# Patient Record
Sex: MALE | Race: BLACK OR AFRICAN AMERICAN | Employment: OTHER | URBAN - METROPOLITAN AREA
[De-identification: names, ages, dates, MRNs, and addresses within clinical notes are randomized per-mention and may not be internally consistent; named-entity substitution may affect disease eponyms.]

---

## 2017-08-18 ENCOUNTER — ANESTHESIA (OUTPATIENT)
Dept: ENDOSCOPY | Age: 82
End: 2017-08-18
Payer: MEDICARE

## 2017-08-18 ENCOUNTER — HOSPITAL ENCOUNTER (OUTPATIENT)
Age: 82
Setting detail: OUTPATIENT SURGERY
Discharge: HOME | End: 2017-08-18
Attending: COLON & RECTAL SURGERY | Admitting: COLON & RECTAL SURGERY
Payer: MEDICARE

## 2017-08-18 ENCOUNTER — ANESTHESIA EVENT (OUTPATIENT)
Dept: ENDOSCOPY | Age: 82
End: 2017-08-18
Payer: MEDICARE

## 2017-08-18 VITALS
SYSTOLIC BLOOD PRESSURE: 112 MMHG | RESPIRATION RATE: 18 BRPM | WEIGHT: 190 LBS | HEART RATE: 74 BPM | TEMPERATURE: 98.2 F | HEIGHT: 71 IN | OXYGEN SATURATION: 94 % | BODY MASS INDEX: 26.6 KG/M2 | DIASTOLIC BLOOD PRESSURE: 68 MMHG

## 2017-08-18 PROCEDURE — 74011250636 HC RX REV CODE- 250/636

## 2017-08-18 PROCEDURE — 74011000250 HC RX REV CODE- 250

## 2017-08-18 PROCEDURE — 77030009426 HC FCPS BIOP ENDOSC BSC -B: Performed by: COLON & RECTAL SURGERY

## 2017-08-18 PROCEDURE — 76040000019: Performed by: COLON & RECTAL SURGERY

## 2017-08-18 PROCEDURE — 88305 TISSUE EXAM BY PATHOLOGIST: CPT | Performed by: COLON & RECTAL SURGERY

## 2017-08-18 PROCEDURE — 77030027957 HC TBNG IRR ENDOGTR BUSS -B: Performed by: COLON & RECTAL SURGERY

## 2017-08-18 PROCEDURE — 76060000031 HC ANESTHESIA FIRST 0.5 HR: Performed by: COLON & RECTAL SURGERY

## 2017-08-18 RX ORDER — SODIUM CHLORIDE 0.9 % (FLUSH) 0.9 %
5-10 SYRINGE (ML) INJECTION EVERY 8 HOURS
Status: DISCONTINUED | OUTPATIENT
Start: 2017-08-18 | End: 2017-08-18 | Stop reason: HOSPADM

## 2017-08-18 RX ORDER — EPINEPHRINE 0.1 MG/ML
1 INJECTION INTRACARDIAC; INTRAVENOUS
Status: DISCONTINUED | OUTPATIENT
Start: 2017-08-18 | End: 2017-08-18 | Stop reason: HOSPADM

## 2017-08-18 RX ORDER — DEXTROMETHORPHAN/PSEUDOEPHED 2.5-7.5/.8
1.2 DROPS ORAL
Status: DISCONTINUED | OUTPATIENT
Start: 2017-08-18 | End: 2017-08-18 | Stop reason: HOSPADM

## 2017-08-18 RX ORDER — LIDOCAINE HYDROCHLORIDE 20 MG/ML
INJECTION, SOLUTION EPIDURAL; INFILTRATION; INTRACAUDAL; PERINEURAL AS NEEDED
Status: DISCONTINUED | OUTPATIENT
Start: 2017-08-18 | End: 2017-08-18 | Stop reason: HOSPADM

## 2017-08-18 RX ORDER — SODIUM CHLORIDE 9 MG/ML
INJECTION, SOLUTION INTRAVENOUS
Status: DISCONTINUED | OUTPATIENT
Start: 2017-08-18 | End: 2017-08-18 | Stop reason: HOSPADM

## 2017-08-18 RX ORDER — NALOXONE HYDROCHLORIDE 0.4 MG/ML
0.4 INJECTION, SOLUTION INTRAMUSCULAR; INTRAVENOUS; SUBCUTANEOUS
Status: DISCONTINUED | OUTPATIENT
Start: 2017-08-18 | End: 2017-08-18 | Stop reason: HOSPADM

## 2017-08-18 RX ORDER — SODIUM CHLORIDE 9 MG/ML
50 INJECTION, SOLUTION INTRAVENOUS CONTINUOUS
Status: DISCONTINUED | OUTPATIENT
Start: 2017-08-18 | End: 2017-08-18 | Stop reason: HOSPADM

## 2017-08-18 RX ORDER — PROPOFOL 10 MG/ML
INJECTION, EMULSION INTRAVENOUS AS NEEDED
Status: DISCONTINUED | OUTPATIENT
Start: 2017-08-18 | End: 2017-08-18 | Stop reason: HOSPADM

## 2017-08-18 RX ORDER — ACETAMINOPHEN 500 MG
650 TABLET ORAL
COMMUNITY
End: 2018-07-27

## 2017-08-18 RX ORDER — SODIUM CHLORIDE 0.9 % (FLUSH) 0.9 %
5-10 SYRINGE (ML) INJECTION AS NEEDED
Status: DISCONTINUED | OUTPATIENT
Start: 2017-08-18 | End: 2017-08-18 | Stop reason: HOSPADM

## 2017-08-18 RX ORDER — ATROPINE SULFATE 0.1 MG/ML
0.5 INJECTION INTRAVENOUS
Status: DISCONTINUED | OUTPATIENT
Start: 2017-08-18 | End: 2017-08-18 | Stop reason: HOSPADM

## 2017-08-18 RX ORDER — FLUMAZENIL 0.1 MG/ML
0.2 INJECTION INTRAVENOUS
Status: DISCONTINUED | OUTPATIENT
Start: 2017-08-18 | End: 2017-08-18 | Stop reason: HOSPADM

## 2017-08-18 RX ADMIN — PROPOFOL 80 MG: 10 INJECTION, EMULSION INTRAVENOUS at 08:25

## 2017-08-18 RX ADMIN — PROPOFOL 20 MG: 10 INJECTION, EMULSION INTRAVENOUS at 08:29

## 2017-08-18 RX ADMIN — SODIUM CHLORIDE: 9 INJECTION, SOLUTION INTRAVENOUS at 08:16

## 2017-08-18 RX ADMIN — LIDOCAINE HYDROCHLORIDE 40 MG: 20 INJECTION, SOLUTION EPIDURAL; INFILTRATION; INTRACAUDAL; PERINEURAL at 08:25

## 2017-08-18 RX ADMIN — PROPOFOL 20 MG: 10 INJECTION, EMULSION INTRAVENOUS at 08:27

## 2017-08-18 RX ADMIN — PROPOFOL 20 MG: 10 INJECTION, EMULSION INTRAVENOUS at 08:31

## 2017-08-18 RX ADMIN — PROPOFOL 40 MG: 10 INJECTION, EMULSION INTRAVENOUS at 08:35

## 2017-08-18 RX ADMIN — PROPOFOL 20 MG: 10 INJECTION, EMULSION INTRAVENOUS at 08:37

## 2017-08-18 NOTE — IP AVS SNAPSHOT
[address] 
[phone] Patient: Mack Summers MRN: [id number] MLM:0/1/2069 You are allergic to the following No active allergies Recent Documentation Height Weight BMI Smoking Status 1.803 m 86.2 kg 26.5 kg/m2 Never Smoker Emergency Contacts Name Discharge Info Relation Home Work Mobile Rafael Machado CAREGIVER [3] Spouse [3] [phone] About your hospitalization You were admitted on:  August 18, 2017 You last received care in the:  [facility] ENDOSCOPY You were discharged on:  August 18, 2017 Unit phone number:  [phone] Why you were hospitalized Your primary diagnosis was:  Not on File Providers Seen During Your Hospitalizations Provider Role Specialty Primary office phone Eliane Michelle MD Attending Provider Colon and Rectal Surgery [phone] Your Primary Care Physician (PCP) Primary Care Physician Office Phone Office Fax Manuel Araizaher [phone] Follow-up Information Follow up With Details Comments Contact Info Sven Amaral MD   [address] [place] [address] 
[phone] Current Discharge Medication List  
  
CONTINUE these medications which have NOT CHANGED Dose & Instructions Dispensing Information Comments Morning Noon Evening Bedtime  
 finasteride 5 mg tablet Commonly known as:  PROSCAR Your last dose was: Your next dose is:    
   
   
 Dose:  5 mg Take 5 mg by mouth daily. Refills:  0  
     
   
   
   
  
 TAMSULOSIN PO Your last dose was: Your next dose is:    
   
   
 Dose:  0.4 mg Take 0.4 mg by mouth daily. Refills:  0  
     
   
   
   
  
 TYLENOL EXTRA STRENGTH 500 mg tablet Generic drug:  acetaminophen Your last dose was: Your next dose is: Dose:  650 mg Take 650 mg by mouth every six (6) hours as needed for Pain. Refills:  0 Discharge Instructions Nghia Monterroso [id number] 
1934 COLON DISCHARGE INSTRUCTIONS Discomfort: 
Redness at IV site- apply warm compress to area; if redness or soreness persist- contact your physician There may be a slight amount of blood passed from the rectum Gaseous discomfort- walking, belching will help relieve any discomfort You may not operate a vehicle for 12 hours You may not engage in an occupation involving machinery or appliances for rest of today You may not drink alcoholic beverages for at least 12 hours Avoid making any critical decisions for at least 24 hour DIET: 
 High fiber diet.  however -  remember your colon is empty and a heavy meal will produce gas. Avoid these foods:  vegetables, fried / greasy foods, carbonated drinks for today MEDICATIONS: 
Avoid blood thinners for one week ACTIVITY: 
You may resume your normal daily activities it is recommended that you spend the remainder of the day resting -  avoid any strenuous activity. CALL M.D. ANY SIGN OF: Increasing pain, nausea, vomiting Abdominal distension (swelling) New increased bleeding (oral or rectal) Fever (chills) Pain in chest area Bloody discharge from nose or mouth Shortness of breath Follow-up Instructions: 
 Call Apurva Reed MD if any questions or problems. Telephone # [phone] Biopsy results will be available in  7 to10 days Should have a repeat colonoscopy in 3 years. COLONOSCOPY FINDINGS: 
Your colonoscopy showed: hemorrhoids and rectal polyps. Discharge Orders None Introducing [facility]! Castro Fontaine introduces [employer] patient portal. Now you can access parts of your medical record, email your doctor's office, and request medication refills online.    
 
1. In your internet browser, go to [email or web address] 2. Click on the First Time User? Click Here link in the Sign In box. You will see the New Member Sign Up page. 3. Enter your [employer] Direct Access Code exactly as it appears below. You will not need to use this code after youve completed the sign-up process. If you do not sign up before the expiration date, you must request a new code. · [employer] Direct Access Code: [email or web address] Expires: 11/16/2017  9:00 AM 
 
4. Enter the last four digits of your Social Security Number (xxxx) and Date of Birth (mm/dd/yyyy) as indicated and click Submit. You will be taken to the next sign-up page. 5. Create a [employer] Direct ID. This will be your [employer] Direct login ID and cannot be changed, so think of one that is secure and easy to remember. 6. Create a [employer] Direct password. You can change your password at any time. 7. Enter your Password Reset Question and Answer. This can be used at a later time if you forget your password. 8. Enter your e-mail address. You will receive e-mail notification when new information is available in [address] 9. Click Sign Up. You can now view and download portions of your medical record. 10. Click the Download Summary menu link to download a portable copy of your medical information. If you have questions, please visit the Frequently Asked Questions section of the [employer] Direct website. Remember, [employer] Direct is NOT to be used for urgent needs. For medical emergencies, dial 911. Now available from your iPhone and Android! General Information Please provide this summary of care documentation to your next provider. Patient Signature:  ____________________________________________________________ Date:  ____________________________________________________________  
  
Milad Ala Provider Signature:  ____________________________________________________________ Date:  ____________________________________________________________

## 2017-08-18 NOTE — BRIEF OP NOTE
BRIEF OPERATIVE NOTE    Date of Procedure: 8/18/2017   Preoperative Diagnosis: PERSONAL HISTORY OF COLON POLYPS   Postoperative Diagnosis: 1. Rectal Polyps  2. Internal Hemorrhoids    Procedure(s):  COLONOSCOPY  ENDOSCOPIC POLYPECTOMY  Surgeon(s) and Role:     * Donald Graves MD - Primary         Assistant Staff:       Surgical Staff:  Endoscopy Technician-1: Vernell Severino  Endoscopy RN-1: Andrew Barnes RN  No case tracking events are documented in the log.   Anesthesia: MAC   Estimated Blood Loss: min  Specimens:   ID Type Source Tests Collected by Time Destination   1 : Rectal Polyps Preservative   Donald Graves MD 8/18/2017 3318 Pathology      Findings: bleeding hemorrhoids, rectal polyps   Complications: none apparent  Implants: * No implants in log *

## 2017-08-18 NOTE — DISCHARGE INSTRUCTIONS
Cristobal Parisi  [id number]  1934    COLON DISCHARGE INSTRUCTIONS  Discomfort:  Redness at IV site- apply warm compress to area; if redness or soreness persist- contact your physician  There may be a slight amount of blood passed from the rectum  Gaseous discomfort- walking, belching will help relieve any discomfort  You may not operate a vehicle for 12 hours  You may not engage in an occupation involving machinery or appliances for rest of today  You may not drink alcoholic beverages for at least 12 hours  Avoid making any critical decisions for at least 24 hour  DIET:   High fiber diet. - however -  remember your colon is empty and a heavy meal will produce gas. Avoid these foods:  vegetables, fried / greasy foods, carbonated drinks for today    MEDICATIONS:  Avoid blood thinners for one week       ACTIVITY:  You may resume your normal daily activities it is recommended that you spend the remainder of the day resting -  avoid any strenuous activity. CALL M.D. ANY SIGN OF:   Increasing pain, nausea, vomiting  Abdominal distension (swelling)  New increased bleeding (oral or rectal)  Fever (chills)  Pain in chest area  Bloody discharge from nose or mouth  Shortness of breath     Follow-up Instructions:   Call Eulice Severin, MD if any questions or problems. Telephone # [phone]  Biopsy results will be available in  7 to10 days  Should have a repeat colonoscopy in 3 years. COLONOSCOPY FINDINGS:  Your colonoscopy showed: hemorrhoids and rectal polyps.

## 2017-08-18 NOTE — PROGRESS NOTES

## 2017-08-18 NOTE — H&P
Colon and Rectal Surgery History and Physical    Subjective:      Manny Portillo is a 80 y.o. male who has hx alyse    There are no active problems to display for this patient. Past Medical History:   Diagnosis Date    BPH (benign prostatic hyperplasia)     DJD (degenerative joint disease)     Hypercholesterolemia     Other and unspecified hyperlipidemia       Past Surgical History:   Procedure Laterality Date    HX ORTHOPAEDIC Right 2010    rotator cuff    HX TONSILLECTOMY        Social History   Substance Use Topics    Smoking status: Never Smoker    Smokeless tobacco: Never Used    Alcohol use Yes      Comment: social      Family History   Problem Relation Age of Onset    Diabetes Sister     Diabetes Brother     Heart Disease Brother       Prior to Admission medications    Medication Sig Start Date End Date Taking? Authorizing Provider   TAMSULOSIN HCL (TAMSULOSIN PO) Take 0.4 mg by mouth daily. Yes Historical Provider   finasteride (PROSCAR) 5 mg tablet Take 5 mg by mouth daily. Yes Historical Provider     No Known Allergies     Review of Systems:    A comprehensive review of systems was negative except for that written in the History of Present Illness. Objective: There were no vitals taken for this visit. Physical Exam:   nad  Chest clear  Heart reg  abd soft    Imaging:  images and reports reviewed    Lab Review:  No results found for this or any previous visit (from the past 24 hour(s)). Labs and radiology: images and reports reviewed      Assessment:   Hx alyse    Plan:     1. I recommend proceeding with colonoscopy. Treatment alternatives were discussed. 2. Discussed aspects of surgical intervention, methods, risks (including by not limited to infection, bleeding, hematoma, and perforation of the intestines or solid organs), and the risks of general anesthetic. The patient understands the risks; any and all questions were answered to the patient's satisfaction.     Signed By: Royal Hameed MD     August 18, 2017

## 2017-08-18 NOTE — ANESTHESIA POSTPROCEDURE EVALUATION
Post-Anesthesia Evaluation and Assessment    Patient: Carmelita Moore MRN: [id number]  SSN: [id number]    YOB: 1934  Age: 80 y.o. Sex: male       Cardiovascular Function/Vital Signs  Visit Vitals    /68    Pulse 84    Temp 36.8 °C (98.2 °F)    Resp 20    Ht 5' 11\" (1.803 m)    Wt 86.2 kg (190 lb)    SpO2 94%    BMI 26.5 kg/m2       Patient is status post MAC anesthesia for Procedure(s):  COLONOSCOPY  ENDOSCOPIC POLYPECTOMY. Nausea/Vomiting: None    Postoperative hydration reviewed and adequate. Pain:  Pain Scale 1: Numeric (0 - 10) (08/18/17 0726)  Pain Intensity 1: 0 (08/18/17 0726)   Managed    Neurological Status: At baseline    Mental Status and Level of Consciousness: Arousable    Pulmonary Status:   O2 Device: Nasal cannula (08/18/17 0844)   Adequate oxygenation and airway patent    Complications related to anesthesia: None    Post-anesthesia assessment completed.  No concerns    Signed By: Birgit Perry MD     August 18, 2017

## 2017-08-18 NOTE — OP NOTES
Colonoscopy Procedure Note    Indications: Previous adenomatous polyp, Family history of colon cancer    Anesthesia/Sedation: MAC anesthesia Propofol    Pre-Procedure Exam:  Airway: clear   Heart: normal S1and S2    Lungs: clear bilateral  Abdomen: soft, nontender, bowel sounds present and normal in all quadrants   Mental Status: awake, alert, and oriented to person, place, and time      Procedure in Detail:  Informed consent was obtained for the procedure, including sedation. Risks of perforation, hemorrhage, adverse drug reaction, and aspiration were discussed. The patient was placed in the left lateral decubitus position. Based on the pre-procedure assessment, including review of the patient's medical history, medications, allergies, and review of systems, he had been deemed to be an appropriate candidate for moderate sedation; he was therefore sedated with the medications listed above. The patient was monitored continuously with ECG tracing, pulse oximetry, blood pressure monitoring, and direct observations. A rectal examination was performed. The [id number] was inserted into the rectum and advanced under direct vision to the cecum, which was identified by the ileocecal valve and appendiceal orifice. The quality of the colonic preparation was excellent. A careful inspection was made as the colonoscope was withdrawn, including a retroflexed view of the rectum; findings and interventions are described below. Appropriate photodocumentation was obtained. Findings:   Rectum:     - Protruding lesions:     -Internal Hemorrhoids and     -Pedunculated Polyp(s) size 3-5 mm removed by polypectomy (hot biopsy)  Sigmoid:   Normal  Descending Colon:   Normal  Transverse Colon:   Normal  Ascending Colon:   Normal  Cecum:   Normal            Specimens: Specimens were collected and sent to pathology. EBL: Minimal    Complications: None; patient tolerated the procedure well.     Attending Attestation: I performed the procedure. Recommendations:   - Repeat colonoscopy in 3 years.    Consider hemorrhoidectomy for bleeding hemorrhoids    Signed By: Roberto Dobbs MD                        August 18, 2017

## 2017-08-18 NOTE — ROUTINE PROCESS
Mack Summers  1934  [id number]    Situation:  Verbal report received from: [facility]  Procedure: Procedure(s):  COLONOSCOPY  ENDOSCOPIC POLYPECTOMY    Background:    Preoperative diagnosis: PERSONAL HISTORY OF COLON POLYPS   Postoperative diagnosis: 1. Rectal Polyps  2. Internal Hemorrhoids    :  Dr. Karyle Skiff  Assistant(s): Endoscopy Technician-1: Pacheco Severino  Endoscopy RN-1: Jamia Feldman RN    Specimens:   ID Type Source Tests Collected by Time Destination   1 : Rectal Polyps Preservative   Eliane Michelle MD 8/18/2017 4264 Pathology     H. Pylori  no    Assessment:  Intra-procedure medications   Anesthesia gave intra-procedure sedation and medications, see anesthesia flow sheet yes    Intravenous fluids: NS@ KVO     Vital signs stable     Abdominal assessment: round and soft     Recommendation:  Discharge patient per MD order.   Family or Friend Spouse   Permission to share finding with family or friend yes

## 2018-03-17 ENCOUNTER — HOSPITAL ENCOUNTER (EMERGENCY)
Age: 83
Discharge: HOME | End: 2018-03-17
Attending: EMERGENCY MEDICINE
Payer: MEDICARE

## 2018-03-17 ENCOUNTER — APPOINTMENT (OUTPATIENT)
Dept: CT IMAGING | Age: 83
End: 2018-03-17
Attending: EMERGENCY MEDICINE
Payer: MEDICARE

## 2018-03-17 VITALS
DIASTOLIC BLOOD PRESSURE: 74 MMHG | WEIGHT: 200 LBS | SYSTOLIC BLOOD PRESSURE: 126 MMHG | TEMPERATURE: 98.3 F | OXYGEN SATURATION: 96 % | RESPIRATION RATE: 16 BRPM | HEART RATE: 89 BPM | HEIGHT: 72 IN | BODY MASS INDEX: 27.09 KG/M2

## 2018-03-17 DIAGNOSIS — R55 SYNCOPE AND COLLAPSE: Primary | ICD-10-CM

## 2018-03-17 LAB
ALBUMIN SERPL-MCNC: 3.3 G/DL (ref 3.5–5)
ALBUMIN/GLOB SERPL: 0.8 {RATIO} (ref 1.1–2.2)
ALP SERPL-CCNC: 123 U/L (ref 45–117)
ALT SERPL-CCNC: 15 U/L (ref 12–78)
ANION GAP SERPL CALC-SCNC: 6 MMOL/L (ref 5–15)
AST SERPL-CCNC: 24 U/L (ref 15–37)
ATRIAL RATE: 84 BPM
BASOPHILS # BLD: 0 K/UL (ref 0–0.1)
BASOPHILS NFR BLD: 1 % (ref 0–1)
BILIRUB SERPL-MCNC: 0.5 MG/DL (ref 0.2–1)
BUN SERPL-MCNC: 16 MG/DL (ref 6–20)
BUN/CREAT SERPL: 14 (ref 12–20)
CALCIUM SERPL-MCNC: 9 MG/DL (ref 8.5–10.1)
CALCULATED P AXIS, ECG09: 53 DEGREES
CALCULATED R AXIS, ECG10: 2 DEGREES
CALCULATED T AXIS, ECG11: 8 DEGREES
CHLORIDE SERPL-SCNC: 108 MMOL/L (ref 97–108)
CO2 SERPL-SCNC: 27 MMOL/L (ref 21–32)
CREAT SERPL-MCNC: 1.17 MG/DL (ref 0.7–1.3)
DIAGNOSIS, 93000: NORMAL
DIFFERENTIAL METHOD BLD: ABNORMAL
EOSINOPHIL # BLD: 0.2 K/UL (ref 0–0.4)
EOSINOPHIL NFR BLD: 4 % (ref 0–7)
ERYTHROCYTE [DISTWIDTH] IN BLOOD BY AUTOMATED COUNT: 12.4 % (ref 11.5–14.5)
GLOBULIN SER CALC-MCNC: 4 G/DL (ref 2–4)
GLUCOSE SERPL-MCNC: 105 MG/DL (ref 65–100)
HCT VFR BLD AUTO: 41.2 % (ref 36.6–50.3)
HGB BLD-MCNC: 14.2 G/DL (ref 12.1–17)
IMM GRANULOCYTES # BLD: 0 K/UL (ref 0–0.04)
IMM GRANULOCYTES NFR BLD AUTO: 1 % (ref 0–0.5)
LYMPHOCYTES # BLD: 2.2 K/UL (ref 0.8–3.5)
LYMPHOCYTES NFR BLD: 40 % (ref 12–49)
MCH RBC QN AUTO: 35.2 PG (ref 26–34)
MCHC RBC AUTO-ENTMCNC: 34.5 G/DL (ref 30–36.5)
MCV RBC AUTO: 102.2 FL (ref 80–99)
MONOCYTES # BLD: 0.5 K/UL (ref 0–1)
MONOCYTES NFR BLD: 9 % (ref 5–13)
NEUTS SEG # BLD: 2.5 K/UL (ref 1.8–8)
NEUTS SEG NFR BLD: 45 % (ref 32–75)
NRBC # BLD: 0 K/UL (ref 0–0.01)
NRBC BLD-RTO: 0 PER 100 WBC
P-R INTERVAL, ECG05: 172 MS
PLATELET # BLD AUTO: 141 K/UL (ref 150–400)
PMV BLD AUTO: 10.4 FL (ref 8.9–12.9)
POTASSIUM SERPL-SCNC: 4 MMOL/L (ref 3.5–5.1)
PROT SERPL-MCNC: 7.3 G/DL (ref 6.4–8.2)
Q-T INTERVAL, ECG07: 362 MS
QRS DURATION, ECG06: 78 MS
QTC CALCULATION (BEZET), ECG08: 427 MS
RBC # BLD AUTO: 4.03 M/UL (ref 4.1–5.7)
SODIUM SERPL-SCNC: 141 MMOL/L (ref 136–145)
TROPONIN I SERPL-MCNC: <0.04 NG/ML
VENTRICULAR RATE, ECG03: 84 BPM
WBC # BLD AUTO: 5.5 K/UL (ref 4.1–11.1)

## 2018-03-17 PROCEDURE — 93005 ELECTROCARDIOGRAM TRACING: CPT

## 2018-03-17 PROCEDURE — 99284 EMERGENCY DEPT VISIT MOD MDM: CPT

## 2018-03-17 PROCEDURE — 96360 HYDRATION IV INFUSION INIT: CPT

## 2018-03-17 PROCEDURE — 85025 COMPLETE CBC W/AUTO DIFF WBC: CPT | Performed by: EMERGENCY MEDICINE

## 2018-03-17 PROCEDURE — 96361 HYDRATE IV INFUSION ADD-ON: CPT

## 2018-03-17 PROCEDURE — 36415 COLL VENOUS BLD VENIPUNCTURE: CPT | Performed by: EMERGENCY MEDICINE

## 2018-03-17 PROCEDURE — 84484 ASSAY OF TROPONIN QUANT: CPT | Performed by: EMERGENCY MEDICINE

## 2018-03-17 PROCEDURE — 80053 COMPREHEN METABOLIC PANEL: CPT | Performed by: EMERGENCY MEDICINE

## 2018-03-17 PROCEDURE — 74011250636 HC RX REV CODE- 250/636: Performed by: EMERGENCY MEDICINE

## 2018-03-17 PROCEDURE — 70450 CT HEAD/BRAIN W/O DYE: CPT

## 2018-03-17 RX ADMIN — SODIUM CHLORIDE 500 ML: 900 INJECTION, SOLUTION INTRAVENOUS at 03:24

## 2018-03-17 NOTE — DISCHARGE INSTRUCTIONS
Fainting: Care Instructions  Your Care Instructions    When you faint, or pass out, you lose consciousness for a short time. A brief drop in blood flow to the brain often causes it. When you fall or lie down, more blood flows to your brain and you regain consciousness. Emotional stress, pain, or overheating-especially if you have been standing-can make you faint. In these cases, fainting is usually not serious. But fainting can be a sign of a more serious problem. Your doctor may want you to have more tests to rule out other causes. The treatment you need depends on the reason why you fainted. The doctor has checked you carefully, but problems can develop later. If you notice any problems or new symptoms, get medical treatment right away. Follow-up care is a key part of your treatment and safety. Be sure to make and go to all appointments, and call your doctor if you are having problems. It's also a good idea to know your test results and keep a list of the medicines you take. How can you care for yourself at home? · Drink plenty of fluids to prevent dehydration. If you have kidney, heart, or liver disease and have to limit fluids, talk with your doctor before you increase your fluid intake. When should you call for help? Call 911 anytime you think you may need emergency care. For example, call if:  ? · You have symptoms of a heart problem. These may include:  ¨ Chest pain or pressure. ¨ Severe trouble breathing. ¨ A fast or irregular heartbeat. ¨ Lightheadedness or sudden weakness. ¨ Coughing up pink, foamy mucus. ¨ Passing out. After you call 911, the  may tell you to chew 1 adult-strength or 2 to 4 low-dose aspirin. Wait for an ambulance. Do not try to drive yourself. ? · You have symptoms of a stroke. These may include:  ¨ Sudden numbness, tingling, weakness, or loss of movement in your face, arm, or leg, especially on only one side of your body. ¨ Sudden vision changes.   ¨ Sudden trouble speaking. ¨ Sudden confusion or trouble understanding simple statements. ¨ Sudden problems with walking or balance. ¨ A sudden, severe headache that is different from past headaches. ? · You passed out (lost consciousness) again. ? Watch closely for changes in your health, and be sure to contact your doctor if:  ? · You do not get better as expected. Where can you learn more? Go to http://horacio-piedad.info/. Enter K025 in the search box to learn more about \"Fainting: Care Instructions. \"  Current as of: March 20, 2017  Content Version: 11.4  © 1021-8560 [employer]. Care instructions adapted under license by [employer] (which disclaims liability or warranty for this information). If you have questions about a medical condition or this instruction, always ask your healthcare professional. [address] any warranty or liability for your use of this information. We hope that we have addressed all of your medical concerns. The examination and treatment you received in the Emergency Department were for an emergent problem and were not intended as complete care. It is important that you follow up with your healthcare provider(s) for ongoing care. If your symptoms worsen or do not improve as expected, and you are unable to reach your usual health care provider(s), you should return to the Emergency Department. Today's healthcare is undergoing tremendous change, and patient satisfaction surveys are one of the many tools to assess the quality of medical care. You may receive a survey from the [employer] organization regarding your experience in the Emergency Department. I hope that your experience has been completely positive, particularly the medical care that I provided. As such, please participate in the survey; anything less than excellent does not meet my expectations or intentions.         [facility] and Eugenie Szymanski participate in nationally recognized quality of care measures. If your blood pressure is greater than 120/80, as reported below, we urge that you seek medical care to address the potential of high blood pressure, commonly known as hypertension. Hypertension can be hereditary or can be caused by certain medical conditions, pain, stress, or \"white coat syndrome. \"       Please make an appointment with your health care provider(s) for follow up of your Emergency Department visit. VITALS:   Patient Vitals for the past 8 hrs:   Temp Pulse Resp BP SpO2   03/17/18 0255 98.3 °F (36.8 °C) 85 19 122/69 97 %          Thank you for allowing us to provide you with medical care today. We realize that you have many choices for your emergency care needs. Please choose us in the future for any continued health care needs.       MD [facility], [facility].   Office: [phone]            Recent Results (from the past 24 hour(s))   EKG, 12 LEAD, INITIAL    Collection Time: 03/17/18  2:54 AM   Result Value Ref Range    Ventricular Rate 84 BPM    Atrial Rate 84 BPM    P-R Interval 172 ms    QRS Duration 78 ms    Q-T Interval 362 ms    QTC Calculation (Bezet) 427 ms    Calculated P Axis 53 degrees    Calculated R Axis 2 degrees    Calculated T Axis 8 degrees    Diagnosis Normal sinus rhythm  No previous ECGs available      CBC WITH AUTOMATED DIFF    Collection Time: 03/17/18  3:00 AM   Result Value Ref Range    WBC 5.5 4.1 - 11.1 K/uL    RBC 4.03 (L) 4.10 - 5.70 M/uL    HGB 14.2 12.1 - 17.0 g/dL    HCT 41.2 36.6 - 50.3 %    .2 (H) 80.0 - 99.0 FL    MCH 35.2 (H) 26.0 - 34.0 PG    MCHC 34.5 30.0 - 36.5 g/dL    RDW 12.4 11.5 - 14.5 %    PLATELET 487 (L) 932 - 400 K/uL    MPV 10.4 8.9 - 12.9 FL    NRBC 0.0 0  WBC    ABSOLUTE NRBC 0.00 0.00 - 0.01 K/uL    NEUTROPHILS 45 32 - 75 %    LYMPHOCYTES 40 12 - 49 %    MONOCYTES 9 5 - 13 % EOSINOPHILS 4 0 - 7 %    BASOPHILS 1 0 - 1 %    IMMATURE GRANULOCYTES 1 (H) 0.0 - 0.5 %    ABS. NEUTROPHILS 2.5 1.8 - 8.0 K/UL    ABS. LYMPHOCYTES 2.2 0.8 - 3.5 K/UL    ABS. MONOCYTES 0.5 0.0 - 1.0 K/UL    ABS. EOSINOPHILS 0.2 0.0 - 0.4 K/UL    ABS. BASOPHILS 0.0 0.0 - 0.1 K/UL    ABS. IMM. GRANS. 0.0 0.00 - 0.04 K/UL    DF AUTOMATED     METABOLIC PANEL, COMPREHENSIVE    Collection Time: 03/17/18  3:00 AM   Result Value Ref Range    Sodium 141 136 - 145 mmol/L    Potassium 4.0 3.5 - 5.1 mmol/L    Chloride 108 97 - 108 mmol/L    CO2 27 21 - 32 mmol/L    Anion gap 6 5 - 15 mmol/L    Glucose 105 (H) 65 - 100 mg/dL    BUN 16 6 - 20 MG/DL    Creatinine 1.17 0.70 - 1.30 MG/DL    BUN/Creatinine ratio 14 12 - 20      GFR est AA >60 >60 ml/min/1.73m2    GFR est non-AA 60 (L) >60 ml/min/1.73m2    Calcium 9.0 8.5 - 10.1 MG/DL    Bilirubin, total 0.5 0.2 - 1.0 MG/DL    ALT (SGPT) 15 12 - 78 U/L    AST (SGOT) 24 15 - 37 U/L    Alk. phosphatase 123 (H) 45 - 117 U/L    Protein, total 7.3 6.4 - 8.2 g/dL    Albumin 3.3 (L) 3.5 - 5.0 g/dL    Globulin 4.0 2.0 - 4.0 g/dL    A-G Ratio 0.8 (L) 1.1 - 2.2     TROPONIN I    Collection Time: 03/17/18  3:00 AM   Result Value Ref Range    Troponin-I, Qt. <0.04 <0.05 ng/mL       Ct Head Wo Cont    Result Date: 3/17/2018  EXAM:  CT HEAD WO CONT INDICATION:   syncope, possible head injury fell going to the bathroom COMPARISON: None. CONTRAST:  None. TECHNIQUE: Unenhanced CT of the head was performed using 5 mm images. Brain and bone windows were generated. CT dose reduction was achieved through use of a standardized protocol tailored for this examination and automatic exposure control for dose modulation. Study delayed by slowness of PACS FINDINGS: Ventricles and sulci are within normal limits for patient's age. No hemorrhage mass or acute infarction identified. Bony structures are intact. 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      IMPRESSION: No acute abnormality

## 2018-03-17 NOTE — ED PROVIDER NOTES
HPI Comments: 81yo M with pmh sig for arthritis, BPH who presents from home via EMS for syncope. Pt states he got up from bed to use bathroom for BM when he passed out onto bathroom floor. Wife helped him up on to toilet where he had a BM but continued to feel shakey and unsteady until EMS arrived. Unknown if he hit head.  + LOC. Pt reports no complaints at this time. Denies CP, SOB, headaches, n/v, diarrhea. Pt has had syncopal episodes in past.      Patient is a 80 y.o. male presenting with dizziness. The history is provided by the patient and the spouse. Dizziness   Pertinent negatives include no chest pain and no headaches. 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    BPH (benign prostatic hyperplasia)     DJD (degenerative joint disease)     Hypercholesterolemia     Other and unspecified hyperlipidemia        Past Surgical History:   Procedure Laterality Date    COLONOSCOPY N/A 8/18/2017    COLONOSCOPY performed by Carmen Escalera MD at [facility] ENDOSCOPY    HX ORTHOPAEDIC Right 2010    rotator cuff    HX TONSILLECTOMY           Family History:   Problem Relation Age of Onset    Diabetes Sister     Diabetes Brother     Heart Disease Brother        Social History     Social History    Marital status:      Spouse name: N/A    Number of children: N/A    Years of education: N/A     Occupational History    Not on file. Social History Main Topics    Smoking status: Never Smoker    Smokeless tobacco: Never Used    Alcohol use Yes      Comment: social    Drug use: Not on file    Sexual activity: Not Currently     Other Topics Concern    Not on file     Social History Narrative         ALLERGIES: Review of patient's allergies indicates no known allergies. Review of Systems   Constitutional: Negative for diaphoresis and fever. HENT: Negative for facial swelling. Eyes: Negative for visual disturbance.    Respiratory: Negative for cough. Cardiovascular: Negative for chest pain. Gastrointestinal: Negative for abdominal pain. Genitourinary: Negative for dysuria. Musculoskeletal: Negative for joint swelling. Skin: Negative for rash. Neurological: Positive for dizziness. Negative for headaches. Hematological: Negative for adenopathy. Psychiatric/Behavioral: Negative for suicidal ideas. Vitals:    03/17/18 0255   BP: 122/69   Pulse: 85   Resp: 19   Temp: 98.3 °F (36.8 °C)   SpO2: 97%   Weight: 90.7 kg (200 lb)   Height: 6' (1.829 m)            Physical Exam   Constitutional: He is oriented to person, place, and time. He appears well-developed and well-nourished. No distress. HENT:   Head: Normocephalic and atraumatic. Mouth/Throat: Oropharynx is clear and moist.   Eyes: Pupils are equal, round, and reactive to light. Neck: Normal range of motion. Neck supple. Cardiovascular: Normal rate, regular rhythm, normal heart sounds and intact distal pulses. Pulmonary/Chest: Effort normal and breath sounds normal. No respiratory distress. Abdominal: Soft. Bowel sounds are normal. He exhibits no distension. There is no tenderness. Musculoskeletal: Normal range of motion. He exhibits no edema. Neurological: He is alert and oriented to person, place, and time. Skin: Skin is warm and dry. Nursing note and vitals reviewed. MDM  Number of Diagnoses or Management Options        ED Course       Procedures    A:  80 y.o. Male presents after syncopal event. VS currently stable. No overt evidence of injury or other significant findings on physical exam.  Neuro exam is nonfocal.    DD:  Vasovagal syncope, cardiogenic, orthostatic, neurogenic, electrolyte abnormality, intoxication,     P:  ecg  cxr  Head ct  Labs  ivf  Reassess    ED EKG interpretation:  Rhythm: normal sinus rhythm. Rate (approx.): XXX. Axis: normal.  ST segment:  No concerning ST elevations or depressions.  This EKG was interpreted by Franck Villaseñor MD,ED Provider. Chest X-ray, 2 view:  Chest xray, PA and Lat, shows no signs of acute disease. This CXR was interpreted by Franck Villaseñor MD, ED Provider. Head CT: IMPRESSION: No acute process. Lab Results Significant For:  CBC:  unremarkable  CMP:  unremarkable  Trop:  unremarkable      4:31 AM  Patient resting comfortably with no complaints at this time. VS remain stable. Repeat physical exam is unremarkable. Pt ambulatory without difficulty and tolerating po's well. Stable for discharge home. Likely had vagal episode from BM.

## 2018-03-17 NOTE — ED TRIAGE NOTES
Pt to ED via EMS for syncopal episode at home. Pt reports getting up to use the restroom, feeling lightheaded and passing out. Denies hitting head. Per EMS, pt diaphoretic on their arrival. Pt reports having similar episodes in the past. Small abrasion on left elbow.

## 2018-07-27 ENCOUNTER — OFFICE VISIT (OUTPATIENT)
Dept: URGENT CARE | Age: 83
End: 2018-07-27

## 2018-07-27 VITALS
HEART RATE: 88 BPM | OXYGEN SATURATION: 97 % | DIASTOLIC BLOOD PRESSURE: 73 MMHG | SYSTOLIC BLOOD PRESSURE: 106 MMHG | WEIGHT: 204 LBS | RESPIRATION RATE: 20 BRPM | TEMPERATURE: 96.8 F | HEIGHT: 70 IN | BODY MASS INDEX: 29.2 KG/M2

## 2018-07-27 DIAGNOSIS — R21 RASH: Primary | ICD-10-CM

## 2018-07-27 RX ORDER — TRIAMCINOLONE ACETONIDE 1 MG/G
CREAM TOPICAL 2 TIMES DAILY
Qty: 30 G | Refills: 0 | Status: SHIPPED | OUTPATIENT
Start: 2018-07-27 | End: 2018-08-03

## 2018-07-27 RX ORDER — PREDNISONE 5 MG/1
TABLET ORAL
Qty: 21 TAB | Refills: 0 | Status: SHIPPED | OUTPATIENT
Start: 2018-07-27 | End: 2020-07-30

## 2018-07-27 RX ORDER — MULTIVITAMIN WITH IRON
1 TABLET ORAL DAILY
COMMUNITY
End: 2021-03-24

## 2018-07-27 NOTE — PATIENT INSTRUCTIONS
Try cream first. If no improvement ok to start prednisone pack. Rash: Care Instructions  Your Care Instructions  A rash is any irritation or inflammation of the skin. Rashes have many possible causes, including allergy, infection, illness, heat, and emotional stress. Follow-up care is a key part of your treatment and safety. Be sure to make and go to all appointments, and call your doctor if you are having problems. It's also a good idea to know your test results and keep a list of the medicines you take. How can you care for yourself at home? · Wash the area with water only. Soap can make dryness and itching worse. Pat dry. · Put cold, wet cloths on the rash to reduce itching. · Keep cool, and stay out of the sun. · Leave the rash open to the air as much of the time as possible. · Sometimes petroleum jelly (Vaseline) can help relieve the discomfort caused by a rash. A moisturizing lotion, such as Cetaphil, also may help. Calamine lotion may help for rashes caused by contact with something (such as a plant or soap) that irritated the skin. Use it 3 or 4 times a day. · If your doctor prescribed a cream, use it as directed. If your doctor prescribed medicine, take it exactly as directed. · If your rash itches so badly that it interferes with your normal activities, take an over-the-counter antihistamine, such as diphenhydramine (Benadryl) or loratadine (Claritin). Read and follow all instructions on the label. When should you call for help? Call your doctor now or seek immediate medical care if:    · You have signs of infection, such as:  ¨ Increased pain, swelling, warmth, or redness. ¨ Red streaks leading from the area. ¨ Pus draining from the area. ¨ A fever.     · You have joint pain along with the rash.    Watch closely for changes in your health, and be sure to contact your doctor if:    · Your rash is changing or getting worse.  For example, call if you have pain along with the rash, the rash is spreading, or you have new blisters.     · You do not get better after 1 week. Where can you learn more? Go to http://horacio-piedad.info/. Enter Z049 in the search box to learn more about \"Rash: Care Instructions. \"  Current as of: October 5, 2017  Content Version: 11.7  © 8078-2208 [employer]. Care instructions adapted under license by [employer] (which disclaims liability or warranty for this information). If you have questions about a medical condition or this instruction, always ask your healthcare professional. [address] any warranty or liability for your use of this information.

## 2018-07-27 NOTE — MR AVS SNAPSHOT
[place] [address] [facility] [address] 
[phone] Patient: Mike Us MRN: [id number] NND:[birth year] Visit Information Date & Time Provider Department Dept. Phone Encounter #  
 7/27/2018 11:00 AM [address] [facility] [phone] [id number] Upcoming Health Maintenance Date Due DTaP/Tdap/Td series (1 - Tdap) 5/7/1955 ZOSTER VACCINE AGE 60> 3/7/1994 GLAUCOMA SCREENING Q2Y 5/7/1999 Pneumococcal 65+ Low/Medium Risk (1 of 2 - PCV13) 5/7/1999 MEDICARE YEARLY EXAM 3/14/2018 Influenza Age 5 to Adult 8/1/2018 Allergies as of 7/27/2018  Review Complete On: 7/27/2018 By: Jennifer Han MD  
 No Known Allergies Current Immunizations  Reviewed on 10/2/2013 Name Date Influenza Vaccine 10/2/2013 Influenza Vaccine Split 10/6/2010 Not reviewed this visit You Were Diagnosed With   
  
 Codes Comments Rash    -  Primary ICD-10-CM: R21 
ICD-9-CM: 782.1 Vitals BP Pulse Temp Resp Height(growth percentile) Weight(growth percentile) 106/73 88 96.8 °F (36 °C) 20 5' 10\" (1.778 m) 204 lb (92.5 kg) SpO2 BMI Smoking Status 97% 29.27 kg/m2 Never Smoker BMI and BSA Data Body Mass Index Body Surface Area  
 29.27 kg/m 2 2.14 m 2 Preferred Pharmacy Pharmacy Name Phone [id number] [place], [address] [phone] Your Updated Medication List  
  
   
This list is accurate as of 7/27/18 11:43 AM.  Always use your most recent med list.  
  
  
  
  
 finasteride 5 mg tablet Commonly known as:  PROSCAR Take 5 mg by mouth daily. multivitamin with iron tablet Take 1 Tab by mouth daily. predniSONE 5 mg dose pack Commonly known as:  STERAPRED See administration instruction per 5mg dose pack TAMSULOSIN PO Take 0.4 mg by mouth daily. triamcinolone acetonide 0.1 % topical cream  
Commonly known as:  KENALOG Apply  to affected area two (2) times a day for 7 days. use thin layer Prescriptions Printed Refills  
 predniSONE (STERAPRED) 5 mg dose pack 0 Sig: See administration instruction per 5mg dose pack Class: Print Prescriptions Sent to Pharmacy Refills  
 triamcinolone acetonide (KENALOG) 0.1 % topical cream 0 Sig: Apply  to affected area two (2) times a day for 7 days. use thin layer Class: Normal  
 Pharmacy: [id number] #: [phone] Route: Topical  
  
Patient Instructions Try cream first. If no improvement ok to start prednisone pack. Rash: Care Instructions Your Care Instructions A rash is any irritation or inflammation of the skin. Rashes have many possible causes, including allergy, infection, illness, heat, and emotional stress. Follow-up care is a key part of your treatment and safety. Be sure to make and go to all appointments, and call your doctor if you are having problems. It's also a good idea to know your test results and keep a list of the medicines you take. How can you care for yourself at home? · Wash the area with water only. Soap can make dryness and itching worse. Pat dry. · Put cold, wet cloths on the rash to reduce itching. · Keep cool, and stay out of the sun. · Leave the rash open to the air as much of the time as possible. · Sometimes petroleum jelly (Vaseline) can help relieve the discomfort caused by a rash. A moisturizing lotion, such as Cetaphil, also may help. Calamine lotion may help for rashes caused by contact with something (such as a plant or soap) that irritated the skin. Use it 3 or 4 times a day. · If your doctor prescribed a cream, use it as directed. If your doctor prescribed medicine, take it exactly as directed. · If your rash itches so badly that it interferes with your normal activities, take an over-the-counter antihistamine, such as diphenhydramine (Benadryl) or loratadine (Claritin). Read and follow all instructions on the label. When should you call for help? Call your doctor now or seek immediate medical care if: 
  · You have signs of infection, such as: 
¨ Increased pain, swelling, warmth, or redness. ¨ Red streaks leading from the area. ¨ Pus draining from the area. ¨ A fever.  
  · You have joint pain along with the rash.  
 Watch closely for changes in your health, and be sure to contact your doctor if: 
  · Your rash is changing or getting worse. For example, call if you have pain along with the rash, the rash is spreading, or you have new blisters.  
  · You do not get better after 1 week. Where can you learn more? Go to http://horacio-piedad.info/. Enter C980 in the search box to learn more about \"Rash: Care Instructions. \" Current as of: October 5, 2017 Content Version: 11.7 © 1172-3587 [employer]. Care instructions adapted under license by [employer] (which disclaims liability or warranty for this information). If you have questions about a medical condition or this instruction, always ask your healthcare professional. [address] any warranty or liability for your use of this information. Introducing [facility]! [employer] introduces AnaBios patient portal. Now you can access parts of your medical record, email your doctor's office, and request medication refills online. 1. In your internet browser, go to [email or web address] 2. Click on the First Time User? Click Here link in the Sign In box. You will see the New Member Sign Up page. 3. Enter your AnaBios Access Code exactly as it appears below. You will not need to use this code after youve completed the sign-up process.  If you do not sign up before the expiration date, you must request a new code. · UI Robot Access Code: [email or web address] Expires: 10/25/2018 11:43 AM 
 
4. Enter the last four digits of your Social Security Number (xxxx) and Date of Birth (mm/dd/yyyy) as indicated and click Submit. You will be taken to the next sign-up page. 5. Create a UI Robot ID. This will be your UI Robot login ID and cannot be changed, so think of one that is secure and easy to remember. 6. Create a [employer]ot password. You can change your password at any time. 7. Enter your Password Reset Question and Answer. This can be used at a later time if you forget your password. 8. Enter your e-mail address. You will receive e-mail notification when new information is available in [address] 9. Click Sign Up. You can now view and download portions of your medical record. 10. Click the Download Summary menu link to download a portable copy of your medical information. If you have questions, please visit the Frequently Asked Questions section of the UI [employer]ot website. Remember, UI Robot is NOT to be used for urgent needs. For medical emergencies, dial 911. Now available from your iPhone and Android! Please provide this summary of care documentation to your next provider. Your primary care clinician is listed as Junior Lopez If you have any questions after today's visit, please call [phone].

## 2018-07-27 NOTE — PROGRESS NOTES
Patient is a 80 y.o. male presenting with rash. Rash    The history is provided by the patient. This is a new problem. Episode onset: 5 days ago. The problem has been gradually worsening. The problem is associated with an unknown factor. There has been no fever. The rash is present on the trunk and right lower leg. The patient is experiencing no pain. Associated symptoms include itching. He has tried nothing for the symptoms. 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    BPH (benign prostatic hyperplasia)     DJD (degenerative joint disease)     Hypercholesterolemia     Other and unspecified hyperlipidemia         Past Surgical History:   Procedure Laterality Date    COLONOSCOPY N/A 8/18/2017    [facility] at [facility] ENDOSCOPY    HX ORTHOPAEDIC Right 2010    rotator cuff    HX TONSILLECTOMY           Family History   Problem Relation Age of Onset    Diabetes Sister     Diabetes Brother     Heart Disease Brother         Social History     Social History    Marital status:      Spouse name: N/A    Number of children: N/A    Years of education: N/A     Occupational History    Not on file. Social History Main Topics    Smoking status: Never Smoker    Smokeless tobacco: Never Used    Alcohol use Yes      Comment: social    Drug use: Not on file    Sexual activity: Not Currently     Other Topics Concern    Not on file     Social History Narrative                ALLERGIES: Review of patient's allergies indicates no known allergies. Review of Systems   Constitutional: Negative for chills and fever. Respiratory: Negative for shortness of breath and wheezing. Cardiovascular: Negative for chest pain and palpitations. Musculoskeletal: Negative for myalgias. Skin: Positive for itching and rash. Hematological: Negative for adenopathy.        Vitals:    07/27/18 1126   BP: 106/73   Pulse: 88 Resp: 20   Temp: 96.8 °F (36 °C)   SpO2: 97%   Weight: 204 lb (92.5 kg)   Height: 5' 10\" (1.778 m)       Physical Exam   Constitutional: He appears well-developed and well-nourished. No distress. Neurological: He is alert. Skin: Rash (Chest, back, abdomen, RLE: multiple scattered erythematous raised lesions, non-tender) noted. He is not diaphoretic. Psychiatric: He has a normal mood and affect. His behavior is normal. Judgment and thought content normal.   Nursing note and vitals reviewed. [facility]    ICD-10-CM ICD-9-CM    1. Rash R21 782.1      Medications Ordered Today   Medications    triamcinolone acetonide (KENALOG) 0.1 % topical cream     Sig: Apply  to affected area two (2) times a day for 7 days. use thin layer     Dispense:  30 g     Refill:  0    predniSONE (STERAPRED) 5 mg dose pack     Sig: See administration instruction per 5mg dose pack     Dispense:  21 Tab     Refill:  0     Try cream initially, INI may take prednisone taper (hold for now)    The patients condition was discussed with the patient and they understand. The patient is to follow up with PCP. If signs and symptoms become worse the pt is to go to the ER. The patient is to take medications as prescribed.              Procedures

## 2020-07-13 ENCOUNTER — APPOINTMENT (OUTPATIENT)
Dept: VASCULAR SURGERY | Age: 85
DRG: 166 | End: 2020-07-13
Attending: EMERGENCY MEDICINE
Payer: MEDICARE

## 2020-07-13 ENCOUNTER — HOSPITAL ENCOUNTER (INPATIENT)
Age: 85
LOS: 15 days | Discharge: NURSING, REHAB OR LONG-TERM CARE | DRG: 166 | End: 2020-07-30
Attending: EMERGENCY MEDICINE | Admitting: FAMILY MEDICINE
Payer: MEDICARE

## 2020-07-13 ENCOUNTER — APPOINTMENT (OUTPATIENT)
Dept: CT IMAGING | Age: 85
DRG: 166 | End: 2020-07-13
Attending: EMERGENCY MEDICINE
Payer: MEDICARE

## 2020-07-13 ENCOUNTER — APPOINTMENT (OUTPATIENT)
Dept: GENERAL RADIOLOGY | Age: 85
DRG: 166 | End: 2020-07-13
Attending: EMERGENCY MEDICINE
Payer: MEDICARE

## 2020-07-13 ENCOUNTER — APPOINTMENT (OUTPATIENT)
Dept: CT IMAGING | Age: 85
DRG: 166 | End: 2020-07-13
Attending: FAMILY MEDICINE
Payer: MEDICARE

## 2020-07-13 DIAGNOSIS — Z74.01 BEDBOUND: ICD-10-CM

## 2020-07-13 DIAGNOSIS — I82.411 ACUTE DEEP VEIN THROMBOSIS (DVT) OF FEMORAL VEIN OF RIGHT LOWER EXTREMITY (HCC): Primary | ICD-10-CM

## 2020-07-13 DIAGNOSIS — R18.8 OTHER ASCITES: ICD-10-CM

## 2020-07-13 DIAGNOSIS — K86.2 CYSTIC MASS OF PANCREAS: ICD-10-CM

## 2020-07-13 DIAGNOSIS — R18.0 MALIGNANT ASCITES: ICD-10-CM

## 2020-07-13 DIAGNOSIS — R11.2 NAUSEA AND VOMITING, INTRACTABILITY OF VOMITING NOT SPECIFIED, UNSPECIFIED VOMITING TYPE: ICD-10-CM

## 2020-07-13 DIAGNOSIS — R53.83 FATIGUE, UNSPECIFIED TYPE: ICD-10-CM

## 2020-07-13 DIAGNOSIS — E88.09 HYPOALBUMINEMIA: ICD-10-CM

## 2020-07-13 DIAGNOSIS — K74.60 CIRRHOSIS OF LIVER WITH ASCITES, UNSPECIFIED HEPATIC CIRRHOSIS TYPE (HCC): ICD-10-CM

## 2020-07-13 DIAGNOSIS — R18.8 CIRRHOSIS OF LIVER WITH ASCITES, UNSPECIFIED HEPATIC CIRRHOSIS TYPE (HCC): ICD-10-CM

## 2020-07-13 DIAGNOSIS — I95.9 HYPOTENSION, UNSPECIFIED HYPOTENSION TYPE: ICD-10-CM

## 2020-07-13 DIAGNOSIS — K86.89 MASS OF PANCREAS: ICD-10-CM

## 2020-07-13 DIAGNOSIS — O22.30 DVT (DEEP VEIN THROMBOSIS) IN PREGNANCY: ICD-10-CM

## 2020-07-13 DIAGNOSIS — R53.81 PHYSICAL DEBILITY: ICD-10-CM

## 2020-07-13 LAB
ALBUMIN SERPL-MCNC: 2 G/DL (ref 3.5–5)
ALBUMIN/GLOB SERPL: 0.3 {RATIO} (ref 1.1–2.2)
ALP SERPL-CCNC: 120 U/L (ref 45–117)
ALT SERPL-CCNC: 10 U/L (ref 12–78)
ANION GAP SERPL CALC-SCNC: 9 MMOL/L (ref 5–15)
APPEARANCE UR: CLEAR
APTT PPP: 30.5 SEC (ref 22.1–32)
AST SERPL-CCNC: 22 U/L (ref 15–37)
BACTERIA URNS QL MICRO: NEGATIVE /HPF
BASOPHILS # BLD: 0.1 K/UL (ref 0–0.1)
BASOPHILS NFR BLD: 1 % (ref 0–1)
BILIRUB SERPL-MCNC: 0.9 MG/DL (ref 0.2–1)
BILIRUB UR QL: NEGATIVE
BNP SERPL-MCNC: 545 PG/ML
BUN SERPL-MCNC: 11 MG/DL (ref 6–20)
BUN/CREAT SERPL: 12 (ref 12–20)
CALCIUM SERPL-MCNC: 8.9 MG/DL (ref 8.5–10.1)
CHLORIDE SERPL-SCNC: 106 MMOL/L (ref 97–108)
CO2 SERPL-SCNC: 23 MMOL/L (ref 21–32)
COLOR UR: ABNORMAL
COMMENT, HOLDF: NORMAL
COVID-19 RAPID TEST, COVR: NOT DETECTED
CREAT SERPL-MCNC: 0.92 MG/DL (ref 0.7–1.3)
DIFFERENTIAL METHOD BLD: ABNORMAL
EOSINOPHIL # BLD: 0.1 K/UL (ref 0–0.4)
EOSINOPHIL NFR BLD: 1 % (ref 0–7)
EPITH CASTS URNS QL MICRO: ABNORMAL /LPF
ERYTHROCYTE [DISTWIDTH] IN BLOOD BY AUTOMATED COUNT: 17.7 % (ref 11.5–14.5)
FERRITIN SERPL-MCNC: 520 NG/ML (ref 26–388)
FIBRINOGEN PPP-MCNC: 209 MG/DL (ref 200–475)
GLOBULIN SER CALC-MCNC: 6.7 G/DL (ref 2–4)
GLUCOSE SERPL-MCNC: 97 MG/DL (ref 65–100)
GLUCOSE UR STRIP.AUTO-MCNC: NEGATIVE MG/DL
HCT VFR BLD AUTO: 38.4 % (ref 36.6–50.3)
HGB BLD-MCNC: 11.8 G/DL (ref 12.1–17)
HGB UR QL STRIP: NEGATIVE
HYALINE CASTS URNS QL MICRO: ABNORMAL /LPF (ref 0–5)
IMM GRANULOCYTES # BLD AUTO: 0.1 K/UL (ref 0–0.04)
IMM GRANULOCYTES NFR BLD AUTO: 1 % (ref 0–0.5)
INR PPP: 1.2 (ref 0.9–1.1)
KETONES UR QL STRIP.AUTO: ABNORMAL MG/DL
LDH SERPL L TO P-CCNC: 139 U/L (ref 85–241)
LEUKOCYTE ESTERASE UR QL STRIP.AUTO: NEGATIVE
LIPASE SERPL-CCNC: 166 U/L (ref 73–393)
LYMPHOCYTES # BLD: 2.4 K/UL (ref 0.8–3.5)
LYMPHOCYTES NFR BLD: 32 % (ref 12–49)
MCH RBC QN AUTO: 29.9 PG (ref 26–34)
MCHC RBC AUTO-ENTMCNC: 30.7 G/DL (ref 30–36.5)
MCV RBC AUTO: 97.5 FL (ref 80–99)
MONOCYTES # BLD: 0.6 K/UL (ref 0–1)
MONOCYTES NFR BLD: 8 % (ref 5–13)
NEUTS SEG # BLD: 4.3 K/UL (ref 1.8–8)
NEUTS SEG NFR BLD: 57 % (ref 32–75)
NITRITE UR QL STRIP.AUTO: NEGATIVE
NRBC # BLD: 0 K/UL (ref 0–0.01)
NRBC BLD-RTO: 0 PER 100 WBC
PH UR STRIP: 6 [PH] (ref 5–8)
PLATELET # BLD AUTO: 231 K/UL (ref 150–400)
PMV BLD AUTO: 9.8 FL (ref 8.9–12.9)
POTASSIUM SERPL-SCNC: 3.3 MMOL/L (ref 3.5–5.1)
PROT SERPL-MCNC: 8.7 G/DL (ref 6.4–8.2)
PROT UR STRIP-MCNC: NEGATIVE MG/DL
PROTHROMBIN TIME: 12.7 SEC (ref 9–11.1)
RBC # BLD AUTO: 3.94 M/UL (ref 4.1–5.7)
RBC #/AREA URNS HPF: ABNORMAL /HPF (ref 0–5)
SAMPLES BEING HELD,HOLD: NORMAL
SODIUM SERPL-SCNC: 138 MMOL/L (ref 136–145)
SOURCE, COVRS: NORMAL
SP GR UR REFRACTOMETRY: 1.01 (ref 1–1.03)
SPECIMEN SOURCE, FCOV2M: NORMAL
THERAPEUTIC RANGE,PTTT: NORMAL SECS (ref 58–77)
TROPONIN I SERPL-MCNC: <0.05 NG/ML
TROPONIN I SERPL-MCNC: <0.05 NG/ML
UR CULT HOLD, URHOLD: NORMAL
UROBILINOGEN UR QL STRIP.AUTO: 1 EU/DL (ref 0.2–1)
WBC # BLD AUTO: 7.4 K/UL (ref 4.1–11.1)
WBC URNS QL MICRO: ABNORMAL /HPF (ref 0–4)

## 2020-07-13 PROCEDURE — 36415 COLL VENOUS BLD VENIPUNCTURE: CPT

## 2020-07-13 PROCEDURE — 99285 EMERGENCY DEPT VISIT HI MDM: CPT

## 2020-07-13 PROCEDURE — 87635 SARS-COV-2 COVID-19 AMP PRB: CPT

## 2020-07-13 PROCEDURE — 83690 ASSAY OF LIPASE: CPT

## 2020-07-13 PROCEDURE — 84484 ASSAY OF TROPONIN QUANT: CPT

## 2020-07-13 PROCEDURE — 85384 FIBRINOGEN ACTIVITY: CPT

## 2020-07-13 PROCEDURE — 74011000258 HC RX REV CODE- 258: Performed by: RADIOLOGY

## 2020-07-13 PROCEDURE — 83615 LACTATE (LD) (LDH) ENZYME: CPT

## 2020-07-13 PROCEDURE — 71275 CT ANGIOGRAPHY CHEST: CPT

## 2020-07-13 PROCEDURE — 96375 TX/PRO/DX INJ NEW DRUG ADDON: CPT

## 2020-07-13 PROCEDURE — 96374 THER/PROPH/DIAG INJ IV PUSH: CPT

## 2020-07-13 PROCEDURE — 96365 THER/PROPH/DIAG IV INF INIT: CPT

## 2020-07-13 PROCEDURE — 81001 URINALYSIS AUTO W/SCOPE: CPT

## 2020-07-13 PROCEDURE — 99218 HC RM OBSERVATION: CPT

## 2020-07-13 PROCEDURE — 96366 THER/PROPH/DIAG IV INF ADDON: CPT

## 2020-07-13 PROCEDURE — 85610 PROTHROMBIN TIME: CPT

## 2020-07-13 PROCEDURE — 85730 THROMBOPLASTIN TIME PARTIAL: CPT

## 2020-07-13 PROCEDURE — 82728 ASSAY OF FERRITIN: CPT

## 2020-07-13 PROCEDURE — 83880 ASSAY OF NATRIURETIC PEPTIDE: CPT

## 2020-07-13 PROCEDURE — 93971 EXTREMITY STUDY: CPT

## 2020-07-13 PROCEDURE — 71046 X-RAY EXAM CHEST 2 VIEWS: CPT

## 2020-07-13 PROCEDURE — 93005 ELECTROCARDIOGRAM TRACING: CPT

## 2020-07-13 PROCEDURE — 85025 COMPLETE CBC W/AUTO DIFF WBC: CPT

## 2020-07-13 PROCEDURE — 80053 COMPREHEN METABOLIC PANEL: CPT

## 2020-07-13 PROCEDURE — 74177 CT ABD & PELVIS W/CONTRAST: CPT

## 2020-07-13 PROCEDURE — 74011250636 HC RX REV CODE- 250/636: Performed by: EMERGENCY MEDICINE

## 2020-07-13 PROCEDURE — 74011636320 HC RX REV CODE- 636/320: Performed by: RADIOLOGY

## 2020-07-13 RX ORDER — FUROSEMIDE 10 MG/ML
40 INJECTION INTRAMUSCULAR; INTRAVENOUS 2 TIMES DAILY
Status: DISCONTINUED | OUTPATIENT
Start: 2020-07-13 | End: 2020-07-13

## 2020-07-13 RX ORDER — ONDANSETRON 2 MG/ML
4 INJECTION INTRAMUSCULAR; INTRAVENOUS
Status: COMPLETED | OUTPATIENT
Start: 2020-07-13 | End: 2020-07-13

## 2020-07-13 RX ORDER — HEPARIN SODIUM 10000 [USP'U]/100ML
18-36 INJECTION, SOLUTION INTRAVENOUS
Status: DISCONTINUED | OUTPATIENT
Start: 2020-07-13 | End: 2020-07-18

## 2020-07-13 RX ORDER — HEPARIN SODIUM 10000 [USP'U]/100ML
18-36 INJECTION, SOLUTION INTRAVENOUS
Status: DISCONTINUED | OUTPATIENT
Start: 2020-07-13 | End: 2020-07-13 | Stop reason: SDUPTHER

## 2020-07-13 RX ORDER — SODIUM CHLORIDE 0.9 % (FLUSH) 0.9 %
10 SYRINGE (ML) INJECTION
Status: COMPLETED | OUTPATIENT
Start: 2020-07-13 | End: 2020-07-13

## 2020-07-13 RX ORDER — HEPARIN SODIUM 5000 [USP'U]/ML
80 INJECTION, SOLUTION INTRAVENOUS; SUBCUTANEOUS ONCE
Status: COMPLETED | OUTPATIENT
Start: 2020-07-13 | End: 2020-07-13

## 2020-07-13 RX ORDER — SODIUM CHLORIDE 0.9 % (FLUSH) 0.9 %
10 SYRINGE (ML) INJECTION
Status: DISPENSED | OUTPATIENT
Start: 2020-07-13 | End: 2020-07-14

## 2020-07-13 RX ORDER — ONDANSETRON 2 MG/ML
4 INJECTION INTRAMUSCULAR; INTRAVENOUS
Status: DISCONTINUED | OUTPATIENT
Start: 2020-07-13 | End: 2020-07-15

## 2020-07-13 RX ORDER — ALBUMIN HUMAN 50 G/1000ML
25 SOLUTION INTRAVENOUS EVERY 6 HOURS
Status: COMPLETED | OUTPATIENT
Start: 2020-07-14 | End: 2020-07-14

## 2020-07-13 RX ORDER — SODIUM CHLORIDE 9 MG/ML
75 INJECTION, SOLUTION INTRAVENOUS CONTINUOUS
Status: CANCELLED | OUTPATIENT
Start: 2020-07-13

## 2020-07-13 RX ORDER — FUROSEMIDE 10 MG/ML
20 INJECTION INTRAMUSCULAR; INTRAVENOUS 2 TIMES DAILY
Status: DISCONTINUED | OUTPATIENT
Start: 2020-07-14 | End: 2020-07-19

## 2020-07-13 RX ADMIN — IOPAMIDOL 70 ML: 755 INJECTION, SOLUTION INTRAVENOUS at 19:27

## 2020-07-13 RX ADMIN — SODIUM CHLORIDE 100 ML: 900 INJECTION, SOLUTION INTRAVENOUS at 19:26

## 2020-07-13 RX ADMIN — ONDANSETRON 4 MG: 2 INJECTION INTRAMUSCULAR; INTRAVENOUS at 15:37

## 2020-07-13 RX ADMIN — HEPARIN SODIUM 6100 UNITS: 5000 INJECTION, SOLUTION INTRAVENOUS; SUBCUTANEOUS at 21:55

## 2020-07-13 RX ADMIN — SODIUM CHLORIDE 1000 ML: 900 INJECTION, SOLUTION INTRAVENOUS at 15:37

## 2020-07-13 RX ADMIN — Medication 10 ML: at 19:26

## 2020-07-13 RX ADMIN — HEPARIN SODIUM AND DEXTROSE 18 UNITS/KG/HR: 10000; 5 INJECTION INTRAVENOUS at 21:57

## 2020-07-13 NOTE — ED TRIAGE NOTES
Patient arrives via EMS from home, reports n/v for 1 week.  EMS reports OT was at house this morning and found patient to be hypotensive (80/50s)

## 2020-07-13 NOTE — ED NOTES
Anirudh Dejesus from vascular: Patient has an acute partially occlusive DVT to the common femoral and proximal femoral of the RLE.

## 2020-07-13 NOTE — ED PROVIDER NOTES
Please note that this dictation was completed with [employer], the computer voice recognition software.  Quite often unanticipated grammatical, syntax, homophones, and other interpretive errors are inadvertently transcribed by the computer software.  Please disregard these errors.  Please excuse any errors that have escaped final proofreading.    'in and out of the hospital since December/ nauseated x 2 weeks/ 'was vomiting a lot on Friday/ tolerating PO today/ [employer] checked my BP and it was low/ I was sweaty'; No other c/o ; assoc sx today  'L leg swollen x 10 days also';     80year-old male past medical history remarkable for osteoarthritis in the hands and lower extremities, BPH, DJD, high cholesterol, hyperlipidemia, and per the wife \"in and out of hospitals since December. Patient states he initially had his left knee replaced and has been in a hospital since then. Patient states he seen by home health they came this morning and noticed that his blood pressure was low and he seemed a little sweaty slightly short of breath. They then waited a bit called 911 brought to the ER for further evaluation. Patient has no complaints currently says he feels fine now.     pt denies HA, vison changes, diff swallowing, CP, SOB, Abd pain, F/Ch, D/Cons or other current systemic complaints    Social/ PSH reviewed in EMR    EMR Chart Reviewed           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    BPH (benign prostatic hyperplasia)     DJD (degenerative joint disease)     Hypercholesterolemia     Other and unspecified hyperlipidemia        Past Surgical History:   Procedure Laterality Date    COLONOSCOPY N/A 8/18/2017    COLON[facility]COPY performed by Morales Sanchez [facility] at [facility] ENDOSCOPY    HX ORTHOPAEDIC Right 2010    rotator cuff    HX TONSILLECTOMY           Family History:   Problem Relation Age of Onset    Diabetes Sister     Diabetes Brother     Heart Disease Brother        Social History     Socioeconomic History    Marital status:      Spouse name: Not on file    Number of children: Not on file    Years of education: Not on file    Highest education level: Not on file   Occupational History    Not on file   Social Needs    Financial resource strain: Not on file    Food insecurity     Worry: Not on file     Inability: Not on file    Transportation needs     Medical: Not on file     Non-medical: Not on file   Tobacco Use    Smoking status: Never Smoker    Smokeless tobacco: Never Used   Substance and Sexual Activity    Alcohol use: Yes     Comment: social    Drug use: Not on file    Sexual activity: Not Currently   Lifestyle    Physical activity     Days per week: Not on file     Minutes per session: Not on file    Stress: Not on file   Relationships    Social connections     Talks on phone: Not on file     Gets together: Not on file     Attends Ep[employer] service: Not on file     Active member of club or organization: Not on file     Attends meetings of clubs or organizations: Not on file     Relationship status: Not on file    Intimate partner violence     Fear of current or ex partner: Not on file     Emotionally abused: Not on file     Physically abused: Not on file     Forced sexual activity: Not on file   Other Topics Concern    Not on file   Social History Narrative    Not on file         ALLERGIES: Patient has no known allergies. Review of Systems   Constitutional: Positive for activity change and diaphoresis. Negative for appetite change, chills and fever. HENT: Negative for dental problem, drooling, trouble swallowing and voice change. Eyes: Negative for visual disturbance. Respiratory: Positive for chest tightness and shortness of breath. Gastrointestinal: Negative for abdominal pain, nausea and vomiting. Genitourinary: Negative for dysuria. Musculoskeletal: Positive for joint swelling.    Skin: Negative for rash.   Neurological: Negative for headaches. All other systems reviewed and are negative. Vitals:    07/13/20 1524   BP: (!) 117/97   Pulse: (!) 115   Resp: 18   Temp: 98.2 °F (36.8 °C)   SpO2: 96%            Physical Exam  Vitals signs and nursing note reviewed. Constitutional:       General: He is not in acute distress. Appearance: Normal appearance. He is well-developed. He is not ill-appearing, toxic-appearing or diaphoretic. Comments: NAD, AxOx4, speaking in complete sentences       HENT:      Head: Normocephalic and atraumatic. Comments: Cn intact     Right Ear: External ear normal.      Left Ear: External ear normal.      Mouth/Throat:      Pharynx: No oropharyngeal exudate. Eyes:      General:         Right eye: No discharge. Left eye: No discharge. Conjunctiva/sclera: Conjunctivae normal.      Pupils: Pupils are equal, round, and reactive to light. Neck:      Musculoskeletal: Normal range of motion and neck supple. Cardiovascular:      Rate and Rhythm: Normal rate and regular rhythm. Pulses: Normal pulses. Heart sounds: Normal heart sounds. No murmur. No friction rub. No gallop. Pulmonary:      Effort: Pulmonary effort is normal. No respiratory distress. Breath sounds: Normal breath sounds. No stridor. No wheezing, rhonchi or rales. Chest:      Chest wall: No tenderness. Abdominal:      General: Bowel sounds are normal. There is no distension. Palpations: Abdomen is soft. There is no mass. Tenderness: There is no abdominal tenderness. There is no guarding or rebound. Genitourinary:     Comments: Pt denies urinary/ Testicular/ scrotal or penile  complaints  Musculoskeletal: Normal range of motion. General: No swelling, tenderness, deformity or signs of injury. Right lower leg: Edema present. Left lower leg: No edema. Lymphadenopathy:      Cervical: No cervical adenopathy.    Skin:     General: Skin is warm and dry.      Capillary Refill: Capillary refill takes less than 2 seconds. Coloration: Skin is not jaundiced or pale. Findings: No bruising, erythema, lesion or rash. Neurological:      General: No focal deficit present. Mental Status: He is alert and oriented to person, place, and time. Cranial Nerves: No cranial nerve deficit. Sensory: No sensory deficit. Motor: No weakness. Coordination: Coordination normal.      Gait: Gait normal.      Deep Tendon Reflexes: Reflexes normal.      Comments: pt has motor/ CV/ Sensation grossly intact to all extremities, R = L in strength;          MDM  Number of Diagnoses or Management Options  Acute deep vein thrombosis (DVT) of femoral vein of right lower extremity ([address]): Fatigue, unspecified type:   Risk of Complications, Morbidity, and/or Mortality  Presenting problems: high  Diagnostic procedures: moderate  Management options: moderate    Critical Care  Total time providing critical care: 30-74 minutes (45 min)    Patient Progress  Patient progress: stable         Procedures    Chief Complaint   Patient presents with    Fatigue    Vomiting    Hypotension       3:33 PM  The patients presenting problems have been discussed, and they are in agreement with the care plan formulated and outlined with them. I have encouraged them to ask questions as they arise throughout their visit.     MEDICATIONS GIVEN:  Medications   sodium chloride 0.9 % bolus infusion 1,000 mL (has no administration in time range)   ondansetron (ZOFRAN) injection 4 mg (has no administration in time range)       LABS REVIEWED:  Labs Reviewed   URINE CULTURE HOLD SAMPLE   CBC WITH AUTOMATED DIFF   METABOLIC PANEL, COMPREHENSIVE   SAMPLES BEING HELD   TROPONIN I   NT-PRO BNP   LIPASE   URINALYSIS W/MICROSCOPIC       RADIOLOGY RESULTS:  The following have been ordered and reviewed:  _____________________________________________________________________  _____________________________________________________________________    EKG interpretation:   Rhythm: sinus tachycardia  rhythm; and regular . Rate (approx.): 112; Axis: normal; P wave: normal; QRS interval: normal ; ST/T wave: normal; Negative acute significant segmental elevations/ unchanged qrs morphology compared to study dated 03/17/2018    PROCEDURES:        CONSULTATIONS:       PROGRESS NOTES:      DIAGNOSIS:    1. Acute deep vein thrombosis (DVT) of femoral vein of right lower extremity (HCC)    2. Fatigue, unspecified type    3. Nausea and vomiting, intractability of vomiting not specified, unspecified vomiting type    4. Hypoalbuminemia    5. Bedbound    6. Physical debility        PLAN:  1-admit/ monitor/ evaluate further      ED COURSE: The patients hospital course has been uncomplicated. 6:30 PM  Discussed results w/ Pt/ Wife; 'Agrees w/ CT-PE, would like to be admitted'; will consult hospitalist;     Hospitalist Perfect Serve for Admission  6:31 PM    ED Room Number: D/HD  Patient Name and age:  Damir Foote 80 y.o.  male  Working Diagnosis:   1. Acute deep vein thrombosis (DVT) of femoral vein of right lower extremity (HCC)    2.  Fatigue, unspecified type        COVID-19 Suspicion:  no    Code Status:  Full Code  Readmission: no  Isolation Requirements:  no  Recommended Level of Care:  telemetry  Department:[facility] Adult ED - 21   Other:  R Lower Ext DVT/ Acute/ asa allergy; CT-PE pending but low suspicion of clinically relevant PE;      7:56 PM  Called by radiologist, 'bilat PE's/ Chronic > acute; omental caking/ ascites and IVC thrombosis' message sent to Dr Beti Hsu also;

## 2020-07-14 ENCOUNTER — APPOINTMENT (OUTPATIENT)
Dept: NON INVASIVE DIAGNOSTICS | Age: 85
DRG: 166 | End: 2020-07-14
Attending: FAMILY MEDICINE
Payer: MEDICARE

## 2020-07-14 PROBLEM — I82.409 DVT (DEEP VENOUS THROMBOSIS) (HCC): Status: ACTIVE | Noted: 2020-07-14

## 2020-07-14 LAB
ANION GAP SERPL CALC-SCNC: 10 MMOL/L (ref 5–15)
APTT PPP: 33.9 SEC (ref 22.1–32)
APTT PPP: >130 SEC (ref 22.1–32)
ATRIAL RATE: 113 BPM
BASOPHILS # BLD: 0.1 K/UL (ref 0–0.1)
BASOPHILS NFR BLD: 1 % (ref 0–1)
BUN SERPL-MCNC: 10 MG/DL (ref 6–20)
BUN/CREAT SERPL: 13 (ref 12–20)
CALCIUM SERPL-MCNC: 8.3 MG/DL (ref 8.5–10.1)
CALCULATED P AXIS, ECG09: 47 DEGREES
CALCULATED R AXIS, ECG10: 16 DEGREES
CALCULATED T AXIS, ECG11: 5 DEGREES
CHLORIDE SERPL-SCNC: 107 MMOL/L (ref 97–108)
CO2 SERPL-SCNC: 23 MMOL/L (ref 21–32)
CREAT SERPL-MCNC: 0.79 MG/DL (ref 0.7–1.3)
DIAGNOSIS, 93000: NORMAL
DIFFERENTIAL METHOD BLD: ABNORMAL
EOSINOPHIL # BLD: 0.1 K/UL (ref 0–0.4)
EOSINOPHIL NFR BLD: 2 % (ref 0–7)
ERYTHROCYTE [DISTWIDTH] IN BLOOD BY AUTOMATED COUNT: 17.4 % (ref 11.5–14.5)
FIBRINOGEN PPP-MCNC: 184 MG/DL (ref 200–475)
GLUCOSE SERPL-MCNC: 94 MG/DL (ref 65–100)
HCT VFR BLD AUTO: 29.6 % (ref 36.6–50.3)
HGB BLD-MCNC: 9.5 G/DL (ref 12.1–17)
IMM GRANULOCYTES # BLD AUTO: 0.1 K/UL (ref 0–0.04)
IMM GRANULOCYTES NFR BLD AUTO: 1 % (ref 0–0.5)
INR PPP: 1.5 (ref 0.9–1.1)
LYMPHOCYTES # BLD: 2.4 K/UL (ref 0.8–3.5)
LYMPHOCYTES NFR BLD: 35 % (ref 12–49)
MCH RBC QN AUTO: 30.5 PG (ref 26–34)
MCHC RBC AUTO-ENTMCNC: 32.1 G/DL (ref 30–36.5)
MCV RBC AUTO: 95.2 FL (ref 80–99)
MONOCYTES # BLD: 0.6 K/UL (ref 0–1)
MONOCYTES NFR BLD: 8 % (ref 5–13)
NEUTS SEG # BLD: 3.7 K/UL (ref 1.8–8)
NEUTS SEG NFR BLD: 53 % (ref 32–75)
NRBC # BLD: 0 K/UL (ref 0–0.01)
NRBC BLD-RTO: 0 PER 100 WBC
P-R INTERVAL, ECG05: 132 MS
PLATELET # BLD AUTO: 201 K/UL (ref 150–400)
PMV BLD AUTO: 9.6 FL (ref 8.9–12.9)
POTASSIUM SERPL-SCNC: 3.2 MMOL/L (ref 3.5–5.1)
PROTHROMBIN TIME: 14.6 SEC (ref 9–11.1)
Q-T INTERVAL, ECG07: 356 MS
QRS DURATION, ECG06: 76 MS
QTC CALCULATION (BEZET), ECG08: 488 MS
RBC # BLD AUTO: 3.11 M/UL (ref 4.1–5.7)
SARS-COV-2, COV2: NOT DETECTED
SODIUM SERPL-SCNC: 140 MMOL/L (ref 136–145)
SPECIMEN SOURCE, FCOV2M: NORMAL
THERAPEUTIC RANGE,PTTT: ABNORMAL SECS (ref 58–77)
THERAPEUTIC RANGE,PTTT: ABNORMAL SECS (ref 58–77)
TROPONIN I SERPL-MCNC: <0.05 NG/ML
VENTRICULAR RATE, ECG03: 113 BPM
WBC # BLD AUTO: 6.8 K/UL (ref 4.1–11.1)

## 2020-07-14 PROCEDURE — 94760 N-INVAS EAR/PLS OXIMETRY 1: CPT

## 2020-07-14 PROCEDURE — 85730 THROMBOPLASTIN TIME PARTIAL: CPT

## 2020-07-14 PROCEDURE — 77030019905 HC CATH URETH INTMIT MDII -A

## 2020-07-14 PROCEDURE — P9045 ALBUMIN (HUMAN), 5%, 250 ML: HCPCS | Performed by: FAMILY MEDICINE

## 2020-07-14 PROCEDURE — 74011250636 HC RX REV CODE- 250/636: Performed by: FAMILY MEDICINE

## 2020-07-14 PROCEDURE — 74011250636 HC RX REV CODE- 250/636: Performed by: HOSPITALIST

## 2020-07-14 PROCEDURE — 85025 COMPLETE CBC W/AUTO DIFF WBC: CPT

## 2020-07-14 PROCEDURE — 85384 FIBRINOGEN ACTIVITY: CPT

## 2020-07-14 PROCEDURE — 36415 COLL VENOUS BLD VENIPUNCTURE: CPT

## 2020-07-14 PROCEDURE — 99218 HC RM OBSERVATION: CPT

## 2020-07-14 PROCEDURE — 74011250636 HC RX REV CODE- 250/636: Performed by: EMERGENCY MEDICINE

## 2020-07-14 PROCEDURE — 85610 PROTHROMBIN TIME: CPT

## 2020-07-14 PROCEDURE — 96366 THER/PROPH/DIAG IV INF ADDON: CPT

## 2020-07-14 PROCEDURE — 84484 ASSAY OF TROPONIN QUANT: CPT

## 2020-07-14 PROCEDURE — 96376 TX/PRO/DX INJ SAME DRUG ADON: CPT

## 2020-07-14 PROCEDURE — 51798 US URINE CAPACITY MEASURE: CPT

## 2020-07-14 PROCEDURE — 36600 WITHDRAWAL OF ARTERIAL BLOOD: CPT

## 2020-07-14 PROCEDURE — 96375 TX/PRO/DX INJ NEW DRUG ADDON: CPT

## 2020-07-14 PROCEDURE — 74011250637 HC RX REV CODE- 250/637: Performed by: FAMILY MEDICINE

## 2020-07-14 PROCEDURE — 74011250636 HC RX REV CODE- 250/636: Performed by: NURSE PRACTITIONER

## 2020-07-14 PROCEDURE — P9047 ALBUMIN (HUMAN), 25%, 50ML: HCPCS | Performed by: NURSE PRACTITIONER

## 2020-07-14 PROCEDURE — 80048 BASIC METABOLIC PNL TOTAL CA: CPT

## 2020-07-14 RX ORDER — SODIUM CHLORIDE 0.9 % (FLUSH) 0.9 %
5-40 SYRINGE (ML) INJECTION AS NEEDED
Status: DISCONTINUED | OUTPATIENT
Start: 2020-07-14 | End: 2020-07-30 | Stop reason: HOSPADM

## 2020-07-14 RX ORDER — FINASTERIDE 5 MG/1
5 TABLET, FILM COATED ORAL DAILY
Status: DISCONTINUED | OUTPATIENT
Start: 2020-07-14 | End: 2020-07-30 | Stop reason: HOSPADM

## 2020-07-14 RX ORDER — HEPARIN SODIUM 1000 [USP'U]/ML
6100 INJECTION, SOLUTION INTRAVENOUS; SUBCUTANEOUS ONCE
Status: COMPLETED | OUTPATIENT
Start: 2020-07-14 | End: 2020-07-14

## 2020-07-14 RX ORDER — ACETAMINOPHEN 325 MG/1
650 TABLET ORAL
Status: DISCONTINUED | OUTPATIENT
Start: 2020-07-14 | End: 2020-07-30 | Stop reason: HOSPADM

## 2020-07-14 RX ORDER — TAMSULOSIN HYDROCHLORIDE 0.4 MG/1
0.4 CAPSULE ORAL DAILY
Status: DISCONTINUED | OUTPATIENT
Start: 2020-07-14 | End: 2020-07-30 | Stop reason: HOSPADM

## 2020-07-14 RX ORDER — ALBUMIN HUMAN 250 G/1000ML
25 SOLUTION INTRAVENOUS ONCE
Status: COMPLETED | OUTPATIENT
Start: 2020-07-14 | End: 2020-07-14

## 2020-07-14 RX ORDER — BALSAM PERU/CASTOR OIL
OINTMENT (GRAM) TOPICAL 2 TIMES DAILY
Status: DISCONTINUED | OUTPATIENT
Start: 2020-07-14 | End: 2020-07-30 | Stop reason: HOSPADM

## 2020-07-14 RX ORDER — SODIUM CHLORIDE 0.9 % (FLUSH) 0.9 %
5-40 SYRINGE (ML) INJECTION EVERY 8 HOURS
Status: DISCONTINUED | OUTPATIENT
Start: 2020-07-14 | End: 2020-07-30 | Stop reason: HOSPADM

## 2020-07-14 RX ADMIN — ONDANSETRON 4 MG: 2 INJECTION INTRAMUSCULAR; INTRAVENOUS at 21:15

## 2020-07-14 RX ADMIN — Medication 10 ML: at 14:00

## 2020-07-14 RX ADMIN — ALBUMIN (HUMAN) 25 G: 12.5 INJECTION, SOLUTION INTRAVENOUS at 00:32

## 2020-07-14 RX ADMIN — HEPARIN SODIUM 6100 UNITS: 1000 INJECTION INTRAVENOUS; SUBCUTANEOUS at 19:05

## 2020-07-14 RX ADMIN — FINASTERIDE 5 MG: 5 TABLET, FILM COATED ORAL at 09:11

## 2020-07-14 RX ADMIN — ALBUMIN (HUMAN) 25 G: 0.25 INJECTION, SOLUTION INTRAVENOUS at 01:03

## 2020-07-14 RX ADMIN — Medication 10 ML: at 21:15

## 2020-07-14 RX ADMIN — ONDANSETRON 4 MG: 2 INJECTION INTRAMUSCULAR; INTRAVENOUS at 16:11

## 2020-07-14 RX ADMIN — TAMSULOSIN HYDROCHLORIDE 0.4 MG: 0.4 CAPSULE ORAL at 09:11

## 2020-07-14 RX ADMIN — FUROSEMIDE 20 MG: 10 INJECTION, SOLUTION INTRAMUSCULAR; INTRAVENOUS at 18:29

## 2020-07-14 RX ADMIN — FUROSEMIDE 20 MG: 10 INJECTION, SOLUTION INTRAMUSCULAR; INTRAVENOUS at 09:11

## 2020-07-14 NOTE — PROGRESS NOTES
TIMMY:    RUR n/a    Patient lives with his wife and plans to return there at discharge. Care Management Interventions  PCP Verified by CM: Yes  Mode of Transport at Discharge: (TBD)  Transition of Care Consult (CM Consult): (TBD)  MyChart Signup: No  Discharge Durable Medical Equipment: No  Physical Therapy Consult: No  Occupational Therapy Consult: No  Speech Therapy Consult: No  Current Support Network: Lives with Spouse  Confirm Follow Up Transport: Family  Discharge Location  Discharge Placement: (TBD)    Reason for Admission:  Right leg swelling. RUR Score:      N/A               Plan for utilizing home health:     No orders     PCP: First and Last name:  Helen Fang III   Name of Practice: Unknown   Are you a current patient: Yes/No: Yes   Approximate date of last visit: 3 weeks ago   Can you participate in a virtual visit with your PCP: unknown                    Current Advanced Directive/Advance Care Plan: Not on file                         Transition of Care Plan:        CM spoke to patient's wife, Leeann Charles to introduce CM role and verify patient demographics. Patient lives at home with his wife. He uses a walker at home. Patient gets his prescriptions [facility] at [facility].    Patient's wife will take him home at discharge.     Alfonzo Dawson RN/CRM

## 2020-07-14 NOTE — PROGRESS NOTES
Spoke with interventional radiology, Dr. Salvador Wilson, he reviewed the CT scan, recommended getting a CTA of the abdomen and pelvis, and based on that he will decide whether IR intervention is needed.   CT of the abdomen pelvis has been ordered

## 2020-07-14 NOTE — WOUND CARE
WOCN Note:     Patient assessed in Room: [facility] bed. Straight cathed for 900cc  Covid negative    New consult placed by RN for potential problem area between buttocks, HH sees at home. Chart shows:  Admitted for right leg swelling. PMH: F/U DVT , Covid Negative, NO pain or SOB. WBC = 6.8  On = 7-14-20  Admitted from home: N/V x 1 week, OT with patient at home this a.m. and B/P was low: 80/50    Assessment:   Patient is A&O x 3, communicative,  Straight cath 900cc. Turns self in the bed, needs assistance lifting up in the bed. Turcios: no turcios straight cathed. Bed: [address] [place] [address] Bed  Patient reports no pain but is nauseated. Bilateral heels and  sacrum skin intact and without erythema. Heels offloaded on pillow. Between buttocks, chaffed / flaky skin. Area appears to have healed. Recommendations:    - Venelex ointment twice daily to upper buttock cheeks / dry flaky skin. Minimize layers of linen/pads under patient to optimize support surface. Turn/reposition approximately every 2 hours and offload heels. Manage incontinence / promote continence; Hydraguard Barrier Crem to buttocks and sacrum daily and as needed with incontinence care. Discussed above plan with patient and RN: Luzmaria Case.     Transition of Care: Plan to follow weekly and as needed while admitted to hospital.      Olga REYNOLDS RN  Wound Care Department  Office: [phone]  Pager: [phone]

## 2020-07-14 NOTE — CONSULTS
[address] [facility].   [address] [facility] NOTE  Will Abhay Clark  [phone] office  [phone] NP/PA in-hospital cell phone M-F until 4:30PM  After 5PM or on weekends, please call  for physician on call        NAME:  Valeria Slaughter   :   1934   MRN:   [id number]       Referring Physician: Ted Ernst NP    Consult Date: 2020 12:44 PM    Chief Complaint: nausea and vomiting     History of Present Illness:  Patient is a 80 y.o. who is seen in consultation at the request of Ted Ernst NP, for likely pancreatitis by CT scan. Patient has a past medical history as below. He presented to the ED for hypotension. He was found to have extensive right lower extremity DVT and bilateral PEs. Patient was admitted to the hospital on 20 for extensive right lower extremity DVT with bilateral PEs and IVC occlusion, ascites, nausea/vomiting, and hypotension. Patient complains of nausea and vomiting for the last approximately 3-4 weeks. No hematemesis. No reflux, dysphagia, or odynophagia. No abdominal pain. No change in bowel habits, melena, or hematochezia. Patient was [facility] for pancreatitis of unknown etiology. Per report of previous records, CA 19-9 was unremarkable, IgG4 was normal, and triglycerides were normal.  He was seen in the office for follow up by Serafin Garza NP. There was discussion of a repeat EGD and possible EUS. No NSAID use. Patient is on a heparin gtt. No alcohol or tobacco use. No history of abdominal surgeries. EGD during recent hospital admission reportedly showed reflux esophagitis and a 5 cm hiatal hernia (no records are available for review). I have reviewed the emergency room note, hospital admission note, notes by all other clinicians who have seen the patient during this hospitalization to date.  I have reviewed the problem list and the reason for this hospitalization. I have reviewed the allergies and the medications the patient was taking at home prior to this hospitalization. 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    BPH (benign prostatic hyperplasia)     DJD (degenerative joint disease)     Hypercholesterolemia     Other and unspecified hyperlipidemia        PSH:  Past Surgical History:   Procedure Laterality Date    COLONOSCOPY N/A 8/18/2017    COL[facility] performed by Cassia Nguyen MD at [facility] ENDOSCOPY    HX ORTHOPAEDIC Right 2010    rotator cuff    HX TONSILLECTOMY         Allergies: Allergies   Allergen Reactions    Aspirin Unknown (comments)       Home Medications:  Prior to Admission Medications   Prescriptions Last Dose Informant Patient Reported? Taking? TAMSULOSIN HCL (TAMSULOSIN PO)   Yes No   Sig: Take 0.4 mg by mouth daily. finasteride (PROSCAR) 5 mg tablet   Yes No   Sig: Take 5 mg by mouth daily. multivitamin with iron tablet   Yes No   Sig: Take 1 Tab by mouth daily.    predniSONE (STERAPRED) 5 mg dose pack   No No   Sig: See administration instruction per 5mg dose pack      Facility-Administered Medications: None       Hospital Medications:  Current Facility-Administered Medications   Medication Dose Route Frequency    finasteride (PROSCAR) tablet 5 mg  5 mg Oral DAILY    tamsulosin (FLOMAX) capsule 0.4 mg  0.4 mg Oral DAILY    sodium chloride (NS) flush 5-40 mL  5-40 mL IntraVENous Q8H    sodium chloride (NS) flush 5-40 mL  5-40 mL IntraVENous PRN    acetaminophen (TYLENOL) tablet 650 mg  650 mg Oral Q6H PRN    heparin 25,000 units in D5W 250 ml infusion  18-36 Units/kg/hr IntraVENous TITRATE    ondansetron (ZOFRAN) injection 4 mg  4 mg IntraVENous Q4H PRN    furosemide (LASIX) injection 20 mg  20 mg IntraVENous BID       Social History:  Social History     Tobacco Use    Smoking status: Never Smoker    Smokeless tobacco: Never Used   Substance Use Topics    Alcohol use: Yes     Comment: social       Family History:  Family History   Problem Relation Age of Onset    Diabetes Sister     Diabetes Brother     Heart Disease Brother        Review of Systems:  Constitutional: negative fever, negative chills, negative weight loss  Eyes:   negative visual changes  ENT:   negative sore throat, tongue or lip swelling  Respiratory:  negative cough, +  dyspnea  Cards:  negative for chest pain, negative palpitations, + lower extremity edema  GI:   See HPI  :  negative for frequency, dysuria  Integument:  negative for rash and pruritus  Heme:  negative for easy bruising and gum/nose bleeding  Musculoskeletal:negative for myalgias, back pain and muscle weakness  Neuro:    negative for headaches, dizziness  Psych: negative for feelings of anxiety, depression     Objective:     Patient Vitals for the past 8 hrs:   BP Temp Pulse Resp SpO2 Weight   07/14/20 0900 100/62 97.5 °F (36.4 °C) 100 17 94 % --   07/14/20 0504 -- -- -- -- -- 76.4 kg (168 lb 6.9 oz)     07/14 0701 - 07/14 1900  In: 480.8 [P.O.:240; I.V.:240.8]  Out: 375 [Urine:375]  No intake/output data recorded. EXAM:     CONST:  Pleasant male lying in bed, no acute distress, emesis bag in hand   NEURO:  Alert and oriented   HEENT: EOMI, no scleral icterus   LUNGS: No respiratory distress   CARD:  S1 S2   ABD:  Soft, mildly distended, mild epigastric tenderness, no rebound, no guarding. + Bowel sounds.    EXT:  Warm   PSYCH: Not anxious or agitated     Data Review     Recent Labs     07/14/20  0500 07/13/20  1530   WBC 6.8 7.4   HGB 9.5* 11.8*   HCT 29.6* 38.4    231     Recent Labs     07/14/20  0500 07/13/20  1530    138   K 3.2* 3.3*    106   CO2 23 23   BUN 10 11   CREA 0.79 0.92   GLU 94 97   CA 8.3* 8.9     Recent Labs     07/13/20  1530   *   TP 8.7*   ALB 2.0*   GLOB 6.7*   LPSE 166     Recent Labs     07/14/20  0500 07/13/20  1530   INR 1.5* 1.2*   PTP 14.6* 12.7*   APTT >130.0* 30.5       CT abdomen/pelvis with IV contrast  INDICATION: ivc thrombosis     COMPARISON: PE CT earlier today     TECHNIQUE:  Following the uneventful intravenous administration of IV contrast, thin axial  images were obtained through the abdomen and pelvis. Coronal and sagittal  reconstructions were generated. Oral contrast was not administered. CT dose  reduction was achieved through use of a standardized protocol tailored for this  examination and automatic exposure control for dose modulation.      FINDINGS:  LUNG BASES: Known right lower lobe pulmonary embolus. Small left pleural  effusion and basilar atelectasis. Calcified lymph nodes in the lower mediastinum  and left hilum. LIVER: Small slightly nodular liver with heterogeneous enhancement. Hepatic  veins are patent. Portal vein patent. Intrahepatic biliary dilatation, with  abrupt termination of the mid common bile duct at the level of the pancreatic  head. GALLBLADDER: Unremarkable. SPLEEN: Geographic hypoenhancement in the posterior superior spleen. PANCREAS: Heterogeneous enhancement of the pancreas and ill-defined soft tissue  fullness in the pancreatic head and body, with abrupt termination of the main  pancreatic duct. Heterogeneous soft tissue in the region of pancreatic head  approximately 4.2 x 4.0 cm though difficult to accurately measure. Associated  narrowing of the preston splenic confluence, and possible splenic vein occlusion. Distal esophageal and gastric varices noted. ADRENALS: No mass. KIDNEYS: Contrast material in both kidneys from PE study. GI TRACT: No bowel obstruction. Edematous thickening of the gastric antrum and  duodenum. PERITONEUM: Large amount of ascites. Focal fluid collection near the tail of the  pancreas 3.7 x 2.3 cm, with adjacent ill-defined slightly hyperdense fluid in  the left anterior pararenal space and extending toward the left paracolic  gutter. No free air.   APPENDIX: Not clearly visualized. RETROPERITONEUM: No aortic aneurysm. LYMPH NODES: None enlarged. ADDITIONAL COMMENTS: N/A.     URINARY BLADDER: Unremarkable. REPRODUCTIVE ORGANS: Enlarged prostate. LYMPH NODES: None enlarged. FREE FLUID: Large amount. BONES: No destructive bone lesion. ADDITIONAL COMMENTS: Deep venous thrombosis right common femoral vein. Inferior  vena cava is patent.     IMPRESSION  IMPRESSION:     1. No evidence of hepatic vein, portal vein, or inferior vena cava thrombosis as  questioned on PE CT earlier today. There is however right lower lobe pulmonary  emboli in right common femoral deep venous thrombosis reported elsewhere earlier  today. 2. Masslike fullness in the pancreatic head, resulting in intrahepatic and  pancreatic duct dilatation as described above. Peripancreatic inflammatory  changes, with 3.7 x 2.3 cm collection near the pancreatic tail possibly  pseudocyst. Large amount of ascites, and moderate amount of slightly complex  fluid/soft tissue in the left abdomen. Findings may represent pancreatitis or  pancreatic neoplasm. Correlation with any prior imaging would be helpful given  the extensive abnormalities. 3. Small left pleural effusion and basilar atelectasis. 4. Edematous thickening of the gastric antrum and duodenum likely secondary to  the pancreatic process. Assessment:   · Pancreatic mass: CT as above.    · Nausea/vomiting: WBC 6.8, Hgb 9.5, LFTs unremarkable, lipase 166, CT abdomen/pelvis with IV contrast (7/13/20): mass-like fullness in the pancreatic head, resulting in intrahepatic and pancreatic duct dilatation, peripancreatic inflammatory changes with 3.7 x 2.3 cm collection near the pancreatic tail possibly pseudocyst, large amount of ascites, and moderate amount of slightly complex fluid/soft tissue in the left abdomen - findings may represent pancreatitis or pancreatic neoplasm; edematous thickening of the gastric antrum and duodenum likely secondary to the pancreatic process. · Right lower extremity DVT with bilateral PEs: on heparin gtt. · Ascites  · Hypotension  · COVID-19 negative     Patient Active Problem List   Diagnosis Code    DVT (deep vein thrombosis) in pregnancy O22.30    DVT (deep venous thrombosis) (HCC) I82.409     Plan:     · Clear liquids  · Supportive measures: antiemetics PRN  · Follow LFTs and lipase  · Hepatology, hematology, and palliative care have been consulted - will await input  · No plan for endoscopic evaluation at this time  · Patient was discussed with and will be seen by Dr. Saba Babin  · Thank you for allowing me to participate in care of Kellie Palomino     Signed By: Chaitanya Chapman     7/14/2020  12:44 PM       Gastroenterology Attending Physician attestation statement and comments. This patient was seen and examined by me in a face-to-face visit today. I reviewed the medical record including lab work, imaging and other provider notes. I confirmed the history as described above. I spoke to the patient, reviewed the medical record including lab work, imaging and other provider notes. I discussed this case in detail with maxwell higgins. I formulated an  assessment of this patient and developed a treatment plan. I agree with the above consultation note. I agree with the history, exam and assessment and plan as outlined in the note.   I would like to add the following:   Abdomen soft, ascites  High suspicion for pancreatic neoplasm, not candidate at [facility] time for EUS with biopsy because of PE's  Ascites, may benefit if possible from large volume paracentesis and sent fluid for cytology, awaiting Dr Tomas Momin 's input  Will follow

## 2020-07-14 NOTE — ED NOTES
Patient's wife had concerns regarding Heparin drip. She states patient is allergic to aspirin and wants to make sure that Heparin drip has no aspirin derivatives. Pharmacy was called per patient's wife request and they confirmed that Heparin does not have any aspirin derivatives. Wife notified.

## 2020-07-14 NOTE — PROGRESS NOTES
[facility] Adult  Hospitalist Group                                                                                          Hospitalist Progress Note  Janeen Mercado MD  Answering service: [phone] from in house phone        Date of Service:  2020  NAME:  Maira Coello  :  1934  MRN:  [id number]      Admission Summary:   Maira Coello is a 80 y.o. male who presents with right leg swelling. Patient does not have his hearing aids, is not able to hear very well, and history was primarily obtained from his wife who is at the bedside. His wife reports that patient has been in and out of the hospital and rehab for the last few months. He initially got diagnosed with what sounds like septic arthritis. Patient underwent knee aspiration, was put on IV antibiotics, was discharged home on IV antibiotics, developed pneumonia, pancreatitis and continued knee pain. Went back to [facility], was admitted there, was treated with antibiotics, and eventually was discharged to a rehab facility. Patient came back home from rehab, recently started having some nausea/vomiting, new swelling in his right leg, patient had significant vomiting 3 to 4 days back, it seems to have resolved now, and he was able to tolerate p.o. today. Home health nurse came today and found that the patient was hypotensive, and patient was brought to [facility] ED. Patient was found to have extensive right lower extremity DVT extending up into the vena cava and bilateral PEs. Patient was requested to be admitted under the hospitalist service. The wife reports that patient has no history of blood clots that she is aware of. Does report that patient has been more or less bedbound in the last few months. She denies patient having any other complaints or problems.   The patient and his wife deny patient c/o  any Headache, blurry vision, sore throat, trouble swallowing, trouble with speech, chest pain, SOB, cough, fever, chills, N/V/D, abd pain, urinary symptoms, constipation, recent travels, sick contacts, focal or generalized oneirological symptoms,  falls, injuries, rashes, contact with COVID-19 diagnosed patients, hematemesis, melena, hemoptysis, hematuria, rashes, denies starting any new medications and denies any other concerns or problems besides as mentioned above.        Interval history / Subjective:     F/u DVT   COVID negative  No pain  No SOB   Assessment & Plan:     Acute DVT/PE  -likely sec to immobility/recent hospitalization  -Dopplers 7/13 acute partially occlusive deep vein thrombosis of the right common femoral vein and proxmial femoral vein   -CTA chest 7/13 Bilateral pulmonary emboli. Occlusion of suprahepatic inferior vena cava and hepatic veins  -CT abd 7/13 No evidence of hepatic vein, portal vein, or inferior vena cava thrombosis as questioned on PE CT earlier today. There is however right lower lobe pulmonary emboli in right common femoral deep venous thrombosis reported elsewhere earlier today.  -Currently on heparin drip, continue for now  -Awaiting Hematology input    Pancreatic mass   -Lipase 166  -recent admission in the hospital with pancreatitis times two  -CT abd 7/13 Masslike fullness in the pancreatic head, resulting in intrahepatic and pancreatic duct dilatation as described above.  Peripancreatic inflammatory changes, with 3.7 x 2.3 cm collection near the pancreatic tail possibly pseudocyst  -Awaiting GI input    COVID PUI  -negative  -low suspicion, take off droplet plus isolation    Ascites/with possible splenic vein occlusion/distal esophageal and gastric varices  -Awaiting Hepatology    Nausea/vomiting  -symptomatic treatment    Possible Pneumonia  -no evidence of pneumonia  -Monitor off antibiotics  -Outpatient repeat chest imaging    Hypokalemia  -replace as needed    Hypotension: Likely hypovolemia, albumin infusion, hold home antihypertensives, close monitoring and further intervention per hospital course    Hypoalbuminemia  -nutrition input  -supps    BPH:   -Continue home medications    Hypercholesterolemia:   -Hold statin for now     Cardiac diet    Code status: FULL CODE  DVT prophylaxis: heparin drip  PTA: home  Baseline: independent  Spoke to Sola Conrad, wife, at [phone]    Plan: Follow Hematology/GI/Hepatology/Palliative care    Care Plan discussed with: Patient/Family  Anticipated Disposition: TBD  Anticipated Discharge: 24 hours to 48 hours     Hospital Problems  Date Reviewed: 7/14/2020          Codes Class Noted POA    * (Principal) DVT (deep venous thrombosis) ([facility] [address] ICD-10-CM: I82.409  ICD-9-CM: 453.40  7/14/2020 Yes                Review of Systems:   A comprehensive review of systems was negative except for that written in the HPI. Vital Signs:    Last 24hrs VS reviewed since prior progress note. Most recent are:  Visit Vitals  /59 (BP 1 Location: Right arm, BP Patient Position: At rest)   Pulse (!) 106   Temp 97.5 °F (36.4 °C)   Resp 18   Wt 76.4 kg (168 lb 6.9 oz)   SpO2 93%   BMI 24.17 kg/m²       No intake or output data in the 24 hours ending 07/14/20 0659     Physical Examination:             Constitutional:  No acute distress, cooperative, pleasant    ENT:  Oral mucosa moist, oropharynx benign. Resp:  CTA bilaterally. No wheezing/rhonchi/rales. No accessory muscle use   CV:  Regular rhythm, normal rate, no murmurs, gallops, rubs    GI:  Soft, non distended, non tender. normoactive bowel sounds, no hepatosplenomegaly     Musculoskeletal:  No edema, warm, 2+ pulses throughout    Neurologic:  Moves all extremities.   AAOx3, CN II-XII reviewed     Skin:  Good turgor, no rashes or ulcers       Data Review:    Review and/or order of clinical lab test      Labs:     Recent Labs     07/14/20  0500 07/13/20  1530   WBC 6.8 7.4   HGB 9.5* 11.8*   HCT 29.6* 38.4    231     Recent Labs     07/14/20  0500 07/13/20  1530   NA 140 138   K 3.2* 3.3*    106   CO2 23 23   BUN 10 11   CREA 0.79 0.92   GLU 94 97   CA 8.3* 8.9     Recent Labs     07/13/20  1530   ALT 10*   *   TBILI 0.9   TP 8.7*   ALB 2.0*   GLOB 6.7*   LPSE 166     Recent Labs     07/14/20  0500 07/13/20  1530   INR 1.5* 1.2*   PTP 14.6* 12.7*   APTT >130.0* 30.5      Recent Labs     07/13/20  2149   FERR 520*      No results found for: FOL, RBCF   No results for input(s): PH, PCO2, PO2 in the last 72 hours.   Recent Labs     07/14/20  0500 07/13/20  2149 07/13/20  1530   TROIQ <0.05 <0.05 <0.05     Lab Results   Component Value Date/Time    Cholesterol, total 250 (H) 10/02/2013 01:55 PM    HDL Cholesterol 53 10/02/2013 01:55 PM    LDL, calculated 176 (H) 10/02/2013 01:55 PM    Triglyceride 106 10/02/2013 01:55 PM    CHOL/HDL Ratio 3.4 10/06/2010 10:25 AM     No results found for: [facility]  Lab Results   Component Value Date/Time    Color YELLOW/STRAW 07/13/2020 04:27 PM    Appearance CLEAR 07/13/2020 04:27 PM    Specific gravity 1.012 07/13/2020 04:27 PM    pH (UA) 6.0 07/13/2020 04:27 PM    Protein Negative 07/13/2020 04:27 PM    Glucose Negative 07/13/2020 04:27 PM    Ketone TRACE (A) 07/13/2020 04:27 PM    Bilirubin Negative 07/13/2020 04:27 PM    Urobilinogen 1.0 07/13/2020 04:27 PM    Nitrites Negative 07/13/2020 04:27 PM    Leukocyte Esterase Negative 07/13/2020 04:27 PM    Epithelial cells FEW 07/13/2020 04:27 PM    Bacteria Negative 07/13/2020 04:27 PM    WBC 0-4 07/13/2020 04:27 PM    RBC 0-5 07/13/2020 04:27 PM         Medications Reviewed:     Current Facility-Administered Medications   Medication Dose Route Frequency    finasteride (PROSCAR) tablet 5 mg  5 mg Oral DAILY    tamsulosin (FLOMAX) capsule 0.4 mg  0.4 mg Oral DAILY    sodium chloride (NS) flush 5-40 mL  5-40 mL IntraVENous Q8H    sodium chloride (NS) flush 5-40 mL  5-40 mL IntraVENous PRN    acetaminophen (TYLENOL) tablet 650 mg  650 mg Oral Q6H PRN    heparin 25,000 units in D5W 250 ml infusion  18-36 Units/kg/hr IntraVENous TITRATE    ondansetron (ZOFRAN) injection 4 mg  4 mg IntraVENous Q4H PRN    sodium chloride 0.9 % bolus infusion 100 mL  100 mL IntraVENous RAD ONCE    sodium chloride (NS) flush 10 mL  10 mL IntraVENous RAD ONCE    iopamidoL (ISOVUE-370) 76 % injection 50 mL  50 mL IntraVENous RAD ONCE    iopamidoL (ISOVUE-370) 76 % injection 50 mL  50 mL IntraVENous RAD ONCE    furosemide (LASIX) injection 20 mg  20 mg IntraVENous BID     ______________________________________________________________________  EXPECTED LENGTH OF STAY: - - -  ACTUAL LENGTH OF STAY:          Olen Angelucci, MD

## 2020-07-14 NOTE — ED NOTES
MD notified of Lasix being held due to bp. Per MD, give ordered albumin and reevaluate afterwards to see if bp has improved for lasix admin.

## 2020-07-14 NOTE — ED NOTES
TRANSFER - OUT REPORT:    Verbal report given to Kenn Espinosa RN(name) on [facility] by SN renetta Mcclellan and Abisai Sanchez RN  being transferred to [address] [place], [place] [address]unit) for routine progression of care       Report consisted of patients Situation, Background, Assessment and   Recommendations(SBAR). Information from the following report(s) SBAR, MAR, Recent results was reviewed with the receiving nurse. Lines:   Peripheral IV 07/13/20 Left Wrist (Active)   Site Assessment Clean, dry, & intact 07/13/20 1533   Phlebitis Assessment 0 07/13/20 1533   Infiltration Assessment 0 07/13/20 1533   Dressing Status Clean, dry, & intact 07/13/20 1533   Hub Color/Line Status Pink;Capped;Flushed;Patent 07/13/20 1533   Action Taken Blood drawn 07/13/20 1533       Peripheral IV 07/13/20 Left Forearm (Active)   Site Assessment Clean, dry, & intact 07/13/20 2103   Phlebitis Assessment 0 07/13/20 2103   Infiltration Assessment 0 07/13/20 2103   Dressing Status Clean, dry, & intact 07/13/20 2103   Hub Color/Line Status Pink 07/13/20 2103   Alcohol Cap Used Yes 07/13/20 2103        Opportunity for questions and clarification was provided.       Patient transported with:   Registered Nurse

## 2020-07-14 NOTE — CONSULTS
Palliative Medicine Consult  Johnny: [phone] ([phone])    Patient Name: Alexander Rodas  YOB: 1934    Date of Initial Consult: July 14, 2020  Reason for Consult: Care Decisions  Requesting Provider: Dr. Loly Lundy    Primary Care Physician: Ivanna Amin MD     SUMMARY:   Alexander Rodas is a 80 y.o. bed bound male with a past history of BPH, hypercholesterolemia,DJD,  , who was admitted on 7/13/2020 from home after [address] Nurse found the pt to be hypotensive. Work up revealed extensive rt lower DVT extending up to the vena cava with bilateral PE's. Admission complicated by finding of pancreatic mass, ascites with possible splenic vein occlusion/ distal esophageal and gastric varices,  Nausea and vomiting, possible pneumonia, hypokalemia, hypotension. Heme onc and GI consults pending. Full Code  No Amd  Current medical issues leading to Palliative Medicine involvement include: support with care decisions given acute illness in a compromised state with high risk of decline. Lolly Fernandez PALLIATIVE DIAGNOSES:   1. Nausea and vomiting  2. hypoalbuminemia  3. bed bound   4. Physical debility      PLAN:   1. Pt seen without family present. Services introduced  2. Pt unable to verbalize his understanding of his illness  3. Briefly discussed benefits of AMD completion  4. I explained current findings and unknowns at this time  Consultants are still evaluating. Pt verbalized an understanding  5. Wife is primary support and will be able to visit once he is off the COVID unit. Will hopefully be able to meet with the both of them in person tomorrow. 6. Pt with significant vomiting so visit ended pre-maturely. Nurse to provide Zofran. 7. Will follow up tomorrrow  8. Initial consult note routed to primary continuity provider and/or primary health care team members  9.  Communicated plan of care with: Palliative IDT, [facility] [address] Team     GOALS OF CARE / TREATMENT PREFERENCES:     GOALS OF CARE:  Patient/Health Care Proxy Stated Goals: Prolong life    TREATMENT PREFERENCES:   Code Status: Full Code    Advance Care Planning:  [x] [facility] Interdisciplinary Team has updated the ACP Navigator with Health Care Decision Maker and Patient Capacity      Primary Decision Maker: [facility] - [phone]  Advance Care Planning 7/13/2020   Confirm Advance Directive Yes, on file       Medical Interventions: Full interventions     Other Instructions:   Artificially Administered Nutrition: No feeding tube     Other:    As far as possible, the palliative care team has discussed with patient / health care proxy about goals of care / treatment preferences for patient. HISTORY:     History obtained from: chart    CHIEF COMPLAINT: pt admitted with aforementioned history an issues    HPI/SUBJECTIVE:    The patient is:   [x] Verbal and participatory  [] Non-participatory due to:   Nausea and vomiting  Pt ate a good breakfast and lunch     Clinical Pain Assessment (nonverbal scale for severity on nonverbal patients):   Clinical Pain Assessment  Severity: 0          Duration: for how long has pt been experiencing pain (e.g., 2 days, 1 month, years)  Frequency: how often pain is an issue (e.g., several times per day, once every few days, constant)     FUNCTIONAL ASSESSMENT:     Palliative Performance Scale (PPS):  PPS: 30       PSYCHOSOCIAL/SPIRITUAL SCREENING:     Palliative IDT has assessed this patient for cultural preferences / practices and a referral made as appropriate to needs (Cultural Services, Patient Advocacy, Ethics, etc.)    Any spiritual / Christian concerns:  [] Yes /  [x] No    Caregiver Burnout:  [] Yes /  [x] No /  [] No Caregiver Present      Anticipatory grief assessment:   [x] Normal  / [] Maladaptive       ESAS Anxiety: Anxiety: 0    ESAS Depression: Depression: 0        REVIEW OF SYSTEMS:     Positive and pertinent negative findings in ROS are noted above in HPI.   The following systems were [x] reviewed / [] unable to be reviewed as noted in HPI  Other findings are noted below. Systems: constitutional, ears/nose/mouth/throat, respiratory, gastrointestinal, genitourinary, musculoskeletal, integumentary, neurologic, psychiatric, endocrine. Positive findings noted below. Modified ESAS Completed by: provider   Fatigue: 2 Drowsiness: 0   Depression: 0 Pain: 0   Anxiety: 0 Nausea: 8   Anorexia: 0 Dyspnea: 0                    PHYSICAL EXAM:     From RN flowsheet:  Wt Readings from Last 3 Encounters:   07/14/20 168 lb 6.9 oz (76.4 kg)   07/27/18 204 lb (92.5 kg)   03/17/18 200 lb (90.7 kg)     Blood pressure 103/62, pulse 100, temperature 98.7 °F (37.1 °C), resp. rate 18, height 5' 10\" (1.778 m), weight 168 lb 6.9 oz (76.4 kg), SpO2 95 %.     Pain Scale 1: Numeric (0 - 10)  Pain Intensity 1: 0                 Last bowel movement, if known:     Constitutional: conversant, nad, ill appearing  Eyes: pupils equal, anicteric  ENMT: no nasal discharge, moist mucous membranes  Cardiovascular: regular rhythm, distal pulses intact  Respiratory: breathing not labored, symmetric  Gastrointestinal: soft non-tender, +bowel sounds  Musculoskeletal: no deformity, no tenderness to palpation  Skin: warm, dry  Neurologic: following commands, moving all extremities  Psychiatric: full affect, no hallucinations  Other:       HISTORY:     Principal Problem:    DVT (deep venous thrombosis) ([facility]) (7/14/2020)      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 Social History     Tobacco Use    Smoking status: Never Smoker    Smokeless tobacco: Never Used   Substance Use Topics    Alcohol use: Yes     Comment: social     Allergies   Allergen Reactions    Aspirin Unknown (comments)      Current Facility-Administered Medications   Medication Dose Route Frequency    finasteride (PROSCAR) tablet 5 mg  5 mg Oral DAILY    tamsulosin (FLOMAX) capsule 0.4 mg  0.4 mg Oral DAILY    sodium chloride (NS) flush 5-40 mL  5-40 mL IntraVENous Q8H    sodium chloride (NS) flush 5-40 mL  5-40 mL IntraVENous PRN    acetaminophen (TYLENOL) tablet 650 mg  650 mg Oral Q6H PRN    balsam peru-castor oiL (VENELEX) ointment   Topical BID    heparin 25,000 units in D5W 250 ml infusion  18-36 Units/kg/hr IntraVENous TITRATE    ondansetron (ZOFRAN) injection 4 mg  4 mg IntraVENous Q4H PRN    furosemide (LASIX) injection 20 mg  20 mg IntraVENous BID          LAB AND IMAGING FINDINGS:     Lab Results   Component Value Date/Time    WBC 6.8 07/14/2020 05:00 AM    HGB 9.5 (L) 07/14/2020 05:00 AM    PLATELET 728 78/67/1893 05:00 AM     Lab Results   Component Value Date/Time    Sodium 140 07/14/2020 05:00 AM    Potassium 3.2 (L) 07/14/2020 05:00 AM    Chloride 107 07/14/2020 05:00 AM    CO2 23 07/14/2020 05:00 AM    BUN 10 07/14/2020 05:00 AM    Creatinine 0.79 07/14/2020 05:00 AM    Calcium 8.3 (L) 07/14/2020 05:00 AM      Lab Results   Component Value Date/Time    Alk.  phosphatase 120 (H) 07/13/2020 03:30 PM    Protein, total 8.7 (H) 07/13/2020 03:30 PM    Albumin 2.0 (L) 07/13/2020 03:30 PM    Globulin 6.7 (H) 07/13/2020 03:30 PM     Lab Results   Component Value Date/Time    INR 1.5 (H) 07/14/2020 05:00 AM    Prothrombin time 14.6 (H) 07/14/2020 05:00 AM    aPTT >130.0 (HH) 07/14/2020 05:00 AM      Lab Results   Component Value Date/Time    Ferritin 520 (H) 07/13/2020 09:49 PM      No results found for: PH, PCO2, PO2  No components found for: GLPOC   No results found for: CPK, CKMB             Total time: 50 min  Counseling / coordination time, spent as noted above: 45 min  > 50% counseling / coordination?: y    Prolonged service was provided for  []30 min   []75 min in face to face time in the presence of the patient, spent as noted above. Time Start:   Time End:   Note: this can only be billed with 65352 (initial) or 60618 (follow up). If multiple start / stop times, list each separately.

## 2020-07-14 NOTE — PROGRESS NOTES
Order for 5% Albumin 12.5mg per 250ml x 2 every 6 hours. Verify dosing amount with charge nurse Estuardo Kumar and call to Peter Le NP to verify dosing. Order changed to 25% Albumin 12.mg per 50ml x 2. Original infusion stopped and new dose started.

## 2020-07-14 NOTE — PROGRESS NOTES
Bedside and Verbal shift change report given to phuc (oncoming nurse) by Nay Izaguirre (offgoing nurse). Report included the following information SBAR, Kardex and MAR.

## 2020-07-14 NOTE — H&P
History & Physical    Primary Care Provider: Thu Valle MD  Source of Information: Patient and his wife    History of Presenting Illness:   Ruslan Nicholas is a 80 y.o. male who presents with right leg swelling. Patient does not have his hearing aids, is not able to hear very well, and history was primarily obtained from his wife who is at the bedside. His wife reports that patient has been in and out of the hospital and rehab for the last few months. He initially got diagnosed with what sounds like septic arthritis. Patient underwent knee aspiration, was put on IV antibiotics, was discharged home on IV antibiotics, developed pneumonia, pancreatitis and continued knee pain. Went back to [facility], was admitted there, was treated with antibiotics, and eventually was discharged to a rehab facility. Patient came back home from rehab, recently started having some nausea/vomiting, new swelling in his right leg, patient had significant vomiting 3 to 4 days back, it seems to have resolved now, and he was able to tolerate p.o. today. Home health nurse came today and found that the patient was hypotensive, and patient was brought to [facility] ED. Patient was found to have extensive right lower extremity DVT extending up into the vena cava and bilateral PEs. Patient was requested to be admitted under the hospitalist service. The wife reports that patient has no history of blood clots that she is aware of. Does report that patient has been more or less bedbound in the last few months. She denies patient having any other complaints or problems.   The patient and his wife deny patient c/o  any Headache, blurry vision, sore throat, trouble swallowing, trouble with speech, chest pain, SOB, cough, fever, chills, N/V/D, abd pain, urinary symptoms, constipation, recent travels, sick contacts, focal or generalized oneirological symptoms,  falls, injuries, rashes, contact with COVID-19 diagnosed patients, hematemesis, melena, hemoptysis, hematuria, rashes, denies starting any new medications and denies any other concerns or problems besides as mentioned above. Review of Systems:  A comprehensive review of systems was negative except for that written in the History of Present Illness. 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 HX ORTHOPAEDIC Right 2010    rotator cuff    HX TONSILLECTOMY       Prior to Admission medications    Medication Sig Start Date End Date Taking? Authorizing Provider   multivitamin with iron tablet Take 1 Tab by mouth daily. Provider, Historical   predniSONE (STERAPRED) 5 mg dose pack See administration instruction per 5mg dose pack 7/27/18   Ramez SHEPPARD MD   TAMSULOSIN HCL (TAMSULOSIN PO) Take 0.4 mg by mouth daily. Provider, Historical   finasteride (PROSCAR) 5 mg tablet Take 5 mg by mouth daily.     Provider, Historical     Allergies   Allergen Reactions    Aspirin Unknown (comments)      Family History   Problem Relation Age of Onset    Diabetes Sister     Diabetes Brother     Heart Disease Brother         SOCIAL HISTORY:  Patient resides:  Independently x   Assisted Living    SNF    With family care       Smoking history:   None x   Former    Chronic      Alcohol history:   None    Social x   Chronic      Ambulates:   Independently    w/cane x   w/walker    w/wc    CODE STATUS:  DNR    Full x   Other      Objective:     Physical Exam:     Visit Vitals  /61 (BP 1 Location: Left arm, BP Patient Position: At rest)   Pulse (!) 114   Temp 98.4 °F (36.9 °C)   Resp 22   Wt 76.4 kg (168 lb 6.9 oz)   SpO2 100%   BMI 24.17 kg/m²      O2 Device: Room air    General : alert x 2, awake, no acute distress, resting in bed, pleasant male, appears to be stated age, extremely hard of hearing  HEENT: PEERL, EOMI, moist mucus membrane, TM clear  Neck: supple, no JVD, no meningeal signs  Chest: Clear to auscultation bilaterally   CVS: S1 S2 heard, Capillary refill less than 2 seconds  Abd: soft/ Non tender, non distended, BS physiological,   Ext: no clubbing, no cyanosis, significant swelling of the right lower extremity, brisk pulses were noted  Neuro/Psych: pleasant mood and affect, moves x4, no focal neurological deficits were noted  Skin: warm    EKG: Sinus tachycardia with nonspecific ST changes. Data Review:     Recent Days:  Recent Labs     07/13/20  1530   WBC 7.4   HGB 11.8*   HCT 38.4        Recent Labs     07/13/20  1530      K 3.3*      CO2 23   GLU 97   BUN 11   CREA 0.92   CA 8.9   ALB 2.0*   ALT 10*   INR 1.2*     No results for input(s): PH, PCO2, PO2, HCO3, FIO2 in the last 72 hours.     24 Hour Results:  Recent Results (from the past 24 hour(s))   EKG, 12 LEAD, INITIAL    Collection Time: 07/13/20  3:25 PM   Result Value Ref Range    Ventricular Rate 113 BPM    Atrial Rate 113 BPM    P-R Interval 132 ms    QRS Duration 76 ms    Q-T Interval 356 ms    QTC Calculation (Bezet) 488 ms    Calculated P Axis 47 degrees    Calculated R Axis 16 degrees    Calculated T Axis 5 degrees    Diagnosis       Sinus tachycardia  When compared with ECG of 17-MAR-2018 02:54,  ST no longer elevated in Anterior leads  Nonspecific T wave abnormality now evident in Anterior leads     CBC WITH AUTOMATED DIFF    Collection Time: 07/13/20  3:30 PM   Result Value Ref Range    WBC 7.4 4.1 - 11.1 K/uL    RBC 3.94 (L) 4.10 - 5.70 M/uL    HGB 11.8 (L) 12.1 - 17.0 g/dL    HCT 38.4 36.6 - 50.3 %    MCV 97.5 80.0 - 99.0 FL    MCH 29.9 26.0 - 34.0 PG    MCHC 30.7 30.0 - 36.5 g/dL RDW 17.7 (H) 11.5 - 14.5 %    PLATELET 358 272 - 269 K/uL    MPV 9.8 8.9 - 12.9 FL    NRBC 0.0 0  WBC    ABSOLUTE NRBC 0.00 0.00 - 0.01 K/uL    NEUTROPHILS 57 32 - 75 %    LYMPHOCYTES 32 12 - 49 %    MONOCYTES 8 5 - 13 %    EOSINOPHILS 1 0 - 7 %    BASOPHILS 1 0 - 1 %    IMMATURE GRANULOCYTES 1 (H) 0.0 - 0.5 %    ABS. NEUTROPHILS 4.3 1.8 - 8.0 K/UL    ABS. LYMPHOCYTES 2.4 0.8 - 3.5 K/UL    ABS. MONOCYTES 0.6 0.0 - 1.0 K/UL    ABS. EOSINOPHILS 0.1 0.0 - 0.4 K/UL    ABS. BASOPHILS 0.1 0.0 - 0.1 K/UL    ABS. IMM. GRANS. 0.1 (H) 0.00 - 0.04 K/UL    DF AUTOMATED     METABOLIC PANEL, COMPREHENSIVE    Collection Time: 07/13/20  3:30 PM   Result Value Ref Range    Sodium 138 136 - 145 mmol/L    Potassium 3.3 (L) 3.5 - 5.1 mmol/L    Chloride 106 97 - 108 mmol/L    CO2 23 21 - 32 mmol/L    Anion gap 9 5 - 15 mmol/L    Glucose 97 65 - 100 mg/dL    BUN 11 6 - 20 MG/DL    Creatinine 0.92 0.70 - 1.30 MG/DL    BUN/Creatinine ratio 12 12 - 20      GFR est AA >60 >60 ml/min/1.73m2    GFR est non-AA >60 >60 ml/min/1.73m2    Calcium 8.9 8.5 - 10.1 MG/DL    Bilirubin, total 0.9 0.2 - 1.0 MG/DL    ALT (SGPT) 10 (L) 12 - 78 U/L    AST (SGOT) 22 15 - 37 U/L    Alk. phosphatase 120 (H) 45 - 117 U/L    Protein, total 8.7 (H) 6.4 - 8.2 g/dL    Albumin 2.0 (L) 3.5 - 5.0 g/dL    Globulin 6.7 (H) 2.0 - 4.0 g/dL    A-G Ratio 0.3 (L) 1.1 - 2.2     SAMPLES BEING HELD    Collection Time: 07/13/20  3:30 PM   Result Value Ref Range    SAMPLES BEING HELD 1RED 1BLU     COMMENT        Add-on orders for these samples will be processed based on acceptable specimen integrity and analyte stability, which may vary by analyte.    LIPASE    Collection Time: 07/13/20  3:30 PM   Result Value Ref Range    Lipase 166 73 - 393 U/L   NT-PRO BNP    Collection Time: 07/13/20  3:30 PM   Result Value Ref Range    NT pro- (H) <450 PG/ML   TROPONIN I    Collection Time: 07/13/20  3:30 PM   Result Value Ref Range    Troponin-I, Qt. <0.05 <0.05 ng/mL   PTT Collection Time: 07/13/20  3:30 PM   Result Value Ref Range    aPTT 30.5 22.1 - 32.0 sec    aPTT, therapeutic range     58.0 - 77.0 SECS   PROTHROMBIN TIME + INR    Collection Time: 07/13/20  3:30 PM   Result Value Ref Range    INR 1.2 (H) 0.9 - 1.1      Prothrombin time 12.7 (H) 9.0 - 11.1 sec   URINALYSIS W/MICROSCOPIC    Collection Time: 07/13/20  4:27 PM   Result Value Ref Range    Color YELLOW/STRAW      Appearance CLEAR CLEAR      Specific gravity 1.012 1.003 - 1.030      pH (UA) 6.0 5.0 - 8.0      Protein Negative NEG mg/dL    Glucose Negative NEG mg/dL    Ketone TRACE (A) NEG mg/dL    Bilirubin Negative NEG      Blood Negative NEG      Urobilinogen 1.0 0.2 - 1.0 EU/dL    Nitrites Negative NEG      Leukocyte Esterase Negative NEG      WBC 0-4 0 - 4 /hpf    RBC 0-5 0 - 5 /hpf    Epithelial cells FEW FEW /lpf    Bacteria Negative NEG /hpf    Hyaline cast 0-2 0 - 5 /lpf   URINE CULTURE HOLD SAMPLE    Collection Time: 07/13/20  4:27 PM    Specimen: Serum; Urine   Result Value Ref Range    Urine culture hold        Urine on hold in Microbiology dept for 2 days. If unpreserved urine is submitted, it cannot be used for addtional testing after 24 hours, recollection will be required. Imaging:   CTA Chest     INDICATION: RLE DVT/ H/O hypotension     COMPARISON: Earlier chest x-ray     EXAM: Precontrast localizer was first performed to verify the levels of the  pulmonary arteries. Subsequently, contiguous axial images were obtained after  the rapid IV bolus administration of 70 cc Isovue-370, followed by thin section  axial reconstructions with multiplanar reformations. Three-dimensional post -  processing was performed.   CT dose reduction was achieved through use of a  standardized protocol tailored for this examination and automatic exposure  control for dose modulation.       FINDINGS: There is pulmonary embolic material demonstrated within the left main  pulmonary artery, proximal left upper lobe pulmonary artery, right upper lobe  pulmonary artery and apical branches, the interlobar right pulmonary artery, and  basilar lower lobe branch arteries bilaterally. There is furthermore occlusion  of the suprahepatic inferior vena cava and hepatic veins. There is no  enlargement of cardiac contour or cardiac chamber size nor deviation of the  interventricular septum. There is mild enlargement of the pulmonary arteries  with left main pulmonary artery diameter measuring 3.2 cm, consistent with  pulmonary arterial hypertension.     There is a small-moderate-sized left pleural effusion. Basilar left lower lobe  atelectasis versus consolidation is shown. There is linear opacification in the  dependent right lower lobe consistent with scarring or subsegmental atelectasis. Abundant ascites is partly shown as is evidence for opacification of the omentum  bilaterally suggesting omental caking or other pathologic process.     No destructive osseous process is shown.     IMPRESSION  IMPRESSION:   1. Bilateral pulmonary emboli. 2. 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 3. Abundant ascites. Omental caking versus edema. 4. Small-moderate left pleural effusion. Basilar left lower lobe consolidation  versus atelectasis. Assessment/ Plan     Extensive right lower extremity DVT with bilateral PEs and IVC occlusion: Patient will be observed on a telemetry bed, start patient on heparin GTT, will consult IR, will get echocardiogram, telemetry monitoring, cycle troponins, pain control, supportive care and close monitoring, once echo report is available may consider switching to oral anticoagulation, monitor closely.   Ascites: Unclear etiology, patient has occlusion of the hepatic vein secondary to extensive DVT, patient's wife denies any history of ascites, start patient on albumin, gentle diuresis if his blood pressure tolerates, pathology consult, closely monitor volume status, supportive care, close monitoring,  may consider getting a ultrasound in the morning.   Nausea/vomiting: Likely secondary to above, IV Zofran, diet as tolerated, may consider getting a GI consult in the morning, if persist may consider additional imaging, reassess as needed  Hypotension: Likely hypovolemia, albumin infusion, hold home antihypertensives, close monitoring and further intervention per hospital course  BPH: Continue home medications and continue to monitor  Hypercholesterolemia: Hold statin for tonight    GI DVT prophylaxis: Patient on a heparin drip               Signed By: Princess Gutierrez MD     July 13, 2020

## 2020-07-14 NOTE — ED NOTES
Dr. Eagle Cam in room with patient. Advised provider that patient and wife have concerns regarding heparin drip. Provider to discuss with patient.

## 2020-07-14 NOTE — ACP (ADVANCE CARE PLANNING)
Advance Care Planning     Advance Care Planning Activator (Inpatient)  Conversation Note      Date of ACP Conversation: 07/14/20     Conversation Conducted with:   Patient with [address]: Next of Kin by law (only applies in absence of above) (name) Jadiel Mt    ACP Activator: Keshia Bojorquez RN    *When Decision Maker makes decisions on behalf of the incapacitated patient: Decision Maker is asked to consider and make decisions based on patient values, known preferences, or best interests. Health Care Decision Maker:    Current Designated Health Care Decision Maker:   Primary Decision Maker: Guanakito Mace - [phone]  (If there is a valid [address] named in the \"Healthcare Decision Makers\" box in the ACP activity, but it is not visible above, be sure to open that field and then select the health care decision maker relationship (ie \"primary\") in the blank space to the right of the name.) Validate  this information as still accurate & up-to-date; edit [address] field as needed.)    Note: Assess and validate information in current ACP documents, as indicated. If no Decision Maker listed above or available through scanned documents, then:      Note: If the relationship of these Decision-Makers to the patient does NOT follow your state's Next of Kin hierarchy, recommend that patient complete ACP document that meets state-specific requirements to allow them to act on the patient's behalf when appropriate. Care Preferences    Ventilation: \"If you were in your present state of health and suddenly became very ill and were unable to breathe on your own, what would your preference be about the use of a ventilator (breathing machine) if it were available to you? \"      If patient would desire the use of a ventilator (breathing machine), answer \"yes\", if not \"no\":yes    \"If your health worsens and it becomes clear that your chance of recovery is unlikely, what would your preference be about the use of a ventilator (breathing machine) if it were available to you? \"     Would the patient desire the use of a ventilator (breathing machine)? YES      Resuscitation  \"CPR works best to restart the heart when there is a sudden event, like a heart attack, in someone who is otherwise healthy. Unfortunately, CPR does not typically restart the heart for people who have serious health conditions or who are very sick. \"    \"In the event your heart stopped as a result of an underlying serious health condition, would you want attempts to be made to restart your heart (answer \"yes\" for attempt to resuscitate) or would you prefer a natural death (answer \"no\" for do not attempt to resuscitate)? \" yes      NOTE: If the patient has a valid advance directive AND now provides care preference(s) that are inconsistent with that prior directive, advise the patient to consider either: creating a new advance directive that complies with state-specific requirements; or, if that is not possible, orally revoking that prior directive in accordance with state-specific requirements, which must be documented in the EHR. [] Yes  [] No   Educated Patient / Brennan Lizarraga regarding differences between Advance Directives and portable DNR orders.     Length of ACP Conversation in minutes:      Conversation Outcomes:  [x] ACP discussion completed  [] Existing advance directive reviewed with patient; no changes to patient's previously recorded wishes     [] New Advance Directive completed   [] Portable Do Not Resuscitate prepared for Provider review and signature  [] POLST/POST/MOLST/MOST prepared for Provider review and signature      Follow-up plan:    [] Schedule follow-up conversation to continue planning  [] Referred individual to Provider for additional questions/concerns   [] Advised patient/agent/surrogate to review completed ACP document and update if needed with changes in condition, patient preferences or care setting     [] This note routed to one or more involved healthcare providers

## 2020-07-15 ENCOUNTER — APPOINTMENT (OUTPATIENT)
Dept: NON INVASIVE DIAGNOSTICS | Age: 85
DRG: 166 | End: 2020-07-15
Attending: FAMILY MEDICINE
Payer: MEDICARE

## 2020-07-15 LAB
ANION GAP SERPL CALC-SCNC: 9 MMOL/L (ref 5–15)
APTT PPP: 129.1 SEC (ref 22.1–32)
APTT PPP: 37.3 SEC (ref 22.1–32)
APTT PPP: 53.5 SEC (ref 22.1–32)
APTT PPP: 57.8 SEC (ref 22.1–32)
BASOPHILS # BLD: 0.1 K/UL (ref 0–0.1)
BASOPHILS NFR BLD: 1 % (ref 0–1)
BUN SERPL-MCNC: 10 MG/DL (ref 6–20)
BUN/CREAT SERPL: 11 (ref 12–20)
CALCIUM SERPL-MCNC: 8.7 MG/DL (ref 8.5–10.1)
CHLORIDE SERPL-SCNC: 105 MMOL/L (ref 97–108)
CO2 SERPL-SCNC: 26 MMOL/L (ref 21–32)
CREAT SERPL-MCNC: 0.93 MG/DL (ref 0.7–1.3)
DIFFERENTIAL METHOD BLD: ABNORMAL
EOSINOPHIL # BLD: 0.1 K/UL (ref 0–0.4)
EOSINOPHIL NFR BLD: 2 % (ref 0–7)
ERYTHROCYTE [DISTWIDTH] IN BLOOD BY AUTOMATED COUNT: 17.7 % (ref 11.5–14.5)
GLUCOSE SERPL-MCNC: 112 MG/DL (ref 65–100)
HCT VFR BLD AUTO: 34.7 % (ref 36.6–50.3)
HGB BLD-MCNC: 10.9 G/DL (ref 12.1–17)
IMM GRANULOCYTES # BLD AUTO: 0 K/UL (ref 0–0.04)
IMM GRANULOCYTES NFR BLD AUTO: 1 % (ref 0–0.5)
LYMPHOCYTES # BLD: 2.2 K/UL (ref 0.8–3.5)
LYMPHOCYTES NFR BLD: 33 % (ref 12–49)
MCH RBC QN AUTO: 30.6 PG (ref 26–34)
MCHC RBC AUTO-ENTMCNC: 31.4 G/DL (ref 30–36.5)
MCV RBC AUTO: 97.5 FL (ref 80–99)
MONOCYTES # BLD: 0.6 K/UL (ref 0–1)
MONOCYTES NFR BLD: 8 % (ref 5–13)
NEUTS SEG # BLD: 3.9 K/UL (ref 1.8–8)
NEUTS SEG NFR BLD: 55 % (ref 32–75)
NRBC # BLD: 0 K/UL (ref 0–0.01)
NRBC BLD-RTO: 0 PER 100 WBC
PLATELET # BLD AUTO: 223 K/UL (ref 150–400)
PMV BLD AUTO: 9.7 FL (ref 8.9–12.9)
POTASSIUM SERPL-SCNC: 2.9 MMOL/L (ref 3.5–5.1)
RBC # BLD AUTO: 3.56 M/UL (ref 4.1–5.7)
SODIUM SERPL-SCNC: 140 MMOL/L (ref 136–145)
THERAPEUTIC RANGE,PTTT: ABNORMAL SECS (ref 58–77)
WBC # BLD AUTO: 6.9 K/UL (ref 4.1–11.1)

## 2020-07-15 PROCEDURE — 36415 COLL VENOUS BLD VENIPUNCTURE: CPT

## 2020-07-15 PROCEDURE — 74011250636 HC RX REV CODE- 250/636: Performed by: NURSE PRACTITIONER

## 2020-07-15 PROCEDURE — 96366 THER/PROPH/DIAG IV INF ADDON: CPT

## 2020-07-15 PROCEDURE — 74011250637 HC RX REV CODE- 250/637: Performed by: FAMILY MEDICINE

## 2020-07-15 PROCEDURE — 93306 TTE W/DOPPLER COMPLETE: CPT

## 2020-07-15 PROCEDURE — 74011250636 HC RX REV CODE- 250/636: Performed by: FAMILY MEDICINE

## 2020-07-15 PROCEDURE — 65660000000 HC RM CCU STEPDOWN

## 2020-07-15 PROCEDURE — 74011250637 HC RX REV CODE- 250/637: Performed by: NURSE PRACTITIONER

## 2020-07-15 PROCEDURE — 51798 US URINE CAPACITY MEASURE: CPT

## 2020-07-15 PROCEDURE — 3E0336Z INTRODUCTION OF NUTRITIONAL SUBSTANCE INTO PERIPHERAL VEIN, PERCUTANEOUS APPROACH: ICD-10-PCS | Performed by: INTERNAL MEDICINE

## 2020-07-15 PROCEDURE — 85730 THROMBOPLASTIN TIME PARTIAL: CPT

## 2020-07-15 PROCEDURE — 36600 WITHDRAWAL OF ARTERIAL BLOOD: CPT

## 2020-07-15 PROCEDURE — 36573 INSJ PICC RS&I 5 YR+: CPT | Performed by: NURSE PRACTITIONER

## 2020-07-15 PROCEDURE — 85025 COMPLETE CBC W/AUTO DIFF WBC: CPT

## 2020-07-15 PROCEDURE — 74011250636 HC RX REV CODE- 250/636: Performed by: EMERGENCY MEDICINE

## 2020-07-15 PROCEDURE — 99218 HC RM OBSERVATION: CPT

## 2020-07-15 PROCEDURE — 74011250637 HC RX REV CODE- 250/637: Performed by: HOSPITALIST

## 2020-07-15 PROCEDURE — 80048 BASIC METABOLIC PNL TOTAL CA: CPT

## 2020-07-15 PROCEDURE — 96376 TX/PRO/DX INJ SAME DRUG ADON: CPT

## 2020-07-15 RX ORDER — ONDANSETRON 2 MG/ML
4 INJECTION INTRAMUSCULAR; INTRAVENOUS EVERY 6 HOURS
Status: DISCONTINUED | OUTPATIENT
Start: 2020-07-15 | End: 2020-07-30 | Stop reason: HOSPADM

## 2020-07-15 RX ORDER — PROCHLORPERAZINE MALEATE 5 MG
5 TABLET ORAL
Status: DISCONTINUED | OUTPATIENT
Start: 2020-07-15 | End: 2020-07-30 | Stop reason: HOSPADM

## 2020-07-15 RX ORDER — SCOLOPAMINE TRANSDERMAL SYSTEM 1 MG/1
1 PATCH, EXTENDED RELEASE TRANSDERMAL
Status: DISCONTINUED | OUTPATIENT
Start: 2020-07-15 | End: 2020-07-23

## 2020-07-15 RX ORDER — POTASSIUM CHLORIDE 750 MG/1
40 TABLET, FILM COATED, EXTENDED RELEASE ORAL
Status: COMPLETED | OUTPATIENT
Start: 2020-07-15 | End: 2020-07-15

## 2020-07-15 RX ADMIN — ONDANSETRON 4 MG: 2 INJECTION INTRAMUSCULAR; INTRAVENOUS at 14:44

## 2020-07-15 RX ADMIN — POTASSIUM CHLORIDE 40 MEQ: 750 TABLET, FILM COATED, EXTENDED RELEASE ORAL at 09:20

## 2020-07-15 RX ADMIN — Medication 10 ML: at 20:27

## 2020-07-15 RX ADMIN — ONDANSETRON 4 MG: 2 INJECTION INTRAMUSCULAR; INTRAVENOUS at 20:27

## 2020-07-15 RX ADMIN — HEPARIN SODIUM AND DEXTROSE 17 UNITS/KG/HR: 10000; 5 INJECTION INTRAVENOUS at 15:42

## 2020-07-15 RX ADMIN — HEPARIN SODIUM AND DEXTROSE 18 UNITS/KG/HR: 10000; 5 INJECTION INTRAVENOUS at 22:13

## 2020-07-15 RX ADMIN — PROCHLORPERAZINE MALEATE 5 MG: 10 TABLET ORAL at 22:24

## 2020-07-15 RX ADMIN — FUROSEMIDE 20 MG: 10 INJECTION, SOLUTION INTRAMUSCULAR; INTRAVENOUS at 18:34

## 2020-07-15 RX ADMIN — CASTOR OIL AND BALSAM, PERU: 788; 87 OINTMENT TOPICAL at 18:34

## 2020-07-15 RX ADMIN — TAMSULOSIN HYDROCHLORIDE 0.4 MG: 0.4 CAPSULE ORAL at 09:21

## 2020-07-15 RX ADMIN — Medication 10 ML: at 14:44

## 2020-07-15 RX ADMIN — CASTOR OIL AND BALSAM, PERU: 788; 87 OINTMENT TOPICAL at 09:20

## 2020-07-15 RX ADMIN — FUROSEMIDE 20 MG: 10 INJECTION, SOLUTION INTRAMUSCULAR; INTRAVENOUS at 09:20

## 2020-07-15 RX ADMIN — HEPARIN SODIUM AND DEXTROSE 20 UNITS/KG/HR: 10000; 5 INJECTION INTRAVENOUS at 07:01

## 2020-07-15 RX ADMIN — FINASTERIDE 5 MG: 5 TABLET, FILM COATED ORAL at 09:21

## 2020-07-15 NOTE — CDMP QUERY
Query 1 of 1    Patient admitted with RLE DVT and bilateral PEs. Noted documentation by RD on 7/14 of 'severe malnutrition'. Please further specify type of malnutrition.       Malnutrition (severe)    Protein calorie malnutrition (severe)    Other (please specify)   Unable to determine    The medical record reflects the following:    Risk Factors: poor PO intake; 6lbs wt loss; nausea      Clinical Indicators:     · Inadequate oral intake, Severe malnutrition, In context of acute illness or injury related to altered GI function as evidenced by vomiting, nausea, weight loss 1-2% in 1 week, poor intake prior to admission    Malnutrition Status:  Severe malnutrition    Context:  Acute illness     Findings of the 6 clinical characteristics of malnutrition:   *Energy Intake:  7 - 50% or less of est energy requirements for 5 or more days  *Weight Loss:  7 - Greater than 2% over 1 week     Body Fat Loss:  Unable to assess,     Muscle Mass Loss:  Unable to assess,    Fluid Accumulation:  Unable to assess,     Strength:  Not performed       Treatment: RD consult; encourage PO intake; Zofran before meals    ASPEN REFERENCE:  Any two ASPEN criteria under any category will establish the diagnosis    Severe Malnutrition:  Energy Intake:  <50% of energy requirement for > 5 days,    Weight loss:   > 2%/ 1 week, >5% in 1 month, >7.5% in 3 months       Body Fat:  loss of SQ fat from orbits, triceps, or fat overlying the ribs- Severe loss  Muscle Mass:   muscle wasting at the temples, clavicles, shoulders, interosseous spaces, scapula, thigh, calf- Severe wasting  Fluid Accumulation:  localized or generalized edema of the extremities, vulva, scrotum- Severe   Strength:   measurably decreased    Thank you,    Baron Ralph, RN, BSN, [facility]  Clinical   [facility]  [phone]

## 2020-07-15 NOTE — PROGRESS NOTES
Palliative Medicine Consult  Johnny: [phone] ([phone])    Patient Name: Camden Osborne  YOB: 1934    Date of Initial Consult: July 14, 2020  Reason for Consult: Care Decisions  Requesting Provider: Dr. Bonnie Braun    Primary Care Physician: Garald Cabot, MD     SUMMARY:   Camden Osborne is a 80 y.o. bed bound male with a past history of BPH, hypercholesterolemia,DJD,  , who was admitted on 7/13/2020 from home after [address] [facility] Nurse found the pt to be hypotensive. Work up revealed extensive rt lower DVT extending up to the vena cava with bilateral PE's. Admission complicated by finding of pancreatic mass, ascites with possible splenic vein occlusion/ distal esophageal and gastric varices,  Nausea and vomiting, possible pneumonia, hypokalemia, hypotension. Heme onc and GI consults pending. Full Code  No Amd  Current medical issues leading to Palliative Medicine involvement include: support with care decisions given acute illness in a compromised state with high risk of decline. Mariah Stoner PALLIATIVE DIAGNOSES:   1. Nausea and vomiting  2. hypoalbuminemia  3. bed bound   4. Physical debility      PLAN:   1. Discussions held with wife today. She is unable to visit due to COVID  2. Reviewed chart and updated her. Explained that our service is here to help with discussions, provide information, help with decision making when needed  3. Wife has a clear understanding of the patient's condition  4. Pt has elected aggressive management. Wife very concerned of the patient's nutrition as he has been vomiting for months. She is inquiring about his nutrition plan and if iv nutrition should be initiated  5. Explained that the plan is to try and control the vomiting with medications and he has been limited to a liquid diet right now. Wife remains concerned as this has been happening for a while now  6. Will schedule zofran 4 times daily add scopolamine patch. Compazine prn  7.  Pt may need TPN for a brief period if vomiting does not improve but will defer to primary team  8. Goals at this time are to pursue aggressive options. I informed wife that endoscopy on hold due to heparin gtt as the clot is the main issue right now   9. Explained that if this is indeed pancreatic cancer, unsure if aggressive options will be offered given poor functional status and high risk for surgery. 10. I did not discuss this with the pt today but will tomorrow. 11. Pt still with nausea and non conversant today. Will follow up with pt and wife tomorrow    15. Initial consult note routed to primary continuity provider and/or primary health care team members  15. Communicated plan of care with: Palliative IDT, [facility] Team     GOALS OF CARE / TREATMENT PREFERENCES:     GOALS OF CARE:  Patient/Health Care Proxy Stated Goals: Prolong life    TREATMENT PREFERENCES:   Code Status: Full Code    Advance Care Planning:  [x] The [facility] Interdisciplinary Team has updated the ACP Navigator with Health Care Decision Maker and Patient Capacity      Primary Decision MakerFarheen Waddell - [phone]  Advance Care Planning 7/13/2020   Confirm Advance Directive Yes, on file       Medical Interventions: Full interventions     Other Instructions:   Artificially Administered Nutrition: No feeding tube     Other:    As far as possible, the palliative care team has discussed with patient / health care proxy about goals of care / treatment preferences for patient.      HISTORY:     History obtained from: chart    CHIEF COMPLAINT: pt admitted with aforementioned history an issues    HPI/SUBJECTIVE:    The patient is:   [x] Verbal and participatory  [] Non-participatory due to:   Nausea and vomiting still      Clinical Pain Assessment (nonverbal scale for severity on nonverbal patients):   Clinical Pain Assessment  Severity: 0          Duration: for how long has pt been experiencing pain (e.g., 2 days, 1 month, years)  Frequency: how often pain is an issue (e.g., several times per day, once every few days, constant)     FUNCTIONAL ASSESSMENT:     Palliative Performance Scale (PPS):  PPS: 30       PSYCHOSOCIAL/SPIRITUAL SCREENING:     Palliative IDT has assessed this patient for cultural preferences / practices and a referral made as appropriate to needs (Cultural Services, Patient Advocacy, Ethics, etc.)    Any spiritual / Buddhism concerns:  [] Yes /  [x] No    Caregiver Burnout:  [] Yes /  [x] No /  [] No Caregiver Present      Anticipatory grief assessment:   [x] Normal  / [] Maladaptive       ESAS Anxiety: Anxiety: 0    ESAS Depression: Depression: 0        REVIEW OF SYSTEMS:     Positive and pertinent negative findings in ROS are noted above in HPI. The following systems were [x] reviewed / [] unable to be reviewed as noted in HPI  Other findings are noted below. Systems: constitutional, ears/nose/mouth/throat, respiratory, gastrointestinal, genitourinary, musculoskeletal, integumentary, neurologic, psychiatric, endocrine. Positive findings noted below. Modified ESAS Completed by: provider   Fatigue: 2 Drowsiness: 0   Depression: 0 Pain: 0   Anxiety: 0 Nausea: 8   Anorexia: 0 Dyspnea: 0           Stool Occurrence(s): 1        PHYSICAL EXAM:     From RN flowsheet:  Wt Readings from Last 3 Encounters:   07/15/20 167 lb 8.8 oz (76 kg)   07/27/18 204 lb (92.5 kg)   03/17/18 200 lb (90.7 kg)     Blood pressure 98/52, pulse (!) 101, temperature 98.3 °F (36.8 °C), resp. rate 18, height 5' 10\" (1.778 m), weight 167 lb 8.8 oz (76 kg), SpO2 95 %.     Pain Scale 1: Numeric (0 - 10)  Pain Intensity 1: 0                 Last bowel movement, if known:     Constitutional: non conversant, nad, ill appearing  Eyes: pupils equal, anicteric  ENMT: no nasal discharge, moist mucous membranes, [place]  Cardiovascular: regular rhythm, distal pulses intact  Respiratory: breathing not labored, symmetric  Gastrointestinal: soft non-tender, +bowel sounds  Musculoskeletal: no deformity, no tenderness to palpation  Skin: warm, dry  Neurologic: following commands, moving all extremities  Psychiatric: full affect, no hallucinations  Other:       HISTORY:     Principal Problem:    DVT (deep venous thrombosis) ([address] (7/14/2020)      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 ENDOSCOPY    HX ORTHOPAEDIC Right 2010    rotator cuff    HX TONSILLECTOMY        Family History   Problem Relation Age of Onset    Diabetes Sister     Diabetes Brother     Heart Disease Brother       History reviewed, no pertinent family history.   Social History     Tobacco Use    Smoking status: Never Smoker    Smokeless tobacco: Never Used   Substance Use Topics    Alcohol use: Yes     Comment: social     Allergies   Allergen Reactions    Aspirin Unknown (comments)      Current Facility-Administered Medications   Medication Dose Route Frequency    finasteride (PROSCAR) tablet 5 mg  5 mg Oral DAILY    tamsulosin (FLOMAX) capsule 0.4 mg  0.4 mg Oral DAILY    sodium chloride (NS) flush 5-40 mL  5-40 mL IntraVENous Q8H    sodium chloride (NS) flush 5-40 mL  5-40 mL IntraVENous PRN    acetaminophen (TYLENOL) tablet 650 mg  650 mg Oral Q6H PRN    balsam peru-castor oiL (VENELEX) ointment   Topical BID    heparin 25,000 units in D5W 250 ml infusion  18-36 Units/kg/hr IntraVENous TITRATE    ondansetron (ZOFRAN) injection 4 mg  4 mg IntraVENous Q4H PRN    furosemide (LASIX) injection 20 mg  20 mg IntraVENous BID          LAB AND IMAGING FINDINGS:     Lab Results   Component Value Date/Time    WBC 6.9 07/15/2020 01:37 AM    HGB 10.9 (L) 07/15/2020 01:37 AM    PLATELET 739 58/39/0620 01:37 AM     Lab Results   Component Value Date/Time    Sodium 140 07/15/2020 01:37 AM    Potassium 2.9 (L) 07/15/2020 01:37 AM    Chloride 105 07/15/2020 01:37 AM    CO2 26 07/15/2020 01:37 AM    BUN 10 07/15/2020 01:37 AM    Creatinine 0.93 07/15/2020 01:37 AM    Calcium 8.7 07/15/2020 01:37 AM      Lab Results   Component Value Date/Time    Alk. phosphatase 120 (H) 07/13/2020 03:30 PM    Protein, total 8.7 (H) 07/13/2020 03:30 PM    Albumin 2.0 (L) 07/13/2020 03:30 PM    Globulin 6.7 (H) 07/13/2020 03:30 PM     Lab Results   Component Value Date/Time    INR 1.5 (H) 07/14/2020 05:00 AM    Prothrombin time 14.6 (H) 07/14/2020 05:00 AM    aPTT 129.1 (HH) 07/15/2020 10:28 AM      Lab Results   Component Value Date/Time    Ferritin 520 (H) 07/13/2020 09:49 PM      No results found for: PH, PCO2, PO2  No components found for: GLPOC   No results found for: CPK, CKMB             Total time: 35 min  Counseling / coordination time, spent as noted above: 30 min  > 50% counseling / coordination?: y    Prolonged service was provided for  []30 min   []75 min in face to face time in the presence of the patient, spent as noted above. Time Start:   Time End:   Note: this can only be billed with 04657 (initial) or 63291 (follow up). If multiple start / stop times, list each separately.

## 2020-07-15 NOTE — PHYSICIAN ADVISORY
Letter of Status Determination:   Recommend hospitalization status upgraded from   OBSERVATION  to INPATIENT  Status     Pt Name:  Camden Osborne   MR#   [facility] # [id number] /  [id number]  Payor: Farida Montoya / Plan: [address] [place] [address] HMO / Product Type: Managed Care Medicare /    TERENCE#  [id number]   Room and Hospital  201/01  @ [place] [address] hospital   Hospitalization date  7/13/2020  3:18 PM   Current Attending Physician  Devin Ho MD   Principal diagnosis  DVT (deep venous thrombosis) [facility])      Clinicals  80 y.o. y.o  male hospitalized with above diagnosis, Ct abd w/ pancreatic mass, ?  Neoplasm, on heparin gtt, Pt w/ ascites, may need paracentesis, hypokalemic this am, tachycardic, soft BP's        [place] (MCG) criteria   Does  NOT apply    STATUS DETERMINATION  Inpatient    The final decision of the patient's hospitalization status depends on the attending physician's judgment    Additional comments     Payor: Farida Montoya / Plan: [address] [place], [place] [address]O / Product Type: Managed Care Medicare /         Bryan Durand MD  Cell: [phone]  Physician Advisor

## 2020-07-15 NOTE — CONSULTS
[address] [facility]MD [place], [place]ite Mongi Slim, Madelin Netters, MD Larrie Bath, PA-C Faye Hu, [facility]     Angy Armstrong, [facility]   LOUISE Nieto, [facility]       Emerita Cutler [address]    at [address], [address] [place] [address], [place]  22.    [phone]    FAX: [address] [place] [address], [address]    [phone]    FAX: [phone]         HEPATOLOGY CONSULT NOTE  I was asked to see this patient in consultation by Dr Belkys Card for management of cirrhosis and ascites. I have reviewed the Emergency room note, Hospital admission note, Notes by all other physicians who have seen the patient during this hospitalization to date. I have reviewed the problem list and the reason for this hospitalization. I have reviewed the allergies and the medications the patient was taking at home prior to this hospitalization. HISTORY:  The patient is a 80year old male who was in reasonable good health until several months ago when developed septic arthritis, followed by pneumonia and pancreatitis. All of this was apparently in the [facility]. He has been in and out of hospital and rehab during this time. He was sent to the ED when [facility] RN found him hypotensive. In the ED underwent a CT scan and was found to have extensive DVT extending into the IVC and PE. A nodular liver suggesting cirrhosis, a mass like fullness in the HOP with double duct sign and ascites. He is on IV heparin. Mental status is now clear, much better than described in admission note. ASSESSMENT AND PLAN:  Cirrhosis  The patient may have cirrhosis. If he does have cirrhosis the etiology is not clear.     The diagnosis of cirrhosis is suggested by imaging, ascites and prolonged INR    Serologic testing for causes of chronic liver disease will be ordered. Ascites   Ascites has recently developed. Would perform diagnostic paracentesis and calculate SAAG which will tell us if he has ascites from portal HTN or malignancy. The later would most likely be pancreas etiology. ECHO heart to r/o RV dysfucntion and TR as etiology of ascites. Pancreas mass with dilated CBD and PD  This is highly suggestive of pancreas cancer. Will ultimately need EUS and biopsy of the pancreas mass. DVT with PE  This was likely precipitated by pancreas cancer   He is being treated with IV heparin   Right leg swelling >>left. SYSTEM REVIEW:  Constitution systems: Negative for fever, chills, weight gain, weight loss. Eyes: Negative for visual changes. ENT: Negative for sore throat, painful swallowing. Respiratory: Negative for cough, hemoptysis, SOB. Cardiology: Negative for chest pain, palpitations. GI:  Negative for constipation or diarrhea. : Negative for urinary frequency, dysuria, hematuria, nocturia. Skin: Negative for rash. Hematology: Negative for easy bruising, blood clots. Musculo-skelatal: Negative for back pain, muscle pain, weakness. Neurologic: Negative for headaches, dizziness, vertigo, memory problems not related to HE. Psychology: Negative for anxiety, depression. FAMILY HISTORY:  There is no family history of liver disease. SOCIAL HISTORY:  The patient is . The patient has no children. The patient has never used tobacco products. The patient has never consumed significant amounts of alcohol. The patient used to work as as a .    The patient retired in [employer]. PHYSICAL EXAMINATION:  VS: per nursing note  General:  No acute distress. Eyes:  Sclera anicteric. ENT:  No oral lesions. Thyroid normal.  Nodes:  No adenopathy. Skin:  No spider angiomata.   No jaundice. Respiratory:  Lungs clear to auscultation. Cardiovascular:  Regular heart rate. Abdomen:  Soft non-tender, Ascites appears to be present. Extremities:  Right leg edema. Neurologic:  Alert and oriented. Cranial nerves grossly intact. No asterixis. LABORATORY:  Results for Sophie Johnson (MRN [id number]) as of 7/16/2020 05:44   Ref. Range 7/13/2020 15:30 7/14/2020 05:00 7/15/2020 01:37   WBC Latest Ref Range: 4.1 - 11.1 K/uL 7.4 6.8 6.9   HGB Latest Ref Range: 12.1 - 17.0 g/dL 11.8 (L) 9.5 (L) 10.9 (L)   PLATELET Latest Ref Range: 150 - 400 K/uL 231 201 223   INR Latest Ref Range: 0.9 - 1.1   1.2 (H) 1.5 (H)    Sodium Latest Ref Range: 136 - 145 mmol/L 138 140 140   Potassium Latest Ref Range: 3.5 - 5.1 mmol/L 3.3 (L) 3.2 (L) 2.9 (L)   Chloride Latest Ref Range: 97 - 108 mmol/L 106 107 105   CO2 Latest Ref Range: 21 - 32 mmol/L 23 23 26   Glucose Latest Ref Range: 65 - 100 mg/dL 97 94 112 (H)   BUN Latest Ref Range: 6 - 20 MG/DL 11 10 10   Creatinine Latest Ref Range: 0.70 - 1.30 MG/DL 0.92 0.79 0.93   Bilirubin, total Latest Ref Range: 0.2 - 1.0 MG/DL 0.9     Albumin Latest Ref Range: 3.5 - 5.0 g/dL 2.0 (L)     ALT Latest Ref Range: 12 - 78 U/L 10 (L)     AST Latest Ref Range: 15 - 37 U/L 22     Alk. phosphatase Latest Ref Range: 45 - 117 U/L 120 (H)         RADIOLOGY:  CT scan abdomen with IV contrast.  Nodular surface to liver suggesting cirrhosis. No liver mass lesions. Mass in HOP with dilated bile ducts and PD. No thrombosis in PV, HV, IVC. Moderate ascites. Femoral vein thrombosis extended to lower IVC, multiple PE.       Elden Cranker, MD [facility] [address], [address] [place] [address].  [phone] [address]

## 2020-07-15 NOTE — PROGRESS NOTES
NUTRITION brief     Chart reviewed and spoke with daughter Elvis Jimenez) via phone. She voiced questions regarding nutrition plan if oral intake remains poor. Took some broth this morning with vomiting afterwards. Pt seen by GI with concerns for pancreatic malignancy but unable to complete EUS with bilateral PE and ascites. May need large volume paracentesis but hepatology consult still pending. Will follow for po intake and tolerance over next 48hrs, however with poor PO intake PTA for 1 week plus would be at low threshold for requiring nutrition support. If nutrition support needed would recommend first line of therapy would be DHT placement, likely into SB. If continued intolerance would then needs TPN but would like to avoid if possible. See full RD assessment from 7/14 for additional details, goals, and monitoring/evaluation.      [facility], RD [address] [place] , Pager #[phone] or via [employer]

## 2020-07-15 NOTE — PROGRESS NOTES
Physical Therapy Screening:    An [facility] screening referral was triggered for physical therapy based on results obtained during the nursing admission assessment. The patients chart was reviewed and the patient is appropriate for a skilled therapy evaluation if there is a decline in functional mobility from baseline. Please order a consult for physical therapy if you are in agreement and would like an evaluation to be completed. Thank you.     Himanshu Andino, PT

## 2020-07-15 NOTE — PROGRESS NOTES
[address] [facility] [address]  [address] [facility], [address]       GI PROGRESS NOTE  Will Fernando Sanchez  [phone] office  [phone] NP/PA in-hospital cell phone M-F until 4:30PM  After 5PM or on weekends, please call  for physician on call      NAME: Loli Jacob   :  1934   MRN:  [id number]       Subjective:   Patient reports persistent nausea and vomiting. No abdominal pain. He is on clear liquids. Objective:     VITALS:   Last 24hrs VS reviewed since prior progress note. Most recent are:  Visit Vitals  /68 (BP 1 Location: Right arm, BP Patient Position: Supine)   Pulse (!) 106   Temp 98.8 °F (37.1 °C)   Resp 19   Ht 5' 10\" (1.778 m)   Wt 76 kg (167 lb 8.8 oz)   SpO2 95%   BMI 24.04 kg/m²       PHYSICAL EXAM:  General: Cooperative, no acute distress    Neurologic:  Alert and oriented  HEENT: EOMI, no scleral icterus   Lungs:  No respiratory distress  Heart:  S1 S2  Abdomen: Soft, mildly distended, no tenderness, no guarding, no rebound. +Bowel sounds.   Extremities: Warm  Psych:   Not anxious or agitated    Lab Data Reviewed:     Recent Results (from the past 24 hour(s))   PTT    Collection Time: 20  4:12 PM   Result Value Ref Range    aPTT 33.9 (H) 22.1 - 32.0 sec    aPTT, therapeutic range     58.0 - 77.0 SECS   PTT    Collection Time: 07/15/20  1:36 AM   Result Value Ref Range    aPTT 53.5 (H) 22.1 - 32.0 sec    aPTT, therapeutic range     58.0 - 77.0 SECS   CBC WITH AUTOMATED DIFF    Collection Time: 07/15/20  1:37 AM   Result Value Ref Range    WBC 6.9 4.1 - 11.1 K/uL    RBC 3.56 (L) 4.10 - 5.70 M/uL    HGB 10.9 (L) 12.1 - 17.0 g/dL    HCT 34.7 (L) 36.6 - 50.3 %    MCV 97.5 80.0 - 99.0 FL    MCH 30.6 26.0 - 34.0 PG    MCHC 31.4 30.0 - 36.5 g/dL    RDW 17.7 (H) 11.5 - 14.5 %    PLATELET 079 394 - 267 K/uL    MPV 9.7 8.9 - 12.9 FL    NRBC 0.0 0  WBC    ABSOLUTE NRBC 0.00 0.00 - 0.01 K/uL    NEUTROPHILS 55 32 - 75 %    LYMPHOCYTES 33 12 - 49 % MONOCYTES 8 5 - 13 %    EOSINOPHILS 2 0 - 7 %    BASOPHILS 1 0 - 1 %    IMMATURE GRANULOCYTES 1 (H) 0.0 - 0.5 %    ABS. NEUTROPHILS 3.9 1.8 - 8.0 K/UL    ABS. LYMPHOCYTES 2.2 0.8 - 3.5 K/UL    ABS. MONOCYTES 0.6 0.0 - 1.0 K/UL    ABS. EOSINOPHILS 0.1 0.0 - 0.4 K/UL    ABS. BASOPHILS 0.1 0.0 - 0.1 K/UL    ABS. IMM.  GRANS. 0.0 0.00 - 0.04 K/UL    DF AUTOMATED     METABOLIC PANEL, BASIC    Collection Time: 07/15/20  1:37 AM   Result Value Ref Range    Sodium 140 136 - 145 mmol/L    Potassium 2.9 (L) 3.5 - 5.1 mmol/L    Chloride 105 97 - 108 mmol/L    CO2 26 21 - 32 mmol/L    Anion gap 9 5 - 15 mmol/L    Glucose 112 (H) 65 - 100 mg/dL    BUN 10 6 - 20 MG/DL    Creatinine 0.93 0.70 - 1.30 MG/DL    BUN/Creatinine ratio 11 (L) 12 - 20      GFR est AA >60 >60 ml/min/1.73m2    GFR est non-AA >60 >60 ml/min/1.73m2    Calcium 8.7 8.5 - 10.1 MG/DL   ECHO ADULT COMPLETE    Collection Time: 07/15/20  9:37 AM   Result Value Ref Range    IVSd 0.87 0.6 - 1.0 cm    LVIDd 4.21 4.2 - 5.9 cm    LVIDs 2.64 cm    LVOT d 2.06 cm    LVPWd 1.04 (A) 0.6 - 1.0 cm    LVOT Peak Gradient 3.28 mmHg    LVOT Peak Velocity 90.58 cm/s    RVIDd 3.30 cm    RVSP 32.10 mmHg    Left Atrium Major Axis 4.46 cm    LA Volume 42.86 18 - 58 mL    LA Area 4C 14.40 cm2    LA Vol 2C 46.08 18 - 58 mL    LA Vol 4C 34.23 18 - 58 mL    Est. RA Pressure 3.00 mmHg    Aortic Valve Area by Continuity of Peak Velocity 2.33 cm2    AoV PG 6.72 mmHg    Aortic Valve Systolic Peak Velocity 822.91 cm/s    MV A Brijesh 108.50 cm/s    Mitral Valve E Wave Deceleration Time 0.14 s    MV E Brijesh 69.94 cm/s    Mitral Valve Pressure Half-time 0.04 s    MVA (PHT) 5.26 cm2    Tapse 2.13 (A) 1.5 - 2.0 cm    Triscuspid Valve Regurgitation Peak Gradient 29.10 mmHg    TR Max Velocity 269.72 cm/s    Ao Root D 3.38 cm    MV E/A 0.64     LV Mass .2 88 - 224 g    LV Mass AL Index 66.7 49 - 115 g/m2    Left Atrium Minor Axis 2.30 cm    LA Vol Index 22.14 16 - 28 ml/m2    LA Vol Index 23.80 16 - 28 ml/m2    LA Vol Index 17.68 16 - 28 ml/m2    JOE/BSA Pk Brijesh 1.2 cm2/m2   PTT    Collection Time: 07/15/20 10:28 AM   Result Value Ref Range    aPTT 129.1 (HH) 22.1 - 32.0 sec    aPTT, therapeutic range     58.0 - 77.0 SECS     CT abdomen/pelvis with IV contrast  INDICATION: ivc thrombosis     COMPARISON: PE CT earlier today     TECHNIQUE:  Following the uneventful intravenous administration of IV contrast, thin axial  images were obtained through the abdomen and pelvis. Coronal and sagittal  reconstructions were generated. Oral contrast was not administered. CT dose  reduction was achieved through use of a standardized protocol tailored for this  examination and automatic exposure control for dose modulation.      FINDINGS:  LUNG BASES: Known right lower lobe pulmonary embolus. Small left pleural  effusion and basilar atelectasis. Calcified lymph nodes in the lower mediastinum  and left hilum. LIVER: Small slightly nodular liver with heterogeneous enhancement. Hepatic  veins are patent. Portal vein patent. Intrahepatic biliary dilatation, with  abrupt termination of the mid common bile duct at the level of the pancreatic  head. GALLBLADDER: Unremarkable. SPLEEN: Geographic hypoenhancement in the posterior superior spleen. PANCREAS: Heterogeneous enhancement of the pancreas and ill-defined soft tissue  fullness in the pancreatic head and body, with abrupt termination of the main  pancreatic duct. Heterogeneous soft tissue in the region of pancreatic head  approximately 4.2 x 4.0 cm though difficult to accurately measure. Associated  narrowing of the preston splenic confluence, and possible splenic vein occlusion. Distal esophageal and gastric varices noted. ADRENALS: No mass. KIDNEYS: Contrast material in both kidneys from PE study. GI TRACT: No bowel obstruction. Edematous thickening of the gastric antrum and  duodenum. PERITONEUM: Large amount of ascites.  Focal fluid collection near the tail of the  pancreas 3.7 x 2.3 cm, with adjacent ill-defined slightly hyperdense fluid in  the left anterior pararenal space and extending toward the left paracolic  gutter. No free air. APPENDIX: Not clearly visualized. RETROPERITONEUM: No aortic aneurysm. LYMPH NODES: None enlarged. ADDITIONAL COMMENTS: N/A.     URINARY BLADDER: Unremarkable. REPRODUCTIVE ORGANS: Enlarged prostate. LYMPH NODES: None enlarged. FREE FLUID: Large amount. BONES: No destructive bone lesion. ADDITIONAL COMMENTS: Deep venous thrombosis right common femoral vein. Inferior  vena cava is patent.     IMPRESSION  IMPRESSION:     1. No evidence of hepatic vein, portal vein, or inferior vena cava thrombosis as  questioned on PE CT earlier today. There is however right lower lobe pulmonary  emboli in right common femoral deep venous thrombosis reported elsewhere earlier  today. 2. Masslike fullness in the pancreatic head, resulting in intrahepatic and  pancreatic duct dilatation as described above. Peripancreatic inflammatory  changes, with 3.7 x 2.3 cm collection near the pancreatic tail possibly  pseudocyst. Large amount of ascites, and moderate amount of slightly complex  fluid/soft tissue in the left abdomen. Findings may represent pancreatitis or  pancreatic neoplasm. Correlation with any prior imaging would be helpful given  the extensive abnormalities. 3. Small left pleural effusion and basilar atelectasis. 4. Edematous thickening of the gastric antrum and duodenum likely secondary to  the pancreatic process. Assessment:   · Pancreatic mass: CT as above.    · Nausea/vomiting: CT abdomen/pelvis with IV contrast (7/13/20): mass-like fullness in the pancreatic head, resulting in intrahepatic and pancreatic duct dilatation, peripancreatic inflammatory changes with 3.7 x 2.3 cm collection near the pancreatic tail possibly pseudocyst, large amount of ascites, and moderate amount of slightly complex fluid/soft tissue in the left abdomen - findings may represent pancreatitis or pancreatic neoplasm; edematous thickening of the gastric antrum and duodenum likely secondary to the pancreatic process. LFTs and lipase unremarkable on 7/13. · Right lower extremity DVT with bilateral PEs: on heparin gtt.    · Ascites  · Hypotension  · COVID-19 negative     Patient Active Problem List   Diagnosis Code    DVT (deep vein thrombosis) in pregnancy O22.30    DVT (deep venous thrombosis) ([facility] [address] I82.409     Plan:   · Supportive measures  · Palliative care following  · Hepatology and hematology consults pending  · No plan for EUS at this time given PEs (on heparin gtt)     Signed By: VANESSA Munguia     7/15/2020  3:04 PM       Agree with above  Supportive care  Will follow

## 2020-07-15 NOTE — PROGRESS NOTES
Problem: Falls - Risk of  Goal: *Absence of Falls  Description: Document Corrie Spain Fall Risk and appropriate interventions in the flowsheet. Outcome: Progressing Towards Goal  Note: Fall Risk Interventions:  Mobility Interventions: Communicate number of staff needed for ambulation/transfer         Medication Interventions: Teach patient to arise slowly    Elimination Interventions: Call light in reach    History of Falls Interventions: Consult care management for discharge planning    Patient continues heparin drip.      Problem: Deep Venous Thrombosis - Risk of  Goal: *Absence of deep venous thrombosis signs and symptoms(Stroke Metric)  Outcome: Progressing Towards Goal  Goal: *Absence of impaired coagulation signs and symptoms  Outcome: Progressing Towards Goal  Goal: *Knowledge of prescribed medications  Outcome: Progressing Towards Goal  Goal: *Absence of bleeding  Outcome: Progressing Towards Goal

## 2020-07-15 NOTE — PROGRESS NOTES
Bedside and Verbal shift change report given to Cheri Hastings RN (oncoming nurse) by Eula Gautam RN (offgoing nurse). Report included the following information SBAR, Kardex, Intake/Output, MAR and Recent Results. 1300: Daughter, Tia Gomez, called and was asking for an update. Tia Headings was noted to not be on patients demographics profile. Patient was asked if ok to give information to [facility] when she called. Patient stated \"It is ok, she is my daughter. You can add her to my chart. \" Marietta's information has been added to demographics profile in chart.

## 2020-07-15 NOTE — PROGRESS NOTES
[address] [facility]  Hospitalist Group                                                                                          Hospitalist Progress Note  Amelia Couch MD  Answering service: [phone] from in house phone        Date of Service:  7/15/2020  NAME:  Jorje Tolliver  :  1934  MRN:  [id number]      Admission Summary:   Jorje Tolliver is a 80 y.o. male who presents with right leg swelling. Patient does not have his hearing aids, is not able to hear very well, and history was primarily obtained from his wife who is at the bedside. His wife reports that patient has been in and out of the hospital and rehab for the last few months. He initially got diagnosed with what sounds like septic arthritis. Patient underwent knee aspiration, was put on IV antibiotics, was discharged home on IV antibiotics, developed pneumonia, pancreatitis and continued knee pain. [place] [facility] [place], was treated with antibiotics, and eventually was discharged to a rehab facility. Patient came back home from rehab, recently started having some nausea/vomiting, new swelling in his right leg, patient had significant vomiting 3 to 4 days back, it seems to have resolved now, and he was able to tolerate p.o. today. Home health nurse came today and found that the patient was hypotensive, and patient was brought to [facility] ED. Patient was found to have extensive right lower extremity DVT extending up into the vena cava and bilateral PEs. Patient was requested to be admitted under the hospitalist service. The wife reports that patient has no history of blood clots that she is aware of. Does report that patient has been more or less bedbound in the last few months. She denies patient having any other complaints or problems.   The patient and his wife deny patient c/o  any Headache, blurry vision, sore throat, trouble swallowing, trouble with speech, chest pain, SOB, cough, fever, chills, N/V/D, abd pain, urinary symptoms, constipation, recent travels, sick contacts, focal or generalized oneirological symptoms,  falls, injuries, rashes, contact with COVID-19 diagnosed patients, hematemesis, melena, hemoptysis, hematuria, rashes, denies starting any new medications and denies any other concerns or problems besides as mentioned above.        Interval history / Subjective:     F/u DVT   COVID negative  No pain  No SOB   Hypokalemia  Assessment & Plan:     Acute DVT/PE  -likely sec to immobility/recent hospitalization  -Dopplers 7/13 acute partially occlusive deep vein thrombosis of the right common femoral vein and proxmial femoral vein   -CTA chest 7/13 Bilateral pulmonary emboli. Occlusion of suprahepatic inferior vena cava and hepatic veins  -CT abd 7/13 No evidence of hepatic vein, portal vein, or inferior vena cava thrombosis as questioned on PE CT earlier today. There is however right lower lobe pulmonary emboli in right common femoral deep venous thrombosis reported elsewhere earlier today.  -Currently on heparin drip, continue for now  -Awaiting Hematology input    Pancreatic mass   -Lipase 166  -recent admission in the hospital with pancreatitis times two  -CT abd 7/13 Masslike fullness in the pancreatic head, resulting in intrahepatic and pancreatic duct dilatation as described above.  Peripancreatic inflammatory changes, with 3.7 x 2.3 cm collection near the pancreatic tail possibly pseudocyst  -Appreciate GI input, high suspicion for pancreatic neoplasm--not a candidate at this time for EUS/biopsy, may benefit from large volume paracentesis, awaiting hepatology    COVID PUI  -negative  -low suspicion, take off droplet plus isolation    Ascites/with possible splenic vein occlusion/distal esophageal and gastric varices  -Awaiting Hepatology    Nausea/vomiting  -symptomatic treatment    Possible Pneumonia  -no evidence of pneumonia  -Monitor off antibiotics  -Outpatient repeat chest imaging    Hypokalemia  -replace as needed    Hypotension: Likely hypovolemia, albumin infusion, hold home antihypertensives, close monitoring and further intervention per hospital course    Hypoalbuminemia  -nutrition input  -supps    BPH:   -Continue home medications    Hypercholesterolemia:   -Hold statin for now     Cardiac diet    Code status: FULL CODE, appreciate palliative care  DVT prophylaxis: heparin drip  PTA: home  Baseline: independent  Spoke to Adán Alvarez, wife, at [phone]    Plan: Follow Hematology/GI/Hepatology/Palliative care    Care Plan discussed with: Patient/Family  Anticipated Disposition: TBD  Anticipated Discharge: 24 hours to 48 hours     Hospital Problems  Date Reviewed: 7/14/2020          Codes Class Noted POA    * (Principal) DVT (deep venous thrombosis) ([facility] [address] ICD-10-CM: I82.409  ICD-9-CM: 453.40  7/14/2020 Yes                Review of Systems:   A comprehensive review of systems was negative except for that written in the HPI. Vital Signs:    Last 24hrs VS reviewed since prior progress note. Most recent are:  Visit Vitals  /68 (BP 1 Location: Right arm, BP Patient Position: At rest)   Pulse (!) 106   Temp 98.5 °F (36.9 °C)   Resp 16   Ht 5' 10\" (1.778 m)   Wt 76.4 kg (168 lb 6.9 oz)   SpO2 94%   BMI 24.17 kg/m²         Intake/Output Summary (Last 24 hours) at 7/15/2020 0720  Last data filed at 7/15/2020 6212  Gross per 24 hour   Intake 676.84 ml   Output 1675 ml   Net -998.16 ml        Physical Examination:             Constitutional:  No acute distress, cooperative, pleasant    ENT:  Oral mucosa moist, oropharynx benign. Resp:  CTA bilaterally. No wheezing/rhonchi/rales. No accessory muscle use   CV:  Regular rhythm, normal rate, no murmurs, gallops, rubs    GI:  Soft, non distended, non tender.  normoactive bowel sounds, no hepatosplenomegaly     Musculoskeletal:  No edema, warm, 2+ pulses throughout    Neurologic:  Moves all extremities. AAOx3, CN II-XII reviewed     Skin:  Good turgor, no rashes or ulcers       Data Review:    Review and/or order of clinical lab test      Labs:     Recent Labs     07/15/20  0137 07/14/20  0500   WBC 6.9 6.8   HGB 10.9* 9.5*   HCT 34.7* 29.6*    201     Recent Labs     07/15/20  0137 07/14/20  0500 07/13/20  1530    140 138   K 2.9* 3.2* 3.3*    107 106   CO2 26 23 23   BUN 10 10 11   CREA 0.93 0.79 0.92   * 94 97   CA 8.7 8.3* 8.9     Recent Labs     07/13/20  1530   ALT 10*   *   TBILI 0.9   TP 8.7*   ALB 2.0*   GLOB 6.7*   LPSE 166     Recent Labs     07/15/20  0136 07/14/20  1612 07/14/20  0500 07/13/20  1530   INR  --   --  1.5* 1.2*   PTP  --   --  14.6* 12.7*   APTT 53.5* 33.9* >130.0* 30.5      Recent Labs     07/13/20  2149   FERR 520*      No results found for: FOL, RBCF   No results for input(s): PH, PCO2, PO2 in the last 72 hours.   Recent Labs     07/14/20  0500 07/13/20  2149 07/13/20  1530   TROIQ <0.05 <0.05 <0.05     Lab Results   Component Value Date/Time    Cholesterol, total 250 (H) 10/02/2013 01:55 PM    HDL Cholesterol 53 10/02/2013 01:55 PM    LDL, calculated 176 (H) 10/02/2013 01:55 PM    Triglyceride 106 10/02/2013 01:55 PM    CHOL/HDL Ratio 3.4 10/06/2010 10:25 AM     No results found for: [facility]  Lab Results   Component Value Date/Time    Color YELLOW/STRAW 07/13/2020 04:27 PM    Appearance CLEAR 07/13/2020 04:27 PM    Specific gravity 1.012 07/13/2020 04:27 PM    pH (UA) 6.0 07/13/2020 04:27 PM    Protein Negative 07/13/2020 04:27 PM    Glucose Negative 07/13/2020 04:27 PM    Ketone TRACE (A) 07/13/2020 04:27 PM    Bilirubin Negative 07/13/2020 04:27 PM    Urobilinogen 1.0 07/13/2020 04:27 PM    Nitrites Negative 07/13/2020 04:27 PM    Leukocyte Esterase Negative 07/13/2020 04:27 PM    Epithelial cells FEW 07/13/2020 04:27 PM    Bacteria Negative 07/13/2020 04:27 PM    WBC 0-4 07/13/2020 04:27 PM    RBC 0-5 07/13/2020 04:27 PM Medications Reviewed:     Current Facility-Administered Medications   Medication Dose Route Frequency    finasteride (PROSCAR) tablet 5 mg  5 mg Oral DAILY    tamsulosin (FLOMAX) capsule 0.4 mg  0.4 mg Oral DAILY    sodium chloride (NS) flush 5-40 mL  5-40 mL IntraVENous Q8H    sodium chloride (NS) flush 5-40 mL  5-40 mL IntraVENous PRN    acetaminophen (TYLENOL) tablet 650 mg  650 mg Oral Q6H PRN    balsam peru-castor oiL (VENELEX) ointment   Topical BID    heparin 25,000 units in D5W 250 ml infusion  18-36 Units/kg/hr IntraVENous TITRATE    ondansetron (ZOFRAN) injection 4 mg  4 mg IntraVENous Q4H PRN    furosemide (LASIX) injection 20 mg  20 mg IntraVENous BID     ______________________________________________________________________  EXPECTED LENGTH OF STAY: - - -  ACTUAL LENGTH OF STAY:          Anahi Bryant MD

## 2020-07-15 NOTE — PROGRESS NOTES
TRANSFER - OUT REPORT:    Verbal report given to Jamia Chavez RN(name) on Ivin Huger  being transferred to (unit) for routine progression of care       Report consisted of patients Situation, Background, Assessment and   Recommendations(SBAR). Information from the following report(s) SBAR, Kardex, Intake/Output, MAR and Recent Results was reviewed with the receiving nurse. Lines:   Peripheral IV 07/13/20 Left Wrist (Active)   Site Assessment Clean, dry, & intact 07/15/20 0919   Phlebitis Assessment 0 07/15/20 0919   Infiltration Assessment 0 07/15/20 0919   Dressing Status Clean, dry, & intact 07/15/20 0919   Dressing Type Transparent;Tape 07/15/20 0919   Hub Color/Line Status Pink;Flushed;Clotted 07/15/20 0919   Action Taken Open ports on tubing capped 07/15/20 0919   Alcohol Cap Used Yes 07/15/20 0919        Opportunity for questions and clarification was provided.       Patient transported with:  Patient belongings

## 2020-07-16 ENCOUNTER — APPOINTMENT (OUTPATIENT)
Dept: ULTRASOUND IMAGING | Age: 85
DRG: 166 | End: 2020-07-16
Attending: INTERNAL MEDICINE
Payer: MEDICARE

## 2020-07-16 LAB
ALBUMIN SERPL-MCNC: 1.8 G/DL (ref 3.5–5)
ALBUMIN/GLOB SERPL: 0.3 {RATIO} (ref 1.1–2.2)
ALP SERPL-CCNC: 92 U/L (ref 45–117)
ALT SERPL-CCNC: 10 U/L (ref 12–78)
ANION GAP SERPL CALC-SCNC: 10 MMOL/L (ref 5–15)
ANION GAP SERPL CALC-SCNC: 5 MMOL/L (ref 5–15)
APTT PPP: 61.6 SEC (ref 22.1–32)
APTT PPP: 82.7 SEC (ref 22.1–32)
APTT PPP: 85.6 SEC (ref 22.1–32)
AST SERPL-CCNC: 15 U/L (ref 15–37)
BILIRUB SERPL-MCNC: 0.7 MG/DL (ref 0.2–1)
BUN SERPL-MCNC: 8 MG/DL (ref 6–20)
BUN SERPL-MCNC: 9 MG/DL (ref 6–20)
BUN/CREAT SERPL: 10 (ref 12–20)
BUN/CREAT SERPL: 10 (ref 12–20)
CALCIUM SERPL-MCNC: 7.7 MG/DL (ref 8.5–10.1)
CALCIUM SERPL-MCNC: 8.2 MG/DL (ref 8.5–10.1)
CEA SERPL-MCNC: 2 NG/ML
CHLORIDE SERPL-SCNC: 105 MMOL/L (ref 97–108)
CHLORIDE SERPL-SCNC: 107 MMOL/L (ref 97–108)
CO2 SERPL-SCNC: 25 MMOL/L (ref 21–32)
CO2 SERPL-SCNC: 29 MMOL/L (ref 21–32)
CREAT SERPL-MCNC: 0.82 MG/DL (ref 0.7–1.3)
CREAT SERPL-MCNC: 0.9 MG/DL (ref 0.7–1.3)
ECHO AO ROOT DIAM: 3.38 CM
ECHO AV AREA PEAK VELOCITY: 2.33 CM2
ECHO AV AREA/BSA PEAK VELOCITY: 1.2 CM2/M2
ECHO AV PEAK GRADIENT: 6.72 MMHG
ECHO AV PEAK VELOCITY: 129.63 CM/S
ECHO EST RA PRESSURE: 3 MMHG
ECHO LA AREA 4C: 14.4 CM2
ECHO LA MAJOR AXIS: 4.46 CM
ECHO LA MINOR AXIS: 2.3 CM
ECHO LA VOL 2C: 46.08 ML (ref 18–58)
ECHO LA VOL 4C: 34.23 ML (ref 18–58)
ECHO LA VOL BP: 42.86 ML (ref 18–58)
ECHO LA VOL/BSA BIPLANE: 22.14 ML/M2 (ref 16–28)
ECHO LA VOLUME INDEX A2C: 23.8 ML/M2 (ref 16–28)
ECHO LA VOLUME INDEX A4C: 17.68 ML/M2 (ref 16–28)
ECHO LV INTERNAL DIMENSION DIASTOLIC: 4.21 CM (ref 4.2–5.9)
ECHO LV INTERNAL DIMENSION SYSTOLIC: 2.64 CM
ECHO LV IVSD: 0.87 CM (ref 0.6–1)
ECHO LV MASS 2D: 129.2 G (ref 88–224)
ECHO LV MASS INDEX 2D: 66.7 G/M2 (ref 49–115)
ECHO LV POSTERIOR WALL DIASTOLIC: 1.04 CM (ref 0.6–1)
ECHO LVOT DIAM: 2.06 CM
ECHO LVOT PEAK GRADIENT: 3.28 MMHG
ECHO LVOT PEAK VELOCITY: 90.58 CM/S
ECHO MV A VELOCITY: 108.5 CM/S
ECHO MV AREA PHT: 5.26 CM2
ECHO MV E DECELERATION TIME (DT): 0.14 S
ECHO MV E VELOCITY: 69.94 CM/S
ECHO MV E/A RATIO: 0.64
ECHO MV PRESSURE HALF TIME (PHT): 0.04 S
ECHO RIGHT VENTRICULAR SYSTOLIC PRESSURE (RVSP): 32.1 MMHG
ECHO RV INTERNAL DIMENSION: 3.3 CM
ECHO RV TAPSE: 2.13 CM (ref 1.5–2)
ECHO TV REGURGITANT MAX VELOCITY: 269.72 CM/S
ECHO TV REGURGITANT PEAK GRADIENT: 29.1 MMHG
FIBRINOGEN PPP-MCNC: 223 MG/DL (ref 200–475)
GLOBULIN SER CALC-MCNC: 5.4 G/DL (ref 2–4)
GLUCOSE SERPL-MCNC: 109 MG/DL (ref 65–100)
GLUCOSE SERPL-MCNC: 142 MG/DL (ref 65–100)
INR PPP: 1.3 (ref 0.9–1.1)
POTASSIUM SERPL-SCNC: 2.8 MMOL/L (ref 3.5–5.1)
POTASSIUM SERPL-SCNC: 3.2 MMOL/L (ref 3.5–5.1)
PROT SERPL-MCNC: 7.2 G/DL (ref 6.4–8.2)
PROTHROMBIN TIME: 13.5 SEC (ref 9–11.1)
SODIUM SERPL-SCNC: 139 MMOL/L (ref 136–145)
SODIUM SERPL-SCNC: 142 MMOL/L (ref 136–145)
THERAPEUTIC RANGE,PTTT: ABNORMAL SECS (ref 58–77)

## 2020-07-16 PROCEDURE — 86301 IMMUNOASSAY TUMOR CA 19-9: CPT

## 2020-07-16 PROCEDURE — 36415 COLL VENOUS BLD VENIPUNCTURE: CPT

## 2020-07-16 PROCEDURE — 88341 IMHCHEM/IMCYTCHM EA ADD ANTB: CPT

## 2020-07-16 PROCEDURE — 74011250636 HC RX REV CODE- 250/636: Performed by: NURSE PRACTITIONER

## 2020-07-16 PROCEDURE — 65660000000 HC RM CCU STEPDOWN

## 2020-07-16 PROCEDURE — 85384 FIBRINOGEN ACTIVITY: CPT

## 2020-07-16 PROCEDURE — 85610 PROTHROMBIN TIME: CPT

## 2020-07-16 PROCEDURE — 88108 CYTOPATH CONCENTRATE TECH: CPT

## 2020-07-16 PROCEDURE — 84157 ASSAY OF PROTEIN OTHER: CPT

## 2020-07-16 PROCEDURE — 77030005208 HC CATH HLDR GLWC -A

## 2020-07-16 PROCEDURE — 0W9G30Z DRAINAGE OF PERITONEAL CAVITY WITH DRAINAGE DEVICE, PERCUTANEOUS APPROACH: ICD-10-PCS | Performed by: RADIOLOGY

## 2020-07-16 PROCEDURE — 82042 OTHER SOURCE ALBUMIN QUAN EA: CPT

## 2020-07-16 PROCEDURE — 88112 CYTOPATH CELL ENHANCE TECH: CPT

## 2020-07-16 PROCEDURE — 80053 COMPREHEN METABOLIC PANEL: CPT

## 2020-07-16 PROCEDURE — 02HV33Z INSERTION OF INFUSION DEVICE INTO SUPERIOR VENA CAVA, PERCUTANEOUS APPROACH: ICD-10-PCS | Performed by: INTERNAL MEDICINE

## 2020-07-16 PROCEDURE — 88305 TISSUE EXAM BY PATHOLOGIST: CPT

## 2020-07-16 PROCEDURE — 77030040831 HC BAG URINE DRNG MDII -A

## 2020-07-16 PROCEDURE — 89050 BODY FLUID CELL COUNT: CPT

## 2020-07-16 PROCEDURE — 88342 IMHCHEM/IMCYTCHM 1ST ANTB: CPT

## 2020-07-16 PROCEDURE — 85730 THROMBOPLASTIN TIME PARTIAL: CPT

## 2020-07-16 PROCEDURE — 82378 CARCINOEMBRYONIC ANTIGEN: CPT

## 2020-07-16 PROCEDURE — 74011250636 HC RX REV CODE- 250/636: Performed by: EMERGENCY MEDICINE

## 2020-07-16 PROCEDURE — 74011250636 HC RX REV CODE- 250/636: Performed by: INTERNAL MEDICINE

## 2020-07-16 PROCEDURE — 74011250637 HC RX REV CODE- 250/637: Performed by: FAMILY MEDICINE

## 2020-07-16 PROCEDURE — 88313 SPECIAL STAINS GROUP 2: CPT

## 2020-07-16 PROCEDURE — 76937 US GUIDE VASCULAR ACCESS: CPT

## 2020-07-16 PROCEDURE — C1751 CATH, INF, PER/CENT/MIDLINE: HCPCS

## 2020-07-16 PROCEDURE — 49083 ABD PARACENTESIS W/IMAGING: CPT

## 2020-07-16 PROCEDURE — 77030020365 HC SOL INJ SOD CL 0.9% 50ML

## 2020-07-16 PROCEDURE — 82150 ASSAY OF AMYLASE: CPT

## 2020-07-16 RX ORDER — POTASSIUM CHLORIDE 29.8 MG/ML
20 INJECTION INTRAVENOUS ONCE
Status: COMPLETED | OUTPATIENT
Start: 2020-07-16 | End: 2020-07-16

## 2020-07-16 RX ORDER — POTASSIUM CHLORIDE 7.45 MG/ML
10 INJECTION INTRAVENOUS
Status: COMPLETED | OUTPATIENT
Start: 2020-07-16 | End: 2020-07-16

## 2020-07-16 RX ORDER — POTASSIUM CHLORIDE 750 MG/1
40 TABLET, FILM COATED, EXTENDED RELEASE ORAL
Status: DISCONTINUED | OUTPATIENT
Start: 2020-07-16 | End: 2020-07-16

## 2020-07-16 RX ORDER — LIDOCAINE HYDROCHLORIDE 10 MG/ML
10 INJECTION, SOLUTION EPIDURAL; INFILTRATION; INTRACAUDAL; PERINEURAL
Status: ACTIVE | OUTPATIENT
Start: 2020-07-16 | End: 2020-07-17

## 2020-07-16 RX ORDER — ALBUMIN HUMAN 250 G/1000ML
25 SOLUTION INTRAVENOUS ONCE
Status: DISPENSED | OUTPATIENT
Start: 2020-07-16 | End: 2020-07-17

## 2020-07-16 RX ADMIN — CASTOR OIL AND BALSAM, PERU: 788; 87 OINTMENT TOPICAL at 10:58

## 2020-07-16 RX ADMIN — HEPARIN SODIUM AND DEXTROSE 14 UNITS/KG/HR: 10000; 5 INJECTION INTRAVENOUS at 15:13

## 2020-07-16 RX ADMIN — HEPARIN SODIUM AND DEXTROSE 16 UNITS/KG/HR: 10000; 5 INJECTION INTRAVENOUS at 07:40

## 2020-07-16 RX ADMIN — POTASSIUM CHLORIDE 10 MEQ: 7.46 INJECTION, SOLUTION INTRAVENOUS at 06:19

## 2020-07-16 RX ADMIN — POTASSIUM CHLORIDE 10 MEQ: 7.46 INJECTION, SOLUTION INTRAVENOUS at 07:19

## 2020-07-16 RX ADMIN — Medication 5 ML: at 05:22

## 2020-07-16 RX ADMIN — Medication 10 ML: at 22:00

## 2020-07-16 RX ADMIN — HEPARIN SODIUM AND DEXTROSE 14 UNITS/KG/HR: 10000; 5 INJECTION INTRAVENOUS at 22:44

## 2020-07-16 RX ADMIN — POTASSIUM CHLORIDE 10 MEQ: 7.46 INJECTION, SOLUTION INTRAVENOUS at 05:17

## 2020-07-16 RX ADMIN — ONDANSETRON 4 MG: 2 INJECTION INTRAMUSCULAR; INTRAVENOUS at 21:08

## 2020-07-16 RX ADMIN — ONDANSETRON 4 MG: 2 INJECTION INTRAMUSCULAR; INTRAVENOUS at 15:24

## 2020-07-16 RX ADMIN — SODIUM CHLORIDE 500 ML: 900 INJECTION, SOLUTION INTRAVENOUS at 04:19

## 2020-07-16 RX ADMIN — ONDANSETRON 4 MG: 2 INJECTION INTRAMUSCULAR; INTRAVENOUS at 03:39

## 2020-07-16 RX ADMIN — ONDANSETRON 4 MG: 2 INJECTION INTRAMUSCULAR; INTRAVENOUS at 10:58

## 2020-07-16 RX ADMIN — POTASSIUM CHLORIDE 20 MEQ: 29.8 INJECTION, SOLUTION INTRAVENOUS at 15:25

## 2020-07-16 RX ADMIN — TAMSULOSIN HYDROCHLORIDE 0.4 MG: 0.4 CAPSULE ORAL at 10:58

## 2020-07-16 RX ADMIN — FINASTERIDE 5 MG: 5 TABLET, FILM COATED ORAL at 10:58

## 2020-07-16 RX ADMIN — Medication 10 ML: at 15:23

## 2020-07-16 NOTE — PROGRESS NOTES
[address] [facility]  Hospitalist Group                                                                                          Hospitalist Progress Note  [address] [facility],   Answering service: [phone] OR [phone] from in house phone        Date of Service:  2020  NAME:  Miki Fatima  :  1934  MRN:  [id number]      Admission Summary:   Miki Fatima is a 80 y.o. male who presents with right leg swelling. Patient does not have his hearing aids, is not able to hear very well, and history was primarily obtained from his wife who is at the bedside. His wife reports that patient has been in and out of the hospital and rehab for the last few months. He initially got diagnosed with what sounds like septic arthritis. Patient underwent knee aspiration, was put on IV antibiotics, was discharged home on IV antibiotics, developed pneumonia, pancreatitis and continued knee pain. Went back to [facility], was admitted there, was treated with antibiotics, and eventually was discharged to a rehab facility. Patient came back home from rehab, recently started having some nausea/vomiting, new swelling in his right leg, patient had significant vomiting 3 to 4 days back, it seems to have resolved now, and he was able to tolerate p.o. today. Home health nurse came today and found that the patient was hypotensive, and patient was brought to [facility] ED. Patient was found to have extensive right lower extremity DVT extending up into the vena cava and bilateral PEs. Patient was requested to be admitted under the hospitalist service. The wife reports that patient has no history of blood clots that she is aware of. Does report that patient has been more or less bedbound in the last few months. She denies patient having any other complaints or problems.   The patient and his wife deny patient c/o  any Headache, blurry vision, sore throat, trouble swallowing, trouble with speech, chest pain, SOB, cough, fever, chills, N/V/D, abd pain, urinary symptoms, constipation, recent travels, sick contacts, focal or generalized oneirological symptoms,  falls, injuries, rashes, contact with COVID-19 diagnosed patients, hematemesis, melena, hemoptysis, hematuria, rashes, denies starting any new medications and denies any other concerns or problems besides as mentioned above.        Interval history / Subjective: Follow up DVT/PE. Reports continuous nausea/vomiting. Minimal oral intake. For paracentesis today. Assessment & Plan:     Acute DVT/PE  -suspected secondary to immobility/recent hospitalization and possible underlying malignancy  -Lower extremity Doppler 7/13: Acute partially occlusive DVT right common femoral and proximal femoral vein   -CTA 7/13: Bilateral pulmonary embolism, occlusion suprahepatic IVC and hepatic vein  -CT abdomen 7/13: No evidence of hepatic vein, portal vein, or inferior vena cava thrombosis as questioned on PE CT earlier today. There is however right lower lobe pulmonary emboli in right common femoral deep venous thrombosis reported elsewhere earlier today.  -Currently on heparin drip, continue for now. Hematology consulted    Pancreatic mass:  Ascites/with possible splenic vein occlusion/distal esophageal and gastric varicies  -Reports recent admission at outside hospital for acute pancreatitis x2  -CT abdomen/pelvis 7/13 with pancreatic head masslike fullness, intra-hepatic and pancreatic duct dilatation.   Peripancreatic inflammatory changes, 3.7 x 2.3 cm collection near pancreatic tail, possibly pseudocyst  -Appreciate GI input, high suspicion for pancreatic neoplasm--not a candidate at this time for EUS/biopsy  -Appreciate hepatology, plans for diagnostic paracentesis    Nausea/vomiting: persistent   -symptomatic treatment, monitor nutrition closely    Ruled out pneumonia pneumonia  -no evidence of pneumonia  -Monitor off antibiotics  -Outpatient repeat chest imaging    Hypokalemia: replete as needed    Hypotension: Likely hypovolemia, albumin infusion, hold home antihypertensives, close monitoring and further intervention per hospital course    Hypoalbuminemia: nutrition input    BPH: Continue home medications    Hypercholesterolemia: Hold statin for now     Cardiac diet    Code status: FULL CODE, appreciate palliative care  DVT prophylaxis: heparin drip  PTA: home  Baseline: independent    Plan: Follow Hematology/GI/Hepatology/Palliative care    Care Plan discussed with: Patient/Family  Anticipated Disposition: TBD  Anticipated Discharge: 24 hours to 48 hours     Hospital Problems  Date Reviewed: 7/14/2020          Codes Class Noted POA    * (Principal) DVT (deep venous thrombosis) ([facility] [address] ICD-10-CM: I82.409  ICD-9-CM: 453.40  7/14/2020 Yes                Review of Systems:   A comprehensive review of systems was negative except for that written in the HPI. Vital Signs:    Last 24hrs VS reviewed since prior progress note. Most recent are:  Visit Vitals  /65   Pulse 99   Temp 98.2 °F (36.8 °C)   Resp 16   Ht 5' 10\" (1.778 m)   Wt 76 kg (167 lb 8.8 oz)   SpO2 93%   BMI 24.04 kg/m²         Intake/Output Summary (Last 24 hours) at 7/16/2020 1222  Last data filed at 7/15/2020 1845  Gross per 24 hour   Intake --   Output 250 ml   Net -250 ml        Physical Examination:             Constitutional:  No acute distress, cooperative, pleasant    ENT:  Oral mucosa moist, oropharynx benign. Resp:  CTA bilaterally. No wheezing/rhonchi/rales. No accessory muscle use   CV:  Regular rhythm, normal rate, no murmurs, gallops, rubs    GI:  Soft, non tender, distended. normoactive bowel sounds, no hepatosplenomegaly     Musculoskeletal:  No edema, warm, 2+ pulses throughout    Neurologic:  Moves all extremities.      Skin:  Good turgor, no rashes or ulcers       Data Review:    Review and/or order of clinical lab test      Labs:     Recent Labs     07/15/20  0139 07/14/20  0500   WBC 6.9 6.8   HGB 10.9* 9.5*   HCT 34.7* 29.6*    201     Recent Labs     07/16/20  0336 07/15/20  0137 07/14/20  0500    140 140   K 2.8* 2.9* 3.2*    105 107   CO2 25 26 23   BUN 8 10 10   CREA 0.82 0.93 0.79   * 112* 94   CA 8.2* 8.7 8.3*     Recent Labs     07/16/20  0336 07/13/20  1530   ALT 10* 10*   AP 92 120*   TBILI 0.7 0.9   TP 7.2 8.7*   ALB 1.8* 2.0*   GLOB 5.4* 6.7*   LPSE  --  166     Recent Labs     07/16/20  1005 07/16/20  0336 07/15/20  2124  07/14/20  0500 07/13/20  1530   INR  --  1.3*  --   --  1.5* 1.2*   PTP  --  13.5*  --   --  14.6* 12.7*   APTT 82.7* 85.6* 57.8*   < > >130.0* 30.5    < > = values in this interval not displayed. Recent Labs     07/13/20  2149   FERR 520*      No results found for: FOL, RBCF   No results for input(s): PH, PCO2, PO2 in the last 72 hours.   Recent Labs     07/14/20  0500 07/13/20  2149 07/13/20  1530   TROIQ <0.05 <0.05 <0.05     Lab Results   Component Value Date/Time    Cholesterol, total 250 (H) 10/02/2013 01:55 PM    HDL Cholesterol 53 10/02/2013 01:55 PM    LDL, calculated 176 (H) 10/02/2013 01:55 PM    Triglyceride 106 10/02/2013 01:55 PM    CHOL/HDL Ratio 3.4 10/06/2010 10:25 AM     No results found for: [facility]  Lab Results   Component Value Date/Time    Color YELLOW/STRAW 07/13/2020 04:27 PM    Appearance CLEAR 07/13/2020 04:27 PM    Specific gravity 1.012 07/13/2020 04:27 PM    pH (UA) 6.0 07/13/2020 04:27 PM    Protein Negative 07/13/2020 04:27 PM    Glucose Negative 07/13/2020 04:27 PM    Ketone TRACE (A) 07/13/2020 04:27 PM    Bilirubin Negative 07/13/2020 04:27 PM    Urobilinogen 1.0 07/13/2020 04:27 PM    Nitrites Negative 07/13/2020 04:27 PM    Leukocyte Esterase Negative 07/13/2020 04:27 PM    Epithelial cells FEW 07/13/2020 04:27 PM    Bacteria Negative 07/13/2020 04:27 PM    WBC 0-4 07/13/2020 04:27 PM    RBC 0-5 07/13/2020 04:27 PM         Medications Reviewed:     Current Facility-Administered Medications   Medication Dose Route Frequency    lidocaine (PF) (XYLOCAINE) 10 mg/mL (1 %) injection 10 mL  10 mL SubCUTAneous RAD ONCE    ondansetron (ZOFRAN) injection 4 mg  4 mg IntraVENous Q6H    scopolamine (TRANSDERM-SCOP) 1 mg over 3 days 1 Patch  1 Patch TransDERmal Q72H    prochlorperazine (COMPAZINE) tablet 5 mg  5 mg Oral Q6H PRN    finasteride (PROSCAR) tablet 5 mg  5 mg Oral DAILY    tamsulosin (FLOMAX) capsule 0.4 mg  0.4 mg Oral DAILY    sodium chloride (NS) flush 5-40 mL  5-40 mL IntraVENous Q8H    sodium chloride (NS) flush 5-40 mL  5-40 mL IntraVENous PRN    acetaminophen (TYLENOL) tablet 650 mg  650 mg Oral Q6H PRN    balsam peru-castor oiL (VENELEX) ointment   Topical BID    heparin 25,000 units in D5W 250 ml infusion  18-36 Units/kg/hr IntraVENous TITRATE    furosemide (LASIX) injection 20 mg  20 mg IntraVENous BID     ______________________________________________________________________  EXPECTED LENGTH OF STAY: 4d 4h  ACTUAL LENGTH OF STAY:          [address] [facility]

## 2020-07-16 NOTE — PROGRESS NOTES
Palliative Medicine Consult  Johnny: [phone] ([phone]    Patient Name: Laura Marshall  YOB: 1934    Date of Initial Consult: July 14, 2020  Reason for Consult: Care Decisions  Requesting Provider: Dr. Brandyn John    Primary Care Physician: Yadiel Eckert MD     SUMMARY:   Laura Marshall is a 80 y.o. bed bound male with a past history of BPH, hypercholesterolemia,DJD,  , who was admitted on 7/13/2020 from home [facility] the pt to be hypotensive. Work up revealed extensive rt lower DVT extending up to the vena cava with bilateral PE's. Admission complicated by finding of pancreatic mass, ascites with possible splenic vein occlusion/ distal esophageal and gastric varices,  Nausea and vomiting, possible pneumonia, hypokalemia, hypotension. Heme onc and GI consults pending. Full Code  No Amd  Current medical issues leading to Palliative Medicine involvement include: support with care decisions given acute illness in a compromised state with high risk of decline. Caleb Da Silav PALLIATIVE DIAGNOSES:   1. Nausea and vomiting  2. hypoalbuminemia  3. bed bound   4. Physical debility      PLAN:     1. Pt seen on his way to angio  2. Nausea improved with the change in meds  3. Wife now allowed to visit. Will call her to arrange a time to meet in person    4. 7/15/20: Discussions held with wife today. She is unable to visit due to COVID  5. Reviewed chart and updated her. Explained that our service is here to help with discussions, provide information, help with decision making when needed  6. Wife has a clear understanding of the patient's condition  7. Pt has elected aggressive management. Wife very concerned of the patient's nutrition as he has been vomiting for months. She is inquiring about his nutrition plan and if iv nutrition should be initiated  8. Explained that the plan is to try and control the vomiting with medications and he has been limited to a liquid diet right now. Wife remains concerned as this has been happening for a while now  5. Will schedule zofran 4 times daily add scopolamine patch. Compazine prn  10. Pt may need TPN for a brief period if vomiting does not improve but will defer to primary team  11. Goals at this time are to pursue aggressive options. I informed wife that endoscopy on hold due to heparin gtt as the clot is the main issue right now   15. Explained that if this is indeed pancreatic cancer, unsure if aggressive options will be offered given poor functional status and high risk for surgery. 13. I did not discuss this with the pt today but will tomorrow. 14. Pt still with nausea and non conversant today. Will follow up with pt and wife tomorrow    13. Initial consult note routed to primary continuity provider and/or primary health care team members  12. Communicated plan of care with: Angelic Paz [facility] Team     GOALS OF CARE / TREATMENT PREFERENCES:     GOALS OF CARE:  Patient/Health Care Proxy Stated Goals: Prolong life    TREATMENT PREFERENCES:   Code Status: Full Code    Advance Care Planning:  [x] The [facility] Interdisciplinary Team has updated the ACP Navigator with Health Care Decision Maker and Patient Capacity      [facility] - [phone]  Advance Care Planning 7/13/2020   Confirm Advance Directive Yes, on file       Medical Interventions: Full interventions     Other Instructions:   Artificially Administered Nutrition: No feeding tube     Other:    As far as possible, the palliative care team has discussed with patient / health care proxy about goals of care / treatment preferences for patient. HISTORY:     History obtained from: chart    CHIEF COMPLAINT: pt admitted with aforementioned history an issues    HPI/SUBJECTIVE:    The patient is:   [x] Verbal and participatory  [] Non-participatory due to:    No complaints      Clinical Pain Assessment (nonverbal scale for severity on nonverbal patients):   Clinical Pain Assessment  Severity: 0          Duration: for how long has pt been experiencing pain (e.g., 2 days, 1 month, years)  Frequency: how often pain is an issue (e.g., several times per day, once every few days, constant)     FUNCTIONAL ASSESSMENT:     Palliative Performance Scale (PPS):  PPS: 40       PSYCHOSOCIAL/SPIRITUAL SCREENING:     Palliative IDT has assessed this patient for cultural preferences / practices and a referral made as appropriate to needs (Cultural Services, Patient Advocacy, Ethics, etc.)    Any spiritual / Taoism concerns:  [] Yes /  [x] No    Caregiver Burnout:  [] Yes /  [x] No /  [] No Caregiver Present      Anticipatory grief assessment:   [x] Normal  / [] Maladaptive       ESAS Anxiety: Anxiety: 0    ESAS Depression: Depression: 0        REVIEW OF SYSTEMS:     Positive and pertinent negative findings in ROS are noted above in HPI. The following systems were [x] reviewed / [] unable to be reviewed as noted in HPI  Other findings are noted below. Systems: constitutional, ears/nose/mouth/throat, respiratory, gastrointestinal, genitourinary, musculoskeletal, integumentary, neurologic, psychiatric, endocrine. Positive findings noted below. Modified ESAS Completed by: provider   Fatigue: 1 Drowsiness: 0   Depression: 0 Pain: 0   Anxiety: 0 Nausea: 3   Anorexia: 0 Dyspnea: 0           Stool Occurrence(s): 1        PHYSICAL EXAM:     From RN flowsheet:  Wt Readings from Last 3 Encounters:   07/15/20 167 lb 8.8 oz (76 kg)   07/27/18 204 lb (92.5 kg)   03/17/18 200 lb (90.7 kg)     Blood pressure 99/62, pulse 96, temperature 98.1 °F (36.7 °C), resp. rate 24, height 5' 10\" (1.778 m), weight 167 lb 8.8 oz (76 kg), SpO2 94 %.     Pain Scale 1: Numeric (0 - 10)  Pain Intensity 1: 0                 Last bowel movement, if known:     Constitutional: non conversant, nad, ill appearing  Eyes: pupils equal, anicteric  ENMT: no nasal discharge, moist mucous membranes, [place]  Cardiovascular: regular rhythm, distal pulses intact  Respiratory: breathing not labored, symmetric  Gastrointestinal: soft non-tender, +bowel sounds  Musculoskeletal: no deformity, no tenderness to palpation  Skin: warm, dry  Neurologic: following commands, moving all extremities  Psychiatric: full affect, no hallucinations  Other:       HISTORY:     Principal Problem:    DVT (deep venous thrombosis) ([facility] [address] (7/14/2020)      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   Social History     Tobacco Use    Smoking status: Never Smoker    Smokeless tobacco: Never Used   Substance Use Topics    Alcohol use: Yes     Comment: social     Allergies   Allergen Reactions    Aspirin Unknown (comments)      Current Facility-Administered Medications   Medication Dose Route Frequency    lidocaine (PF) (XYLOCAINE) 10 mg/mL (1 %) injection 10 mL  10 mL SubCUTAneous RAD ONCE    potassium chloride 20 mEq in 50 ml IVPB  20 mEq IntraVENous ONCE    albumin human 25% (BUMINATE) solution 25 g  25 g IntraVENous ONCE    ondansetron (ZOFRAN) injection 4 mg  4 mg IntraVENous Q6H    scopolamine (TRANSDERM-SCOP) 1 mg over 3 days 1 Patch  1 Patch TransDERmal Q72H    prochlorperazine (COMPAZINE) tablet 5 mg  5 mg Oral Q6H PRN    finasteride (PROSCAR) tablet 5 mg  5 mg Oral DAILY    tamsulosin (FLOMAX) capsule 0.4 mg  0.4 mg Oral DAILY    sodium chloride (NS) flush 5-40 mL  5-40 mL IntraVENous Q8H    sodium chloride (NS) flush 5-40 mL  5-40 mL IntraVENous PRN    acetaminophen (TYLENOL) tablet 650 mg  650 mg Oral Q6H PRN    balsam peru-castor oiL (VENELEX) ointment   Topical BID    heparin 25,000 units in D5W 250 ml infusion  18-36 Units/kg/hr IntraVENous TITRATE    furosemide (LASIX) injection 20 mg  20 mg IntraVENous BID          LAB AND IMAGING FINDINGS:     Lab Results   Component Value Date/Time    WBC 6.9 07/15/2020 01:37 AM    HGB 10.9 (L) 07/15/2020 01:37 AM    PLATELET 497 81/43/8202 01:37 AM     Lab Results   Component Value Date/Time    Sodium 142 07/16/2020 03:36 AM    Potassium 2.8 (L) 07/16/2020 03:36 AM    Chloride 107 07/16/2020 03:36 AM    CO2 25 07/16/2020 03:36 AM    BUN 8 07/16/2020 03:36 AM    Creatinine 0.82 07/16/2020 03:36 AM    Calcium 8.2 (L) 07/16/2020 03:36 AM      Lab Results   Component Value Date/Time    Alk. phosphatase 92 07/16/2020 03:36 AM    Protein, total 7.2 07/16/2020 03:36 AM    Albumin 1.8 (L) 07/16/2020 03:36 AM    Globulin 5.4 (H) 07/16/2020 03:36 AM     Lab Results   Component Value Date/Time    INR 1.3 (H) 07/16/2020 03:36 AM    Prothrombin time 13.5 (H) 07/16/2020 03:36 AM    aPTT 82.7 (H) 07/16/2020 10:05 AM      Lab Results   Component Value Date/Time    Ferritin 520 (H) 07/13/2020 09:49 PM      No results found for: PH, PCO2, PO2  No components found for: GLPOC   No results found for: CPK, CKMB             Total time: 25 min  Counseling / coordination time, spent as noted above: 20 min  > 50% counseling / coordination?: y    Prolonged service was provided for  []30 min   []75 min in face to face time in the presence of the patient, spent as noted above. Time Start:   Time End:   Note: this can only be billed with 81528 (initial) or 01248 (follow up). If multiple start / stop times, list each separately.

## 2020-07-16 NOTE — PROGRESS NOTES
TRANSFER - OUT REPORT:    Verbal report given to ELIZABETH Ambriz(name) on Megan Huddleston  being transferred to [facility](unit) for routine progression of care       Report consisted of patients Situation, Background, Assessment and   Recommendations(SBAR). Information from the following report(s) SBAR, Kardex, Procedure Summary, Intake/Output, MAR and Recent Results was reviewed with the receiving nurse. Lines:   PICC Double Lumen 07/16/20 Right;Brachial (Active)   Criteria for Appropriate Use Limited/no vessel suitable for conventional peripheral access 07/16/20 1030   Site Assessment Clean;Dry 07/16/20 1030   Phlebitis Assessment 0 07/16/20 1030   Infiltration Assessment 0 07/16/20 1030   Arm Circumference (cm) 28 cm 07/16/20 1030   Date of Last Dressing Change 07/16/20 07/16/20 1030   Dressing Status New;Occlusive 07/16/20 1030   External Catheter Length (cm) 0 centimeters 07/16/20 1030   Dressing Type Disk with Chlorhexadine gluconate (CHG); Stabilization/securement device;Transparent 07/16/20 1030   Hub Color/Line Status Purple;Flushed;Capped 07/16/20 1030   Positive Blood Return (Site #1) Yes 07/16/20 1030   Hub Color/Line Status Red;Flushed;Capped 07/16/20 1030   Positive Blood Return (Site #2) Yes 07/16/20 1030   Alcohol Cap Used Yes 07/16/20 1030       Peripheral IV 07/13/20 Left Wrist (Active)   Site Assessment Clean, dry, & intact 07/16/20 0925   Phlebitis Assessment 0 07/16/20 0925   Infiltration Assessment 0 07/16/20 0925   Dressing Status Clean, dry, & intact 07/16/20 0925   Dressing Type Transparent 07/16/20 0925   Hub Color/Line Status Infusing 07/16/20 0925   Action Taken Open ports on tubing capped 07/16/20 0925   Alcohol Cap Used Yes 07/16/20 0925       Peripheral IV 07/16/20 Right Arm (Active)   Site Assessment Clean, dry, & intact 07/16/20 0925   Phlebitis Assessment 0 07/16/20 0925   Infiltration Assessment 0 07/16/20 0925   Dressing Status Clean, dry, & intact 07/16/20 0925   Dressing Type Transparent 07/16/20 0925   Hub Color/Line Status Infusing 07/16/20 0925   Action Taken Open ports on tubing capped 07/16/20 0925   Alcohol Cap Used Yes 07/16/20 0925        Opportunity for questions and clarification was provided.       Patient transported with:   [employer]

## 2020-07-16 NOTE — CONSULTS
[address]    Name:  Nain Washington  MR#:  [id number]  :  1934  ACCOUNT #:  [de-identified]  DATE OF SERVICE:  2020    HISTORY OF PRESENT ILLNESS:  The patient is an 80year-old retired  who is seen after admission to [facility] for new-onset right lower extremity DVT and pulmonary embolus. This gentleman was found at home on  to be hypotensive by his home health nurse and sent to the emergency room. In the ER, he was found to have a right lower extremity DVT and bilateral pulmonary emboli. Initially, there was concern that he had IVC thrombosis, which was later found not to be the case. This gentleman unfortunately was also found to have a cystic pancreatic mass upon admission CT scan. We were asked to see him regarding these findings. The patient notes that he has had an approximate 20-pound weight loss over the past several weeks. He has mid-abdominal discomfort for which he is not currently taking any pain medicine. PAST MEDICAL HISTORY:  Significant for BPH, DJD, hypercholesterolemia, arthritis. PAST SURGICAL HISTORY:  Colonoscopy with Dr. Edward Sol in 2017, rotator cuff surgery, tonsillectomy. ALLERGIES:  TO ASPIRIN. FAMILY HISTORY:  Negative for malignancy. SOCIAL HISTORY:  He lives at home. He has a history of social alcohol use. He is a nonsmoker, retired  from [employer]. PHYSICAL EXAMINATION:  GENERAL:  He is a pleasant elderly gentleman, in no apparent distress. He is hard of hearing. VITAL SIGNS:  His temperature is 98, pulse 103, BP 99/62, respirations 20, O2 sat 96% on room air. HEENT:  EOMI. Nonicteric sclerae. NECK:  Supple, full range of motion. He has no lymphadenopathy noted. RESPIRATORY:  He is aerating well. ABDOMEN:  Soft, nontender. No hepatosplenomegaly. No masses felt.   EXTREMITIES:  He has right lower extremity swelling, which is nontender. NEUROLOGIC:  Cranial nerves II through XII are intact. Sensorimotor grossly intact. LABORATORY DATA:  CBC is normal.  His CMP likewise is normal with the exception of an albumin of 1.8 and potassium of 3.8. DIAGNOSTIC DATA:  CT of the abdomen and pelvis dated 07/13 shows mass-like fullness in the pancreatic head with intrahepatic and pancreatic duct dilatation. There is a large amount of ascites, small left pleural effusion. CT of the chest dated 07/13 reveals bilateral pulmonary emboli. IMPRESSION AND PLAN:  1. Pancreatic mass. This gentleman appears to have a pancreatic neoplasm and will require a biopsy for further guidance. Unfortunately, the recent pulmonary embolus and DVT will make a biopsy difficult at this time. The patient was seen by Dr. Shalini Leonardo and plans are in place at least currently to perform paracentesis and check cytology on that analysis. If that cytology is negative, we will have to make a final decision whether a biopsy of the pancreatic lesion should be done. 2.  Deep vein thrombosis and pulmonary embolus. This gentleman is currently on unfractionated heparin. He can be transitioned to either Xarelto or Eliquis after several days. We will follow closely with you on behalf of [employer].       MD JOSH [id number]  D:  07/16/2020 18:30  T:  07/16/2020 19:42  JOB #:  [id number]

## 2020-07-16 NOTE — PROGRESS NOTES
[address]  [address] [facility], [address]       GI PROGRESS NOTE  [facility]  [phone] office  [phone] NP/PA in-hospital cell phone M-F until 4:30PM  After 5PM or on weekends, please call  for physician on call      NAME: Tony Sanchez   :  1934   MRN:  [id number]       Subjective:   Patient reports some improvement in nausea with less vomiting this morning. No abdominal pain. Objective:     VITALS:   Last 24hrs VS reviewed since prior progress note. Most recent are:  Visit Vitals  /65   Pulse 99   Temp 98.2 °F (36.8 °C)   Resp 16   Ht 5' 10\" (1.778 m)   Wt 76 kg (167 lb 8.8 oz)   SpO2 93%   BMI 24.04 kg/m²       PHYSICAL EXAM:  General:          Cooperative, no acute distress    Neurologic:      Alert and oriented  HEENT:           EOMI, no scleral icterus   Lungs:             No respiratory distress  Heart:              S1 S2  Abdomen:        Soft, mildly distended, no tenderness, no guarding, no rebound. +Bowel sounds.   Extremities:     Warm  Psych:             Not anxious or agitated      Lab Data Reviewed:     Recent Results (from the past 24 hour(s))   PTT    Collection Time: 07/15/20  2:31 PM   Result Value Ref Range    aPTT 37.3 (H) 22.1 - 32.0 sec    aPTT, therapeutic range     58.0 - 77.0 SECS   PTT    Collection Time: 07/15/20  9:24 PM   Result Value Ref Range    aPTT 57.8 (H) 22.1 - 32.0 sec    aPTT, therapeutic range     58.0 - 77.0 SECS   PROTHROMBIN TIME + INR    Collection Time: 20  3:36 AM   Result Value Ref Range    INR 1.3 (H) 0.9 - 1.1      Prothrombin time 13.5 (H) 9.0 - 11.1 sec   FIBRINOGEN    Collection Time: 20  3:36 AM   Result Value Ref Range    Fibrinogen 223 200 - 410 mg/dL   METABOLIC PANEL, COMPREHENSIVE    Collection Time: 20  3:36 AM   Result Value Ref Range    Sodium 142 136 - 145 mmol/L    Potassium 2.8 (L) 3.5 - 5.1 mmol/L    Chloride 107 97 - 108 mmol/L    CO2 25 21 - 32 mmol/L    Anion gap 10 5 - 15 mmol/L    Glucose 109 (H) 65 - 100 mg/dL    BUN 8 6 - 20 MG/DL    Creatinine 0.82 0.70 - 1.30 MG/DL    BUN/Creatinine ratio 10 (L) 12 - 20      GFR est AA >60 >60 ml/min/1.73m2    GFR est non-AA >60 >60 ml/min/1.73m2    Calcium 8.2 (L) 8.5 - 10.1 MG/DL    Bilirubin, total 0.7 0.2 - 1.0 MG/DL    ALT (SGPT) 10 (L) 12 - 78 U/L    AST (SGOT) 15 15 - 37 U/L    Alk. phosphatase 92 45 - 117 U/L    Protein, total 7.2 6.4 - 8.2 g/dL    Albumin 1.8 (L) 3.5 - 5.0 g/dL    Globulin 5.4 (H) 2.0 - 4.0 g/dL    A-G Ratio 0.3 (L) 1.1 - 2.2     PTT    Collection Time: 07/16/20  3:36 AM   Result Value Ref Range    aPTT 85.6 (H) 22.1 - 32.0 sec    aPTT, therapeutic range     58.0 - 77.0 SECS     CT abdomen/pelvis with IV contrast  INDICATION: ivc thrombosis     COMPARISON: PE CT earlier today     TECHNIQUE:  Following the uneventful intravenous administration of IV contrast, thin axial  images were obtained through the abdomen and pelvis. Coronal and sagittal  reconstructions were generated. Oral contrast was not administered. CT dose  reduction was achieved through use of a standardized protocol tailored for this  examination and automatic exposure control for dose modulation.      FINDINGS:  LUNG BASES: Known right lower lobe pulmonary embolus. Small left pleural  effusion and basilar atelectasis. Calcified lymph nodes in the lower mediastinum  and left hilum. LIVER: Small slightly nodular liver with heterogeneous enhancement. Hepatic  veins are patent. Portal vein patent. Intrahepatic biliary dilatation, with  abrupt termination of the mid common bile duct at the level of the pancreatic  head. GALLBLADDER: Unremarkable. SPLEEN: Geographic hypoenhancement in the posterior superior spleen. PANCREAS: Heterogeneous enhancement of the pancreas and ill-defined soft tissue  fullness in the pancreatic head and body, with abrupt termination of the main  pancreatic duct.  Heterogeneous soft tissue in the region of pancreatic head  approximately 4.2 x 4.0 cm though difficult to accurately measure. Associated  narrowing of the preston splenic confluence, and possible splenic vein occlusion. Distal esophageal and gastric varices noted. ADRENALS: No mass. KIDNEYS: Contrast material in both kidneys from PE study. GI TRACT: No bowel obstruction. Edematous thickening of the gastric antrum and  duodenum. PERITONEUM: Large amount of ascites. Focal fluid collection near the tail of the  pancreas 3.7 x 2.3 cm, with adjacent ill-defined slightly hyperdense fluid in  the left anterior pararenal space and extending toward the left paracolic  gutter. No free air. APPENDIX: Not clearly visualized. RETROPERITONEUM: No aortic aneurysm. LYMPH NODES: None enlarged. ADDITIONAL COMMENTS: N/A.     URINARY BLADDER: Unremarkable. REPRODUCTIVE ORGANS: Enlarged prostate. LYMPH NODES: None enlarged. FREE FLUID: Large amount. BONES: No destructive bone lesion. ADDITIONAL COMMENTS: Deep venous thrombosis right common femoral vein. Inferior  vena cava is patent.     IMPRESSION  IMPRESSION:     1. No evidence of hepatic vein, portal vein, or inferior vena cava thrombosis as  questioned on PE CT earlier today. There is however right lower lobe pulmonary  emboli in right common femoral deep venous thrombosis reported elsewhere earlier  today. 2. Masslike fullness in the pancreatic head, resulting in intrahepatic and  pancreatic duct dilatation as described above. Peripancreatic inflammatory  changes, with 3.7 x 2.3 cm collection near the pancreatic tail possibly  pseudocyst. Large amount of ascites, and moderate amount of slightly complex  fluid/soft tissue in the left abdomen. Findings may represent pancreatitis or  pancreatic neoplasm. Correlation with any prior imaging would be helpful given  the extensive abnormalities. 3. Small left pleural effusion and basilar atelectasis.   4. Edematous thickening of the gastric antrum and duodenum likely secondary to  the pancreatic process. Assessment:   · Pancreatic mass: CT as above, suspicious for malignancy. · Nausea/vomiting: CT abdomen/pelvis with IV contrast (7/13/20): mass-like fullness in the pancreatic head, resulting in intrahepatic and pancreatic duct dilatation, peripancreatic inflammatory changes with 3.7 x 2.3 cm collection near the pancreatic tail possibly pseudocyst, large amount of ascites, and moderate amount of slightly complex fluid/soft tissue in the left abdomen - findings may represent pancreatitis or pancreatic neoplasm; edematous thickening of the gastric antrum and duodenum likely secondary to the pancreatic process. LFTs and lipase unremarkable on 7/13. · Right lower extremity DVT with bilateral PEs: on heparin gtt.   · Ascites: hepatology, Dr. Sanford Melo following. · Hypotension  · COVID-19 negative     Patient Active Problem List   Diagnosis Code    DVT (deep vein thrombosis) in pregnancy O22.30    DVT (deep venous thrombosis) ([facility] [address] I82.409     Plan:   · Supportive measures  · Palliative care following  · Hepatology following  · No plan for EUS at this time given PEs (on heparin gtt)  · Plan for outpatient follow up with primary gastroenterologist, Dr. Eugene Enamorado. We will be available again as needed. Please call us with any questions. Thank you.      Signed By: VANESSA Leigh     7/16/2020  10:52 AM         Agree with above  Discussed today with Dr Sanford Melo, to follow his recommendations for paracentesis  Will sign off

## 2020-07-16 NOTE — CONSULTS
[address] [facility], MD, [address], Cite Melinda Myles MD Darron Bayley, LULU Bateman, [facility]EROD Armstrong, [facility]   LOUISE Arias, [facility]       Emerita Cutler [address]    at [address], [id number] [place], [place] [address]    [address] [facility] 22.    [phone]    FAX: [address]    at [facility]    [address], [address]    [facility], [address]    [phone]    FAX: [phone]         HEPATOLOGY PROGRESS NOTE  The patient is a 80year old male who was in reasonable good health until several months ago when developed septic arthritis, followed by pneumonia and pancreatitis. All of this was apparently in the [facility] system. He has been in and out of hospital and rehab during this time. He was sent to the ED when [facility] RN found him hypotensive. In the ED underwent a CT scan and was found to have extensive DVT extending into the IVC and PE. A nodular liver suggesting cirrhosis, a mass like fullness in the HOP with double duct sign and ascites. He remains on IV heparin. Right leg swelling going down. Breathing is better. ECHO heart has been done but report not yet out. Plan for US paracentesis today. Will need to hold heparin several hours prior to procedure. ASSESSMENT AND PLAN:  Cirrhosis  The patient may have cirrhosis. If he does have cirrhosis the etiology is not clear. The diagnosis of cirrhosis is suggested by imaging, ascites and prolonged INR    Serologic testing for causes of chronic liver disease will be ordered. Ascites   Ascites has recently developed. Would preform at least a diagnostic paracentesis and calculate SAAG which will tell us if he has ascites from portal HTN or malignancy.   The later would most likely be pancreas etiology. Pancreas mass with dilated CBD and PD  This is highly suggestive of pancreas cancer. Will ultimately need EUS and biopsy of the pancreas mass. DVT with PE  This was likely precipitated by pancreas cancer   He is being treated with IV heparin   Right leg swelling >>left. SYSTEM REVIEW:  Constitution systems: Negative for fever, chills, weight gain, weight loss. Eyes: Negative for visual changes. ENT: Negative for sore throat, painful swallowing. Respiratory: Negative for cough, hemoptysis, SOB. Cardiology: Negative for chest pain, palpitations. GI:  Negative for constipation or diarrhea. : Negative for urinary frequency, dysuria, hematuria, nocturia. Skin: Negative for rash. Hematology: Negative for easy bruising, blood clots. Musculo-skelatal: Negative for back pain, muscle pain, weakness. Neurologic: Negative for headaches, dizziness, vertigo, memory problems not related to HE. Psychology: Negative for anxiety, depression. FAMILY HISTORY:  There is no family history of liver disease. SOCIAL HISTORY:  The patient is . The patient has no children. The patient has never used tobacco products. The patient has never consumed significant amounts of alcohol. The patient used to work as as a .    The patient retired in [employer]. PHYSICAL EXAMINATION:  VS: per nursing note  General:  No acute distress. Eyes:  Sclera anicteric. ENT:  No oral lesions. Thyroid normal.  Nodes:  No adenopathy. Skin:  No spider angiomata. No jaundice. Respiratory:  Lungs clear to auscultation. Cardiovascular:  Regular heart rate. Abdomen:  Soft non-tender, Ascites appears to be present. Extremities:  Right leg edema. Neurologic:  Alert and oriented. Cranial nerves grossly intact. No asterixis. LABORATORY:  Results for Alberto Edward (MRN [id number]) as of 7/16/2020 05:44   Ref.  Range 7/13/2020 15:30 7/14/2020 05:00 7/15/2020 01:37 7/16/2020 03:36   WBC Latest Ref Range: 4.1 - 11.1 K/uL 7.4 6.8 6.9    HGB Latest Ref Range: 12.1 - 17.0 g/dL 11.8 (L) 9.5 (L) 10.9 (L)    PLATELET Latest Ref Range: 150 - 400 K/uL 231 201 223    INR Latest Ref Range: 0.9 - 1.1   1.2 (H) 1.5 (H)  1.3 (H)   Sodium Latest Ref Range: 136 - 145 mmol/L 138 140 140 142   Potassium Latest Ref Range: 3.5 - 5.1 mmol/L 3.3 (L) 3.2 (L) 2.9 (L) 2.8 (L)   Chloride Latest Ref Range: 97 - 108 mmol/L 106 107 105 107   CO2 Latest Ref Range: 21 - 32 mmol/L 23 23 26 25   Glucose Latest Ref Range: 65 - 100 mg/dL 97 94 112 (H) 109 (H)   BUN Latest Ref Range: 6 - 20 MG/DL 11 10 10 8   Creatinine Latest Ref Range: 0.70 - 1.30 MG/DL 0.92 0.79 0.93 0.82   Bilirubin, total Latest Ref Range: 0.2 - 1.0 MG/DL 0.9   0.7   Albumin Latest Ref Range: 3.5 - 5.0 g/dL 2.0 (L)   1.8 (L)   ALT Latest Ref Range: 12 - 78 U/L 10 (L)   10 (L)   AST Latest Ref Range: 15 - 37 U/L 22   15   Alk. phosphatase Latest Ref Range: 45 - 117 U/L 120 (H)   92       RADIOLOGY:  CT scan abdomen with IV contrast.  Nodular surface to liver suggesting cirrhosis. No liver mass lesions. Mass in HOP with dilated bile ducts and PD. No thrombosis in PV, HV, IVC. Moderate ascites. Femoral vein thrombosis extended to lower IVC, multiple PE.       MD Michelle Castro [address] of [address], [address] [place] [address] 22.  [phone]  [address] [facility]

## 2020-07-16 NOTE — PROGRESS NOTES
1005 - PICC line placed and ptt drawn for heparin drip. 1025 - Per angio, stop heparin drip for paracentesis. They will send transport in 1 hour. Ptt resulted 82.7. Per pharmacist, Miguel Mane, when patient returns from angio, restart heparin drip and decrease by 2 units/kg/hour. 1200 - Report given to 5W. Patient taken down to angio and they were notified of room change.

## 2020-07-16 NOTE — PROCEDURES
PICC Placement Note    PRE-PROCEDURE VERIFICATION  Correct Procedure: yes  Correct Site:  yes  Temperature: Temp: 98.2 °F (36.8 °C), Temperature Source: Temp Source: Axillary  Recent Labs     07/16/20  0336 07/15/20  0137   BUN 8 10   CREA 0.82 0.93   PLT  --  223   INR 1.3*  --    WBC  --  6.9       Allergies: Aspirin    Education materials, including PICC Booklet and written information regarding central catheter related bloodstream infection and prevention given to patient. See Patient Education activity for further details. PROCEDURE DETAIL  A double lumen power injectable PICC line was started for reliable vascular access. The following documentation is in addition to the PICC properties in the lines/airways flowsheet :  Lot #: [id number]  Xylocaine 1% used intradermally:  yes  Total Catheter Length: 39 (cm)  External Catheter Length: 0 (cm)  Circumference: 28 (cm)  Vein Selection for PICC: right brachial  Central Line Bundle followed: yes  Complication Related to Insertion: none    The placement was verified by ECG technology. The PICC is on the right side and the tip overlies the superior vena cava. ECG verification documentation is on the patient's paper chart. Line is okay to use. Report to Bennett Aguirre.     GALLITO CrookN, RN, VA-BC   Vascular Access Team

## 2020-07-16 NOTE — PROGRESS NOTES
07/16/20 0338   Vital Signs   Temp 97.9 °F (36.6 °C)   Temp Source Oral   Pulse (Heart Rate) (!) 109   Resp Rate 18   O2 Sat (%) 93 %   Level of Consciousness Alert   /63   MAP (Calculated) 75   MEWS Score 3     Paged hospitalist to notify. Bryan Del Castillo NP gave orders for 500 mL bolus.

## 2020-07-16 NOTE — PROGRESS NOTES
This RN assessed pt for Paracentesis procedure and found pt to be confused. Pt states \"its 2001\" when asked year and also states   \"[facility]\" when asked where he is. RN verified with unit nurse that he was AOx4 this morning. Angio RN to call pt wife for consent. Provider notified.

## 2020-07-16 NOTE — PROGRESS NOTES
Bedside and Verbal shift change report given to Norberto Broderick RN (oncoming nurse) by Brayan Tam RN (offgoing nurse). Report included the following information SBAR, Kardex, Intake/Output, MAR and Recent Results.

## 2020-07-17 LAB
ALBUMIN FLD-MCNC: 1.2 G/DL
AMYLASE FLD-CCNC: 19 U/L
ANION GAP SERPL CALC-SCNC: 7 MMOL/L (ref 5–15)
APPEARANCE FLD: ABNORMAL
APTT PPP: 68.9 SEC (ref 22.1–32)
BUN SERPL-MCNC: 9 MG/DL (ref 6–20)
BUN/CREAT SERPL: 10 (ref 12–20)
CALCIUM SERPL-MCNC: 8 MG/DL (ref 8.5–10.1)
CANCER AG19-9 SERPL-ACNC: 1 U/ML (ref 0–35)
CHLORIDE SERPL-SCNC: 104 MMOL/L (ref 97–108)
CO2 SERPL-SCNC: 28 MMOL/L (ref 21–32)
COLOR FLD: YELLOW
COMMENT, HOLDF: NORMAL
CREAT SERPL-MCNC: 0.9 MG/DL (ref 0.7–1.3)
ERYTHROCYTE [DISTWIDTH] IN BLOOD BY AUTOMATED COUNT: 17.9 % (ref 11.5–14.5)
FIBRINOGEN PPP-MCNC: 208 MG/DL (ref 200–475)
GLUCOSE SERPL-MCNC: 126 MG/DL (ref 65–100)
HCT VFR BLD AUTO: 29.9 % (ref 36.6–50.3)
HGB BLD-MCNC: 9.8 G/DL (ref 12.1–17)
INR PPP: 1.3 (ref 0.9–1.1)
LYMPHOCYTES NFR FLD: 57 %
MAGNESIUM SERPL-MCNC: 1.7 MG/DL (ref 1.6–2.4)
MCH RBC QN AUTO: 30.9 PG (ref 26–34)
MCHC RBC AUTO-ENTMCNC: 32.8 G/DL (ref 30–36.5)
MCV RBC AUTO: 94.3 FL (ref 80–99)
MONOS+MACROS NFR FLD: 10 %
NEUTROPHILS NFR FLD: 33 %
NRBC # BLD: 0 K/UL (ref 0–0.01)
NRBC BLD-RTO: 0 PER 100 WBC
NUC CELL # FLD: 976 /CU MM
PLATELET # BLD AUTO: 215 K/UL (ref 150–400)
PMV BLD AUTO: 9.8 FL (ref 8.9–12.9)
POTASSIUM SERPL-SCNC: 3 MMOL/L (ref 3.5–5.1)
PROT FLD-MCNC: 3.6 G/DL
PROTHROMBIN TIME: 13.2 SEC (ref 9–11.1)
RBC # BLD AUTO: 3.17 M/UL (ref 4.1–5.7)
RBC # FLD: >100 /CU MM
SAMPLES BEING HELD,HOLD: NORMAL
SODIUM SERPL-SCNC: 139 MMOL/L (ref 136–145)
SPECIMEN SOURCE FLD: ABNORMAL
SPECIMEN SOURCE FLD: NORMAL
THERAPEUTIC RANGE,PTTT: ABNORMAL SECS (ref 58–77)
WBC # BLD AUTO: 6.2 K/UL (ref 4.1–11.1)

## 2020-07-17 PROCEDURE — 97165 OT EVAL LOW COMPLEX 30 MIN: CPT

## 2020-07-17 PROCEDURE — 85610 PROTHROMBIN TIME: CPT

## 2020-07-17 PROCEDURE — 85730 THROMBOPLASTIN TIME PARTIAL: CPT

## 2020-07-17 PROCEDURE — 74011250636 HC RX REV CODE- 250/636: Performed by: NURSE PRACTITIONER

## 2020-07-17 PROCEDURE — 83735 ASSAY OF MAGNESIUM: CPT

## 2020-07-17 PROCEDURE — 97530 THERAPEUTIC ACTIVITIES: CPT

## 2020-07-17 PROCEDURE — 80048 BASIC METABOLIC PNL TOTAL CA: CPT

## 2020-07-17 PROCEDURE — 88112 CYTOPATH CELL ENHANCE TECH: CPT

## 2020-07-17 PROCEDURE — 74011250636 HC RX REV CODE- 250/636: Performed by: FAMILY MEDICINE

## 2020-07-17 PROCEDURE — 65660000000 HC RM CCU STEPDOWN

## 2020-07-17 PROCEDURE — 97162 PT EVAL MOD COMPLEX 30 MIN: CPT

## 2020-07-17 PROCEDURE — 74011250636 HC RX REV CODE- 250/636: Performed by: EMERGENCY MEDICINE

## 2020-07-17 PROCEDURE — 74011250637 HC RX REV CODE- 250/637: Performed by: FAMILY MEDICINE

## 2020-07-17 PROCEDURE — 85384 FIBRINOGEN ACTIVITY: CPT

## 2020-07-17 PROCEDURE — 36415 COLL VENOUS BLD VENIPUNCTURE: CPT

## 2020-07-17 PROCEDURE — 85027 COMPLETE CBC AUTOMATED: CPT

## 2020-07-17 RX ORDER — POTASSIUM CHLORIDE 7.45 MG/ML
10 INJECTION INTRAVENOUS EVERY 6 HOURS
Status: DISCONTINUED | OUTPATIENT
Start: 2020-07-17 | End: 2020-07-19

## 2020-07-17 RX ORDER — POTASSIUM CHLORIDE 29.8 MG/ML
20 INJECTION INTRAVENOUS ONCE
Status: DISCONTINUED | OUTPATIENT
Start: 2020-07-17 | End: 2020-07-17

## 2020-07-17 RX ORDER — POTASSIUM CHLORIDE 7.45 MG/ML
10 INJECTION INTRAVENOUS
Status: COMPLETED | OUTPATIENT
Start: 2020-07-17 | End: 2020-07-17

## 2020-07-17 RX ORDER — HALOPERIDOL 5 MG/ML
4 INJECTION INTRAMUSCULAR
Status: DISCONTINUED | OUTPATIENT
Start: 2020-07-17 | End: 2020-07-19

## 2020-07-17 RX ADMIN — CASTOR OIL AND BALSAM, PERU: 788; 87 OINTMENT TOPICAL at 10:16

## 2020-07-17 RX ADMIN — ONDANSETRON 4 MG: 2 INJECTION INTRAMUSCULAR; INTRAVENOUS at 02:56

## 2020-07-17 RX ADMIN — FUROSEMIDE 20 MG: 10 INJECTION, SOLUTION INTRAMUSCULAR; INTRAVENOUS at 10:07

## 2020-07-17 RX ADMIN — HEPARIN SODIUM AND DEXTROSE 14 UNITS/KG/HR: 10000; 5 INJECTION INTRAVENOUS at 11:42

## 2020-07-17 RX ADMIN — POTASSIUM CHLORIDE 10 MEQ: 10 INJECTION, SOLUTION INTRAVENOUS at 09:48

## 2020-07-17 RX ADMIN — FUROSEMIDE 20 MG: 10 INJECTION, SOLUTION INTRAMUSCULAR; INTRAVENOUS at 18:56

## 2020-07-17 RX ADMIN — FINASTERIDE 5 MG: 5 TABLET, FILM COATED ORAL at 10:07

## 2020-07-17 RX ADMIN — TAMSULOSIN HYDROCHLORIDE 0.4 MG: 0.4 CAPSULE ORAL at 10:07

## 2020-07-17 RX ADMIN — POTASSIUM CHLORIDE 10 MEQ: 10 INJECTION, SOLUTION INTRAVENOUS at 08:00

## 2020-07-17 RX ADMIN — Medication 10 ML: at 14:13

## 2020-07-17 RX ADMIN — Medication 10 ML: at 21:01

## 2020-07-17 RX ADMIN — POTASSIUM CHLORIDE 10 MEQ: 10 INJECTION, SOLUTION INTRAVENOUS at 11:44

## 2020-07-17 RX ADMIN — HALOPERIDOL LACTATE 4 MG: 5 INJECTION, SOLUTION INTRAMUSCULAR at 16:00

## 2020-07-17 RX ADMIN — CASTOR OIL AND BALSAM, PERU: 788; 87 OINTMENT TOPICAL at 19:00

## 2020-07-17 RX ADMIN — ONDANSETRON 4 MG: 2 INJECTION INTRAMUSCULAR; INTRAVENOUS at 10:07

## 2020-07-17 RX ADMIN — ONDANSETRON 4 MG: 2 INJECTION INTRAMUSCULAR; INTRAVENOUS at 21:00

## 2020-07-17 RX ADMIN — ONDANSETRON 4 MG: 2 INJECTION INTRAMUSCULAR; INTRAVENOUS at 14:13

## 2020-07-17 RX ADMIN — POTASSIUM CHLORIDE 10 MEQ: 10 INJECTION, SOLUTION INTRAVENOUS at 06:19

## 2020-07-17 RX ADMIN — POTASSIUM CHLORIDE 10 MEQ: 10 INJECTION, SOLUTION INTRAVENOUS at 18:57

## 2020-07-17 RX ADMIN — Medication 10 ML: at 06:00

## 2020-07-17 RX ADMIN — HEPARIN SODIUM AND DEXTROSE 14 UNITS/KG/HR: 10000; 5 INJECTION INTRAVENOUS at 04:20

## 2020-07-17 NOTE — PROGRESS NOTES
Problem: Mobility Impaired (Adult and Pediatric)  Goal: *Acute Goals and Plan of Care (Insert Text)  Description: FUNCTIONAL STATUS PRIOR TO ADMISSION: Patient was modified independent using a rolling walker and WB quad cane for functional mobility. HOME SUPPORT PRIOR TO ADMISSION: The patient lived with spouse, but did not require assist from spouse. Physical Therapy Goals  Initiated 7/17/2020  1. Patient will move from supine to sit and sit to supine  in bed with modified independence within 7 day(s). 2.  Patient will transfer from bed to chair and chair to bed with minimal assistance/contact guard assist using the least restrictive device within 7 day(s). 3.  Patient will perform sit to stand with minimal assistance/contact guard assist within 7 day(s). 4.  Patient will ambulate with minimal assistance/contact guard assist for 50 feet with the least restrictive device within 7 day(s). 5.  Patient will ascend/descend 3 stairs with bilateral handrail(s) with minimal assistance/contact guard assist within 7 day(s). Outcome: Progressing Towards Goal   PHYSICAL THERAPY EVALUATION  Patient: Kindra Bravo (68 y.o. male)  Date: 7/17/2020  Primary Diagnosis: DVT (deep vein thrombosis) in pregnancy [O22.30]  DVT (deep venous thrombosis) ([facility]) [I82.409]        Precautions:   Fall      ASSESSMENT  Based on the objective data described below, the patient presents with generalized weakness, decreased functional mobility, decreased tolerance to activity as evidenced by hypotension while seated EOB. Pt received supine in bed and agreeable to therapy. Pt able to assume sitting EOB with supervision and demonstrated fair static sitting balance. BP noted to be 68/51 while seated EOB that did not improve with UE and LE therex. Pt asymptomatic while sitting upright, but did not progress to OOB for safety. Pt returned to supine position and BP did improve(see below).  Pt will continue to benefit from PT to progress mobility as tolerated. Pt is waiting to discuss with his spouse to decide if he will pursue a biopsy of his pancreas for further treatment options. Vitals:    07/17/20 0412 07/17/20 0710 07/17/20 0847 07/17/20 0853   BP: 104/64  (!) 68/51 93/62   BP 1 Location: Left arm  Left arm Left leg   BP Patient Position: At rest  Sitting Supine   Pulse: 100      Resp: 29      Temp: 98.3 °F (36.8 °C)      SpO2: 93%      Weight:  74.9 kg (165 lb 2 oz)     Height:  5' 10\" (1.778 m)       . Current Level of Function Impacting Discharge (mobility/balance): supervision bed mobility, fair-good sitting balance; limited by significant hypotension    Functional Outcome Measure: The patient scored Total: 55/100 on the Barthel Index which is indicative of 45% impaired ability to care for basic self needs/dependency on others. Other factors to consider for discharge: hypotension     Patient will benefit from skilled therapy intervention to address the above noted impairments. PLAN :  Recommendations and Planned Interventions: bed mobility training, transfer training, gait training, therapeutic exercises, neuromuscular re-education, patient and family training/education, and therapeutic activities      Frequency/Duration: Patient will be followed by physical therapy:  5 times a week to address goals. Recommendation for discharge: (in order for the patient to meet his/her long term goals)  To be determined: pending BP improvement and ability to progress mobility    This discharge recommendation:  Has been made in collaboration with the attending provider and/or case management    IF patient discharges home will need the following DME: to be determined (TBD)         SUBJECTIVE:   Patient stated I was getting up by myself at home. 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    BPH (benign prostatic hyperplasia)     DJD (degenerative joint disease)     Hypercholesterolemia     Other and unspecified hyperlipidemia      Past Surgical History:   Procedure Laterality Date    COLONOSCOPY N/A 8/18/2017    COLONOSCOPY performed by Marc Hussein MD at [facility] ENDOSCOPY    HX ORTHOPAEDIC Right 2010    rotator cuff    HX TONSILLECTOMY         Personal factors and/or comorbidities impacting plan of care: bilateral PEs, DVT, in and out of hospital and rehab facilities    Home Situation  Home Environment: Patient room  # Steps to Enter: 3  Rails to Enter: Yes  Hand Rails : Bilateral  One/Two Story Residence: One story  Living Alone: Yes  Support Systems: Family member(s)  Patient Expects to be Discharged to[de-identified] Private residence  Current DME Used/Available at Home: Wheelchair, [address], [place], quad, Grab bars  Tub or Shower Type: Tub/Shower combination    EXAMINATION/PRESENTATION/DECISION MAKING:   Critical Behavior:  Neurologic State: Alert  Orientation Level: Oriented X4  Cognition: Appropriate decision making, Appropriate for age attention/concentration, Appropriate safety awareness  Safety/Judgement: Awareness of environment  Hearing: Auditory  Auditory Impairment: Hard of hearing, bilateral  Hearing Aids/Status: At home    Edema: bilateral LE edema  Range Of Motion:  AROM: Generally decreased, functional           PROM: Within functional limits           Strength:    Strength: Generally decreased, functional                    Tone & Sensation:   Tone: Normal              Sensation: Intact               Coordination:  Coordination: Generally decreased, functional  Vision:   Acuity: Within Defined Limits  Corrective Lenses: Glasses  Functional Mobility:  Bed Mobility:  Rolling: Supervision  Supine to Sit: Supervision  Sit to Supine: Supervision  Scooting: Supervision  Transfers:          Balance:   Sitting: Intact; Without support  Standing: (not tested 2/2 low BP seated at EOB)       Therapeutic Exercises:    Ankle pumps, LAQ, elbow flexion/extension    Functional Measure:  Barthel Index:    Bathin  Bladder: 10  Bowels: 10  Groomin  Dressin  Feeding: 10  Mobility: 0  Stairs: 0  Toilet Use: 5  Transfer (Bed to Chair and Back): 10  Total: 55/100       The Barthel ADL Index: Guidelines  1. The index should be used as a record of what a patient does, not as a record of what a patient could do. 2. The main aim is to establish degree of independence from any help, physical or verbal, however minor and for whatever reason. 3. The need for supervision renders the patient not independent. 4. A patient's performance should be established using the best available evidence. Asking the patient, friends/relatives and nurses are the usual sources, but direct observation and common sense are also important. However direct testing is not needed. 5. Usually the patient's performance over the preceding 24-48 hours is important, but occasionally longer periods will be relevant. 6. Middle categories imply that the patient supplies over 50 per cent of the effort. 7. Use of aids to be independent is allowed. Hloa Jackson., Barthel, D.W. (1888). Functional evaluation: the Barthel Index. [facility] (14)2. [facility] oralia SANDY Sahu, Darcy Moraeson., [facility] [place], [address] [place] [address] (). Measuring the change indisability after inpatient rehabilitation; comparison of the responsiveness of the Barthel Index and Functional [place] Measure. Journal of Neurology, Neurosurgery, and Psychiatry, 66(4), 606-218. Angeli Benson, N.J.A, SARITHA Narayanan, & Mack Anaya M.A. (2004.) Assessment of post-stroke quality of life in cost-effectiveness studies: The usefulness of the Barthel Index and the EuroQoL-5D.  Quality of Life Research, 13, 331-08        Based on the above components, the patient evaluation is determined to be of the following complexity level: MEDIUM    Pain Rating:  Pt denied pain    Activity Tolerance:   Poor and hypotensive seated EOB and unable to progress OOB at this time  Please refer to the flowsheet for vital signs taken during this treatment. After treatment patient left in no apparent distress:   Supine in bed, Call bell within reach, and Side rails x 3    COMMUNICATION/EDUCATION:   The patients plan of care was discussed with: Occupational therapist and Registered nurse. Fall prevention education was provided and the patient/caregiver indicated understanding., Patient/family have participated as able in goal setting and plan of care. , and Patient/family agree to work toward stated goals and plan of care.     Thank you for this referral.  Aisha Marie, PT, DPT   Time Calculation: 21 mins

## 2020-07-17 NOTE — PROGRESS NOTES
Problem: Falls - Risk of  Goal: *Absence of Falls  Description: Document Castro Herman Fall Risk and appropriate interventions in the flowsheet.   Outcome: Progressing Towards Goal  Note: Fall Risk Interventions:  Mobility Interventions: Communicate number of staff needed for ambulation/transfer         Medication Interventions: Evaluate medications/consider consulting pharmacy    Elimination Interventions: Call light in reach, Patient to call for help with toileting needs    History of Falls Interventions: Door open when patient unattended, Evaluate medications/consider consulting pharmacy         Problem: Patient Education: Go to Patient Education Activity  Goal: Patient/Family Education  Outcome: Progressing Towards Goal     Problem: General Medical Care Plan  Goal: *Absence of infection signs and symptoms  Outcome: Progressing Towards Goal     Problem: Deep Venous Thrombosis - Risk of  Goal: *Absence of deep venous thrombosis signs and symptoms(Stroke Metric)  Outcome: Progressing Towards Goal  Goal: *Absence of impaired coagulation signs and symptoms  Outcome: Progressing Towards Goal  Goal: *Knowledge of prescribed medications  Outcome: Progressing Towards Goal  Goal: *Absence of bleeding  Outcome: Progressing Towards Goal     Problem: Patient Education: Go to Patient Education Activity  Goal: Patient/Family Education  Outcome: Progressing Towards Goal     Problem: Patient Education: Go to Patient Education Activity  Goal: Patient/Family Education  Outcome: Progressing Towards Goal     Problem: Nutrition Deficit  Goal: *Optimize nutritional status  Outcome: Progressing Towards Goal

## 2020-07-17 NOTE — ADT AUTH CERT NOTES
Echo 7/15/20 by Brendon Pablo RN         Review Status  Review Entered    In Primary  7/17/2020 14:01        Criteria Review    Echo 7/15/20  Interpretation Summary      ·           Normal wall thickness and systolic function (ejection fraction normal). Small left ventricle, appears slightly underfilled. Estimated left ventricular ejection fraction is 60 - 65%. Mild (grade 1) left ventricular diastolic dysfunction. ·           IVC is small and fully collapses with inspiration, suggesting low to normal right atrial filling pressures. ·           Normal right ventricular size and systolic function. ·           Eccentric, anteriorly directed mild to moderate mitral valve regurgitation is present. ·           Ascites is noted. Echo Findings      Left Ventricle   Normal wall thickness and systolic function (ejection fraction normal). Small left ventricle. The estimated ejection fraction is 60 - 65%. There is mild (grade 1) left ventricular diastolic dysfunction. Wall Scoring      The left ventricular wall motion is normal.      Left Atrium       Normal cavity size. Right Ventricle            Normal cavity size and global systolic function. Assessment of RV function:   TAPSE = 21 mm. TAPSV = 18 mm/s. Right Atrium    Normal cavity size. Aortic Valve     Trileaflet valve structure, no stenosis and no regurgitation. Mitral Valve     Normal valve structure and no stenosis. Mild to moderate regurgitation. The mitral regurgitant jet is anteriorly directed. Tricuspid Valve           Normal valve structure and no stenosis. Trace regurgitation. RVSP 39 mmHg. Pulmonic Valve           Normal valve structure, no stenosis and no regurgitation. Aorta    Normal aortic root. IVC/Hepatic Veins       Normal structure. Normal central venous pressure (0-5 mmHg); IVC diameter is less than 21 mm and collapses more than 50% with respiration. Pericardium     No evidence of pericardial effusion.  Ascites present. Wall Scoring      Score Index: 1.000      Percent Normal: 100.0%                                              The left ventricular wall motion is normal.      TTE procedure Findings      TTE Procedure Information    Image quality: good. The view(s) performed were parasternal, apical, subcostal and suprasternal. Color flow Doppler was performed and pulse wave and/or continuous wave Doppler was performed. No contrast was given. Procedure Staff      Technologist/Clinician: Erika Yung  Supporting Staff: None  Performing Physician/Midlevel: None     Exam Completion Date/Time: 7/15/20  9:50 AM  2D Volume Measurements                     ESV     ESV Index   LA Biplane         42.86 mL (Range: 18 - 58)                    22.14 ml/m2 (Range: 16 - 28)         LA A4C              34.23 mL (Range: 18 - 58)                    17.68 ml/m2 (Range: 16 - 28)         LA A2C              46.08 mL (Range: 18 - 58)                    23.8 ml/m2 (Range: 16 - 28)      2D/M-Mode Measurements      Dimensions   Measurement  Value (Range)   LVIDs    2.64 cm      LVIDd    4.21 cm (4.2 - 5. 9)      IVSd      0.87 cm (0.6 - 1.0)      LVPWd              1.04 cm (0.6 - 1.0)       LV Mass AL       129.2 g (88 - 224)      LV Mass AL Index         66.7 g/m2 (49 - 115)      Left Atrium Major Axis              4.46 cm      Left Atrium Minor Axis              2.3 cm      LA Area 4C       14.4 cm2      Tapse    2.13 cm (1.5 - 2.0)       RVIDd    3.3 cm      Aorta Measurements      Aorta   Measurement  Value (Range)   Ao Root D         3.38 cm      Aortic Valve Measurements      Stenosis   Aortic Valve Systolic Peak Velocity      129.63 cm/s      AoV PG             6.72 mmHg      Aortic Valve Area by Continuity of Peak Velocity         2.33 cm2                 LVOT   LVOT Peak Velocity      90.58 cm/s      LVOT Peak Gradient    3.28 mmHg      LVOT d              2.06 cm                                   Mitral Valve Measurements    Stenosis   Mitral Valve Pressure Half-time            0.04 s      MVA (PHT)        5.26 cm2      Tricuspid Valve Measurements      Stenosis   Est. RA Pressure          3 mmHg      RVSP    32.1 mmHg                 Regurgitation   Triscuspid Valve Regurgitation Peak Gradient            29.1 mmHg      TR Max Velocity            269.72 cm/s      Diastolic Filling/Shunts      Diastolic Filling   Mitral Valve E Wave Deceleration Time           0.14 s      MV E Brijesh           69.94 cm/s      MV A Brijesh           108.5 cm/s      MV E/A              0.64        Additional clinical requested 7/17/20 by Mushtaq Plata RN         Review Status  Review Entered    In Primary  7/17/2020 13:58        Criteria Review    Additional Clinical Requested 7/17/20  Subjective: Hepatology - ECHO shows no RV dysfunction or TR that could be contributing to ascites. Had paracentesis yesterday to determine if ascites was due to portal hypertension or malignancy. I do not see any results from paracentesis at this time. If ascites analysis and cytology negative for malignancy will need EUS and biopsy of pancreas mass for diagnosis. Hem/Onc - Spoke with patient who complains of: feels better, no abd. Pain, less leg swelling, no bleeding     RN progress note - 0600: Per day shift nurse Kim Brooks RN) only administer scheduled IV albumin if patient has had a total of 5,000 ml output from paracentesis drain. Patient has only had a total of 4,375 ml of output since 1615 so IV albumin was not given. Will  inform morning nurse to follow up on this     Objective: General:  No acute distress. Eyes:  Sclera anicteric. ENT:  No oral lesions. Thyroid normal.  Nodes:  No adenopathy. Skin:  No spider angiomata. No jaundice. Respiratory:  Lungs clear to auscultation. Cardiovascular:  Regular heart rate. Abdomen:  Soft non-tender, Ascites appears to be present. Extremities:  Right leg edema. Neurologic:  Alert and oriented.   Cranial nerves grossly intact. No asterixis.                Vitals: 97.9 °F 112   92/56  32   97 %  RA     Labs: cell count in body fluid (ascitic fluid) - RBC count >100, neutrophils 33, lymphs 57, mono/macrophages 10; k 3.0, glucose 126, BUN/creat ratio 10, calcium 8.0, INR 1.3, PT 13.2, PTT 68.9, RBC 3.17, hgb 9.8, hct 29.9, RDW 17.9     Meds: venelex ointment TP BID, proscar 5 mg PO qd, Lasix 20 mg IV BID, heparin 25,000 units in D5W IV gtt with titration @14units/kg/hr, zofran 4 mg IV q6hrs, potassium chloride 10 mEq IV q1hr (x3 doses), followed by potassium chloride 10 mEq IV q6hrs, Flomax 0.4 mg PO qd     MD Assessment/Plan: Hepatology - Cirrhosis  The patient may have cirrhosis. If he does have cirrhosis the etiology is not clear. The diagnosis of cirrhosis is suggested by imaging, ascites and prolonged INR     Serologic testing for causes of chronic liver disease will be ordered.     Ascites   Ascites has recently developed. Would preform at least a diagnostic paracentesis and calculate SAAG which will tell us if he has ascites from portal HTN or malignancy. The later would most likely be pancreas etiology.     Pancreas mass with dilated CBD and PD  This is highly suggestive of pancreas cancer. May ultimately need EUS and biopsy of the pancreas mass unless ascites is malignant and cytology positive for pancreas cancer.     DVT with PE  This was likely precipitated by pancreas cancer   He is being treated with IV heparin   Right leg swelling >>left.     Hem/Onc - 1. Rle DVT/PE. .. 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 Pancreatic mass,,, pt said he would consider Rx if found to be malignant. .. will try to set up bx for next week if he and family agree     OT - Based on the objective data described below, the patient presents with decreased strength/endurance, decreased functional mobility/balance, decreased activity tolerance and low BP, all of which limit pt's ability to complete self-care routine at level congruent with PLOF. Pt's occupational output limited during OT evaluation 2/2 EOB BP reading 68/51; however, pt asymptomatic. Currently, pt benefits from independence for bed level self-feeding, S/U for bed level grooming and UB dressing, Min A for bed level bathing and LE dressing and Supervision for toileting at bedpan. Although pt's output was limited by low BP he did demonstrate good Supervision level bed mobility, with OT believing pt will make good OOB gains once BP controlled. Of note, pt and pt's wife determining if they will continue with biopsy on pancreatic mass. Pt benefits from skilled OT to address functional deficits in an overall effort at maximizing pt's highest level of safe functional independence.     PT - Based on the objective data described below, the patient presents with generalized weakness, decreased functional mobility, decreased tolerance to activity as evidenced by hypotension while seated EOB. Pt received supine in bed and agreeable to therapy. Pt able to assume sitting EOB with supervision and demonstrated fair static sitting balance. BP noted to be 68/51 while seated EOB that did not improve with UE and LE therex. Pt asymptomatic while sitting upright, but did not progress to OOB for safety. Pt returned to supine position and BP did improve(see below). Pt will continue to benefit from PT to progress mobility as tolerated. Pt is waiting to discuss with his spouse to decide if he will pursue a biopsy of his pancreas for further treatment options     Nutrition - Pt [place], spoke with daughter via phone. Poor po over past week with intake consisting of just bone broth and Ensure shakes. 6lbs wt loss over that period of time.  Ensure High Protein (420kcal, 42g protein), Ensure Clear (720kcal, 24g protein) added for lower fat content until po tolerance can be better evaluated and seen by GI.      7/17:  Met with pt this morning and spoke with Dr. Jorden Ramirez. Pt continues to have nausea, lots of \"spitting,\" but vomiting is somewhat improved. He tolerated taking his medications orally this morning. We hope to avoid nutrition support, but pt has not had significant/adequate nutritional intake for several weeks now, so threshold is low to start something. We definitely want to avoid TPN if at all possible. If support is warranted would first try duodenal feedings in hopes of minimizing his N/V. He is being treated for multiple DVT's, he may undergo pancreas bx next week. RD will continue to follow.     Plan - 1. Start GI Lite diet today per discussion with Dr. Jorden Ramirez     2. Continue with Ensure Clear supplements     3. In the event enteral nutrition is needed, would place ND tube to minimize nausea and vomiting:              --- Vital 1.5 starting at 30 ml/hr x 6 hours              --- Increase rate by 10 ml/hr every 6 hours until at goal of 50 ml/hr              --- Give 120 ml water flush every 4 hours              --- The above goal will provide: 1800 kcals, 81 gm pro, 1635 ml free fluid       Additional Clinical Requested 7/16/20 by Matt Yusuf RN         Review Status  Review Entered    In Primary  7/17/2020 13:31        Criteria Review    Additional Clinical Requested 7/16/20  HPI: Hem/Onc - This gentleman unfortunately was also found to have a cystic pancreatic mass upon admission CT scan. We were asked to see him regarding these findings. The patient notes that he has had an approximate 20-pound weight loss over the past several weeks. He has mid-abdominal discomfort for which he is not currently taking any pain medicine.     Subjective: Hepatology - He remains on IV heparin. Right leg swelling going down. Breathing is better. ECHO heart has been done but report not yet out. Plan for US paracentesis today.   Will need to hold heparin several hours prior to procedure  GI - Patient reports some improvement in nausea with less vomiting this morning. No abdominal pain  IM - Follow up DVT/PE. Reports continuous nausea/vomiting. Minimal oral intake. For paracentesis today     RN progress note at 0348 regarding , /63 - Paged hospitalist to notify. Monae Valentino NP gave orders for 500 mL bolus     RN progress notes - 2776 - PICC line placed and ptt drawn for heparin drip. 1025 - Per angio, stop heparin drip for paracentesis. They will send transport in 1 hour. Ptt resulted 82.7. Per pharmacist, Salvatore Romero, when patient returns from angio, restart heparin drip and decrease by 2 units/kg/hour. 1200 - Report given to 5W. Patient taken down to angio and they were notified of room change.       1235 - This RN assessed pt for Paracentesis procedure and found pt to be confused. Pt states \"its 2001\" when asked year and also states   \"[facility]\" when asked where he is. RN verified with unit nurse that he was AOx4 this morning. Angio RN to call pt wife for consent. Provider notified     Objective: General:  No acute distress. Eyes:  Sclera anicteric. ENT:  No oral lesions. Thyroid normal.  Nodes:  No adenopathy. Skin:  No spider angiomata. No jaundice. Respiratory:  Lungs clear to auscultation. Cardiovascular:  Regular heart rate. Abdomen:  Soft non-tender, Ascites appears to be present. Extremities:  Right leg edema. Neurologic:  Alert and oriented. Cranial nerves grossly intact. No asterixis.                Vitals: 98 °F 104   95/56  30  97 %  RA     Labs: k 3.2, glucose 142, BUN/creat ratio 10, calcium 7.7, PTT 61.6, PTT 82.7, k 2.8, glucose 109, BUN/creat ratio 10, calcium 8.2, ALT 10, albumin 1.8, globulin 5.4, a-g ratio 0.3, INR 1.3, PT 13.5, PTT 85.6     Imaging: US paracentesis abd - IMPRESSION:   Placement of a right lower quadrant 6 Micronesian paracentesis catheter.  The catheter   was left in place and attached to gravity drainage     Meds: venelex ointment TP BID, proscar 5 mg PO qd, heparin 25,000 units in D5W IV gtt with titration @14-16units/kg/hr, zofran 4 mg IV q6hrs, Flomax 0.4 mg PO qd, potassium chloride 10 mEq IV q1hr, potassium chloride 20 mEq IV x1,  ml bolus IV x1     MD Assessment/Plan: Hepatology - Cirrhosis  The patient may have cirrhosis. If he does have cirrhosis the etiology is not clear. The diagnosis of cirrhosis is suggested by imaging, ascites and prolonged INR     Serologic testing for causes of chronic liver disease will be ordered.     Ascites   Ascites has recently developed. Would preform at least a diagnostic paracentesis and calculate SAAG which will tell us if he has ascites from portal HTN or malignancy. The later would most likely be pancreas etiology.     Pancreas mass with dilated CBD and PD  This is highly suggestive of pancreas cancer. Will ultimately need EUS and biopsy of the pancreas mass.     DVT with PE  This was likely precipitated by pancreas cancer   He is being treated with IV heparin   Right leg swelling >>left.     GI - ·    Pancreatic mass: CT as above, suspicious for malignancy. ·           Nausea/vomiting: CT abdomen/pelvis with IV contrast (7/13/20): mass-like fullness in the pancreatic head, resulting in intrahepatic and pancreatic duct dilatation, peripancreatic inflammatory changes with 3.7 x 2.3 cm collection near the pancreatic tail possibly pseudocyst, large amount of ascites, and moderate amount of slightly complex fluid/soft tissue in the left abdomen - findings may represent pancreatitis or pancreatic neoplasm; edematous thickening of the gastric antrum and duodenum likely secondary to the pancreatic process.  LFTs and lipase unremarkable on 7/13.   ·           Right lower extremity DVT with bilateral PEs: on heparin gtt.   ·           Ascites: hepatology, Dr. Abhinav Arnold following.    ·           Hypotension  ·           COVID-19 negative  Plan - · Supportive measures  ·           Palliative care following  ·           Hepatology following  ·           No plan for EUS at this time given PEs (on heparin gtt)  ·           Plan for outpatient follow up with primary gastroenterologist, Dr. Rei Nair. We will be available again as needed     IM - Acute DVT/PE  -suspected secondary to immobility/recent hospitalization and possible underlying malignancy  -Lower extremity Doppler 7/13: Acute partially occlusive DVT right common femoral and proximal femoral vein   -CTA 7/13: Bilateral pulmonary embolism, occlusion suprahepatic IVC and hepatic vein  -CT abdomen 7/13: No evidence of hepatic vein, portal vein, or inferior vena cava thrombosis as questioned on PE CT earlier today. There is however right lower lobe pulmonary emboli in right common femoral deep venous thrombosis reported elsewhere earlier today.  -Currently on heparin drip, continue for now. Hematology consulted     Pancreatic mass:  Ascites/with possible splenic vein occlusion/distal esophageal and gastric varicies  -Reports recent admission at outside hospital for acute pancreatitis x2  -CT abdomen/pelvis 7/13 with pancreatic head masslike fullness, intra-hepatic and pancreatic duct dilatation.   Peripancreatic inflammatory changes, 3.7 x 2.3 cm collection near pancreatic tail, possibly pseudocyst  -Appreciate GI input, high suspicion for pancreatic neoplasm--not a candidate at this time for EUS/biopsy  -Appreciate hepatology, plans for diagnostic paracentesis     Nausea/vomiting: persistent   -symptomatic treatment, monitor nutrition closely     Ruled out pneumonia pneumonia  -no evidence of pneumonia  -Monitor off antibiotics  -Outpatient repeat chest imaging     Hypokalemia: replete as needed     Hypotension: Likely hypovolemia, albumin infusion, hold home antihypertensives, close monitoring and further intervention per hospital course     Hypoalbuminemia: nutrition input     BPH: Continue home medications     Hypercholesterolemia: Hold statin for now     Cardiac diet     Code status: FULL CODE, appreciate palliative care  DVT prophylaxis: heparin drip  PTA: home  Baseline: independent     Plan: Follow Hematology/GI/Hepatology/Palliative care     Palliative Med - 1. Nausea and vomiting  2.         hypoalbuminemia  3.         bed bound   4. Physical debility  Plan - 1. Pt seen on his way to angio  2. Nausea improved with the change in meds  3. Wife now allowed to visit. Will call her to arrange a time to meet in person     4.         7/15/20: Discussions held with wife today. She is unable to visit due to COVID  5. Reviewed chart and updated her. Explained that our service is here to help with discussions, provide information, help with decision making when needed  6. Wife has a clear understanding of the patient's condition  7. Pt has elected aggressive management. Wife very concerned of the patient's nutrition as he has been vomiting for months. She is inquiring about his nutrition plan and if iv nutrition should be initiated  8. Explained that the plan is to try and control the vomiting with medications and he has been limited to a liquid diet right now. Wife remains concerned as this has been happening for a while now  5. Will schedule zofran 4 times daily add scopolamine patch. Compazine prn  10. Pt may need TPN for a brief period if vomiting does not improve but will defer to primary team  11. Goals at this time are to pursue aggressive options. I informed wife that endoscopy on hold due to heparin gtt as the clot is the main issue right now   15. Explained that if this is indeed pancreatic cancer, unsure if aggressive options will be offered given poor functional status and high risk for surgery. 13.       I did not discuss this with the pt today but will tomorrow.     14.       Pt still with nausea and non conversant today. Will follow up with pt and wife tomorrow     15. Initial consult note routed to primary continuity provider and/or primary health care team members  12. Communicated plan of care with: Palliative IDT, [facility] Team     Hem/Onc - 1. Pancreatic mass. This gentleman appears to have a pancreatic neoplasm and will require a biopsy for further guidance. Unfortunately, the recent pulmonary embolus and DVT will make a biopsy difficult at this time. The patient was seen by Dr. Morro Dao and plans are in place at least currently to perform paracentesis and check cytology on that analysis. If that cytology is negative, we will have to make a final decision whether a biopsy of the pancreatic lesion should be done. 2.  Deep vein thrombosis and pulmonary embolus. This gentleman is currently on unfractionated heparin.   He can be transitioned to either Xarelto or Eliquis after several days

## 2020-07-17 NOTE — PROGRESS NOTES
[facility] [facility]  Hospitalist Group                                                                                          Hospitalist Progress Note  [address],   Answering service: [phone] OR [phone] from in house phone        Date of Service:  2020  NAME:  Jenelle Junior  :  1934  MRN:  [id number]      Admission Summary:   Jenelle Junior is a 80 y.o. male who presents with right leg swelling. Patient does not have his hearing aids, is not able to hear very well, and history was primarily obtained from his wife who is at the bedside. His wife reports that patient has been in and out of the hospital and rehab for the last few months. He initially got diagnosed with what sounds like septic arthritis. Patient underwent knee aspiration, was put on IV antibiotics, was discharged home on IV antibiotics, developed pneumonia, pancreatitis and continued knee pain. Went back to [facility], was admitted there, was treated with antibiotics, and eventually was discharged to a rehab facility. Patient came back home from rehab, recently started having some nausea/vomiting, new swelling in his right leg, patient had significant vomiting 3 to 4 days back, it seems to have resolved now, and he was able to tolerate p.o. today. Home health nurse came today and found that the patient was hypotensive, and patient was brought to [facility] ED. Patient was found to have extensive right lower extremity DVT extending up into the vena cava and bilateral PEs. Patient was requested to be admitted under the hospitalist service. The wife reports that patient has no history of blood clots that she is aware of. Does report that patient has been more or less bedbound in the last few months. She denies patient having any other complaints or problems.   The patient and his wife deny patient c/o  any Headache, blurry vision, sore throat, trouble swallowing, trouble with speech, chest pain, SOB, cough, fever, chills, N/V/D, abd pain, urinary symptoms, constipation, recent travels, sick contacts, focal or generalized oneirological symptoms,  falls, injuries, rashes, contact with COVID-19 diagnosed patients, hematemesis, melena, hemoptysis, hematuria, rashes, denies starting any new medications and denies any other concerns or problems besides as mentioned above.        Interval history / Subjective: Follow up DVT/PE. Patient seen and examined. Has persistent nausea and constantly spitting. No vomiting. Nutrition at bedside. Will transition to GI lite diet and see if patient tolerates. If able to take in solid food, plan to transition from heparin drip to NOAC. Assessment & Plan:     Acute DVT/PE  -suspected secondary to immobility/recent hospitalization and possible underlying malignancy  -Lower extremity Doppler 7/13: Acute partially occlusive DVT right common femoral and proximal femoral vein   -CTA 7/13: Bilateral pulmonary embolism, occlusion suprahepatic IVC and hepatic vein  -CT abdomen 7/13: No evidence of hepatic vein, portal vein, or inferior vena cava thrombosis as questioned on PE CT earlier today. There is however right lower lobe pulmonary emboli in right common femoral deep venous thrombosis reported elsewhere earlier today.  -Currently on heparin drip, continue for now. Appreciate hematology    Pancreatic mass:  Ascites/with possible splenic vein occlusion/distal esophageal and gastric varicies  -Reports recent admission at outside hospital for acute pancreatitis x2  -CT abdomen/pelvis 7/13 with pancreatic head masslike fullness, intra-hepatic and pancreatic duct dilatation. Peripancreatic inflammatory changes, 3.7 x 2.3 cm collection near pancreatic tail, possibly pseudocyst  -Appreciate GI input, high suspicion for pancreatic neoplasm--not a candidate at this time for EUS/biopsy  -Appreciate hepatology, s/p paracentesis.  Have ordered studies    Nausea/vomiting: persistent   -symptomatic treatment, monitor nutrition closely. Appreciate palliative care      Ruled out pneumonia pneumonia  -no evidence of pneumonia  -Monitor off antibiotics  -Outpatient repeat chest imaging    Hypokalemia: replete as needed    Hypotension: Likely hypovolemia, albumin infusion, hold home antihypertensives, close monitoring and further intervention per hospital course    Hypoalbuminemia: nutrition input    BPH: Continue home medications    Hypercholesterolemia: Hold statin for now     Cardiac diet    Code status: FULL CODE, appreciate palliative care  DVT prophylaxis: heparin drip  PTA: home  Baseline: independent    Plan: Follow Hematology/GI/Hepatology/Palliative care    Care Plan discussed with: Patient/Family  Anticipated Disposition: TBD  Anticipated Discharge: 24 hours to 48 hours     Hospital Problems  Date Reviewed: 7/14/2020          Codes Class Noted POA    * (Principal) DVT (deep venous thrombosis) ([facility] [address] ICD-10-CM: I82.409  ICD-9-CM: 453.40  7/14/2020 Yes                Review of Systems:   A comprehensive review of systems was negative except for that written in the HPI. Vital Signs:    Last 24hrs VS reviewed since prior progress note. Most recent are:  Visit Vitals  BP 92/56   Pulse (!) 112   Temp 97.9 °F (36.6 °C)   Resp (!) 32   Ht 5' 10\" (1.778 m)   Wt 74.9 kg (165 lb 2 oz)   SpO2 97%   BMI 23.69 kg/m²         Intake/Output Summary (Last 24 hours) at 7/17/2020 1346  Last data filed at 7/17/2020 1019  Gross per 24 hour   Intake --   Output 4725 ml   Net -4725 ml        Physical Examination:             Constitutional:  No acute distress, cooperative, pleasant    ENT:  Oral mucosa moist, oropharynx benign. Resp:  CTA bilaterally. No wheezing/rhonchi/rales. No accessory muscle use   CV:  Regular rhythm, normal rate, no murmurs, gallops, rubs    GI:  Soft, non tender, distended.  normoactive bowel sounds, no hepatosplenomegaly     Musculoskeletal:  No edema, warm, 2+ pulses throughout    Neurologic:  Moves all extremities. Skin:  Good turgor, no rashes or ulcers       Data Review:    Review and/or order of clinical lab test      Labs:     Recent Labs     07/17/20  0304 07/15/20  0137   WBC 6.2 6.9   HGB 9.8* 10.9*   HCT 29.9* 34.7*    223     Recent Labs     07/17/20  0304 07/16/20  2115 07/16/20  0336    139 142   K 3.0* 3.2* 2.8*    105 107   CO2 28 29 25   BUN 9 9 8   CREA 0.90 0.90 0.82   * 142* 109*   CA 8.0* 7.7* 8.2*   MG 1.7  --   --      Recent Labs     07/16/20  0336   ALT 10*   AP 92   TBILI 0.7   TP 7.2   ALB 1.8*   GLOB 5.4*     Recent Labs     07/17/20  0304 07/16/20 2113 07/16/20  1005 07/16/20  0336   INR 1.3*  --   --  1.3*   PTP 13.2*  --   --  13.5*   APTT 68.9* 61.6* 82.7* 85.6*      No results for input(s): FE, TIBC, PSAT, FERR in the last 72 hours. No results found for: FOL, RBCF   No results for input(s): PH, PCO2, PO2 in the last 72 hours. No results for input(s): CPK, CKNDX, TROIQ in the last 72 hours.     No lab exists for component: CPKMB  Lab Results   Component Value Date/Time    Cholesterol, total 250 (H) 10/02/2013 01:55 PM    HDL Cholesterol 53 10/02/2013 01:55 PM    LDL, calculated 176 (H) 10/02/2013 01:55 PM    Triglyceride 106 10/02/2013 01:55 PM    CHOL/HDL Ratio 3.4 10/06/2010 10:25 AM     No results found for: [facility]  Lab Results   Component Value Date/Time    Color YELLOW/STRAW 07/13/2020 04:27 PM    Appearance CLEAR 07/13/2020 04:27 PM    Specific gravity 1.012 07/13/2020 04:27 PM    pH (UA) 6.0 07/13/2020 04:27 PM    Protein Negative 07/13/2020 04:27 PM    Glucose Negative 07/13/2020 04:27 PM    Ketone TRACE (A) 07/13/2020 04:27 PM    Bilirubin Negative 07/13/2020 04:27 PM    Urobilinogen 1.0 07/13/2020 04:27 PM    Nitrites Negative 07/13/2020 04:27 PM    Leukocyte Esterase Negative 07/13/2020 04:27 PM    Epithelial cells FEW 07/13/2020 04:27 PM    Bacteria Negative 07/13/2020 04:27 PM    WBC 0-4 07/13/2020 04:27 PM    RBC 0-5 07/13/2020 04:27 PM         Medications Reviewed:     Current Facility-Administered Medications   Medication Dose Route Frequency    potassium chloride 10 mEq in 100 ml IVPB  10 mEq IntraVENous Q6H    ondansetron (ZOFRAN) injection 4 mg  4 mg IntraVENous Q6H    scopolamine (TRANSDERM-SCOP) 1 mg over 3 days 1 Patch  1 Patch TransDERmal Q72H    prochlorperazine (COMPAZINE) tablet 5 mg  5 mg Oral Q6H PRN    finasteride (PROSCAR) tablet 5 mg  5 mg Oral DAILY    tamsulosin (FLOMAX) capsule 0.4 mg  0.4 mg Oral DAILY    sodium chloride (NS) flush 5-40 mL  5-40 mL IntraVENous Q8H    sodium chloride (NS) flush 5-40 mL  5-40 mL IntraVENous PRN    acetaminophen (TYLENOL) tablet 650 mg  650 mg Oral Q6H PRN    balsam peru-castor oiL (VENELEX) ointment   Topical BID    heparin 25,000 units in D5W 250 ml infusion  18-36 Units/kg/hr IntraVENous TITRATE    furosemide (LASIX) injection 20 mg  20 mg IntraVENous BID     ______________________________________________________________________  EXPECTED LENGTH OF STAY: 4d 4h  ACTUAL LENGTH OF STAY:          2                 Lori Crump DO

## 2020-07-17 NOTE — PROGRESS NOTES
TIMMY Plan  -RUR NA  -Transferred to 5W  -OBS  -Awaiting PT/OT-TBD per BP improvement needed  -Family transport    CM will follow    Jose Cox, MHA/CRM

## 2020-07-17 NOTE — PROGRESS NOTES
[address] hospitals, [place], [address], Cite Mongi Slim, MD Emmanuel Thornton, LULU De Leon, [facility]     Angy DAN Patti, [facility]   LOUISE Rincon, [facility]       Emerita SantizoUNM [facility] 136    at [address], [address] Samuel Bird  22.    [phone]    FAX: [address] [place] [address] Avenue    at [facility]    [address], [address]    [facility], [address]    [phone]    FAX: [phone]       HEPATOLOGY PROGRESS NOTE  The patient is a 80year old male who was in reasonable good health until several months ago when developed septic arthritis, followed by pneumonia and pancreatitis. All of this was apparently in the [facility] system. He has been in and out of hospital and rehab during this time. He was sent to the ED when [facility] RN found him hypotensive. In the ED underwent a CT scan and was found to have extensive DVT extending into the IVC and PE. A nodular liver suggesting cirrhosis, a mass like fullness in the HOP with double duct sign and ascites. ECHO shows no RV dysfunction or TR that could be contributing to ascites. Had paracentesis yesterday to determine if ascites was due to portal hypertension or malignancy. Cell count 976 33% = 322 polys. SAAG = 1.8 - 1.2 = 0.6  Cytology still pending. ASSESSMENT AND PLAN:  Cirrhosis  The patient may have cirrhosis. If he does have cirrhosis the etiology is not clear. The diagnosis of cirrhosis is suggested by imaging, ascites and prolonged INR  Serologic testing for causes of chronic liver disease will be ordered. Ascites   Ascites has recently developed.   Paracentesis demonstrated SAAG of 0.6 which is not consistent with portal HTN and suggests malignant ascites consistent with the pancreas mass.    Cell count is 322 polys which under normal circumstances would be consistent with SBP. However, ascites was not sent for analysis at time of paracentesis and fluid was sent from bag or tubing the next day and may not be accurate assessment. I would not start antibiotics for SBP. Pancreas mass with dilated CBD and PD  This is highly suggestive of pancreas cancer. The SAAG of 0.6 is consistent with malignant or exudative ascites and the pancreas mass. May ultimately need EUS and biopsy of the pancreas mass unless ascites is malignant and cytology positive for pancreas cancer. Depends upon how aggressive patient and family want to be and whether he could tolerate chemotherapy. Await cytology    DVT with PE  This was likely precipitated by pancreas cancer   He is being treated with IV heparin   Right leg swelling >>left. PHYSICAL EXAMINATION:  VS: per nursing note  General:  No acute distress. Eyes:  Sclera anicteric. ENT:  No oral lesions. Thyroid normal.  Nodes:  No adenopathy. Skin:  No spider angiomata. No jaundice. Respiratory:  Lungs clear to auscultation. Cardiovascular:  Regular heart rate. Abdomen:  Soft non-tender, Ascites appears to be present. Extremities:  Right leg edema. Neurologic:  Alert and oriented. Cranial nerves grossly intact. No asterixis. LABORATORY:  Results for Carrie Pappas (MRN [id number]) as of 7/17/2020 06:23   Ref.  Range 7/15/2020 01:37 7/16/2020 03:36 7/17/2020 03:04   WBC Latest Ref Range: 4.1 - 11.1 K/uL 6.9  6.2   HGB Latest Ref Range: 12.1 - 17.0 g/dL 10.9 (L)  9.8 (L)   PLATELET Latest Ref Range: 150 - 400 K/uL 223  215   INR Latest Ref Range: 0.9 - 1.1    1.3 (H) 1.3 (H)   Sodium Latest Ref Range: 136 - 145 mmol/L 140 142 139   Potassium Latest Ref Range: 3.5 - 5.1 mmol/L 2.9 (L) 2.8 (L) 3.0 (L)   Chloride Latest Ref Range: 97 - 108 mmol/L 105 107 104   CO2 Latest Ref Range: 21 - 32 mmol/L 26 25 28   Glucose Latest Ref Range: 65 - 100 mg/dL 112 (H) 109 (H) 126 (H)   BUN Latest Ref Range: 6 - 20 MG/DL 10 8 9   Creatinine Latest Ref Range: 0.70 - 1.30 MG/DL 0.93 0.82 0.90   Bilirubin, total Latest Ref Range: 0.2 - 1.0 MG/DL  0.7    Albumin Latest Ref Range: 3.5 - 5.0 g/dL  1.8 (L)    ALT Latest Ref Range: 12 - 78 U/L  10 (L)    AST Latest Ref Range: 15 - 37 U/L  15    Alk. phosphatase Latest Ref Range: 45 - 117 U/L  92        RADIOLOGY:  CT scan abdomen with IV contrast.  Nodular surface to liver suggesting cirrhosis. No liver mass lesions. Mass in HOP with dilated bile ducts and PD. No thrombosis in PV, HV, IVC. Moderate ascites. Femoral vein thrombosis extended to lower IVC, multiple PE.       Vasyl Burkett MD  [facility] [address], [address] [place] [address] 22.  [phone]  [address] [place] [address]

## 2020-07-17 NOTE — PROGRESS NOTES
0600: Per day shift nurse Manoj Quinonez RN) only administer scheduled IV albumin if patient has had a total of 5,000 ml output from paracentesis drain. Patient has only had a total of 4,375 ml of output since 1615 so IV albumin was not given. Will  inform morning nurse to follow up on this. 0815: Bedside and Verbal shift change report given to Pradeep Hernandez (oncoming nurse) by Jazmin Fairchild RN (offgoing nurse). Report included the following information SBAR, Kardex and MAR.

## 2020-07-17 NOTE — PROGRESS NOTES
Palliative Medicine Consult  Johnny: [phone] ([phone])    Patient Name: Pastora Alfred  YOB: 1934    Date of Initial Consult: July 14, 2020  Reason for Consult: Care Decisions  Requesting Provider: Dr. Carolee Ruiz    Primary Care Physician: Jessica Baig MD     SUMMARY:   Pastora Alfred is a 80 y.o. bed bound male with a past history of BPH, hypercholesterolemia,DJD,  , who was admitted on 7/13/2020 [facility] the pt to be hypotensive. Work up revealed extensive rt lower DVT extending up to the vena cava with bilateral PE's. Admission complicated by finding of pancreatic mass, ascites with possible splenic vein occlusion/ distal esophageal and gastric varices,  Nausea and vomiting, possible pneumonia, hypokalemia, hypotension. Heme onc and GI consults pending. Full Code  No Amd  Current medical issues leading to Palliative Medicine involvement include: support with care decisions given acute illness in a compromised state with high risk of decline. Willie Savant PALLIATIVE DIAGNOSES:   1. Nausea and vomiting  2. hypoalbuminemia  3. bed bound   4. Physical debility      PLAN:     1. Unable to reach wife. Left her a vm  2. Code status discussed with the pt and he agrees with DNR. He asked appropriate questions and has capacity for this decision  3. Nausea/vomiting: pt tolerated some chicken with lunch. He has not had vomiting. Nausea remains problematic. I have added Haldol 4mg tid to his regimen to see if it helps. If s/s are unchanged in 24 hours, would recommend discontinuing it to avoid polypharmacy  4. Will follow up again on Monday. I would like to discuss goals and understanding at the same time with wife. 5. 7/15/20: Discussions held with wife today. She is unable to visit due to COVID  6. Reviewed chart and updated her. Explained that our service is here to help with discussions, provide information, help with decision making when needed  7.  Wife has a clear understanding of the patient's condition  8. Pt has elected aggressive management. Wife very concerned of the patient's nutrition as he has been vomiting for months. She is inquiring about his nutrition plan and if iv nutrition should be initiated  9. Explained that the plan is to try and control the vomiting with medications and he has been limited to a liquid diet right now. Wife remains concerned as this has been happening for a while now  8. Will schedule zofran 4 times daily add scopolamine patch. Compazine prn  11. Pt may need TPN for a brief period if vomiting does not improve but will defer to primary team  12. Goals at this time are to pursue aggressive options. I informed wife that endoscopy on hold due to heparin gtt as the clot is the main issue right now   15. Explained that if this is indeed pancreatic cancer, unsure if aggressive options will be offered given poor functional status and high risk for surgery. 14. I did not discuss this with the pt today but will tomorrow. 15. Pt still with nausea and non conversant today. Will follow up with pt and wife tomorrow    12. Initial consult note routed to primary continuity provider and/or primary health care team members  17.  Communicated plan of care with: Palliative IDTAngelic 192 Team     GOALS OF CARE / TREATMENT PREFERENCES:     GOALS OF CARE:  Patient/Health Care Proxy Stated Goals: (unclear)    TREATMENT PREFERENCES:   Code Status: Full Code    Advance Care Planning:  [x] The [facility] Interdisciplinary Team has updated the ACP Navigator with Health Care Decision Maker and Patient Capacity      Primary Decision Maker: Dontrell Steele - [phone]  Advance Care Planning 7/13/2020   Confirm Advance Directive Yes, on file       Medical Interventions: Limited additional interventions     Other Instructions:   Artificially Administered Nutrition: No feeding tube     Other:    As far as possible, the palliative care team has discussed with patient / health care proxy about goals of care / treatment preferences for patient. HISTORY:     History obtained from: chart    CHIEF COMPLAINT: pt admitted with aforementioned history an issues    HPI/SUBJECTIVE:    The patient is:   [x] Verbal and participatory  [] Non-participatory due to:   Nausea, spitting a lot      Clinical Pain Assessment (nonverbal scale for severity on nonverbal patients):   Clinical Pain Assessment  Severity: 0          Duration: for how long has pt been experiencing pain (e.g., 2 days, 1 month, years)  Frequency: how often pain is an issue (e.g., several times per day, once every few days, constant)     FUNCTIONAL ASSESSMENT:     Palliative Performance Scale (PPS):  PPS: 40       PSYCHOSOCIAL/SPIRITUAL SCREENING:     Palliative IDT has assessed this patient for cultural preferences / practices and a referral made as appropriate to needs (Cultural Services, Patient Advocacy, Ethics, etc.)    Any spiritual / Baptist concerns:  [] Yes /  [x] No    Caregiver Burnout:  [] Yes /  [x] No /  [] No Caregiver Present      Anticipatory grief assessment:   [x] Normal  / [] Maladaptive       ESAS Anxiety: Anxiety: 0    ESAS Depression: Depression: 0        REVIEW OF SYSTEMS:     Positive and pertinent negative findings in ROS are noted above in HPI. The following systems were [x] reviewed / [] unable to be reviewed as noted in HPI  Other findings are noted below. Systems: constitutional, ears/nose/mouth/throat, respiratory, gastrointestinal, genitourinary, musculoskeletal, integumentary, neurologic, psychiatric, endocrine. Positive findings noted below.   Modified ESAS Completed by: provider   Fatigue: 2 Drowsiness: 0   Depression: 0 Pain: 0   Anxiety: 0 Nausea: 3   Anorexia: 0 Dyspnea: 0     Constipation: No     Stool Occurrence(s): 1        PHYSICAL EXAM:     From RN flowsheet:  Wt Readings from Last 3 Encounters:   07/17/20 165 lb 2 oz (74.9 kg)   07/27/18 204 lb (92.5 kg)   03/17/18 200 lb (90.7 kg)     Blood pressure 92/56, pulse (!) 112, temperature 97.9 °F (36.6 °C), resp. rate (!) 32, height 5' 10\" (1.778 m), weight 165 lb 2 oz (74.9 kg), SpO2 97 %. Pain Scale 1: Numeric (0 - 10)  Pain Intensity 1: 0                 Last bowel movement, if known:     Constitutional:  conversant, nad, ill appearing  Eyes: pupils equal, anicteric  ENMT: no nasal discharge, moist mucous membranes, [place]  Cardiovascular: regular rhythm, distal pulses intact  Respiratory: breathing not labored, symmetric  Gastrointestinal: soft non-tender, +bowel sounds  Musculoskeletal: no deformity, no tenderness to palpation  Skin: warm, dry  Neurologic: following commands, moving all extremities  Psychiatric: full affect, no hallucinations  Other:       HISTORY:     Principal Problem:    DVT (deep venous thrombosis) ([facility]) (7/14/2020)      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   Social History     Tobacco Use    Smoking status: Never Smoker    Smokeless tobacco: Never Used   Substance Use Topics    Alcohol use: Yes     Comment: social     Allergies   Allergen Reactions    Aspirin Unknown (comments)      Current Facility-Administered Medications   Medication Dose Route Frequency    potassium chloride 10 mEq in 100 ml IVPB  10 mEq IntraVENous Q6H    haloperidol lactate (HALDOL) injection 4 mg  4 mg IntraMUSCular TIDAC    ondansetron (ZOFRAN) injection 4 mg  4 mg IntraVENous Q6H    scopolamine (TRANSDERM-SCOP) 1 mg over 3 days 1 Patch  1 Patch TransDERmal Q72H    prochlorperazine (COMPAZINE) tablet 5 mg  5 mg Oral Q6H PRN    finasteride (PROSCAR) tablet 5 mg  5 mg Oral DAILY    tamsulosin (FLOMAX) capsule 0.4 mg  0.4 mg Oral DAILY    sodium chloride (NS) flush 5-40 mL  5-40 mL IntraVENous Q8H    sodium chloride (NS) flush 5-40 mL  5-40 mL IntraVENous PRN    acetaminophen (TYLENOL) tablet 650 mg  650 mg Oral Q6H PRN    balsam peru-castor oiL (VENELEX) ointment   Topical BID    heparin 25,000 units in D5W 250 ml infusion  18-36 Units/kg/hr IntraVENous TITRATE    furosemide (LASIX) injection 20 mg  20 mg IntraVENous BID          LAB AND IMAGING FINDINGS:     Lab Results   Component Value Date/Time    WBC 6.2 07/17/2020 03:04 AM    HGB 9.8 (L) 07/17/2020 03:04 AM    PLATELET 148 40/48/8235 03:04 AM     Lab Results   Component Value Date/Time    Sodium 139 07/17/2020 03:04 AM    Potassium 3.0 (L) 07/17/2020 03:04 AM    Chloride 104 07/17/2020 03:04 AM    CO2 28 07/17/2020 03:04 AM    BUN 9 07/17/2020 03:04 AM    Creatinine 0.90 07/17/2020 03:04 AM    Calcium 8.0 (L) 07/17/2020 03:04 AM    Magnesium 1.7 07/17/2020 03:04 AM      Lab Results   Component Value Date/Time    Alk.  phosphatase 92 07/16/2020 03:36 AM    Protein, total 7.2 07/16/2020 03:36 AM    Albumin 1.8 (L) 07/16/2020 03:36 AM    Globulin 5.4 (H) 07/16/2020 03:36 AM     Lab Results   Component Value Date/Time    INR 1.3 (H) 07/17/2020 03:04 AM    Prothrombin time 13.2 (H) 07/17/2020 03:04 AM    aPTT 68.9 (H) 07/17/2020 03:04 AM      Lab Results   Component Value Date/Time    Ferritin 520 (H) 07/13/2020 09:49 PM      No results found for: PH, PCO2, PO2  No components found for: GLPOC   No results found for: CPK, CKMB             Total time: 35 min  Counseling / coordination time, spent as noted above: 30 min  > 50% counseling / coordination?: y    Prolonged service was provided for  []30 min   []75 min in face to face time in the presence of the patient, spent as noted above. Time Start:   Time End:   Note: this can only be billed with 41415 (initial) or 30878 (follow up). If multiple start / stop times, list each separately.

## 2020-07-17 NOTE — PROGRESS NOTES
Problem: Self Care Deficits Care Plan (Adult)  Goal: *Acute Goals and Plan of Care (Insert Text)  Description:   FUNCTIONAL STATUS PRIOR TO ADMISSION: Pt reports living with wife in single story home, reporting [place] at quad cane. Has tub-shower combo with no seated surface. Retired principal.    HOME SUPPORT: The patient lived with wife but did not require assist.    Occupational Therapy Goals  Initiated 7/17/2020  1. Patient will perform grooming with modified independence within 7 day(s). 2.  Patient will perform lower body dressing with supervision/set-up within 7 day(s). 3.  Patient will perform bathing with supervision/set-up within 7 day(s). 4.  Patient will perform toilet transfers with modified independence within 7 day(s). 5.  Patient will perform all aspects of toileting with modified independence within 7 day(s). 6.  Patient will utilize energy conservation techniques during functional activities with Min verbal cues within 7 day(s). Outcome: Progressing Towards Goal    OCCUPATIONAL THERAPY EVALUATION  Patient: Jorje Tolliver (41 y.o. male)  Date: 7/17/2020  Primary Diagnosis: DVT (deep vein thrombosis) in pregnancy [O22.30]  DVT (deep venous thrombosis) ([facility]) [I82.409]        Precautions:  Fall    ASSESSMENT  Based on the objective data described below, the patient presents with decreased strength/endurance, decreased functional mobility/balance, decreased activity tolerance and low BP, all of which limit pt's ability to complete self-care routine at level congruent with PLOF. Pt's occupational output limited during OT evaluation 2/2 EOB BP reading 68/51; however, pt asymptomatic. Currently, pt benefits from independence for bed level self-feeding, S/U for bed level grooming and UB dressing, Min A for bed level bathing and LE dressing and Supervision for toileting at bedpan.   Although pt's output was limited by low BP he did demonstrate good Supervision level bed mobility, with OT believing pt will make good OOB gains once BP controlled. Of note, pt and pt's wife determining if they will continue with biopsy on pancreatic mass. Pt benefits from skilled OT to address functional deficits in an overall effort at maximizing pt's highest level of safe functional independence. Current Level of Function Impacting Discharge (ADLs/self-care): independence for bed level self-feeding, S/U for bed level grooming and UB dressing, Min A for bed level bathing and LE dressing and Supervision for toileting at bedpan    Functional Outcome Measure: The patient scored 55/100 on the Barthel Index outcome measure. Other factors to consider for discharge: pending biopsy on pancreatic mass     Patient will benefit from skilled therapy intervention to address the above noted impairments. PLAN :  Recommendations and Planned Interventions: self care training, functional mobility training, therapeutic exercise, balance training, therapeutic activities, endurance activities, and patient education    Frequency/Duration: Patient will be followed by occupational therapy 5 times a week to address goals. 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    BPH (benign prostatic hyperplasia)     DJD (degenerative joint disease)     Hypercholesterolemia     Other and unspecified hyperlipidemia      Past Surgical History:   Procedure Laterality Date    COLONOSCOPY N/A 8/18/2017    COLONOSCOPY performed by Kacy Schumacher MD at [facility] ENDOSCOPY    HX ORTHOPAEDIC Right 2010    rotator cuff    HX TONSILLECTOMY         Expanded or extensive additional review of patient history:     Home Situation  Home Environment: Patient room  # Steps to Enter: 3  Rails to Enter: Yes  Hand Rails : Bilateral  One/Two Story Residence: One story  Living Alone: Yes  Support Systems: Family member(s)  Patient Expects to be Discharged to[de-identified] Private residence  Current DME Used/Available at Home: Wheelchair, [place], quad, Grab bars  Tub or Shower Type: Tub/Shower combination    Hand dominance: Right    EXAMINATION OF PERFORMANCE DEFICITS:  Cognitive/Behavioral Status:  Neurologic State: Alert  Orientation Level: Oriented X4  Cognition: Appropriate decision making; Appropriate for age attention/concentration; Appropriate safety awareness  Perception: Appears intact  Perseveration: No perseveration noted  Safety/Judgement: Awareness of environment    Hearing: Auditory  Auditory Impairment: Hard of hearing, bilateral  Hearing Aids/Status: At home    Vision/Perceptual:                           Acuity: Within Defined Limits    Corrective Lenses: Glasses    Range of Motion:    AROM: Generally decreased, functional  PROM: Within functional limits                      Strength:    Strength: Generally decreased, functional                Coordination:  Coordination: Generally decreased, functional  Fine Motor Skills-Upper: Left Intact; Right Intact    Gross Motor Skills-Upper: Left Intact; Right Intact    Tone & Sensation:    Tone: Normal  Sensation: Intact                      Balance:  Sitting: Intact; Without support  Standing: (not tested 2/2 low BP seated at EOB)    Functional Mobility and Transfers for ADLs:  Bed Mobility:  Rolling: Supervision  Supine to Sit: Supervision  Sit to Supine: Supervision  Scooting: Supervision    ADL Assessment:  Feeding: Independent    Oral Facial Hygiene/Grooming: Setup    Bathing: Minimum assistance(bed level)    Upper Body Dressing: Setup    Lower Body Dressing: Minimum assistance    Toileting: Supervision(using bedpan)    ADL Intervention and task modifications:  Cognitive Retraining  Safety/Judgement: Awareness of environment     Functional Measure:  Barthel Index:    Bathin  Bladder: 10  Bowels: 10  Groomin  Dressin  Feeding: 10  Mobility: 0  Stairs: 0  Toilet Use: 5  Transfer (Bed to Chair and Back): 10  Total: 55/100        The Barthel ADL Index: Guidelines  1. The index should be used as a record of what a patient does, not as a record of what a patient could do. 2. The main aim is to establish degree of independence from any help, physical or verbal, however minor and for whatever reason. 3. The need for supervision renders the patient not independent. 4. A patient's performance should be established using the best available evidence. Asking the patient, friends/relatives and nurses are the usual sources, but direct observation and common sense are also important. However direct testing is not needed. 5. Usually the patient's performance over the preceding 24-48 hours is important, but occasionally longer periods will be relevant. 6. Middle categories imply that the patient supplies over 50 per cent of the effort. 7. Use of aids to be independent is allowed. Holly Nazario., Barthel, D.W. (0119). Functional evaluation: the Barthel Index. [address] [facility] (14)2. Michelle Li, SANDY, Terese Castleman., [place].[facility], [address] [place] [address] (). Measuring the change indisability after inpatient rehabilitation; comparison of the responsiveness of the Barthel Index and Functional [place] Measure. Journal of Neurology, Neurosurgery, and Psychiatry, 66(4), 276-319. Nelson Burt, N.J.A, SARITHA Narayanan, & Yvrose Javed, MKathyA. (2004.) Assessment of post-stroke quality of life in cost-effectiveness studies: The usefulness of the Barthel Index and the EuroQoL-5D.  Quality of Life Research, 13, 128-75         Occupational Therapy Evaluation Charge Determination   History Examination Decision-Making   LOW Complexity : Brief history review  MEDIUM Complexity : 3-5 performance deficits relating to physical, cognitive , or psychosocial skils that result in activity limitations and / or participation restrictions LOW Complexity : No comorbidities that affect functional and no verbal or physical assistance needed to complete eval tasks       Based on the above components, the patient evaluation is determined to be of the following complexity level: LOW   Pain Rating:  No c/o pain    Activity Tolerance:   Fair and requires rest breaks  Please refer to the flowsheet for vital signs taken during this treatment. After treatment patient left in no apparent distress:    Supine in bed, Call bell within reach, Bed / chair alarm activated, and Side rails x 3    COMMUNICATION/EDUCATION:   The patients plan of care was discussed with: Physical therapist and Registered nurse. Home safety education was provided and the patient/caregiver indicated understanding., Patient/family have participated as able in goal setting and plan of care. , and Patient/family agree to work toward stated goals and plan of care. This patients plan of care is appropriate for delegation to [facility].     Thank you for this referral.  Rylan Mo, CHECO  Time Calculation: 20 mins

## 2020-07-17 NOTE — PROGRESS NOTES
Comprehensive Nutrition Assessment    Type and Reason for Visit:  Reassessment/follow up    Nutrition Recommendations/Plan:     1. Start GI Lite diet today per discussion with Dr. Wolfgang Mark    2. Continue with Ensure Clear supplements    3. In the event enteral nutrition is needed, would place ND tube to minimize nausea and vomiting:   --- Vital 1.5 starting at 30 ml/hr x 6 hours   --- Increase rate by 10 ml/hr every 6 hours until at goal of 50 ml/hr   --- Give 120 ml water flush every 4 hours   --- The above goal will provide: 1800 kcals, 81 gm pro, 1635 ml free fluid    Nutrition Assessment:     Pt admitted for DVT. PMHx: arthritis, hypercholesterolemia. Hypotension on admit with DVT tof femoral vein on admit. Multiple hospitalizations and rehab visit over past few months with septic arthritis (Dec-Jan) followed by pancreatitis. Bedbound for past few months. N/V for 1 week PTA. Pancreatic mass with 2 previous admissions for pancreatitis, GI consult still pending. Ascites also noted with hepatology to see. Pt [place], spoke with daughter via phone. Poor po over past week with intake consisting of just bone broth and Ensure shakes. 6lbs wt loss over that period of time. Ensure High Protein (420kcal, 42g protein), Ensure Clear (720kcal, 24g protein) added for lower fat content until po tolerance can be better evaluated and seen by GI.     7/17:  Met with pt this morning and spoke with Dr. Wolfgang Mark. Pt continues to have nausea, lots of \"spitting,\" but vomiting is somewhat improved. He tolerated taking his medications orally this morning. We hope to avoid nutrition support, but pt has not had significant/adequate nutritional intake for several weeks now, so threshold is low to start something. We definitely want to avoid TPN if at all possible. If support is warranted would first try duodenal feedings in hopes of minimizing his N/V. He is being treated for multiple DVT's, he may undergo pancreas bx next week.   ADDISON will continue to follow. Malnutrition Assessment:  Malnutrition Status:  Severe malnutrition    Context:  Acute illness     Findings of the 6 clinical characteristics of malnutrition:   Energy Intake:  7 - 50% or less of est energy requirements for 5 or more days  Weight Loss:  7 - Greater than 2% over 1 week     Body Fat Loss:  Unable to assess,     Muscle Mass Loss:  Unable to assess,    Fluid Accumulation:  Unable to assess,     Strength:  Not performed     Estimated Daily Nutrient Needs:  Energy (kcal):  1738-1882kcal  Protein (g):  92 - 1.2g/kg       Fluid (ml/day):  1800 - 1ml/kcal    Nutrition Related Findings:  Pt had BM on 7/16, still with nausea  Wounds:  None       Current Nutrition Therapies:   Ensure Clear all meals    Meds: albumin, lasix, heparin    Anthropometric Measures:  · Height:  5' 10\" (177.8 cm)  · Current Body Wt:  76.2 kg (168 lb)   · Admission Body Wt:  168 lb 6.9 oz    · Usual Body Wt:        · Ideal Body Wt:   :  101.2 %   ·   Nutrition Diagnosis:   · Inadequate oral intake, Severe malnutrition, In context of acute illness or injury related to altered GI function as evidenced by vomiting, nausea, weight loss 1-2% in 1 week, poor intake prior to admission    Nutrition Interventions:   Food and/or Nutrient Delivery: GI Lite diet, continue with Ensure Clear TID  Nutrition Education and Counseling: No recommendations at this time  Coordination of Nutrition Care: Continued inpatient monitoring    Goals:  Consumption of at least 50% meals and 2-3 supplements/day in 3-4 days       Nutrition Monitoring and Evaluation:   Behavioral-Environmental Outcomes:    Food/Nutrient Intake Outcomes: Supplement intake, Food and nutrient intake, Vitamin/mineral intake  Physical Signs/Symptoms Outcomes: Nausea/vomiting, GI status, Weight    Discharge Planning:     Too soon to determine       Mirtha Taylor, [address], [address] [facility]

## 2020-07-17 NOTE — PROGRESS NOTES
Hematology-Oncology Progress Note    Hermilo Chavez  1934  [id number]  7/17/2020    Follow-up for: pancreatic mass/DVT/PE     [x]        Chart notes since last visit reviewed   [x]        Medications reviewed for allergies and interactions       Case discussed with the following:         []                            [x]        Nursing Staff                                                                         []        Pathologist                                                                        []        FAMILY      Subjective:     Spoke with patient who complains of: feels better, no abd.  Pain, less leg swelling, no bleeding    Objective:     Patient Vitals for the past 24 hrs:   BP Temp Pulse Resp SpO2 Height Weight   07/17/20 0853 93/62 -- -- -- -- -- --   07/17/20 0847 (!) 68/51 -- -- -- -- -- --   07/17/20 0710 -- -- -- -- -- 5' 10\" (1.778 m) 74.9 kg (165 lb 2 oz)   07/17/20 0412 104/64 98.3 °F (36.8 °C) 100 29 93 % -- --   07/16/20 2359 96/59 98.6 °F (37 °C) 99 20 95 % -- --   07/16/20 2042 109/61 98.6 °F (37 °C) 99 20 98 % -- --   07/16/20 1710 99/62 98.1 °F (36.7 °C) (!) 103 20 96 % -- --   07/16/20 1437 99/62 98.1 °F (36.7 °C) 96 24 94 % -- --   07/16/20 1415 95/56 98 °F (36.7 °C) (!) 104 30 97 % -- --   07/16/20 1345 107/57 -- (!) 103 (!) 33 98 % -- --   07/16/20 1340 109/53 -- (!) 104 (!) 35 99 % -- --   07/16/20 1335 107/59 -- (!) 105 30 99 % -- --   07/16/20 1330 100/65 -- (!) 103 (!) 33 99 % -- --   07/16/20 1325 112/61 -- (!) 106 29 99 % -- --   07/16/20 1303 105/59 98.4 °F (36.9 °C) 100 27 100 % -- --       REVIEW OF SYSTEMS:    Constitutional: negative fever, negative chills, negative weight loss  Eyes:   negative visual changes  ENT:   negative sore throat, tongue or lip swelling  Respiratory:  negative cough, negative dyspnea  Cards:  negative for chest pain, palpitations, lower extremity edema  GI:   negative for nausea, vomiting, diarrhea, and abdominal pain  Neuro:  negative for headaches, dizziness, vertigo  [x]                        Full ROS o/w normal/non contributor    Constitutional:  Patient looks  []        Sick  [x]        Frail  [x]        Better                                                 []        Depressed    HEENT:  [x]   NC                         []   AT               []    ALOPECIA           Eyes: [x]   Normal               []    Icteric  Oropharynx: [x]    Normal                  []  Thrush               []   Dry  Neck:   [x]   Supple                  []  Rigid               JVD:    [x]   ABSENT       []   PRESENT  Aerating well                  Extr:    []  Lymphedema             []   Cyanosis      []  Clubbing  Edema:     []   NONE       [x]   PRESENT Rle    Skin:  Intact []           Purpura []        Rash: [x]   ABSENT       []  PRESENT  Neuro:  []        Normal  [x]        Confused      Available labs reviewed:  Labs:    Recent Results (from the past 24 hour(s))   PTT    Collection Time: 07/16/20 10:05 AM   Result Value Ref Range    aPTT 82.7 (H) 22.1 - 32.0 sec    aPTT, therapeutic range     58.0 - 77.0 SECS   CEA    Collection Time: 07/16/20  7:45 PM   Result Value Ref Range    CEA 2.0 ng/mL   PTT    Collection Time: 07/16/20  9:13 PM   Result Value Ref Range    aPTT 61.6 (H) 22.1 - 32.0 sec    aPTT, therapeutic range     58.0 - 91.6 SECS   METABOLIC PANEL, BASIC    Collection Time: 07/16/20  9:15 PM   Result Value Ref Range    Sodium 139 136 - 145 mmol/L    Potassium 3.2 (L) 3.5 - 5.1 mmol/L    Chloride 105 97 - 108 mmol/L    CO2 29 21 - 32 mmol/L    Anion gap 5 5 - 15 mmol/L    Glucose 142 (H) 65 - 100 mg/dL    BUN 9 6 - 20 MG/DL    Creatinine 0.90 0.70 - 1.30 MG/DL    BUN/Creatinine ratio 10 (L) 12 - 20      GFR est AA >60 >60 ml/min/1.73m2    GFR est non-AA >60 >60 ml/min/1.73m2    Calcium 7.7 (L) 8.5 - 10.1 MG/DL   PROTHROMBIN TIME + INR    Collection Time: 07/17/20  3:04 AM   Result Value Ref Range    INR 1.3 (H) 0.9 - 1.1 Prothrombin time 13.2 (H) 9.0 - 11.1 sec   PTT    Collection Time: 07/17/20  3:04 AM   Result Value Ref Range    aPTT 68.9 (H) 22.1 - 32.0 sec    aPTT, therapeutic range     58.0 - 77.0 SECS   FIBRINOGEN    Collection Time: 07/17/20  3:04 AM   Result Value Ref Range    Fibrinogen 208 200 - 475 mg/dL   CBC W/O DIFF    Collection Time: 07/17/20  3:04 AM   Result Value Ref Range    WBC 6.2 4.1 - 11.1 K/uL    RBC 3.17 (L) 4.10 - 5.70 M/uL    HGB 9.8 (L) 12.1 - 17.0 g/dL    HCT 29.9 (L) 36.6 - 50.3 %    MCV 94.3 80.0 - 99.0 FL    MCH 30.9 26.0 - 34.0 PG    MCHC 32.8 30.0 - 36.5 g/dL    RDW 17.9 (H) 11.5 - 14.5 %    PLATELET 826 055 - 159 K/uL    MPV 9.8 8.9 - 12.9 FL    NRBC 0.0 0  WBC    ABSOLUTE NRBC 0.00 0.00 - 0.66 K/uL   METABOLIC PANEL, BASIC    Collection Time: 07/17/20  3:04 AM   Result Value Ref Range    Sodium 139 136 - 145 mmol/L    Potassium 3.0 (L) 3.5 - 5.1 mmol/L    Chloride 104 97 - 108 mmol/L    CO2 28 21 - 32 mmol/L    Anion gap 7 5 - 15 mmol/L    Glucose 126 (H) 65 - 100 mg/dL    BUN 9 6 - 20 MG/DL    Creatinine 0.90 0.70 - 1.30 MG/DL    BUN/Creatinine ratio 10 (L) 12 - 20      GFR est AA >60 >60 ml/min/1.73m2    GFR est non-AA >60 >60 ml/min/1.73m2    Calcium 8.0 (L) 8.5 - 10.1 MG/DL   MAGNESIUM    Collection Time: 07/17/20  3:04 AM   Result Value Ref Range    Magnesium 1.7 1.6 - 2.4 mg/dL       Available Xrays reviewed:    Chemotherapy monitored and toxicities assessed:    Assessment and Plan   1. Rle DVT/PE. .. 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 Pancreatic mass,,, pt said he would consider Rx if found to be malignant. .. will try to set up bx for next week if he and family agree    Meri Camilo MD

## 2020-07-18 LAB
APTT PPP: 37.1 SEC (ref 22.1–32)
APTT PPP: 65.2 SEC (ref 22.1–32)
ERYTHROCYTE [DISTWIDTH] IN BLOOD BY AUTOMATED COUNT: 17.8 % (ref 11.5–14.5)
FIBRINOGEN PPP-MCNC: 229 MG/DL (ref 200–475)
HCT VFR BLD AUTO: 33.2 % (ref 36.6–50.3)
HGB BLD-MCNC: 10.6 G/DL (ref 12.1–17)
INR PPP: 1.3 (ref 0.9–1.1)
MCH RBC QN AUTO: 30.5 PG (ref 26–34)
MCHC RBC AUTO-ENTMCNC: 31.9 G/DL (ref 30–36.5)
MCV RBC AUTO: 95.7 FL (ref 80–99)
NRBC # BLD: 0 K/UL (ref 0–0.01)
NRBC BLD-RTO: 0 PER 100 WBC
PLATELET # BLD AUTO: 248 K/UL (ref 150–400)
PMV BLD AUTO: 9.8 FL (ref 8.9–12.9)
PROTHROMBIN TIME: 13 SEC (ref 9–11.1)
RBC # BLD AUTO: 3.47 M/UL (ref 4.1–5.7)
THERAPEUTIC RANGE,PTTT: ABNORMAL SECS (ref 58–77)
THERAPEUTIC RANGE,PTTT: ABNORMAL SECS (ref 58–77)
WBC # BLD AUTO: 6.9 K/UL (ref 4.1–11.1)

## 2020-07-18 PROCEDURE — 65660000000 HC RM CCU STEPDOWN

## 2020-07-18 PROCEDURE — 85384 FIBRINOGEN ACTIVITY: CPT

## 2020-07-18 PROCEDURE — 85027 COMPLETE CBC AUTOMATED: CPT

## 2020-07-18 PROCEDURE — 74011250636 HC RX REV CODE- 250/636: Performed by: NURSE PRACTITIONER

## 2020-07-18 PROCEDURE — 74011250637 HC RX REV CODE- 250/637: Performed by: NURSE PRACTITIONER

## 2020-07-18 PROCEDURE — 85730 THROMBOPLASTIN TIME PARTIAL: CPT

## 2020-07-18 PROCEDURE — 74011250636 HC RX REV CODE- 250/636: Performed by: FAMILY MEDICINE

## 2020-07-18 PROCEDURE — 74011250636 HC RX REV CODE- 250/636: Performed by: INTERNAL MEDICINE

## 2020-07-18 PROCEDURE — 74011250637 HC RX REV CODE- 250/637: Performed by: FAMILY MEDICINE

## 2020-07-18 PROCEDURE — 74011250637 HC RX REV CODE- 250/637: Performed by: INTERNAL MEDICINE

## 2020-07-18 PROCEDURE — 85610 PROTHROMBIN TIME: CPT

## 2020-07-18 PROCEDURE — 36415 COLL VENOUS BLD VENIPUNCTURE: CPT

## 2020-07-18 RX ORDER — BENZONATATE 100 MG/1
100 CAPSULE ORAL
Status: DISCONTINUED | OUTPATIENT
Start: 2020-07-18 | End: 2020-07-30 | Stop reason: HOSPADM

## 2020-07-18 RX ORDER — HEPARIN SODIUM 10000 [USP'U]/100ML
18-36 INJECTION, SOLUTION INTRAVENOUS
Status: DISCONTINUED | OUTPATIENT
Start: 2020-07-18 | End: 2020-07-29

## 2020-07-18 RX ORDER — GUAIFENESIN 100 MG/5ML
100 SOLUTION ORAL
Status: DISCONTINUED | OUTPATIENT
Start: 2020-07-18 | End: 2020-07-30 | Stop reason: HOSPADM

## 2020-07-18 RX ADMIN — TAMSULOSIN HYDROCHLORIDE 0.4 MG: 0.4 CAPSULE ORAL at 09:53

## 2020-07-18 RX ADMIN — ONDANSETRON 4 MG: 2 INJECTION INTRAMUSCULAR; INTRAVENOUS at 21:37

## 2020-07-18 RX ADMIN — SODIUM CHLORIDE 500 ML: 900 INJECTION, SOLUTION INTRAVENOUS at 21:18

## 2020-07-18 RX ADMIN — Medication 10 ML: at 14:40

## 2020-07-18 RX ADMIN — SODIUM CHLORIDE 500 ML: 900 INJECTION, SOLUTION INTRAVENOUS at 14:38

## 2020-07-18 RX ADMIN — BENZONATATE 100 MG: 100 CAPSULE ORAL at 19:34

## 2020-07-18 RX ADMIN — HALOPERIDOL LACTATE 4 MG: 5 INJECTION, SOLUTION INTRAMUSCULAR at 11:00

## 2020-07-18 RX ADMIN — Medication 10 ML: at 14:41

## 2020-07-18 RX ADMIN — ONDANSETRON 4 MG: 2 INJECTION INTRAMUSCULAR; INTRAVENOUS at 03:49

## 2020-07-18 RX ADMIN — POTASSIUM CHLORIDE 10 MEQ: 10 INJECTION, SOLUTION INTRAVENOUS at 19:34

## 2020-07-18 RX ADMIN — ONDANSETRON 4 MG: 2 INJECTION INTRAMUSCULAR; INTRAVENOUS at 09:53

## 2020-07-18 RX ADMIN — HALOPERIDOL LACTATE 4 MG: 5 INJECTION, SOLUTION INTRAMUSCULAR at 15:45

## 2020-07-18 RX ADMIN — FUROSEMIDE 20 MG: 10 INJECTION, SOLUTION INTRAMUSCULAR; INTRAVENOUS at 18:20

## 2020-07-18 RX ADMIN — FUROSEMIDE 20 MG: 10 INJECTION, SOLUTION INTRAMUSCULAR; INTRAVENOUS at 09:53

## 2020-07-18 RX ADMIN — CASTOR OIL AND BALSAM, PERU: 788; 87 OINTMENT TOPICAL at 18:31

## 2020-07-18 RX ADMIN — GUAIFENESIN 100 MG: 200 SOLUTION ORAL at 19:34

## 2020-07-18 RX ADMIN — CASTOR OIL AND BALSAM, PERU: 788; 87 OINTMENT TOPICAL at 09:53

## 2020-07-18 RX ADMIN — POTASSIUM CHLORIDE 10 MEQ: 10 INJECTION, SOLUTION INTRAVENOUS at 00:12

## 2020-07-18 RX ADMIN — ONDANSETRON 4 MG: 2 INJECTION INTRAMUSCULAR; INTRAVENOUS at 14:39

## 2020-07-18 RX ADMIN — ACETAMINOPHEN 650 MG: 325 TABLET ORAL at 14:48

## 2020-07-18 RX ADMIN — Medication 10 ML: at 21:37

## 2020-07-18 RX ADMIN — Medication 10 ML: at 05:27

## 2020-07-18 RX ADMIN — HALOPERIDOL LACTATE 4 MG: 5 INJECTION, SOLUTION INTRAMUSCULAR at 07:40

## 2020-07-18 RX ADMIN — HEPARIN SODIUM 18 UNITS/KG/HR: 10000 INJECTION, SOLUTION INTRAVENOUS at 22:00

## 2020-07-18 RX ADMIN — POTASSIUM CHLORIDE 10 MEQ: 10 INJECTION, SOLUTION INTRAVENOUS at 05:26

## 2020-07-18 RX ADMIN — FINASTERIDE 5 MG: 5 TABLET, FILM COATED ORAL at 09:53

## 2020-07-18 RX ADMIN — POTASSIUM CHLORIDE 10 MEQ: 10 INJECTION, SOLUTION INTRAVENOUS at 11:04

## 2020-07-18 RX ADMIN — APIXABAN 10 MG: 5 TABLET, FILM COATED ORAL at 10:06

## 2020-07-18 NOTE — PROGRESS NOTES
Hematology-Oncology Progress Note    Ariane Camargo  1934  [id number]  7/18/2020    Follow-up for: pancreatic mass/DVT/PE     [x]        Chart notes since last visit reviewed   [x]        Medications reviewed for allergies and interactions       Case discussed with the following:         []                            [x]        Nursing Staff                                                                         []        Pathologist                                                                        []        FAMILY      Subjective:     Spoke with patient who complains of: some nausea and emesis , no bleeding no new compl    Objective:     Patient Vitals for the past 24 hrs:   BP Temp Pulse Resp SpO2   07/18/20 1334 96/61 98.9 °F (37.2 °C) (!) 107 20 95 %   07/18/20 1217 (!) 83/42 97.7 °F (36.5 °C) (!) 107 28 99 %   07/18/20 0954 101/67 98.2 °F (36.8 °C) (!) 102 18 95 %   07/18/20 0256 94/59 97.6 °F (36.4 °C) 97 18 97 %   07/18/20 0012 -- -- 98 20 95 %   07/17/20 2325 101/63 98.2 °F (36.8 °C) (!) 106 30 94 %   07/17/20 2139 95/52 98.3 °F (36.8 °C) 98 28 98 %   07/17/20 1632 94/60 97.6 °F (36.4 °C) (!) 105 (!) 32 95 %       REVIEW OF SYSTEMS:    Constitutional: negative fever, negative chills, negative weight loss  Eyes:   negative visual changes  ENT:   negative sore throat, tongue or lip swelling  Respiratory:  negative cough, negative dyspnea  Cards:  negative for chest pain, palpitations, lower extremity edema  GI:   negative for nausea, vomiting, diarrhea, and abdominal pain  Neuro:  negative for headaches, dizziness, vertigo  [x]                        Full ROS o/w normal/non contributor    Constitutional:  Patient looks  []        Sick  [x]        Frail  [x]        Better                                                 []        Depressed    HEENT:  [x]   NC                         []   AT               []    ALOPECIA           Eyes: [x]   Normal               []    Icteric  Oropharynx: [x]    Normal                  []  Thrush               []   Dry  Neck:   [x]   Supple                  []  Rigid               JVD:    [x]   ABSENT       []   PRESENT  Aerating well                  Extr:    []  Lymphedema             []   Cyanosis      []  Clubbing  Edema:     []   NONE       [x]   PRESENT Rle    Skin:  Intact []           Purpura []        Rash: [x]   ABSENT       []  PRESENT  Neuro:  []        Normal  [x]        Confused      Available labs reviewed:  Labs:    Recent Results (from the past 24 hour(s))   PROTHROMBIN TIME + INR    Collection Time: 07/18/20  3:30 AM   Result Value Ref Range    INR 1.3 (H) 0.9 - 1.1      Prothrombin time 13.0 (H) 9.0 - 11.1 sec   FIBRINOGEN    Collection Time: 07/18/20  3:30 AM   Result Value Ref Range    Fibrinogen 229 200 - 475 mg/dL   PTT    Collection Time: 07/18/20  3:30 AM   Result Value Ref Range    aPTT 65.2 (H) 22.1 - 32.0 sec    aPTT, therapeutic range     58.0 - 77.0 SECS       Available Xrays reviewed:    Chemotherapy monitored and toxicities assessed:    Assessment and Plan   1. Rle DVT/PE. Aileen Eduardo Noted that he has been started on Eliquis 10 mg  With dc of the heparin gtt.   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 2.   Pancreatic mass,,, pt said he would consider Rx if found to be malignant. .. will try to set up bx for next week if he and family agree    Kori Zimmerman MD

## 2020-07-18 NOTE — PROGRESS NOTES
Bedside and Verbal shift change report given to Yvonne Dugan (oncoming nurse) by Sarah Houston RN (offgoing nurse). Report included the following information SBAR, Kardex, Intake/Output, MAR and Recent Results.

## 2020-07-18 NOTE — PROGRESS NOTES
[address] [facility]  Hospitalist Group                                                                                          Hospitalist Progress Note  [address], DO  Answering service: [phone] OR [phone] from in house phone        Date of Service:  2020  NAME:  Odalys Grullon  :  1934  MRN:  [id number]      Admission Summary:   Odalys Grullon is a 80 y.o. male who presents with right leg swelling. Patient does not have his hearing aids, is not able to hear very well, and history was primarily obtained from his wife who is at the bedside. His wife reports that patient has been in and out of the hospital and rehab for the last few months. He initially got diagnosed with what sounds like septic arthritis. Patient underwent knee aspiration, was put on IV antibiotics, was discharged home on IV antibiotics, developed pneumonia, pancreatitis and continued knee pain. Went back to [facility], was admitted there, was treated with antibiotics, and eventually was discharged to a rehab facility. Patient came back home from rehab, recently started having some nausea/vomiting, new swelling in his right leg, patient had significant vomiting 3 to 4 days back, it seems to have resolved now, and he was able to tolerate p.o. today. Home health nurse came today and found that the patient was hypotensive, and patient was brought to [facility] ED. Patient was found to have extensive right lower extremity DVT extending up into the vena cava and bilateral PEs. Patient was requested to be admitted under the hospitalist service. The wife reports that patient has no history of blood clots that she is aware of. Does report that patient has been more or less bedbound in the last few months. She denies patient having any other complaints or problems.   The patient and his wife deny patient c/o  any Headache, blurry vision, sore throat, trouble swallowing, trouble with speech, chest pain, SOB, cough, fever, chills, N/V/D, abd pain, urinary symptoms, constipation, recent travels, sick contacts, focal or generalized oneirological symptoms,  falls, injuries, rashes, contact with COVID-19 diagnosed patients, hematemesis, melena, hemoptysis, hematuria, rashes, denies starting any new medications and denies any other concerns or problems besides as mentioned above.        Interval history / Subjective: Follow up DVT/PE. Patient seen and examined. Tolerating oral diet more. Continues on scheduled antiemetics. Discussed with hematology and GI. Hematology suggests definitive pathology while hospitalized. Discussed case with GI. Will await cytology from paracentesis fluid. If negative, possible EUS next Tuesday pending pulmonology clearance in setting of bilateral pulmonary embolism. Transition back to heparin drip. Assessment & Plan:     Acute DVT/PE  -suspected secondary to immobility/recent hospitalization and possible underlying malignancy  -Lower extremity Doppler 7/13: Acute partially occlusive DVT right common femoral and proximal femoral vein   -CTA 7/13: Bilateral pulmonary embolism, occlusion suprahepatic IVC and hepatic vein  -CT abdomen 7/13: No evidence of hepatic vein, portal vein, or inferior vena cava thrombosis as questioned on PE CT earlier today. There is however right lower lobe pulmonary emboli in right common femoral deep venous thrombosis reported elsewhere earlier today.  -Continue on heparin drip. Plan to transition to Eliquis pending clinical course    Pancreatic mass:  Ascites/with possible splenic vein occlusion/distal esophageal and gastric varicies  -Reports recent admission at outside hospital for acute pancreatitis x2  -CT abdomen/pelvis 7/13 with pancreatic head masslike fullness, intra-hepatic and pancreatic duct dilatation.   Peripancreatic inflammatory changes, 3.7 x 2.3 cm collection near pancreatic tail, possibly pseudocyst  -Appreciate GI input, high suspicion for pancreatic neoplasm--not a candidate at this time for EUS/biopsy  -Appreciate hepatology, s/p paracentesis. Awaiting cytology    Nausea/vomiting: improving  -symptomatic treatment, monitor nutrition closely. Appreciate palliative care      Ruled out pneumonia   -no evidence of pneumonia  -Monitor off antibiotics  -Outpatient repeat chest imaging    Hypokalemia: replete as needed    Hypotension: Likely hypovolemia, albumin infusion, hold home antihypertensives, close monitoring and further intervention per hospital course    Hypoalbuminemia: nutrition input    BPH: Continue home medications    Hypercholesterolemia: Hold statin for now     Cardiac diet    Code status: FULL CODE, appreciate palliative care  DVT prophylaxis: heparin drip  PTA: home  Baseline: independent    Plan: Follow Hematology/GI/Hepatology/Palliative care    Care Plan discussed with: Patient/Family  Anticipated Disposition: TBD  Anticipated Discharge: 24 hours to 48 hours     Hospital Problems  Date Reviewed: 7/14/2020          Codes Class Noted POA    * (Principal) [facility] ICD-10-CM: I82.409  ICD-9-CM: 453.40  7/14/2020 Yes                Review of Systems:   Negative unless stated above    Vital Signs:    Last 24hrs VS reviewed since prior progress note. Most recent are:  Visit Vitals  BP 96/61   Pulse (!) 107   Temp 98.9 °F (37.2 °C)   Resp 20   Ht 5' 10\" (1.778 m)   Wt 74.9 kg (165 lb 2 oz)   SpO2 95%   BMI 23.69 kg/m²         Intake/Output Summary (Last 24 hours) at 7/18/2020 1601  Last data filed at 7/18/2020 1101  Gross per 24 hour   Intake --   Output 1225 ml   Net -1225 ml        Physical Examination:             Constitutional:  No acute distress, cooperative, pleasant    ENT:  Oral mucosa moist, oropharynx benign. Resp:  CTA bilaterally. No wheezing/rhonchi/rales. No accessory muscle use   CV:  Regular rhythm, normal rate, no murmurs, gallops, rubs    GI:  Soft, non tender, distended. normoactive bowel sounds, no hepatosplenomegaly     Musculoskeletal:  No edema, warm, 2+ pulses throughout    Neurologic:  Moves all extremities. Skin:  Good turgor, no rashes or ulcers       Data Review:    Review and/or order of clinical lab test      Labs:     Recent Labs     07/17/20  0304   WBC 6.2   HGB 9.8*   HCT 29.9*        Recent Labs     07/17/20  0304 07/16/20 2115 07/16/20  0336    139 142   K 3.0* 3.2* 2.8*    105 107   CO2 28 29 25   BUN 9 9 8   CREA 0.90 0.90 0.82   * 142* 109*   CA 8.0* 7.7* 8.2*   MG 1.7  --   --      Recent Labs     07/16/20  0336   ALT 10*   AP 92   TBILI 0.7   TP 7.2   ALB 1.8*   GLOB 5.4*     Recent Labs     07/18/20  0330 07/17/20  0304 07/16/20 2113 07/16/20  0336   INR 1.3* 1.3*  --   --  1.3*   PTP 13.0* 13.2*  --   --  13.5*   APTT 65.2* 68.9* 61.6*   < > 85.6*    < > = values in this interval not displayed. No results for input(s): FE, TIBC, PSAT, FERR in the last 72 hours. No results found for: FOL, RBCF   No results for input(s): PH, PCO2, PO2 in the last 72 hours. No results for input(s): CPK, CKNDX, TROIQ in the last 72 hours.     No lab exists for component: CPKMB  Lab Results   Component Value Date/Time    Cholesterol, total 250 (H) 10/02/2013 01:55 PM    HDL Cholesterol 53 10/02/2013 01:55 PM    LDL, calculated 176 (H) 10/02/2013 01:55 PM    Triglyceride 106 10/02/2013 01:55 PM    CHOL/HDL Ratio 3.4 10/06/2010 10:25 AM     No results found for: [facility]  Lab Results   Component Value Date/Time    Color YELLOW/STRAW 07/13/2020 04:27 PM    Appearance CLEAR 07/13/2020 04:27 PM    Specific gravity 1.012 07/13/2020 04:27 PM    pH (UA) 6.0 07/13/2020 04:27 PM    Protein Negative 07/13/2020 04:27 PM    Glucose Negative 07/13/2020 04:27 PM    Ketone TRACE (A) 07/13/2020 04:27 PM    Bilirubin Negative 07/13/2020 04:27 PM    Urobilinogen 1.0 07/13/2020 04:27 PM    Nitrites Negative 07/13/2020 04:27 PM    Leukocyte Esterase Negative 07/13/2020 04:27 PM    Epithelial cells FEW 07/13/2020 04:27 PM    Bacteria Negative 07/13/2020 04:27 PM    WBC 0-4 07/13/2020 04:27 PM    RBC 0-5 07/13/2020 04:27 PM         Medications Reviewed:     Current Facility-Administered Medications   Medication Dose Route Frequency    apixaban (ELIQUIS) tablet 10 mg  10 mg Oral BID    potassium chloride 10 mEq in 100 ml IVPB  10 mEq IntraVENous Q6H    haloperidol lactate (HALDOL) injection 4 mg  4 mg IntraMUSCular TIDAC    ondansetron (ZOFRAN) injection 4 mg  4 mg IntraVENous Q6H    scopolamine (TRANSDERM-SCOP) 1 mg over 3 days 1 Patch  1 Patch TransDERmal Q72H    prochlorperazine (COMPAZINE) tablet 5 mg  5 mg Oral Q6H PRN    finasteride (PROSCAR) tablet 5 mg  5 mg Oral DAILY    tamsulosin (FLOMAX) capsule 0.4 mg  0.4 mg Oral DAILY    sodium chloride (NS) flush 5-40 mL  5-40 mL IntraVENous Q8H    sodium chloride (NS) flush 5-40 mL  5-40 mL IntraVENous PRN    acetaminophen (TYLENOL) tablet 650 mg  650 mg Oral Q6H PRN    balsam peru-castor oiL (VENELEX) ointment   Topical BID    furosemide (LASIX) injection 20 mg  20 mg IntraVENous BID     ______________________________________________________________________  EXPECTED LENGTH OF STAY: 4d 4h  ACTUAL LENGTH OF STAY:          [facility]

## 2020-07-18 NOTE — PROGRESS NOTES
07/18/20 1217   Vital Signs   Temp 97.7 °F (36.5 °C)   Temp Source Oral   Pulse (Heart Rate) (!) 107   Resp Rate 28   O2 Sat (%) 99 %   Level of Consciousness Alert   BP (!) 83/42   MAP (Calculated) (!) 56   MEWS Score 4     Dr. Devin Moura notified of MEWS score. Vitals retaken. Orders to be placed. Patient asymptomatic and remains in chair. Will continue to monitor. Call burgos within reach. Sonia KIRKPATRICK noted.

## 2020-07-18 NOTE — PROGRESS NOTES
Patient drained found dislodged on floor, Dr. Kenzie Ro notified. Okay for drain to be out, just leave dressing in place. Also notified of brown, coffee grain emesis and patient requesting medication for chest pain while coughing. Patient assessed and vitals remain stable. MD to place orders. Will continue to monitor. Call bell within reach.

## 2020-07-19 ENCOUNTER — APPOINTMENT (OUTPATIENT)
Dept: CT IMAGING | Age: 85
DRG: 166 | End: 2020-07-19
Attending: INTERNAL MEDICINE
Payer: MEDICARE

## 2020-07-19 LAB
ANION GAP SERPL CALC-SCNC: 4 MMOL/L (ref 5–15)
ANION GAP SERPL CALC-SCNC: 8 MMOL/L (ref 5–15)
APTT PPP: 118.4 SEC (ref 22.1–32)
APTT PPP: 38.5 SEC (ref 22.1–32)
APTT PPP: 75.8 SEC (ref 22.1–32)
APTT PPP: 85.1 SEC (ref 22.1–32)
BUN SERPL-MCNC: 17 MG/DL (ref 6–20)
BUN SERPL-MCNC: 21 MG/DL (ref 6–20)
BUN/CREAT SERPL: 17 (ref 12–20)
BUN/CREAT SERPL: 21 (ref 12–20)
CALCIUM SERPL-MCNC: 8.1 MG/DL (ref 8.5–10.1)
CALCIUM SERPL-MCNC: 8.1 MG/DL (ref 8.5–10.1)
CHLORIDE SERPL-SCNC: 103 MMOL/L (ref 97–108)
CHLORIDE SERPL-SCNC: 99 MMOL/L (ref 97–108)
CO2 SERPL-SCNC: 26 MMOL/L (ref 21–32)
CO2 SERPL-SCNC: 33 MMOL/L (ref 21–32)
CREAT SERPL-MCNC: 1 MG/DL (ref 0.7–1.3)
CREAT SERPL-MCNC: 1.01 MG/DL (ref 0.7–1.3)
ERYTHROCYTE [DISTWIDTH] IN BLOOD BY AUTOMATED COUNT: 17.7 % (ref 11.5–14.5)
FIBRINOGEN PPP-MCNC: 240 MG/DL (ref 200–475)
GLUCOSE SERPL-MCNC: 139 MG/DL (ref 65–100)
GLUCOSE SERPL-MCNC: 152 MG/DL (ref 65–100)
HCT VFR BLD AUTO: 27.1 % (ref 36.6–50.3)
HCT VFR BLD AUTO: 31.7 % (ref 36.6–50.3)
HCT VFR BLD AUTO: 34.8 % (ref 36.6–50.3)
HGB BLD-MCNC: 10.2 G/DL (ref 12.1–17)
HGB BLD-MCNC: 10.4 G/DL (ref 12.1–17)
HGB BLD-MCNC: 8.7 G/DL (ref 12.1–17)
INR PPP: 1.3 (ref 0.9–1.1)
LIPASE SERPL-CCNC: 121 U/L (ref 73–393)
MAGNESIUM SERPL-MCNC: 1.6 MG/DL (ref 1.6–2.4)
MCH RBC QN AUTO: 30.8 PG (ref 26–34)
MCHC RBC AUTO-ENTMCNC: 32.2 G/DL (ref 30–36.5)
MCV RBC AUTO: 95.8 FL (ref 80–99)
NRBC # BLD: 0 K/UL (ref 0–0.01)
NRBC BLD-RTO: 0 PER 100 WBC
PHOSPHATE SERPL-MCNC: 3.2 MG/DL (ref 2.6–4.7)
PLATELET # BLD AUTO: 256 K/UL (ref 150–400)
PMV BLD AUTO: 10.2 FL (ref 8.9–12.9)
POTASSIUM SERPL-SCNC: 3.6 MMOL/L (ref 3.5–5.1)
POTASSIUM SERPL-SCNC: 3.9 MMOL/L (ref 3.5–5.1)
PROTHROMBIN TIME: 13.4 SEC (ref 9–11.1)
RBC # BLD AUTO: 3.31 M/UL (ref 4.1–5.7)
SODIUM SERPL-SCNC: 136 MMOL/L (ref 136–145)
SODIUM SERPL-SCNC: 137 MMOL/L (ref 136–145)
THERAPEUTIC RANGE,PTTT: ABNORMAL SECS (ref 58–77)
WBC # BLD AUTO: 8.3 K/UL (ref 4.1–11.1)

## 2020-07-19 PROCEDURE — 85610 PROTHROMBIN TIME: CPT

## 2020-07-19 PROCEDURE — 83735 ASSAY OF MAGNESIUM: CPT

## 2020-07-19 PROCEDURE — 83690 ASSAY OF LIPASE: CPT

## 2020-07-19 PROCEDURE — C9113 INJ PANTOPRAZOLE SODIUM, VIA: HCPCS | Performed by: INTERNAL MEDICINE

## 2020-07-19 PROCEDURE — 85384 FIBRINOGEN ACTIVITY: CPT

## 2020-07-19 PROCEDURE — 36415 COLL VENOUS BLD VENIPUNCTURE: CPT

## 2020-07-19 PROCEDURE — P9045 ALBUMIN (HUMAN), 5%, 250 ML: HCPCS | Performed by: NURSE PRACTITIONER

## 2020-07-19 PROCEDURE — 84100 ASSAY OF PHOSPHORUS: CPT

## 2020-07-19 PROCEDURE — 85014 HEMATOCRIT: CPT

## 2020-07-19 PROCEDURE — P9047 ALBUMIN (HUMAN), 25%, 50ML: HCPCS | Performed by: INTERNAL MEDICINE

## 2020-07-19 PROCEDURE — 82533 TOTAL CORTISOL: CPT

## 2020-07-19 PROCEDURE — 85730 THROMBOPLASTIN TIME PARTIAL: CPT

## 2020-07-19 PROCEDURE — 80048 BASIC METABOLIC PNL TOTAL CA: CPT

## 2020-07-19 PROCEDURE — 70450 CT HEAD/BRAIN W/O DYE: CPT

## 2020-07-19 PROCEDURE — 74011250637 HC RX REV CODE- 250/637: Performed by: NURSE PRACTITIONER

## 2020-07-19 PROCEDURE — 74011250636 HC RX REV CODE- 250/636: Performed by: INTERNAL MEDICINE

## 2020-07-19 PROCEDURE — 74011000250 HC RX REV CODE- 250: Performed by: INTERNAL MEDICINE

## 2020-07-19 PROCEDURE — 85027 COMPLETE CBC AUTOMATED: CPT

## 2020-07-19 PROCEDURE — 74011000258 HC RX REV CODE- 258: Performed by: INTERNAL MEDICINE

## 2020-07-19 PROCEDURE — 74011250637 HC RX REV CODE- 250/637: Performed by: FAMILY MEDICINE

## 2020-07-19 PROCEDURE — 65660000000 HC RM CCU STEPDOWN

## 2020-07-19 PROCEDURE — 74011250636 HC RX REV CODE- 250/636: Performed by: NURSE PRACTITIONER

## 2020-07-19 PROCEDURE — 85018 HEMOGLOBIN: CPT

## 2020-07-19 PROCEDURE — P9047 ALBUMIN (HUMAN), 25%, 50ML: HCPCS | Performed by: NURSE PRACTITIONER

## 2020-07-19 RX ORDER — ALBUMIN HUMAN 250 G/1000ML
12.5 SOLUTION INTRAVENOUS ONCE
Status: COMPLETED | OUTPATIENT
Start: 2020-07-19 | End: 2020-07-19

## 2020-07-19 RX ORDER — HYDROCORTISONE SODIUM SUCCINATE 100 MG/2ML
50 INJECTION, POWDER, FOR SOLUTION INTRAMUSCULAR; INTRAVENOUS EVERY 6 HOURS
Status: DISCONTINUED | OUTPATIENT
Start: 2020-07-19 | End: 2020-07-21

## 2020-07-19 RX ORDER — MIDODRINE HYDROCHLORIDE 5 MG/1
5 TABLET ORAL
Status: DISCONTINUED | OUTPATIENT
Start: 2020-07-19 | End: 2020-07-20

## 2020-07-19 RX ORDER — DEXTROSE, SODIUM CHLORIDE, AND POTASSIUM CHLORIDE 5; .45; .075 G/100ML; G/100ML; G/100ML
125 INJECTION INTRAVENOUS CONTINUOUS
Status: DISCONTINUED | OUTPATIENT
Start: 2020-07-19 | End: 2020-07-19

## 2020-07-19 RX ORDER — ALBUMIN HUMAN 50 G/1000ML
25 SOLUTION INTRAVENOUS ONCE
Status: COMPLETED | OUTPATIENT
Start: 2020-07-19 | End: 2020-07-19

## 2020-07-19 RX ADMIN — MIDODRINE HYDROCHLORIDE 5 MG: 5 TABLET ORAL at 22:25

## 2020-07-19 RX ADMIN — HALOPERIDOL LACTATE 4 MG: 5 INJECTION, SOLUTION INTRAMUSCULAR at 07:03

## 2020-07-19 RX ADMIN — TAMSULOSIN HYDROCHLORIDE 0.4 MG: 0.4 CAPSULE ORAL at 09:42

## 2020-07-19 RX ADMIN — ONDANSETRON 4 MG: 2 INJECTION INTRAMUSCULAR; INTRAVENOUS at 09:41

## 2020-07-19 RX ADMIN — CALCIUM GLUCONATE: 94 INJECTION, SOLUTION INTRAVENOUS at 18:31

## 2020-07-19 RX ADMIN — Medication 10 ML: at 22:26

## 2020-07-19 RX ADMIN — ONDANSETRON 4 MG: 2 INJECTION INTRAMUSCULAR; INTRAVENOUS at 15:21

## 2020-07-19 RX ADMIN — HEPARIN SODIUM 15 UNITS/KG/HR: 10000 INJECTION, SOLUTION INTRAVENOUS at 18:37

## 2020-07-19 RX ADMIN — FINASTERIDE 5 MG: 5 TABLET, FILM COATED ORAL at 09:42

## 2020-07-19 RX ADMIN — SODIUM CHLORIDE 80 MG: 9 INJECTION INTRAMUSCULAR; INTRAVENOUS; SUBCUTANEOUS at 09:42

## 2020-07-19 RX ADMIN — ALBUMIN (HUMAN) 12.5 G: 0.25 INJECTION, SOLUTION INTRAVENOUS at 10:25

## 2020-07-19 RX ADMIN — ALBUMIN (HUMAN) 25 G: 12.5 INJECTION, SOLUTION INTRAVENOUS at 22:24

## 2020-07-19 RX ADMIN — SODIUM CHLORIDE 5 MG: 9 INJECTION INTRAMUSCULAR; INTRAVENOUS; SUBCUTANEOUS at 18:25

## 2020-07-19 RX ADMIN — ONDANSETRON 4 MG: 2 INJECTION INTRAMUSCULAR; INTRAVENOUS at 22:26

## 2020-07-19 RX ADMIN — SODIUM CHLORIDE 5 MG: 9 INJECTION INTRAMUSCULAR; INTRAVENOUS; SUBCUTANEOUS at 09:41

## 2020-07-19 RX ADMIN — POTASSIUM CHLORIDE 10 MEQ: 10 INJECTION, SOLUTION INTRAVENOUS at 00:32

## 2020-07-19 RX ADMIN — ONDANSETRON 4 MG: 2 INJECTION INTRAMUSCULAR; INTRAVENOUS at 03:16

## 2020-07-19 RX ADMIN — ALBUMIN (HUMAN) 12.5 G: 0.25 INJECTION, SOLUTION INTRAVENOUS at 21:11

## 2020-07-19 RX ADMIN — Medication 10 ML: at 06:06

## 2020-07-19 RX ADMIN — SODIUM CHLORIDE 500 ML: 900 INJECTION, SOLUTION INTRAVENOUS at 17:00

## 2020-07-19 RX ADMIN — SODIUM CHLORIDE 500 ML: 900 INJECTION, SOLUTION INTRAVENOUS at 13:00

## 2020-07-19 RX ADMIN — SODIUM CHLORIDE 40 MG: 9 INJECTION INTRAMUSCULAR; INTRAVENOUS; SUBCUTANEOUS at 22:26

## 2020-07-19 RX ADMIN — POTASSIUM CHLORIDE, DEXTROSE MONOHYDRATE AND SODIUM CHLORIDE 125 ML/HR: 75; 5; 450 INJECTION, SOLUTION INTRAVENOUS at 04:06

## 2020-07-19 RX ADMIN — SODIUM CHLORIDE 250 ML: 900 INJECTION, SOLUTION INTRAVENOUS at 15:19

## 2020-07-19 RX ADMIN — POTASSIUM CHLORIDE 10 MEQ: 10 INJECTION, SOLUTION INTRAVENOUS at 06:06

## 2020-07-19 NOTE — PROGRESS NOTES
Hospitalist Progress Note:     Notified by nursing staff for hypotension, 76/49 retaken and 83/53. Tachycardic 124. Give IVF bolus. S/p albumin. Hct 34.8, awaiting Hgb. Transfer to [facility]. May require transfer to ICU for pressor support. Otilia Garza DO       Addendum: [id number]    Notified that patient had fall when getting out of bed. Will order CT head. Bed rest.     Remains hypotensive. Additional fluid bolus.      Otilia Garza DO

## 2020-07-19 NOTE — PROGRESS NOTES
[address] [facility]  Hospitalist Group                                                                                          Hospitalist Progress Note  [facility],   Answering service: [phone] OR [phone] from in house phone        Date of Service:  2020  NAME:  Valeria Slaughter  :  1934  MRN:  [id number]      Admission Summary:   Valeria Slaughter is a 80 y.o. male who presents with right leg swelling. Patient does not have his hearing aids, is not able to hear very well, and history was primarily obtained from his wife who is at the bedside. His wife reports that patient has been in and out of the hospital and rehab for the last few months. He initially got diagnosed with what sounds like septic arthritis. Patient underwent knee aspiration, was put on IV antibiotics, was discharged home on IV antibiotics, developed pneumonia, pancreatitis and continued knee pain. Went back to [facility], was admitted there, was treated with antibiotics, and eventually was discharged to a rehab facility. Patient came back home from rehab, recently started having some nausea/vomiting, new swelling in his right leg, patient had significant vomiting 3 to 4 days back, it seems to have resolved now, and he was able to tolerate p.o. today. Home health nurse came today and found that the patient was hypotensive, and patient was brought to [facility] ED. Patient was found to have extensive right lower extremity DVT extending up into the vena cava and bilateral PEs. Patient was requested to be admitted under the hospitalist service. The wife reports that patient has no history of blood clots that she is aware of. Does report that patient has been more or less bedbound in the last few months. She denies patient having any other complaints or problems.   The patient and his wife deny patient c/o  any Headache, blurry vision, sore throat, trouble swallowing, trouble with speech, chest pain, SOB, cough, fever, chills, N/V/D, abd pain, urinary symptoms, constipation, recent travels, sick contacts, focal or generalized oneirological symptoms,  falls, injuries, rashes, contact with COVID-19 diagnosed patients, hematemesis, melena, hemoptysis, hematuria, rashes, denies starting any new medications and denies any other concerns or problems besides as mentioned above.        Interval history / Subjective: Follow up DVT/PE. Patient seen and examined. Developed coffee-ground emesis, small amounts ~50 cc, recurrent. Initiated on PPI. Trend H/H.  GI to evaluate. Attempt to adjust antiemetics for symptomatic control. Hypotensive and given IVFs. Patient's nutrition has been limited since admission. Agreeable to TPN. Assessment & Plan:     Coffee ground emesis:   -PPI BID  -Antiemetics  -Trend H/H, currently stable    Acute DVT/PE  -suspected secondary to immobility/recent hospitalization and possible underlying malignancy  -Lower extremity Doppler 7/13: Acute partially occlusive DVT right common femoral and proximal femoral vein   -CTA 7/13: Bilateral pulmonary embolism  -Continues on heparin drip. Will continue for now pending GI evaluation      Nausea/vomiting: Persistent  -Change antiemetics to Compazine, Zofran  -Patient with minimal oral intake for several days. Initiate TPN. Pancreatic mass:  Ascites/with possible splenic vein occlusion/distal esophageal and gastric varicies  -Reports recent admission at outside hospital for acute pancreatitis x2  -CT abdomen/pelvis 7/13 with pancreatic head masslike fullness, intra-hepatic and pancreatic duct dilatation. Peripancreatic inflammatory changes, 3.7 x 2.3 cm collection near pancreatic tail, possibly pseudocyst  -Appreciate GI input, high suspicion for pancreatic neoplasm--not a candidate at this time for EUS/biopsy. -Appreciate hepatology, s/p paracentesis.  Awaiting cytology    Hypotension: Persistent  -reinitiate albumin, s/p fluid bolus     Hypokalemia: replete as needed    Hypoalbuminemia: nutrition input    BPH: Continue home medications    Hypercholesterolemia: Hold statin for now    Ruled out pneumonia   -no evidence of pneumonia  -Monitor off antibiotics  -Outpatient repeat chest imaging      Code status: FULL CODE, appreciate palliative care  DVT prophylaxis: heparin drip  PTA: home  Baseline: independent    Plan: Follow Hematology/GI/Hepatology/Palliative care    Care Plan discussed with: Patient/Family  Anticipated Disposition: TBD  Anticipated Discharge: 24 hours to 48 hours     Hospital Problems  Date Reviewed: 7/14/2020          Codes Class Noted POA    * (Principal) DVT (deep venous thrombosis) ([facility] [address] ICD-10-CM: I82.409  ICD-9-CM: 453.40  7/14/2020 Yes                Review of Systems:   Negative unless stated above    Vital Signs:    Last 24hrs VS reviewed since prior progress note. Most recent are:  Visit Vitals  BP 91/54 (BP 1 Location: Left arm, BP Patient Position: Sitting)   Pulse (!) 120   Temp 97.5 °F (36.4 °C)   Resp 16   Ht 5' 10\" (1.778 m)   Wt 76.9 kg (169 lb 8.5 oz)   SpO2 96%   BMI 24.33 kg/m²         Intake/Output Summary (Last 24 hours) at 7/19/2020 0947  Last data filed at 7/18/2020 2040  Gross per 24 hour   Intake --   Output 600 ml   Net -600 ml        Physical Examination:             Constitutional:  moderate acute distress, cooperative, pleasant    ENT:  Oral mucosa moist, oropharynx benign. Resp:  CTA bilaterally. No wheezing/rhonchi/rales. No accessory muscle use   CV:  Regular rhythm, normal rate, no murmurs, gallops, rubs    GI:  Soft, non tender, no fluid wave, no hepatosplenomegaly     Musculoskeletal:  No edema, warm, 2+ pulses throughout    Neurologic:  Moves all extremities.      Skin:  Good turgor, no rashes or ulcers       Data Review:    Review and/or order of clinical lab test      Labs:     Recent Labs     07/19/20  0356 07/18/20  1832   WBC 8.3 6.9   HGB 10.2* 10.6*   HCT 31.7* 33.2*  248     Recent Labs     07/19/20  0356 07/17/20  0304 07/16/20  2115    139 139   K 3.6 3.0* 3.2*   CL 99 104 105   CO2 33* 28 29   BUN 17 9 9   CREA 1.00 0.90 0.90   * 126* 142*   CA 8.1* 8.0* 7.7*   MG 1.6 1.7  --    PHOS 3.2  --   --      Recent Labs     07/19/20  0356   LPSE 121     Recent Labs     07/19/20  0356 07/18/20  1837 07/18/20  0330 07/17/20  0304   INR 1.3*  --  1.3* 1.3*   PTP 13.4*  --  13.0* 13.2*   APTT 75.8* 37.1* 65.2* 68.9*      No results for input(s): FE, TIBC, PSAT, FERR in the last 72 hours. No results found for: FOL, RBCF   No results for input(s): PH, PCO2, PO2 in the last 72 hours. No results for input(s): CPK, CKNDX, TROIQ in the last 72 hours.     No lab exists for component: CPKMB  Lab Results   Component Value Date/Time    Cholesterol, total 250 (H) 10/02/2013 01:55 PM    HDL Cholesterol 53 10/02/2013 01:55 PM    LDL, calculated 176 (H) 10/02/2013 01:55 PM    Triglyceride 106 10/02/2013 01:55 PM    CHOL/HDL Ratio 3.4 10/06/2010 10:25 AM     No results found for: [facility]  Lab Results   Component Value Date/Time    Color YELLOW/STRAW 07/13/2020 04:27 PM    Appearance CLEAR 07/13/2020 04:27 PM    Specific gravity 1.012 07/13/2020 04:27 PM    pH (UA) 6.0 07/13/2020 04:27 PM    Protein Negative 07/13/2020 04:27 PM    Glucose Negative 07/13/2020 04:27 PM    Ketone TRACE (A) 07/13/2020 04:27 PM    Bilirubin Negative 07/13/2020 04:27 PM    Urobilinogen 1.0 07/13/2020 04:27 PM    Nitrites Negative 07/13/2020 04:27 PM    Leukocyte Esterase Negative 07/13/2020 04:27 PM    Epithelial cells FEW 07/13/2020 04:27 PM    Bacteria Negative 07/13/2020 04:27 PM    WBC 0-4 07/13/2020 04:27 PM    RBC 0-5 07/13/2020 04:27 PM         Medications Reviewed:     Current Facility-Administered Medications   Medication Dose Route Frequency    dextrose 5% - 0.45% NaCl with KCl 10 mEq/L infusion  125 mL/hr IntraVENous CONTINUOUS    pantoprazole (PROTONIX) 40 mg in 0.9% sodium chloride 10 mL injection  40 mg IntraVENous Q12H    prochlorperazine (COMPAZINE) with saline injection 5 mg  5 mg IntraVENous Q6H    heparin 25,000 units in D5W 250 ml infusion  18-36 Units/kg/hr IntraVENous TITRATE    benzonatate (TESSALON) capsule 100 mg  100 mg Oral TID PRN    guaiFENesin (ROBITUSSIN) 100 mg/5 mL oral liquid 100 mg  100 mg Oral Q4H PRN    ondansetron (ZOFRAN) injection 4 mg  4 mg IntraVENous Q6H    scopolamine (TRANSDERM-SCOP) 1 mg over 3 days 1 Patch  1 Patch TransDERmal Q72H    prochlorperazine (COMPAZINE) tablet 5 mg  5 mg Oral Q6H PRN    finasteride (PROSCAR) tablet 5 mg  5 mg Oral DAILY    tamsulosin (FLOMAX) capsule 0.4 mg  0.4 mg Oral DAILY    sodium chloride (NS) flush 5-40 mL  5-40 mL IntraVENous Q8H    sodium chloride (NS) flush 5-40 mL  5-40 mL IntraVENous PRN    acetaminophen (TYLENOL) tablet 650 mg  650 mg Oral Q6H PRN    balsam peru-castor oiL (VENELEX) ointment   Topical BID     ______________________________________________________________________  EXPECTED LENGTH OF STAY: 4d 4h  ACTUAL LENGTH OF STAY:          [address], DO

## 2020-07-19 NOTE — PROGRESS NOTES
hosp np notified of mews score of 4, pt to get miv of d51/2ns with 10meq kcl at 125cc hr.  np also notified that pt continues to hve occasional coffee ground  emesis .

## 2020-07-19 NOTE — PROGRESS NOTES
Milad (hosp pa)  notified of pt's mews score of 4. Also notified of emesis episode. Pt to get 500cc bolus ns and restart heparin as ordered.

## 2020-07-19 NOTE — PROGRESS NOTES
Bedside and Verbal shift change report given to Sheryl Viera (oncoming nurse) by Sarwat Montalvo RN (offgoing nurse). Report included the following information SBAR, Kardex, Intake/Output and Recent Results.

## 2020-07-19 NOTE — PROGRESS NOTES
[address]  [facility], [address] [place] [address]    GI PROGRESS NOTE    NAME: Luis Fernando Hein   :  1934   MRN:  [id number]       Subjective:     I got re consulted by Dr Shannen Sweet for vomiting some blood since last night, made NPO, he was on po solid food only for 24 hours, got paracentesis last week, got eliquis yesterday, on heparin gtt. He c/o mild abdominal pain, he spited blood earlier this morning  Review of Systems    Constitutional: negative fever, negative chills, negative weight loss  Eyes:   negative visual changes  ENT:   negative sore throat, tongue or lip swelling  Respiratory:  negative cough, negative dyspnea  Cards:  negative for chest pain, palpitations, lower extremity edema  GI:   See HPI  :  negative for frequency, dysuria  Integument:  negative for rash and pruritus  Heme:  negative for easy bruising and gum/nose bleeding  Musculoskel: negative for myalgias,  back pain and muscle weakness  Neuro: negative for headaches, dizziness, vertigo  Psych:  negative for feelings of anxiety, depression         Objective:     VITALS:   Last 24hrs VS reviewed since prior progress note. Most recent are:  Visit Vitals  BP 91/54 (BP 1 Location: Left arm, BP Patient Position: Sitting)   Pulse (!) 120   Temp 97.5 °F (36.4 °C)   Resp 16   Ht 5' 10\" (1.778 m)   Wt 76.9 kg (169 lb 8.5 oz)   SpO2 96%   BMI 24.33 kg/m²       Intake/Output Summary (Last 24 hours) at 2020 1019  Last data filed at 2020 204  Gross per 24 hour   Intake --   Output 400 ml   Net -400 ml     PHYSICAL EXAM:  General: WD, WN. Alert, cooperative, no acute distress    HEENT: NC, Atraumatic. PERRLA, EOMI. Anicteric sclerae. Lungs:  CTA Bilaterally. No Wheezing/Rhonchi/Rales. Heart:  Regular  rhythm,  No murmur (), No Rubs, No Gallops  Abdomen: Soft, Non distended, Non tender.  +Bowel sounds, no HSM  Extremities: No c/c/e  Neurologic:  CN 2-12 gi, Alert and oriented X 3.   No acute neurological distress   Psych:   Good insight. Not anxious nor agitated.     Lab Data Reviewed:   Recent Labs     07/19/20  0356 07/18/20  1832   WBC 8.3 6.9   HGB 10.2* 10.6*   HCT 31.7* 33.2*    248     Recent Labs     07/19/20  0356 07/17/20  0304    139   K 3.6 3.0*   CL 99 104   CO2 33* 28   BUN 17 9   CREA 1.00 0.90   * 126*   PHOS 3.2  --    CA 8.1* 8.0*     Recent Labs     07/19/20  0356   LPSE 121       ________________________________________________________________________       Assessment:   · Hematemesis of small amount, H/H stable  · Pancreas mass  · Ascites, s/p paracentesis   · PE and DVT on heparin gtt     Patient Active Problem List   Diagnosis Code    DVT (deep vein thrombosis) in pregnancy O22.30    [facility] I82.409     Plan:   · Discussed with Dr Wolfgang Mark, to start TPN  · NPO  · Follow up on cytology from peritoneal fluid and if negative for malignancy then will consider EUS for pancreas mass biopsy after clearance by pulmonary, the earliest on Tuesday since he got eliquis yesterday  · Will follow     Signed By: Sergei Damon MD     7/19/2020  10:19 AM

## 2020-07-19 NOTE — PROGRESS NOTES
Problem: Falls - Risk of  Goal: *Absence of Falls  Description: Document Farida Garrison Fall Risk and appropriate interventions in the flowsheet.   Outcome: Progressing Towards Goal  Note: Fall Risk Interventions:  Mobility Interventions: Communicate number of staff needed for ambulation/transfer         Medication Interventions: Evaluate medications/consider consulting pharmacy    Elimination Interventions: Call light in reach, Patient to call for help with toileting needs    History of Falls Interventions: Door open when patient unattended, Evaluate medications/consider consulting pharmacy         Problem: Patient Education: Go to Patient Education Activity  Goal: Patient/Family Education  Outcome: Progressing Towards Goal     Problem: General Medical Care Plan  Goal: *Absence of infection signs and symptoms  Outcome: Progressing Towards Goal     Problem: Deep Venous Thrombosis - Risk of  Goal: *Absence of deep venous thrombosis signs and symptoms(Stroke Metric)  Outcome: Progressing Towards Goal  Goal: *Absence of impaired coagulation signs and symptoms  Outcome: Progressing Towards Goal  Goal: *Knowledge of prescribed medications  Outcome: Progressing Towards Goal  Goal: *Absence of bleeding  Outcome: Progressing Towards Goal     Problem: Patient Education: Go to Patient Education Activity  Goal: Patient/Family Education  Outcome: Progressing Towards Goal     Problem: Patient Education: Go to Patient Education Activity  Goal: Patient/Family Education  Outcome: Progressing Towards Goal     Problem: Nutrition Deficit  Goal: *Optimize nutritional status  Outcome: Progressing Towards Goal

## 2020-07-19 NOTE — PROGRESS NOTES
Alicia Guevara to get assistance to transfer as he requires ACLS cardiac monitoring. He was not available. Called Nursing supervisor and she said she will send someone to help.

## 2020-07-19 NOTE — PROGRESS NOTES
TRANSFER - OUT REPORT:    Verbal report given to April(name) on Birgit Calabrese  being transferred to (unit) for change in patient condition(MEWS of 5 low bp high hr)       Report consisted of patients Situation, Background, Assessment and   Recommendations(SBAR). Information from the following report(s) SBAR, Intake/Output and MAR was reviewed with the receiving nurse. Lines:   PICC Double Lumen 07/16/20 Right;Brachial (Active)   Central Line Being Utilized Yes 07/18/20 2020   Criteria for Appropriate Use Limited/no vessel suitable for conventional peripheral access 07/18/20 2020   Site Assessment Clean, dry, & intact 07/18/20 2020   Phlebitis Assessment 0 07/18/20 2020   Infiltration Assessment 0 07/18/20 2020   Arm Circumference (cm) 28 cm 07/16/20 1030   Date of Last Dressing Change 07/16/20 07/18/20 2020   Dressing Status Clean, dry, & intact 07/18/20 2020   External Catheter Length (cm) 0 centimeters 07/16/20 1030   Dressing Type Disk with Chlorhexadine gluconate (CHG) 07/18/20 2020   Hub Color/Line Status Red 07/18/20 2020   Positive Blood Return (Site #1) Yes 07/17/20 2139   Hub Color/Line Status Purple 07/18/20 2020   Positive Blood Return (Site #2) Yes 07/17/20 2139   Alcohol Cap Used Yes 07/18/20 2020       Peripheral IV 07/13/20 Left Wrist (Active)   Site Assessment Clean, dry, & intact 07/18/20 2020   Phlebitis Assessment 0 07/18/20 2020   Infiltration Assessment 0 07/18/20 2020   Dressing Status Clean, dry, & intact 07/18/20 2020   Dressing Type Transparent 07/18/20 2020   Hub Color/Line Status Capped 07/18/20 2020   Action Taken Open ports on tubing capped 07/18/20 2020   Alcohol Cap Used Yes 07/18/20 2020        Opportunity for questions and clarification was provided.       Patient transported with:   Registered Nurse  Tech

## 2020-07-20 ENCOUNTER — APPOINTMENT (OUTPATIENT)
Dept: INTERVENTIONAL RADIOLOGY/VASCULAR | Age: 85
DRG: 166 | End: 2020-07-20
Attending: NURSE PRACTITIONER
Payer: MEDICARE

## 2020-07-20 ENCOUNTER — APPOINTMENT (OUTPATIENT)
Dept: NON INVASIVE DIAGNOSTICS | Age: 85
DRG: 166 | End: 2020-07-20
Attending: NURSE PRACTITIONER
Payer: MEDICARE

## 2020-07-20 LAB
ALBUMIN SERPL-MCNC: 2.1 G/DL (ref 3.5–5)
ALBUMIN/GLOB SERPL: 0.6 {RATIO} (ref 1.1–2.2)
ALP SERPL-CCNC: 53 U/L (ref 45–117)
ALT SERPL-CCNC: 11 U/L (ref 12–78)
ANION GAP SERPL CALC-SCNC: 5 MMOL/L (ref 5–15)
ANION GAP SERPL CALC-SCNC: 7 MMOL/L (ref 5–15)
APTT PPP: 38.7 SEC (ref 22.1–32)
AST SERPL-CCNC: 11 U/L (ref 15–37)
BILIRUB SERPL-MCNC: 0.4 MG/DL (ref 0.2–1)
BNP SERPL-MCNC: 369 PG/ML
BUN SERPL-MCNC: 22 MG/DL (ref 6–20)
BUN SERPL-MCNC: 27 MG/DL (ref 6–20)
BUN/CREAT SERPL: 28 (ref 12–20)
BUN/CREAT SERPL: 31 (ref 12–20)
CALCIUM SERPL-MCNC: 7.9 MG/DL (ref 8.5–10.1)
CALCIUM SERPL-MCNC: 8.1 MG/DL (ref 8.5–10.1)
CHLORIDE SERPL-SCNC: 106 MMOL/L (ref 97–108)
CHLORIDE SERPL-SCNC: 110 MMOL/L (ref 97–108)
CO2 SERPL-SCNC: 26 MMOL/L (ref 21–32)
CO2 SERPL-SCNC: 27 MMOL/L (ref 21–32)
CORTIS AM PEAK SERPL-MCNC: 15.7 UG/DL (ref 4.3–22.45)
CREAT SERPL-MCNC: 0.8 MG/DL (ref 0.7–1.3)
CREAT SERPL-MCNC: 0.88 MG/DL (ref 0.7–1.3)
ERYTHROCYTE [DISTWIDTH] IN BLOOD BY AUTOMATED COUNT: 17.3 % (ref 11.5–14.5)
ERYTHROCYTE [DISTWIDTH] IN BLOOD BY AUTOMATED COUNT: 18 % (ref 11.5–14.5)
FIBRINOGEN PPP-MCNC: 160 MG/DL (ref 200–475)
GLOBULIN SER CALC-MCNC: 3.6 G/DL (ref 2–4)
GLUCOSE SERPL-MCNC: 128 MG/DL (ref 65–100)
GLUCOSE SERPL-MCNC: 190 MG/DL (ref 65–100)
HCT VFR BLD AUTO: 23.9 % (ref 36.6–50.3)
HCT VFR BLD AUTO: 27 % (ref 36.6–50.3)
HCT VFR BLD AUTO: 30.5 % (ref 36.6–50.3)
HCT VFR BLD AUTO: 31.5 % (ref 36.6–50.3)
HGB BLD-MCNC: 10.2 G/DL (ref 12.1–17)
HGB BLD-MCNC: 7.6 G/DL (ref 12.1–17)
HGB BLD-MCNC: 8.4 G/DL (ref 12.1–17)
HGB BLD-MCNC: 9.9 G/DL (ref 12.1–17)
INR PPP: 1.3 (ref 0.9–1.1)
LACTATE SERPL-SCNC: 1.2 MMOL/L (ref 0.4–2)
MAGNESIUM SERPL-MCNC: 1.6 MG/DL (ref 1.6–2.4)
MAGNESIUM SERPL-MCNC: 2.3 MG/DL (ref 1.6–2.4)
MCH RBC QN AUTO: 30.6 PG (ref 26–34)
MCH RBC QN AUTO: 30.7 PG (ref 26–34)
MCHC RBC AUTO-ENTMCNC: 31.8 G/DL (ref 30–36.5)
MCHC RBC AUTO-ENTMCNC: 32.4 G/DL (ref 30–36.5)
MCV RBC AUTO: 94.9 FL (ref 80–99)
MCV RBC AUTO: 96.4 FL (ref 80–99)
NRBC # BLD: 0 K/UL (ref 0–0.01)
NRBC # BLD: 0.46 K/UL (ref 0–0.01)
NRBC BLD-RTO: 0 PER 100 WBC
NRBC BLD-RTO: 10.4 PER 100 WBC
PHOSPHATE SERPL-MCNC: 2.6 MG/DL (ref 2.6–4.7)
PHOSPHATE SERPL-MCNC: 2.6 MG/DL (ref 2.6–4.7)
PLATELET # BLD AUTO: 180 K/UL (ref 150–400)
PLATELET # BLD AUTO: 186 K/UL (ref 150–400)
PMV BLD AUTO: 9.7 FL (ref 8.9–12.9)
PMV BLD AUTO: 9.8 FL (ref 8.9–12.9)
POTASSIUM SERPL-SCNC: 3.3 MMOL/L (ref 3.5–5.1)
POTASSIUM SERPL-SCNC: 4.8 MMOL/L (ref 3.5–5.1)
PROT SERPL-MCNC: 5.7 G/DL (ref 6.4–8.2)
PROTHROMBIN TIME: 13 SEC (ref 9–11.1)
RBC # BLD AUTO: 2.48 M/UL (ref 4.1–5.7)
RBC # BLD AUTO: 3.32 M/UL (ref 4.1–5.7)
SODIUM SERPL-SCNC: 140 MMOL/L (ref 136–145)
SODIUM SERPL-SCNC: 141 MMOL/L (ref 136–145)
THERAPEUTIC RANGE,PTTT: ABNORMAL SECS (ref 58–77)
TROPONIN I SERPL-MCNC: <0.05 NG/ML
WBC # BLD AUTO: 6.4 K/UL (ref 4.1–11.1)
WBC # BLD AUTO: 7.4 K/UL (ref 4.1–11.1)

## 2020-07-20 PROCEDURE — 65610000006 HC RM INTENSIVE CARE

## 2020-07-20 PROCEDURE — 36415 COLL VENOUS BLD VENIPUNCTURE: CPT

## 2020-07-20 PROCEDURE — 74011250636 HC RX REV CODE- 250/636: Performed by: INTERNAL MEDICINE

## 2020-07-20 PROCEDURE — 80053 COMPREHEN METABOLIC PANEL: CPT

## 2020-07-20 PROCEDURE — 30233N1 TRANSFUSION OF NONAUTOLOGOUS RED BLOOD CELLS INTO PERIPHERAL VEIN, PERCUTANEOUS APPROACH: ICD-10-PCS | Performed by: INTERNAL MEDICINE

## 2020-07-20 PROCEDURE — 86923 COMPATIBILITY TEST ELECTRIC: CPT

## 2020-07-20 PROCEDURE — 85018 HEMOGLOBIN: CPT

## 2020-07-20 PROCEDURE — C1769 GUIDE WIRE: HCPCS

## 2020-07-20 PROCEDURE — 83605 ASSAY OF LACTIC ACID: CPT

## 2020-07-20 PROCEDURE — 76937 US GUIDE VASCULAR ACCESS: CPT

## 2020-07-20 PROCEDURE — 99153 MOD SED SAME PHYS/QHP EA: CPT

## 2020-07-20 PROCEDURE — G0500 MOD SEDAT ENDO SERVICE >5YRS: HCPCS

## 2020-07-20 PROCEDURE — 74011000258 HC RX REV CODE- 258: Performed by: NURSE PRACTITIONER

## 2020-07-20 PROCEDURE — 85027 COMPLETE CBC AUTOMATED: CPT

## 2020-07-20 PROCEDURE — 84100 ASSAY OF PHOSPHORUS: CPT

## 2020-07-20 PROCEDURE — 74011000250 HC RX REV CODE- 250: Performed by: STUDENT IN AN ORGANIZED HEALTH CARE EDUCATION/TRAINING PROGRAM

## 2020-07-20 PROCEDURE — C1894 INTRO/SHEATH, NON-LASER: HCPCS

## 2020-07-20 PROCEDURE — 06H03DZ INSERTION OF INTRALUMINAL DEVICE INTO INFERIOR VENA CAVA, PERCUTANEOUS APPROACH: ICD-10-PCS | Performed by: STUDENT IN AN ORGANIZED HEALTH CARE EDUCATION/TRAINING PROGRAM

## 2020-07-20 PROCEDURE — 74011636320 HC RX REV CODE- 636/320

## 2020-07-20 PROCEDURE — 0DJ08ZZ INSPECTION OF UPPER INTESTINAL TRACT, VIA NATURAL OR ARTIFICIAL OPENING ENDOSCOPIC: ICD-10-PCS | Performed by: INTERNAL MEDICINE

## 2020-07-20 PROCEDURE — 74011000250 HC RX REV CODE- 250: Performed by: NURSE PRACTITIONER

## 2020-07-20 PROCEDURE — C1880 VENA CAVA FILTER: HCPCS

## 2020-07-20 PROCEDURE — 84484 ASSAY OF TROPONIN QUANT: CPT

## 2020-07-20 PROCEDURE — 77030004561 HC CATH ANGI DX COBRA ANGI -B

## 2020-07-20 PROCEDURE — 85730 THROMBOPLASTIN TIME PARTIAL: CPT

## 2020-07-20 PROCEDURE — 83880 ASSAY OF NATRIURETIC PEPTIDE: CPT

## 2020-07-20 PROCEDURE — 83735 ASSAY OF MAGNESIUM: CPT

## 2020-07-20 PROCEDURE — 85610 PROTHROMBIN TIME: CPT

## 2020-07-20 PROCEDURE — 74011000250 HC RX REV CODE- 250: Performed by: INTERNAL MEDICINE

## 2020-07-20 PROCEDURE — 76040000007: Performed by: INTERNAL MEDICINE

## 2020-07-20 PROCEDURE — 74011250636 HC RX REV CODE- 250/636: Performed by: NURSE PRACTITIONER

## 2020-07-20 PROCEDURE — 86900 BLOOD TYPING SEROLOGIC ABO: CPT

## 2020-07-20 PROCEDURE — 85384 FIBRINOGEN ACTIVITY: CPT

## 2020-07-20 PROCEDURE — C9113 INJ PANTOPRAZOLE SODIUM, VIA: HCPCS | Performed by: INTERNAL MEDICINE

## 2020-07-20 PROCEDURE — 93308 TTE F-UP OR LMTD: CPT

## 2020-07-20 PROCEDURE — 74011250637 HC RX REV CODE- 250/637: Performed by: NURSE PRACTITIONER

## 2020-07-20 PROCEDURE — 74011250637 HC RX REV CODE- 250/637: Performed by: HOSPITALIST

## 2020-07-20 PROCEDURE — P9016 RBC LEUKOCYTES REDUCED: HCPCS

## 2020-07-20 PROCEDURE — 36430 TRANSFUSION BLD/BLD COMPNT: CPT

## 2020-07-20 RX ORDER — ATROPINE SULFATE 0.1 MG/ML
0.5 INJECTION INTRAVENOUS
Status: CANCELLED | OUTPATIENT
Start: 2020-07-20 | End: 2020-07-21

## 2020-07-20 RX ORDER — MAGNESIUM SULFATE HEPTAHYDRATE 40 MG/ML
2 INJECTION, SOLUTION INTRAVENOUS ONCE
Status: COMPLETED | OUTPATIENT
Start: 2020-07-20 | End: 2020-07-20

## 2020-07-20 RX ORDER — SODIUM CHLORIDE 0.9 % (FLUSH) 0.9 %
5-40 SYRINGE (ML) INJECTION EVERY 8 HOURS
Status: DISCONTINUED | OUTPATIENT
Start: 2020-07-20 | End: 2020-07-30 | Stop reason: HOSPADM

## 2020-07-20 RX ORDER — SODIUM CHLORIDE 9 MG/ML
250 INJECTION, SOLUTION INTRAVENOUS AS NEEDED
Status: DISCONTINUED | OUTPATIENT
Start: 2020-07-20 | End: 2020-07-30 | Stop reason: HOSPADM

## 2020-07-20 RX ORDER — MIDODRINE HYDROCHLORIDE 5 MG/1
10 TABLET ORAL EVERY 8 HOURS
Status: DISCONTINUED | OUTPATIENT
Start: 2020-07-20 | End: 2020-07-21

## 2020-07-20 RX ORDER — SODIUM CHLORIDE 0.9 % (FLUSH) 0.9 %
5-40 SYRINGE (ML) INJECTION AS NEEDED
Status: CANCELLED | OUTPATIENT
Start: 2020-07-20

## 2020-07-20 RX ORDER — MIDAZOLAM HYDROCHLORIDE 1 MG/ML
.25-5 INJECTION, SOLUTION INTRAMUSCULAR; INTRAVENOUS
Status: CANCELLED | OUTPATIENT
Start: 2020-07-20 | End: 2020-07-20

## 2020-07-20 RX ORDER — SODIUM CHLORIDE, SODIUM LACTATE, POTASSIUM CHLORIDE, CALCIUM CHLORIDE 600; 310; 30; 20 MG/100ML; MG/100ML; MG/100ML; MG/100ML
25 INJECTION, SOLUTION INTRAVENOUS CONTINUOUS
Status: DISCONTINUED | OUTPATIENT
Start: 2020-07-20 | End: 2020-07-29

## 2020-07-20 RX ORDER — SODIUM CHLORIDE 9 MG/ML
50 INJECTION, SOLUTION INTRAVENOUS CONTINUOUS
Status: CANCELLED | OUTPATIENT
Start: 2020-07-20 | End: 2020-07-20

## 2020-07-20 RX ORDER — EPINEPHRINE 0.1 MG/ML
1 INJECTION INTRACARDIAC; INTRAVENOUS
Status: CANCELLED | OUTPATIENT
Start: 2020-07-20 | End: 2020-07-21

## 2020-07-20 RX ORDER — NALOXONE HYDROCHLORIDE 0.4 MG/ML
0.4 INJECTION, SOLUTION INTRAMUSCULAR; INTRAVENOUS; SUBCUTANEOUS
Status: CANCELLED | OUTPATIENT
Start: 2020-07-20 | End: 2020-07-20

## 2020-07-20 RX ORDER — FENTANYL CITRATE 50 UG/ML
25-200 INJECTION, SOLUTION INTRAMUSCULAR; INTRAVENOUS
Status: DISPENSED | OUTPATIENT
Start: 2020-07-20 | End: 2020-07-20

## 2020-07-20 RX ORDER — MIDAZOLAM HYDROCHLORIDE 1 MG/ML
.25-5 INJECTION, SOLUTION INTRAMUSCULAR; INTRAVENOUS
Status: DISPENSED | OUTPATIENT
Start: 2020-07-20 | End: 2020-07-20

## 2020-07-20 RX ORDER — ATROPINE SULFATE 0.1 MG/ML
0.5 INJECTION INTRAVENOUS
Status: ACTIVE | OUTPATIENT
Start: 2020-07-20 | End: 2020-07-21

## 2020-07-20 RX ORDER — LIDOCAINE HYDROCHLORIDE 20 MG/ML
20 INJECTION, SOLUTION INFILTRATION; PERINEURAL ONCE
Status: COMPLETED | OUTPATIENT
Start: 2020-07-20 | End: 2020-07-20

## 2020-07-20 RX ORDER — NALOXONE HYDROCHLORIDE 0.4 MG/ML
0.4 INJECTION, SOLUTION INTRAMUSCULAR; INTRAVENOUS; SUBCUTANEOUS
Status: ACTIVE | OUTPATIENT
Start: 2020-07-20 | End: 2020-07-20

## 2020-07-20 RX ORDER — DEXTROMETHORPHAN/PSEUDOEPHED 2.5-7.5/.8
1.2 DROPS ORAL
Status: DISCONTINUED | OUTPATIENT
Start: 2020-07-20 | End: 2020-07-30 | Stop reason: HOSPADM

## 2020-07-20 RX ORDER — DEXTROMETHORPHAN/PSEUDOEPHED 2.5-7.5/.8
1.2 DROPS ORAL
Status: CANCELLED | OUTPATIENT
Start: 2020-07-20

## 2020-07-20 RX ORDER — FLUMAZENIL 0.1 MG/ML
0.2 INJECTION INTRAVENOUS
Status: CANCELLED | OUTPATIENT
Start: 2020-07-20 | End: 2020-07-20

## 2020-07-20 RX ORDER — MAGNESIUM SULFATE HEPTAHYDRATE 40 MG/ML
2 INJECTION, SOLUTION INTRAVENOUS ONCE
Status: DISCONTINUED | OUTPATIENT
Start: 2020-07-20 | End: 2020-07-20 | Stop reason: SDUPTHER

## 2020-07-20 RX ORDER — FLUMAZENIL 0.1 MG/ML
0.2 INJECTION INTRAVENOUS
Status: ACTIVE | OUTPATIENT
Start: 2020-07-20 | End: 2020-07-20

## 2020-07-20 RX ORDER — SODIUM CHLORIDE 9 MG/ML
50 INJECTION, SOLUTION INTRAVENOUS CONTINUOUS
Status: DISPENSED | OUTPATIENT
Start: 2020-07-20 | End: 2020-07-20

## 2020-07-20 RX ORDER — FENTANYL CITRATE 50 UG/ML
25-200 INJECTION, SOLUTION INTRAMUSCULAR; INTRAVENOUS
Status: CANCELLED | OUTPATIENT
Start: 2020-07-20 | End: 2020-07-20

## 2020-07-20 RX ORDER — SODIUM CHLORIDE 0.9 % (FLUSH) 0.9 %
5-40 SYRINGE (ML) INJECTION EVERY 8 HOURS
Status: CANCELLED | OUTPATIENT
Start: 2020-07-20

## 2020-07-20 RX ORDER — SODIUM CHLORIDE 0.9 % (FLUSH) 0.9 %
5-40 SYRINGE (ML) INJECTION AS NEEDED
Status: DISCONTINUED | OUTPATIENT
Start: 2020-07-20 | End: 2020-07-30 | Stop reason: HOSPADM

## 2020-07-20 RX ORDER — EPINEPHRINE 0.1 MG/ML
1 INJECTION INTRACARDIAC; INTRAVENOUS
Status: ACTIVE | OUTPATIENT
Start: 2020-07-20 | End: 2020-07-21

## 2020-07-20 RX ORDER — LIDOCAINE HYDROCHLORIDE 20 MG/ML
INJECTION, SOLUTION INFILTRATION; PERINEURAL
Status: DISPENSED
Start: 2020-07-20 | End: 2020-07-20

## 2020-07-20 RX ORDER — POTASSIUM CHLORIDE 29.8 MG/ML
20 INJECTION INTRAVENOUS
Status: COMPLETED | OUTPATIENT
Start: 2020-07-20 | End: 2020-07-20

## 2020-07-20 RX ADMIN — HYDROCORTISONE SODIUM SUCCINATE 50 MG: 100 INJECTION, POWDER, FOR SOLUTION INTRAMUSCULAR; INTRAVENOUS at 03:50

## 2020-07-20 RX ADMIN — HYDROCORTISONE SODIUM SUCCINATE 50 MG: 100 INJECTION, POWDER, FOR SOLUTION INTRAMUSCULAR; INTRAVENOUS at 09:52

## 2020-07-20 RX ADMIN — SODIUM CHLORIDE 5 MG: 9 INJECTION INTRAMUSCULAR; INTRAVENOUS; SUBCUTANEOUS at 06:36

## 2020-07-20 RX ADMIN — IOPAMIDOL 35 ML: 612 INJECTION, SOLUTION INTRAVENOUS at 14:04

## 2020-07-20 RX ADMIN — LIDOCAINE HYDROCHLORIDE 6 ML: 20 INJECTION, SOLUTION INFILTRATION; PERINEURAL at 14:05

## 2020-07-20 RX ADMIN — SODIUM CHLORIDE, SODIUM LACTATE, POTASSIUM CHLORIDE, AND CALCIUM CHLORIDE 125 ML/HR: 600; 310; 30; 20 INJECTION, SOLUTION INTRAVENOUS at 02:32

## 2020-07-20 RX ADMIN — Medication 40 ML: at 22:00

## 2020-07-20 RX ADMIN — Medication 10 ML: at 06:36

## 2020-07-20 RX ADMIN — PIPERACILLIN AND TAZOBACTAM 3.38 G: 3; .375 INJECTION, POWDER, LYOPHILIZED, FOR SOLUTION INTRAVENOUS at 14:32

## 2020-07-20 RX ADMIN — CALCIUM GLUCONATE: 94 INJECTION, SOLUTION INTRAVENOUS at 18:17

## 2020-07-20 RX ADMIN — CASTOR OIL AND BALSAM, PERU: 788; 87 OINTMENT TOPICAL at 09:52

## 2020-07-20 RX ADMIN — POTASSIUM CHLORIDE 20 MEQ: 29.8 INJECTION, SOLUTION INTRAVENOUS at 07:15

## 2020-07-20 RX ADMIN — Medication 10 ML: at 18:04

## 2020-07-20 RX ADMIN — OCTREOTIDE ACETATE 25 MCG/HR: 500 INJECTION, SOLUTION INTRAVENOUS; SUBCUTANEOUS at 02:44

## 2020-07-20 RX ADMIN — ONDANSETRON 4 MG: 2 INJECTION INTRAMUSCULAR; INTRAVENOUS at 20:36

## 2020-07-20 RX ADMIN — PHENYLEPHRINE HYDROCHLORIDE 50 MCG/MIN: 10 INJECTION INTRAVENOUS at 22:05

## 2020-07-20 RX ADMIN — MAGNESIUM SULFATE HEPTAHYDRATE 2 G: 40 INJECTION, SOLUTION INTRAVENOUS at 07:59

## 2020-07-20 RX ADMIN — SODIUM CHLORIDE, SODIUM LACTATE, POTASSIUM CHLORIDE, AND CALCIUM CHLORIDE 125 ML/HR: 600; 310; 30; 20 INJECTION, SOLUTION INTRAVENOUS at 22:04

## 2020-07-20 RX ADMIN — PHENYLEPHRINE HYDROCHLORIDE 75 MCG/MIN: 10 INJECTION INTRAVENOUS at 18:05

## 2020-07-20 RX ADMIN — POTASSIUM CHLORIDE 20 MEQ: 29.8 INJECTION, SOLUTION INTRAVENOUS at 08:50

## 2020-07-20 RX ADMIN — OCTREOTIDE ACETATE 25 MCG/HR: 500 INJECTION, SOLUTION INTRAVENOUS; SUBCUTANEOUS at 22:05

## 2020-07-20 RX ADMIN — HYDROCORTISONE SODIUM SUCCINATE 50 MG: 100 INJECTION, POWDER, FOR SOLUTION INTRAMUSCULAR; INTRAVENOUS at 16:34

## 2020-07-20 RX ADMIN — HYDROCORTISONE SODIUM SUCCINATE 50 MG: 100 INJECTION, POWDER, FOR SOLUTION INTRAMUSCULAR; INTRAVENOUS at 22:06

## 2020-07-20 RX ADMIN — NOREPINEPHRINE BITARTRATE 4 MCG/MIN: 1 INJECTION, SOLUTION, CONCENTRATE INTRAVENOUS at 19:10

## 2020-07-20 RX ADMIN — SODIUM CHLORIDE 5 MG: 9 INJECTION INTRAMUSCULAR; INTRAVENOUS; SUBCUTANEOUS at 18:14

## 2020-07-20 RX ADMIN — SODIUM CHLORIDE 40 MG: 9 INJECTION INTRAMUSCULAR; INTRAVENOUS; SUBCUTANEOUS at 08:08

## 2020-07-20 RX ADMIN — ONDANSETRON 4 MG: 2 INJECTION INTRAMUSCULAR; INTRAVENOUS at 14:32

## 2020-07-20 RX ADMIN — SODIUM CHLORIDE 5 MG: 9 INJECTION INTRAMUSCULAR; INTRAVENOUS; SUBCUTANEOUS at 00:11

## 2020-07-20 RX ADMIN — ONDANSETRON 4 MG: 2 INJECTION INTRAMUSCULAR; INTRAVENOUS at 03:50

## 2020-07-20 RX ADMIN — MIDODRINE HYDROCHLORIDE 10 MG: 5 TABLET ORAL at 03:50

## 2020-07-20 RX ADMIN — ONDANSETRON 4 MG: 2 INJECTION INTRAMUSCULAR; INTRAVENOUS at 08:08

## 2020-07-20 RX ADMIN — PHENYLEPHRINE HYDROCHLORIDE 30 MCG/MIN: 10 INJECTION INTRAVENOUS at 13:25

## 2020-07-20 RX ADMIN — POTASSIUM CHLORIDE 20 MEQ: 29.8 INJECTION, SOLUTION INTRAVENOUS at 09:52

## 2020-07-20 RX ADMIN — SODIUM CHLORIDE 5 MG: 9 INJECTION INTRAMUSCULAR; INTRAVENOUS; SUBCUTANEOUS at 11:42

## 2020-07-20 RX ADMIN — SODIUM CHLORIDE 40 MG: 9 INJECTION INTRAMUSCULAR; INTRAVENOUS; SUBCUTANEOUS at 20:36

## 2020-07-20 RX ADMIN — SODIUM CHLORIDE 5 MG: 9 INJECTION INTRAMUSCULAR; INTRAVENOUS; SUBCUTANEOUS at 23:40

## 2020-07-20 RX ADMIN — CASTOR OIL AND BALSAM, PERU: 788; 87 OINTMENT TOPICAL at 18:14

## 2020-07-20 RX ADMIN — PIPERACILLIN AND TAZOBACTAM 3.38 G: 3; .375 INJECTION, POWDER, LYOPHILIZED, FOR SOLUTION INTRAVENOUS at 22:07

## 2020-07-20 NOTE — PROGRESS NOTES
2100: NP Carolina Doe notified about Pt. BP in 60/40 after IV fluid bolus. Orders received for IV Albumin    2200: NP Carolina Doe notified about Pt. BP in 70/40's after IV albumin. Orders received for 2 doses of IV albumin and Midodrine. 2300: Took Pt. To CT scan for CT head. 0000: Hospitalist notified about Pt. BP and 2 episodes of small to medium coffee ground emesis unable to measure. 0100: MD Smita Chua at bedside. Orders received for to Held the Heparin drip and 1 unit of PRBC Transfusion. 0200: ICU intensivist at bedside to assess the Pt.condition       TRANSFER - OUT REPORT:    Verbal report given to ICU RN(name) on Ariane Camargo  being transferred to ICU(unit) for change in patient condition(Hypotension)       Report consisted of patients Situation, Background, Assessment and   Recommendations(SBAR). Information from the following report(s) SBAR, Kardex, ED Summary, OR Summary, Procedure Summary, Intake/Output, MAR, Accordion, Recent Results, Med Rec Status, Cardiac Rhythm Sinus Tachycardia, Alarm Parameters , Pre Procedure Checklist, Procedure Verification and Quality Measures was reviewed with the receiving nurse. Lines:   PICC Double Lumen 07/16/20 Right;Brachial (Active)   Central Line Being Utilized Yes 07/20/20 0400   Criteria for Appropriate Use Limited/no vessel suitable for conventional peripheral access 07/20/20 0400   Site Assessment Clean, dry, & intact 07/20/20 0400   Phlebitis Assessment 0 07/20/20 0400   Infiltration Assessment 0 07/20/20 0400   Arm Circumference (cm) 28 cm 07/16/20 1030   Date of Last Dressing Change 07/16/20 07/20/20 0400   Dressing Status Clean, dry, & intact 07/20/20 0400   Action Taken Open ports on tubing capped 07/20/20 0400   External Catheter Length (cm) 0 centimeters 07/16/20 1030   Dressing Type Disk with Chlorhexadine gluconate (CHG); Transparent;Tape 07/20/20 0400   Hub Color/Line Status Purple; Infusing 07/20/20 0400   Positive Blood Return (Site #1) Yes 07/20/20 0400   Hub Color/Line Status Red; Infusing 07/20/20 0400   Positive Blood Return (Site #2) Yes 07/20/20 0400   Alcohol Cap Used Yes 07/20/20 0400       Peripheral IV 07/13/20 Left Wrist (Active)   Site Assessment Clean, dry, & intact 07/20/20 0400   Phlebitis Assessment 0 07/20/20 0400   Infiltration Assessment 0 07/20/20 0400   Dressing Status Clean, dry, & intact 07/20/20 0400   Dressing Type Transparent;Tape 07/20/20 0400   Hub Color/Line Status Pink;Flushed; Infusing 07/20/20 0400   Action Taken Open ports on tubing capped 07/20/20 0400   Alcohol Cap Used Yes 07/20/20 0400        Opportunity for questions and clarification was provided.       Patient transported with:   Monitor  Registered Nurse  Tech

## 2020-07-20 NOTE — PROGRESS NOTES
Bedside shift change report given to Montserrat Elizalde RN (oncoming nurse) by ELIZABETH Travis (offgoing nurse). Report included the following information SBAR, Kardex, Intake/Output, MAR, Accordion and Cardiac Rhythm Sinus tach.

## 2020-07-20 NOTE — PROGRESS NOTES
Bedside shift change report given to John Dixon RN (oncoming nurse) by Lady Willie RN (offgoing nurse). Report included the following information SBAR, Kardex, ED Summary, Intake/Output, MAR and Recent Results. SHIFT SUMMARY:    0800 Initial Shift  Assessment performed           Mental Status: Oriented x4. Drowsy. Pupils equal and reactive. Moves all ext. Spontan. Respiratory: RA           Cardiac: Sinus tach           GI/: Continent/ Urinal           IV Drips: LR 125cc/hr, Octreotide 25cc/hr, TPN 42cc/hr. 0900 Echo tech at bedside. Spoke with IR plan for IVC filter placement sometime this AM.  5005 GI Np at bedside. Plan to possibly do an EGD today. 1030 Second unit of blood finished as well as replacement of electrolytes. 1125 BMP and H&H Sent. 1215 Pt transferred to angio for IVC filter on portable monitor. 1300 Received call from angio nurse patients BP consistently low with SBP high 80s. MAP 60-63. Brought Adam-synephrine gtt to Angio and started with nurse. Patient drowsy but easily aroused and answers all questions appropriately. Follows verbal commands. Moves all extremities on command. Pupils equal and reactive. 1419 Patient back from Angio. Hooked up to monitor. Remains drowsy but oriented. Called and updated wife. 1540 Bedside shift change report given to [facility] ELIZABETH MONTERROSO (oncoming nurse) by John Dixon RN (offgoing nurse). Report included the following information SBAR, Kardex, ED Summary, Intake/Output, MAR and Recent Results.

## 2020-07-20 NOTE — PROCEDURES
Interventional Radiology  Procedure Note        7/20/2020 2:11 PM    Patient: Esaw Breaker     Informed consent obtained    Diagnosis: DVT RLE femoral vein    Procedure(s): IVC filter placement    Specimens removed:  none    Complications: None    Primary Physician: Bing Dos Santos MD    Recomendations: N/A    Discharge Disposition: Pressors initiated before procedure due to intermittent MAPs below 65. To be titrated in ICU.     Full dictated report to follow    Bing Dos Santos MD  Interventional Radiology  [facility]  2:11 PM, 7/20/2020

## 2020-07-20 NOTE — CDMP QUERY
Pt admitted with DVT's, with documentation of Acute GIB. Pt noted to have drop in Hgb from 10.4 to 7.6 and drop in BP down to 76/49, HR up to 124. Pt received boluses of Albumin and also total of 2L NS Bolus. Pt was transferred to ICU for closer documentation. If possible, please document in the progress notes and d/c summary if you are evaluating and / or treating any of the following:     Hemorrhagic Shock   Hypovolemic Shock   Other Shock, Please specify   Other explanation of findings, please specify   Clinically unable to determine    The medical record reflects the following:     Risk Factors: has developed UGIB     Clinical Indicators: pt with several episodes of coffee ground emesis, with drop in BP down to 76/49, . Noted drop in Hgb from 10.4 down to 7.6. Treatment: 500 cc NS IV Bolus x4, Albumin boluses, and Transfusion of 1U PRBC. Pt transferred to ICU. Pt has now been started on low dose Pressor.      Thank you, Deisi JOHNSON  Clinical Documentation Improvement  [phone]

## 2020-07-20 NOTE — ROUTINE PROCESS
1530: Bedside and Verbal shift change report given to Jah Levy RN (oncoming nurse) by Janeen Evangelista RN (offgoing nurse). Report included the following information SBAR, Kardex, ED Summary, Procedure Summary, Intake/Output, MAR, Recent Results, Med Rec Status, Cardiac Rhythm SR and Alarm Parameters . 1645: Dr. Rachel Garcia at bedside for EGD.    1900: Patient HR dropping into 40's when HR had been in 80's-100's. NP made aware. Will transition patient from eusebio gtt to levo gtt. 1930: Bedside and Verbal shift change report given to Arlinda Spatz, RN (oncoming nurse) by Jah Levy RN (offgoing nurse). Report included the following information SBAR, Kardex, ED Summary, Procedure Summary, Intake/Output, MAR, Recent Results, Med Rec Status, Cardiac Rhythm SB/SR, Alarm Parameters  and Dual Neuro Assessment.

## 2020-07-20 NOTE — PROGRESS NOTES
Hematology-Oncology Progress Note    Ale Crawford  1934  [id number]  7/20/2020    Follow-up for: pancreatic mass/DVT/PE     [x]        Chart notes since last visit reviewed   [x]        Medications reviewed for allergies and interactions       Case discussed with the following:         []                            [x]        Nursing Staff                                                                         []        Pathologist                                                                        []        FAMILY      Subjective:     Spoke with patient who complains of: very sleepy this am, getting blood. . no c/o pain, no further vomiting    Objective:     Patient Vitals for the past 24 hrs:   BP Temp Pulse Resp SpO2 Height Weight   07/20/20 1015 91/59 -- 95 15 100 % -- --   07/20/20 1000 93/58 -- 94 15 100 % -- --   07/20/20 0945 (!) 87/57 -- (!) 101 16 -- -- --   07/20/20 0930 (!) 82/49 97.8 °F (36.6 °C) 94 14 -- -- --   07/20/20 0915 (!) 88/55 97.7 °F (36.5 °C) 97 13 98 % -- --   07/20/20 0911 93/55 -- -- -- -- 5' 10\" (1.778 m) 71 kg (156 lb 8.4 oz)   07/20/20 0900 (!) 80/55 97.9 °F (36.6 °C) 97 25 95 % -- --   07/20/20 0845 93/55 98 °F (36.7 °C) 97 8 96 % -- --   07/20/20 0833 92/63 98 °F (36.7 °C) (!) 103 14 -- -- --   07/20/20 0830 92/63 -- (!) 102 14 -- -- --   07/20/20 0815 (!) 85/44 -- (!) 107 25 96 % -- --   07/20/20 0800 (!) 88/61 98 °F (36.7 °C) (!) 109 15 -- -- --   07/20/20 0745 97/61 -- (!) 108 14 -- -- --   07/20/20 0730 97/61 -- (!) 105 15 -- -- --   07/20/20 0615 (!) 87/54 -- (!) 106 21 95 % -- --   07/20/20 0600 (!) 87/57 98.1 °F (36.7 °C) (!) 108 18 96 % -- --   07/20/20 0545 93/60 -- (!) 107 24 92 % -- --   07/20/20 0530 (!) 89/53 98.3 °F (36.8 °C) (!) 107 21 93 % -- --   07/20/20 0515 (!) 87/53 -- (!) 112 28 (!) 89 % -- --   07/20/20 0500 (!) 87/50 98 °F (36.7 °C) (!) 110 25 94 % -- 71.4 kg (157 lb 6.5 oz)   07/20/20 0445 (!) 88/48 98.1 °F (36.7 °C) (!) 114 18 -- -- -- 07/20/20 0431 -- 97.8 °F (36.6 °C) -- -- -- -- --   07/20/20 0430 91/53 -- (!) 109 16 -- -- --   07/20/20 0415 -- -- (!) 113 23 99 % -- --   07/20/20 0400 98/54 98.1 °F (36.7 °C) (!) 106 11 -- -- --   07/20/20 0345 95/54 -- (!) 106 16 94 % -- --   07/20/20 0330 91/55 -- (!) 103 16 -- -- --   07/20/20 0215 (!) 81/44 -- (!) 114 -- 96 % -- --   07/20/20 0200 94/49 -- (!) 116 -- 98 % -- --   07/20/20 0145 100/56 -- (!) 114 -- 98 % -- --   07/20/20 0130 100/48 -- (!) 112 -- 98 % -- --   07/20/20 0115 (!) 85/48 -- (!) 112 -- -- -- --   07/20/20 0045 (!) 89/47 -- (!) 111 -- -- -- --   07/20/20 0030 90/49 -- (!) 111 -- 99 % -- --   07/20/20 0015 93/54 -- -- -- -- -- --   07/19/20 2345 (!) 89/52 -- (!) 120 -- 99 % -- --   07/19/20 2330 95/40 98.3 °F (36.8 °C) (!) 113 20 95 % -- --   07/19/20 2245 (!) 81/48 -- (!) 118 -- -- -- --   07/19/20 2240 (!) 81/45 -- -- -- -- -- --   07/19/20 2020 (!) 65/45 -- -- -- -- -- --   07/19/20 2014 (!) 70/48 98.4 °F (36.9 °C) (!) 117 16 96 % -- --   07/19/20 1841 96/53 -- -- -- -- -- --   07/19/20 1827 (!) 83/52 -- -- -- -- -- --   07/19/20 1738 93/49 -- -- -- -- -- --   07/19/20 1725 (!) 80/48 -- -- -- -- -- --   07/19/20 1634 (!) 82/57 -- (!) 122 -- -- -- --   07/19/20 1554 (!) 88/58 -- -- -- -- -- --   07/19/20 1509 102/52 98.6 °F (37 °C) (!) 122 15 92 % -- --   07/19/20 1435 111/60 -- (!) 122 -- -- -- --   07/19/20 1422 (!) 87/60 -- -- -- -- -- --   07/19/20 1352 90/54 98.3 °F (36.8 °C) (!) 121 16 98 % -- --   07/19/20 1211 (!) 76/49 98.6 °F (37 °C) (!) 124 15 96 % -- --       REVIEW OF SYSTEMS:    Constitutional: negative fever, negative chills, negative weight loss  Eyes:   negative visual changes  ENT:   negative sore throat, tongue or lip swelling  Respiratory:  negative cough, negative dyspnea  Cards:  negative for chest pain, palpitations, lower extremity edema  GI:   negative for nausea, vomiting, diarrhea, and abdominal pain  Neuro:  negative for headaches, dizziness, vertigo  [x]                        Full ROS o/w normal/non contributor    Constitutional:  Patient looks  []        Sick  [x]        Frail  []        Better                                                 []        Depressed    HEENT:  [x]   NC                         []   AT               []    ALOPECIA           Eyes: [x]   Normal               []    Icteric  Oropharynx: [x]    Normal                  []  Thrush               []   Dry  Neck:   [x]   Supple                  []  Rigid               JVD:    [x]   ABSENT       []   PRESENT  Aerating well   abd.  Soft non tender               Extr:    []  Lymphedema             []   Cyanosis      []  Clubbing  Edema:     []   NONE       [x]   PRESENT Rle    Skin:  Intact []           Purpura []        Rash: [x]   ABSENT       []  PRESENT  Neuro:  []        Normal  [x]        Confused      Available labs reviewed:  Labs:    Recent Results (from the past 24 hour(s))   METABOLIC PANEL, BASIC    Collection Time: 07/19/20 10:52 AM   Result Value Ref Range    Sodium 137 136 - 145 mmol/L    Potassium 3.9 3.5 - 5.1 mmol/L    Chloride 103 97 - 108 mmol/L    CO2 26 21 - 32 mmol/L    Anion gap 8 5 - 15 mmol/L    Glucose 152 (H) 65 - 100 mg/dL    BUN 21 (H) 6 - 20 MG/DL    Creatinine 1.01 0.70 - 1.30 MG/DL    BUN/Creatinine ratio 21 (H) 12 - 20      GFR est AA >60 >60 ml/min/1.73m2    GFR est non-AA >60 >60 ml/min/1.73m2    Calcium 8.1 (L) 8.5 - 10.1 MG/DL   PTT    Collection Time: 07/19/20 10:52 AM   Result Value Ref Range    aPTT 118.4 (HH) 22.1 - 32.0 sec    aPTT, therapeutic range     58.0 - 77.0 SECS   HEMATOCRIT    Collection Time: 07/19/20 10:52 AM   Result Value Ref Range    HCT 34.8 (L) 36.6 - 50.3 %   PTT    Collection Time: 07/19/20  2:22 PM   Result Value Ref Range    aPTT 38.5 (H) 22.1 - 32.0 sec    aPTT, therapeutic range     58.0 - 77.0 SECS   PTT    Collection Time: 07/19/20  9:38 PM   Result Value Ref Range    aPTT 85.1 (H) 22.1 - 32.0 sec    aPTT, therapeutic range     58.0 - 77.0 SECS   HGB & HCT    Collection Time: 07/19/20  9:38 PM   Result Value Ref Range    HGB 8.7 (L) 12.1 - 17.0 g/dL    HCT 27.1 (L) 36.6 - 50.3 %   CORTISOL, AM    Collection Time: 07/19/20  9:38 PM   Result Value Ref Range    Cortisol, a.m. 15.7 4.30 - 22.45 ug/dL   PTT    Collection Time: 07/20/20  2:37 AM   Result Value Ref Range    aPTT 38.7 (H) 22.1 - 32.0 sec    aPTT, therapeutic range     58.0 - 77.0 SECS   PROTHROMBIN TIME + INR    Collection Time: 07/20/20  2:37 AM   Result Value Ref Range    INR 1.3 (H) 0.9 - 1.1      Prothrombin time 13.0 (H) 9.0 - 11.1 sec   FIBRINOGEN    Collection Time: 07/20/20  2:37 AM   Result Value Ref Range    Fibrinogen 160 (L) 200 - 475 mg/dL   CBC W/O DIFF    Collection Time: 07/20/20  2:37 AM   Result Value Ref Range    WBC 4.4 4.1 - 11.1 K/uL    RBC 2.48 (L) 4.10 - 5.70 M/uL    HGB 7.6 (L) 12.1 - 17.0 g/dL    HCT 23.9 (L) 36.6 - 50.3 %    MCV 96.4 80.0 - 99.0 FL    MCH 30.6 26.0 - 34.0 PG    MCHC 31.8 30.0 - 36.5 g/dL    RDW 18.0 (H) 11.5 - 14.5 %    PLATELET 338 923 - 235 K/uL    MPV 9.7 8.9 - 12.9 FL    NRBC 10.4 (H) 0  WBC    ABSOLUTE NRBC 0.46 (H) 0.00 - 0.01 K/uL   MAGNESIUM    Collection Time: 07/20/20  2:37 AM   Result Value Ref Range    Magnesium 1.6 1.6 - 2.4 mg/dL   PHOSPHORUS    Collection Time: 07/20/20  2:37 AM   Result Value Ref Range    Phosphorus 2.6 2.6 - 4.7 MG/DL   METABOLIC PANEL, COMPREHENSIVE    Collection Time: 07/20/20  2:37 AM   Result Value Ref Range    Sodium 140 136 - 145 mmol/L    Potassium 3.3 (L) 3.5 - 5.1 mmol/L    Chloride 106 97 - 108 mmol/L    CO2 27 21 - 32 mmol/L    Anion gap 7 5 - 15 mmol/L    Glucose 128 (H) 65 - 100 mg/dL    BUN 27 (H) 6 - 20 MG/DL    Creatinine 0.88 0.70 - 1.30 MG/DL    BUN/Creatinine ratio 31 (H) 12 - 20      GFR est AA >60 >60 ml/min/1.73m2    GFR est non-AA >60 >60 ml/min/1.73m2    Calcium 7.9 (L) 8.5 - 10.1 MG/DL    Bilirubin, total 0.4 0.2 - 1.0 MG/DL    ALT (SGPT) 11 (L) 12 - 78 U/L AST (SGOT) 11 (L) 15 - 37 U/L    Alk. phosphatase 53 45 - 117 U/L    Protein, total 5.7 (L) 6.4 - 8.2 g/dL    Albumin 2.1 (L) 3.5 - 5.0 g/dL    Globulin 3.6 2.0 - 4.0 g/dL    A-G Ratio 0.6 (L) 1.1 - 2.2     TYPE + CROSSMATCH    Collection Time: 07/20/20  2:37 AM   Result Value Ref Range    Crossmatch Expiration 07/23/2020     ABO/Rh(D) O NEGATIVE     Antibody screen NEG     Unit number [id number]     Blood component type RC LR     Unit division 00     Status of unit ISSUED     Crossmatch result Compatible     Unit number [id number]     Blood component type RC LR     Unit division 00     Status of unit REL FROM [facility]     Crossmatch result Compatible     Unit number [id number]     Blood component type RC LR,1     Unit division 00     Status of unit ISSUED     Crossmatch result Compatible    LACTIC ACID    Collection Time: 07/20/20  2:37 AM   Result Value Ref Range    Lactic acid 1.2 0.4 - 2.0 MMOL/L   TROPONIN I    Collection Time: 07/20/20  2:37 AM   Result Value Ref Range    Troponin-I, Qt. <0.05 <0.05 ng/mL   NT-PRO BNP    Collection Time: 07/20/20  2:37 AM   Result Value Ref Range    NT pro- <450 PG/ML       Available Xrays reviewed:    Chemotherapy monitored and toxicities assessed:    Assessment and Plan   1. Rle DVT/PE. .. Pt had gi bleed after starting eliquis,, currently off heparin and eliquis. .. pt probably needs ivc filter, will d/w hospitalist    2. Pancreatic mass,,, pt said he would consider Rx if found to be malignant. ..awaiting results of paracentesis cytology done last week. . if negative and pt still willing/able would move forward with eus guided biopsy.    GI on board    Leilani Kline MD

## 2020-07-20 NOTE — PROGRESS NOTES
TIMMY  Patient admitted with RLE DVT, B/L PE's and IVC occlusion. RUR 22 %  Disposition: TBD  Patient transferred to ICU for hypotension and patient had 2 episodes of coffee ground emesis. Patient received 2 units PRBC's. Care management is continuing to follow.    Baljinder Carey RN,CRM

## 2020-07-20 NOTE — PROGRESS NOTES
[facility] Adult  Hospitalist Group     ICU Transfer/Accept Summary     This patient is being transferred INTO THE ICU  DATE OF TRANSFER: 7/20/2020       PATIENT ID: Valeria Slaughter  MRN: [id number]   YOB: 1934    PRIMARY CARE PROVIDER: Darrel Jurado MD   DATE OF ADMISSION: 7/13/2020  3:18 PM    ATTENDING PHYSICIAN: Dianne Lloyd NP  CONSULTATIONS:   IP CONSULT TO HEMATOLOGY  IP CONSULT TO GASTROENTEROLOGY  IP CONSULT TO HEPATOLOGY  IP CONSULT TO INTERVENTIONAL RADIOLOGY  IP CONSULT TO PALLIATIVE CARE - PROVIDER  IP CONSULT TO HEMATOLOGY    PROCEDURES/SURGERIES:   * No surgery found *    REASON FOR ADMISSION: DVT (deep venous thrombosis) [facility]     HOSPITAL PROBLEM LIST:  Patient Active Problem List   Diagnosis Code    DVT (deep vein thrombosis) in pregnancy O22.30    DVT (deep venous thrombosis) (HCC) I82.409         Brief HPI and Hospital Course:      Mr. Dea Alberto is an 14-year-old man with a past medical history of arthritis, BPH, DJD, hypercholesterolemia and pancreatic mass who was admitted on 7/13/2020 after several days of nausea and vomiting,new swelling in his right leg and hypotension. He was recently diagnosed with septic arthritis in his knee. He had an aspiration and was put on antibiotics and sent home. He developed pneumonia, pancreatitis and continued to have pain. He was admitted to [facility] for short while and was treated with additional antibiotics. He was discharged to rehab facility and eventually to home with services. Evaluation here at [facility] found him to have an extensive DVT in his right lower leg extending up into the vena cava. He also had bilateral PEs. HemeOnc, gastroenterology and hepatology are following. He is on a heparin drip    Beginning the morning of 7/19/2020, patient became more hypotensive and tachycardic. He has continued with coffee ground emesis.   He had initially been fluid responsive, but despite approximately 2 L of IV fluid, albumin and midodrine,  hypotension continued. Additionally, hemoglobin dropped approximately 2 g to 8.7. One unit PRBC was ordered. Throughout, patient has been alert and oriented and has no complaints. Concern is for upper GI bleed. ICU was consulted and accepted the patient for transfer to ICU for closer monitoring. Transfer to the unit was discussed with the patient's wife by Dr. Retta Krabbe. Assessment and Plan:    Hypotension, likely related to upper GI bleed  -Several episodes of coffee-ground emesis this shift  -2g drop in hemoglobin to 8.7  -recently started eating. Now NPO  · Continue PPI and antiemetics  · Trend H&H  · PRBC 1 unit ordered  · Heparin drip on hold  · TPN  · GI following    Acute DVT/PE  -Doppler 7/13 partially occlusive DVT right common femoral  -CTA 7/13: Bilateral pulmonary emboli  -Heparin on hold due to above    Pancreatic mass  -High suspicion for neoplasm. Not a candidate for biopsy.  -Hepatology following, s/p paracentesis. Awaiting cytology  · On TPN    Hypokalemia  · Replete and monitor    PMH hypercholesterolemia, BPH  · Continue home meds        CODE: Full  DVT: Heparin Drip (on hold)  FUNCTIONAL STATUS: Bedbound. Baseline independent w assist.             PHYSICAL EXAMINATION:  Visit Vitals  BP (!) 81/44   Pulse (!) 114   Temp 98.3 °F (36.8 °C)   Resp 20   Ht 5' 10\" (1.778 m)   Wt 76.9 kg (169 lb 8.5 oz)   SpO2 96%   BMI 24.33 kg/m²       General:          Alert, cooperative, no distress, appears 'tired'  HEENT:           Atraumatic, MMM            Neck:               Supple, symmetrical,  thyroid: non tender  Lungs:             Clear to auscultation bilaterally. No Wheezing or Rhonchi. No rales. Heart:              Regular  rhythm,  No  murmur   No edema  Abdomen:       Soft, non-tender. Not distended. Bowel sounds normal  Extremities:     No cyanosis. No clubbing,  +2 distal pulses  Skin:                Not pale.   Not Jaundiced  No rashes   Psych: Not anxious or agitated. Neurologic:      Alert, moves all extremities, oriented X 3.     CODE STATUS:  xx Full Code    DNR    Partial    Comfort Care         IMPENDING DETERIORATION -Cardiovascular    ASSOCIATED RISK FACTORS - Hypotension and Bleeding    MANAGEMENT- Bedside Assessment, Supervision of Care and Transfer    INTERPRETATION -  Blood Pressure and labs    INTERVENTIONS - hemodynamic mngmt    TREATMENT RESPONSE -Unchanged     PERFORMED BY - Self and Dr. Watson Alpers    I have spent 40 minutes of critical care time involved in lab review, consultations with specialist, family decision- making, bedside attention and documentation. During this entire length of time I was immediately available to the patient .     Lenny Macedo, MPH, MSN, RN, NP-C  [phone] or via Perfect Serve                                                 Signed:   Lenny Macedo NP  Date of Service:  7/20/2020  3:03 AM

## 2020-07-20 NOTE — PROCEDURES
[address]  [address] [facility], [address] [place] [address]        Esophago- Gastroduodenoscopy (EGD) Procedure Note    Jacky Tavera  1934  [id number]      Procedure: Endoscopic Gastroduodenoscopy --diagnostic    Indication:  coffee ground emesis     Pre-operative Diagnosis: see indication above    Post-operative Diagnosis: see findings below    : Bernice Wiley. Jeremie Burkett MD    Referring Provider: Karen Schmitz MD      Anesthesia/Sedation:  Versed 2 mg IV and Fentanyl 25 mcg IV        Procedure Details     After informed consent was obtained for the procedure, with all risks and benefits of procedure explained the patient was taken to the endoscopy suite and placed in the left lateral decubitus position. Following sequential administration of sedation as per above, the endoscope was inserted into the mouth and advanced under direct vision to second portion of the duodenum. A careful inspection was made as the gastroscope was withdrawn, including a retroflexed view of the proximal stomach; findings and interventions are described below. Findings:   Esophagus: LA Grade D erosive esophagitis with stigmata of recent bleeding. Stomach: a 2 cm hiatal hernia was present. Approximately 1200 cc dark fluid was suctioned. There was no obvious underlying mucosal abnormality. The stomach anatomy was tortuous and angulated possibly due to extrinsic compression. It was necessary to overcome extensive looping to traverse the pylorus, which was otherwise patent and normal appearing. Duodenum: extrinsic compression on the duodenal bulb, but the scope traversed this into the duodenal sweep and second portion, which were normal appearing. There was spontaneous biliary drainage. No stigmata of recent bleeding seen in the duodenum      Therapies:  none    Specimens: none         EBL: None      Complications:   None; patient tolerated the procedure well. Impression:    1.  LA Grade D erosive esophagitis  2. Hiatal hernia  3. Retention of a large volume of gastric fluid likely secondary to gastric outlet obstruction from extrinsic compression    Recommendations:  1. NPO for now  2. Pantoprazole 40 mg IV q12  3. Would continue to hold anticoagulation as able  4. He will need repeat EGD in 2 months to evaluate for mucosal healing of his severe erosive esophagitis  5. Follow up cytology from ascites fluid analysis  6. Consider EUS +/- FNA/FNB when out of ICU if cytology is non-diagnostic  7. Will follow along with you    Signed By: Usha Wynn.  Bonifacio Marcus MD     7/20/2020  6:37 PM

## 2020-07-20 NOTE — PROGRESS NOTES
Post Fall Documentation      [facility] witnessed/unwitnessed fall occurred on 7/19/20 (Date) at 36 (Time). The answers to the following questions summarize the fall: In the patient's own words,:  · What were you attempting to do when you fell? \"Going to the bathroom\"  · Do you know why you fell? \"I got dizzy\"  · Do you have any pain/discomfort or any other complaints? \"No\"  · Which part of your body made contact with the floor or other object? \"My head\"    Nurse:  [facility] Was this an assisted fall? no   Was fall witnessed? Yes     By Whom? PCT   If witnessed, what part of the body made contact with the floor or other object? Head hit the bed rail   Patients mental status after the fall/when found: Alert and oriented   Any apparent injury:  Swelling   Immediate interventions for injury/suspected injury? No interventions needed   Patient assisted back to bed? Assist X2   Name of provider notified and time, any comments? Dr. Hawk Black @ 1640,  CT of head ordered.  Name of family member notified and time: Pt's daughter and aunt. Immediate VS and physical assessment documented in flow sheets. Neuro assessment every hour x 4 (for potential head injury or unwitnessed fall) documented in flow sheets.       April Lili Benton no

## 2020-07-20 NOTE — PROGRESS NOTES
Angio notified ICU RN regarding pt BP. ICU RN to come to angio with ordered Adam drip and start. Angio RN to continue to monitor pt closely.

## 2020-07-20 NOTE — PERIOP NOTES
TRANSFER - IN REPORT:    Verbal report received from [address] RN(name) on Ayala Calixto  being received from 7110(unit) for ordered procedure      Report consisted of patients Situation, Background, Assessment and   Recommendations(SBAR). Information from the following report(s) SBAR, Recent Results, Pre Procedure Checklist, Procedure Verification and Quality Measures was reviewed with the receiving nurse. Opportunity for questions and clarification was provided. Assessment completed upon patients arrival to unit and care assumed.

## 2020-07-20 NOTE — PROGRESS NOTES
Notified patient has been hypotensive this afternoon. As per RN report patient had an episode of coffee ground emesis earlier this today. Through the day he is becoming hypotensive and tachycardic with bp 70/48  Up to 111/60. He has received   two doses of albumin 25%, 1,250ml IV NS, a dose of midrodrine and he is currently getting albumin 5% 25gm. Noted that patient is fluid responsive. Lasted bp in the 23W systolic while getting albumin. Patient alert, awake and asymptomatic. Does not have any acute complaints. H/H repeated stat tonight is 8.7/27.1 done from 10.4 and Hct 34.8 from earlier today. Given reported coffee ground emesis, worsening hypotension and anemia concerned for GI bleed. Will hold heparin gtt for now. Trend H/H every 6 hours. Transfusion consent completed and signed. Given hemodynamic instability and concerns for GI I personally discussed case with ICU NP. Requested ICU eval. She stated will evaluate if persistently hypotensive. Addendum 7/20/2020 0130: Patient just had 2 episodes of coffee ground emesis. Now hypotensive. I have ordered for 1Unit of PRBC. I have discussed case with Dr. Lamine Brambila ICU attending. Appreciate evaluation and transfer to the ICU for close monitoring. Addendum: 7/20/2020 0303: I have notified Patient's wife Mrs. Venu Zamora of patient's current clinical condition and transfer to the ICU.

## 2020-07-20 NOTE — PROGRESS NOTES
Attended Interdisciplinary rounds in Critical Care Unit, where patient care was discussed. Visit by: Kelechi Kingsley. Cassius Marcum.  Estella Mariscal MA, [address] [place] [address]

## 2020-07-20 NOTE — PROGRESS NOTES
TRANSFER - IN REPORT:    Verbal report received from Carmelina Snyder RN(name) on Camden Osborne  being received from ICU-10(unit) for ordered procedure      Report consisted of patients Situation, Background, Assessment and   Recommendations(SBAR). Information from the following report(s) SBAR, Kardex, Intake/Output, MAR and Recent Results was reviewed with the receiving nurse. Opportunity for questions and clarification was provided. Assessment completed upon patients arrival to unit and care assumed.      Pt transported on monitor by ICU RN and transport

## 2020-07-20 NOTE — PROGRESS NOTES
Physical therapy:    Chart reviewed in prep for PT session. Noted patient transferred to ICU on 7/19/2020 for hypotension and tachycardia. Pt also with continued coffee ground emesis and Hgb dropping. Spoke with RN and pt is currently receiving a blood transfusion and is expected to have a busy day with medical procedures. Will defer today and continue to follow.  Thank you    Robin Ballard, PT, DPT

## 2020-07-20 NOTE — H&P
Radiology History and Physical    Patient: Megan Huddleston 80 y.o. male       Chief Complaint: Fatigue; Vomiting; and Hypotension      History of Present Illness: DVT of RLE in femoral veins. Anticoagulation contraindicated by GI bleed. Consent obtained from patient's daughter. Patient mildly hypotensive during initial evaluation, lethargic and difficult to obtain history.     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    BPH (benign prostatic hyperplasia)     DJD (degenerative joint disease)     Hypercholesterolemia     Other and unspecified hyperlipidemia      Family History   Problem Relation Age of Onset    Diabetes Sister     Diabetes Brother     Heart Disease Brother      Social History     Socioeconomic History    Marital status:      Spouse name: Not on file    Number of children: Not on file    Years of education: Not on file    Highest education level: Not on file   Occupational History    Not on file   Social Needs    Financial resource strain: Not on file    Food insecurity     Worry: Not on file     Inability: Not on file    Transportation needs     Medical: Not on file     Non-medical: Not on file   Tobacco Use    Smoking status: Never Smoker    Smokeless tobacco: Never Used   Substance and Sexual Activity    Alcohol use: Yes     Comment: social    Drug use: Not on file    Sexual activity: Not Currently   Lifestyle    Physical activity     Days per week: Not on file     Minutes per session: Not on file    Stress: Not on file   Relationships    Social connections     Talks on phone: Not on file     Gets together: Not on file     Attends Congregational service: Not on file     Active member of club or organization: Not on file     Attends meetings of clubs or organizations: Not on file     Relationship status: Not on file    Intimate partner violence     Fear of current or ex partner: Not on file     Emotionally abused: Not on file     Physically abused: Not on file     Forced sexual activity: Not on file   Other Topics Concern    Not on file   Social History Narrative    Not on file       Allergies:    Allergies   Allergen Reactions    Aspirin Unknown (comments)       Current Medications:  Current Facility-Administered Medications   Medication Dose Route Frequency    0.9% sodium chloride infusion 250 mL  250 mL IntraVENous PRN    octreotide (SANDOSTATIN) 500 mcg in 0.9% sodium chloride 500 mL infusion  25 mcg/hr IntraVENous CONTINUOUS    lactated Ringers infusion  125 mL/hr IntraVENous CONTINUOUS    midodrine (PROAMATINE) tablet 10 mg  10 mg Oral Q8H    PHENYLephrine (ANTONI-SYNEPHRINE) 30 mg in 0.9% sodium chloride 250 mL infusion   mcg/min IntraVENous TITRATE    0.9% sodium chloride infusion 250 mL  250 mL IntraVENous PRN    iohexoL (OMNIPAQUE) 240 mg iodine/mL solution 50 mL  50 mL Other RAD ONCE    lidocaine (XYLOCAINE) 20 mg/mL (2 %) injection 400 mg  20 mL SubCUTAneous ONCE    TPN ADULT - CENTRAL   IntraVENous CONTINUOUS    lidocaine (XYLOCAINE) 20 mg/mL (2 %) injection        iopamidoL (ISOVUE 300) 61 % contrast injection        pantoprazole (PROTONIX) 40 mg in 0.9% sodium chloride 10 mL injection  40 mg IntraVENous Q12H    prochlorperazine (COMPAZINE) with saline injection 5 mg  5 mg IntraVENous Q6H    TPN ADULT - CENTRAL   IntraVENous CONTINUOUS    hydrocortisone Sod Succ (PF) (SOLU-CORTEF) injection 50 mg  50 mg IntraVENous Q6H    [Held by provider] heparin 25,000 units in D5W 250 ml infusion  18-36 Units/kg/hr IntraVENous TITRATE    benzonatate (TESSALON) capsule 100 mg  100 mg Oral TID PRN    guaiFENesin (ROBITUSSIN) 100 mg/5 mL oral liquid 100 mg  100 mg Oral Q4H PRN    ondansetron (ZOFRAN) injection 4 mg  4 mg IntraVENous Q6H    scopolamine (TRANSDERM-SCOP) 1 mg over 3 days 1 Patch  1 Patch TransDERmal Q72H    prochlorperazine (COMPAZINE) tablet 5 mg  5 mg Oral Q6H PRN    finasteride (PROSCAR) tablet 5 mg  5 mg Oral DAILY    [Held by provider] tamsulosin (FLOMAX) capsule 0.4 mg  0.4 mg Oral DAILY    sodium chloride (NS) flush 5-40 mL  5-40 mL IntraVENous Q8H    sodium chloride (NS) flush 5-40 mL  5-40 mL IntraVENous PRN    acetaminophen (TYLENOL) tablet 650 mg  650 mg Oral Q6H PRN    balsam peru-castor oiL (VENELEX) ointment   Topical BID        Physical Exam:  Blood pressure 92/62, pulse 91, temperature 97.8 °F (36.6 °C), resp. rate 13, height 5' 10\" (1.778 m), weight 71 kg (156 lb 8.4 oz), SpO2 100 %. GENERAL: Lethargic, but responsive, cooperative, no distress, appears stated age,   LUNG: Nonlabored respiration  HEART: regular rate and rhythm, S1, S2 normal, no murmur, click, rub or gallop      Alerts:    Hospital Problems  Date Reviewed: 7/14/2020          Codes Class Noted POA    * (Principal) DVT (deep venous thrombosis) ([facility] ICD-10-CM: I82.409  ICD-9-CM: 453.40  7/14/2020 Yes              Laboratory:      Recent Labs     07/20/20  1207  07/20/20  0237   HGB 10.2*   < > 7.6*   HCT 31.5*   < > 23.9*   WBC 7.4  --  4.4     --  180   INR  --   --  1.3*   BUN 22*  --  27*   CREA 0.80  --  0.88   K 4.8  --  3.3*    < > = values in this interval not displayed. Plan of Care/Planned Procedure:  Risks, benefits, and alternatives reviewed with patient and he agrees to proceed with the procedure. No sedation to be used. Pressors initiated in holding area before procedure due to MAPs intermittently below 65, although patient remained responsive throughout my interaction with him. Will proceed with IVC filter. Circumaortic left renal vein noted on CT.     Bill Liz MD  Interventional Radiology  [facility]  2:02 PM, 7/20/2020

## 2020-07-20 NOTE — CDMP QUERY
Pt admitted with DVT's. Pt noted to have several episodes of Coffee ground emesis, with documentation of UGIB. Hgb hs dropped from 10.4 to 7.6, requiring transfer to ICU . If possible, please document in the progress notes and d/c summary if you are evaluating and / or treating any of the following:     Anemia due to acute blood loss   Anemia due to chronic blood loss   Dilutional anemia   Other, please specify   Clinically unable to determine    The medical record reflects the following:     Risk Factors: coffee ground emesis     Clinical Indicators: pt with several episodes of coffee ground emesis, with documentation of UGIB. BP dropped down to 76/49, with . Hgb has dropped from 10.4 to 7.6. Has been transferred to ICU for closer observation and possible pressor support. Treatment: 1U PRBC, Albumin IV x4 doses, 500 CC NS IV Bolus x3, transfer to ICU for closer observation.     Thank you, Deisi JOHNSON  Clinical Documentation Improvement  [phone]

## 2020-07-20 NOTE — PROGRESS NOTES
Occupational Therapy: hold    Chart reviewed in prep for OT session. Noted patient transferred to ICU on 7/19/2020 for hypotension and tachycardia. Pt also with continued coffee ground emesis and Hgb dropping. Noted pt is currently receiving a blood transfusion and is expected to have a busy day with medical procedures. Will hold OT today and will f/u as able and appropriate. Thank you.     Kwame Boateng, OTR/L

## 2020-07-20 NOTE — PROGRESS NOTES
Brief Critical Care Progress Note    Patient transferred to ICU overnight for hypotension likely due to GIB in the setting of heparin use for DVT/PE. Heparin stopped. Patient received 2u PRBCs with appropriate response. Trending H/H q6h. Given inability to anticoagulate, patient went to IR for IVC filter placement. During the procedure, patient started on low dose phenylephrine to maintain MAP > 65. Following IVC filter placement, patient underwent EGD at bedside. Patient with severe erosive esophagitis with about 1.2L of dark/black gastric contents removed. No source of active bleeding identified. Patient remains lethargic and on low dose phenylephrine following procedure. Will send CBC at 1800.     KENRICK Rivera

## 2020-07-20 NOTE — PROGRESS NOTES
[address]  [address] [facility], [address]       GI PROGRESS NOTE  Will Kervin Pang  [phone] office  [phone] NP/PA in-hospital cell phone M-F until 4:30PM  After 5PM or on weekends, please call  for physician on call      NAME: Birgit Calabrese   :  1934   MRN:  [id number]       Subjective:   Patient was transferred to the ICU due to hypotension. He reportedly had coffee-ground emesis prior to arrival in the ICU. No vomiting since in the ICU. No melena. He is very drowsy. Discussed with RN. Patient has had complaints of nausea. He has been receiving both Zofran and Compazine. He is receiving 2nd unit of PRBCs at this time. Patient is not on pressors at this time. He remains hypotensive. Objective:     VITALS:   Last 24hrs VS reviewed since prior progress note. Most recent are:  Visit Vitals  BP 93/55   Pulse 97   Temp 98 °F (36.7 °C)   Resp 8   Ht 5' 10\" (1.778 m)   Wt 71.4 kg (157 lb 6.5 oz)   SpO2 96%   BMI 22.59 kg/m²       PHYSICAL EXAM:  General: No acute distress    Neurologic:  Alert, drowsy  HEENT: EOMI, no scleral icterus   Lungs:  No respiratory distress  Heart:  S1 S2  Abdomen: Soft, mildly distended, no apparent tenderness, no guarding, no rebound. +Bowel sounds.    Extremities: Warm  Psych:   Unable to assess, drowsy    Lab Data Reviewed:     Recent Results (from the past 24 hour(s))   METABOLIC PANEL, BASIC    Collection Time: 20 10:52 AM   Result Value Ref Range    Sodium 137 136 - 145 mmol/L    Potassium 3.9 3.5 - 5.1 mmol/L    Chloride 103 97 - 108 mmol/L    CO2 26 21 - 32 mmol/L    Anion gap 8 5 - 15 mmol/L    Glucose 152 (H) 65 - 100 mg/dL    BUN 21 (H) 6 - 20 MG/DL    Creatinine 1.01 0.70 - 1.30 MG/DL    BUN/Creatinine ratio 21 (H) 12 - 20      GFR est AA >60 >60 ml/min/1.73m2    GFR est non-AA >60 >60 ml/min/1.73m2    Calcium 8.1 (L) 8.5 - 10.1 MG/DL   PTT    Collection Time: 20 10:52 AM   Result Value Ref Range    aPTT 118.4 (HH) 22.1 - 32.0 sec    aPTT, therapeutic range     58.0 - 77.0 SECS   HEMATOCRIT    Collection Time: 07/19/20 10:52 AM   Result Value Ref Range    HCT 34.8 (L) 36.6 - 50.3 %   PTT    Collection Time: 07/19/20  2:22 PM   Result Value Ref Range    aPTT 38.5 (H) 22.1 - 32.0 sec    aPTT, therapeutic range     58.0 - 77.0 SECS   PTT    Collection Time: 07/19/20  9:38 PM   Result Value Ref Range    aPTT 85.1 (H) 22.1 - 32.0 sec    aPTT, therapeutic range     58.0 - 77.0 SECS   HGB & HCT    Collection Time: 07/19/20  9:38 PM   Result Value Ref Range    HGB 8.7 (L) 12.1 - 17.0 g/dL    HCT 27.1 (L) 36.6 - 50.3 %   CORTISOL, AM    Collection Time: 07/19/20  9:38 PM   Result Value Ref Range    Cortisol, a.m. 15.7 4.30 - 22.45 ug/dL   PTT    Collection Time: 07/20/20  2:37 AM   Result Value Ref Range    aPTT 38.7 (H) 22.1 - 32.0 sec    aPTT, therapeutic range     58.0 - 77.0 SECS   PROTHROMBIN TIME + INR    Collection Time: 07/20/20  2:37 AM   Result Value Ref Range    INR 1.3 (H) 0.9 - 1.1      Prothrombin time 13.0 (H) 9.0 - 11.1 sec   FIBRINOGEN    Collection Time: 07/20/20  2:37 AM   Result Value Ref Range    Fibrinogen 160 (L) 200 - 475 mg/dL   CBC W/O DIFF    Collection Time: 07/20/20  2:37 AM   Result Value Ref Range    WBC 4.4 4.1 - 11.1 K/uL    RBC 2.48 (L) 4.10 - 5.70 M/uL    HGB 7.6 (L) 12.1 - 17.0 g/dL    HCT 23.9 (L) 36.6 - 50.3 %    MCV 96.4 80.0 - 99.0 FL    MCH 30.6 26.0 - 34.0 PG    MCHC 31.8 30.0 - 36.5 g/dL    RDW 18.0 (H) 11.5 - 14.5 %    PLATELET 487 981 - 287 K/uL    MPV 9.7 8.9 - 12.9 FL    NRBC 10.4 (H) 0  WBC    ABSOLUTE NRBC 0.46 (H) 0.00 - 0.01 K/uL   MAGNESIUM    Collection Time: 07/20/20  2:37 AM   Result Value Ref Range    Magnesium 1.6 1.6 - 2.4 mg/dL   PHOSPHORUS    Collection Time: 07/20/20  2:37 AM   Result Value Ref Range    Phosphorus 2.6 2.6 - 4.7 MG/DL   METABOLIC PANEL, COMPREHENSIVE    Collection Time: 07/20/20  2:37 AM   Result Value Ref Range    Sodium 140 136 - 145 mmol/L Potassium 3.3 (L) 3.5 - 5.1 mmol/L    Chloride 106 97 - 108 mmol/L    CO2 27 21 - 32 mmol/L    Anion gap 7 5 - 15 mmol/L    Glucose 128 (H) 65 - 100 mg/dL    BUN 27 (H) 6 - 20 MG/DL    Creatinine 0.88 0.70 - 1.30 MG/DL    BUN/Creatinine ratio 31 (H) 12 - 20      GFR est AA >60 >60 ml/min/1.73m2    GFR est non-AA >60 >60 ml/min/1.73m2    Calcium 7.9 (L) 8.5 - 10.1 MG/DL    Bilirubin, total 0.4 0.2 - 1.0 MG/DL    ALT (SGPT) 11 (L) 12 - 78 U/L    AST (SGOT) 11 (L) 15 - 37 U/L    Alk. phosphatase 53 45 - 117 U/L    Protein, total 5.7 (L) 6.4 - 8.2 g/dL    Albumin 2.1 (L) 3.5 - 5.0 g/dL    Globulin 3.6 2.0 - 4.0 g/dL    A-G Ratio 0.6 (L) 1.1 - 2.2     TYPE + CROSSMATCH    Collection Time: 07/20/20  2:37 AM   Result Value Ref Range    Crossmatch Expiration 07/23/2020     ABO/Rh(D) O NEGATIVE     Antibody screen NEG     Unit number [id number]     Blood component type  LR     Unit division 00     Status of unit ISSUED     Crossmatch result Compatible     Unit number [id number]     Blood component type  LR     Unit division 00     Status of unit REL FROM [facility]     Crossmatch result Compatible     Unit number [id number]     Blood component type  LR,1     Unit division 00     Status of unit ISSUED     Crossmatch result Compatible    LACTIC ACID    Collection Time: 07/20/20  2:37 AM   Result Value Ref Range    Lactic acid 1.2 0.4 - 2.0 MMOL/L   TROPONIN I    Collection Time: 07/20/20  2:37 AM   Result Value Ref Range    Troponin-I, Qt. <0.05 <0.05 ng/mL   NT-PRO BNP    Collection Time: 07/20/20  2:37 AM   Result Value Ref Range    NT pro- <450 PG/ML       Assessment:   · Hematemesis: Hgb 7.6 (8.7<--10.4<--10.2 yesterday), INR 1.3. Heparin gtt on hold. Received 1 dose of Eliquis on 7/18.  Receiving 2nd unit    · Hypotension  · Pancreatic mass: CT abdomen/pelvis with IV contrast (7/13/20): mass-like fullness in the pancreatic head, resulting in intrahepatic and pancreatic duct dilatation, peripancreatic inflammatory changes with 3.7 x 2.3 cm collection near the pancreatic tail possibly pseudocyst, large amount of ascites, and moderate amount of slightly complex fluid/soft tissue in the left abdomen - findings may represent pancreatitis or pancreatic neoplasm; edematous thickening of the gastric antrum and duodenum likely secondary to the pancreatic process.  LFTs and lipase unremarkable on 7/13.   · Right lower extremity DVT with bilateral PEs: heparin gtt on hold. Received 1 dose of Eliquis on 7/18. IVC filter planned for today. · Ascites: hepatology, Dr. Aliya Delgado following. · Hypotension  · COVID-19 negative     Patient Active Problem List   Diagnosis Code    DVT (deep vein thrombosis) in pregnancy O22.30    DVT (deep venous thrombosis) (HCC) I82.409     Plan:   · PPI BID  · On octreotide gtt  · Trend CBC and transfuse as necessary  · Plan for bedside EGD this afternoon with Dr. Wannetta Hamman. Discussed with RN. Details and risks of the procedure to include (but not limited to) anesthesia, bleeding, infection, and perforation were discussed with the patient's wife/POA,  Danilo Sanders ([phone]). She understands and is in agreement to proceed. Please verify consent has been obtained. · Follow up on cytology from peritoneal fluid. Next consider EUS/FNA for pancreatic mass. Hepatology, Dr. Aliya Delgado, following. Signed By: Melvern Cranker, [place]     7/20/2020  9:03 AM          This patient was seen and examined by me in a face-to-face visit today. I reviewed the medical record including lab work, imaging and other provider notes. I confirmed the interval history as described above. I spoke to the patient, discussing our findings and plans. I discussed this case in detail with Chaitanya Collins. I formulated an updated  assessment of this patient and guided our treatment plan. I agree with the above progress note. I agree with the history, exam and assessment and plan as outlined in the note.   I would like to add the following: Plan for EGD today as above. Rivera Mckeon MD

## 2020-07-20 NOTE — CONSULTS
[facility]    ICU TEAM Note    Name: Jorje Tolliver   : 1934   MRN: [id number]   Date: 2020      Assessment/Plan:       1. Hypotension  a. Likely hypovolemia from GIB, recent echo with collapsible IVC  b. Fluid responsive  c. Will transfuse 1 PRBC in lieu of decreasing hemoglobin  d. Aim for SBP >90 - may need phenylephrine - continue midodrine  2. Anemia/GIB  a. Unclear etiology, perhaps PUD vs variceal bleed  b. No endoscopic evidence of varices but mention of splenic vein thrombosis and varices  c. GI consulted  d. Transfuse for active bleed or hgb <7  e. Start octreotide gtt, continue high dose PPI  3. Cirrhosis  a. Seen on CT body - unclear etiology - hepatology following  4. Ascites  a. SAAG suggestive of not portal HTN etiology - malignancy work-up pending  5. PE/DVT  a. Likely to the above  b. Hold blood thinners given GIB  6. BPH  a. Hold flomax  7. Pancreatic mass  a. Cytology vs tissue biopsy pending      Subjective:   Progress Note: 2020      Reason for ICU Admission: 79 yo male with BPH, recurrent pancreatitis who was admitted with hypotension. Found with DVT/PE, ascites, cirrhosis, coffee ground emesisand pancreatic mass. Transferred to ICU with hypotension, fluid responsive but persistent. Active Problem List:     Problem List  Date Reviewed: 2020          Codes Class    * (Principal) DVT (deep venous thrombosis) (HCC) ICD-10-CM: I82.409  ICD-9-CM: 453.40         DVT (deep vein thrombosis) in pregnancy ICD-10-CM: O22.30  ICD-9-CM: 671.30               Past Medical History:      has a past medical history of Arthritis, BPH (benign prostatic hyperplasia), DJD (degenerative joint disease), Hypercholesterolemia, and Other and unspecified hyperlipidemia.  He also has no past medical history of Abuse, Adverse effect of anesthesia, Anemia NEC, Calculus of kidney, Congestive heart failure, unspecified, Contact dermatitis and other eczema, due to unspecified cause, COPD, Depression, Headache(784.0), Psychotic disorder ([facility] Sleep apnea, or Trauma. Past Surgical History:      has a past surgical history that includes hx orthopaedic (Right, 2010); hx tonsillectomy; and colonoscopy (N/A, 2017). Home Medications:     Prior to Admission medications    Medication Sig Start Date End Date Taking? Authorizing Provider   multivitamin with iron tablet Take 1 Tab by mouth daily. Provider, Historical   predniSONE (STERAPRED) 5 mg dose pack See administration instruction per 5mg dose pack 18   Ramez SHEPPARD MD   TAMSULOSIN HCL (TAMSULOSIN PO) Take 0.4 mg by mouth daily. Provider, Historical   finasteride (PROSCAR) 5 mg tablet Take 5 mg by mouth daily. Provider, Historical       Allergies/Social/Family History: Allergies   Allergen Reactions    Aspirin Unknown (comments)      Social History     Tobacco Use    Smoking status: Never Smoker    Smokeless tobacco: Never Used   Substance Use Topics    Alcohol use: Yes     Comment: social      Family History   Problem Relation Age of Onset    Diabetes Sister     Diabetes Brother     Heart Disease Brother        Review of Systems:     A comprehensive review of systems was negative except for that written in the HPI.     Objective:   Vital Signs:  Visit Vitals  BP (!) 81/44   Pulse (!) 114   Temp 98.3 °F (36.8 °C)   Resp 20   Ht 5' 10\" (1.778 m)   Wt 76.9 kg (169 lb 8.5 oz)   SpO2 96%   BMI 24.33 kg/m²      O2 Device: Room air   Temp (24hrs), Av.3 °F (36.8 °C), Min:97.5 °F (36.4 °C), Max:98.6 °F (37 °C)           Intake/Output:     Intake/Output Summary (Last 24 hours) at 2020 0242  Last data filed at 2020 0124  Gross per 24 hour   Intake 0 ml   Output 250 ml   Net -250 ml       Physical Exam:    General: NAD  HENT: Atraumatic  Cardio: RRR  Respiratory: CTA x 2  GI: Soft not tender abdomen, distended  Extremities: -ve edema  Neuro: grossly intact      LABS AND  DATA: Personally reviewed  Recent Labs     07/19/20 2138 07/19/20  1052 07/19/20  0356 07/18/20  1832   WBC  --   --  8.3 6.9   HGB 8.7*  --  10.4*  10.2* 10.6*   HCT 27.1* 34.8* 31.7* 33.2*   PLT  --   --  256 248     Recent Labs     07/19/20  1052 07/19/20  0356 07/17/20  0304    136 139   K 3.9 3.6 3.0*    99 104   CO2 26 33* 28   BUN 21* 17 9   CREA 1.01 1.00 0.90   * 139* 126*   CA 8.1* 8.1* 8.0*   MG  --  1.6 1.7   PHOS  --  3.2  --      Recent Labs     07/19/20 0356   LPSE 121     Recent Labs     07/19/20 2138 07/19/20  1422  07/19/20  0356  07/18/20  0330   INR  --   --   --  1.3*  --  1.3*   PTP  --   --   --  13.4*  --  13.0*   APTT 85.1* 38.5*   < > 75.8*   < > 65.2*    < > = values in this interval not displayed. No results for input(s): PHI, PCO2I, PO2I, FIO2I in the last 72 hours. No results for input(s): CPK, CKMB, TROIQ, BNPP in the last 72 hours. Hemodynamics:   PAP:   CO:     Wedge:   CI:     CVP:    SVR:       PVR:       Ventilator Settings:  Mode Rate Tidal Volume Pressure FiO2 PEEP                    Peak airway pressure:      Minute ventilation:          MEDS: Reviewed    Chest X-Ray:  CXR Results  (Last 48 hours)    None        ECHO:  Preserved EF    DISPOSITION  Stay in ICU    CRITICAL CARE CONSULTANT NOTE  I had a face to face encounter with the patient, reviewed and interpreted patient data including clinical events, labs, images, vital signs, I/O's, and examined patient. I have discussed the case and the plan and management of the patient's care with the consulting services, the bedside nurses and the respiratory therapist.      NOTE OF PERSONAL INVOLVEMENT IN CARE   This patient has a high probability of imminent, clinically significant deterioration, which requires the highest level of preparedness to intervene urgently.  I participated in the decision-making and personally managed or directed the management of the following life and organ supporting interventions that required my frequent assessment to treat or prevent imminent deterioration. I personally spent 35 minutes of critical care time. This is time spent at this critically ill patient's bedside actively involved in patient care as well as the coordination of care and discussions with the patient's family. This does not include any procedural time which has been billed separately.     Kasey Reyna MD  975 codetag  7/20/2020

## 2020-07-21 ENCOUNTER — APPOINTMENT (OUTPATIENT)
Dept: CT IMAGING | Age: 85
DRG: 166 | End: 2020-07-21
Attending: PHYSICIAN ASSISTANT
Payer: MEDICARE

## 2020-07-21 LAB
ABO + RH BLD: NORMAL
ANION GAP SERPL CALC-SCNC: 5 MMOL/L (ref 5–15)
APTT PPP: 32.2 SEC (ref 22.1–32)
BLD PROD TYP BPU: NORMAL
BLOOD GROUP ANTIBODIES SERPL: NORMAL
BPU ID: NORMAL
BUN SERPL-MCNC: 21 MG/DL (ref 6–20)
BUN/CREAT SERPL: 27 (ref 12–20)
CALCIUM SERPL-MCNC: 8 MG/DL (ref 8.5–10.1)
CHLORIDE SERPL-SCNC: 112 MMOL/L (ref 97–108)
CO2 SERPL-SCNC: 25 MMOL/L (ref 21–32)
CREAT SERPL-MCNC: 0.78 MG/DL (ref 0.7–1.3)
CROSSMATCH RESULT,%XM: NORMAL
FIBRINOGEN PPP-MCNC: 155 MG/DL (ref 200–475)
GLUCOSE BLD STRIP.AUTO-MCNC: 164 MG/DL (ref 65–100)
GLUCOSE BLD STRIP.AUTO-MCNC: 172 MG/DL (ref 65–100)
GLUCOSE SERPL-MCNC: 200 MG/DL (ref 65–100)
HCT VFR BLD AUTO: 30.6 % (ref 36.6–50.3)
HCT VFR BLD AUTO: 31.1 % (ref 36.6–50.3)
HGB BLD-MCNC: 10.1 G/DL (ref 12.1–17)
HGB BLD-MCNC: 10.1 G/DL (ref 12.1–17)
INR PPP: 1.2 (ref 0.9–1.1)
MAGNESIUM SERPL-MCNC: 2.2 MG/DL (ref 1.6–2.4)
PHOSPHATE SERPL-MCNC: 2.5 MG/DL (ref 2.6–4.7)
POTASSIUM SERPL-SCNC: 4.2 MMOL/L (ref 3.5–5.1)
PROTHROMBIN TIME: 12 SEC (ref 9–11.1)
SERVICE CMNT-IMP: ABNORMAL
SERVICE CMNT-IMP: ABNORMAL
SODIUM SERPL-SCNC: 142 MMOL/L (ref 136–145)
SPECIMEN EXP DATE BLD: NORMAL
STATUS OF UNIT,%ST: NORMAL
THERAPEUTIC RANGE,PTTT: ABNORMAL SECS (ref 58–77)
UNIT DIVISION, %UDIV: 0

## 2020-07-21 PROCEDURE — 80048 BASIC METABOLIC PNL TOTAL CA: CPT

## 2020-07-21 PROCEDURE — 82962 GLUCOSE BLOOD TEST: CPT

## 2020-07-21 PROCEDURE — 74011250637 HC RX REV CODE- 250/637: Performed by: FAMILY MEDICINE

## 2020-07-21 PROCEDURE — 74011250636 HC RX REV CODE- 250/636: Performed by: NURSE PRACTITIONER

## 2020-07-21 PROCEDURE — 74011250636 HC RX REV CODE- 250/636: Performed by: INTERNAL MEDICINE

## 2020-07-21 PROCEDURE — 74011000250 HC RX REV CODE- 250: Performed by: INTERNAL MEDICINE

## 2020-07-21 PROCEDURE — 74011250637 HC RX REV CODE- 250/637: Performed by: NURSE PRACTITIONER

## 2020-07-21 PROCEDURE — 85730 THROMBOPLASTIN TIME PARTIAL: CPT

## 2020-07-21 PROCEDURE — 74011250637 HC RX REV CODE- 250/637: Performed by: INTERNAL MEDICINE

## 2020-07-21 PROCEDURE — 74011636637 HC RX REV CODE- 636/637: Performed by: INTERNAL MEDICINE

## 2020-07-21 PROCEDURE — 97535 SELF CARE MNGMENT TRAINING: CPT

## 2020-07-21 PROCEDURE — 36592 COLLECT BLOOD FROM PICC: CPT

## 2020-07-21 PROCEDURE — 65610000006 HC RM INTENSIVE CARE

## 2020-07-21 PROCEDURE — 84100 ASSAY OF PHOSPHORUS: CPT

## 2020-07-21 PROCEDURE — 74011000258 HC RX REV CODE- 258: Performed by: INTERNAL MEDICINE

## 2020-07-21 PROCEDURE — 74011000250 HC RX REV CODE- 250: Performed by: NURSE PRACTITIONER

## 2020-07-21 PROCEDURE — 97530 THERAPEUTIC ACTIVITIES: CPT

## 2020-07-21 PROCEDURE — 74011250637 HC RX REV CODE- 250/637: Performed by: HOSPITALIST

## 2020-07-21 PROCEDURE — 83735 ASSAY OF MAGNESIUM: CPT

## 2020-07-21 PROCEDURE — C9113 INJ PANTOPRAZOLE SODIUM, VIA: HCPCS | Performed by: INTERNAL MEDICINE

## 2020-07-21 PROCEDURE — 85018 HEMOGLOBIN: CPT

## 2020-07-21 PROCEDURE — 85384 FIBRINOGEN ACTIVITY: CPT

## 2020-07-21 PROCEDURE — 36415 COLL VENOUS BLD VENIPUNCTURE: CPT

## 2020-07-21 PROCEDURE — 74011000258 HC RX REV CODE- 258: Performed by: NURSE PRACTITIONER

## 2020-07-21 PROCEDURE — 85610 PROTHROMBIN TIME: CPT

## 2020-07-21 RX ORDER — LANOLIN ALCOHOL/MO/W.PET/CERES
3 CREAM (GRAM) TOPICAL
Status: DISCONTINUED | OUTPATIENT
Start: 2020-07-21 | End: 2020-07-30 | Stop reason: HOSPADM

## 2020-07-21 RX ORDER — MAGNESIUM SULFATE 100 %
4 CRYSTALS MISCELLANEOUS AS NEEDED
Status: DISCONTINUED | OUTPATIENT
Start: 2020-07-21 | End: 2020-07-30 | Stop reason: HOSPADM

## 2020-07-21 RX ORDER — INSULIN LISPRO 100 [IU]/ML
INJECTION, SOLUTION INTRAVENOUS; SUBCUTANEOUS EVERY 6 HOURS
Status: DISCONTINUED | OUTPATIENT
Start: 2020-07-21 | End: 2020-07-30 | Stop reason: HOSPADM

## 2020-07-21 RX ORDER — HYDROCORTISONE SODIUM SUCCINATE 100 MG/2ML
50 INJECTION, POWDER, FOR SOLUTION INTRAMUSCULAR; INTRAVENOUS EVERY 12 HOURS
Status: DISCONTINUED | OUTPATIENT
Start: 2020-07-21 | End: 2020-07-26

## 2020-07-21 RX ORDER — MIDODRINE HYDROCHLORIDE 5 MG/1
15 TABLET ORAL EVERY 8 HOURS
Status: DISCONTINUED | OUTPATIENT
Start: 2020-07-21 | End: 2020-07-23

## 2020-07-21 RX ORDER — DEXTROSE MONOHYDRATE 100 MG/ML
0-250 INJECTION, SOLUTION INTRAVENOUS AS NEEDED
Status: DISCONTINUED | OUTPATIENT
Start: 2020-07-21 | End: 2020-07-30 | Stop reason: HOSPADM

## 2020-07-21 RX ADMIN — Medication 10 ML: at 22:26

## 2020-07-21 RX ADMIN — SODIUM CHLORIDE 5 MG: 9 INJECTION INTRAMUSCULAR; INTRAVENOUS; SUBCUTANEOUS at 23:37

## 2020-07-21 RX ADMIN — PIPERACILLIN AND TAZOBACTAM 3.38 G: 3; .375 INJECTION, POWDER, LYOPHILIZED, FOR SOLUTION INTRAVENOUS at 06:01

## 2020-07-21 RX ADMIN — SODIUM CHLORIDE 5 MG: 9 INJECTION INTRAMUSCULAR; INTRAVENOUS; SUBCUTANEOUS at 06:02

## 2020-07-21 RX ADMIN — SODIUM CHLORIDE 40 MG: 9 INJECTION INTRAMUSCULAR; INTRAVENOUS; SUBCUTANEOUS at 22:24

## 2020-07-21 RX ADMIN — Medication 30 ML: at 06:00

## 2020-07-21 RX ADMIN — ONDANSETRON 4 MG: 2 INJECTION INTRAMUSCULAR; INTRAVENOUS at 04:17

## 2020-07-21 RX ADMIN — ONDANSETRON 4 MG: 2 INJECTION INTRAMUSCULAR; INTRAVENOUS at 19:20

## 2020-07-21 RX ADMIN — CASTOR OIL AND BALSAM, PERU: 788; 87 OINTMENT TOPICAL at 19:34

## 2020-07-21 RX ADMIN — NOREPINEPHRINE BITARTRATE 4 MCG/MIN: 1 INJECTION, SOLUTION, CONCENTRATE INTRAVENOUS at 15:20

## 2020-07-21 RX ADMIN — SODIUM CHLORIDE 40 MG: 9 INJECTION INTRAMUSCULAR; INTRAVENOUS; SUBCUTANEOUS at 08:46

## 2020-07-21 RX ADMIN — HYDROCORTISONE SODIUM SUCCINATE 50 MG: 100 INJECTION, POWDER, FOR SOLUTION INTRAMUSCULAR; INTRAVENOUS at 04:17

## 2020-07-21 RX ADMIN — MIDODRINE HYDROCHLORIDE 15 MG: 5 TABLET ORAL at 22:24

## 2020-07-21 RX ADMIN — SODIUM CHLORIDE, SODIUM LACTATE, POTASSIUM CHLORIDE, AND CALCIUM CHLORIDE 125 ML/HR: 600; 310; 30; 20 INJECTION, SOLUTION INTRAVENOUS at 23:42

## 2020-07-21 RX ADMIN — PIPERACILLIN AND TAZOBACTAM 3.38 G: 3; .375 INJECTION, POWDER, LYOPHILIZED, FOR SOLUTION INTRAVENOUS at 19:19

## 2020-07-21 RX ADMIN — FINASTERIDE 5 MG: 5 TABLET, FILM COATED ORAL at 08:47

## 2020-07-21 RX ADMIN — HYDROCORTISONE SODIUM SUCCINATE 50 MG: 100 INJECTION, POWDER, FOR SOLUTION INTRAMUSCULAR; INTRAVENOUS at 19:20

## 2020-07-21 RX ADMIN — MIDODRINE HYDROCHLORIDE 15 MG: 5 TABLET ORAL at 13:22

## 2020-07-21 RX ADMIN — SODIUM CHLORIDE 5 MG: 9 INJECTION INTRAMUSCULAR; INTRAVENOUS; SUBCUTANEOUS at 19:21

## 2020-07-21 RX ADMIN — SODIUM CHLORIDE, SODIUM LACTATE, POTASSIUM CHLORIDE, AND CALCIUM CHLORIDE 125 ML/HR: 600; 310; 30; 20 INJECTION, SOLUTION INTRAVENOUS at 15:20

## 2020-07-21 RX ADMIN — ONDANSETRON 4 MG: 2 INJECTION INTRAMUSCULAR; INTRAVENOUS at 08:46

## 2020-07-21 RX ADMIN — Medication 10 ML: at 13:22

## 2020-07-21 RX ADMIN — INSULIN LISPRO 2 UNITS: 100 INJECTION, SOLUTION INTRAVENOUS; SUBCUTANEOUS at 12:00

## 2020-07-21 RX ADMIN — CASTOR OIL AND BALSAM, PERU: 788; 87 OINTMENT TOPICAL at 08:46

## 2020-07-21 RX ADMIN — SODIUM CHLORIDE 5 MG: 9 INJECTION INTRAMUSCULAR; INTRAVENOUS; SUBCUTANEOUS at 13:19

## 2020-07-21 RX ADMIN — MELATONIN 3 MG: at 23:37

## 2020-07-21 RX ADMIN — CALCIUM GLUCONATE: 94 INJECTION, SOLUTION INTRAVENOUS at 19:21

## 2020-07-21 RX ADMIN — INSULIN LISPRO 2 UNITS: 100 INJECTION, SOLUTION INTRAVENOUS; SUBCUTANEOUS at 19:19

## 2020-07-21 RX ADMIN — SODIUM CHLORIDE, SODIUM LACTATE, POTASSIUM CHLORIDE, AND CALCIUM CHLORIDE 125 ML/HR: 600; 310; 30; 20 INJECTION, SOLUTION INTRAVENOUS at 06:14

## 2020-07-21 NOTE — PROGRESS NOTES
Palliative Medicine Consult  Johnny: [phone] ([phone])    Patient Name: Valeria Slaughter  YOB: 1934    Date of Initial Consult: July 14, 2020  Reason for Consult: Care Decisions  Requesting Provider: Dr. Durán [facility]    Primary Care Physician: Darrel Jurado MD     SUMMARY:   Valeria Slaughter is a 80 y.o. bed bound male with a past history of BPH, hypercholesterolemia,DJD,  , who was admitted on 7/13/2020 from home after [facility] Nurse found the pt to be hypotensive. Work up revealed extensive rt lower DVT extending up to the vena cava with bilateral PE's. Admission complicated by finding of pancreatic mass, ascites with possible splenic vein occlusion/ distal esophageal and gastric varices,  Nausea and vomiting, possible pneumonia, hypokalemia, hypotension. Heme onc and GI consults pending. Full Code  No Amd  Current medical issues leading to Palliative Medicine involvement include: support with care decisions given acute illness in a compromised state with high risk of decline. .    Interval History:  7/20/20: FNA and biopsy, ivc filter, EGD, (ulcer), gib from eliquis     PALLIATIVE DIAGNOSES:   1. Nausea and vomiting~ improved  2. Hypoalbuminemia  3. Hypotension~ on pressers   4. bed bound   5. Physical debility      PLAN:     1. Multiple conversations with wife. She is unable to come to the hospital to see the patient  2. Discussed events since our last discussion  3. All questions answered  4. Called wife again with the pt on speaker and allowed her to speak to spouse  5. Pt feeling well and says nausea well controlled  6. No pain   7. Waiting on results and options to discuss next steps  8. DNR status affirmed. Pt signed DDNR last week  9. Goal is to continue current level of care  10. Will continue to follow      11. Initial consult note routed to primary continuity provider and/or primary health care team members  12.  Communicated plan of care with: Palliative IDT, [facility] Care Team     GOALS OF CARE / TREATMENT PREFERENCES:     GOALS OF CARE:  Patient/Health Care Proxy Stated Goals: (unclear)    TREATMENT PREFERENCES:   Code Status: DNR    Advance Care Planning:  [x] [facility] Interdisciplinary Team has updated the ACP Navigator with Health Care Decision Maker and Patient Capacity      Primary Decision Maker: Mily Cabrera - [phone]  Advance Care Planning 7/13/2020   Confirm Advance Directive Yes, on file       Medical Interventions: Limited additional interventions     Other Instructions:   Artificially Administered Nutrition: No feeding tube     Other:    As far as possible, the palliative care team has discussed with patient / health care proxy about goals of care / treatment preferences for patient.      HISTORY:     History obtained from: chart    CHIEF COMPLAINT: pt admitted with aforementioned history an issues    HPI/SUBJECTIVE:    The patient is:   [x] Verbal and participatory  [] Non-participatory due to:    spitting a lot      Clinical Pain Assessment (nonverbal scale for severity on nonverbal patients):   Clinical Pain Assessment  Severity: 0          Duration: for how long has pt been experiencing pain (e.g., 2 days, 1 month, years)  Frequency: how often pain is an issue (e.g., several times per day, once every few days, constant)     FUNCTIONAL ASSESSMENT:     Palliative Performance Scale (PPS):  PPS: 40       PSYCHOSOCIAL/SPIRITUAL SCREENING:     Palliative IDT has assessed this patient for cultural preferences / practices and a referral made as appropriate to needs (Cultural Services, Patient Advocacy, Ethics, etc.)    Any spiritual / Hinduism concerns:  [] Yes /  [x] No    Caregiver Burnout:  [] Yes /  [x] No /  [] No Caregiver Present      Anticipatory grief assessment:   [x] Normal  / [] Maladaptive       ESAS Anxiety: Anxiety: 0    ESAS Depression: Depression: 0        REVIEW OF SYSTEMS:     Positive and pertinent negative findings in ROS are noted above in HPI. The following systems were [x] reviewed / [] unable to be reviewed as noted in HPI  Other findings are noted below. Systems: constitutional, ears/nose/mouth/throat, respiratory, gastrointestinal, genitourinary, musculoskeletal, integumentary, neurologic, psychiatric, endocrine. Positive findings noted below. Modified ESAS Completed by: provider   Fatigue: 2 Drowsiness: 0   Depression: 0 Pain: 0   Anxiety: 0 Nausea: 3   Anorexia: 0 Dyspnea: 0     Constipation: No     Stool Occurrence(s): 0        PHYSICAL EXAM:     From RN flowsheet:  Wt Readings from Last 3 Encounters:   07/21/20 171 lb 1.2 oz (77.6 kg)   07/27/18 204 lb (92.5 kg)   03/17/18 200 lb (90.7 kg)     Blood pressure 99/60, pulse 91, temperature 97.4 °F (36.3 °C), resp. rate 16, height 5' 10\" (1.778 m), weight 171 lb 1.2 oz (77.6 kg), SpO2 100 %.     Pain Scale 1: Numeric (0 - 10)  Pain Intensity 1: 0     Pain Location 1: Chest           Last bowel movement, if known:     Constitutional:  conversant, nad, ill appearing  Eyes: pupils equal, anicteric  ENMT: no nasal discharge, moist mucous membranes, [place]  Cardiovascular: regular rhythm, distal pulses intact  Respiratory: breathing not labored, symmetric  Gastrointestinal: soft non-tender, +bowel sounds  Musculoskeletal: no deformity, no tenderness to palpation  Skin: warm, dry  Neurologic: following commands, moving all extremities  Psychiatric: full affect, no hallucinations  Other:       HISTORY:     Principal Problem:    DVT (deep venous thrombosis) ([facility]) (7/14/2020)      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    BPH (benign prostatic hyperplasia)     DJD (degenerative joint disease)     Hypercholesterolemia     Other and unspecified hyperlipidemia       Past Surgical History:   Procedure Laterality Date    COLONOSCOPY N/A 8/18/2017    COLONOSCOPY performed by Luis M Stacy MD at [address] HX ORTHOPAEDIC Right 2010    rotator cuff    HX TONSILLECTOMY      IR IVC FILTER  7/20/2020         IR PLC IVC FILTER  7/20/2020      Family History   Problem Relation Age of Onset    Diabetes Sister     Diabetes Brother     Heart Disease Brother       History reviewed, no pertinent family history.   Social History     Tobacco Use    Smoking status: Never Smoker    Smokeless tobacco: Never Used   Substance Use Topics    Alcohol use: Yes     Comment: social     Allergies   Allergen Reactions    Aspirin Unknown (comments)      Current Facility-Administered Medications   Medication Dose Route Frequency    hydrocortisone Sod Succ (PF) (SOLU-CORTEF) injection 50 mg  50 mg IntraVENous Q12H    0.9% sodium chloride infusion 250 mL  250 mL IntraVENous PRN    lactated Ringers infusion  125 mL/hr IntraVENous CONTINUOUS    midodrine (PROAMATINE) tablet 10 mg  10 mg Oral Q8H    0.9% sodium chloride infusion 250 mL  250 mL IntraVENous PRN    sodium chloride (NS) flush 5-40 mL  5-40 mL IntraVENous Q8H    sodium chloride (NS) flush 5-40 mL  5-40 mL IntraVENous PRN    simethicone (MYLICON) 09UZ/5.9EB oral drops 80 mg  1.2 mL Oral Multiple    atropine injection 0.5 mg  0.5 mg IntraVENous ONCE PRN    EPINEPHrine (ADRENALIN) 0.1 mg/mL syringe 1 mg  1 mg Endoscopically ONCE PRN    TPN ADULT - CENTRAL   IntraVENous CONTINUOUS    piperacillin-tazobactam (ZOSYN) 3.375 g in 0.9% sodium chloride (MBP/ADV) 100 mL  3.375 g IntraVENous Q8H    NOREPINephrine (LEVOPHED) 8,000 mcg in dextrose 5% 250 mL infusion  2-30 mcg/min IntraVENous TITRATE    pantoprazole (PROTONIX) 40 mg in 0.9% sodium chloride 10 mL injection  40 mg IntraVENous Q12H    prochlorperazine (COMPAZINE) with saline injection 5 mg  5 mg IntraVENous Q6H    [Held by provider] heparin 25,000 units in D5W 250 ml infusion  18-36 Units/kg/hr IntraVENous TITRATE    benzonatate (TESSALON) capsule 100 mg  100 mg Oral TID PRN    guaiFENesin (ROBITUSSIN) 100 mg/5 mL oral liquid 100 mg 100 mg Oral Q4H PRN    ondansetron (ZOFRAN) injection 4 mg  4 mg IntraVENous Q6H    scopolamine (TRANSDERM-SCOP) 1 mg over 3 days 1 Patch  1 Patch TransDERmal Q72H    prochlorperazine (COMPAZINE) tablet 5 mg  5 mg Oral Q6H PRN    finasteride (PROSCAR) tablet 5 mg  5 mg Oral DAILY    [Held by provider] tamsulosin (FLOMAX) capsule 0.4 mg  0.4 mg Oral DAILY    sodium chloride (NS) flush 5-40 mL  5-40 mL IntraVENous Q8H    sodium chloride (NS) flush 5-40 mL  5-40 mL IntraVENous PRN    acetaminophen (TYLENOL) tablet 650 mg  650 mg Oral Q6H PRN    balsam peru-castor oiL (VENELEX) ointment   Topical BID          LAB AND IMAGING FINDINGS:     Lab Results   Component Value Date/Time    WBC 7.4 07/20/2020 12:07 PM    HGB 10.1 (L) 07/21/2020 05:58 AM    PLATELET 302 11/06/2684 12:07 PM     Lab Results   Component Value Date/Time    Sodium 142 07/21/2020 05:58 AM    Potassium 4.2 07/21/2020 05:58 AM    Chloride 112 (H) 07/21/2020 05:58 AM    CO2 25 07/21/2020 05:58 AM    BUN 21 (H) 07/21/2020 05:58 AM    Creatinine 0.78 07/21/2020 05:58 AM    Calcium 8.0 (L) 07/21/2020 05:58 AM    Magnesium 2.2 07/21/2020 05:58 AM    Phosphorus 2.5 (L) 07/21/2020 05:58 AM      Lab Results   Component Value Date/Time    Alk.  phosphatase 53 07/20/2020 02:37 AM    Protein, total 5.7 (L) 07/20/2020 02:37 AM    Albumin 2.1 (L) 07/20/2020 02:37 AM    Globulin 3.6 07/20/2020 02:37 AM     Lab Results   Component Value Date/Time    INR 1.2 (H) 07/21/2020 05:58 AM    Prothrombin time 12.0 (H) 07/21/2020 05:58 AM    aPTT 32.2 (H) 07/21/2020 05:58 AM      Lab Results   Component Value Date/Time    Ferritin 520 (H) 07/13/2020 09:49 PM      No results found for: PH, PCO2, PO2  No components found for: GLPOC   No results found for: CPK, CKMB             Total time: 35 min  Counseling / coordination time, spent as noted above: 30 min  > 50% counseling / coordination?: y    Prolonged service was provided for  []30 min   []75 min in face to face time in the presence of the patient, spent as noted above. Time Start:   Time End:   Note: this can only be billed with 43466 (initial) or 66102 (follow up). If multiple start / stop times, list each separately.

## 2020-07-21 NOTE — PROGRESS NOTES
Comprehensive Nutrition Assessment    Type and Reason for Visit: Reassess    Nutrition Recommendations/Plan:   -Add 500 ml 20% lipid 3 x/week to meet estimated energy needs    -POC BG testing and correction scale insulin    Nutrition Assessment:   Pt admitted for DVT. PMHx: arthritis, hypercholesterolemia. Hypotension on admit with DVT to femoral vein on admit-s/p IVC filter. . Multiple hospitalizations and rehab visit over past few months with septic arthritis (Dec-Jan) followed by pancreatitis. Bedbound for past few months. N/V for 1 week PTA. Pancreatic mass. Ascites-s/p paracentesis-awaiting cytology results. GIB-noted results of EGD yesterday. Agree with starting TPN to meet nutrient needs. Mr Augustus Ashford is severely malnourished with minimal oral nutrient intake since admission. Current TPN solution provides 1500 ml, 90 gm protein and 1380 calories per day. Recommend adding 500 ml 20% lipid 3 x/week to bring up calories to 1810 (on average) per day. TPN/lipids will then meet 100% pt's estimated needs. Thiamine added to TPN d/t possible refeeding risk. Lytes WNL today except phosphorus slightly BNL.  mg/dl on am labs.     Malnutrition Assessment:  Malnutrition Status:  Severe malnutrition    Context:  Acute illness     Findings of the 6 clinical characteristics of malnutrition:   Energy Intake:  7 - 50% or less of est energy requirements for 5 or more days  Weight Loss:  7 - Greater than 2% over 1 week     Body Fat Loss:  Unable to assess,     Muscle Mass Loss:  Unable to assess,    Fluid Accumulation:  Unable to assess,     Strength:  Not performed     Estimated Daily Nutrient Needs:  Energy (kcal):  1738-1882kcal  Protein (g):  92 - 1.2g/kg       Fluid (ml/day):  1800 - 1ml/kcal    Nutrition Related Findings:  Last BM 7/19; Edema-1+ pitting BUE's      Wounds:    None       Current Nutrition Therapies:   Current Parenteral Nutrition Orders:  · Type and Formula: 6% AA D20   · Lipids: None  · Duration: Continuous  · Rate/Volume: 63 ml/hr/1500 mml    Anthropometric Measures:  · Height:  5' 10\" (177.8 cm)  · Current Body Wt:  77.6 kg (171 lb 1.2 oz)   · Admission Body Wt:  168 lb 6.9 oz    · Ideal Body Wt:  166:  103.1 %   · BMI Categories:  Normal weight (BMI 18.5-24. 9)       Nutrition Diagnosis:   · Inadequate oral intake, Severe malnutrition, In context of acute illness or injury related to altered GI function as evidenced by vomiting, nausea, weight loss 1-2% in 1 week, poor intake prior to admission    · Inadequate pareneteral nutrition related to inadequate pareneteral nutrition infusion as evidenced by TPN meeting 79% estimated energy needs. Nutrition Interventions:   Food and/or Nutrient Delivery: Continue NPO, Modify parenteral nutrition  Nutrition Education and Counseling: No recommendations at this time  Coordination of Nutrition Care: Continued inpatient monitoring, Interdisciplinary rounds    Goals:  TPN to meet 85% estimated needs x 5-7 days until able to resume oral diet. Nutrition Monitoring and Evaluation:   Food/Nutrient Intake Outcomes: Parenteral nutrition intake/tolerance  Physical Signs/Symptoms Outcomes: Biochemical data, GI status    Discharge Planning:     Too soon to determine     Electronically signed by Jean Carlos Domingo RD on 7/21/2020 at 9:28 AM    Contact: [employer] or pager #[phone]

## 2020-07-21 NOTE — PROGRESS NOTES
Bedside shift change report received from [facility], [address] [place] [address]. Report included the following information SBAR, Kardex, Procedure Summary, Intake/Output, MAR and Recent Results. Shift Summary: Uneventful shift. No n/v/bleeding. Levophed, TPN, Octreotide gtts.

## 2020-07-21 NOTE — PROGRESS NOTES
Hematology-Oncology Progress Note    My Stage  1934  [id number]  7/21/2020    Follow-up for: pancreatic mass/DVT/PE     [x]        Chart notes since last visit reviewed   [x]        Medications reviewed for allergies and interactions       Case discussed with the following:         []                            []        Nursing Staff                                                                         []        Pathologist                                                                        []        FAMILY      Subjective:     Spoke with patient who complains of: alert, comfortable this am, stronger. .. did well with ivc filter placement and egd yesterday    Objective:     Patient Vitals for the past 24 hrs:   BP Temp Pulse Resp SpO2 Height Weight   07/21/20 0921 -- -- -- -- -- 5' 10\" (1.778 m) --   07/21/20 0840 96/56 -- 88 21 100 % -- --   07/21/20 0800 105/54 97.4 °F (36.3 °C) 62 11 98 % -- --   07/21/20 0700 109/61 -- (!) 56 14 100 % -- --   07/21/20 0600 94/60 -- 74 25 100 % -- --   07/21/20 0500 97/60 -- 64 21 100 % -- --   07/21/20 0400 105/64 97 °F (36.1 °C) (!) 53 18 100 % -- 77.6 kg (171 lb 1.2 oz)   07/21/20 0300 102/56 -- (!) 52 9 100 % -- --   07/21/20 0100 105/54 -- 61 26 100 % -- --   07/21/20 0000 105/56 -- (!) 45 13 100 % -- --   07/20/20 2340 -- 97.5 °F (36.4 °C) -- -- -- -- --   07/20/20 2300 102/57 -- (!) 47 11 100 % -- --   07/20/20 2200 102/55 -- (!) 54 12 100 % -- --   07/20/20 2100 99/57 -- (!) 55 12 100 % -- --   07/20/20 2041 -- 97.6 °F (36.4 °C) -- -- -- -- --   07/20/20 2000 102/61 -- (!) 58 16 100 % -- --   07/20/20 1930 113/72 -- 84 20 100 % -- --   07/20/20 1900 96/64 -- (!) 54 14 97 % -- --   07/20/20 1830 (!) 87/53 -- 68 12 99 % -- --   07/20/20 1815 92/50 -- 72 14 98 % -- --   07/20/20 1800 (!) 85/50 -- 80 15 98 % -- --   07/20/20 1745 (!) 89/52 -- 80 20 100 % -- --   07/20/20 1730 90/60 -- 84 14 100 % -- --   07/20/20 1715 97/65 -- 100 30 100 % -- -- 07/20/20 1710 96/64 -- 85 23 100 % -- --   07/20/20 1705 98/62 -- 80 14 100 % -- --   07/20/20 1700 97/60 -- 86 17 100 % -- --   07/20/20 1645 92/61 -- 75 13 100 % -- --   07/20/20 1630 (!) 88/52 -- 83 18 100 % -- --   07/20/20 1600 (!) 87/61 97.6 °F (36.4 °C) 88 18 100 % -- --   07/20/20 1530 96/59 -- 92 12 100 % -- --   07/20/20 1515 100/57 -- 92 15 -- -- --   07/20/20 1500 100/58 -- 88 14 -- -- --   07/20/20 1445 94/64 -- 89 16 -- -- --   07/20/20 1430 93/66 -- 89 12 100 % -- --   07/20/20 1419 91/59 -- 95 24 100 % -- --   07/20/20 1405 91/61 -- 89 -- 100 % -- --   07/20/20 1400 92/62 -- 91 -- 100 % -- --   07/20/20 1355 94/60 -- 90 -- 100 % -- --   07/20/20 1350 91/60 -- 91 -- 100 % -- --   07/20/20 1345 93/63 -- 90 -- 100 % -- --   07/20/20 1340 (!) 87/63 -- 90 -- 100 % -- --   07/20/20 1320 (!) 84/51 -- 83 -- 100 % -- --   07/20/20 1307 91/52 -- -- -- 100 % -- --   07/20/20 1300 (!) 79/51 -- -- -- -- -- --   07/20/20 1250 (!) 87/55 -- 81 13 100 % -- --   07/20/20 1245 96/56 -- 85 14 100 % -- --   07/20/20 1200 97/57 98 °F (36.7 °C) 96 13 100 % -- --   07/20/20 1145 (!) 89/57 -- 91 13 100 % -- --   07/20/20 1130 99/58 -- 88 13 100 % -- --   07/20/20 1115 100/66 -- 88 14 100 % -- --   07/20/20 1100 91/55 -- 85 29 100 % -- --   07/20/20 1045 96/57 -- 99 28 100 % -- --   07/20/20 1030 (!) 88/62 -- 91 17 100 % -- --   07/20/20 1015 91/59 -- 95 15 100 % -- --       REVIEW OF SYSTEMS:    Constitutional: negative fever, negative chills, negative weight loss  Eyes:   negative visual changes  ENT:   negative sore throat, tongue or lip swelling  Respiratory:  negative cough, negative dyspnea  Cards:  negative for chest pain, palpitations, lower extremity edema  GI:   negative for nausea, vomiting, diarrhea, and abdominal pain  Neuro:  negative for headaches, dizziness, vertigo  [x]                        Full ROS o/w normal/non contributor    Constitutional:  Patient looks  []        Sick  [x]        Frail  [] Better                                                 []        Depressed    HEENT:  [x]   NC                         []   AT               []    ALOPECIA           Eyes: [x]   Normal               []    Icteric  Oropharynx: [x]    Normal                  []  Thrush               []   Dry  Neck:   [x]   Supple                  []  Rigid               JVD:    [x]   ABSENT       []   PRESENT  Aerating well   abd.  Soft non tender               Extr:    []  Lymphedema             []   Cyanosis      []  Clubbing  Edema:     []   NONE       [x]   PRESENT Rle    Skin:  Intact []           Purpura []        Rash: [x]   ABSENT       []  PRESENT  Neuro:  []        Normal  [x]        Confused      Available labs reviewed:  Labs:    Recent Results (from the past 24 hour(s))   HGB & HCT    Collection Time: 07/20/20 11:24 AM   Result Value Ref Range    HGB 8.4 (L) 12.1 - 17.0 g/dL    HCT 27.0 (L) 36.6 - 57.0 %   METABOLIC PANEL, BASIC    Collection Time: 07/20/20 12:07 PM   Result Value Ref Range    Sodium 141 136 - 145 mmol/L    Potassium 4.8 3.5 - 5.1 mmol/L    Chloride 110 (H) 97 - 108 mmol/L    CO2 26 21 - 32 mmol/L    Anion gap 5 5 - 15 mmol/L    Glucose 190 (H) 65 - 100 mg/dL    BUN 22 (H) 6 - 20 MG/DL    Creatinine 0.80 0.70 - 1.30 MG/DL    BUN/Creatinine ratio 28 (H) 12 - 20      GFR est AA >60 >60 ml/min/1.73m2    GFR est non-AA >60 >60 ml/min/1.73m2    Calcium 8.1 (L) 8.5 - 10.1 MG/DL   MAGNESIUM    Collection Time: 07/20/20 12:07 PM   Result Value Ref Range    Magnesium 2.3 1.6 - 2.4 mg/dL   PHOSPHORUS    Collection Time: 07/20/20 12:07 PM   Result Value Ref Range    Phosphorus 2.6 2.6 - 4.7 MG/DL   CBC W/O DIFF    Collection Time: 07/20/20 12:07 PM   Result Value Ref Range    WBC 7.4 4.1 - 11.1 K/uL    RBC 3.32 (L) 4.10 - 5.70 M/uL    HGB 10.2 (L) 12.1 - 17.0 g/dL    HCT 31.5 (L) 36.6 - 50.3 %    MCV 94.9 80.0 - 99.0 FL    MCH 30.7 26.0 - 34.0 PG    MCHC 32.4 30.0 - 36.5 g/dL    RDW 17.3 (H) 11.5 - 14.5 %    PLATELET 186 150 - 400 K/uL    MPV 9.8 8.9 - 12.9 FL    NRBC 0.0 0  WBC    ABSOLUTE NRBC 0.00 0.00 - 0.01 K/uL   HGB & HCT    Collection Time: 07/20/20  6:07 PM   Result Value Ref Range    HGB 9.9 (L) 12.1 - 17.0 g/dL    HCT 30.5 (L) 36.6 - 50.3 %   HGB & HCT    Collection Time: 07/20/20 11:46 PM   Result Value Ref Range    HGB 10.1 (L) 12.1 - 17.0 g/dL    HCT 30.6 (L) 36.6 - 50.3 %   PROTHROMBIN TIME + INR    Collection Time: 07/21/20  5:58 AM   Result Value Ref Range    INR 1.2 (H) 0.9 - 1.1      Prothrombin time 12.0 (H) 9.0 - 11.1 sec   PTT    Collection Time: 07/21/20  5:58 AM   Result Value Ref Range    aPTT 32.2 (H) 22.1 - 32.0 sec    aPTT, therapeutic range     58.0 - 77.0 SECS   FIBRINOGEN    Collection Time: 07/21/20  5:58 AM   Result Value Ref Range    Fibrinogen 155 (L) 200 - 475 mg/dL   HGB & HCT    Collection Time: 07/21/20  5:58 AM   Result Value Ref Range    HGB 10.1 (L) 12.1 - 17.0 g/dL    HCT 31.1 (L) 36.6 - 68.3 %   METABOLIC PANEL, BASIC    Collection Time: 07/21/20  5:58 AM   Result Value Ref Range    Sodium 142 136 - 145 mmol/L    Potassium 4.2 3.5 - 5.1 mmol/L    Chloride 112 (H) 97 - 108 mmol/L    CO2 25 21 - 32 mmol/L    Anion gap 5 5 - 15 mmol/L    Glucose 200 (H) 65 - 100 mg/dL    BUN 21 (H) 6 - 20 MG/DL    Creatinine 0.78 0.70 - 1.30 MG/DL    BUN/Creatinine ratio 27 (H) 12 - 20      GFR est AA >60 >60 ml/min/1.73m2    GFR est non-AA >60 >60 ml/min/1.73m2    Calcium 8.0 (L) 8.5 - 10.1 MG/DL   MAGNESIUM    Collection Time: 07/21/20  5:58 AM   Result Value Ref Range    Magnesium 2.2 1.6 - 2.4 mg/dL   PHOSPHORUS    Collection Time: 07/21/20  5:58 AM   Result Value Ref Range    Phosphorus 2.5 (L) 2.6 - 4.7 MG/DL       Available Xrays reviewed:    Chemotherapy monitored and toxicities assessed:    Assessment and Plan   1. Rle DVT/PE. .. Pt had gi bleed after starting eliquis,, currently off heparin and eliquis. ..  ivc filter placed emergently on 7/74 without complication     2.    Pancreatic mass,,, pt had egd and fna bx done yesterday. Results pending    3. Anemia. hgb 10 today. . Secondary to gi bleed. . had 2cm ulcer seen on egd yesterday.   Transfuse prn hgb<8 in this setting    Leilani Kline MD

## 2020-07-21 NOTE — PROGRESS NOTES
Problem: Mobility Impaired (Adult and Pediatric)  Goal: *Acute Goals and Plan of Care (Insert Text)  Description: FUNCTIONAL STATUS PRIOR TO ADMISSION: Patient was modified independent using a rolling walker and WB quad cane for functional mobility. HOME SUPPORT PRIOR TO ADMISSION: The patient lived with spouse, but did not require assist from spouse. Physical Therapy Goals  Initiated 7/17/2020  1. Patient will move from supine to sit and sit to supine  in bed with modified independence within 7 day(s). 2.  Patient will transfer from bed to chair and chair to bed with minimal assistance/contact guard assist using the least restrictive device within 7 day(s). 3.  Patient will perform sit to stand with minimal assistance/contact guard assist within 7 day(s). 4.  Patient will ambulate with minimal assistance/contact guard assist for 50 feet with the least restrictive device within 7 day(s). 5.  Patient will ascend/descend 3 stairs with bilateral handrail(s) with minimal assistance/contact guard assist within 7 day(s). Outcome: Progressing Towards Goal   PHYSICAL THERAPY TREATMENT  Patient: Iram Ortega (1 y.o. male)  Date: 7/21/2020  Diagnosis: DVT (deep vein thrombosis) in pregnancy [O22.30]  DVT (deep venous thrombosis) (HCC) [I82.409]   DVT (deep venous thrombosis) ([facility])  Procedure(s) (LRB):  ESOPHAGOGASTRODUODENOSCOPY (EGD) at bedside (N/A) 1 Day Post-Op  Precautions: Fall  Chart, physical therapy assessment, plan of care and goals were reviewed. ASSESSMENT  Patient continues with skilled PT services and is progressing towards goals. Patient is received supine in bed and agreeable to mobility. BP is assessed for orthostatic hypotension and patient continues to demonstrate decreased pressure with positional changes. He demonstrates good bed mobility. He requires VC for safe hand placement for sit<>stand transfers with use of a rolling walker.  He demonstrates the ability to step up the HOB with use of rolling walker demonstrating good balance. Supine BP: 91/53 HR 78  Sitting BP: 93/47 HR 93  Standing BP post side steppin/51 HR 04  Supine BP: 100/61 HR 71    Current Level of Function Impacting Discharge (mobility/balance): CGA side stepping with RW, supervision bed mobility, good sitting balance    Other factors to consider for discharge: medical stability, increased gait training, increased need for assistance, increased risk for falls          PLAN :  Patient continues to benefit from skilled intervention to address the above impairments. Continue treatment per established plan of care. to address goals. Recommendation for discharge: (in order for the patient to meet his/her long term goals)  To be determined: pending progression with acute care    This discharge recommendation:  Has been made in collaboration with the attending provider and/or case management    IF patient discharges home will need the following DME: patient owns DME required for discharge       SUBJECTIVE:   Patient stated i'm doing ok.     OBJECTIVE DATA SUMMARY:   Critical Behavior:  Neurologic State: Drowsy  Orientation Level: Oriented X4  Cognition: Follows commands  Safety/Judgement: Awareness of environment  Functional Mobility Training:  Bed Mobility:  Rolling: Supervision  Supine to Sit: Supervision  Sit to Supine: Supervision  Scooting: Supervision        Transfers:                                   Balance:  Sitting: Intact; Without support  Standing: Intact; With support  Ambulation/Gait Training:                                                        Stairs: Therapeutic Exercises:     Pain Rating:      Activity Tolerance:   Fair and signs and symptoms of orthostatic hypotension  Please refer to the flowsheet for vital signs taken during this treatment.     After treatment patient left in no apparent distress:   Call bell within reach, Side rails x 3, and bed in chair position    COMMUNICATION/COLLABORATION:   The patients plan of care was discussed with: Occupational therapist and Registered nurse.      Tobi Coles, PT   Time Calculation: 23 mins

## 2020-07-21 NOTE — PROGRESS NOTES
[address] [place] [address] Dr [facility], [address] [place] [address] [place]       GI PROGRESS NOTE  [facility] [place]  [phone] office  [phone] NP/PA in-hospital cell phone M-F until 4:30PM  After 5PM or on weekends, please call  for physician on call      NAME: Ariane Camargo   :  1934   MRN:  [id number]       Subjective:   Patient denies nausea, vomiting, or abdominal pain. He reports a bowel movement today. EGD was done yesterday. Discussed with RN. No signs of active bleeding. He is on pressors. Objective:     VITALS:   Last 24hrs VS reviewed since prior progress note. Most recent are:  Visit Vitals  BP 96/71   Pulse 76   Temp 97.4 °F (36.3 °C)   Resp 24   Ht 5' 10\" (1.778 m)   Wt 77.6 kg (171 lb 1.2 oz)   SpO2 100%   BMI 24.55 kg/m²       PHYSICAL EXAM:  General:          No acute distress    Neurologic:      Alert  HEENT:           EOMI, no scleral icterus   Lungs:             No respiratory distress  Heart:              S1 S2  Abdomen:        Soft, mildly distended, no apparent tenderness, no guarding, no rebound. +Bowel sounds.    Extremities:     Warm  Psych:             Not anxious or agitated    Lab Data Reviewed:     Recent Results (from the past 24 hour(s))   METABOLIC PANEL, BASIC    Collection Time: 20 12:07 PM   Result Value Ref Range    Sodium 141 136 - 145 mmol/L    Potassium 4.8 3.5 - 5.1 mmol/L    Chloride 110 (H) 97 - 108 mmol/L    CO2 26 21 - 32 mmol/L    Anion gap 5 5 - 15 mmol/L    Glucose 190 (H) 65 - 100 mg/dL    BUN 22 (H) 6 - 20 MG/DL    Creatinine 0.80 0.70 - 1.30 MG/DL    BUN/Creatinine ratio 28 (H) 12 - 20      GFR est AA >60 >60 ml/min/1.73m2    GFR est non-AA >60 >60 ml/min/1.73m2    Calcium 8.1 (L) 8.5 - 10.1 MG/DL   MAGNESIUM    Collection Time: 20 12:07 PM   Result Value Ref Range    Magnesium 2.3 1.6 - 2.4 mg/dL   PHOSPHORUS    Collection Time: 20 12:07 PM   Result Value Ref Range    Phosphorus 2.6 2.6 - 4.7 MG/DL   CBC W/O DIFF Collection Time: 07/20/20 12:07 PM   Result Value Ref Range    WBC 7.4 4.1 - 11.1 K/uL    RBC 3.32 (L) 4.10 - 5.70 M/uL    HGB 10.2 (L) 12.1 - 17.0 g/dL    HCT 31.5 (L) 36.6 - 50.3 %    MCV 94.9 80.0 - 99.0 FL    MCH 30.7 26.0 - 34.0 PG    MCHC 32.4 30.0 - 36.5 g/dL    RDW 17.3 (H) 11.5 - 14.5 %    PLATELET 849 826 - 142 K/uL    MPV 9.8 8.9 - 12.9 FL    NRBC 0.0 0  WBC    ABSOLUTE NRBC 0.00 0.00 - 0.01 K/uL   HGB & HCT    Collection Time: 07/20/20  6:07 PM   Result Value Ref Range    HGB 9.9 (L) 12.1 - 17.0 g/dL    HCT 30.5 (L) 36.6 - 50.3 %   HGB & HCT    Collection Time: 07/20/20 11:46 PM   Result Value Ref Range    HGB 10.1 (L) 12.1 - 17.0 g/dL    HCT 30.6 (L) 36.6 - 50.3 %   PROTHROMBIN TIME + INR    Collection Time: 07/21/20  5:58 AM   Result Value Ref Range    INR 1.2 (H) 0.9 - 1.1      Prothrombin time 12.0 (H) 9.0 - 11.1 sec   PTT    Collection Time: 07/21/20  5:58 AM   Result Value Ref Range    aPTT 32.2 (H) 22.1 - 32.0 sec    aPTT, therapeutic range     58.0 - 77.0 SECS   FIBRINOGEN    Collection Time: 07/21/20  5:58 AM   Result Value Ref Range    Fibrinogen 155 (L) 200 - 475 mg/dL   HGB & HCT    Collection Time: 07/21/20  5:58 AM   Result Value Ref Range    HGB 10.1 (L) 12.1 - 17.0 g/dL    HCT 31.1 (L) 36.6 - 16.6 %   METABOLIC PANEL, BASIC    Collection Time: 07/21/20  5:58 AM   Result Value Ref Range    Sodium 142 136 - 145 mmol/L    Potassium 4.2 3.5 - 5.1 mmol/L    Chloride 112 (H) 97 - 108 mmol/L    CO2 25 21 - 32 mmol/L    Anion gap 5 5 - 15 mmol/L    Glucose 200 (H) 65 - 100 mg/dL    BUN 21 (H) 6 - 20 MG/DL    Creatinine 0.78 0.70 - 1.30 MG/DL    BUN/Creatinine ratio 27 (H) 12 - 20      GFR est AA >60 >60 ml/min/1.73m2    GFR est non-AA >60 >60 ml/min/1.73m2    Calcium 8.0 (L) 8.5 - 10.1 MG/DL   MAGNESIUM    Collection Time: 07/21/20  5:58 AM   Result Value Ref Range    Magnesium 2.2 1.6 - 2.4 mg/dL   PHOSPHORUS    Collection Time: 07/21/20  5:58 AM   Result Value Ref Range    Phosphorus 2.5 (L) 2.6 - 4.7 MG/DL       Assessment:   · Hematemesis: EGD (7/20/20): LA Grade D erosive esophagitis; hiatal hernia; retention of large volume of gastric fluid likely secondary to gastric outlet obstruction from extrinsic compression. Hgb 10. 1. received 2 units yesterday. · Hypotension: on pressors  · Pancreatic mass: CT abdomen/pelvis with IV contrast (7/13/20): mass-like fullness in the pancreatic head, resulting in intrahepatic and pancreatic duct dilatation, peripancreatic inflammatory changes with 3.7 x 2.3 cm collection near the pancreatic tail possibly pseudocyst, large amount of ascites, and moderate amount of slightly complex fluid/soft tissue in the left abdomen - findings may represent pancreatitis or pancreatic neoplasm; edematous thickening of the gastric antrum and duodenum likely secondary to the pancreatic process.  LFTs and lipase unremarkable on 7/13. Cytology of peritoneal fluid was negative for malignant cells. · Right lower extremity DVT with bilateral PEs: heparin gtt on hold. Received 1 dose of Eliquis on 7/18. Status post IVC filter 7/20. · Ascites: hepatology, Dr. Marin Boards following. Status post paracentesis: cytology was negative for malignant cells. · Hypotension  · COVID-19 negative     Patient Active Problem List   Diagnosis Code    DVT (deep vein thrombosis) in pregnancy O22.30    DVT (deep venous thrombosis) (HCC) I82.409     Plan:   · PPI BID  · Continue to hold anticoagulation as able  · Will need repeat EGD in 2 months for evaluation of mucosal healing of severe erosive esophagitis  · Cytology was negative for malignant cells. Will obtain CT abdomen with IV contrast pancreatic protocol. Next consider EUS +/-FNA/FNB when out of ICU. Signed By: Chaitanya Chapman     7/21/2020  11:57 AM          This patient was seen and examined by me in a face-to-face visit today. I reviewed the medical record including lab work, imaging and other provider notes.  I confirmed the interval history as described above. I spoke to the patient, discussing our findings and plans. I discussed this case in detail with Chaitanya Garcia. I formulated an updated  assessment of this patient and guided our treatment plan. I agree with the above progress note. I agree with the history, exam and assessment and plan as outlined in the note. I would like to add the following: Patient seen and examined. He is more alert today. I reviewed procedure results with him today and informed him of cytology results. We will plan for CT abdomen with IV contrast (pancreatic protocol). Next step is to proceed with EUS following CT. Continue IV PPI q12. Will follow. Rivera Fernandez MD

## 2020-07-21 NOTE — PROGRESS NOTES
[address] [facility] Hospital[facility] [place], Jcarlos Lemus MD Dempsey Gallery, LULU [place], [facility]     Angy Armstrong, [facility]   Anastasia Townsend, [place], [facility]       [facility] 136    at [address], [facility] Samuel Bird  22.    [phone]    FAX: [address] [place] [address] Avenue    [facility]    [address], [address]    [place], [address]    [phone]    FAX: [phone]       HEPATOLOGY PROGRESS NOTE  The patient is a 80year old male who was in reasonable good health until several months ago when developed septic arthritis, followed by pneumonia and pancreatitis. All of this was apparently in the [facility] system. He has been in and out of hospital and rehab during this time. He was sent to the ED when [facility] RN found him hypotensive. In the ED underwent a CT scan and was found to have extensive DVT extending into the IVC and PE. A nodular liver suggesting cirrhosis, a mass like fullness in the HOP with double duct sign and ascites. Events of yesterday noted. Transferred to ICU after coffee ground emesis and hypotension. EGD demonstrated esophagitis from all the vomiting. No esophageal varices. IVC filter placed. Anti-coagulation stopped. There are no active liver issues. GI will continue to follow and plans for EUS and biopsy of pancreas mass when stable. ASSESSMENT AND PLAN:  Cirrhosis  The patient may have cirrhosis. If he does have cirrhosis the etiology is not clear. The diagnosis of cirrhosis is suggested by imaging, ascites and prolonged INR  Serologic testing for causes of chronic liver disease will be ordered. Ascites   Ascites has recently developed.   Paracentesis demonstrated SAAG of 0.6 which is not consistent with portal HTN and suggests malignant ascites consistent with the pancreas mass. Cell count is 322 polys which under normal circumstances would be consistent with SBP. However, ascites was not sent for analysis at time of paracentesis and fluid was sent from bag or tubing the next day and may not be accurate assessment. I would not start antibiotics for SBP. Pancreas mass with dilated CBD and PD  This is highly suggestive of pancreas cancer. The SAAG of 0.6 is consistent with malignant or exudative ascites and the pancreas mass. May ultimately need EUS and biopsy of the pancreas mass unless ascites is malignant and cytology positive for pancreas cancer. Depends upon how aggressive patient and family want to be and whether he could tolerate chemotherapy. Await cytology    DVT with PE  This was likely precipitated by pancreas cancer   He is being treated with IV heparin   Right leg swelling >>left. PHYSICAL EXAMINATION:  VS: per nursing note  General:  No acute distress. Eyes:  Sclera anicteric. ENT:  No oral lesions. Thyroid normal.  Nodes:  No adenopathy. Skin:  No spider angiomata. No jaundice. Respiratory:  Lungs clear to auscultation. Cardiovascular:  Regular heart rate. Abdomen:  Soft non-tender, Ascites appears to be present. Extremities:  Right leg edema. Neurologic:  Alert and oriented. Cranial nerves grossly intact. No asterixis. LABORATORY:  Results for Lisa Kim (MRN [id number]) as of 7/21/2020 20:28   Ref.  Range 7/20/2020 02:37 7/21/2020 05:58   WBC Latest Ref Range: 4.1 - 11.1 K/uL 6.4    HGB Latest Ref Range: 12.1 - 17.0 g/dL 7.6 (L) 10.1 (L)   PLATELET Latest Ref Range: 150 - 400 K/uL 180    INR Latest Ref Range: 0.9 - 1.1   1.3 (H) 1.2 (H)   Sodium Latest Ref Range: 136 - 145 mmol/L 140 142   Potassium Latest Ref Range: 3.5 - 5.1 mmol/L 3.3 (L) 4.2   Chloride Latest Ref Range: 97 - 108 mmol/L 106 112 (H)   CO2 Latest Ref Range: 21 - 32 mmol/L 27 25   Glucose Latest Ref Range: 65 - 100 mg/dL 128 (H) 200 (H)   BUN Latest Ref Range: 6 - 20 MG/DL 27 (H) 21 (H)   Creatinine Latest Ref Range: 0.70 - 1.30 MG/DL 0.88 0.78   Bilirubin, total Latest Ref Range: 0.2 - 1.0 MG/DL 0.4    Albumin Latest Ref Range: 3.5 - 5.0 g/dL 2.1 (L)    ALT Latest Ref Range: 12 - 78 U/L 11 (L)    AST Latest Ref Range: 15 - 37 U/L 11 (L)    Alk. phosphatase Latest Ref Range: 45 - 117 U/L 53          RADIOLOGY:  CT scan abdomen with IV contrast.  Nodular surface to liver suggesting cirrhosis. No liver mass lesions. Mass in HOP with dilated bile ducts and PD. No thrombosis in PV, HV, IVC. Moderate ascites. Femoral vein thrombosis extended to lower IVC, multiple PE.       Lois Hernandez MD  [facility] [address] [facility] 22.  [phone]  [address] [facility]

## 2020-07-21 NOTE — PROGRESS NOTES
[facility]    ICU TEAM Progress Note    Name: Debbie Pinzon   : 1934   MRN: [id number]   Date: 2020      I  Subjective:   Progress Note: 2020      Reason for ICU Admission: GI bleed, low blood pressure    Interval history: This is a very pleasant elderly gentleman who was admitted to the hospital on  with progressive nausea vomiting and right lower limb swelling and pain. He was found to have extensive DVT in that limb as well as pulmonary embolisms. 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 He had ascites which was drained and sent for cytology results still pending. While he is on anticoagulation for his thromboembolic disease he developed significant amount of GI bleed and endoscopy showed erosive esophagitis. IVC filter placed on the EK without complication. He is still on TPN but tolerating ice chips without nausea or vomiting. He remains on low-dose norepinephrine. Overnight Events: No acute event, IVC filter placed, EGD showed erosive esophagitis, no active bleeding detected      Active Problem List:     Problem List  Date Reviewed: 2020          Codes Class    * (Principal) DVT (deep venous thrombosis) (HCC) ICD-10-CM: I82.409  ICD-9-CM: 453.40         DVT (deep vein thrombosis) in pregnancy ICD-10-CM: O22.30  ICD-9-CM: 671.30               Past Medical History:      has a past medical history of Arthritis, BPH (benign prostatic hyperplasia), DJD (degenerative joint disease), Hypercholesterolemia, and Other and unspecified hyperlipidemia. He also has no past medical history of Abuse, Adverse effect of anesthesia, Anemia NEC, Calculus of kidney, Congestive heart failure, unspecified, Contact dermatitis and other eczema, due to unspecified cause, COPD, Depression, Headache(784.0), Psychotic disorder ([facility] [address] Sleep apnea, or Trauma.     Past Surgical History:      has a past surgical history that includes hx orthopaedic (Right, 2010); hx tonsillectomy; colonoscopy (N/A, 2017); ir ivc filter (2020); and ir plc ivc filter (2020). Home Medications:     Prior to Admission medications    Medication Sig Start Date End Date Taking? Authorizing Provider   multivitamin with iron tablet Take 1 Tab by mouth daily. Provider, Historical   predniSONE (STERAPRED) 5 mg dose pack See administration instruction per 5mg dose pack 18   Rosanna SHEPPARD MD   TAMSULOSIN HCL (TAMSULOSIN PO) Take 0.4 mg by mouth daily. Provider, Historical   finasteride (PROSCAR) 5 mg tablet Take 5 mg by mouth daily. Provider, Historical       Allergies/Social/Family History: Allergies   Allergen Reactions    Aspirin Unknown (comments)      Social History     Tobacco Use    Smoking status: Never Smoker    Smokeless tobacco: Never Used   Substance Use Topics    Alcohol use: Yes     Comment: social      Family History   Problem Relation Age of Onset    Diabetes Sister     Diabetes Brother     Heart Disease Brother        Review of Systems:     A comprehensive review of systems was negative except for that written in the HPI. Objective:   Vital Signs:  Visit Vitals  /61   Pulse (!) 56   Temp 97 °F (36.1 °C)   Resp 14   Ht 5' 10\" (1.778 m)   Wt 77.6 kg (171 lb 1.2 oz)   SpO2 100%   BMI 24.55 kg/m²    O2 Flow Rate (L/min): 2 l/min O2 Device: Room air Temp (24hrs), Av.7 °F (36.5 °C), Min:97 °F (36.1 °C), Max:98 °F (36.7 °C)           Intake/Output:     Intake/Output Summary (Last 24 hours) at 2020 0819  Last data filed at 2020 0700  Gross per 24 hour   Intake 5468.66 ml   Output 1075 ml   Net 4393.66 ml       Physical Exam:    General:  Alert, cooperative, well noursished, well developed, appears stated age   Eyes:  Pupils equally round and reactive to light.    Mouth/Throat: Mucous membranes normal, oral pharynx clear   Neck: Supple   Lungs:   Clear to auscultation bilaterally, good effort   CV:  Regular rate and rhythm,no murmur, click, rub or gallop   Abdomen:   Soft, non-tender. bowel sounds normal. non-distended   Extremities: No cyanosis or edema   Skin: Skin color, texture, turgor normal. no acute rash or lesions   Lymph nodes: Cervical and supraclavicular normal   Musculoskeletal: No swelling or deformity   Lines/Devices:  Intact, no erythema, drainage or tenderness   Psych: Alert and oriented, normal mood affect given the setting  Moves 4 limbs no deficit, hard of hearing       LABS AND  DATA: Personally reviewed  Recent Labs     07/21/20  0558 07/20/20  2346  07/20/20  1207  07/20/20  0237   WBC  --   --   --  7.4  --  6.4   HGB 10.1* 10.1*   < > 10.2*   < > 7.6*   HCT 31.1* 30.6*   < > 31.5*   < > 23.9*   PLT  --   --   --  186  --  180    < > = values in this interval not displayed. Recent Labs     07/21/20  0558 07/20/20  1207    141   K 4.2 4.8   * 110*   CO2 25 26   BUN 21* 22*   CREA 0.78 0.80   * 190*   CA 8.0* 8.1*   MG 2.2 2.3   PHOS 2.5* 2.6     Recent Labs     07/20/20  0237 07/19/20  0356   AP 53  --    TP 5.7*  --    ALB 2.1*  --    GLOB 3.6  --    LPSE  --  121     Recent Labs     07/21/20  0558 07/20/20  0237   INR 1.2* 1.3*   PTP 12.0* 13.0*   APTT 32.2* 38.7*      No results for input(s): PHI, PCO2I, PO2I, FIO2I in the last 72 hours. Recent Labs     07/20/20 0237   TROIQ <0.05       Hemodynamics:   PAP:   CO:     Wedge:   CI:     CVP:    SVR:       PVR:       Ventilator Settings:  Mode Rate Tidal Volume Pressure FiO2 PEEP                    Peak airway pressure:      Minute ventilation:          MEDS: Reviewed    Chest X-Ray:  Reviewed all the images and agree with report  CXR Results  (Last 48 hours)    None            Multidisciplinary Rounds Completed:   Yes    Assessment and Plan:   - GI bleed with Acute blood loss anemia:  Hemoglobin been stable, discontinue every 6 H&H, discontinue octreotide, continue PPI twice daily, EGD showed erosive esophagitis with evidence of recent bleed but no active bleeding.   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 Appreciate hematology input  - Head of the pancreas mass: Pending ascites cytology, possible plan for EUS biopsy if patient agrees and its indicated. - Hypotension: This is multifactorial, will wean norepinephrine as tolerated, start weaning steroids especially with the erosive esophagitis. On midodrone  - Continue TPN for now, replace electrolytes as indicated, not able to tolerate Oral intake likely due to pancreatic mass    Patient overall prognosis is very poor, I will ask palliative care to revisit him and discuss goals of care. In my opinion it is reasonable to consider comfort measure if patient's and family improve. DISPOSITION  Stay in ICU  Will transfer care when he is off pressors  CRITICAL CARE CONSULTANT NOTE  I had a face to face encounter with the patient, reviewed and interpreted patient data including clinical events, labs, images, vital signs, I/O's, and examined patient. I have discussed the case and the plan and management of the patient's care with the consulting services, the bedside nurses and the respiratory therapist.      NOTE OF PERSONAL INVOLVEMENT IN CARE   This patient has a high probability of imminent, clinically significant deterioration, which requires the highest level of preparedness to intervene urgently. I participated in the decision-making and personally managed or directed the management of the following life and organ supporting interventions that required my frequent assessment to treat or prevent imminent deterioration. I personally spent 40 minutes of critical care time. This is time spent at this critically ill patient's bedside actively involved in patient care as well as the coordination of care and discussions with the patient's family.   This does not include any procedural time which has been billed separately. Luis Charles M.D.   Staff Intensivist/Pulmonologist  [facility]  7/21/2020

## 2020-07-21 NOTE — PROGRESS NOTES
Problem: Self Care Deficits Care Plan (Adult)  Goal: *Acute Goals and Plan of Care (Insert Text)  Description:   FUNCTIONAL STATUS PRIOR TO ADMISSION: Pt reports living with wife in single story home, reporting Mod Clinch at quad cane. Has tub-shower combo with no seated surface. Retired principal.    HOME SUPPORT: The patient lived with wife but did not require assist.    Occupational Therapy Goals  Initiated 7/17/2020  1. Patient will perform grooming with modified independence within 7 day(s). 2.  Patient will perform lower body dressing with supervision/set-up within 7 day(s). 3.  Patient will perform bathing with supervision/set-up within 7 day(s). 4.  Patient will perform toilet transfers with modified independence within 7 day(s). 5.  Patient will perform all aspects of toileting with modified independence within 7 day(s). 6.  Patient will utilize energy conservation techniques during functional activities with Min verbal cues within 7 day(s). Outcome: Progressing Towards Goal    OCCUPATIONAL THERAPY TREATMENT  Patient: Laura Marshall (10 y.o. male)  Date: 7/21/2020  Diagnosis: DVT (deep vein thrombosis) in pregnancy [O22.30]  DVT (deep venous thrombosis) ([facility]) [I82.409]   DVT (deep venous thrombosis) ([facility])  Procedure(s) (LRB):  ESOPHAGOGASTRODUODENOSCOPY (EGD) at bedside (N/A) 1 Day Post-Op  Precautions: Fall  Chart, occupational therapy assessment, plan of care, and goals were reviewed. ASSESSMENT  Patient continues with skilled OT services and is progressing towards goals. Pt progressed to sitting EOB and with standing but noted with orthostatic BP. Pt was able to complete grooming while sitting EOB with supervision demonstrating good activity tolerance but noted significant drop in BP with standing activities. At this time, pt unable to return home and will benefit from discharge to rehab facility to maximize independence and safety with all activities.     Vitals:    07/21/20 1120 07/21/20 1140 07/21/20 1200 07/21/20 1300   BP: 94/53 96/71 (!) 89/57 96/60   BP 1 Location:       BP Patient Position: supine sitting standing sitting   Pulse: 75 76 63 79   Resp: 16 24 29 21   Temp:   97.7 °F (36.5 °C)    SpO2:  100%     Weight:       Height:            Current Level of Function Impacting Discharge (ADLs): min A for mobility tasks    Other factors to consider for discharge: debility         PLAN :  Patient continues to benefit from skilled intervention to address the above impairments. Continue treatment per established plan of care. to address goals. Recommend with staff: bed in chair position TID     Recommend next OT session: progressing to EOB for activities. Recommendation for discharge: (in order for the patient to meet his/her long term goals)  Therapy up to 5 days/week in SNF setting    This discharge recommendation:  Has been made in collaboration with the attending provider and/or case management    IF patient discharges home will need the following DME: none       SUBJECTIVE:   Patient stated I am feeling alright.     OBJECTIVE DATA SUMMARY:   Cognitive/Behavioral Status:  Neurologic State: Alert  Orientation Level: Oriented to person;Oriented to place  Cognition: Follows commands  Perception: Appears intact  Perseveration: No perseveration noted  Safety/Judgement: Awareness of environment    Functional Mobility and Transfers for ADLs:  Bed Mobility:  Rolling: Supervision  Supine to Sit: Supervision  Sit to Supine: Supervision  Scooting: Supervision    Transfers:             Balance:  Sitting: Intact; With support  Standing: Intact; With support    ADL Intervention:       Grooming  Grooming Assistance: Supervision  Position Performed: Seated edge of bed  Washing Face: Supervision  Washing Hands: Supervision  Brushing Teeth: Supervision                             Cognitive Retraining  Safety/Judgement: Awareness of environment      Pain:  No pain    Activity Tolerance: Good  Please refer to the flowsheet for vital signs taken during this treatment. After treatment patient left in no apparent distress:   Sitting in chair and Call bell within reach    COMMUNICATION/COLLABORATION:   The patients plan of care was discussed with: Physical therapist and Registered nurse.      Rashad Matos OT  Time Calculation: 27 mins

## 2020-07-21 NOTE — ROUTINE PROCESS
0800 received report from Karsten Tadeo, [facility]; assumed care; Pt is resting bed. Drips:   TPN @ 63 ml/hr;   Levo @ 8 mcg;   Sandostatin @ 25 mcg  LR @125; Abx    0830 Dr Lucy Arzate  at the bedside; Plan: to wean off Levo as tolerated; re-consult palliative cere;   0910 Palliative care was called. 1100 PT/OT at the bedside for therapy. [address] Interdisciplinary rounds. 1148 Sandostatin stopped. 1200 Bedside and Verbal shift change report given to Samanta Corrales (oncoming nurse) by Paige Boone (offgoing nurse). Report included the following information SBAR, Kardex, Procedure Summary, Intake/Output, MAR, Recent Results and Cardiac Rhythm NSR.

## 2020-07-21 NOTE — WOUND CARE
WOCN Note:     New consult for chaffing. Chart shows:  Admitted for GIB, esophagitis, outlet obstruction, DVT, PE  History of cirrhosis, pancreatitis    Assessment:   Appropriately conversational and reports no pain. Able to turn independently for assessment. Bed: total care with foam mattress - discussed with RN who will obtain appropriate critical care bed    Bilateral heel, buttocks, and sacral skin intact and without erythema. There is hyperkeratotic tissue with rough texture and chaffing to bilateral buttocks - cream applied and encouraged him to turn side-to-side. Recommendations:     Turn/reposition approximately every 2 hours and offload heels with pillows at all times in bed. Z-guard cream to buttocks and sacrum daily and as needed with incontinence care    No current wound care needs - will sign off.      RODOLFO Ochoa, RN, [facility]  Certified Wound, Ostomy, Continence Nurse  office [phone]  pager [phone] or call  to page

## 2020-07-22 ENCOUNTER — APPOINTMENT (OUTPATIENT)
Dept: CT IMAGING | Age: 85
DRG: 166 | End: 2020-07-22
Attending: PHYSICIAN ASSISTANT
Payer: MEDICARE

## 2020-07-22 LAB
APTT PPP: 33.2 SEC (ref 22.1–32)
BASOPHILS # BLD: 0 K/UL (ref 0–0.1)
BASOPHILS NFR BLD: 0 % (ref 0–1)
DIFFERENTIAL METHOD BLD: ABNORMAL
EOSINOPHIL # BLD: 0 K/UL (ref 0–0.4)
EOSINOPHIL NFR BLD: 0 % (ref 0–7)
ERYTHROCYTE [DISTWIDTH] IN BLOOD BY AUTOMATED COUNT: 18.1 % (ref 11.5–14.5)
FIBRINOGEN PPP-MCNC: 131 MG/DL (ref 200–475)
GLUCOSE BLD STRIP.AUTO-MCNC: 164 MG/DL (ref 65–100)
GLUCOSE BLD STRIP.AUTO-MCNC: 171 MG/DL (ref 65–100)
GLUCOSE BLD STRIP.AUTO-MCNC: 179 MG/DL (ref 65–100)
GLUCOSE BLD STRIP.AUTO-MCNC: 189 MG/DL (ref 65–100)
GLUCOSE BLD STRIP.AUTO-MCNC: 204 MG/DL (ref 65–100)
GLUCOSE BLD STRIP.AUTO-MCNC: 211 MG/DL (ref 65–100)
GLUCOSE BLD STRIP.AUTO-MCNC: 446 MG/DL (ref 65–100)
HCT VFR BLD AUTO: 28.3 % (ref 36.6–50.3)
HGB BLD-MCNC: 9 G/DL (ref 12.1–17)
IMM GRANULOCYTES # BLD AUTO: 0.1 K/UL (ref 0–0.04)
IMM GRANULOCYTES NFR BLD AUTO: 1 % (ref 0–0.5)
INR PPP: 1.3 (ref 0.9–1.1)
LYMPHOCYTES # BLD: 1.2 K/UL (ref 0.8–3.5)
LYMPHOCYTES NFR BLD: 16 % (ref 12–49)
MAGNESIUM SERPL-MCNC: 1.9 MG/DL (ref 1.6–2.4)
MCH RBC QN AUTO: 31 PG (ref 26–34)
MCHC RBC AUTO-ENTMCNC: 31.8 G/DL (ref 30–36.5)
MCV RBC AUTO: 97.6 FL (ref 80–99)
MONOCYTES # BLD: 0.5 K/UL (ref 0–1)
MONOCYTES NFR BLD: 7 % (ref 5–13)
NEUTS SEG # BLD: 6 K/UL (ref 1.8–8)
NEUTS SEG NFR BLD: 76 % (ref 32–75)
NRBC # BLD: 0 K/UL (ref 0–0.01)
NRBC BLD-RTO: 0 PER 100 WBC
PHOSPHATE SERPL-MCNC: 2.3 MG/DL (ref 2.6–4.7)
PLATELET # BLD AUTO: 179 K/UL (ref 150–400)
PMV BLD AUTO: 9.6 FL (ref 8.9–12.9)
PROTHROMBIN TIME: 13 SEC (ref 9–11.1)
RBC # BLD AUTO: 2.9 M/UL (ref 4.1–5.7)
SERVICE CMNT-IMP: ABNORMAL
THERAPEUTIC RANGE,PTTT: ABNORMAL SECS (ref 58–77)
WBC # BLD AUTO: 7.8 K/UL (ref 4.1–11.1)

## 2020-07-22 PROCEDURE — 74170 CT ABD WO CNTRST FLWD CNTRST: CPT

## 2020-07-22 PROCEDURE — 85730 THROMBOPLASTIN TIME PARTIAL: CPT

## 2020-07-22 PROCEDURE — C9113 INJ PANTOPRAZOLE SODIUM, VIA: HCPCS | Performed by: INTERNAL MEDICINE

## 2020-07-22 PROCEDURE — 97110 THERAPEUTIC EXERCISES: CPT

## 2020-07-22 PROCEDURE — 74011000258 HC RX REV CODE- 258: Performed by: NURSE PRACTITIONER

## 2020-07-22 PROCEDURE — 74011250636 HC RX REV CODE- 250/636: Performed by: INTERNAL MEDICINE

## 2020-07-22 PROCEDURE — 85384 FIBRINOGEN ACTIVITY: CPT

## 2020-07-22 PROCEDURE — 74011250636 HC RX REV CODE- 250/636: Performed by: NURSE PRACTITIONER

## 2020-07-22 PROCEDURE — 85025 COMPLETE CBC W/AUTO DIFF WBC: CPT

## 2020-07-22 PROCEDURE — 74011250637 HC RX REV CODE- 250/637: Performed by: INTERNAL MEDICINE

## 2020-07-22 PROCEDURE — 36415 COLL VENOUS BLD VENIPUNCTURE: CPT

## 2020-07-22 PROCEDURE — 97530 THERAPEUTIC ACTIVITIES: CPT

## 2020-07-22 PROCEDURE — 83735 ASSAY OF MAGNESIUM: CPT

## 2020-07-22 PROCEDURE — 82962 GLUCOSE BLOOD TEST: CPT

## 2020-07-22 PROCEDURE — 84100 ASSAY OF PHOSPHORUS: CPT

## 2020-07-22 PROCEDURE — 74011000258 HC RX REV CODE- 258: Performed by: RADIOLOGY

## 2020-07-22 PROCEDURE — 74011000250 HC RX REV CODE- 250: Performed by: INTERNAL MEDICINE

## 2020-07-22 PROCEDURE — 74011636637 HC RX REV CODE- 636/637: Performed by: INTERNAL MEDICINE

## 2020-07-22 PROCEDURE — 65610000006 HC RM INTENSIVE CARE

## 2020-07-22 PROCEDURE — 74011000258 HC RX REV CODE- 258: Performed by: INTERNAL MEDICINE

## 2020-07-22 PROCEDURE — 74011636320 HC RX REV CODE- 636/320: Performed by: RADIOLOGY

## 2020-07-22 PROCEDURE — 36592 COLLECT BLOOD FROM PICC: CPT

## 2020-07-22 PROCEDURE — 85610 PROTHROMBIN TIME: CPT

## 2020-07-22 RX ORDER — SODIUM CHLORIDE 0.9 % (FLUSH) 0.9 %
10 SYRINGE (ML) INJECTION
Status: COMPLETED | OUTPATIENT
Start: 2020-07-22 | End: 2020-07-22

## 2020-07-22 RX ADMIN — Medication 10 ML: at 15:07

## 2020-07-22 RX ADMIN — HYDROCORTISONE SODIUM SUCCINATE 50 MG: 100 INJECTION, POWDER, FOR SOLUTION INTRAMUSCULAR; INTRAVENOUS at 19:17

## 2020-07-22 RX ADMIN — SODIUM CHLORIDE 100 ML: 900 INJECTION, SOLUTION INTRAVENOUS at 15:07

## 2020-07-22 RX ADMIN — SODIUM CHLORIDE 40 MG: 9 INJECTION INTRAMUSCULAR; INTRAVENOUS; SUBCUTANEOUS at 20:24

## 2020-07-22 RX ADMIN — CASTOR OIL AND BALSAM, PERU: 788; 87 OINTMENT TOPICAL at 08:11

## 2020-07-22 RX ADMIN — INSULIN LISPRO 3 UNITS: 100 INJECTION, SOLUTION INTRAVENOUS; SUBCUTANEOUS at 12:51

## 2020-07-22 RX ADMIN — INSULIN LISPRO 2 UNITS: 100 INJECTION, SOLUTION INTRAVENOUS; SUBCUTANEOUS at 23:28

## 2020-07-22 RX ADMIN — ONDANSETRON 4 MG: 2 INJECTION INTRAMUSCULAR; INTRAVENOUS at 20:24

## 2020-07-22 RX ADMIN — PIPERACILLIN AND TAZOBACTAM 3.38 G: 3; .375 INJECTION, POWDER, LYOPHILIZED, FOR SOLUTION INTRAVENOUS at 19:17

## 2020-07-22 RX ADMIN — Medication 10 ML: at 21:33

## 2020-07-22 RX ADMIN — MIDODRINE HYDROCHLORIDE 15 MG: 5 TABLET ORAL at 05:42

## 2020-07-22 RX ADMIN — PIPERACILLIN AND TAZOBACTAM 3.38 G: 3; .375 INJECTION, POWDER, LYOPHILIZED, FOR SOLUTION INTRAVENOUS at 12:21

## 2020-07-22 RX ADMIN — INSULIN LISPRO 2 UNITS: 100 INJECTION, SOLUTION INTRAVENOUS; SUBCUTANEOUS at 06:03

## 2020-07-22 RX ADMIN — ONDANSETRON 4 MG: 2 INJECTION INTRAMUSCULAR; INTRAVENOUS at 03:11

## 2020-07-22 RX ADMIN — INSULIN LISPRO 2 UNITS: 100 INJECTION, SOLUTION INTRAVENOUS; SUBCUTANEOUS at 00:10

## 2020-07-22 RX ADMIN — IOPAMIDOL 100 ML: 755 INJECTION, SOLUTION INTRAVENOUS at 15:07

## 2020-07-22 RX ADMIN — I.V. FAT EMULSION 500 ML: 20 EMULSION INTRAVENOUS at 20:21

## 2020-07-22 RX ADMIN — Medication 10 ML: at 05:48

## 2020-07-22 RX ADMIN — SODIUM CHLORIDE 40 MG: 9 INJECTION INTRAMUSCULAR; INTRAVENOUS; SUBCUTANEOUS at 08:07

## 2020-07-22 RX ADMIN — Medication 20 ML: at 06:11

## 2020-07-22 RX ADMIN — MIDODRINE HYDROCHLORIDE 15 MG: 5 TABLET ORAL at 15:55

## 2020-07-22 RX ADMIN — SODIUM CHLORIDE, SODIUM LACTATE, POTASSIUM CHLORIDE, AND CALCIUM CHLORIDE 125 ML/HR: 600; 310; 30; 20 INJECTION, SOLUTION INTRAVENOUS at 08:15

## 2020-07-22 RX ADMIN — MIDODRINE HYDROCHLORIDE 15 MG: 5 TABLET ORAL at 21:32

## 2020-07-22 RX ADMIN — Medication 10 ML: at 21:34

## 2020-07-22 RX ADMIN — CALCIUM GLUCONATE: 94 INJECTION, SOLUTION INTRAVENOUS at 18:29

## 2020-07-22 RX ADMIN — HYDROCORTISONE SODIUM SUCCINATE 50 MG: 100 INJECTION, POWDER, FOR SOLUTION INTRAMUSCULAR; INTRAVENOUS at 05:41

## 2020-07-22 RX ADMIN — ONDANSETRON 4 MG: 2 INJECTION INTRAMUSCULAR; INTRAVENOUS at 08:07

## 2020-07-22 RX ADMIN — PIPERACILLIN AND TAZOBACTAM 3.38 G: 3; .375 INJECTION, POWDER, LYOPHILIZED, FOR SOLUTION INTRAVENOUS at 03:11

## 2020-07-22 RX ADMIN — Medication 10 ML: at 15:48

## 2020-07-22 RX ADMIN — INSULIN LISPRO 2 UNITS: 100 INJECTION, SOLUTION INTRAVENOUS; SUBCUTANEOUS at 19:17

## 2020-07-22 RX ADMIN — CASTOR OIL AND BALSAM, PERU: 788; 87 OINTMENT TOPICAL at 18:34

## 2020-07-22 RX ADMIN — SODIUM CHLORIDE 5 MG: 9 INJECTION INTRAMUSCULAR; INTRAVENOUS; SUBCUTANEOUS at 05:41

## 2020-07-22 NOTE — PROGRESS NOTES
Problem: Mobility Impaired (Adult and Pediatric)  Goal: *Acute Goals and Plan of Care (Insert Text)  Description: FUNCTIONAL STATUS PRIOR TO ADMISSION: Patient was modified independent using a rolling walker and WB quad cane for functional mobility. HOME SUPPORT PRIOR TO ADMISSION: The patient lived with spouse, but did not require assist from spouse. Physical Therapy Goals  Initiated 7/17/2020  1. Patient will move from supine to sit and sit to supine  in bed with modified independence within 7 day(s). 2.  Patient will transfer from bed to chair and chair to bed with minimal assistance/contact guard assist using the least restrictive device within 7 day(s). 3.  Patient will perform sit to stand with minimal assistance/contact guard assist within 7 day(s). 4.  Patient will ambulate with minimal assistance/contact guard assist for 50 feet with the least restrictive device within 7 day(s). 5.  Patient will ascend/descend 3 stairs with bilateral handrail(s) with minimal assistance/contact guard assist within 7 day(s). Outcome: Progressing Towards Goal   PHYSICAL THERAPY TREATMENT  Patient: Valeria Slaughter (7 y.o. male)  Date: 7/22/2020  Diagnosis: DVT (deep vein thrombosis) in pregnancy [O22.30]  DVT (deep venous thrombosis) ([facility]) [I82.409]   DVT (deep venous thrombosis) ([facility])  Procedure(s) (LRB):  ESOPHAGOGASTRODUODENOSCOPY (EGD) at bedside (N/A) 2 Days Post-Op  Precautions: Fall  Chart, physical therapy assessment, plan of care and goals were reviewed. ASSESSMENT  Patient continues with skilled PT services and is slowly progressing towards goals. Pt received supine in bed and agreeable to therapy. Pt tolerated session fairly well, but continues to be limited by generalized weakness, decreased functional mobility, impaired balance and gait and hypotension. Pt able to progress to standing and side stepping along the bedside with min A and RW, but further mobility deferred due hypotension.  Pt returned to supine position with bed in modified chair position and all needs met. Pt will continue to benefit from PT to progress mobility and reach highest level of independence. D    Vitals:    07/22/20 1120 07/22/20 1124 07/22/20 1126 07/22/20 1200   BP: (!) 85/52 (!) 62/48 (!) 88/56 97/52   BP 1 Location: Left arm Left arm Left arm Left arm   BP Patient Position: Sitting Standing Sitting;Post activity At rest   Pulse: 66 84 83 (!) 48   Resp:    14   Temp:    97 °F (36.1 °C)   SpO2:    97%   Weight:       Height:         . Current Level of Function Impacting Discharge (mobility/balance): min A sit<>stand and side stepping along the bedside; limited by hypotension    Other factors to consider for discharge: pending medical course         PLAN :  Patient continues to benefit from skilled intervention to address the above impairments. Continue treatment per established plan of care. to address goals. Recommendation for discharge: (in order for the patient to meet his/her long term goals)  To be determined: pending medical course and family decision on plan of care    This discharge recommendation:  Has been made in collaboration with the attending provider and/or case management    IF patient discharges home will need the following DME: to be determined (TBD)       SUBJECTIVE:   Patient stated I've had better days.     OBJECTIVE DATA SUMMARY:   Critical Behavior:  Neurologic State: Drowsy, Eyes open to voice  Orientation Level: Oriented X4  Cognition: Follows commands  Safety/Judgement: Awareness of environment  Functional Mobility Training:  Bed Mobility:     Supine to Sit: Supervision  Sit to Supine: Supervision           Transfers:  Sit to Stand: Minimum assistance  Stand to Sit: Minimum assistance                             Balance:  Sitting: Intact  Standing: Intact; With support  Ambulation/Gait Training:  Distance (ft): 3 Feet (ft)(side step along bedside)  Assistive Device: Gait belt;Walker, rolling  Ambulation - Level of Assistance: Minimal assistance        Gait Abnormalities: Decreased step clearance; Step to gait        Base of Support: Narrowed     Speed/Taylor: Pace decreased (<100 feet/min)  Step Length: Right shortened;Left shortened            Therapeutic Exercises: Ankle pumps, LAQ, seated marching  Pain Rating:  Pt denied pain    Activity Tolerance:   Fair  Please refer to the flowsheet for vital signs taken during this treatment. After treatment patient left in no apparent distress:   Supine in bed, Call bell within reach, and Side rails x 3    COMMUNICATION/COLLABORATION:   The patients plan of care was discussed with: Registered nurse.      Ashwin Matthews, PT, DPT   Time Calculation: 23 mins

## 2020-07-22 NOTE — PROGRESS NOTES
1930: Bedside and Verbal shift change report given to POP Wynn RN (oncoming nurse) by ELIZABETH Avila (offgoing nurse). Report included the following information SBAR, Kardex, Procedure Summary, Intake/Output, MAR, Accordion, Recent Results, Cardiac Rhythm NSR, Alarm Parameters  and Dual Neuro Assessment. 0400: D. Armand Epley, NP aware of patient's HR in the 40's. No orders received. 0730: Gustavo Ellison MD discussing plan of care with RN. MD wishes to d/c levophed at this time and maintain MAP >60. See MAR.    0730: Bedside and Verbal shift change report given to [address], RN (oncoming nurse) by POP Wynn RN (offgoing nurse). Report included the following information SBAR, Kardex, Intake/Output, MAR, Accordion, Recent Results, Cardiac Rhythm NSR with frequent PAC's, Alarm Parameters  and Dual Neuro Assessment. Shift Summary:  A&O x2, follows commands. Patient required low dose levophed overnight to maintain MAP >65. Turned off at [phone] per Gustavo Ellison MD (new MAP goal >60). NSR with frequent PAC's. RA. Adequate UOP overnight. Bath given.

## 2020-07-22 NOTE — PROGRESS NOTES
Palliative Medicine Consult  Johnny: [phone] ([phone])    Patient Name: Lizandro Galaviz  YOB: 1934    Date of Initial Consult: July 14, 2020  Reason for Consult: Care Decisions  Requesting Provider: Dr. Ivan Brewer    Primary Care Physician: Angi Pruett MD     SUMMARY:   Lizandro Galaviz is a 80 y.o. bed bound male with a past history of BPH, hypercholesterolemia,DJD,  , who was admitted on 7/13/2020 from home after [address] [facility] Nurse found the pt to be hypotensive. Work up revealed extensive rt lower DVT extending up to the vena cava with bilateral PE's. Admission complicated by finding of pancreatic mass, ascites with possible splenic vein occlusion/ distal esophageal and gastric varices,  Nausea and vomiting, possible pneumonia, hypokalemia, hypotension. Heme onc and GI consults pending. Full Code  No Amd  Current medical issues leading to Palliative Medicine involvement include: support with care decisions given acute illness in a compromised state with high risk of decline. .    Interval History:  7/20/20: FNA and biopsy, ivc filter, EGD, (ulcer), gib from eliquis     PALLIATIVE DIAGNOSES:   1. Nausea and vomiting~ improved  2. Hypoalbuminemia  3. Hypotension~ on pressers   4. bed bound   5. Physical debility      PLAN:     1. Pt seen without family present. He went down for additional imaging today  2. Oncology has weighed in and has no aggressive options to offer if it is pancreatic cancer due to the patient's functional status. I explained this to the pt today. He asked about what the other option would be. 3. I talked to him about hospice, home health, rehab  4. Pt would like to pursue rehab. He said he was able to stand oob today and wants to get stronger  5. Plan: continue to address acute issues. Re-address care goals as needed    6. 7/21/20: Multiple conversations with wife. She is unable to come to the hospital to see the patient  7.  Discussed events since our last discussion  8. All questions answered  9. Called wife again with the pt on speaker and allowed her to speak to spouse  8. Pt feeling well and says nausea well controlled  11. No pain   12. Waiting on results and options to discuss next steps  13. DNR status affirmed. Pt signed DDNR last week  14. Goal is to continue current level of care  15. Will continue to follow      16. Initial consult note routed to primary continuity provider and/or primary health care team members  17. Communicated plan of care with: Palliative IDTAngelic 192 Team     GOALS OF CARE / TREATMENT PREFERENCES:     GOALS OF CARE:  Patient/Health Care Proxy Stated Goals: Rehabilitation    TREATMENT PREFERENCES:   Code Status: DNR    Advance Care Planning:  [x] [facility] Interdisciplinary Team has updated the ACP Navigator with Health Care Decision Maker and Patient Capacity      Primary Decision Maker: Margoth Ann - [phone]  Advance Care Planning 7/13/2020   Confirm Advance Directive Yes, on file       Medical Interventions: Limited additional interventions     Other Instructions:   Artificially Administered Nutrition: No feeding tube     Other:    As far as possible, the palliative care team has discussed with patient / health care proxy about goals of care / treatment preferences for patient. HISTORY:     History obtained from: chart    CHIEF COMPLAINT: pt admitted with aforementioned history an issues    HPI/SUBJECTIVE:    The patient is:   [x] Verbal and participatory  [] Non-participatory due to:    No complaints today      Clinical Pain Assessment (nonverbal scale for severity on nonverbal patients):   Clinical Pain Assessment  Severity: 0          Duration: for how long has pt been experiencing pain (e.g., 2 days, 1 month, years)  Frequency: how often pain is an issue (e.g., several times per day, once every few days, constant)     FUNCTIONAL ASSESSMENT:     Palliative Performance Scale (PPS):  PPS: 40       PSYCHOSOCIAL/SPIRITUAL SCREENING:     Palliative IDT has assessed this patient for cultural preferences / practices and a referral made as appropriate to needs (Cultural Services, Patient Advocacy, Ethics, etc.)    Any spiritual / Anglican concerns:  [] Yes /  [x] No    Caregiver Burnout:  [] Yes /  [x] No /  [] No Caregiver Present      Anticipatory grief assessment:   [x] Normal  / [] Maladaptive       ESAS Anxiety: Anxiety: 0    ESAS Depression: Depression: 0        REVIEW OF SYSTEMS:     Positive and pertinent negative findings in ROS are noted above in HPI. The following systems were [x] reviewed / [] unable to be reviewed as noted in HPI  Other findings are noted below. Systems: constitutional, ears/nose/mouth/throat, respiratory, gastrointestinal, genitourinary, musculoskeletal, integumentary, neurologic, psychiatric, endocrine. Positive findings noted below. Modified ESAS Completed by: provider   Fatigue: 2 Drowsiness: 0   Depression: 0 Pain: 0   Anxiety: 0 Nausea: 0   Anorexia: 0 Dyspnea: 0     Constipation: No     Stool Occurrence(s): 0        PHYSICAL EXAM:     From RN flowsheet:  Wt Readings from Last 3 Encounters:   07/22/20 180 lb 1.9 oz (81.7 kg)   07/27/18 204 lb (92.5 kg)   03/17/18 200 lb (90.7 kg)     Blood pressure 94/50, pulse (!) 55, temperature 97 °F (36.1 °C), resp. rate 14, height 5' 10\" (1.778 m), weight 180 lb 1.9 oz (81.7 kg), SpO2 98 %.     Pain Scale 1: Numeric (0 - 10)  Pain Intensity 1: 0     Pain Location 1: Chest           Last bowel movement, if known:     Constitutional:  conversant, nad, ill appearing  Eyes: pupils equal, anicteric  ENMT: no nasal discharge, moist mucous membranes, Ruby  Cardiovascular: regular rhythm, distal pulses intact  Respiratory: breathing not labored, symmetric  Gastrointestinal: soft non-tender, +bowel sounds  Musculoskeletal: no deformity, no tenderness to palpation  Skin: warm, dry  Neurologic: following commands, moving all extremities  Psychiatric: full affect, no hallucinations  Other:       HISTORY:     Principal Problem:    DVT (deep venous thrombosis) ([facility] [address] (7/14/2020)      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    BPH (benign prostatic hyperplasia)     DJD (degenerative joint disease)     Hypercholesterolemia     Other and unspecified hyperlipidemia       Past Surgical History:   Procedure Laterality Date    COLONOSCOPY N/A 8/18/2017    COLONOSCOPY performed by Ed Stewart MD at [address] [place] [address] ENDOSCOPY    HX ORTHOPAEDIC Right 2010    rotator cuff    HX TONSILLECTOMY      IR IVC FILTER  7/20/2020         IR PLC IVC FILTER  7/20/2020      Family History   Problem Relation Age of Onset    Diabetes Sister     Diabetes Brother     Heart Disease Brother       History reviewed, no pertinent family history.   Social History     Tobacco Use    Smoking status: Never Smoker    Smokeless tobacco: Never Used   Substance Use Topics    Alcohol use: Yes     Comment: social     Allergies   Allergen Reactions    Aspirin Unknown (comments)      Current Facility-Administered Medications   Medication Dose Route Frequency    NOREPINephrine (LEVOPHED) 8,000 mcg in dextrose 5% 250 mL infusion  2-30 mcg/min IntraVENous TITRATE    TPN ADULT - CENTRAL   IntraVENous CONTINUOUS    hydrocortisone Sod Succ (PF) (SOLU-CORTEF) injection 50 mg  50 mg IntraVENous Q12H    midodrine (PROAMATINE) tablet 15 mg  15 mg Oral Q8H    fat emulsion 20% (LIPOSYN, INTRAlipid) infusion 500 mL  500 mL IntraVENous Q MON, WED & FRI    TPN ADULT - CENTRAL   IntraVENous CONTINUOUS    glucose chewable tablet 16 g  4 Tab Oral PRN    glucagon (GLUCAGEN) injection 1 mg  1 mg IntraMUSCular PRN    dextrose 10% infusion 0-250 mL  0-250 mL IntraVENous PRN    insulin lispro (HUMALOG) injection   SubCUTAneous Q6H    melatonin tablet 3 mg  3 mg Oral QHS PRN    0.9% sodium chloride infusion 250 mL  250 mL IntraVENous PRN    lactated Ringers infusion  125 mL/hr IntraVENous CONTINUOUS    0.9% sodium chloride infusion 250 mL  250 mL IntraVENous PRN    sodium chloride (NS) flush 5-40 mL  5-40 mL IntraVENous Q8H    sodium chloride (NS) flush 5-40 mL  5-40 mL IntraVENous PRN    simethicone (MYLICON) 25KD/6.2WL oral drops 80 mg  1.2 mL Oral Multiple    piperacillin-tazobactam (ZOSYN) 3.375 g in 0.9% sodium chloride (MBP/ADV) 100 mL  3.375 g IntraVENous Q8H    pantoprazole (PROTONIX) 40 mg in 0.9% sodium chloride 10 mL injection  40 mg IntraVENous Q12H    prochlorperazine (COMPAZINE) with saline injection 5 mg  5 mg IntraVENous Q6H    [Held by provider] heparin 25,000 units in D5W 250 ml infusion  18-36 Units/kg/hr IntraVENous TITRATE    benzonatate (TESSALON) capsule 100 mg  100 mg Oral TID PRN    guaiFENesin (ROBITUSSIN) 100 mg/5 mL oral liquid 100 mg  100 mg Oral Q4H PRN    ondansetron (ZOFRAN) injection 4 mg  4 mg IntraVENous Q6H    scopolamine (TRANSDERM-SCOP) 1 mg over 3 days 1 Patch  1 Patch TransDERmal Q72H    prochlorperazine (COMPAZINE) tablet 5 mg  5 mg Oral Q6H PRN    finasteride (PROSCAR) tablet 5 mg  5 mg Oral DAILY    [Held by provider] tamsulosin (FLOMAX) capsule 0.4 mg  0.4 mg Oral DAILY    sodium chloride (NS) flush 5-40 mL  5-40 mL IntraVENous Q8H    sodium chloride (NS) flush 5-40 mL  5-40 mL IntraVENous PRN    acetaminophen (TYLENOL) tablet 650 mg  650 mg Oral Q6H PRN    balsam peru-castor oiL (VENELEX) ointment   Topical BID          LAB AND IMAGING FINDINGS:     Lab Results   Component Value Date/Time    WBC 7.8 07/22/2020 06:10 AM    HGB 9.0 (L) 07/22/2020 06:10 AM    PLATELET 744 44/30/2063 06:10 AM     Lab Results   Component Value Date/Time    Sodium 142 07/21/2020 05:58 AM    Potassium 4.2 07/21/2020 05:58 AM    Chloride 112 (H) 07/21/2020 05:58 AM    CO2 25 07/21/2020 05:58 AM    BUN 21 (H) 07/21/2020 05:58 AM    Creatinine 0.78 07/21/2020 05:58 AM    Calcium 8.0 (L) 07/21/2020 05:58 AM    Magnesium 1.9 07/22/2020 06:05 AM    Phosphorus 2.3 (L) 07/22/2020 06:05 AM      Lab Results   Component Value Date/Time    Alk. phosphatase 53 07/20/2020 02:37 AM    Protein, total 5.7 (L) 07/20/2020 02:37 AM    Albumin 2.1 (L) 07/20/2020 02:37 AM    Globulin 3.6 07/20/2020 02:37 AM     Lab Results   Component Value Date/Time    INR 1.3 (H) 07/22/2020 06:10 AM    Prothrombin time 13.0 (H) 07/22/2020 06:10 AM    aPTT 33.2 (H) 07/22/2020 06:10 AM      Lab Results   Component Value Date/Time    Ferritin 520 (H) 07/13/2020 09:49 PM      No results found for: PH, PCO2, PO2  No components found for: GLPOC   No results found for: CPK, CKMB             Total time: 35 min  Counseling / coordination time, spent as noted above: 30 min  > 50% counseling / coordination?: y    Prolonged service was provided for  []30 min   []75 min in face to face time in the presence of the patient, spent as noted above. Time Start:   Time End:   Note: this can only be billed with 57130 (initial) or 02440 (follow up). If multiple start / stop times, list each separately.

## 2020-07-22 NOTE — PROGRESS NOTES
Hematology-Oncology Progress Note      Tory Lassiter  1934  [id number]  7/22/2020      Subjective:     Briefly, Mr. Meredith Lema is an 79 y/o retired  admitted with hypotension and DVT. Work-up ultimately revealed PE and a pancreatic mass along with omental caking and left pleural effusion. Paracentesis 7/16 inconclusive. EGD done 7/20/20, EUSBx held due to patient on pressors. Started on oral NOAC, but had bleeding and IVC filter placed. Currently in ICU on pressors.      Allergies: Aspirin  Current Facility-Administered Medications   Medication Dose Route Frequency Provider Last Rate Last Dose    NOREPINephrine (LEVOPHED) 8,000 mcg in dextrose 5% 250 mL infusion  2-30 mcg/min IntraVENous TITRATE Ishmael Pradhan MD        hydrocortisone Sod Succ (PF) (SOLU-CORTEF) injection 50 mg  50 mg IntraVENous Q12H Ishmael Pradhan MD   50 mg at 07/22/20 0541    midodrine (PROAMATINE) tablet 15 mg  15 mg Oral Q8H Ishmael Pradhan MD   15 mg at 07/22/20 0542    fat emulsion 20% (LIPOSYN, INTRAlipid) infusion 500 mL  500 mL IntraVENous Q MON, WED & FRI Ishmael Pradhan MD        TPN ADULT - CENTRAL   IntraVENous CONTINUOUS Ishmael Pradhan MD 63 mL/hr at 07/21/20 1921      glucose chewable tablet 16 g  4 Tab Oral PRN Ishmael Pradhan MD        glucagon (GLUCAGEN) injection 1 mg  1 mg IntraMUSCular PRN Ishmael Pradhan MD        dextrose 10% infusion 0-250 mL  0-250 mL IntraVENous PRN Ishmael Pradhan MD        insulin lispro (HUMALOG) injection   SubCUTAneous Q6H Ishmael Pradhan MD   2 Units at 07/22/20 0603    melatonin tablet 3 mg  3 mg Oral QHS PRN ARISTEO Douglas   3 mg at 07/21/20 2337    0.9% sodium chloride infusion 250 mL  250 mL IntraVENous PRN Alexander Jewell NP        lactated Ringers infusion  125 mL/hr IntraVENous CONTINUOUS Bere Kessler  mL/hr at 07/21/20 2342 125 mL/hr at 07/21/20 2342    0.9% sodium chloride infusion 250 mL  250 mL IntraVENous PRN KENRICK Kwon        sodium chloride (NS) flush 5-40 mL  5-40 mL IntraVENous Q8H Giacomo Moise MD   20 mL at 07/22/20 0611    sodium chloride (NS) flush 5-40 mL  5-40 mL IntraVENous PRN Giacomo Moise MD        simethicone (MYLICON) 65RR/2.3VV oral drops 80 mg  1.2 mL Oral Multiple Giacomo Moise MD        piperacillin-tazobactam (ZOSYN) 3.375 g in 0.9% sodium chloride (MBP/ADV) 100 mL  3.375 g IntraVENous Q8H Amirah Lo, NP 25 mL/hr at 07/22/20 0311 3.375 g at 07/22/20 0311    pantoprazole (PROTONIX) 40 mg in 0.9% sodium chloride 10 mL injection  40 mg IntraVENous Q12H SETH Melara M, DO   40 mg at 07/21/20 2224    prochlorperazine (COMPAZINE) with saline injection 5 mg  5 mg IntraVENous Q6H Lori Melara, DO   5 mg at 07/22/20 0541    [Held by provider] heparin 25,000 units in D5W 250 ml infusion  18-36 Units/kg/hr IntraVENous TITRATE Ashley RODRIGUEZ, DO 9.7 mL/hr at 07/19/20 2238 13 Units/kg/hr at 07/19/20 2238    benzonatate (TESSALON) capsule 100 mg  100 mg Oral TID PRN Ashley RODRIGUEZ, DO   100 mg at 07/1934    guaiFENesin (ROBITUSSIN) 100 mg/5 mL oral liquid 100 mg  100 mg Oral Q4H PRN Ashley RODRIGUEZ, DO   100 mg at 07/1934    ondansetron (ZOFRAN) injection 4 mg  4 mg IntraVENous Q6H Dartha Flood D, NP   4 mg at 07/22/20 0311    scopolamine (TRANSDERM-SCOP) 1 mg over 3 days 1 Patch  1 Patch TransDERmal Q72H Dartha Flood D, NP   1 Patch at 07/21/20 1920    prochlorperazine (COMPAZINE) tablet 5 mg  5 mg Oral Q6H PRN Dartha Flood D, NP   5 mg at 07/15/20 2224    finasteride (PROSCAR) tablet 5 mg  5 mg Oral DAILY Jessie Guerra MD   5 mg at 07/21/20 0847    [Held by provider] tamsulosin (FLOMAX) capsule 0.4 mg  0.4 mg Oral DAILY Jessie Guerra MD   0.4 mg at 07/19/20 0942    sodium chloride (NS) flush 5-40 mL  5-40 mL IntraVENous Q8H Jessie Guerra MD   10 mL at 07/22/20 0548    sodium chloride (NS) flush 5-40 mL  5-40 mL IntraVENous PRN Mary Youngblood MD   10 mL at 07/18/20 1440    acetaminophen (TYLENOL) tablet 650 mg  650 mg Oral Q6H PRN Mary Youngblood MD   650 mg at 07/18/20 1448    balsam peru-castor oiL (VENELEX) ointment   Topical BID Devin Ho MD         Objective:     Patient Vitals for the past 24 hrs:   BP Temp Pulse Resp SpO2 Height Weight   07/22/20 0630 (!) 89/54 -- 67 17 94 % -- --   07/22/20 0618 -- -- -- -- -- -- 81.7 kg (180 lb 1.9 oz)   07/22/20 0600 99/56 -- (!) 52 17 -- -- --   07/22/20 0530 122/58 -- (!) 57 12 (!) 87 % -- --   07/22/20 0500 108/61 -- (!) 50 15 95 % -- --   07/22/20 0430 95/56 -- (!) 52 11 97 % -- --   07/22/20 0400 100/60 98.4 °F (36.9 °C) (!) 56 15 95 % -- --   07/22/20 0330 102/59 -- 66 23 95 % -- --   07/22/20 0300 90/49 -- 61 12 94 % -- --   07/22/20 0230 90/51 -- 61 15 95 % -- --   07/22/20 0200 93/53 -- 64 11 95 % -- --   07/22/20 0130 105/59 -- 79 14 97 % -- --   07/22/20 0100 102/51 -- 60 13 96 % -- --   07/22/20 0030 (!) 86/52 -- 66 14 96 % -- --   07/22/20 0017 91/49 -- 60 13 95 % -- --   07/22/20 0000 100/58 97.9 °F (36.6 °C) 62 24 95 % -- --   07/21/20 2330 98/61 -- 71 (!) 36 -- -- --   07/21/20 2300 102/59 -- 78 28 -- -- --   07/21/20 2230 102/62 -- 83 (!) 33 96 % -- --   07/21/20 2200 104/56 -- 67 20 96 % -- --   07/21/20 2130 111/85 -- 60 19 98 % -- --   07/21/20 2100 103/54 -- 71 18 96 % -- --   07/21/20 2030 95/59 -- 68 18 97 % -- --   07/21/20 2000 97/59 97.9 °F (36.6 °C) 90 23 97 % -- --   07/21/20 1945 -- -- 84 20 99 % -- --   07/21/20 1930 102/58 -- 76 22 97 % -- --   07/21/20 1915 -- -- 77 22 98 % -- --   07/21/20 1900 -- -- 82 20 99 % -- --   07/21/20 1845 -- -- 87 17 98 % -- --   07/21/20 1830 104/66 -- 67 19 98 % -- --   07/21/20 1815 -- -- 91 18 97 % -- --   07/21/20 1800 95/61 -- 84 30 99 % -- --   07/21/20 1745 -- -- 74 21 98 % -- --   07/21/20 1730 -- -- 81 21 98 % -- --   07/21/20 1715 -- -- 89 20 100 % -- --   07/21/20 1700 95/62 -- 84 16 98 % -- -- 07/21/20 1630 -- -- 82 24 99 % -- --   07/21/20 1615 -- -- 82 23 98 % -- --   07/21/20 1600 95/57 -- 85 25 97 % -- --   07/21/20 1545 -- -- 78 19 98 % -- --   07/21/20 1530 -- -- 96 17 100 % -- --   07/21/20 1515 -- -- 75 19 -- -- --   07/21/20 1500 91/62 -- 83 17 -- -- --   07/21/20 1445 -- -- 80 24 -- -- --   07/21/20 1430 -- -- 77 21 -- -- --   07/21/20 1415 -- -- 82 23 -- -- --   07/21/20 1400 95/60 -- 86 23 -- -- --   07/21/20 1345 -- -- 74 13 -- -- --   07/21/20 1330 -- -- 76 22 -- -- --   07/21/20 1315 -- -- 85 23 -- -- --   07/21/20 1300 96/60 -- 79 21 -- -- --   07/21/20 1245 -- -- 75 25 -- -- --   07/21/20 1230 -- -- 81 21 98 % -- --   07/21/20 1215 -- -- 91 28 -- -- --   07/21/20 1200 (!) 89/57 97.7 °F (36.5 °C) 63 29 -- -- --   07/21/20 1140 96/71 -- 76 24 100 % -- --   07/21/20 1120 94/53 -- 75 16 -- -- --   07/21/20 1100 (!) 83/47 -- 93 25 -- -- --   07/21/20 1040 92/61 -- 81 22 100 % -- --   07/21/20 1020 (!) 89/53 -- 76 20 100 % -- --   07/21/20 1000 99/60 -- 91 16 -- -- --   07/21/20 0940 99/60 -- 70 24 100 % -- --   07/21/20 0921 -- -- -- -- -- 5' 10\" (1.778 m) --   07/21/20 0920 95/55 -- 70 23 100 % -- --   07/21/20 0903 92/54 -- 69 18 100 % -- --   07/21/20 0840 96/56 -- 88 21 100 % -- --   07/21/20 0800 105/54 97.4 °F (36.3 °C) 62 11 98 % -- --       Gen: NAD  HEENT: PERRL, Sclerae anicteric  Cv: RRR without m/r/g  Pulm: CTA bilaterally  Abd: NABS, NTND, No HSM  Ext: No c/c/e    Available labs reviewed:  Labs:    Recent Results (from the past 24 hour(s))   GLUCOSE, POC    Collection Time: 07/21/20  1:09 PM   Result Value Ref Range    Glucose (POC) 164 (H) 65 - 100 mg/dL    Performed by [id number], POC    Collection Time: 07/21/20  7:05 PM   Result Value Ref Range    Glucose (POC) 172 (H) 65 - 100 mg/dL    Performed by [id number], POC    Collection Time: 07/22/20 12:06 AM   Result Value Ref Range    Glucose (POC) 179 (H) 65 - 100 mg/dL    Performed by [facility]    GLUCOSE, POC    Collection Time: 07/22/20  5:44 AM   Result Value Ref Range    Glucose (POC) 189 (H) 65 - 100 mg/dL    Performed by [address] [place] [address] [address]    Collection Time: 07/22/20  6:05 AM   Result Value Ref Range    Magnesium 1.9 1.6 - 2.4 mg/dL   PHOSPHORUS    Collection Time: 07/22/20  6:05 AM   Result Value Ref Range    Phosphorus 2.3 (L) 2.6 - 4.7 MG/DL   PROTHROMBIN TIME + INR    Collection Time: 07/22/20  6:10 AM   Result Value Ref Range    INR 1.3 (H) 0.9 - 1.1      Prothrombin time 13.0 (H) 9.0 - 11.1 sec   PTT    Collection Time: 07/22/20  6:10 AM   Result Value Ref Range    aPTT 33.2 (H) 22.1 - 32.0 sec    aPTT, therapeutic range     58.0 - 77.0 SECS   FIBRINOGEN    Collection Time: 07/22/20  6:10 AM   Result Value Ref Range    Fibrinogen 131 (L) 200 - 475 mg/dL   CBC WITH AUTOMATED DIFF    Collection Time: 07/22/20  6:10 AM   Result Value Ref Range    WBC 7.8 4.1 - 11.1 K/uL    RBC 2.90 (L) 4.10 - 5.70 M/uL    HGB 9.0 (L) 12.1 - 17.0 g/dL    HCT 28.3 (L) 36.6 - 50.3 %    MCV 97.6 80.0 - 99.0 FL    MCH 31.0 26.0 - 34.0 PG    MCHC 31.8 30.0 - 36.5 g/dL    RDW 18.1 (H) 11.5 - 14.5 %    PLATELET 862 092 - 436 K/uL    MPV 9.6 8.9 - 12.9 FL    NRBC 0.0 0  WBC    ABSOLUTE NRBC 0.00 0.00 - 0.01 K/uL    NEUTROPHILS 76 (H) 32 - 75 %    LYMPHOCYTES 16 12 - 49 %    MONOCYTES 7 5 - 13 %    EOSINOPHILS 0 0 - 7 %    BASOPHILS 0 0 - 1 %    IMMATURE GRANULOCYTES 1 (H) 0.0 - 0.5 %    ABS. NEUTROPHILS 6.0 1.8 - 8.0 K/UL    ABS. LYMPHOCYTES 1.2 0.8 - 3.5 K/UL    ABS. MONOCYTES 0.5 0.0 - 1.0 K/UL    ABS. EOSINOPHILS 0.0 0.0 - 0.4 K/UL    ABS. BASOPHILS 0.0 0.0 - 0.1 K/UL    ABS. IMM. GRANS. 0.1 (H) 0.00 - 0.04 K/UL    DF AUTOMATED           Assessment and Plan     81 y/o elderly man with poor performance status, presenting with pancreatic mass, omental caking and VTE concerning for locally advanced pancreatic CA. No path yet given his tenuous status. 1. Suspected pancreatic CA:  He may never be strong enough for EUS/bx. Defer risks/benefits of this to the GI service. From our standpoint, if he had a positive biopsy today for pancreatic adenocarcinoma, he would not be a candidate for palliative cytotoxic chemotherapy. Palliative care is a reasonable option. 2. VTE: Agree with IVC filter. No evidence of phlegmasia of right leg. If he is felt to have worsening/limb threatening disease, would favor lovenox over NOAC given GI malignancy. Thank you for allowing us to participate in the care of this very pleasant patient.     Karthikeyan Miller MD  Hematology/Oncology  Phone [phone]

## 2020-07-22 NOTE — PROGRESS NOTES
[facility]    ICU TEAM Progress Note    Name: Rossy Estrada   : 1934   MRN: [id number]   Date: 2020      I  Subjective:   Progress Note: 2020      Reason for ICU Admission:  GI bleed, low blood pressure    Interval history: This is a very pleasant elderly gentleman who was admitted to the hospital on  with progressive nausea vomiting and right lower limb swelling and pain. He was found to have extensive DVT in that limb as well as pulmonary embolisms. 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 He had ascites which was drained and sent for cytology results still pending. While he is on anticoagulation for his thromboembolic disease he developed significant amount of GI bleed and endoscopy showed erosive esophagitis. IVC filter placed on the 00TW without complication. He is still on TPN but tolerating ice chips without nausea or vomiting. Overnight Events:   No acute event, was able to wean off norepinephrine but had to be restarted on low-dose when he went to sleep, also developed some bradycardia while sleeping but this was self-limiting and resolved when he wakes up. No nausea no vomiting no external bleeding    Active Problem List:     Problem List  Date Reviewed: 2020          Codes Class    * (Principal) DVT (deep venous thrombosis) (HCC) ICD-10-CM: I82.409  ICD-9-CM: 453.40         DVT (deep vein thrombosis) in pregnancy ICD-10-CM: O22.30  ICD-9-CM: 671.30               Past Medical History:      has a past medical history of Arthritis, BPH (benign prostatic hyperplasia), DJD (degenerative joint disease), Hypercholesterolemia, and Other and unspecified hyperlipidemia.  He also has no past medical history of Abuse, Adverse effect of anesthesia, Anemia NEC, Calculus of kidney, Congestive heart failure, unspecified, Contact dermatitis and other eczema, due to unspecified cause, COPD, Depression, Headache(784.0), Psychotic disorder [facility]), Sleep apnea, or Trauma. Past Surgical History:      has a past surgical history that includes hx orthopaedic (Right, 2010); hx tonsillectomy; colonoscopy (N/A, 2017); ir ivc filter (2020); and ir plc ivc filter (2020). Home Medications:     Prior to Admission medications    Medication Sig Start Date End Date Taking? Authorizing Provider   multivitamin with iron tablet Take 1 Tab by mouth daily. Provider, Historical   predniSONE (STERAPRED) 5 mg dose pack See administration instruction per 5mg dose pack 18   Andrea SHEPPARD MD   TAMSULOSIN HCL (TAMSULOSIN PO) Take 0.4 mg by mouth daily. Provider, Historical   finasteride (PROSCAR) 5 mg tablet Take 5 mg by mouth daily. Provider, Historical       Allergies/Social/Family History: Allergies   Allergen Reactions    Aspirin Unknown (comments)      Social History     Tobacco Use    Smoking status: Never Smoker    Smokeless tobacco: Never Used   Substance Use Topics    Alcohol use: Yes     Comment: social      Family History   Problem Relation Age of Onset    Diabetes Sister     Diabetes Brother     Heart Disease Brother        Review of Systems:     I reviewed 12 systems and they are negative but as in interval history    Objective:   Vital Signs:  Visit Vitals  BP (!) 89/54   Pulse 67   Temp 98.4 °F (36.9 °C)   Resp 17   Ht 5' 10\" (1.778 m)   Wt 81.7 kg (180 lb 1.9 oz)   SpO2 94%   BMI 25.84 kg/m²    O2 Flow Rate (L/min): 2 l/min O2 Device: Room air Temp (24hrs), Av.9 °F (36.6 °C), Min:97.4 °F (36.3 °C), Max:98.4 °F (36.9 °C)           Intake/Output:     Intake/Output Summary (Last 24 hours) at 2020 0646  Last data filed at 2020 0600  Gross per 24 hour   Intake 5054.59 ml   Output 200 ml   Net 4854.59 ml       Physical Exam:  General:  Alert, cooperative, well noursished, well developed, appears stated age   Eyes:  Pupils equally round and reactive to light.    Mouth/Throat: Mucous membranes normal, oral pharynx clear   Neck: Supple   Lungs:   Clear to auscultation bilaterally, good effort   CV:  Regular rate and rhythm,no murmur, click, rub or gallop   Abdomen:   Soft, non-tender. bowel sounds normal. non-distended   Extremities: No cyanosis or edema   Skin: Skin color, texture, turgor normal. no acute rash or lesions   Lymph nodes: Cervical and supraclavicular normal   Musculoskeletal: No swelling or deformity   Lines/Devices:  Intact, no erythema, drainage or tenderness   Psych: Alert and oriented, normal mood affect given the setting  Moves 4 limbs no deficit, hard of hearing          LABS AND  DATA: Personally reviewed  Recent Labs     07/22/20  0610 07/21/20  0558  07/20/20  1207   WBC 7.8  --   --  7.4   HGB 9.0* 10.1*   < > 10.2*   HCT 28.3* 31.1*   < > 31.5*     --   --  186    < > = values in this interval not displayed. Recent Labs     07/22/20  0605 07/21/20  0558 07/20/20  1207   NA  --  142 141   K  --  4.2 4.8   CL  --  112* 110*   CO2  --  25 26   BUN  --  21* 22*   CREA  --  0.78 0.80   GLU  --  200* 190*   CA  --  8.0* 8.1*   MG 1.9 2.2 2.3   PHOS 2.3* 2.5* 2.6     Recent Labs     07/20/20  0237   AP 53   TP 5.7*   ALB 2.1*   GLOB 3.6     Recent Labs     07/21/20  0558 07/20/20  0237   INR 1.2* 1.3*   PTP 12.0* 13.0*   APTT 32.2* 38.7*      No results for input(s): PHI, PCO2I, PO2I, FIO2I in the last 72 hours. Recent Labs     07/20/20  0237   TROIQ <0.05       MEDS: Reviewed    Chest X-Ray:  CXR Results  (Last 48 hours)    None          Multidisciplinary Rounds Completed:   Yes    Assessment and Plan:   -Hemorrhagic shock: Due to GI bleed, improving  - GI bleed with Acute blood loss anemia due to erosive esophagitis:  Hemoglobin been stable,  continue PPI twice daily, EGD showed erosive esophagitis with evidence of recent bleed but no active bleeding.   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 Appreciate hematology input  - Head of the pancreas mass: Negative cytology from ascitic fluid. Possible plan for EUS biopsy when out of the ICU Per GI  - Hypotension: This is multifactorial, will wean norepinephrine as tolerated, start weaning steroids especially with the erosive esophagitis. On midodrone  -  Severe protein calorie malnourishment continue TPN for now, we will start clear liquids and see how he tolerates, replace electrolytes as indicated,      Appreciate palliative care input, patient affirmed DNR DNI status. Palliative care will continue to follow.       DISPOSITION  Will transfer out of the ICU when off norepinephrine for few hours    CRITICAL CARE CONSULTANT NOTE  I had a face to face encounter with the patient, reviewed and interpreted patient data including clinical events, labs, images, vital signs, I/O's, and examined patient. I have discussed the case and the plan and management of the patient's care with the consulting services, the bedside nurses and the respiratory therapist.      NOTE OF PERSONAL INVOLVEMENT IN CARE   This patient has a high probability of imminent, clinically significant deterioration, which requires the highest level of preparedness to intervene urgently. I participated in the decision-making and personally managed or directed the management of the following life and organ supporting interventions that required my frequent assessment to treat or prevent imminent deterioration. I personally spent 40 minutes of critical care time. This is time spent at this critically ill patient's bedside actively involved in patient care as well as the coordination of care and discussions with the patient's family. This does not include any procedural time which has been billed separately. Jose Alas M.D.   Staff Intensivist/Pulmonologist  [facility]  7/22/2020

## 2020-07-22 NOTE — PROGRESS NOTES
[address]  [address] [facility], [address]       GI PROGRESS NOTE        NAME: Dixon Saleem   :  1934   MRN:  [id number]       Subjective:   Patient denies nausea, vomiting, or abdominal pain. Pressor requirement is less per nursing staff. Oncology assessment is that he would not be a candidate for chemotherapy at this time if he had a diagnosis of pancreatic cancer due to his functional status. Objective:     VITALS:   Last 24hrs VS reviewed since prior progress note. Most recent are:  Visit Vitals  BP (!) 88/56 (BP 1 Location: Left arm, BP Patient Position: Sitting;Post activity)   Pulse 83   Temp 97 °F (36.1 °C)   Resp 14   Ht 5' 10\" (1.778 m)   Wt 81.7 kg (180 lb 1.9 oz)   SpO2 96%   BMI 25.84 kg/m²       PHYSICAL EXAM:  General:          No acute distress    Neurologic:      Alert  HEENT:           EOMI, no scleral icterus   Lungs:             No respiratory distress  Heart:              S1 S2  Abdomen:        Soft, mildly distended, no apparent tenderness, no guarding, no rebound. +Bowel sounds.    Extremities:     Warm  Psych:             Not anxious or agitated    Lab Data Reviewed:     Recent Results (from the past 24 hour(s))   GLUCOSE, POC    Collection Time: 20  1:09 PM   Result Value Ref Range    Glucose (POC) 164 (H) 65 - 100 mg/dL    Performed by [facility], POC    Collection Time: 20  7:05 PM   Result Value Ref Range    Glucose (POC) 172 (H) 65 - 100 mg/dL    Performed by [id number], POC    Collection Time: 20 12:06 AM   Result Value Ref Range    Glucose (POC) 179 (H) 65 - 100 mg/dL    Performed by [id number], POC    Collection Time: 20  5:44 AM   Result Value Ref Range    Glucose (POC) 189 (H) 65 - 100 mg/dL    Performed by [address] [place] [address]    Collection Time: 20  6:05 AM   Result Value Ref Range    Magnesium 1.9 1.6 - 2.4 mg/dL   PHOSPHORUS    Collection Time: 07/22/20  6:05 AM   Result Value Ref Range    Phosphorus 2.3 (L) 2.6 - 4.7 MG/DL   PROTHROMBIN TIME + INR    Collection Time: 07/22/20  6:10 AM   Result Value Ref Range    INR 1.3 (H) 0.9 - 1.1      Prothrombin time 13.0 (H) 9.0 - 11.1 sec   PTT    Collection Time: 07/22/20  6:10 AM   Result Value Ref Range    aPTT 33.2 (H) 22.1 - 32.0 sec    aPTT, therapeutic range     58.0 - 77.0 SECS   FIBRINOGEN    Collection Time: 07/22/20  6:10 AM   Result Value Ref Range    Fibrinogen 131 (L) 200 - 475 mg/dL   CBC WITH AUTOMATED DIFF    Collection Time: 07/22/20  6:10 AM   Result Value Ref Range    WBC 7.8 4.1 - 11.1 K/uL    RBC 2.90 (L) 4.10 - 5.70 M/uL    HGB 9.0 (L) 12.1 - 17.0 g/dL    HCT 28.3 (L) 36.6 - 50.3 %    MCV 97.6 80.0 - 99.0 FL    MCH 31.0 26.0 - 34.0 PG    MCHC 31.8 30.0 - 36.5 g/dL    RDW 18.1 (H) 11.5 - 14.5 %    PLATELET 488 654 - 167 K/uL    MPV 9.6 8.9 - 12.9 FL    NRBC 0.0 0  WBC    ABSOLUTE NRBC 0.00 0.00 - 0.01 K/uL    NEUTROPHILS 76 (H) 32 - 75 %    LYMPHOCYTES 16 12 - 49 %    MONOCYTES 7 5 - 13 %    EOSINOPHILS 0 0 - 7 %    BASOPHILS 0 0 - 1 %    IMMATURE GRANULOCYTES 1 (H) 0.0 - 0.5 %    ABS. NEUTROPHILS 6.0 1.8 - 8.0 K/UL    ABS. LYMPHOCYTES 1.2 0.8 - 3.5 K/UL    ABS. MONOCYTES 0.5 0.0 - 1.0 K/UL    ABS. EOSINOPHILS 0.0 0.0 - 0.4 K/UL    ABS. BASOPHILS 0.0 0.0 - 0.1 K/UL    ABS. IMM. GRANS. 0.1 (H) 0.00 - 0.04 K/UL    DF AUTOMATED         Assessment:   · Hematemesis: EGD (7/20/20): LA Grade D erosive esophagitis; hiatal hernia; retention of large volume of gastric fluid likely secondary to gastric outlet obstruction from extrinsic compression.  Hgb 9.0.   · Hypotension: on pressors  · Pancreatic mass: CT abdomen/pelvis with IV contrast (7/13/20): mass-like fullness in the pancreatic head, resulting in intrahepatic and pancreatic duct dilatation, peripancreatic inflammatory changes with 3.7 x 2.3 cm collection near the pancreatic tail possibly pseudocyst, large amount of ascites, and moderate amount of slightly complex fluid/soft tissue in the left abdomen - findings may represent pancreatitis or pancreatic neoplasm; edematous thickening of the gastric antrum and duodenum likely secondary to the pancreatic process.  LFTs and lipase unremarkable on 7/13. Cytology of peritoneal fluid was negative for malignant cells. · Right lower extremity DVT with bilateral PEs: heparin gtt on hold. Received 1 dose of Eliquis on 7/18. Status post IVC filter 7/20. · Ascites: hepatology, Dr. Radha Garcia following. Status post paracentesis: cytology was negative for malignant cells. · Hypotension  · COVID-19 negative     Patient Active Problem List   Diagnosis Code    DVT (deep vein thrombosis) in pregnancy O22.30    DVT (deep venous thrombosis) (HCC) I82.409     Plan:   · PPI BID  · Continue to hold anticoagulation as able  · Will need repeat EGD in 2 months for evaluation of mucosal healing of severe erosive esophagitis  · CT abdomen with IV contrast (pancreatic protocol). I would advise that we we proceed with interval imaging to better understand if he has superimposed pancreatitis with an evolving fluid collection. Given Oncology recommendations as of today, we will hold off on plan for EUS at this time. I have discussed this with patient and his wife (over the phone). Rivera Garcia MD

## 2020-07-23 LAB
AMYLASE SERPL-CCNC: 90 U/L (ref 25–115)
ANION GAP SERPL CALC-SCNC: 7 MMOL/L (ref 5–15)
APTT PPP: 31.1 SEC (ref 22.1–32)
BUN SERPL-MCNC: 19 MG/DL (ref 6–20)
BUN/CREAT SERPL: 28 (ref 12–20)
CALCIUM SERPL-MCNC: 8.1 MG/DL (ref 8.5–10.1)
CHLORIDE SERPL-SCNC: 111 MMOL/L (ref 97–108)
CO2 SERPL-SCNC: 23 MMOL/L (ref 21–32)
CREAT SERPL-MCNC: 0.67 MG/DL (ref 0.7–1.3)
FIBRINOGEN PPP-MCNC: 132 MG/DL (ref 200–475)
GLUCOSE BLD STRIP.AUTO-MCNC: 111 MG/DL (ref 65–100)
GLUCOSE BLD STRIP.AUTO-MCNC: 140 MG/DL (ref 65–100)
GLUCOSE BLD STRIP.AUTO-MCNC: 148 MG/DL (ref 65–100)
GLUCOSE BLD STRIP.AUTO-MCNC: 157 MG/DL (ref 65–100)
GLUCOSE SERPL-MCNC: 170 MG/DL (ref 65–100)
HCT VFR BLD AUTO: 30.3 % (ref 36.6–50.3)
HGB BLD-MCNC: 9.9 G/DL (ref 12.1–17)
INR PPP: 1.2 (ref 0.9–1.1)
LIPASE SERPL-CCNC: 177 U/L (ref 73–393)
MAGNESIUM SERPL-MCNC: 2 MG/DL (ref 1.6–2.4)
PHOSPHATE SERPL-MCNC: 2.4 MG/DL (ref 2.6–4.7)
POTASSIUM SERPL-SCNC: 3.6 MMOL/L (ref 3.5–5.1)
PROTHROMBIN TIME: 12.6 SEC (ref 9–11.1)
SERVICE CMNT-IMP: ABNORMAL
SODIUM SERPL-SCNC: 141 MMOL/L (ref 136–145)
THERAPEUTIC RANGE,PTTT: NORMAL SECS (ref 58–77)
TRIGL SERPL-MCNC: 244 MG/DL (ref ?–150)

## 2020-07-23 PROCEDURE — 74011250636 HC RX REV CODE- 250/636: Performed by: NURSE PRACTITIONER

## 2020-07-23 PROCEDURE — 82962 GLUCOSE BLOOD TEST: CPT

## 2020-07-23 PROCEDURE — 80048 BASIC METABOLIC PNL TOTAL CA: CPT

## 2020-07-23 PROCEDURE — 85018 HEMOGLOBIN: CPT

## 2020-07-23 PROCEDURE — 65660000000 HC RM CCU STEPDOWN

## 2020-07-23 PROCEDURE — 85730 THROMBOPLASTIN TIME PARTIAL: CPT

## 2020-07-23 PROCEDURE — 83735 ASSAY OF MAGNESIUM: CPT

## 2020-07-23 PROCEDURE — 74011000258 HC RX REV CODE- 258: Performed by: NURSE PRACTITIONER

## 2020-07-23 PROCEDURE — 84478 ASSAY OF TRIGLYCERIDES: CPT

## 2020-07-23 PROCEDURE — 83690 ASSAY OF LIPASE: CPT

## 2020-07-23 PROCEDURE — 82150 ASSAY OF AMYLASE: CPT

## 2020-07-23 PROCEDURE — 85610 PROTHROMBIN TIME: CPT

## 2020-07-23 PROCEDURE — 74011250637 HC RX REV CODE- 250/637: Performed by: INTERNAL MEDICINE

## 2020-07-23 PROCEDURE — 97530 THERAPEUTIC ACTIVITIES: CPT

## 2020-07-23 PROCEDURE — 74011000250 HC RX REV CODE- 250: Performed by: INTERNAL MEDICINE

## 2020-07-23 PROCEDURE — 74011250636 HC RX REV CODE- 250/636: Performed by: INTERNAL MEDICINE

## 2020-07-23 PROCEDURE — C9113 INJ PANTOPRAZOLE SODIUM, VIA: HCPCS | Performed by: INTERNAL MEDICINE

## 2020-07-23 PROCEDURE — 74011250637 HC RX REV CODE- 250/637: Performed by: FAMILY MEDICINE

## 2020-07-23 PROCEDURE — 74011636637 HC RX REV CODE- 636/637: Performed by: INTERNAL MEDICINE

## 2020-07-23 PROCEDURE — 97535 SELF CARE MNGMENT TRAINING: CPT

## 2020-07-23 PROCEDURE — 74011000250 HC RX REV CODE- 250: Performed by: NURSE PRACTITIONER

## 2020-07-23 PROCEDURE — 85384 FIBRINOGEN ACTIVITY: CPT

## 2020-07-23 PROCEDURE — 36415 COLL VENOUS BLD VENIPUNCTURE: CPT

## 2020-07-23 PROCEDURE — 74011250637 HC RX REV CODE- 250/637: Performed by: NURSE PRACTITIONER

## 2020-07-23 PROCEDURE — 84100 ASSAY OF PHOSPHORUS: CPT

## 2020-07-23 RX ORDER — MIDODRINE HYDROCHLORIDE 5 MG/1
20 TABLET ORAL EVERY 8 HOURS
Status: DISCONTINUED | OUTPATIENT
Start: 2020-07-23 | End: 2020-07-30 | Stop reason: HOSPADM

## 2020-07-23 RX ADMIN — INSULIN LISPRO 2 UNITS: 100 INJECTION, SOLUTION INTRAVENOUS; SUBCUTANEOUS at 13:05

## 2020-07-23 RX ADMIN — ONDANSETRON 4 MG: 2 INJECTION INTRAMUSCULAR; INTRAVENOUS at 08:33

## 2020-07-23 RX ADMIN — MIDODRINE HYDROCHLORIDE 15 MG: 5 TABLET ORAL at 05:23

## 2020-07-23 RX ADMIN — SODIUM CHLORIDE 40 MG: 9 INJECTION INTRAMUSCULAR; INTRAVENOUS; SUBCUTANEOUS at 20:01

## 2020-07-23 RX ADMIN — ONDANSETRON 4 MG: 2 INJECTION INTRAMUSCULAR; INTRAVENOUS at 20:01

## 2020-07-23 RX ADMIN — SODIUM CHLORIDE 5 MG: 9 INJECTION INTRAMUSCULAR; INTRAVENOUS; SUBCUTANEOUS at 23:30

## 2020-07-23 RX ADMIN — CASTOR OIL AND BALSAM, PERU: 788; 87 OINTMENT TOPICAL at 18:01

## 2020-07-23 RX ADMIN — CASTOR OIL AND BALSAM, PERU: 788; 87 OINTMENT TOPICAL at 08:38

## 2020-07-23 RX ADMIN — SODIUM CHLORIDE, SODIUM LACTATE, POTASSIUM CHLORIDE, AND CALCIUM CHLORIDE 125 ML/HR: 600; 310; 30; 20 INJECTION, SOLUTION INTRAVENOUS at 02:21

## 2020-07-23 RX ADMIN — Medication 10 ML: at 21:35

## 2020-07-23 RX ADMIN — FINASTERIDE 5 MG: 5 TABLET, FILM COATED ORAL at 08:40

## 2020-07-23 RX ADMIN — SODIUM CHLORIDE, SODIUM LACTATE, POTASSIUM CHLORIDE, AND CALCIUM CHLORIDE 125 ML/HR: 600; 310; 30; 20 INJECTION, SOLUTION INTRAVENOUS at 10:45

## 2020-07-23 RX ADMIN — Medication 10 ML: at 05:24

## 2020-07-23 RX ADMIN — SODIUM CHLORIDE 40 MG: 9 INJECTION INTRAMUSCULAR; INTRAVENOUS; SUBCUTANEOUS at 08:34

## 2020-07-23 RX ADMIN — SODIUM CHLORIDE 5 MG: 9 INJECTION INTRAMUSCULAR; INTRAVENOUS; SUBCUTANEOUS at 11:54

## 2020-07-23 RX ADMIN — PIPERACILLIN AND TAZOBACTAM 3.38 G: 3; .375 INJECTION, POWDER, LYOPHILIZED, FOR SOLUTION INTRAVENOUS at 10:00

## 2020-07-23 RX ADMIN — PIPERACILLIN AND TAZOBACTAM 3.38 G: 3; .375 INJECTION, POWDER, LYOPHILIZED, FOR SOLUTION INTRAVENOUS at 02:16

## 2020-07-23 RX ADMIN — HYDROCORTISONE SODIUM SUCCINATE 50 MG: 100 INJECTION, POWDER, FOR SOLUTION INTRAMUSCULAR; INTRAVENOUS at 18:02

## 2020-07-23 RX ADMIN — MIDODRINE HYDROCHLORIDE 20 MG: 5 TABLET ORAL at 21:32

## 2020-07-23 RX ADMIN — Medication 10 ML: at 18:01

## 2020-07-23 RX ADMIN — CALCIUM GLUCONATE: 94 INJECTION, SOLUTION INTRAVENOUS at 18:13

## 2020-07-23 RX ADMIN — MIDODRINE HYDROCHLORIDE 20 MG: 5 TABLET ORAL at 11:52

## 2020-07-23 RX ADMIN — INSULIN LISPRO 2 UNITS: 100 INJECTION, SOLUTION INTRAVENOUS; SUBCUTANEOUS at 05:30

## 2020-07-23 RX ADMIN — SODIUM CHLORIDE, SODIUM LACTATE, POTASSIUM CHLORIDE, AND CALCIUM CHLORIDE 125 ML/HR: 600; 310; 30; 20 INJECTION, SOLUTION INTRAVENOUS at 20:00

## 2020-07-23 RX ADMIN — INSULIN LISPRO 2 UNITS: 100 INJECTION, SOLUTION INTRAVENOUS; SUBCUTANEOUS at 23:35

## 2020-07-23 RX ADMIN — ONDANSETRON 4 MG: 2 INJECTION INTRAMUSCULAR; INTRAVENOUS at 02:16

## 2020-07-23 RX ADMIN — SODIUM CHLORIDE 5 MG: 9 INJECTION INTRAMUSCULAR; INTRAVENOUS; SUBCUTANEOUS at 18:02

## 2020-07-23 RX ADMIN — HYDROCORTISONE SODIUM SUCCINATE 50 MG: 100 INJECTION, POWDER, FOR SOLUTION INTRAMUSCULAR; INTRAVENOUS at 05:23

## 2020-07-23 RX ADMIN — PIPERACILLIN AND TAZOBACTAM 3.38 G: 3; .375 INJECTION, POWDER, LYOPHILIZED, FOR SOLUTION INTRAVENOUS at 18:03

## 2020-07-23 NOTE — PROGRESS NOTES
1930 Bedside shift change report given to Jenise Ro RN (oncoming nurse) by Jeremie Davalos RN (offgoing nurse). Report included the following information SBAR, Kardex, Intake/Output, MAR, Recent Results, Cardiac Rhythm NSR/SB and Alarm Parameters . 0012 Hospitalist paged d/t MAP 50-60s. Orders received for albumin per   AMADOU Wynn. BP parameters clarified to maintain MAP > 60.    0730 Bedside and Verbal shift change report given to Thomas Palmer RN (oncoming nurse) by TYLER Zimmer RN (offgoing nurse). Report included the following information SBAR, Kardex, Intake/Output, MAR, Cardiac Rhythm NSR and Alarm Parameters . Shift Summary: Patient AAO x 3-4, follows commands. GRAHAM spontanesouly. Up to bedside commode x 1, max 2 assist. Transfer orders to 4W.

## 2020-07-23 NOTE — PROGRESS NOTES
Palliative Medicine Consult  Johnny: [phone] ([phone])    Patient Name: Iram Ortega  YOB: 1934    Date of Initial Consult: July 14, 2020  Reason for Consult: Care Decisions  Requesting Provider: Dr. Bharat Chu    Primary Care Physician: Gayle De Guzman MD     SUMMARY:   Iram Ortega is a 80 y.o. bed bound male with a past history of BPH, hypercholesterolemia,DJD,  , who was admitted on 7/13/2020 from home after [address] Nurse found the pt to be hypotensive. Work up revealed extensive rt lower DVT extending up to the vena cava with bilateral PE's. Admission complicated by finding of pancreatic mass, ascites with possible splenic vein occlusion/ distal esophageal and gastric varices,  Nausea and vomiting, possible pneumonia, hypokalemia, hypotension. Heme onc and GI consults pending. Full Code  No Amd  Current medical issues leading to Palliative Medicine involvement include: support with care decisions given acute illness in a compromised state with high risk of decline. .    Interval History:  7/20/20: FNA and biopsy, ivc filter, EGD, (ulcer), gib from eliquis     PALLIATIVE DIAGNOSES:   1. Nausea and vomiting~ improved  2. Hypoalbuminemia  3. Hypotension~ on pressers   4. bed bound   5. Physical debility      PLAN:     1. Pt seen without family present. 2. He is comfortable without complaints. Remains hypotensive on pressors  3. Called wife to update her but she did not answer. vm left on mobile  4. Oncology has weighed in and has no aggressive options to offer if it is pancreatic cancer due to the patient's functional status. I explained this to the pt today. He asked about what the other option would be. 5. Pt would like to pursue rehab. He said he was able to stand oob today and wants to get stronger  6. Plan: continue to address acute issues. Re-address care goals as needed    7. 7/21/20: Multiple conversations with wife.   She is unable to come to the hospital to see the patient  8. Discussed events since our last discussion  9. All questions answered  10. Called wife again with the pt on speaker and allowed her to speak to spouse  6. Pt feeling well and says nausea well controlled  12. No pain   13. Waiting on results and options to discuss next steps  14. DNR status affirmed. Pt signed DDNR last week  15. Goal is to continue current level of care  16. Will continue to follow      17. Initial consult note routed to primary continuity provider and/or primary health care team members  25. Communicated plan of care with: Palliative IDTAngelic 192 Team     GOALS OF CARE / TREATMENT PREFERENCES:     GOALS OF CARE:  Patient/Health Care Proxy Stated Goals: Rehabilitation    TREATMENT PREFERENCES:   Code Status: DNR    Advance Care Planning:  [x] [facility] Interdisciplinary Team has updated the ACP Navigator with Health Care Decision Maker and Patient Capacity      Primary Decision Maker: Manuel Hansjarett - [phone]  Advance Care Planning 7/13/2020   Confirm Advance Directive Yes, on file       Medical Interventions: Limited additional interventions     Other Instructions:   Artificially Administered Nutrition: No feeding tube     Other:    As far as possible, the palliative care team has discussed with patient / health care proxy about goals of care / treatment preferences for patient. HISTORY:     History obtained from: chart    CHIEF COMPLAINT: pt admitted with aforementioned history an issues    HPI/SUBJECTIVE:    The patient is:   [x] Verbal and participatory  [] Non-participatory due to:    No complaints today      Clinical Pain Assessment (nonverbal scale for severity on nonverbal patients):   Clinical Pain Assessment  Severity: 0          Duration: for how long has pt been experiencing pain (e.g., 2 days, 1 month, years)  Frequency: how often pain is an issue (e.g., several times per day, once every few days, constant)     FUNCTIONAL ASSESSMENT:     Palliative Performance Scale (PPS):  PPS: 40       PSYCHOSOCIAL/SPIRITUAL SCREENING:     Palliative IDT has assessed this patient for cultural preferences / practices and a referral made as appropriate to needs (Cultural Services, Patient Advocacy, Ethics, etc.)    Any spiritual / Mandaeism concerns:  [] Yes /  [x] No    Caregiver Burnout:  [] Yes /  [x] No /  [] No Caregiver Present      Anticipatory grief assessment:   [x] Normal  / [] Maladaptive       ESAS Anxiety: Anxiety: 0    ESAS Depression: Depression: 0        REVIEW OF SYSTEMS:     Positive and pertinent negative findings in ROS are noted above in HPI. The following systems were [x] reviewed / [] unable to be reviewed as noted in HPI  Other findings are noted below. Systems: constitutional, ears/nose/mouth/throat, respiratory, gastrointestinal, genitourinary, musculoskeletal, integumentary, neurologic, psychiatric, endocrine. Positive findings noted below. Modified ESAS Completed by: provider   Fatigue: 2 Drowsiness: 0   Depression: 0 Pain: 0   Anxiety: 0 Nausea: 0   Anorexia: 0 Dyspnea: 0     Constipation: No     Stool Occurrence(s): 0        PHYSICAL EXAM:     From RN flowsheet:  Wt Readings from Last 3 Encounters:   07/23/20 188 lb 15 oz (85.7 kg)   07/27/18 204 lb (92.5 kg)   03/17/18 200 lb (90.7 kg)     Blood pressure 111/61, pulse 78, temperature 97.7 °F (36.5 °C), resp. rate (!) 35, height 5' 10\" (1.778 m), weight 188 lb 15 oz (85.7 kg), SpO2 95 %.     Pain Scale 1: Numeric (0 - 10)  Pain Intensity 1: 0     Pain Location 1: Chest           Last bowel movement, if known:     Constitutional:  conversant, nad, ill appearing  Eyes: pupils equal, anicteric  ENMT: no nasal discharge, moist mucous membranes, [place]  Cardiovascular: regular rhythm, distal pulses intact  Respiratory: breathing not labored, symmetric  Gastrointestinal: soft non-tender, +bowel sounds  Musculoskeletal: no deformity, no tenderness to palpation  Skin: warm, dry  Neurologic: following commands, moving all extremities  Psychiatric: full affect, no hallucinations  Other:       HISTORY:     Principal Problem:    DVT (deep venous thrombosis) (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    BPH (benign prostatic hyperplasia)     DJD (degenerative joint disease)     Hypercholesterolemia     Other and unspecified hyperlipidemia       Past Surgical History:   Procedure Laterality Date    COLONOSCOPY N/A 8/18/2017    COLON[facility] ENDOSCOPY    HX ORTHOPAEDIC Right 2010    rotator cuff    HX TONSILLECTOMY      IR IVC FILTER  7/20/2020         IR PLC IVC FILTER  7/20/2020      Family History   Problem Relation Age of Onset    Diabetes Sister     Diabetes Brother     Heart Disease Brother       History reviewed, no pertinent family history.   Social History     Tobacco Use    Smoking status: Never Smoker    Smokeless tobacco: Never Used   Substance Use Topics    Alcohol use: Yes     Comment: social     Allergies   Allergen Reactions    Aspirin Unknown (comments)      Current Facility-Administered Medications   Medication Dose Route Frequency    NOREPINephrine (LEVOPHED) 8,000 mcg in dextrose 5% 250 mL infusion  2-30 mcg/min IntraVENous TITRATE    TPN ADULT - CENTRAL   IntraVENous CONTINUOUS    hydrocortisone Sod Succ (PF) (SOLU-CORTEF) injection 50 mg  50 mg IntraVENous Q12H    midodrine (PROAMATINE) tablet 15 mg  15 mg Oral Q8H    fat emulsion 20% (LIPOSYN, INTRAlipid) infusion 500 mL  500 mL IntraVENous Q MON, WED & FRI    glucose chewable tablet 16 g  4 Tab Oral PRN    glucagon (GLUCAGEN) injection 1 mg  1 mg IntraMUSCular PRN    dextrose 10% infusion 0-250 mL  0-250 mL IntraVENous PRN    insulin lispro (HUMALOG) injection   SubCUTAneous Q6H    melatonin tablet 3 mg  3 mg Oral QHS PRN    0.9% sodium chloride infusion 250 mL  250 mL IntraVENous PRN    lactated Ringers infusion  125 mL/hr IntraVENous CONTINUOUS    0.9% sodium chloride infusion 250 mL  250 mL IntraVENous PRN    sodium chloride (NS) flush 5-40 mL  5-40 mL IntraVENous Q8H    sodium chloride (NS) flush 5-40 mL  5-40 mL IntraVENous PRN    simethicone (MYLICON) 95YE/5.4IY oral drops 80 mg  1.2 mL Oral Multiple    piperacillin-tazobactam (ZOSYN) 3.375 g in 0.9% sodium chloride (MBP/ADV) 100 mL  3.375 g IntraVENous Q8H    pantoprazole (PROTONIX) 40 mg in 0.9% sodium chloride 10 mL injection  40 mg IntraVENous Q12H    prochlorperazine (COMPAZINE) with saline injection 5 mg  5 mg IntraVENous Q6H    [Held by provider] heparin 25,000 units in D5W 250 ml infusion  18-36 Units/kg/hr IntraVENous TITRATE    benzonatate (TESSALON) capsule 100 mg  100 mg Oral TID PRN    guaiFENesin (ROBITUSSIN) 100 mg/5 mL oral liquid 100 mg  100 mg Oral Q4H PRN    ondansetron (ZOFRAN) injection 4 mg  4 mg IntraVENous Q6H    scopolamine (TRANSDERM-SCOP) 1 mg over 3 days 1 Patch  1 Patch TransDERmal Q72H    prochlorperazine (COMPAZINE) tablet 5 mg  5 mg Oral Q6H PRN    finasteride (PROSCAR) tablet 5 mg  5 mg Oral DAILY    [Held by provider] tamsulosin (FLOMAX) capsule 0.4 mg  0.4 mg Oral DAILY    sodium chloride (NS) flush 5-40 mL  5-40 mL IntraVENous Q8H    sodium chloride (NS) flush 5-40 mL  5-40 mL IntraVENous PRN    acetaminophen (TYLENOL) tablet 650 mg  650 mg Oral Q6H PRN    balsam peru-castor oiL (VENELEX) ointment   Topical BID          LAB AND IMAGING FINDINGS:     Lab Results   Component Value Date/Time    WBC 7.8 07/22/2020 06:10 AM    HGB 9.9 (L) 07/23/2020 04:42 AM    PLATELET 651 78/97/5824 06:10 AM     Lab Results   Component Value Date/Time    Sodium 141 07/23/2020 04:41 AM    Potassium 3.6 07/23/2020 04:41 AM    Chloride 111 (H) 07/23/2020 04:41 AM    CO2 23 07/23/2020 04:41 AM    BUN 19 07/23/2020 04:41 AM    Creatinine 0.67 (L) 07/23/2020 04:41 AM    Calcium 8.1 (L) 07/23/2020 04:41 AM Magnesium 2.0 07/23/2020 04:41 AM    Phosphorus 2.4 (L) 07/23/2020 04:41 AM      Lab Results   Component Value Date/Time    Alk. phosphatase 53 07/20/2020 02:37 AM    Protein, total 5.7 (L) 07/20/2020 02:37 AM    Albumin 2.1 (L) 07/20/2020 02:37 AM    Globulin 3.6 07/20/2020 02:37 AM     Lab Results   Component Value Date/Time    INR 1.2 (H) 07/23/2020 04:41 AM    Prothrombin time 12.6 (H) 07/23/2020 04:41 AM    aPTT 31.1 07/23/2020 04:41 AM      Lab Results   Component Value Date/Time    Ferritin 520 (H) 07/13/2020 09:49 PM      No results found for: PH, PCO2, PO2  No components found for: GLPOC   No results found for: CPK, CKMB             Total time: 35 min  Counseling / coordination time, spent as noted above: 30 min  > 50% counseling / coordination?: y    Prolonged service was provided for  []30 min   []75 min in face to face time in the presence of the patient, spent as noted above. Time Start:   Time End:   Note: this can only be billed with 20596 (initial) or 48974 (follow up). If multiple start / stop times, list each separately.

## 2020-07-23 NOTE — PROGRESS NOTES
0730: Bedside shift change report given to Antelmo Zamarripa RN (oncoming nurse) by Monroe RN (offgoing nurse). Report included the following information SBAR, Kardex, ED Summary, Procedure Summary, Intake/Output, MAR, Accordion, Recent Results and Dual Neuro Assessment. 1130: ICU multidisciplinary rounds lead by Dr. Marie Fernandez (Intensivist): The following were reviewed and discussed: current labs,  recent imaging, lines/drains, review of body systems, nutrition, cultures, mobility, length of ICU stay. The plan of care for the day is as follows  Off Levo gtt, may transfer out today. Watch secretions and bradycardia.  (written note by RN)

## 2020-07-23 NOTE — PROGRESS NOTES
Spiritual Care Assessment/Progress Note  [facility]      NAME: Dixon Saleem      MRN: [id number]  AGE: 80 y.o. SEX: male  Scientology Affiliation: Mandaen   Language: English     7/23/2020     Total Time (in minutes): 5     Spiritual Assessment begun in [address] through conversation with:         []Patient        [] Family    [] Friend(s)        Reason for Consult: Follow-up, routine     Spiritual beliefs: (Please include comment if needed)     [] Identifies with a dexter tradition:         [] Supported by a dexter community:            [] Claims no spiritual orientation:           [] Seeking spiritual identity:                [] Adheres to an individual form of spirituality:           [x] Not able to assess:                           Identified resources for coping:      [] Prayer                               [] Music                  [] Guided Imagery     [] Family/friends                 [] Pet visits     [] Devotional reading                         [x] Unknown     [] Other:                                               Interventions offered during this visit: (See comments for more details)    Patient Interventions: Interdisciplinary rounds           Plan of Care:     [] Support spiritual and/or cultural needs    [] Support AMD and/or advance care planning process      [] Support grieving process   [] Coordinate Rites and/or Rituals    [] Coordination with community clergy   [] No spiritual needs identified at this time   [] Detailed Plan of Care below (See Comments)  [] Make referral to Music Therapy  [] Make referral to Pet Therapy     [] Make referral to Addiction services  [] Make referral to [facility]  [] Make referral to Spiritual Care Partner  [] No future visits requested        [x] Follow up visits as needed     Comments:  visit for routine follow up. Patient resting quietly and comfortably in bed. No family or visitors present.   Attended rounds and spoke to the care team taking acre of this patient to obtain current status and plan of care. Will continue to follow up as needed and upon request as able. Visited by Rev. Elmer Condon MDiv, [facility] p[facility]: [phone])

## 2020-07-23 NOTE — PROGRESS NOTES
Problem: Self Care Deficits Care Plan (Adult)  Goal: *Acute Goals and Plan of Care (Insert Text)  Description:   FUNCTIONAL STATUS PRIOR TO ADMISSION: Pt reports living with wife in single story home, reporting Mod Cotton at quad cane. Has tub-shower combo with no seated surface. Retired principal.    HOME SUPPORT: The patient lived with wife but did not require assist.    Occupational Therapy Goals  Initiated 7/17/2020  1. Patient will perform grooming with modified independence within 7 day(s). 2.  Patient will perform lower body dressing with supervision/set-up within 7 day(s). 3.  Patient will perform bathing with supervision/set-up within 7 day(s). 4.  Patient will perform toilet transfers with modified independence within 7 day(s). 5.  Patient will perform all aspects of toileting with modified independence within 7 day(s). 6.  Patient will utilize energy conservation techniques during functional activities with Min verbal cues within 7 day(s). Outcome: Progressing Towards Goal    OCCUPATIONAL THERAPY TREATMENT  Patient: Rossy Estrada (3 y.o. male)  Date: 7/23/2020  Diagnosis: DVT (deep vein thrombosis) in pregnancy [O22.30]  DVT (deep venous thrombosis) ([facility]) [I82.409]   DVT (deep venous thrombosis) ([facility])  Procedure(s) (LRB):  ESOPHAGOGASTRODUODENOSCOPY (EGD) at bedside (N/A) 3 Days Post-Op  Precautions: Fall  Chart, occupational therapy assessment, plan of care, and goals were reviewed. ASSESSMENT  Patient continues with skilled OT services and is progressing towards goals. Pt was able to progress OOB to chair with additional time and cues for activities. He does have significant orthostasis with activities but BP was able to stabilize while in the chair with ankle pumps and feet elevated in recliner chair. Pt completed grooming activities from chair level and did well. At this time recommend discharge to rehab facility as he is not safe to discharge.   Pt with DVT and PE and IVC filter placement. Recommend discharge to rehab setting to maximize independence and safety with all activities. Patient Vitals for the past 4 hrs:   Pulse Resp BP SpO2   07/23/20 1224 -- sitting -- 102/61 --   07/23/20 1217 -- sitting -- (!) 79/47 --   07/23/20 1215 --sitting -- (!) 76/52 --   07/23/20 1212 --supine -- 97/64 --   07/23/20 1200 85 (!) 34 (!) 89/54 96 %           Current Level of Function Impacting Discharge (ADLs): min A    Other factors to consider for discharge: none         PLAN :  Patient continues to benefit from skilled intervention to address the above impairments. Continue treatment per established plan of care. to address goals. Recommend with staff: OOB to chair TID as tolerated and able. Monitor vitals. MAP goals >60    Recommend next OT session: LB dressing, BSC transfers    Recommendation for discharge: (in order for the patient to meet his/her long term goals)  Therapy up to 5 days/week in SNF setting    This discharge recommendation:  Has been made in collaboration with the attending provider and/or case management    IF patient discharges home will need the following DME: none       SUBJECTIVE:   Patient stated I would really like getting out of this bed.     OBJECTIVE DATA SUMMARY:   Cognitive/Behavioral Status:  Neurologic State: Alert  Orientation Level: Oriented X4  Cognition: Appropriate for age attention/concentration  Perception: Appears intact  Perseveration: No perseveration noted  Safety/Judgement: Good awareness of safety precautions    Functional Mobility and Transfers for ADLs:  Bed Mobility:  Rolling: Minimum assistance  Supine to Sit: Minimum assistance    Transfers:  Sit to Stand: Minimum assistance     Bed to Chair: Minimum assistance    Balance:  Sitting: Intact  Standing: Intact; With support    ADL Intervention:       Grooming  Grooming Assistance: Supervision  Position Performed: Seated in chair  Washing Face: Supervision  Washing Hands: Supervision  Brushing Teeth: Supervision                             Cognitive Retraining  Safety/Judgement: Good awareness of safety precautions    Pain:  No pain    Activity Tolerance:   Good  Please refer to the flowsheet for vital signs taken during this treatment. After treatment patient left in no apparent distress:   Sitting in chair and Call bell within reach    COMMUNICATION/COLLABORATION:   The patients plan of care was discussed with: Physical therapist and Registered nurse.      Debby Suazo OT  Time Calculation: 32 mins

## 2020-07-23 NOTE — PROGRESS NOTES
[address]  [facility] [place], [address]       GI PROGRESS NOTE  Will Loretta Sauceda  [phone] office  [phone] NP/PA in-hospital cell phone M-F until 4:30PM  After 5PM or on weekends, please call  for physician on call      NAME: Maira Coello   :  1934   MRN:  [id number]       Subjective:   Patient is sitting in the chair. No nausea, vomiting, or abdominal pain. Objective:     VITALS:   Last 24hrs VS reviewed since prior progress note. Most recent are:  Visit Vitals  /61   Pulse (!) 107   Temp 97.7 °F (36.5 °C)   Resp 29   Ht 5' 10\" (1.778 m)   Wt 85.7 kg (188 lb 15 oz)   SpO2 96%   BMI 27.11 kg/m²       PHYSICAL EXAM:  General:          No acute distress    Neurologic:      Alert  HEENT:           EOMI, no scleral icterus   Lungs:             No respiratory distress  Heart:              S1 S2  Abdomen:        Soft, mildly distended, no apparent tenderness, no guarding, no rebound.  +Bowel sounds.   Extremities:     Warm  Psych:             Not anxious or agitated      Lab Data Reviewed:     Recent Results (from the past 24 hour(s))   GLUCOSE, POC    Collection Time: 20  6:37 PM   Result Value Ref Range    Glucose (POC) 164 (H) 65 - 100 mg/dL    Performed by Katie Arellano    GLUCOSE, POC    Collection Time: 20 11:25 PM   Result Value Ref Range    Glucose (POC) 171 (H) 65 - 100 mg/dL    Performed by Karla Virgen + INR    Collection Time: 20  4:41 AM   Result Value Ref Range    INR 1.2 (H) 0.9 - 1.1      Prothrombin time 12.6 (H) 9.0 - 11.1 sec   PTT    Collection Time: 20  4:41 AM   Result Value Ref Range    aPTT 31.1 22.1 - 32.0 sec    aPTT, therapeutic range     58.0 - 77.0 SECS   FIBRINOGEN    Collection Time: 20  4:41 AM   Result Value Ref Range    Fibrinogen 132 (L) 200 - 428 mg/dL   METABOLIC PANEL, BASIC    Collection Time: 20  4:41 AM   Result Value Ref Range    Sodium 141 136 - 145 mmol/L    Potassium 3.6 3.5 - 5.1 mmol/L    Chloride 111 (H) 97 - 108 mmol/L    CO2 23 21 - 32 mmol/L    Anion gap 7 5 - 15 mmol/L    Glucose 170 (H) 65 - 100 mg/dL    BUN 19 6 - 20 MG/DL    Creatinine 0.67 (L) 0.70 - 1.30 MG/DL    BUN/Creatinine ratio 28 (H) 12 - 20      GFR est AA >60 >60 ml/min/1.73m2    GFR est non-AA >60 >60 ml/min/1.73m2    Calcium 8.1 (L) 8.5 - 10.1 MG/DL   AMYLASE    Collection Time: 07/23/20  4:41 AM   Result Value Ref Range    Amylase 90 25 - 115 U/L   LIPASE    Collection Time: 07/23/20  4:41 AM   Result Value Ref Range    Lipase 177 73 - 393 U/L   TRIGLYCERIDE    Collection Time: 07/23/20  4:41 AM   Result Value Ref Range    Triglyceride 244 (H) <150 MG/DL   MAGNESIUM    Collection Time: 07/23/20  4:41 AM   Result Value Ref Range    Magnesium 2.0 1.6 - 2.4 mg/dL   PHOSPHORUS    Collection Time: 07/23/20  4:41 AM   Result Value Ref Range    Phosphorus 2.4 (L) 2.6 - 4.7 MG/DL   HGB & HCT    Collection Time: 07/23/20  4:42 AM   Result Value Ref Range    HGB 9.9 (L) 12.1 - 17.0 g/dL    HCT 30.3 (L) 36.6 - 50.3 %   GLUCOSE, POC    Collection Time: 07/23/20  5:28 AM   Result Value Ref Range    Glucose (POC) 157 (H) 65 - 100 mg/dL    Performed by Clifton Rodriguez, POC    Collection Time: 07/23/20  1:03 PM   Result Value Ref Range    Glucose (POC) 140 (H) 65 - 100 mg/dL    Performed by SLAVA BARNEY      IMPRESSION  IMPRESSION:        1. Unchanged significant inflammation involving the pancreas with areas of low  attenuation in the pancreatic head and pancreatic body measuring up to 4 cm. Given the amount of peripancreatic inflammation, this likely represents edema or  necrosis related to pancreatitis, less likely pancreatic neoplasm. No  significant pancreatic ductal dilatation. Focal fluid surrounding the pancreatic  body and tail likely developing pseudocysts. A couple small foci of air anterior  to the pancreatic body of uncertain significance.  Extensive infiltration  surrounding the portal vein and hepatic arteries.     2. Distal common bile duct obstruction likely related to inflammation in the  pancreatic head. Moderate bowel wall thickening involving the gastric antrum and  proximal duodenum likely reactive.     3. 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     4. Moderate ascites is again noted with nonspecific nodular infiltration of the  anterior peritoneal cavity and omentum     5. Bibasilar airspace disease with small pleural effusions. Assessment:   · Hematemesis: EGD (7/20/20): LA Grade D erosive esophagitis; hiatal hernia; retention of large volume of gastric fluid likely secondary to gastric outlet obstruction from extrinsic compression. Hgb 9.3. · Hypotension: off pressors  · Pancreatic mass: CT abdomen/pelvis with IV contrast (7/13/20): mass-like fullness in the pancreatic head, resulting in intrahepatic and pancreatic duct dilatation, peripancreatic inflammatory changes with 3.7 x 2.3 cm collection near the pancreatic tail possibly pseudocyst, large amount of ascites, and moderate amount of slightly complex fluid/soft tissue in the left abdomen - findings may represent pancreatitis or pancreatic neoplasm; edematous thickening of the gastric antrum and duodenum likely secondary to the pancreatic process.  LFTs and lipase unremarkable on 7/13. Cytology of peritoneal fluid was negative for malignant cells.  CT abdomen with and without IV contrast (7/22/20): unchanged significant inflammation involving the pancreas with areas of low attenuation in the pancreatic head and pancreatic body measuring up to 4 cm; given the amount of peripancreatic inflammation, likely represents edema or necrosis related to pancreatitis, less likely pancreatic neoplasm; no significant pancreatic ductal dilatation; focal fluid surrounding the pancreatic body and tail likely developing pseudocysts; distal common bile duct obstruction likely related to inflammation in the pancreatic head; moderate bowel wall thickening involving the gastric antrum and proximal duodenum likely reactive. · Right lower extremity DVT with bilateral PEs: heparin gtt on hold. Received 1 dose of Eliquis on 7/18. Status post IVC filter 7/20. · Ascites: hepatology, Dr. Ann Marie Montero following. Status post paracentesis: cytology was negative for malignant cells. · Hypotension  · COVID-19 negative     Patient Active Problem List   Diagnosis Code    DVT (deep vein thrombosis) in pregnancy O22.30    DVT (deep venous thrombosis) (HCC) I82.409     Plan:   · PPI BID  · LR  · Supportive measures  · Continue to hold anticoagulation as able  · Will need repeat EGD in 2 months for evaluation of mucosal healing of severe erosive esophagitis  · CT with above findings which were discussed with the patient. We will hold off on EUS at this time. Signed By: VANESSA Garcia     7/23/2020  1:58 PM       GI Attending: Agree with above plan. CT reviewed. Findings are significant for severe acute pancreatitis with possibly a low density mass in region of pancreatic head and pancreatic body. There is stranding and fluid along body/tail. Chronic splenic vein thrombosis and extensive gastric and gastroesophageal varices. Infiltration surrounding confluence of main portal vein and splenic vein. There appears to be an abrupt cut-off of distal CBD secondary to pancreatic head findings. I would favor continuing medical management at this time. EUS would not be advised at this time, but perhaps in 3-4 weeks following medical recovery from acute pancreatitis with interval imaging. MRI/MRCP with contrast would be better to evaluate for presence or absence of parenchymal necrosis. If it is clear that a mass is present at that time, then EUS with FNA/FNB would be indicated. Of note, his CA 19-9 is normal here (and it was normal at [facility] when he was admitted there as well).      Continue PPI for severe erosive esophagitis. Continue TPN at this time. He would not be a good candidate for endoscopic PEG tube placement given his ascites and gastric outlet obstruction. He should be considered for PEG-J vs surgical J tube placement for enteral nutrition while he is recovering, if this is within his goals of care. Will continue to follow. Rivera Del Cid MD

## 2020-07-23 NOTE — PROGRESS NOTES
Hematology-Oncology Progress Note      Ariane Camargo  1934  [id number]  7/23/2020      Subjective:     Repeat CT results noted. Patient without complaint. Still on pressors, BP ~88/P last night.      Allergies: Aspirin  Current Facility-Administered Medications   Medication Dose Route Frequency Provider Last Rate Last Dose    NOREPINephrine (LEVOPHED) 8,000 mcg in dextrose 5% 250 mL infusion  2-30 mcg/min IntraVENous TITRATE Ishmael Pradhan MD 1.9 mL/hr at 07/23/20 0216 1 mcg/min at 07/23/20 0216    TPN ADULT - CENTRAL   IntraVENous CONTINUOUS LashaeIshmael Bland MD 63 mL/hr at 07/22/20 1829      hydrocortisone Sod Succ (PF) (SOLU-CORTEF) injection 50 mg  50 mg IntraVENous Q12H Ishmael Pradhan MD   50 mg at 07/23/20 0523    midodrine (PROAMATINE) tablet 15 mg  15 mg Oral Q8H Ishmael Pradhan MD   15 mg at 07/23/20 0523    fat emulsion 20% (LIPOSYN, INTRAlipid) infusion 500 mL  500 mL IntraVENous Q MON, WED & FRI Andrea Raza MD   Stopped at 07/23/20 0700    glucose chewable tablet 16 g  4 Tab Oral PRN Ishmael Pradhan MD        glucagon (GLUCAGEN) injection 1 mg  1 mg IntraMUSCular PRN Ishmael Pradhan MD        dextrose 10% infusion 0-250 mL  0-250 mL IntraVENous PRN Ishmael Pradhan MD        insulin lispro (HUMALOG) injection   SubCUTAneous Q6H Ishmael Pradhan MD   2 Units at 07/23/20 0530    melatonin tablet 3 mg  3 mg Oral QHS PRN Dianne POE NP-C   3 mg at 07/21/20 2337    0.9% sodium chloride infusion 250 mL  250 mL IntraVENous PRN Elyssa Francois NP        lactated Ringers infusion  125 mL/hr IntraVENous CONTINUOUS Pedro Quick  mL/hr at 07/23/20 0221 125 mL/hr at 07/23/20 0221    0.9% sodium chloride infusion 250 mL  250 mL IntraVENous PRN ISAC BiggsP        sodium chloride (NS) flush 5-40 mL  5-40 mL IntraVENous Q8H Izabella WALLACE MD   10 mL at 07/23/20 0524    sodium chloride (NS) flush 5-40 mL  5-40 mL IntraVENous PRN Dale Espana MD        simethicone (MYLICON) 92VA/1.9UW oral drops 80 mg  1.2 mL Oral Multiple Dale Espana MD        piperacillin-tazobactam (ZOSYN) 3.375 g in 0.9% sodium chloride (MBP/ADV) 100 mL  3.375 g IntraVENous Q8H Amirah Lo, NP 25 mL/hr at 07/23/20 0216 3.375 g at 07/23/20 0216    pantoprazole (PROTONIX) 40 mg in 0.9% sodium chloride 10 mL injection  40 mg IntraVENous Q12H SETH Melara M, DO   40 mg at 07/22/20 2024    prochlorperazine (COMPAZINE) with saline injection 5 mg  5 mg IntraVENous Q6H Lori Melara, DO   Stopped at 07/22/20 1225    [Held by provider] heparin 25,000 units in D5W 250 ml infusion  18-36 Units/kg/hr IntraVENous TITRATE Parvez RODRIGUEZ DO 9.7 mL/hr at 07/19/20 2238 13 Units/kg/hr at 07/19/20 2238    benzonatate (TESSALON) capsule 100 mg  100 mg Oral TID PRN Parvez RODRIGUEZ, DO   100 mg at 07/1934    guaiFENesin (ROBITUSSIN) 100 mg/5 mL oral liquid 100 mg  100 mg Oral Q4H PRN Parvez RODRIGUEZ, DO   100 mg at 07/1934    ondansetron (ZOFRAN) injection 4 mg  4 mg IntraVENous Q6H Jackie SMITH NP   4 mg at 07/23/20 0216    scopolamine (TRANSDERM-SCOP) 1 mg over 3 days 1 Patch  1 Patch TransDERmal Q72H Jackie SMITH NP   1 Patch at 07/21/20 1920    prochlorperazine (COMPAZINE) tablet 5 mg  5 mg Oral Q6H PRN Jackie SMITH NP   5 mg at 07/15/20 2224    finasteride (PROSCAR) tablet 5 mg  5 mg Oral DAILY Lemuel Mathews MD   Stopped at 07/22/20 0900    [Held by provider] tamsulosin (FLOMAX) capsule 0.4 mg  0.4 mg Oral DAILY Lemuel Mathews MD   0.4 mg at 07/19/20 0942    sodium chloride (NS) flush 5-40 mL  5-40 mL IntraVENous Q8H Lemuel Mathews MD   10 mL at 07/23/20 0524    sodium chloride (NS) flush 5-40 mL  5-40 mL IntraVENous PRN Lemuel Mathews MD   10 mL at 07/18/20 1440    acetaminophen (TYLENOL) tablet 650 mg  650 mg Oral Q6H PRN Lemuel Mathews MD   650 mg at 07/18/20 1448    balsam peru-castor oiL (Jeremie Castillo) ointment   Topical BID Romina Mcduffie MD         Objective:     Patient Vitals for the past 24 hrs:   BP Temp Pulse Resp SpO2 Weight   07/23/20 0715 103/55 -- 73 16 99 % --   07/23/20 0700 104/54 -- (!) 56 10 100 % --   07/23/20 0645 96/52 -- (!) 51 12 96 % --   07/23/20 0630 90/54 -- 63 13 93 % --   07/23/20 0618 -- -- -- -- -- 85.7 kg (188 lb 15 oz)   07/23/20 0615 95/53 -- (!) 51 9 96 % --   07/23/20 0600 109/56 -- (!) 58 10 99 % --   07/23/20 0545 116/69 -- (!) 54 12 100 % --   07/23/20 0530 101/52 -- (!) 53 11 97 % --   07/23/20 0515 102/51 -- (!) 51 10 100 % --   07/23/20 0500 105/50 -- (!) 52 14 99 % --   07/23/20 0445 94/44 -- 66 18 99 % --   07/23/20 0430 93/59 -- (!) 50 11 98 % --   07/23/20 0415 97/52 -- (!) 51 12 98 % --   07/23/20 0400 104/51 97.1 °F (36.2 °C) (!) 51 14 95 % --   07/23/20 0345 96/57 -- (!) 59 13 94 % --   07/23/20 0330 90/55 -- (!) 49 8 94 % --   07/23/20 0315 92/51 -- (!) 51 11 96 % --   07/23/20 0300 99/53 -- (!) 54 18 94 % --   07/23/20 0245 (!) 87/56 -- (!) 48 8 -- --   07/23/20 0230 (!) 88/53 -- (!) 52 14 94 % --   07/23/20 0215 (!) 85/43 -- -- -- -- --   07/23/20 0200 96/62 -- 97 28 92 % --   07/23/20 0100 94/58 -- (!) 56 12 93 % --   07/23/20 0000 (!) 89/52 97.4 °F (36.3 °C) (!) 47 15 95 % --   07/22/20 2300 91/52 -- (!) 55 9 97 % --   07/22/20 2200 (!) 89/48 -- (!) 47 11 94 % --   07/22/20 2100 (!) 88/57 -- (!) 59 17 100 % --   07/22/20 2000 95/61 97.6 °F (36.4 °C) (!) 47 13 96 % --   07/22/20 1900 95/53 -- (!) 52 12 95 % --   07/22/20 1800 (!) 89/49 -- (!) 44 13 100 % --   07/22/20 1700 94/50 -- (!) 46 15 99 % --   07/22/20 1600 (!) 76/55 97.3 °F (36.3 °C) (!) 54 19 98 % --   07/22/20 1530 (!) 89/64 -- (!) 58 21 94 % --   07/22/20 1430 94/52 -- (!) 52 9 100 % --   07/22/20 1400 91/51 -- (!) 43 12 100 % --   07/22/20 1300 94/50 -- (!) 55 14 98 % --   07/22/20 1200 97/52 97 °F (36.1 °C) (!) 48 14 97 % --   07/22/20 1126 (!) 88/56 -- 83 -- -- --   07/22/20 1124 (!) 62/48 -- 84 -- -- --   07/22/20 1120 (!) 85/52 -- 66 -- -- --   07/22/20 1117 96/55 -- 71 -- 96 % --   07/22/20 1000 91/52 -- (!) 55 14 96 % --   07/22/20 0900 90/52 -- (!) 52 11 98 % --   07/22/20 0800 (!) 78/44 97 °F (36.1 °C) (!) 51 12 96 % --       Gen: NAD  HEENT: PERRL, Sclerae anicteric  Cv: RRR without m/r/g  Pulm: CTA bilaterally  Abd: NABS, NTND, No HSM  Ext: No c/c/e    Available labs reviewed:  Labs:    Recent Results (from the past 24 hour(s))   GLUCOSE, POC    Collection Time: 07/22/20 12:29 PM   Result Value Ref Range    Glucose (POC) 446 (H) 65 - 100 mg/dL    Performed by Kisha Rhiannon    GLUCOSE, POC    Collection Time: 07/22/20 12:31 PM   Result Value Ref Range    Glucose (POC) 204 (H) 65 - 100 mg/dL    Performed by Kisha Rhianonn    GLUCOSE, POC    Collection Time: 07/22/20 12:32 PM   Result Value Ref Range    Glucose (POC) 211 (H) 65 - 100 mg/dL    Performed by Kisha Rhiannon    GLUCOSE, POC    Collection Time: 07/22/20  6:37 PM   Result Value Ref Range    Glucose (POC) 164 (H) 65 - 100 mg/dL    Performed by Kisha Rhiannon    GLUCOSE, POC    Collection Time: 07/22/20 11:25 PM   Result Value Ref Range    Glucose (POC) 171 (H) 65 - 100 mg/dL    Performed by Karla Virgen + INR    Collection Time: 07/23/20  4:41 AM   Result Value Ref Range    INR 1.2 (H) 0.9 - 1.1      Prothrombin time 12.6 (H) 9.0 - 11.1 sec   PTT    Collection Time: 07/23/20  4:41 AM   Result Value Ref Range    aPTT 31.1 22.1 - 32.0 sec    aPTT, therapeutic range     58.0 - 77.0 SECS   FIBRINOGEN    Collection Time: 07/23/20  4:41 AM   Result Value Ref Range    Fibrinogen 132 (L) 200 - 137 mg/dL   METABOLIC PANEL, BASIC    Collection Time: 07/23/20  4:41 AM   Result Value Ref Range    Sodium 141 136 - 145 mmol/L    Potassium 3.6 3.5 - 5.1 mmol/L    Chloride 111 (H) 97 - 108 mmol/L    CO2 23 21 - 32 mmol/L    Anion gap 7 5 - 15 mmol/L    Glucose 170 (H) 65 - 100 mg/dL    BUN 19 6 - 20 MG/DL    Creatinine 0.67 (L) 0.70 - 1.30 MG/DL    BUN/Creatinine ratio 28 (H) 12 - 20      GFR est AA >60 >60 ml/min/1.73m2    GFR est non-AA >60 >60 ml/min/1.73m2    Calcium 8.1 (L) 8.5 - 10.1 MG/DL   AMYLASE    Collection Time: 07/23/20  4:41 AM   Result Value Ref Range    Amylase 90 25 - 115 U/L   LIPASE    Collection Time: 07/23/20  4:41 AM   Result Value Ref Range    Lipase 177 73 - 393 U/L   TRIGLYCERIDE    Collection Time: 07/23/20  4:41 AM   Result Value Ref Range    Triglyceride 244 (H) <150 MG/DL   MAGNESIUM    Collection Time: 07/23/20  4:41 AM   Result Value Ref Range    Magnesium 2.0 1.6 - 2.4 mg/dL   PHOSPHORUS    Collection Time: 07/23/20  4:41 AM   Result Value Ref Range    Phosphorus 2.4 (L) 2.6 - 4.7 MG/DL   HGB & HCT    Collection Time: 07/23/20  4:42 AM   Result Value Ref Range    HGB 9.9 (L) 12.1 - 17.0 g/dL    HCT 30.3 (L) 36.6 - 50.3 %   GLUCOSE, POC    Collection Time: 07/23/20  5:28 AM   Result Value Ref Range    Glucose (POC) 157 (H) 65 - 100 mg/dL    Performed by Tristin Vidal     79 y/o man with poor performance status, presenting with pancreatic inflamation/omental caking and VTE. Developed GI bleeding/hypotension on anticoagulation which has been held, s/p IVC filter. 1. Pancreatic inflammation: no positive path results yet. With new CT scan, unclear if this is pancreatic malignancy or other process. Defer to GI re: timing/type of investigation. Probably imprudent to do at this time given hypotension. Will follow from a distance, but available if needed. 2. VTE: agree with holding anticoagulation at this time, s/p IVC filtert. Thank you for allowing us to participate in the care of this very pleasant patient.     Marcial Pino MD  Hematology/Oncology  Phone [phone]

## 2020-07-23 NOTE — PROGRESS NOTES
Problem: Mobility Impaired (Adult and Pediatric)  Goal: *Acute Goals and Plan of Care (Insert Text)  Description: FUNCTIONAL STATUS PRIOR TO ADMISSION: Patient was modified independent using a rolling walker and WB quad cane for functional mobility. HOME SUPPORT PRIOR TO ADMISSION: The patient lived with spouse, but did not require assist from spouse. Physical Therapy Goals  Initiated 7/17/2020  1. Patient will move from supine to sit and sit to supine  in bed with modified independence within 7 day(s). 2.  Patient will transfer from bed to chair and chair to bed with minimal assistance/contact guard assist using the least restrictive device within 7 day(s). 3.  Patient will perform sit to stand with minimal assistance/contact guard assist within 7 day(s). 4.  Patient will ambulate with minimal assistance/contact guard assist for 50 feet with the least restrictive device within 7 day(s). 5.  Patient will ascend/descend 3 stairs with bilateral handrail(s) with minimal assistance/contact guard assist within 7 day(s). Outcome: Progressing Towards Goal   PHYSICAL THERAPY TREATMENT  Patient: Lesvia Pryor (71 y.o. male)  Date: 7/23/2020  Diagnosis: DVT (deep vein thrombosis) in pregnancy [O22.30]  DVT (deep venous thrombosis) ([facility]) [I82.409]   DVT (deep venous thrombosis) ([facility])  Procedure(s) (LRB):  ESOPHAGOGASTRODUODENOSCOPY (EGD) at bedside (N/A) 3 Days Post-Op  Precautions: Fall MAP > 60  Chart, physical therapy assessment, plan of care and goals were reviewed. ASSESSMENT  Patient continues with skilled PT services and is progressing towards goals. Pt received supine in bed and agreeable to therapy. Pt continues to demonstrate decreased strength, decreased functional mobility, impaired balance and gait, and hypotension. Pt was able to progress OOB to the chair with RW with min A and short, shuffle steps. Pt's BP remained low, but stable and he is asymptomatic.   Pt remained seated in chair with legs elevated, reclined position. Pt encouraged to perform AROM of bilateral LEs as tolerated. Continue to recommend SNF placement upon discharge pending progress with acute therapies and medical course    Vitals:    07/23/20 1215 07/23/20 1217 07/23/20 1224 07/23/20 1300   BP: (!) 76/52 (!) 79/47 102/61 101/61   BP 1 Location:       BP Patient Position: Sitting  Comment: sitting EOB Sitting  Comment: in chair Sitting  Comment: w/ legs reclined    Pulse:    (!) 107   Resp:    29   Temp:       SpO2:       Weight:       Height:             Current Level of Function Impacting Discharge (mobility/balance): min A bed mobility, min A transfers with RW, and short gait for bed to chair transfer           PLAN :  Patient continues to benefit from skilled intervention to address the above impairments. Continue treatment per established plan of care. to address goals. Recommendation for discharge: (in order for the patient to meet his/her long term goals)  Therapy up to 5 days/week in SNF setting    This discharge recommendation:  Has been made in collaboration with the attending provider and/or case management    IF patient discharges home will need the following DME: to be determined (TBD)       SUBJECTIVE:   Patient stated It feels pretty good to be up.     OBJECTIVE DATA SUMMARY:   Critical Behavior:  Neurologic State: Alert, Eyes open spontaneously  Orientation Level: Oriented X4  Cognition: Follows commands  Safety/Judgement: Awareness of environment  Functional Mobility Training:  Bed Mobility:  Rolling: Minimum assistance  Supine to Sit: Minimum assistance              Transfers:  Sit to Stand: Minimum assistance  Stand to Sit: Minimum assistance        Bed to Chair: Minimum assistance                    Balance:  Sitting: Intact  Standing: Intact; With support  Ambulation/Gait Training:  Distance (ft): 2 Feet (ft)  Assistive Device: Gait belt;Walker, rolling  Ambulation - Level of Assistance: Minimal assistance        Gait Abnormalities: Decreased step clearance; Step to gait; Shuffling gait        Base of Support: Narrowed     Speed/Taylor: Pace decreased (<100 feet/min)  Step Length: Right shortened;Left shortened                Therapeutic Exercises: Ankle pumps, LAQ  Pain Rating:  Pt denied pain    Activity Tolerance:   Good and Fair  Please refer to the flowsheet for vital signs taken during this treatment. After treatment patient left in no apparent distress:   Sitting in chair and Call bell within reach    COMMUNICATION/COLLABORATION:   The patients plan of care was discussed with: Occupational therapist and Registered nurse.      Clifford Runner, PT, DPT   Time Calculation: 16 mins

## 2020-07-23 NOTE — PROGRESS NOTES
[facility] Adult  Hospitalist Group     ICU Transfer/Accept Summary     [place] [address] [place]  DATE OF TRANSFER: 7/23/2020       PATIENT ID: Iram Ortega  MRN: [id number]   YOB: 1934    PRIMARY CARE PROVIDER: Gayle De Guzman MD   DATE OF ADMISSION: 7/13/2020  3:18 PM    ATTENDING PHYSICIAN: Kenneth Meyer MD  CONSULTATIONS:   IP CONSULT TO HEMATOLOGY  IP CONSULT TO GASTROENTEROLOGY  IP CONSULT TO HEPATOLOGY  IP CONSULT TO INTERVENTIONAL RADIOLOGY  IP CONSULT TO PALLIATIVE CARE - PROVIDER  IP CONSULT TO HEMATOLOGY  IP CONSULT TO INTERVENTIONAL RADIOLOGY  IP CONSULT TO HOSPITALIST    PROCEDURES/SURGERIES:   Procedure(s):  ESOPHAGOGASTRODUODENOSCOPY (EGD) at bedside    REASON FOR ADMISSION: DVT (deep venous thrombosis) [facility]     HOSPITAL PROBLEM LIST:  Patient Active Problem List   Diagnosis Code    DVT (deep vein thrombosis) in pregnancy O22.30    DVT (deep venous thrombosis) ([address] I82.409         Brief HPI and Hospital Course:       This is a very pleasant elderly gentleman who was admitted to the hospital on July 13 with progressive nausea vomiting and right lower limb swelling and pain.  He was found to have extensive DVT in that limb as well as pulmonary embolisms.  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  He had ascites which was drained and sent for cytology results still pending.  While he is on anticoagulation for his thromboembolic disease he developed significant amount of GI bleed and endoscopy showed erosive esophagitis.   IVC filter placed on the 58BX without complication. Nancy Dailey is still on TPN but tolerating ice chips without nausea or vomiting.      Assessment and Plan:    -Hemorrhagic shock: Due to GI bleed POA improving BP still low   - GI bleed with Acute blood loss anemia due to erosive esophagitis:  Hemoglobin been stable,  continue PPI twice daily, EGD showed erosive esophagitis with evidence of recent bleed but no active bleeding.     - Extensive thromboembolic disease with DVTs and bilateral pulmonary embolism:  Anticoagulation discontinued because of massive GI bleed, IVC filter and inserted on July 20. Now placed on Heparin gtt for treatment in DVT/PE, watch for bleeding.     - Head of the pancreas mass: Negative cytology from ascitic fluid. Possible plan for EUS biopsy when out of the ICU Per GI    - Hypotension:  On midodrone, off norepi. BP low may require albumn    -  Severe protein calorie malnourishment continue TPN for now, we will start clear liquids and see how he tolerates, replace electrolytes as indicated,     - DVT ppx SCD    - GI ppx on PPI     - DDNR       PHYSICAL EXAMINATION:  Visit Vitals  BP 96/56   Pulse 85   Temp 97.7 °F (36.5 °C)   Resp 27   Ht 5' 10\" (1.778 m)   Wt 85.7 kg (188 lb 15 oz)   SpO2 97%   BMI 27.11 kg/m²       General:          Alert, cooperative, no distress  HEENT:           Atraumatic, MMM            Neck:               Supple, symmetrical,  thyroid: non tender  Lungs:             Clear to auscultation bilaterally. Heart:              Regular  rhythm,  No  murmur   No edema  Abdomen:       Soft, non-tender. Not distended. Bowel sounds normal  Extremities:     No cyanosis. No clubbing,  +2 distal pulses  Skin:                Not pale. Not Jaundiced  No rashes   Psych:             Not anxious or agitated.   Neurologic:      Alert, moves all extremities    CODE STATUS:   Full Code   x DNR    Partial    Comfort Care     Signed:   Zoe Nunes MD  Date of Service:  7/23/2020  3:18 PM

## 2020-07-23 NOTE — PROGRESS NOTES
[facility]    ICU TEAM Progress Note    Name: Birgit Calabrese   : 1934   MRN: [id number]   Date: 2020      I  Subjective:   Progress Note: 2020      Reason for ICU Admission:  GI bleed, low blood pressure    Interval history: This is a very pleasant elderly gentleman who was admitted to the hospital on  with progressive nausea vomiting and right lower limb swelling and pain. He was found to have extensive DVT in that limb as well as pulmonary embolisms. 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 He had ascites which was drained and sent for cytology results still pending. While he is on anticoagulation for his thromboembolic disease he developed significant amount of GI bleed and endoscopy showed erosive esophagitis. IVC filter placed on the 92EF without complication. He is still on TPN but tolerating ice chips without nausea or vomiting. Overnight Events:       Active Problem List:     Problem List  Date Reviewed: 2020          Codes Class    * (Principal) DVT (deep venous thrombosis) (HCC) ICD-10-CM: I82.409  ICD-9-CM: 453.40         DVT (deep vein thrombosis) in pregnancy ICD-10-CM: O22.30  ICD-9-CM: 671.30               Past Medical History:      has a past medical history of Arthritis, BPH (benign prostatic hyperplasia), DJD (degenerative joint disease), Hypercholesterolemia, and Other and unspecified hyperlipidemia. He also has no past medical history of Abuse, Adverse effect of anesthesia, Anemia NEC, Calculus of kidney, Congestive heart failure, unspecified, Contact dermatitis and other eczema, due to unspecified cause, COPD, Depression, Headache(784.0), Psychotic disorder ([facility] [address] Sleep apnea, or Trauma.     Past Surgical History:      has a past surgical history that includes hx orthopaedic (Right, ); hx tonsillectomy; colonoscopy (N/A, 2017); ir ivc filter (2020); and ir plc ivc filter (2020). Home Medications:     Prior to Admission medications    Medication Sig Start Date End Date Taking? Authorizing Provider   multivitamin with iron tablet Take 1 Tab by mouth daily. Provider, Historical   predniSONE (STERAPRED) 5 mg dose pack See administration instruction per 5mg dose pack 18   Cam SHEPPARD MD   TAMSULOSIN HCL (TAMSULOSIN PO) Take 0.4 mg by mouth daily. Provider, Historical   finasteride (PROSCAR) 5 mg tablet Take 5 mg by mouth daily. Provider, Historical       Allergies/Social/Family History: Allergies   Allergen Reactions    Aspirin Unknown (comments)      Social History     Tobacco Use    Smoking status: Never Smoker    Smokeless tobacco: Never Used   Substance Use Topics    Alcohol use: Yes     Comment: social      Family History   Problem Relation Age of Onset    Diabetes Sister     Diabetes Brother     Heart Disease Brother        Review of Systems:     I reviewed 12 systems and they are negative but as in interval history    Objective:   Vital Signs:  Visit Vitals  /61   Pulse 78   Temp 97.7 °F (36.5 °C)   Resp (!) 35   Ht 5' 10\" (1.778 m)   Wt 85.7 kg (188 lb 15 oz)   SpO2 95%   BMI 27.11 kg/m²    O2 Flow Rate (L/min): 2 l/min O2 Device: Room air Temp (24hrs), Av.4 °F (36.3 °C), Min:97 °F (36.1 °C), Max:97.7 °F (36.5 °C)           Intake/Output:     Intake/Output Summary (Last 24 hours) at 2020 1017  Last data filed at 2020 1000  Gross per 24 hour   Intake 5846.49 ml   Output 1525 ml   Net 4321.49 ml       Physical Exam:  General:  Alert, cooperative, well noursished, well developed, appears stated age   Eyes:  Pupils equally round and reactive to light. Mouth/Throat: Mucous membranes normal, oral pharynx clear   Neck: Supple   Lungs:   Clear to auscultation bilaterally, good effort   CV:  Regular rate and rhythm,no murmur, click, rub or gallop   Abdomen:   Soft, non-tender.  bowel sounds normal. non-distended   Extremities: No cyanosis or edema   Skin: Skin color, texture, turgor normal. no acute rash or lesions   Lymph nodes: Cervical and supraclavicular normal   Musculoskeletal: No swelling or deformity   Lines/Devices:  Intact, no erythema, drainage or tenderness   Psych: Alert and oriented, normal mood affect given the setting  Moves 4 limbs no deficit, hard of hearing          LABS AND  DATA: Personally reviewed  Recent Labs     07/23/20  0442 07/22/20  0610  07/20/20  1207   WBC  --  7.8  --  7.4   HGB 9.9* 9.0*   < > 10.2*   HCT 30.3* 28.3*   < > 31.5*   PLT  --  179  --  186    < > = values in this interval not displayed. Recent Labs     07/23/20  0441 07/22/20  0605 07/21/20  0558     --  142   K 3.6  --  4.2   *  --  112*   CO2 23  --  25   BUN 19  --  21*   CREA 0.67*  --  0.78   *  --  200*   CA 8.1*  --  8.0*   MG 2.0 1.9 2.2   PHOS 2.4* 2.3* 2.5*     Recent Labs     07/23/20 0441   AML 90   LPSE 177     Recent Labs     07/23/20  0441 07/22/20  0610   INR 1.2* 1.3*   PTP 12.6* 13.0*   APTT 31.1 33.2*      No results for input(s): PHI, PCO2I, PO2I, FIO2I in the last 72 hours. No results for input(s): CPK, CKMB, TROIQ, BNPP in the last 72 hours. MEDS: Reviewed    Chest X-Ray:  CXR Results  (Last 48 hours)    None          Multidisciplinary Rounds Completed: Yes    Assessment and Plan:   -Hemorrhagic shock: Due to GI bleed, improving  - GI bleed with Acute blood loss anemia due to erosive esophagitis:  Hemoglobin been stable,  continue PPI twice daily, EGD showed erosive esophagitis with evidence of recent bleed but no active bleeding.   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 Appreciate hematology input  Now placed on Heparin gtt for treatment in DVT/PE, watch for bleeding.   - Head of the pancreas mass: Negative cytology from ascitic fluid.   Possible plan for EUS biopsy when out of the ICU Per GI  - Hypotension:  On midodrone, off norepi. -  Severe protein calorie malnourishment continue TPN for now, we will start clear liquids and see how he tolerates, replace electrolytes as indicated,      Appreciate palliative care input, patient affirmed DNR DNI status. Palliative care will continue to follow.       DISPOSITION  Will transfer out of the ICU     CRITICAL CARE CONSULTANT NOTE  I had a face to face encounter with the patient, reviewed and interpreted patient data including clinical events, labs, images, vital signs, I/O's, and examined patient. I have discussed the case and the plan and management of the patient's care with the consulting services, the bedside nurses and the respiratory therapist.      NOTE OF PERSONAL INVOLVEMENT IN CARE    I participated in the decision-making and personally managed or directed the management of the following life and organ supporting interventions that required my frequent assessment to treat or prevent imminent deterioration. I personally spent 45 minutes of critical care time. This is time spent at this critically ill patient's bedside actively involved in patient care as well as the coordination of care and discussions with the patient's family. This does not include any procedural time which has been billed separately.       Tasha Philip [facility]     Critical Care Medicine  [facility]

## 2020-07-23 NOTE — PROGRESS NOTES
1930 Bedside and Verbal shift change report given to Tati Walter RN (oncoming nurse) by Jjaa Stewart RN (offgoing nurse). Report included the following information SBAR, Kardex, Intake/Output, MAR, Recent Results, Cardiac Rhythm SB and Alarm Parameters . 6146 Primary Nurse Jamel Cruz and Yaneth Gross RN performed a dual skin assessment on this patient No impairment noted  Joseph score is 15.    0730 Bedside and Verbal shift change report given to Marta Cleaning RN (oncoming nurse) by Tati Walter RN (offgoing nurse). Report included the following information SBAR, Kardex, Intake/Output, MAR, Recent Results, Cardiac Rhythm SB and Alarm Parameters . Shift Summary: Patient AAO x 3-4, follows commands. GRAHAM spontanesouly. Generalized weakness. Levophed titrated @ 1 mcg/ min to maintain MAP > 60. Patient c/o mild nausea, scheduled zofran given per order.

## 2020-07-24 LAB
ANION GAP SERPL CALC-SCNC: 6 MMOL/L (ref 5–15)
APTT PPP: 34.8 SEC (ref 22.1–32)
APTT PPP: 37.1 SEC (ref 22.1–32)
BASOPHILS # BLD: 0 K/UL (ref 0–0.1)
BASOPHILS NFR BLD: 0 % (ref 0–1)
BUN SERPL-MCNC: 21 MG/DL (ref 6–20)
BUN/CREAT SERPL: 32 (ref 12–20)
CALCIUM SERPL-MCNC: 7.8 MG/DL (ref 8.5–10.1)
CHLORIDE SERPL-SCNC: 111 MMOL/L (ref 97–108)
CO2 SERPL-SCNC: 24 MMOL/L (ref 21–32)
CREAT SERPL-MCNC: 0.66 MG/DL (ref 0.7–1.3)
DIFFERENTIAL METHOD BLD: ABNORMAL
EOSINOPHIL # BLD: 0 K/UL (ref 0–0.4)
EOSINOPHIL NFR BLD: 0 % (ref 0–7)
ERYTHROCYTE [DISTWIDTH] IN BLOOD BY AUTOMATED COUNT: 18.7 % (ref 11.5–14.5)
FIBRINOGEN PPP-MCNC: 136 MG/DL (ref 200–475)
GLUCOSE BLD STRIP.AUTO-MCNC: 115 MG/DL (ref 65–100)
GLUCOSE BLD STRIP.AUTO-MCNC: 124 MG/DL (ref 65–100)
GLUCOSE BLD STRIP.AUTO-MCNC: 143 MG/DL (ref 65–100)
GLUCOSE BLD STRIP.AUTO-MCNC: 195 MG/DL (ref 65–100)
GLUCOSE SERPL-MCNC: 142 MG/DL (ref 65–100)
HCT VFR BLD AUTO: 29.5 % (ref 36.6–50.3)
HCT VFR BLD AUTO: 32.8 % (ref 36.6–50.3)
HGB BLD-MCNC: 9.2 G/DL (ref 12.1–17)
HGB BLD-MCNC: 9.4 G/DL (ref 12.1–17)
IMM GRANULOCYTES # BLD AUTO: 0 K/UL
IMM GRANULOCYTES NFR BLD AUTO: 0 %
INR PPP: 1.3 (ref 0.9–1.1)
LYMPHOCYTES # BLD: 1 K/UL (ref 0.8–3.5)
LYMPHOCYTES NFR BLD: 12 % (ref 12–49)
MAGNESIUM SERPL-MCNC: 1.9 MG/DL (ref 1.6–2.4)
MCH RBC QN AUTO: 31.3 PG (ref 26–34)
MCHC RBC AUTO-ENTMCNC: 31.9 G/DL (ref 30–36.5)
MCV RBC AUTO: 98.3 FL (ref 80–99)
MONOCYTES # BLD: 0.7 K/UL (ref 0–1)
MONOCYTES NFR BLD: 8 % (ref 5–13)
NEUTS BAND NFR BLD MANUAL: 3 % (ref 0–6)
NEUTS SEG # BLD: 6.9 K/UL (ref 1.8–8)
NEUTS SEG NFR BLD: 77 % (ref 32–75)
NRBC # BLD: 0 K/UL (ref 0–0.01)
NRBC BLD-RTO: 0 PER 100 WBC
PHOSPHATE SERPL-MCNC: 3.1 MG/DL (ref 2.6–4.7)
PLATELET # BLD AUTO: 169 K/UL (ref 150–400)
PMV BLD AUTO: 9.9 FL (ref 8.9–12.9)
POTASSIUM SERPL-SCNC: 3.7 MMOL/L (ref 3.5–5.1)
PROTHROMBIN TIME: 13.1 SEC (ref 9–11.1)
RBC # BLD AUTO: 3 M/UL (ref 4.1–5.7)
RBC MORPH BLD: ABNORMAL
SERVICE CMNT-IMP: ABNORMAL
SODIUM SERPL-SCNC: 141 MMOL/L (ref 136–145)
THERAPEUTIC RANGE,PTTT: ABNORMAL SECS (ref 58–77)
THERAPEUTIC RANGE,PTTT: ABNORMAL SECS (ref 58–77)
WBC # BLD AUTO: 8.6 K/UL (ref 4.1–11.1)

## 2020-07-24 PROCEDURE — 85730 THROMBOPLASTIN TIME PARTIAL: CPT

## 2020-07-24 PROCEDURE — 74011636637 HC RX REV CODE- 636/637: Performed by: INTERNAL MEDICINE

## 2020-07-24 PROCEDURE — 74011250636 HC RX REV CODE- 250/636: Performed by: HOSPITALIST

## 2020-07-24 PROCEDURE — 74011250637 HC RX REV CODE- 250/637: Performed by: NURSE PRACTITIONER

## 2020-07-24 PROCEDURE — 74011250636 HC RX REV CODE- 250/636: Performed by: INTERNAL MEDICINE

## 2020-07-24 PROCEDURE — 85384 FIBRINOGEN ACTIVITY: CPT

## 2020-07-24 PROCEDURE — 83735 ASSAY OF MAGNESIUM: CPT

## 2020-07-24 PROCEDURE — 85610 PROTHROMBIN TIME: CPT

## 2020-07-24 PROCEDURE — 74011000250 HC RX REV CODE- 250: Performed by: HOSPITALIST

## 2020-07-24 PROCEDURE — 74011000250 HC RX REV CODE- 250: Performed by: INTERNAL MEDICINE

## 2020-07-24 PROCEDURE — 80048 BASIC METABOLIC PNL TOTAL CA: CPT

## 2020-07-24 PROCEDURE — 84100 ASSAY OF PHOSPHORUS: CPT

## 2020-07-24 PROCEDURE — 85025 COMPLETE CBC W/AUTO DIFF WBC: CPT

## 2020-07-24 PROCEDURE — 65660000000 HC RM CCU STEPDOWN

## 2020-07-24 PROCEDURE — 74011250636 HC RX REV CODE- 250/636: Performed by: NURSE PRACTITIONER

## 2020-07-24 PROCEDURE — P9045 ALBUMIN (HUMAN), 5%, 250 ML: HCPCS | Performed by: NURSE PRACTITIONER

## 2020-07-24 PROCEDURE — 74011000258 HC RX REV CODE- 258: Performed by: NURSE PRACTITIONER

## 2020-07-24 PROCEDURE — 82962 GLUCOSE BLOOD TEST: CPT

## 2020-07-24 PROCEDURE — 97530 THERAPEUTIC ACTIVITIES: CPT

## 2020-07-24 PROCEDURE — 74011250637 HC RX REV CODE- 250/637: Performed by: FAMILY MEDICINE

## 2020-07-24 PROCEDURE — C9113 INJ PANTOPRAZOLE SODIUM, VIA: HCPCS | Performed by: INTERNAL MEDICINE

## 2020-07-24 PROCEDURE — 74011000258 HC RX REV CODE- 258: Performed by: HOSPITALIST

## 2020-07-24 PROCEDURE — 85018 HEMOGLOBIN: CPT

## 2020-07-24 PROCEDURE — 36415 COLL VENOUS BLD VENIPUNCTURE: CPT

## 2020-07-24 PROCEDURE — 97535 SELF CARE MNGMENT TRAINING: CPT

## 2020-07-24 RX ORDER — ALBUMIN HUMAN 50 G/1000ML
25 SOLUTION INTRAVENOUS ONCE
Status: COMPLETED | OUTPATIENT
Start: 2020-07-24 | End: 2020-07-24

## 2020-07-24 RX ADMIN — CASTOR OIL AND BALSAM, PERU: 788; 87 OINTMENT TOPICAL at 08:09

## 2020-07-24 RX ADMIN — PIPERACILLIN AND TAZOBACTAM 3.38 G: 3; .375 INJECTION, POWDER, LYOPHILIZED, FOR SOLUTION INTRAVENOUS at 02:30

## 2020-07-24 RX ADMIN — ALBUMIN (HUMAN) 25 G: 12.5 INJECTION, SOLUTION INTRAVENOUS at 00:29

## 2020-07-24 RX ADMIN — MIDODRINE HYDROCHLORIDE 20 MG: 5 TABLET ORAL at 06:03

## 2020-07-24 RX ADMIN — HYDROCORTISONE SODIUM SUCCINATE 50 MG: 100 INJECTION, POWDER, FOR SOLUTION INTRAMUSCULAR; INTRAVENOUS at 17:33

## 2020-07-24 RX ADMIN — Medication 10 ML: at 06:04

## 2020-07-24 RX ADMIN — PIPERACILLIN AND TAZOBACTAM 3.38 G: 3; .375 INJECTION, POWDER, LYOPHILIZED, FOR SOLUTION INTRAVENOUS at 11:29

## 2020-07-24 RX ADMIN — SODIUM CHLORIDE 5 MG: 9 INJECTION INTRAMUSCULAR; INTRAVENOUS; SUBCUTANEOUS at 17:33

## 2020-07-24 RX ADMIN — SODIUM CHLORIDE, SODIUM LACTATE, POTASSIUM CHLORIDE, AND CALCIUM CHLORIDE 125 ML/HR: 600; 310; 30; 20 INJECTION, SOLUTION INTRAVENOUS at 11:30

## 2020-07-24 RX ADMIN — Medication 10 ML: at 21:18

## 2020-07-24 RX ADMIN — CALCIUM GLUCONATE: 94 INJECTION, SOLUTION INTRAVENOUS at 18:48

## 2020-07-24 RX ADMIN — MIDODRINE HYDROCHLORIDE 20 MG: 5 TABLET ORAL at 13:33

## 2020-07-24 RX ADMIN — I.V. FAT EMULSION 500 ML: 20 EMULSION INTRAVENOUS at 18:48

## 2020-07-24 RX ADMIN — SODIUM CHLORIDE 40 MG: 9 INJECTION INTRAMUSCULAR; INTRAVENOUS; SUBCUTANEOUS at 08:09

## 2020-07-24 RX ADMIN — SODIUM CHLORIDE 40 MG: 9 INJECTION INTRAMUSCULAR; INTRAVENOUS; SUBCUTANEOUS at 21:17

## 2020-07-24 RX ADMIN — SODIUM CHLORIDE, SODIUM LACTATE, POTASSIUM CHLORIDE, AND CALCIUM CHLORIDE 125 ML/HR: 600; 310; 30; 20 INJECTION, SOLUTION INTRAVENOUS at 02:35

## 2020-07-24 RX ADMIN — HYDROCORTISONE SODIUM SUCCINATE 50 MG: 100 INJECTION, POWDER, FOR SOLUTION INTRAMUSCULAR; INTRAVENOUS at 06:03

## 2020-07-24 RX ADMIN — MIDODRINE HYDROCHLORIDE 20 MG: 5 TABLET ORAL at 21:17

## 2020-07-24 RX ADMIN — HEPARIN SODIUM 18 UNITS/KG/HR: 10000 INJECTION, SOLUTION INTRAVENOUS at 16:11

## 2020-07-24 RX ADMIN — Medication 10 ML: at 13:36

## 2020-07-24 RX ADMIN — ONDANSETRON 4 MG: 2 INJECTION INTRAMUSCULAR; INTRAVENOUS at 21:17

## 2020-07-24 RX ADMIN — PIPERACILLIN AND TAZOBACTAM 3.38 G: 3; .375 INJECTION, POWDER, LYOPHILIZED, FOR SOLUTION INTRAVENOUS at 18:40

## 2020-07-24 RX ADMIN — CASTOR OIL AND BALSAM, PERU: 788; 87 OINTMENT TOPICAL at 17:35

## 2020-07-24 RX ADMIN — FINASTERIDE 5 MG: 5 TABLET, FILM COATED ORAL at 08:23

## 2020-07-24 RX ADMIN — INSULIN LISPRO 2 UNITS: 100 INJECTION, SOLUTION INTRAVENOUS; SUBCUTANEOUS at 06:02

## 2020-07-24 NOTE — PROGRESS NOTES
Physical Therapy Note  07/24/2020 07/24/20 1426 07/24/20 1435 07/24/20 1437   Vital Signs   BP 95/56 (!) 89/52 (!) 72/37   MAP (Calculated) 69 (!) 64 (!) 49   BP 1 Location Left arm Left arm Left arm   BP 1 Method Automatic Automatic Automatic   BP Patient Position At rest Sitting Standing;Post activity      07/24/20 1440   Vital Signs   /56   MAP (Calculated) 74   BP 1 Location Left arm   BP 1 Method Automatic   BP Patient Position Trendelenburg;Supine     Full note to follow.     Jerry Meza, PT, DPT

## 2020-07-24 NOTE — PROGRESS NOTES
Bedside and Verbal shift change report given to Zoe (oncoming nurse) by Robert Sims (offgoing nurse). Report included the following information SBAR, ED Summary, Procedure Summary, Intake/Output, MAR and Cardiac Rhythm normal snus sinus vianey.

## 2020-07-24 NOTE — PROGRESS NOTES
Problem: Mobility Impaired (Adult and Pediatric)  Goal: *Acute Goals and Plan of Care (Insert Text)  Description: FUNCTIONAL STATUS PRIOR TO ADMISSION: Patient was modified independent using a rolling walker and WB quad cane for functional mobility. HOME SUPPORT PRIOR TO ADMISSION: The patient lived with spouse, but did not require assist from spouse. Physical Therapy Goals  Initiated 7/17/2020  1. Patient will move from supine to sit and sit to supine  in bed with modified independence within 7 day(s). 2.  Patient will transfer from bed to chair and chair to bed with minimal assistance/contact guard assist using the least restrictive device within 7 day(s). 3.  Patient will perform sit to stand with minimal assistance/contact guard assist within 7 day(s). 4.  Patient will ambulate with minimal assistance/contact guard assist for 50 feet with the least restrictive device within 7 day(s). 5.  Patient will ascend/descend 3 stairs with bilateral handrail(s) with minimal assistance/contact guard assist within 7 day(s). Outcome: Progressing Towards Goal     PHYSICAL THERAPY TREATMENT  Patient: Damir Foote (89 y.o. male)  Date: 7/24/2020  Diagnosis: DVT (deep vein thrombosis) in pregnancy [O22.30]  DVT (deep venous thrombosis) ([facility]) [I82.409]   DVT (deep venous thrombosis) ([facility])  Procedure(s) (LRB):  ESOPHAGOGASTRODUODENOSCOPY (EGD) at bedside (N/A) 4 Days Post-Op  Precautions: Fall  Chart, physical therapy assessment, plan of care and goals were reviewed. ASSESSMENT  Patient continues with skilled PT services and is marginally progressing towards goals however remains most limited by persistent asymptomatic hypotension, generalized weakness, BLE edema, and impaired balance leading to increased dependency and falls risk from baseline level. Received pt supine in bed, note transfer from ICU for routine progression of care. Cleared for mobility by RN.  Completed B LE there ex (see below) with manually graded resistance to aid in BP support prior to mobilizing to EOB; completed repeated sit<>stands and side stepping along EOB however BP and MAP continued to drop in upright position despite increased activity (72/37mmHg in stance), therefore assisted back to supine and placed in trendelenburg position (improved to 111/56mmHg). For the weekend, recommend patient to complete as able in order to maintain strength, endurance and independence with nursing: OOB to chair 3x/day with assist x1 and RW IF BPs are stable. PT to follow-up with patient after the weekend. He remains below baseline and a high falls risk - recommend discharge to rehab setting once medically cleared (SNF). Will follow. Current Level of Function Impacting Discharge (mobility/balance): min A for limited mobility; asymptomatic hypotension    Other factors to consider for discharge: high falls risk; below baseline          PLAN :  Patient continues to benefit from skilled intervention to address the above impairments. Continue treatment per established plan of care. to address goals.     Recommendation for discharge: (in order for the patient to meet his/her long term goals)  Therapy up to 5 days/week in SNF setting    This discharge recommendation:  Has been made in collaboration with the attending provider and/or case management    IF patient discharges home will need the following DME: to be determined (TBD)       SUBJECTIVE:   Patient stated I'm ok    OBJECTIVE DATA SUMMARY:   Critical Behavior:  Neurologic State: Alert  Orientation Level: Oriented X4  Cognition: Appropriate for age attention/concentration  Safety/Judgement: Good awareness of safety precautions  Functional Mobility Training:  Bed Mobility:     Supine to Sit: Supervision  Sit to Supine: Supervision  Scooting: Supervision     Transfers:  Sit to Stand: Minimum assistance  Stand to Sit: Minimum assistance  Bed to Chair: Additional time;Minimum assistance Balance:  Sitting: Intact  Standing: Impaired; With support  Standing - Static: Good  Standing - Dynamic : Fair;Constant support    Activity Tolerance:   Fair, observed SOB with activity, and signs and symptoms of orthostatic hypotension  Please refer to the flowsheet for vital signs taken during this treatment. After treatment patient left in no apparent distress:   Supine in bed, Call bell within reach, Caregiver / family present, and Side rails x 3    COMMUNICATION/COLLABORATION:   The patients plan of care was discussed with: Registered nurse and Physician.      Zi Eldridge PT, DPT   Time Calculation: 26 mins

## 2020-07-24 NOTE — PROGRESS NOTES
[facility].  [facility], [address]       GI PROGRESS NOTE  Will Zan Neighbours  [phone] office  [phone] NP/PA in-hospital cell phone M-F until 4:30PM  After 5PM or on weekends, please call  for physician on call      NAME: Hermilo Chavez   :  1934   MRN:  [id number]       Subjective:   Patient denies nausea, vomiting, or abdominal pain. He is receiving TPN. Objective:     VITALS:   Last 24hrs VS reviewed since prior progress note. Most recent are:  Visit Vitals  BP (!) 89/50   Pulse (!) 126   Temp 97.6 °F (36.4 °C)   Resp (!) 39   Ht 5' 10\" (1.778 m)   Wt 90.7 kg (199 lb 15.3 oz)   SpO2 97%   BMI 28.69 kg/m²       PHYSICAL EXAM:  General:          No acute distress    Neurologic:      Alert  HEENT:           EOMI, no scleral icterus   Lungs:             No respiratory distress  Heart:              S1 S2  Abdomen:        Soft, mildly distended, no apparent tenderness, no guarding, no rebound.  +Bowel sounds.   Extremities:     Warm  Psych:             Not anxious or agitated      Lab Data Reviewed:     Recent Results (from the past 24 hour(s))   GLUCOSE, POC    Collection Time: 20  1:03 PM   Result Value Ref Range    Glucose (POC) 140 (H) 65 - 100 mg/dL    Performed by [address] [place] [address], POC    Collection Time: 20  6:19 PM   Result Value Ref Range    Glucose (POC) 111 (H) 65 - 100 mg/dL    Performed by [address] [place] [address], POC    Collection Time: 20 11:32 PM   Result Value Ref Range    Glucose (POC) 148 (H) 65 - 100 mg/dL    Performed by [address] [place] [address] [facility] + INR    Collection Time: 20  3:59 AM   Result Value Ref Range    INR 1.3 (H) 0.9 - 1.1      Prothrombin time 13.1 (H) 9.0 - 11.1 sec   PTT    Collection Time: 20  3:59 AM   Result Value Ref Range    aPTT 34.8 (H) 22.1 - 32.0 sec    aPTT, therapeutic range     58.0 - 77.0 SECS   FIBRINOGEN    Collection Time: 20  3:59 AM   Result Value Ref Range    Fibrinogen 136 (L) 200 - 475 mg/dL   MAGNESIUM    Collection Time: 07/24/20  3:59 AM   Result Value Ref Range    Magnesium 1.9 1.6 - 2.4 mg/dL   PHOSPHORUS    Collection Time: 07/24/20  3:59 AM   Result Value Ref Range    Phosphorus 3.1 2.6 - 4.7 MG/DL   METABOLIC PANEL, BASIC    Collection Time: 07/24/20  3:59 AM   Result Value Ref Range    Sodium 141 136 - 145 mmol/L    Potassium 3.7 3.5 - 5.1 mmol/L    Chloride 111 (H) 97 - 108 mmol/L    CO2 24 21 - 32 mmol/L    Anion gap 6 5 - 15 mmol/L    Glucose 142 (H) 65 - 100 mg/dL    BUN 21 (H) 6 - 20 MG/DL    Creatinine 0.66 (L) 0.70 - 1.30 MG/DL    BUN/Creatinine ratio 32 (H) 12 - 20      GFR est AA >60 >60 ml/min/1.73m2    GFR est non-AA >60 >60 ml/min/1.73m2    Calcium 7.8 (L) 8.5 - 10.1 MG/DL   CBC WITH AUTOMATED DIFF    Collection Time: 07/24/20  3:59 AM   Result Value Ref Range    WBC 8.6 4.1 - 11.1 K/uL    RBC 3.00 (L) 4.10 - 5.70 M/uL    HGB 9.4 (L) 12.1 - 17.0 g/dL    HCT 29.5 (L) 36.6 - 50.3 %    MCV 98.3 80.0 - 99.0 FL    MCH 31.3 26.0 - 34.0 PG    MCHC 31.9 30.0 - 36.5 g/dL    RDW 18.7 (H) 11.5 - 14.5 %    PLATELET 560 452 - 998 K/uL    MPV 9.9 8.9 - 12.9 FL    NRBC 0.0 0  WBC    ABSOLUTE NRBC 0.00 0.00 - 0.01 K/uL    NEUTROPHILS 77 (H) 32 - 75 %    BAND NEUTROPHILS 3 0 - 6 %    LYMPHOCYTES 12 12 - 49 %    MONOCYTES 8 5 - 13 %    EOSINOPHILS 0 0 - 7 %    BASOPHILS 0 0 - 1 %    IMMATURE GRANULOCYTES 0 %    ABS. NEUTROPHILS 6.9 1.8 - 8.0 K/UL    ABS. LYMPHOCYTES 1.0 0.8 - 3.5 K/UL    ABS. MONOCYTES 0.7 0.0 - 1.0 K/UL    ABS. EOSINOPHILS 0.0 0.0 - 0.4 K/UL    ABS. BASOPHILS 0.0 0.0 - 0.1 K/UL    ABS. IMM.  GRANS. 0.0 K/UL    DF MANUAL      RBC COMMENTS ANISOCYTOSIS  1+        RBC COMMENTS MACROCYTOSIS  1+        RBC COMMENTS OVALOCYTES  1+        RBC COMMENTS BLUE CELLS  1+        RBC COMMENTS POLYCHROMASIA  1+       GLUCOSE, POC    Collection Time: 07/24/20  6:00 AM   Result Value Ref Range    Glucose (POC) 143 (H) 65 - 100 mg/dL Performed by Jayleen Gregory    GLUCOSE, POC    Collection Time: 07/24/20 11:28 AM   Result Value Ref Range    Glucose (POC) 124 (H) 65 - 100 mg/dL    Performed by Mildred Fisher         Assessment:   · Hematemesis: EGD (7/20/20): LA Grade D erosive esophagitis; hiatal hernia; retention of large volume of gastric fluid likely secondary to gastric outlet obstruction from extrinsic compression. Hgb 9.4. · Hypotension: off pressors  · Pancreatic mass: CT abdomen/pelvis with IV contrast (7/13/20): mass-like fullness in the pancreatic head, resulting in intrahepatic and pancreatic duct dilatation, peripancreatic inflammatory changes with 3.7 x 2.3 cm collection near the pancreatic tail possibly pseudocyst, large amount of ascites, and moderate amount of slightly complex fluid/soft tissue in the left abdomen - findings may represent pancreatitis or pancreatic neoplasm; edematous thickening of the gastric antrum and duodenum likely secondary to the pancreatic process.  LFTs and lipase unremarkable on 7/13. Cytology of peritoneal fluid was negative for malignant cells. CT abdomen with and without IV contrast (7/22/20): unchanged significant inflammation involving the pancreas with areas of low attenuation in the pancreatic head and pancreatic body measuring up to 4 cm; given the amount of peripancreatic inflammation, likely represents edema or necrosis related to pancreatitis, less likely pancreatic neoplasm; no significant pancreatic ductal dilatation; focal fluid surrounding the pancreatic body and tail likely developing pseudocysts; distal common bile duct obstruction likely related to inflammation in the pancreatic head; moderate bowel wall thickening involving the gastric antrum and proximal duodenum likely reactive. · Right lower extremity DVT with bilateral PEs: status post IVC filter 7/20.    · Ascites: hepatology, Dr. Gunjan Owens following. Status post paracentesis: cytology was negative for malignant cells.   · Hypotension  · COVID-19 negative     Patient Active Problem List   Diagnosis Code    DVT (deep vein thrombosis) in pregnancy O22.30    [facility] I82.409     Plan:   · NPO. Receiving TPN. · PPI BID  · LR  · Supportive measures  · Continue medical management. Consider EUS in 3-4 weeks following recovery from acute pancreatitis with interval imaging. · If within goals of care, consider G-J or surgical J-tube for nutrition. · We will be available as needed over the weekend. Please call us with any questions. Thank you. Signed By: Chaitanya Johnson     7/24/2020  12:36 PM         GI Attending: Discussed with hospitalist. Elizabeth Fernandez to restart heparin gtt at this time. Plan otherwise above. Would consider G-J vs surgical J tube for nutrition if within goals of care. Will see on request this weekend. Please call with any questions. Rivera Castañeda MD

## 2020-07-24 NOTE — PROGRESS NOTES
0730 Bedside and Verbal shift change report given to JANES REYES RN (oncoming nurse) by B. Martell Peabody (offgoing nurse). Report included the following information SBAR, Kardex, ED Summary, Procedure Summary, Intake/Output, MAR, Accordion, Recent Results, Med Rec Status, Cardiac Rhythm NSR and Alarm Parameters . Assumed care of pt. Assessment complete. 1300 TRANSFER - OUT REPORT:    Verbal report given to RN(name) on Jackqueline Inch  being transferred to (unit) for routine progression of care       Report consisted of patients Situation, Background, Assessment and   Recommendations(SBAR). Information from the following report(s) SBAR, Kardex, ED Summary, OR Summary, Procedure Summary, Intake/Output, MAR, Accordion, Recent Results, Med Rec Status, Cardiac Rhythm SINUS KYMBERLY/NSR and Alarm Parameters  was reviewed with the receiving nurse. Lines:   PICC Double Lumen 07/16/20 Right;Brachial (Active)   Central Line Being Utilized Yes 07/24/20 1419   Criteria for Appropriate Use Total parenteral nutrition 07/24/20 1419   Site Assessment Clean, dry, & intact 07/24/20 1419   Phlebitis Assessment 0 07/24/20 1419   Infiltration Assessment 0 07/24/20 1419   Arm Circumference (cm) 28 cm 07/16/20 1030   Date of Last Dressing Change 07/22/20 07/24/20 1419   Dressing Status Clean, dry, & intact 07/24/20 1419   Action Taken Open ports on tubing capped 07/24/20 1419   External Catheter Length (cm) 0 centimeters 07/16/20 1030   Dressing Type Disk with Chlorhexadine gluconate (CHG) 07/24/20 1419   Hub Color/Line Status Purple; Infusing 07/24/20 1419   Positive Blood Return (Site #1) Yes 07/24/20 1419   Hub Color/Line Status Red; Infusing 07/24/20 1419   Positive Blood Return (Site #2) Yes 07/24/20 1419   Alcohol Cap Used Yes 07/24/20 1419       Peripheral IV 07/13/20 Left Wrist (Active)   Site Assessment Clean, dry, & intact 07/24/20 1419   Phlebitis Assessment 0 07/24/20 1419   Infiltration Assessment 0 07/24/20 1419   Dressing Status Clean, dry, & intact 07/24/20 1419   Dressing Type Transparent 07/24/20 1419   Hub Color/Line Status Pink;Capped 07/24/20 1419   Action Taken Open ports on tubing capped 07/24/20 1419   Alcohol Cap Used Yes 07/24/20 1419        Opportunity for questions and clarification was provided.       Patient transported with:   Monitor  Patient-specific medications from Pharmacy  Registered Nurse  Tech

## 2020-07-24 NOTE — PROGRESS NOTES
[facility] Adult  Hospitalist Group                                                                                          Hospitalist Progress Note  Elian Burgos MD  Answering service: [phone] from in house phone        Date of Service:  2020  NAME:  Ariane Camargo  :  1934  MRN:  [id number]      Admission Summary: This is a very pleasant elderly gentleman who was admitted to the hospital on  with progressive nausea vomiting and right lower limb swelling and pain.  He was found to have extensive DVT in that limb as well as pulmonary embolisms.  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  He had ascites which was drained and sent for cytology results still pending.  While he is on anticoagulation for his thromboembolic disease he developed significant amount of GI bleed and endoscopy showed erosive esophagitis.  IVC filter placed on the 58HO without complication. [facility] is still on TPN but tolerating ice chips without nausea or vomiting.      Interval history / Subjective:   Pt working with PT/OT spoke to wife at bedside     Assessment & Plan:     Assessment and Plan:     -Hemorrhagic shock: Due to GI bleed POA improving BP still low but stable  - GI bleed with Acute blood loss anemia due to erosive esophagitis:  Hemoglobin been stable,  continue PPI twice daily, EGD showed erosive esophagitis with evidence of recent bleed but no active bleeding.   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   Now placed on Heparin gtt for treatment in DVT/PE, watch for bleeding.   - Ok to switch to oral? Need GI clearence     - Head of the pancreas mass: Negative cytology from ascitic fluid.  Possible plan for EUS biopsy when out of the ICU Per GI     - Hypotension:  On midodrone, off norepi. BP low may require albumn     -  Severe protein calorie malnourishment continue TPN for now, we will start clear liquids and see how he tolerates, replace electrolytes as indicated,      Code status: DNR  DVT prophylaxis: On heparin    Care Plan discussed with: Patient/Family and Nurse  Anticipated Disposition: Home w/Family  Anticipated Discharge: 24 hours to 48 hours     Hospital Problems  Date Reviewed: 7/14/2020          Codes Class Noted POA    * (Principal) DVT (deep venous thrombosis) ([facility] [address] ICD-10-CM: I82.409  ICD-9-CM: 453.40  7/14/2020 Yes                Review of Systems:   A comprehensive review of systems was negative. Vital Signs:    Last 24hrs VS reviewed since prior progress note. Most recent are:  Visit Vitals  /54 (BP 1 Location: Left arm, BP Patient Position: At rest)   Pulse 72   Temp 97.4 °F (36.3 °C)   Resp 29   Ht 5' 10\" (1.778 m)   Wt 90.7 kg (199 lb 15.3 oz)   SpO2 99%   BMI 28.69 kg/m²         Intake/Output Summary (Last 24 hours) at 7/24/2020 1441  Last data filed at 7/24/2020 1419  Gross per 24 hour   Intake 6231.88 ml   Output 450 ml   Net 5781.88 ml        Physical Examination:             Constitutional:  No acute distress, cooperative, pleasant    ENT:  Oral mucosa moist, oropharynx benign. Resp:  CTA bilaterally. No wheezing/rhonchi/rales. No accessory muscle use   CV:  Regular rhythm, normal rate, no murmurs, gallops, rubs    GI:  Soft, non distended, non tender. normoactive bowel sounds, no hepatosplenomegaly     Musculoskeletal:  No edema, warm, 2+ pulses throughout    Neurologic:  Moves all extremities. AAOx3, CN II-XII reviewed     Psych:  Good insight, Not anxious nor agitated.        Data Review:    I personally reviewed  Image and labs      Labs:     Recent Labs     07/24/20  0359 07/23/20  0442 07/22/20  0610   WBC 8.6  --  7.8   HGB 9.4* 9.9* 9.0*   HCT 29.5* 30.3* 28.3*     --  179     Recent Labs     07/24/20  0359 07/23/20  0441 07/22/20  0605    141 --    K 3.7 3.6  --    * 111*  --    CO2 24 23  --    BUN 21* 19  --    CREA 0.66* 0.67*  --    * 170*  --    CA 7.8* 8.1*  --    MG 1.9 2.0 1.9   PHOS 3.1 2.4* 2.3*     Recent Labs     07/23/20  0441   AML 90   LPSE 177     Recent Labs     07/24/20  0359 07/23/20  0441 07/22/20  0610   INR 1.3* 1.2* 1.3*   PTP 13.1* 12.6* 13.0*   APTT 34.8* 31.1 33.2*      No results for input(s): FE, TIBC, PSAT, FERR in the last 72 hours. No results found for: FOL, RBCF   No results for input(s): PH, PCO2, PO2 in the last 72 hours. No results for input(s): CPK, CKNDX, TROIQ in the last 72 hours.     No lab exists for component: CPKMB  Lab Results   Component Value Date/Time    Cholesterol, total 250 (H) 10/02/2013 01:55 PM    HDL Cholesterol 53 10/02/2013 01:55 PM    LDL, calculated 176 (H) 10/02/2013 01:55 PM    Triglyceride 244 (H) 07/23/2020 04:41 AM    CHOL/HDL Ratio 3.4 10/06/2010 10:25 AM     Lab Results   Component Value Date/Time    Glucose (POC) 124 (H) 07/24/2020 11:28 AM    Glucose (POC) 143 (H) 07/24/2020 06:00 AM    Glucose (POC) 148 (H) 07/23/2020 11:32 PM    Glucose (POC) 111 (H) 07/23/2020 06:19 PM    Glucose (POC) 140 (H) 07/23/2020 01:03 PM     Lab Results   Component Value Date/Time    Color YELLOW/STRAW 07/13/2020 04:27 PM    Appearance CLEAR 07/13/2020 04:27 PM    Specific gravity 1.012 07/13/2020 04:27 PM    pH (UA) 6.0 07/13/2020 04:27 PM    Protein Negative 07/13/2020 04:27 PM    Glucose Negative 07/13/2020 04:27 PM    Ketone TRACE (A) 07/13/2020 04:27 PM    Bilirubin Negative 07/13/2020 04:27 PM    Urobilinogen 1.0 07/13/2020 04:27 PM    Nitrites Negative 07/13/2020 04:27 PM    Leukocyte Esterase Negative 07/13/2020 04:27 PM    Epithelial cells FEW 07/13/2020 04:27 PM    Bacteria Negative 07/13/2020 04:27 PM    WBC 0-4 07/13/2020 04:27 PM    RBC 0-5 07/13/2020 04:27 PM         Medications Reviewed:     Current Facility-Administered Medications   Medication Dose Route Frequency    TPN ADULT - CENTRAL   IntraVENous CONTINUOUS    midodrine (PROAMATINE) tablet 20 mg  20 mg Oral Q8H    TPN ADULT - CENTRAL   IntraVENous CONTINUOUS    hydrocortisone Sod Succ (PF) (SOLU-CORTEF) injection 50 mg  50 mg IntraVENous Q12H    fat emulsion 20% (LIPOSYN, INTRAlipid) infusion 500 mL  500 mL IntraVENous Q MON, WED & FRI    glucose chewable tablet 16 g  4 Tab Oral PRN    glucagon (GLUCAGEN) injection 1 mg  1 mg IntraMUSCular PRN    dextrose 10% infusion 0-250 mL  0-250 mL IntraVENous PRN    insulin lispro (HUMALOG) injection   SubCUTAneous Q6H    melatonin tablet 3 mg  3 mg Oral QHS PRN    0.9% sodium chloride infusion 250 mL  250 mL IntraVENous PRN    lactated Ringers infusion  125 mL/hr IntraVENous CONTINUOUS    0.9% sodium chloride infusion 250 mL  250 mL IntraVENous PRN    sodium chloride (NS) flush 5-40 mL  5-40 mL IntraVENous Q8H    sodium chloride (NS) flush 5-40 mL  5-40 mL IntraVENous PRN    simethicone (MYLICON) 43AT/1.2KI oral drops 80 mg  1.2 mL Oral Multiple    piperacillin-tazobactam (ZOSYN) 3.375 g in 0.9% sodium chloride (MBP/ADV) 100 mL  3.375 g IntraVENous Q8H    pantoprazole (PROTONIX) 40 mg in 0.9% sodium chloride 10 mL injection  40 mg IntraVENous Q12H    prochlorperazine (COMPAZINE) with saline injection 5 mg  5 mg IntraVENous Q6H    [Held by provider] heparin 25,000 units in D5W 250 ml infusion  18-36 Units/kg/hr IntraVENous TITRATE    benzonatate (TESSALON) capsule 100 mg  100 mg Oral TID PRN    guaiFENesin (ROBITUSSIN) 100 mg/5 mL oral liquid 100 mg  100 mg Oral Q4H PRN    ondansetron (ZOFRAN) injection 4 mg  4 mg IntraVENous Q6H    prochlorperazine (COMPAZINE) tablet 5 mg  5 mg Oral Q6H PRN    finasteride (PROSCAR) tablet 5 mg  5 mg Oral DAILY    [Held by provider] tamsulosin (FLOMAX) capsule 0.4 mg  0.4 mg Oral DAILY    sodium chloride (NS) flush 5-40 mL  5-40 mL IntraVENous Q8H    sodium chloride (NS) flush 5-40 mL  5-40 mL IntraVENous PRN    acetaminophen (TYLENOL) tablet 650 mg  650 mg Oral Q6H PRN    balsam peru-castor oiL (VENELEX) ointment   Topical BID     ______________________________________________________________________  EXPECTED LENGTH OF STAY: 4d 4h  ACTUAL LENGTH OF STAY:          Sergei Ball MD

## 2020-07-24 NOTE — PROGRESS NOTES
TIMMY:  Patient admitted with RLE DVT, B/L PE's and IVC occlusion. Patient transferred to ICU for hypotension and patient had 2 episodes of coffee ground emesis. Patient received 2 units PRBC's. Patient transferred to Room 433 today    RUR 25%    Disposition: TBD; probably [place]; Discussed transition of care with patients wife. Provided wife with list of  rehab options; wife has chosen [place]; referral sent through [facility] management is continuing to follow. Arcelia Roberto RN, CRM    Freedom of choice letter signed and placed on bedside chart.    Jessica Hess RN,CRM

## 2020-07-25 LAB
ANION GAP SERPL CALC-SCNC: 7 MMOL/L (ref 5–15)
APTT PPP: 32.3 SEC (ref 22.1–32)
APTT PPP: 49.3 SEC (ref 22.1–32)
APTT PPP: 79.1 SEC (ref 22.1–32)
APTT PPP: 92.5 SEC (ref 22.1–32)
BASOPHILS # BLD: 0 K/UL (ref 0–0.1)
BASOPHILS NFR BLD: 0 % (ref 0–1)
BUN SERPL-MCNC: 19 MG/DL (ref 6–20)
BUN/CREAT SERPL: 27 (ref 12–20)
CALCIUM SERPL-MCNC: 7.7 MG/DL (ref 8.5–10.1)
CHLORIDE SERPL-SCNC: 110 MMOL/L (ref 97–108)
CO2 SERPL-SCNC: 22 MMOL/L (ref 21–32)
CREAT SERPL-MCNC: 0.71 MG/DL (ref 0.7–1.3)
DIFFERENTIAL METHOD BLD: ABNORMAL
EOSINOPHIL # BLD: 0 K/UL (ref 0–0.4)
EOSINOPHIL NFR BLD: 0 % (ref 0–7)
ERYTHROCYTE [DISTWIDTH] IN BLOOD BY AUTOMATED COUNT: 19.2 % (ref 11.5–14.5)
FIBRINOGEN PPP-MCNC: 139 MG/DL (ref 200–475)
GLUCOSE BLD STRIP.AUTO-MCNC: 113 MG/DL (ref 65–100)
GLUCOSE BLD STRIP.AUTO-MCNC: 136 MG/DL (ref 65–100)
GLUCOSE BLD STRIP.AUTO-MCNC: 149 MG/DL (ref 65–100)
GLUCOSE BLD STRIP.AUTO-MCNC: 193 MG/DL (ref 65–100)
GLUCOSE SERPL-MCNC: 133 MG/DL (ref 65–100)
HCT VFR BLD AUTO: 29 % (ref 36.6–50.3)
HGB BLD-MCNC: 9.4 G/DL (ref 12.1–17)
IMM GRANULOCYTES # BLD AUTO: 0 K/UL
IMM GRANULOCYTES NFR BLD AUTO: 0 %
INR PPP: 1.3 (ref 0.9–1.1)
LYMPHOCYTES # BLD: 1.2 K/UL (ref 0.8–3.5)
LYMPHOCYTES NFR BLD: 11 % (ref 12–49)
MAGNESIUM SERPL-MCNC: 1.9 MG/DL (ref 1.6–2.4)
MCH RBC QN AUTO: 32.1 PG (ref 26–34)
MCHC RBC AUTO-ENTMCNC: 32.4 G/DL (ref 30–36.5)
MCV RBC AUTO: 99 FL (ref 80–99)
MONOCYTES # BLD: 0.9 K/UL (ref 0–1)
MONOCYTES NFR BLD: 8 % (ref 5–13)
NEUTS BAND NFR BLD MANUAL: 2 % (ref 0–6)
NEUTS SEG # BLD: 9 K/UL (ref 1.8–8)
NEUTS SEG NFR BLD: 79 % (ref 32–75)
NRBC # BLD: 0.02 K/UL (ref 0–0.01)
NRBC BLD-RTO: 0.2 PER 100 WBC
PHOSPHATE SERPL-MCNC: 3 MG/DL (ref 2.6–4.7)
PLATELET # BLD AUTO: 174 K/UL (ref 150–400)
PMV BLD AUTO: 10.3 FL (ref 8.9–12.9)
POTASSIUM SERPL-SCNC: 3.3 MMOL/L (ref 3.5–5.1)
PROTHROMBIN TIME: 13 SEC (ref 9–11.1)
RBC # BLD AUTO: 2.93 M/UL (ref 4.1–5.7)
RBC MORPH BLD: ABNORMAL
SERVICE CMNT-IMP: ABNORMAL
SODIUM SERPL-SCNC: 139 MMOL/L (ref 136–145)
THERAPEUTIC RANGE,PTTT: ABNORMAL SECS (ref 58–77)
WBC # BLD AUTO: 11.1 K/UL (ref 4.1–11.1)

## 2020-07-25 PROCEDURE — 74011250636 HC RX REV CODE- 250/636: Performed by: HOSPITALIST

## 2020-07-25 PROCEDURE — 74011000250 HC RX REV CODE- 250: Performed by: INTERNAL MEDICINE

## 2020-07-25 PROCEDURE — 85025 COMPLETE CBC W/AUTO DIFF WBC: CPT

## 2020-07-25 PROCEDURE — 82962 GLUCOSE BLOOD TEST: CPT

## 2020-07-25 PROCEDURE — 74011636637 HC RX REV CODE- 636/637: Performed by: INTERNAL MEDICINE

## 2020-07-25 PROCEDURE — 65660000000 HC RM CCU STEPDOWN

## 2020-07-25 PROCEDURE — 74011000258 HC RX REV CODE- 258: Performed by: NURSE PRACTITIONER

## 2020-07-25 PROCEDURE — C9113 INJ PANTOPRAZOLE SODIUM, VIA: HCPCS | Performed by: INTERNAL MEDICINE

## 2020-07-25 PROCEDURE — 74011000258 HC RX REV CODE- 258: Performed by: HOSPITALIST

## 2020-07-25 PROCEDURE — 85730 THROMBOPLASTIN TIME PARTIAL: CPT

## 2020-07-25 PROCEDURE — 85384 FIBRINOGEN ACTIVITY: CPT

## 2020-07-25 PROCEDURE — 36415 COLL VENOUS BLD VENIPUNCTURE: CPT

## 2020-07-25 PROCEDURE — 74011250637 HC RX REV CODE- 250/637: Performed by: NURSE PRACTITIONER

## 2020-07-25 PROCEDURE — 85610 PROTHROMBIN TIME: CPT

## 2020-07-25 PROCEDURE — 74011250636 HC RX REV CODE- 250/636: Performed by: NURSE PRACTITIONER

## 2020-07-25 PROCEDURE — 83735 ASSAY OF MAGNESIUM: CPT

## 2020-07-25 PROCEDURE — 74011000250 HC RX REV CODE- 250: Performed by: HOSPITALIST

## 2020-07-25 PROCEDURE — 74011250637 HC RX REV CODE- 250/637: Performed by: FAMILY MEDICINE

## 2020-07-25 PROCEDURE — 84100 ASSAY OF PHOSPHORUS: CPT

## 2020-07-25 PROCEDURE — 74011250636 HC RX REV CODE- 250/636: Performed by: INTERNAL MEDICINE

## 2020-07-25 PROCEDURE — 80048 BASIC METABOLIC PNL TOTAL CA: CPT

## 2020-07-25 RX ORDER — POTASSIUM CHLORIDE 7.45 MG/ML
10 INJECTION INTRAVENOUS
Status: COMPLETED | OUTPATIENT
Start: 2020-07-25 | End: 2020-07-25

## 2020-07-25 RX ORDER — HEPARIN SODIUM 5000 [USP'U]/ML
40 INJECTION, SOLUTION INTRAVENOUS; SUBCUTANEOUS ONCE
Status: COMPLETED | OUTPATIENT
Start: 2020-07-25 | End: 2020-07-25

## 2020-07-25 RX ORDER — HEPARIN SODIUM 5000 [USP'U]/ML
80 INJECTION, SOLUTION INTRAVENOUS; SUBCUTANEOUS ONCE
Status: COMPLETED | OUTPATIENT
Start: 2020-07-25 | End: 2020-07-25

## 2020-07-25 RX ADMIN — POTASSIUM CHLORIDE 10 MEQ: 10 INJECTION, SOLUTION INTRAVENOUS at 11:06

## 2020-07-25 RX ADMIN — Medication 10 ML: at 11:21

## 2020-07-25 RX ADMIN — ONDANSETRON 4 MG: 2 INJECTION INTRAMUSCULAR; INTRAVENOUS at 14:08

## 2020-07-25 RX ADMIN — HYDROCORTISONE SODIUM SUCCINATE 50 MG: 100 INJECTION, POWDER, FOR SOLUTION INTRAMUSCULAR; INTRAVENOUS at 18:06

## 2020-07-25 RX ADMIN — ONDANSETRON 4 MG: 2 INJECTION INTRAMUSCULAR; INTRAVENOUS at 20:08

## 2020-07-25 RX ADMIN — HYDROCORTISONE SODIUM SUCCINATE 50 MG: 100 INJECTION, POWDER, FOR SOLUTION INTRAMUSCULAR; INTRAVENOUS at 07:30

## 2020-07-25 RX ADMIN — FINASTERIDE 5 MG: 5 TABLET, FILM COATED ORAL at 08:04

## 2020-07-25 RX ADMIN — POTASSIUM CHLORIDE 10 MEQ: 10 INJECTION, SOLUTION INTRAVENOUS at 11:19

## 2020-07-25 RX ADMIN — INSULIN LISPRO 2 UNITS: 100 INJECTION, SOLUTION INTRAVENOUS; SUBCUTANEOUS at 11:18

## 2020-07-25 RX ADMIN — CASTOR OIL AND BALSAM, PERU: 788; 87 OINTMENT TOPICAL at 08:17

## 2020-07-25 RX ADMIN — SODIUM CHLORIDE 40 MG: 9 INJECTION INTRAMUSCULAR; INTRAVENOUS; SUBCUTANEOUS at 08:10

## 2020-07-25 RX ADMIN — ONDANSETRON 4 MG: 2 INJECTION INTRAMUSCULAR; INTRAVENOUS at 04:21

## 2020-07-25 RX ADMIN — SODIUM CHLORIDE 5 MG: 9 INJECTION INTRAMUSCULAR; INTRAVENOUS; SUBCUTANEOUS at 00:40

## 2020-07-25 RX ADMIN — CALCIUM GLUCONATE: 94 INJECTION, SOLUTION INTRAVENOUS at 18:14

## 2020-07-25 RX ADMIN — Medication 10 ML: at 11:20

## 2020-07-25 RX ADMIN — HEPARIN SODIUM 3750 UNITS: 5000 INJECTION, SOLUTION INTRAVENOUS; SUBCUTANEOUS at 21:58

## 2020-07-25 RX ADMIN — TAMSULOSIN HYDROCHLORIDE 0.4 MG: 0.4 CAPSULE ORAL at 08:04

## 2020-07-25 RX ADMIN — CASTOR OIL AND BALSAM, PERU: 788; 87 OINTMENT TOPICAL at 20:08

## 2020-07-25 RX ADMIN — HEPARIN SODIUM 6000 UNITS: 5000 INJECTION, SOLUTION INTRAVENOUS; SUBCUTANEOUS at 15:53

## 2020-07-25 RX ADMIN — PIPERACILLIN AND TAZOBACTAM 3.38 G: 3; .375 INJECTION, POWDER, LYOPHILIZED, FOR SOLUTION INTRAVENOUS at 11:07

## 2020-07-25 RX ADMIN — MIDODRINE HYDROCHLORIDE 20 MG: 5 TABLET ORAL at 21:48

## 2020-07-25 RX ADMIN — Medication 40 ML: at 18:12

## 2020-07-25 RX ADMIN — POTASSIUM CHLORIDE 10 MEQ: 10 INJECTION, SOLUTION INTRAVENOUS at 10:50

## 2020-07-25 RX ADMIN — INSULIN LISPRO 2 UNITS: 100 INJECTION, SOLUTION INTRAVENOUS; SUBCUTANEOUS at 00:40

## 2020-07-25 RX ADMIN — SODIUM CHLORIDE 40 MG: 9 INJECTION INTRAMUSCULAR; INTRAVENOUS; SUBCUTANEOUS at 20:08

## 2020-07-25 RX ADMIN — ONDANSETRON 4 MG: 2 INJECTION INTRAMUSCULAR; INTRAVENOUS at 08:06

## 2020-07-25 RX ADMIN — MIDODRINE HYDROCHLORIDE 20 MG: 5 TABLET ORAL at 07:31

## 2020-07-25 RX ADMIN — PIPERACILLIN AND TAZOBACTAM 3.38 G: 3; .375 INJECTION, POWDER, LYOPHILIZED, FOR SOLUTION INTRAVENOUS at 04:21

## 2020-07-25 RX ADMIN — PIPERACILLIN AND TAZOBACTAM 3.38 G: 3; .375 INJECTION, POWDER, LYOPHILIZED, FOR SOLUTION INTRAVENOUS at 18:10

## 2020-07-25 RX ADMIN — MIDODRINE HYDROCHLORIDE 20 MG: 5 TABLET ORAL at 14:08

## 2020-07-25 NOTE — PROGRESS NOTES
Problem: Falls - Risk of  Goal: *Absence of Falls  Description: Document Zachary Luis Fall Risk and appropriate interventions in the flowsheet. Outcome: Progressing Towards Goal  Note: Fall Risk Interventions:  Mobility Interventions: Patient to call before getting OOB    Mentation Interventions: Adequate sleep, hydration, pain control    Medication Interventions: Patient to call before getting OOB    Elimination Interventions: Call light in reach, Urinal in reach    History of Falls Interventions: Door open when patient unattended         Problem: Patient Education: Go to Patient Education Activity  Goal: Patient/Family Education  Outcome: Progressing Towards Goal     Problem: General Medical Care Plan  Goal: *Absence of infection signs and symptoms  Outcome: Progressing Towards Goal     Problem: Deep Venous Thrombosis - Risk of  Goal: *Absence of deep venous thrombosis signs and symptoms(Stroke Metric)  Outcome: Progressing Towards Goal  Goal: *Knowledge of prescribed medications  Outcome: Progressing Towards Goal  Goal: *Absence of bleeding  Outcome: Progressing Towards Goal     Problem: Patient Education: Go to Patient Education Activity  Goal: Patient/Family Education  Outcome: Progressing Towards Goal     Problem: Patient Education: Go to Patient Education Activity  Goal: Patient/Family Education  Outcome: Progressing Towards Goal     Problem: Nutrition Deficit  Goal: *Optimize nutritional status  Outcome: Progressing Towards Goal     Problem: Pressure Injury - Risk of  Goal: *Prevention of pressure injury  Description: Document Joseph Scale and appropriate interventions in the flowsheet.   Outcome: Progressing Towards Goal  Note: Pressure Injury Interventions:  Sensory Interventions: Sit a 90-degree angle/use footstool if needed    Moisture Interventions: Absorbent underpads    Activity Interventions: Pressure redistribution bed/mattress(bed type)    Mobility Interventions: Float heels, Pressure redistribution bed/mattress (bed type)    Nutrition Interventions: Document food/fluid/supplement intake    Friction and Shear Interventions: Lift sheet                Problem: Patient Education: Go to Patient Education Activity  Goal: Patient/Family Education  Outcome: Progressing Towards Goal     Problem: Nutrition Deficit  Goal: *Optimize nutritional status  Outcome: Progressing Towards Goal

## 2020-07-25 NOTE — PROGRESS NOTES
Verbal shift change report given to Steffen (oncoming nurse) by Glo Valencia (offgoing nurse). Report included the following information SBAR, ED Summary, Procedure Summary, MAR and Cardiac Rhythm NS.

## 2020-07-25 NOTE — PROGRESS NOTES
Bedside shift change report given to Steffen (oncoming nurse) by Pat Mcclain (offgoing nurse).  Report included the following information SBAR, Intake/Output, MAR, Recent Results and Cardiac Rhythm SR.

## 2020-07-25 NOTE — PROGRESS NOTES
[facility] [facility] Adult  Hospitalist Group                                                                                          Hospitalist Progress Note  Rudy Bragg MD  Answering service: [phone] from in house phone        Date of Service:  2020  NAME:  Birgit Calabrese  :  1934  MRN:  [id number]      Admission Summary: This is a very pleasant elderly gentleman who was admitted to the hospital on  with progressive nausea vomiting and right lower limb swelling and pain.  He was found to have extensive DVT in that limb as well as pulmonary embolisms.  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  He had ascites which was drained and sent for cytology results still pending.  While he is on anticoagulation for his thromboembolic disease he developed significant amount of GI bleed and endoscopy showed erosive esophagitis.  IVC filter placed on the 40FF without complication. [facility] is still on TPN but tolerating ice chips without nausea or vomiting.      Interval history / Subjective:   Pt seen and examined with RN, doing good on heparin drip and TPN  D/w GI he will need to get bowel rest for a while ( months on TPN ) hgb remains stable > 9       Assessment & Plan:     Assessment and Plan:     -Hemorrhagic shock: Due to GI bleed POA improving BP still low but stable  - GI bleed with Acute blood loss anemia due to erosive esophagitis:  Hemoglobin been stable,  continue PPI twice daily, EGD showed erosive esophagitis with evidence of recent bleed but no active bleeding.   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   Now placed on Heparin gtt for treatment in DVT/PE, watch for bleeding.   - Ok to switch to oral? Need GI clearence     - Head of the pancreas mass: Negative cytology from ascitic fluid.  Possible plan for EUS biopsy when out of the ICU Per GI  - need TPN to give rest to pancreas      - Hypotension:  On midodrone, off norepi. BP low may require albumn     -  Severe protein calorie malnourishment continue TPN for now, we will start clear liquids and see how he tolerates, replace electrolytes as indicated,     - mild hypokalemia mag WNL     Code status: DNR  DVT prophylaxis: On heparin    Care Plan discussed with: Patient/Family and Nurse  Anticipated Disposition: Home w/Family  Anticipated Discharge: 24 hours to 48 hours     Hospital Problems  Date Reviewed: 7/14/2020          Codes Class Noted POA    * (Principal) DVT (deep venous thrombosis) ([facility] [address] ICD-10-CM: I82.409  ICD-9-CM: 453.40  7/14/2020 Yes                Review of Systems:   A comprehensive review of systems was negative. Vital Signs:    Last 24hrs VS reviewed since prior progress note. Most recent are:  Visit Vitals  BP 91/45   Pulse 75   Temp 97.6 °F (36.4 °C)   Resp 27   Ht 5' 10\" (1.778 m)   Wt 94.1 kg (207 lb 8 oz)   SpO2 95%   BMI 29.77 kg/m²         Intake/Output Summary (Last 24 hours) at 7/25/2020 7821  Last data filed at 7/25/2020 0334  Gross per 24 hour   Intake 1333.25 ml   Output 700 ml   Net 633.25 ml        Physical Examination:             Constitutional:  No acute distress, cooperative, pleasant    ENT:  Oral mucosa moist, oropharynx benign. Resp:  CTA bilaterally. No wheezing/rhonchi/rales. No accessory muscle use   CV:  Regular rhythm, normal rate, no murmurs, gallops, rubs    GI:  Soft, non distended, non tender. normoactive bowel sounds, no hepatosplenomegaly     Musculoskeletal:  No edema, warm, 2+ pulses throughout    Neurologic:  Moves all extremities. AAOx3, CN II-XII reviewed     Psych:  Good insight, Not anxious nor agitated.        Data Review:    I personally reviewed  Image and labs      Labs:     Recent Labs     07/25/20  0609 07/24/20  1615 07/24/20  0359   WBC 11.1  --  8.6 HGB 9.4* 9.2* 9.4*   HCT 29.0* 32.8* 29.5*     --  169     Recent Labs     07/25/20  0609 07/24/20  0359 07/23/20  0441    141 141   K 3.3* 3.7 3.6   * 111* 111*   CO2 22 24 23   BUN 19 21* 19   CREA 0.71 0.66* 0.67*   * 142* 170*   CA 7.7* 7.8* 8.1*   MG 1.9 1.9 2.0   PHOS 3.0 3.1 2.4*     Recent Labs     07/23/20  0441   AML 90   LPSE 177     Recent Labs     07/25/20  0609 07/24/20  2308 07/24/20  1615 07/24/20  0359 07/23/20  0441   INR 1.3*  --   --  1.3* 1.2*   PTP 13.0*  --   --  13.1* 12.6*   APTT 92.5* 79.1* 37.1* 34.8* 31.1      No results for input(s): FE, TIBC, PSAT, FERR in the last 72 hours. No results found for: FOL, RBCF   No results for input(s): PH, PCO2, PO2 in the last 72 hours. No results for input(s): CPK, CKNDX, TROIQ in the last 72 hours.     No lab exists for component: CPKMB  Lab Results   Component Value Date/Time    Cholesterol, total 250 (H) 10/02/2013 01:55 PM    HDL Cholesterol 53 10/02/2013 01:55 PM    LDL, calculated 176 (H) 10/02/2013 01:55 PM    Triglyceride 244 (H) 07/23/2020 04:41 AM    CHOL/HDL Ratio 3.4 10/06/2010 10:25 AM     Lab Results   Component Value Date/Time    Glucose (POC) 136 (H) 07/25/2020 06:35 AM    Glucose (POC) 195 (H) 07/24/2020 11:46 PM    Glucose (POC) 115 (H) 07/24/2020 06:11 PM    Glucose (POC) 124 (H) 07/24/2020 11:28 AM    Glucose (POC) 143 (H) 07/24/2020 06:00 AM     Lab Results   Component Value Date/Time    Color YELLOW/STRAW 07/13/2020 04:27 PM    Appearance CLEAR 07/13/2020 04:27 PM    Specific gravity 1.012 07/13/2020 04:27 PM    pH (UA) 6.0 07/13/2020 04:27 PM    Protein Negative 07/13/2020 04:27 PM    Glucose Negative 07/13/2020 04:27 PM    Ketone TRACE (A) 07/13/2020 04:27 PM    Bilirubin Negative 07/13/2020 04:27 PM    Urobilinogen 1.0 07/13/2020 04:27 PM    Nitrites Negative 07/13/2020 04:27 PM    Leukocyte Esterase Negative 07/13/2020 04:27 PM    Epithelial cells FEW 07/13/2020 04:27 PM    Bacteria Negative 07/13/2020 04:27 PM    WBC 0-4 07/13/2020 04:27 PM    RBC 0-5 07/13/2020 04:27 PM         Medications Reviewed:     Current Facility-Administered Medications   Medication Dose Route Frequency    TPN ADULT - CENTRAL   IntraVENous CONTINUOUS    TPN ADULT - CENTRAL   IntraVENous CONTINUOUS    midodrine (PROAMATINE) tablet 20 mg  20 mg Oral Q8H    hydrocortisone Sod Succ (PF) (SOLU-CORTEF) injection 50 mg  50 mg IntraVENous Q12H    fat emulsion 20% (LIPOSYN, INTRAlipid) infusion 500 mL  500 mL IntraVENous Q MON, WED & FRI    glucose chewable tablet 16 g  4 Tab Oral PRN    glucagon (GLUCAGEN) injection 1 mg  1 mg IntraMUSCular PRN    dextrose 10% infusion 0-250 mL  0-250 mL IntraVENous PRN    insulin lispro (HUMALOG) injection   SubCUTAneous Q6H    melatonin tablet 3 mg  3 mg Oral QHS PRN    0.9% sodium chloride infusion 250 mL  250 mL IntraVENous PRN    lactated Ringers infusion  25 mL/hr IntraVENous CONTINUOUS    0.9% sodium chloride infusion 250 mL  250 mL IntraVENous PRN    sodium chloride (NS) flush 5-40 mL  5-40 mL IntraVENous Q8H    sodium chloride (NS) flush 5-40 mL  5-40 mL IntraVENous PRN    simethicone (MYLICON) 33QL/7.3IN oral drops 80 mg  1.2 mL Oral Multiple    piperacillin-tazobactam (ZOSYN) 3.375 g in 0.9% sodium chloride (MBP/ADV) 100 mL  3.375 g IntraVENous Q8H    pantoprazole (PROTONIX) 40 mg in 0.9% sodium chloride 10 mL injection  40 mg IntraVENous Q12H    prochlorperazine (COMPAZINE) with saline injection 5 mg  5 mg IntraVENous Q6H    heparin 25,000 units in D5W 250 ml infusion  18-36 Units/kg/hr IntraVENous TITRATE    benzonatate (TESSALON) capsule 100 mg  100 mg Oral TID PRN    guaiFENesin (ROBITUSSIN) 100 mg/5 mL oral liquid 100 mg  100 mg Oral Q4H PRN    ondansetron (ZOFRAN) injection 4 mg  4 mg IntraVENous Q6H    prochlorperazine (COMPAZINE) tablet 5 mg  5 mg Oral Q6H PRN    finasteride (PROSCAR) tablet 5 mg  5 mg Oral DAILY    tamsulosin (FLOMAX) capsule 0.4 mg  0.4 mg Oral DAILY    sodium chloride (NS) flush 5-40 mL  5-40 mL IntraVENous Q8H    sodium chloride (NS) flush 5-40 mL  5-40 mL IntraVENous PRN    acetaminophen (TYLENOL) tablet 650 mg  650 mg Oral Q6H PRN    balsam peru-castor oiL (VENELEX) ointment   Topical BID     ______________________________________________________________________  EXPECTED LENGTH OF STAY: 4d 4h  ACTUAL LENGTH OF STAY:          Angélica Angulo MD

## 2020-07-26 LAB
ANION GAP SERPL CALC-SCNC: 6 MMOL/L (ref 5–15)
APTT PPP: 67.5 SEC (ref 22.1–32)
APTT PPP: 93.9 SEC (ref 22.1–32)
APTT PPP: >130 SEC (ref 22.1–32)
BASOPHILS # BLD: 0 K/UL (ref 0–0.1)
BASOPHILS NFR BLD: 0 % (ref 0–1)
BUN SERPL-MCNC: 21 MG/DL (ref 6–20)
BUN/CREAT SERPL: 30 (ref 12–20)
CALCIUM SERPL-MCNC: 7.7 MG/DL (ref 8.5–10.1)
CHLORIDE SERPL-SCNC: 114 MMOL/L (ref 97–108)
CO2 SERPL-SCNC: 21 MMOL/L (ref 21–32)
CREAT SERPL-MCNC: 0.69 MG/DL (ref 0.7–1.3)
DIFFERENTIAL METHOD BLD: ABNORMAL
EOSINOPHIL # BLD: 0 K/UL (ref 0–0.4)
EOSINOPHIL NFR BLD: 0 % (ref 0–7)
ERYTHROCYTE [DISTWIDTH] IN BLOOD BY AUTOMATED COUNT: 19.8 % (ref 11.5–14.5)
FIBRINOGEN PPP-MCNC: 154 MG/DL (ref 200–475)
GLUCOSE BLD STRIP.AUTO-MCNC: 128 MG/DL (ref 65–100)
GLUCOSE BLD STRIP.AUTO-MCNC: 132 MG/DL (ref 65–100)
GLUCOSE BLD STRIP.AUTO-MCNC: 140 MG/DL (ref 65–100)
GLUCOSE BLD STRIP.AUTO-MCNC: 174 MG/DL (ref 65–100)
GLUCOSE SERPL-MCNC: 171 MG/DL (ref 65–100)
HCT VFR BLD AUTO: 28.9 % (ref 36.6–50.3)
HGB BLD-MCNC: 9.2 G/DL (ref 12.1–17)
IMM GRANULOCYTES # BLD AUTO: 0 K/UL
IMM GRANULOCYTES NFR BLD AUTO: 0 %
INR PPP: 1.3 (ref 0.9–1.1)
LYMPHOCYTES # BLD: 0.8 K/UL (ref 0.8–3.5)
LYMPHOCYTES NFR BLD: 9 % (ref 12–49)
MAGNESIUM SERPL-MCNC: 1.9 MG/DL (ref 1.6–2.4)
MCH RBC QN AUTO: 31.6 PG (ref 26–34)
MCHC RBC AUTO-ENTMCNC: 31.8 G/DL (ref 30–36.5)
MCV RBC AUTO: 99.3 FL (ref 80–99)
METAMYELOCYTES NFR BLD MANUAL: 2 %
MONOCYTES # BLD: 0.1 K/UL (ref 0–1)
MONOCYTES NFR BLD: 1 % (ref 5–13)
NEUTS SEG # BLD: 7.5 K/UL (ref 1.8–8)
NEUTS SEG NFR BLD: 88 % (ref 32–75)
NRBC # BLD: 0 K/UL (ref 0–0.01)
NRBC BLD-RTO: 0 PER 100 WBC
PHOSPHATE SERPL-MCNC: 3.5 MG/DL (ref 2.6–4.7)
PLATELET # BLD AUTO: 168 K/UL (ref 150–400)
PMV BLD AUTO: 10.3 FL (ref 8.9–12.9)
POTASSIUM SERPL-SCNC: 3.8 MMOL/L (ref 3.5–5.1)
PROTHROMBIN TIME: 12.9 SEC (ref 9–11.1)
RBC # BLD AUTO: 2.91 M/UL (ref 4.1–5.7)
RBC MORPH BLD: ABNORMAL
RBC MORPH BLD: ABNORMAL
SERVICE CMNT-IMP: ABNORMAL
SODIUM SERPL-SCNC: 141 MMOL/L (ref 136–145)
THERAPEUTIC RANGE,PTTT: ABNORMAL SECS (ref 58–77)
WBC # BLD AUTO: 8.5 K/UL (ref 4.1–11.1)

## 2020-07-26 PROCEDURE — 74011636637 HC RX REV CODE- 636/637: Performed by: INTERNAL MEDICINE

## 2020-07-26 PROCEDURE — 74011000250 HC RX REV CODE- 250: Performed by: INTERNAL MEDICINE

## 2020-07-26 PROCEDURE — 85610 PROTHROMBIN TIME: CPT

## 2020-07-26 PROCEDURE — 74011000258 HC RX REV CODE- 258: Performed by: HOSPITALIST

## 2020-07-26 PROCEDURE — 85730 THROMBOPLASTIN TIME PARTIAL: CPT

## 2020-07-26 PROCEDURE — P9047 ALBUMIN (HUMAN), 25%, 50ML: HCPCS | Performed by: HOSPITALIST

## 2020-07-26 PROCEDURE — 83735 ASSAY OF MAGNESIUM: CPT

## 2020-07-26 PROCEDURE — 74011250637 HC RX REV CODE- 250/637: Performed by: NURSE PRACTITIONER

## 2020-07-26 PROCEDURE — 80048 BASIC METABOLIC PNL TOTAL CA: CPT

## 2020-07-26 PROCEDURE — 85384 FIBRINOGEN ACTIVITY: CPT

## 2020-07-26 PROCEDURE — 82962 GLUCOSE BLOOD TEST: CPT

## 2020-07-26 PROCEDURE — 74011250636 HC RX REV CODE- 250/636: Performed by: INTERNAL MEDICINE

## 2020-07-26 PROCEDURE — 74011000250 HC RX REV CODE- 250: Performed by: HOSPITALIST

## 2020-07-26 PROCEDURE — 85025 COMPLETE CBC W/AUTO DIFF WBC: CPT

## 2020-07-26 PROCEDURE — 74011250636 HC RX REV CODE- 250/636: Performed by: HOSPITALIST

## 2020-07-26 PROCEDURE — 84100 ASSAY OF PHOSPHORUS: CPT

## 2020-07-26 PROCEDURE — 74011250636 HC RX REV CODE- 250/636: Performed by: NURSE PRACTITIONER

## 2020-07-26 PROCEDURE — 74011250637 HC RX REV CODE- 250/637: Performed by: FAMILY MEDICINE

## 2020-07-26 PROCEDURE — 65660000000 HC RM CCU STEPDOWN

## 2020-07-26 PROCEDURE — 36415 COLL VENOUS BLD VENIPUNCTURE: CPT

## 2020-07-26 PROCEDURE — 74011000258 HC RX REV CODE- 258: Performed by: NURSE PRACTITIONER

## 2020-07-26 PROCEDURE — C9113 INJ PANTOPRAZOLE SODIUM, VIA: HCPCS | Performed by: INTERNAL MEDICINE

## 2020-07-26 RX ORDER — ALBUMIN HUMAN 250 G/1000ML
25 SOLUTION INTRAVENOUS EVERY 6 HOURS
Status: DISPENSED | OUTPATIENT
Start: 2020-07-26 | End: 2020-07-27

## 2020-07-26 RX ADMIN — CASTOR OIL AND BALSAM, PERU: 788; 87 OINTMENT TOPICAL at 08:15

## 2020-07-26 RX ADMIN — ONDANSETRON 4 MG: 2 INJECTION INTRAMUSCULAR; INTRAVENOUS at 20:07

## 2020-07-26 RX ADMIN — HYDROCORTISONE SODIUM SUCCINATE 50 MG: 100 INJECTION, POWDER, FOR SOLUTION INTRAMUSCULAR; INTRAVENOUS at 07:01

## 2020-07-26 RX ADMIN — PIPERACILLIN AND TAZOBACTAM 3.38 G: 3; .375 INJECTION, POWDER, LYOPHILIZED, FOR SOLUTION INTRAVENOUS at 03:50

## 2020-07-26 RX ADMIN — FINASTERIDE 5 MG: 5 TABLET, FILM COATED ORAL at 08:19

## 2020-07-26 RX ADMIN — MIDODRINE HYDROCHLORIDE 20 MG: 5 TABLET ORAL at 21:28

## 2020-07-26 RX ADMIN — ONDANSETRON 4 MG: 2 INJECTION INTRAMUSCULAR; INTRAVENOUS at 08:22

## 2020-07-26 RX ADMIN — Medication 10 ML: at 11:26

## 2020-07-26 RX ADMIN — INSULIN LISPRO 2 UNITS: 100 INJECTION, SOLUTION INTRAVENOUS; SUBCUTANEOUS at 07:01

## 2020-07-26 RX ADMIN — ONDANSETRON 4 MG: 2 INJECTION INTRAMUSCULAR; INTRAVENOUS at 15:59

## 2020-07-26 RX ADMIN — HEPARIN SODIUM 14 UNITS/KG/HR: 10000 INJECTION, SOLUTION INTRAVENOUS at 01:12

## 2020-07-26 RX ADMIN — MIDODRINE HYDROCHLORIDE 20 MG: 5 TABLET ORAL at 15:58

## 2020-07-26 RX ADMIN — Medication 20 ML: at 18:07

## 2020-07-26 RX ADMIN — TAMSULOSIN HYDROCHLORIDE 0.4 MG: 0.4 CAPSULE ORAL at 08:19

## 2020-07-26 RX ADMIN — SODIUM CHLORIDE 40 MG: 9 INJECTION INTRAMUSCULAR; INTRAVENOUS; SUBCUTANEOUS at 20:07

## 2020-07-26 RX ADMIN — MIDODRINE HYDROCHLORIDE 20 MG: 5 TABLET ORAL at 07:01

## 2020-07-26 RX ADMIN — ONDANSETRON 4 MG: 2 INJECTION INTRAMUSCULAR; INTRAVENOUS at 03:50

## 2020-07-26 RX ADMIN — PIPERACILLIN AND TAZOBACTAM 3.38 G: 3; .375 INJECTION, POWDER, LYOPHILIZED, FOR SOLUTION INTRAVENOUS at 11:27

## 2020-07-26 RX ADMIN — CALCIUM GLUCONATE: 94 INJECTION, SOLUTION INTRAVENOUS at 18:10

## 2020-07-26 RX ADMIN — CASTOR OIL AND BALSAM, PERU: 788; 87 OINTMENT TOPICAL at 20:08

## 2020-07-26 RX ADMIN — INSULIN LISPRO 2 UNITS: 100 INJECTION, SOLUTION INTRAVENOUS; SUBCUTANEOUS at 01:15

## 2020-07-26 RX ADMIN — PIPERACILLIN AND TAZOBACTAM 3.38 G: 3; .375 INJECTION, POWDER, LYOPHILIZED, FOR SOLUTION INTRAVENOUS at 20:07

## 2020-07-26 RX ADMIN — ALBUMIN (HUMAN) 25 G: 0.25 INJECTION, SOLUTION INTRAVENOUS at 11:16

## 2020-07-26 RX ADMIN — SODIUM CHLORIDE 40 MG: 9 INJECTION INTRAMUSCULAR; INTRAVENOUS; SUBCUTANEOUS at 08:22

## 2020-07-26 NOTE — PROGRESS NOTES
Problem: Falls - Risk of  Goal: *Absence of Falls  Description: Document Cari Fairchild Fall Risk and appropriate interventions in the flowsheet. Outcome: Progressing Towards Goal  Note: Fall Risk Interventions:  Mobility Interventions: Patient to call before getting OOB    Mentation Interventions: Adequate sleep, hydration, pain control    Medication Interventions: Patient to call before getting OOB    Elimination Interventions: Call light in reach    History of Falls Interventions: Door open when patient unattended         Problem: Patient Education: Go to Patient Education Activity  Goal: Patient/Family Education  Outcome: Progressing Towards Goal     Problem: General Medical Care Plan  Goal: *Absence of infection signs and symptoms  Outcome: Progressing Towards Goal     Problem: Deep Venous Thrombosis - Risk of  Goal: *Absence of deep venous thrombosis signs and symptoms(Stroke Metric)  Outcome: Progressing Towards Goal  Goal: *Absence of impaired coagulation signs and symptoms  Outcome: Progressing Towards Goal  Goal: *Knowledge of prescribed medications  Outcome: Progressing Towards Goal  Goal: *Absence of bleeding  Outcome: Progressing Towards Goal     Problem: Patient Education: Go to Patient Education Activity  Goal: Patient/Family Education  Outcome: Progressing Towards Goal     Problem: Patient Education: Go to Patient Education Activity  Goal: Patient/Family Education  Outcome: Progressing Towards Goal     Problem: Nutrition Deficit  Goal: *Optimize nutritional status  Outcome: Progressing Towards Goal     Problem: Pressure Injury - Risk of  Goal: *Prevention of pressure injury  Description: Document Joseph Scale and appropriate interventions in the flowsheet.   Outcome: Progressing Towards Goal  Note: Pressure Injury Interventions:  Sensory Interventions: Assess changes in LOC, Keep linens dry and wrinkle-free    Moisture Interventions: Absorbent underpads    Activity Interventions: Increase time out of bed    Mobility Interventions: Pressure redistribution bed/mattress (bed type)    Nutrition Interventions: Document food/fluid/supplement intake    Friction and Shear Interventions: Lift sheet                Problem: Patient Education: Go to Patient Education Activity  Goal: Patient/Family Education  Outcome: Progressing Towards Goal     Problem: Pulmonary Embolism Care Plan (Adult)  Goal: *Improvement of existing pulmonary embolism  Outcome: Progressing Towards Goal  Goal: *Absence of bleeding  Outcome: Progressing Towards Goal  Goal: *Labs within defined limits  Outcome: Progressing Towards Goal     Problem: Patient Education: Go to Patient Education Activity  Goal: Patient/Family Education  Outcome: Progressing Towards Goal     Problem: Nutrition Deficit  Goal: *Optimize nutritional status  Outcome: Progressing Towards Goal

## 2020-07-26 NOTE — PROGRESS NOTES
16:16 patient in recliner multiple stick to start a PIV has been attempted, with [facility], same scenario as yesterday unable to get a PIV. Paged Dr. Nereyda Castroells to see about getting access. Patient has Double Lumen Picc with TPN and Heparin infusing. 16:22 Spoke with Anestesiologist he is willing to come look for PIV or start Central Line if needed.  Thanked him, Keli Jerez RN

## 2020-07-26 NOTE — PROGRESS NOTES
Bedside and Verbal shift change report given to Steffen (oncoming nurse) by Chris Roberts (offgoing nurse). Report included the following information SBAR, Kardex, Procedure Summary, Intake/Output, MAR and Cardiac Rhythm normal sinus.

## 2020-07-26 NOTE — PROGRESS NOTES
[facility]                                                                                          Hospitalist Progress Note  Tess Cabezas MD  Answering service: [phone] from in house phone        Date of Service:  2020  NAME:  Lizandro Galaviz  :  1934  MRN:  [id number]      Admission Summary: This is a very pleasant elderly gentleman who was admitted to the hospital on  with progressive nausea vomiting and right lower limb swelling and pain.  He was found to have extensive DVT in that limb as well as pulmonary embolisms.  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  He had ascites which was drained and sent for cytology results still pending.  While he is on anticoagulation for his thromboembolic disease he developed significant amount of GI bleed and endoscopy showed erosive esophagitis.   IVC filter placed on the 66DK without complication. [facility] is still on TPN but tolerating ice chips without nausea or vomiting.      Interval history / Subjective:   Pt seen and examined with RN, doing good on heparin drip and TPN  No overnight issues, looking forward to see family members coming to visit [facility] today,     D/w GI he will need to get bowel rest for a while ( months on TPN ) hgb remains stable > 9       Assessment & Plan:     Assessment and Plan:     -Hemorrhagic shock: Due to GI bleed POA improving BP still low but stable - resolved   - GI bleed with Acute blood loss anemia due to erosive esophagitis:  Hemoglobin been stable,  continue PPI twice daily, EGD showed erosive esophagitis with evidence of recent bleed but no active bleeding.   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 Now placed on Heparin gtt for treatment in DVT/PE, watch for bleeding.   - Ok to switch to oral? Need GI clearence     - Head of the pancreas mass: Negative cytology from ascitic fluid.  Possible plan for EUS biopsy when out of the ICU Per GI  - need TPN to give rest to pancreas      - Hypotension:  On midodrone, off norepi. BP low may require albumin     -  Severe protein calorie malnourishment continue TPN for now, we will start clear liquids and see how he tolerates, replace electrolytes as indicated,     - mild hypokalemia mag WNL     Code status: DNR  DVT prophylaxis: On heparin    Care Plan discussed with: Patient/Family and Nurse  Anticipated Disposition: Home w/Family  Anticipated Discharge: 24 hours to 48 hours     Hospital Problems  Date Reviewed: 7/14/2020          Codes Class Noted POA    * ([facility] ICD-10-CM: I82.409  ICD-9-CM: 453.40  7/14/2020 Yes                Review of Systems:   A comprehensive review of systems was negative. Vital Signs:    Last 24hrs VS reviewed since prior progress note. Most recent are:  Visit Vitals  BP (!) 86/42 (BP 1 Location: Left arm, BP Patient Position: At rest)   Pulse 73   Temp 97.1 °F (36.2 °C)   Resp (!) 31   Ht 5' 10\" (1.778 m)   Wt 95 kg (209 lb 7 oz)   SpO2 98%   BMI 30.05 kg/m²         Intake/Output Summary (Last 24 hours) at 7/26/2020 0908  Last data filed at 7/25/2020 2008  Gross per 24 hour   Intake 519.7 ml   Output --   Net 519.7 ml        Physical Examination:             Constitutional:  No acute distress,   ENT:  Oral mucosa moist    Resp:  CTA bilaterally. No wheezing/rhonchi/rales. CV:  Regular rhythm, normal rate, no murmurs, gallops, rubs    GI:  Soft, non distended, non tender bs +    Musculoskeletal:  No edema, warm, 2+ pulses throughout oedema 1 +    Neurologic:  Moves all extremities. AAOx3, CN II-XII reviewed     Psych:  Good insight, Not anxious nor agitated.        Data Review:    I personally reviewed  Image and labs    Xr Chest Pa Lat    Result Date: 7/13/2020  IMPRESSION: Left lower lobe pneumonia. Patchy right lower lobe pneumonia    Ct Head Wo Cont    Result Date: 7/19/2020  IMPRESSION: No acute findings. Cta Chest W Or W Wo Cont    Result Date: 7/13/2020  IMPRESSION: 1. Bilateral pulmonary emboli. 2. 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 3. Abundant ascites. Omental caking versus edema. 4. Small-moderate left pleural effusion. Basilar left lower lobe consolidation versus atelectasis. The findings were called to Dr. Milagro Ge on 7/13/2020 at 8:05 PM by myself. [address]    Result Date: 7/13/2020  IMPRESSION: 1. No evidence of hepatic vein, portal vein, or inferior vena cava thrombosis as questioned on PE CT earlier today. There is however right lower lobe pulmonary emboli in right common femoral deep venous thrombosis reported elsewhere earlier today. 2. Masslike fullness in the pancreatic head, resulting in intrahepatic and pancreatic duct dilatation as described above. Peripancreatic inflammatory changes, with 3.7 x 2.3 cm collection near the pancreatic tail possibly pseudocyst. Large amount of ascites, and moderate amount of slightly complex fluid/soft tissue in the left abdomen. Findings may represent pancreatitis or pancreatic neoplasm. Correlation with any prior imaging would be helpful given the extensive abnormalities. 3. Small left pleural effusion and basilar atelectasis. 4. Edematous thickening of the gastric antrum and duodenum likely secondary to the pancreatic process. Ct Abd W Wo Cont    Result Date: 7/22/2020  IMPRESSION: 1. Unchanged significant inflammation involving the pancreas with areas of low attenuation in the pancreatic head and pancreatic body measuring up to 4 cm.  Given the amount of peripancreatic inflammation, this likely represents edema or necrosis related to pancreatitis, less likely pancreatic neoplasm. No significant pancreatic ductal dilatation. Focal fluid surrounding the pancreatic body and tail likely developing pseudocysts. A couple small foci of air anterior to the pancreatic body of uncertain significance. Extensive infiltration surrounding the portal vein and hepatic arteries. 2. Distal common bile duct obstruction likely related to inflammation in the pancreatic head. Moderate bowel wall thickening involving the gastric antrum and proximal duodenum likely reactive. 3. 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 4. Moderate ascites is again noted with nonspecific nodular infiltration of the anterior peritoneal cavity and omentum 5. Bibasilar airspace disease with small pleural effusions. Ir Plc Ivc Filter    Result Date: 7/20/2020  Impression: 1. Uneventful placement of retrievable IVC filter as described above. 2. Placement of the IVC filter just above the bifurcation of the inferior vena cava due to presence of a circumaortic left renal vein. Us Paracentesis Abd W Imaging    Result Date: 7/16/2020  IMPRESSION: Placement of a right lower quadrant 6 [place] paracentesis catheter. The catheter was left in place and attached to gravity drainage     Labs:     Recent Labs     07/26/20 0353 07/25/20 0609   WBC 8.5 11.1   HGB 9.2* 9.4*   HCT 28.9* 29.0*    174     Recent Labs     07/26/20 0353 07/25/20 0609 07/24/20  0359    139 141   K 3.8 3.3* 3.7   * 110* 111*   CO2 21 22 24   BUN 21* 19 21*   CREA 0.69* 0.71 0.66*   * 133* 142*   CA 7.7* 7.7* 7.8*   MG 1.9 1.9 1.9   PHOS 3.5 3.0 3.1     No results for input(s): ALT, AP, TBIL, TBILI, TP, ALB, GLOB, GGT, AML, LPSE in the last 72 hours.     No lab exists for component: SGOT, GPT, AMYP, HLPSE  Recent Labs     07/26/20  0715 07/26/20 0353 07/25/20 2007 07/25/20  0609 07/24/20  0359   INR  --  1.3*  --   --  1.3*  --  1.3*   PTP  --  12.9*  --   --  13.0*  --  13.1*   APTT 67.5* >130.0* 49.3*   < > 92.5* < > 34.8*    < > = values in this interval not displayed. No results for input(s): FE, TIBC, PSAT, FERR in the last 72 hours. No results found for: FOL, RBCF   No results for input(s): PH, PCO2, PO2 in the last 72 hours. No results for input(s): CPK, CKNDX, TROIQ in the last 72 hours.     No lab exists for component: CPKMB  Lab Results   Component Value Date/Time    Cholesterol, total 250 (H) 10/02/2013 01:55 PM    HDL Cholesterol 53 10/02/2013 01:55 PM    LDL, calculated 176 (H) 10/02/2013 01:55 PM    Triglyceride 244 (H) 07/23/2020 04:41 AM    CHOL/HDL Ratio 3.4 10/06/2010 10:25 AM     Lab Results   Component Value Date/Time    Glucose (POC) 174 (H) 07/26/2020 06:15 AM    Glucose (POC) 193 (H) 07/25/2020 11:51 PM    Glucose (POC) 113 (H) 07/25/2020 06:05 PM    Glucose (POC) 149 (H) 07/25/2020 11:14 AM    Glucose (POC) 136 (H) 07/25/2020 06:35 AM     Lab Results   Component Value Date/Time    Color YELLOW/STRAW 07/13/2020 04:27 PM    Appearance CLEAR 07/13/2020 04:27 PM    Specific gravity 1.012 07/13/2020 04:27 PM    pH (UA) 6.0 07/13/2020 04:27 PM    Protein Negative 07/13/2020 04:27 PM    Glucose Negative 07/13/2020 04:27 PM    Ketone TRACE (A) 07/13/2020 04:27 PM    Bilirubin Negative 07/13/2020 04:27 PM    Urobilinogen 1.0 07/13/2020 04:27 PM    Nitrites Negative 07/13/2020 04:27 PM    Leukocyte Esterase Negative 07/13/2020 04:27 PM    Epithelial cells FEW 07/13/2020 04:27 PM    Bacteria Negative 07/13/2020 04:27 PM    WBC 0-4 07/13/2020 04:27 PM    RBC 0-5 07/13/2020 04:27 PM         Medications Reviewed:     Current Facility-Administered Medications   Medication Dose Route Frequency    TPN ADULT - CENTRAL   IntraVENous CONTINUOUS    TPN ADULT - CENTRAL   IntraVENous CONTINUOUS    midodrine (PROAMATINE) tablet 20 mg  20 mg Oral Q8H    hydrocortisone Sod Succ (PF) (SOLU-CORTEF) injection 50 mg  50 mg IntraVENous Q12H    fat emulsion 20% (LIPOSYN, INTRAlipid) infusion 500 mL  500 mL IntraVENous Q MON, WED & FRI    glucose chewable tablet 16 g  4 Tab Oral PRN    glucagon (GLUCAGEN) injection 1 mg  1 mg IntraMUSCular PRN    dextrose 10% infusion 0-250 mL  0-250 mL IntraVENous PRN    insulin lispro (HUMALOG) injection   SubCUTAneous Q6H    melatonin tablet 3 mg  3 mg Oral QHS PRN    0.9% sodium chloride infusion 250 mL  250 mL IntraVENous PRN    lactated Ringers infusion  25 mL/hr IntraVENous CONTINUOUS    0.9% sodium chloride infusion 250 mL  250 mL IntraVENous PRN    sodium chloride (NS) flush 5-40 mL  5-40 mL IntraVENous Q8H    sodium chloride (NS) flush 5-40 mL  5-40 mL IntraVENous PRN    simethicone (MYLICON) 64QT/2.3KA oral drops 80 mg  1.2 mL Oral Multiple    piperacillin-tazobactam (ZOSYN) 3.375 g in 0.9% sodium chloride (MBP/ADV) 100 mL  3.375 g IntraVENous Q8H    pantoprazole (PROTONIX) 40 mg in 0.9% sodium chloride 10 mL injection  40 mg IntraVENous Q12H    prochlorperazine (COMPAZINE) with saline injection 5 mg  5 mg IntraVENous Q6H    heparin 25,000 units in D5W 250 ml infusion  18-36 Units/kg/hr IntraVENous TITRATE    benzonatate (TESSALON) capsule 100 mg  100 mg Oral TID PRN    guaiFENesin (ROBITUSSIN) 100 mg/5 mL oral liquid 100 mg  100 mg Oral Q4H PRN    ondansetron (ZOFRAN) injection 4 mg  4 mg IntraVENous Q6H    prochlorperazine (COMPAZINE) tablet 5 mg  5 mg Oral Q6H PRN    finasteride (PROSCAR) tablet 5 mg  5 mg Oral DAILY    tamsulosin (FLOMAX) capsule 0.4 mg  0.4 mg Oral DAILY    sodium chloride (NS) flush 5-40 mL  5-40 mL IntraVENous Q8H    sodium chloride (NS) flush 5-40 mL  5-40 mL IntraVENous PRN    acetaminophen (TYLENOL) tablet 650 mg  650 mg Oral Q6H PRN    balsam peru-castor oiL (VENELEX) ointment   Topical BID     ______________________________________________________________________  EXPECTED LENGTH OF STAY: 4d 4h  ACTUAL LENGTH OF STAY:          11                 Nicole Buckley MD

## 2020-07-27 LAB
ANION GAP SERPL CALC-SCNC: 5 MMOL/L (ref 5–15)
APTT PPP: 45.9 SEC (ref 22.1–32)
APTT PPP: 62.9 SEC (ref 22.1–32)
APTT PPP: 70.2 SEC (ref 22.1–32)
APTT PPP: 83.3 SEC (ref 22.1–32)
BASOPHILS # BLD: 0 K/UL (ref 0–0.1)
BASOPHILS NFR BLD: 0 % (ref 0–1)
BUN SERPL-MCNC: 19 MG/DL (ref 6–20)
BUN/CREAT SERPL: 28 (ref 12–20)
CALCIUM SERPL-MCNC: 7.6 MG/DL (ref 8.5–10.1)
CHLORIDE SERPL-SCNC: 112 MMOL/L (ref 97–108)
CO2 SERPL-SCNC: 24 MMOL/L (ref 21–32)
CREAT SERPL-MCNC: 0.69 MG/DL (ref 0.7–1.3)
DIFFERENTIAL METHOD BLD: ABNORMAL
EOSINOPHIL # BLD: 0.3 K/UL (ref 0–0.4)
EOSINOPHIL NFR BLD: 4 % (ref 0–7)
ERYTHROCYTE [DISTWIDTH] IN BLOOD BY AUTOMATED COUNT: 19.9 % (ref 11.5–14.5)
FIBRINOGEN PPP-MCNC: 159 MG/DL (ref 200–475)
GLUCOSE BLD STRIP.AUTO-MCNC: 112 MG/DL (ref 65–100)
GLUCOSE BLD STRIP.AUTO-MCNC: 120 MG/DL (ref 65–100)
GLUCOSE BLD STRIP.AUTO-MCNC: 126 MG/DL (ref 65–100)
GLUCOSE BLD STRIP.AUTO-MCNC: 131 MG/DL (ref 65–100)
GLUCOSE SERPL-MCNC: 130 MG/DL (ref 65–100)
HCT VFR BLD AUTO: 27.9 % (ref 36.6–50.3)
HGB BLD-MCNC: 9.1 G/DL (ref 12.1–17)
IMM GRANULOCYTES # BLD AUTO: 0 K/UL
IMM GRANULOCYTES NFR BLD AUTO: 0 %
INR PPP: 1.2 (ref 0.9–1.1)
LYMPHOCYTES # BLD: 1.8 K/UL (ref 0.8–3.5)
LYMPHOCYTES NFR BLD: 23 % (ref 12–49)
MAGNESIUM SERPL-MCNC: 1.9 MG/DL (ref 1.6–2.4)
MCH RBC QN AUTO: 32.4 PG (ref 26–34)
MCHC RBC AUTO-ENTMCNC: 32.6 G/DL (ref 30–36.5)
MCV RBC AUTO: 99.3 FL (ref 80–99)
METAMYELOCYTES NFR BLD MANUAL: 1 %
MONOCYTES # BLD: 0.6 K/UL (ref 0–1)
MONOCYTES NFR BLD: 8 % (ref 5–13)
NEUTS BAND NFR BLD MANUAL: 1 % (ref 0–6)
NEUTS SEG # BLD: 5 K/UL (ref 1.8–8)
NEUTS SEG NFR BLD: 63 % (ref 32–75)
NRBC # BLD: 0.02 K/UL (ref 0–0.01)
NRBC BLD-RTO: 0.3 PER 100 WBC
PHOSPHATE SERPL-MCNC: 3.3 MG/DL (ref 2.6–4.7)
PLATELET # BLD AUTO: 162 K/UL (ref 150–400)
PMV BLD AUTO: 9.8 FL (ref 8.9–12.9)
POTASSIUM SERPL-SCNC: 3.3 MMOL/L (ref 3.5–5.1)
PROTHROMBIN TIME: 12.5 SEC (ref 9–11.1)
RBC # BLD AUTO: 2.81 M/UL (ref 4.1–5.7)
RBC MORPH BLD: ABNORMAL
SERVICE CMNT-IMP: ABNORMAL
SODIUM SERPL-SCNC: 141 MMOL/L (ref 136–145)
THERAPEUTIC RANGE,PTTT: ABNORMAL SECS (ref 58–77)
WBC # BLD AUTO: 7.8 K/UL (ref 4.1–11.1)

## 2020-07-27 PROCEDURE — 85610 PROTHROMBIN TIME: CPT

## 2020-07-27 PROCEDURE — 74011000258 HC RX REV CODE- 258: Performed by: HOSPITALIST

## 2020-07-27 PROCEDURE — 97110 THERAPEUTIC EXERCISES: CPT

## 2020-07-27 PROCEDURE — 85730 THROMBOPLASTIN TIME PARTIAL: CPT

## 2020-07-27 PROCEDURE — 74011000250 HC RX REV CODE- 250: Performed by: INTERNAL MEDICINE

## 2020-07-27 PROCEDURE — 80048 BASIC METABOLIC PNL TOTAL CA: CPT

## 2020-07-27 PROCEDURE — 74011250636 HC RX REV CODE- 250/636: Performed by: NURSE PRACTITIONER

## 2020-07-27 PROCEDURE — 74011250636 HC RX REV CODE- 250/636: Performed by: HOSPITALIST

## 2020-07-27 PROCEDURE — 84100 ASSAY OF PHOSPHORUS: CPT

## 2020-07-27 PROCEDURE — 83735 ASSAY OF MAGNESIUM: CPT

## 2020-07-27 PROCEDURE — 74011250637 HC RX REV CODE- 250/637: Performed by: NURSE PRACTITIONER

## 2020-07-27 PROCEDURE — 85025 COMPLETE CBC W/AUTO DIFF WBC: CPT

## 2020-07-27 PROCEDURE — 85384 FIBRINOGEN ACTIVITY: CPT

## 2020-07-27 PROCEDURE — 82962 GLUCOSE BLOOD TEST: CPT

## 2020-07-27 PROCEDURE — 36415 COLL VENOUS BLD VENIPUNCTURE: CPT

## 2020-07-27 PROCEDURE — 65660000000 HC RM CCU STEPDOWN

## 2020-07-27 PROCEDURE — 74011000258 HC RX REV CODE- 258: Performed by: NURSE PRACTITIONER

## 2020-07-27 PROCEDURE — P9047 ALBUMIN (HUMAN), 25%, 50ML: HCPCS | Performed by: HOSPITALIST

## 2020-07-27 PROCEDURE — 74011000250 HC RX REV CODE- 250: Performed by: HOSPITALIST

## 2020-07-27 PROCEDURE — C9113 INJ PANTOPRAZOLE SODIUM, VIA: HCPCS | Performed by: INTERNAL MEDICINE

## 2020-07-27 PROCEDURE — 74011250636 HC RX REV CODE- 250/636: Performed by: INTERNAL MEDICINE

## 2020-07-27 PROCEDURE — 97530 THERAPEUTIC ACTIVITIES: CPT

## 2020-07-27 RX ORDER — HEPARIN SODIUM 5000 [USP'U]/ML
40 INJECTION, SOLUTION INTRAVENOUS; SUBCUTANEOUS ONCE
Status: COMPLETED | OUTPATIENT
Start: 2020-07-27 | End: 2020-07-27

## 2020-07-27 RX ADMIN — Medication 10 ML: at 21:13

## 2020-07-27 RX ADMIN — MIDODRINE HYDROCHLORIDE 20 MG: 5 TABLET ORAL at 14:33

## 2020-07-27 RX ADMIN — SODIUM CHLORIDE, SODIUM LACTATE, POTASSIUM CHLORIDE, AND CALCIUM CHLORIDE 25 ML/HR: 600; 310; 30; 20 INJECTION, SOLUTION INTRAVENOUS at 23:31

## 2020-07-27 RX ADMIN — ALBUMIN (HUMAN) 25 G: 0.25 INJECTION, SOLUTION INTRAVENOUS at 01:50

## 2020-07-27 RX ADMIN — CASTOR OIL AND BALSAM, PERU: 788; 87 OINTMENT TOPICAL at 18:38

## 2020-07-27 RX ADMIN — HEPARIN SODIUM 3800 UNITS: 5000 INJECTION, SOLUTION INTRAVENOUS; SUBCUTANEOUS at 22:41

## 2020-07-27 RX ADMIN — ALBUMIN (HUMAN) 25 G: 0.25 INJECTION, SOLUTION INTRAVENOUS at 06:22

## 2020-07-27 RX ADMIN — CASTOR OIL AND BALSAM, PERU: 788; 87 OINTMENT TOPICAL at 09:55

## 2020-07-27 RX ADMIN — SODIUM CHLORIDE 40 MG: 9 INJECTION INTRAMUSCULAR; INTRAVENOUS; SUBCUTANEOUS at 09:32

## 2020-07-27 RX ADMIN — ONDANSETRON 4 MG: 2 INJECTION INTRAMUSCULAR; INTRAVENOUS at 04:13

## 2020-07-27 RX ADMIN — MIDODRINE HYDROCHLORIDE 20 MG: 5 TABLET ORAL at 21:12

## 2020-07-27 RX ADMIN — HEPARIN SODIUM 8 UNITS/KG/HR: 10000 INJECTION, SOLUTION INTRAVENOUS at 22:42

## 2020-07-27 RX ADMIN — I.V. FAT EMULSION 500 ML: 20 EMULSION INTRAVENOUS at 18:25

## 2020-07-27 RX ADMIN — PIPERACILLIN AND TAZOBACTAM 3.38 G: 3; .375 INJECTION, POWDER, LYOPHILIZED, FOR SOLUTION INTRAVENOUS at 04:13

## 2020-07-27 RX ADMIN — ONDANSETRON 4 MG: 2 INJECTION INTRAMUSCULAR; INTRAVENOUS at 21:12

## 2020-07-27 RX ADMIN — ONDANSETRON 4 MG: 2 INJECTION INTRAMUSCULAR; INTRAVENOUS at 09:32

## 2020-07-27 RX ADMIN — MIDODRINE HYDROCHLORIDE 20 MG: 5 TABLET ORAL at 06:23

## 2020-07-27 RX ADMIN — SODIUM CHLORIDE 40 MG: 9 INJECTION INTRAMUSCULAR; INTRAVENOUS; SUBCUTANEOUS at 21:12

## 2020-07-27 RX ADMIN — HEPARIN SODIUM 6 UNITS/KG/HR: 10000 INJECTION, SOLUTION INTRAVENOUS at 18:23

## 2020-07-27 RX ADMIN — CALCIUM GLUCONATE: 94 INJECTION, SOLUTION INTRAVENOUS at 18:25

## 2020-07-27 NOTE — PROGRESS NOTES
Problem: Self Care Deficits Care Plan (Adult)  Goal: *Acute Goals and Plan of Care (Insert Text)  Description:   FUNCTIONAL STATUS PRIOR TO ADMISSION: Pt reports living with wife in single story home, reporting Mod Parker at quad cane. Has tub-shower combo with no seated surface. Retired principal.    HOME SUPPORT: The patient lived with wife but did not require assist.    Occupational Therapy Goals  Weekly Re-Assessment 7/27/2020 - Continue all. Initiated 7/17/2020  1. Patient will perform grooming with modified independence within 7 day(s). 2.  Patient will perform lower body dressing with supervision/set-up within 7 day(s). 3.  Patient will perform bathing with supervision/set-up within 7 day(s). 4.  Patient will perform toilet transfers with modified independence within 7 day(s). 5.  Patient will perform all aspects of toileting with modified independence within 7 day(s). 6.  Patient will utilize energy conservation techniques during functional activities with Min verbal cues within 7 day(s). Outcome: Progressing Towards Goal     OCCUPATIONAL THERAPY TREATMENT/WEEKLY RE-ASSESSMENT  Patient: Valeria Slaughter (24 y.o. male)  Date: 7/27/2020  Diagnosis: DVT (deep vein thrombosis) in pregnancy [O22.30]  DVT (deep venous thrombosis) ([facility]) [I82.409] DVT (deep venous thrombosis) ([facility])  Procedure(s) (LRB):  ESOPHAGOGASTRODUODENOSCOPY (EGD) at bedside (N/A) 7 Days Post-Op  Precautions: Fall  Chart, occupational therapy assessment, plan of care, and goals were reviewed. ASSESSMENT  Patient continues with skilled OT services and is progressing towards goals. RN only cleared patient for bed-level activities at this time 2/2 hypotension with most recent systolic BP in 24B. Patient reported already completing grooming tasks this morning, but receptive to BUE exercises in bed. Patient tolerated exercises well, using RUE to complete two sets of exercises with rest break in between.  Although LUE AROM limited at baseline, patient engaging within available ROM as able. Observed increased edema in BUEs this session - patient receptive to education provided regarding benefits of elevation and active movements to manage. At conclusion of session, assisted patient with positioning BUEs and encouraged UE ROM exercises throughout the day with patient verbalizing understanding. Although patient is limited by hypotension and decreased activity tolerance this session, over course of past week, patient making good progress with regards to mobility and active participation in self-care. Current Level of Function Impacting Discharge (ADLs): set-up to min assist for upper body ADLs    Other factors to consider for discharge: hypotension         PLAN :  Goals have been updated based on progression since last assessment. Patient continues to benefit from skilled intervention to address the above impairments. Continue to follow patient 5 times a week to address goals. Recommend with staff: elevate BUEs via pillows, encourage max participation in ADLs, chair position in bed for all meals and encourage HOB raised throughout the day if patient unable to tolerate OOB to chair    Recommendation for discharge: (in order for the patient to meet his/her long term goals)  Therapy up to 5 days/week in SNF setting    This discharge recommendation:  Has been made in collaboration with the attending provider and/or case management    IF patient discharges home will need the following DME: TBD at rehab       SUBJECTIVE:   Patient stated So keep my wrist above my elbow?  following education on using pillows to elevate BUEs and reduce swelling in dependent positioning. OBJECTIVE DATA SUMMARY:   Cognitive/Behavioral Status:  Neurologic State: Alert  Orientation Level: Oriented X4  Cognition: Follows commands; Appropriate for age attention/concentration  Perception: Appears intact  Perseveration: No perseveration noted      Functional Mobility and Transfers for ADLs:  Per RN, only cleared for bed-level this session 2/2 hypotension. Balance:  Sitting: Intact, Supported (HOB elevated)    ADL Intervention:    Patient declining bed-level ADLs this session. Therapeutic Exercises:   Patient engaged in BUE ROM and strengthening exercises to continue working towards ease of transfers and ability to participate in greater OOB activity. Patient with significantly decreased AROM in LUE, particularly movements from the shoulder, but engaging as able. Patient using RUE for exercises with good tolerance, completing 2 sets of 10 each with rest break between sets. -10 reps shoulder elevation, holding contraction for 3 seconds  -10 reps shoulder flexion, 2 sets  -10 reps elbow flexion with water bottle, 2 sets  -10 reps wrist flexion/extension, 2 sets  -10 reps full finger flexion/extension, 2 sets     Pain:  Patient with no reports of pain. Activity Tolerance:   Fair and requires rest breaks  Please refer to the flowsheet for vital signs taken during this treatment. After treatment patient left in no apparent distress:   Supine in bed, Call bell within reach, Side rails x 3 and RN student in room    COMMUNICATION/COLLABORATION:   The patients plan of care was discussed with: Physical therapist and Registered nurse.      Aletha Bass OT  Time Calculation: 12 mins

## 2020-07-27 NOTE — PROGRESS NOTES
Hematology-Oncology Progress Note    Camden Osborne  1934  [id number]  7/27/2020    Follow-up for: pancreatic mass/DVT/PE     [x]        Chart notes since last visit reviewed   [x]        Medications reviewed for allergies and interactions       Case discussed with the following:         []                            []        Nursing Staff                                                                         []        Pathologist                                                                        []        FAMILY      Subjective:     Spoke with patient who complains of: alert, comfortable this am, stronger. this am, no c/o pain    Objective:     Patient Vitals for the past 24 hrs:   BP Temp Pulse Resp SpO2 Weight   07/27/20 0815 (!) 88/42 97.9 °F (36.6 °C) 64 23 99 % --   07/27/20 0326 (!) 87/42 97.6 °F (36.4 °C) 82 26 99 % 94.4 kg (208 lb 1.8 oz)   07/26/20 2333 95/56 98 °F (36.7 °C) 75 (!) 31 98 % --   07/26/20 1902 96/50 98 °F (36.7 °C) 75 25 99 % --   07/26/20 1517 93/47 98.6 °F (37 °C) 75 (!) 36 99 % --   07/26/20 1112 98/54 98.3 °F (36.8 °C) 77 (!) 31 100 % --   07/26/20 1100 103/45 97.9 °F (36.6 °C) 67 29 100 % --       REVIEW OF SYSTEMS:    Constitutional: negative fever, negative chills, negative weight loss  Eyes:   negative visual changes  ENT:   negative sore throat, tongue or lip swelling  Respiratory:  negative cough, negative dyspnea  Cards:  negative for chest pain, palpitations, lower extremity edema  GI:   negative for nausea, vomiting, diarrhea, and abdominal pain  Neuro:  negative for headaches, dizziness, vertigo  [x]                        Full ROS o/w normal/non contributor    Constitutional:  Patient looks  []        Sick  [x]        Frail  [x]        Better                                                 []        Depressed    HEENT:  [x]   NC                         []   AT               []    ALOPECIA           Eyes: [x]   Normal               [] Icteric  Oropharynx: [x]    Normal                  []  Thrush               []   Dry  Neck:   [x]   Supple                  []  Rigid               JVD:    [x]   ABSENT       []   PRESENT  Aerating well         Extr:    []  Lymphedema             []   Cyanosis      []  Clubbing  Edema:     []   NONE       [x]   PRESENT Rle    Skin:  Intact []           Purpura []        Rash: [x]   ABSENT       []  PRESENT  Neuro:  []        Normal  [x]        Confused      Available labs reviewed:  Labs:    Recent Results (from the past 24 hour(s))   GLUCOSE, POC    Collection Time: 07/26/20 11:06 AM   Result Value Ref Range    Glucose (POC) 132 (H) 65 - 100 mg/dL    Performed by [facility]    PTT    Collection Time: 07/26/20  2:35 PM   Result Value Ref Range    aPTT 93.9 (HH) 22.1 - 32.0 sec    aPTT, therapeutic range     58.0 - 77.0 SECS   GLUCOSE, POC    Collection Time: 07/26/20  4:48 PM   Result Value Ref Range    Glucose (POC) 128 (H) 65 - 100 mg/dL    Performed by [facility]    GLUCOSE, POC    Collection Time: 07/26/20 11:35 PM   Result Value Ref Range    Glucose (POC) 140 (H) 65 - 100 mg/dL    Performed by William Wynn  PCT    PROTHROMBIN TIME + INR    Collection Time: 07/27/20  1:06 AM   Result Value Ref Range    INR 1.2 (H) 0.9 - 1.1      Prothrombin time 12.5 (H) 9.0 - 11.1 sec   PTT    Collection Time: 07/27/20  1:06 AM   Result Value Ref Range    aPTT 70.2 (H) 22.1 - 32.0 sec    aPTT, therapeutic range     58.0 - 77.0 SECS   FIBRINOGEN    Collection Time: 07/27/20  1:06 AM   Result Value Ref Range    Fibrinogen 159 (L) 200 - 475 mg/dL   MAGNESIUM    Collection Time: 07/27/20  1:06 AM   Result Value Ref Range    Magnesium 1.9 1.6 - 2.4 mg/dL   PHOSPHORUS    Collection Time: 07/27/20  1:06 AM   Result Value Ref Range    Phosphorus 3.3 2.6 - 4.7 MG/DL   CBC WITH AUTOMATED DIFF    Collection Time: 07/27/20  1:06 AM   Result Value Ref Range    WBC 7.8 4.1 - 11.1 K/uL    RBC 2.81 (L) 4.10 - 5.70 M/uL    HGB 9.1 (L) 12.1 - 17.0 g/dL    HCT 27.9 (L) 36.6 - 50.3 %    MCV 99.3 (H) 80.0 - 99.0 FL    MCH 32.4 26.0 - 34.0 PG    MCHC 32.6 30.0 - 36.5 g/dL    RDW 19.9 (H) 11.5 - 14.5 %    PLATELET 943 857 - 707 K/uL    MPV 9.8 8.9 - 12.9 FL    NRBC 0.3 (H) 0  WBC    ABSOLUTE NRBC 0.02 (H) 0.00 - 0.01 K/uL    NEUTROPHILS 63 32 - 75 %    BAND NEUTROPHILS 1 0 - 6 %    LYMPHOCYTES 23 12 - 49 %    MONOCYTES 8 5 - 13 %    EOSINOPHILS 4 0 - 7 %    BASOPHILS 0 0 - 1 %    METAMYELOCYTES 1 (H) 0 %    IMMATURE GRANULOCYTES 0 %    ABS. NEUTROPHILS 5.0 1.8 - 8.0 K/UL    ABS. LYMPHOCYTES 1.8 0.8 - 3.5 K/UL    ABS. MONOCYTES 0.6 0.0 - 1.0 K/UL    ABS. EOSINOPHILS 0.3 0.0 - 0.4 K/UL    ABS. BASOPHILS 0.0 0.0 - 0.1 K/UL    ABS. IMM. GRANS. 0.0 K/UL    DF MANUAL      RBC COMMENTS ANISOCYTOSIS  1+        RBC COMMENTS MACROCYTOSIS  1+        RBC COMMENTS BLUE CELLS  PRESENT       METABOLIC PANEL, BASIC    Collection Time: 07/27/20  1:06 AM   Result Value Ref Range    Sodium 141 136 - 145 mmol/L    Potassium 3.3 (L) 3.5 - 5.1 mmol/L    Chloride 112 (H) 97 - 108 mmol/L    CO2 24 21 - 32 mmol/L    Anion gap 5 5 - 15 mmol/L    Glucose 130 (H) 65 - 100 mg/dL    BUN 19 6 - 20 MG/DL    Creatinine 0.69 (L) 0.70 - 1.30 MG/DL    BUN/Creatinine ratio 28 (H) 12 - 20      GFR est AA >60 >60 ml/min/1.73m2    GFR est non-AA >60 >60 ml/min/1.73m2    Calcium 7.6 (L) 8.5 - 10.1 MG/DL   GLUCOSE, POC    Collection Time: 07/27/20  6:10 AM   Result Value Ref Range    Glucose (POC) 131 (H) 65 - 100 mg/dL    Performed by Sayda Cronin    PTT    Collection Time: 07/27/20  7:09 AM   Result Value Ref Range    aPTT 83.3 (H) 22.1 - 32.0 sec    aPTT, therapeutic range     58.0 - 77.0 SECS       Available Xrays reviewed:    Chemotherapy monitored and toxicities assessed:    Assessment and Plan   1. Rle DVT/PE. .. Pt had gi bleed after starting eliquis,, currently off heparin and eliquis. ..  ivc filter placed emergently on 5/03 without complication     2.    Pancreatic mass,,pt had ascites examined for cytology which was negative,,, GI feels the mass is inflammatory and related to pancreatitis    3. Anemia. hgb 9.1 today. . Secondary to gi bleed. . had 2cm ulcer seen on egd last week  Transfuse prn hgb<8 in this setting    Edna Morrow MD

## 2020-07-27 NOTE — PROGRESS NOTES
Bedside shift change report given to ELIZABETH Downs (oncoming nurse) by ELIZABETH Travis (offgoing nurse). Report included the following information SBAR, Kardex, Intake/Output, MAR, Accordion and Cardiac Rhythm NSR.

## 2020-07-27 NOTE — PROGRESS NOTES
INTERVENTIONAL RADIOLOGY PROGRESS NOTE      Consulted for GJ tube placement. Given some degree of gastric outlet obstruction, GJ tube placement is not likely to be successful. I would recommend surgical J tube for long term nutrition.            Ina Garnett MD

## 2020-07-27 NOTE — ACP (ADVANCE CARE PLANNING)
[address] [facility]                                      Advance Care Planning Note    Name: Kevin Martínez  YOB: 1934  MRN: [id number]  Admission Date: 7/13/2020  3:18 PM    Date of discussion: 7/27/2020    Active Diagnoses:    Hospital Problems  Date Reviewed: 7/14/2020          Codes Class Noted POA    * (Principal) [facility] ICD-10-CM: I82.409  ICD-9-CM: 453.40  7/14/2020 Yes              These active diagnoses are of sufficient risk that focused discussion on advance care planning is indicated in order to allow the patient to thoughtfully consider personal goals of care, and if situations arise that prevent the ability to personally give input, to ensure appropriate representation of their personal desires for different levels and aggressiveness of care. Discussion:     Persons present and participating in discussion: Linette Lee MD,     Discussion: Discussed with the patient his current medical condition of pulmonary embolism GI bleed anticoagulation and pancreatic mass discussed future plan of care and possible worsening of situation probably requiring life support and CPR . It was noted he signed for [facility]. D/w Pt     Time Spent:     Total time spent face-to-face in education and discussion: 16 minutes.      Jj Diop MD  Date of Service:  7/27/2020  11:18 AM

## 2020-07-27 NOTE — PROGRESS NOTES
TIMMY:    RUR 25%     Disposition: Patient accepted to [facility]    CM will start auth once updated PT/OT notes available. CM notified PT. Patient will need transportation arranged at discharge    Care management is continuing to follow.      Freedom of choice letter signed and placed on bedside chart. Catracho Larsen MSA, RN, CRM. IR unable to place GJ tube today due to gastric outlet obstruction. Recommended a surgical J-tube placement for long term nutrition. Patient remains on TPN.

## 2020-07-27 NOTE — PROGRESS NOTES
[address] [facility]                                                                                          Hospitalist Progress Note  Carey Palmer MD  Answering service: [phone] from in house phone        Date of Service:  2020  NAME:  Ruslan Nicholas  :  1934  MRN:  [id number]      Admission Summary: This is a very pleasant elderly gentleman who was admitted to the hospital on  with progressive nausea vomiting and right lower limb swelling and pain.  He was found to have extensive DVT in that limb as well as pulmonary embolisms.  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  He had ascites which was drained and sent for cytology results still pending.  While he is on anticoagulation for his thromboembolic disease he developed significant amount of GI bleed and endoscopy showed erosive esophagitis.   IVC filter placed on the 18MP without complication. Carolee Juárez is still on TPN but tolerating ice chips without nausea or vomiting.      Interval history / Subjective:   Pt seen and examined with RN, doing good on heparin drip and TPN discussed with the patient that he will need a GJ tube in order to continue his nutrition after discharge from the hospital and will need to go to a rehab discussed with GI interventional radiology consulted for the procedure    D/w GI he will need to get bowel rest for a while ( months on TPN and thereafter GJ tube) hgb remains stable > 9  Renewed TPN     Assessment & Plan:     Assessment and Plan:     -Hemorrhagic shock: Due to GI bleed POA improving BP still low but stable - resolved   - GI bleed with Acute blood loss anemia due to erosive esophagitis:  Hemoglobin been stable,  continue PPI twice daily, EGD showed erosive esophagitis with evidence of recent bleed but no active bleeding.   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 Now placed on Heparin gtt for treatment in DVT/PE, watch for bleeding.   - Ok to switch to oral? Need GI clearence     - Head of the pancreas mass: Negative cytology from ascitic fluid.  Possible plan for EUS biopsy when out of the ICU Per GI  - need TPN to give rest to pancreas      - Hypotension:  On midodrone, off norepi. BP low may require albumin     -  Severe protein calorie malnourishment continue TPN for now, we will start clear liquids and see how he tolerates, replace electrolytes as indicated,     - mild hypokalemia mag WNL     Code status: DNR  DVT prophylaxis: On heparin    Care Plan discussed with: Patient/Family and Nurse  Anticipated Disposition: Home w/Family  Anticipated Discharge: 24 hours to 48 hours     Hospital Problems  Date Reviewed: 7/14/2020          Codes Class Noted POA    * (Principal) [facility] ICD-10-CM: I82.409  ICD-9-CM: 453.40  7/14/2020 Yes                Review of Systems:   A comprehensive review of systems was negative. Vital Signs:    Last 24hrs VS reviewed since prior progress note. Most recent are:  Visit Vitals  BP (!) 88/42 (BP 1 Location: Left arm, BP Patient Position: At rest)   Pulse 64   Temp 97.9 °F (36.6 °C)   Resp 23   Ht 5' 10\" (1.778 m)   Wt 94.4 kg (208 lb 1.8 oz)   SpO2 99%   BMI 29.86 kg/m²         Intake/Output Summary (Last 24 hours) at 7/27/2020 1050  Last data filed at 7/27/2020 0150  Gross per 24 hour   Intake 1653.99 ml   Output 550 ml   Net 1103.99 ml        Physical Examination:             Constitutional:  No acute distress,   ENT:  Oral mucosa moist    Resp:  CTA bilaterally. No wheezing/rhonchi/rales.     CV:  Regular rhythm, normal rate, no murmurs, gallops, rubs    GI:  Soft, non distended, non tender bs +    Musculoskeletal: No edema, warm, 2+ pulses throughout oedema 1 +    Neurologic:  Moves all extremities. AAOx3, CN II-XII reviewed     Psych:  Good insight, Not anxious nor agitated. Data Review:    I personally reviewed  Image and labs    Xr Chest Pa Lat    Result Date: 7/13/2020  IMPRESSION: Left lower lobe pneumonia. Patchy right lower lobe pneumonia    Ct Head Wo Cont    Result Date: 7/19/2020  IMPRESSION: No acute findings. Cta Chest W Or W Wo Cont    Result Date: 7/13/2020  IMPRESSION: 1. Bilateral pulmonary emboli. 2. 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 3. Abundant ascites. Omental caking versus edema. 4. Small-moderate left pleural effusion. Basilar left lower lobe consolidation versus atelectasis. The findings were called to Dr. Edi Card on 7/13/2020 at 8:05 PM by myself. [address] [facility] [address] [place]    Result Date: 7/13/2020  IMPRESSION: 1. No evidence of hepatic vein, portal vein, or inferior vena cava thrombosis as questioned on PE CT earlier today. There is however right lower lobe pulmonary emboli in right common femoral deep venous thrombosis reported elsewhere earlier today. 2. Masslike fullness in the pancreatic head, resulting in intrahepatic and pancreatic duct dilatation as described above. Peripancreatic inflammatory changes, with 3.7 x 2.3 cm collection near the pancreatic tail possibly pseudocyst. Large amount of ascites, and moderate amount of slightly complex fluid/soft tissue in the left abdomen. Findings may represent pancreatitis or pancreatic neoplasm. Correlation with any prior imaging would be helpful given the extensive abnormalities. 3. Small left pleural effusion and basilar atelectasis. 4. Edematous thickening of the gastric antrum and duodenum likely secondary to the pancreatic process. Ct Abd W Wo Cont    Result Date: 7/22/2020  IMPRESSION: 1.  Unchanged significant inflammation involving the pancreas with areas of low attenuation in the pancreatic head and pancreatic body measuring up to 4 cm. Given the amount of peripancreatic inflammation, this likely represents edema or necrosis related to pancreatitis, less likely pancreatic neoplasm. No significant pancreatic ductal dilatation. Focal fluid surrounding the pancreatic body and tail likely developing pseudocysts. A couple small foci of air anterior to the pancreatic body of uncertain significance. Extensive infiltration surrounding the portal vein and hepatic arteries. 2. Distal common bile duct obstruction likely related to inflammation in the pancreatic head. Moderate bowel wall thickening involving the gastric antrum and proximal duodenum likely reactive. 3. 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 4. Moderate ascites is again noted with nonspecific nodular infiltration of the anterior peritoneal cavity and omentum 5. Bibasilar airspace disease with small pleural effusions. Ir Plc Ivc Filter    Result Date: 7/20/2020  Impression: 1. Uneventful placement of retrievable IVC filter as described above. 2. Placement of the IVC filter just above the bifurcation of the inferior vena cava due to presence of a circumaortic left renal vein. Us Paracentesis Abd W Imaging    Result Date: 7/16/2020  IMPRESSION: Placement of a right lower quadrant 6 Ghanaian paracentesis catheter. The catheter was left in place and attached to gravity drainage     Labs:     Recent Labs     07/27/20  0106 07/26/20  0353   WBC 7.8 8.5   HGB 9.1* 9.2*   HCT 27.9* 28.9*    168     Recent Labs     07/27/20  0106 07/26/20  0353 07/25/20  0609    141 139   K 3.3* 3.8 3.3*   * 114* 110*   CO2 24 21 22   BUN 19 21* 19   CREA 0.69* 0.69* 0.71   * 171* 133*   CA 7.6* 7.7* 7.7*   MG 1.9 1.9 1.9   PHOS 3.3 3.5 3.0     No results for input(s): ALT, AP, TBIL, TBILI, TP, ALB, GLOB, GGT, AML, LPSE in the last 72 hours.     No lab exists for component: SGOT, GPT, AMYP, HLPSE  Recent Labs     07/27/20  0709 07/27/20  0106 07/26/20  1435  07/26/20  0353  07/25/20  0609   INR  --  1.2*  --   --  1.3*  --  1.3*   PTP  --  12.5*  --   --  12.9*  --  13.0*   APTT 83.3* 70.2* 93.9*   < > >130.0*   < > 92.5*    < > = values in this interval not displayed. No results for input(s): FE, TIBC, PSAT, FERR in the last 72 hours. No results found for: FOL, RBCF   No results for input(s): PH, PCO2, PO2 in the last 72 hours. No results for input(s): CPK, CKNDX, TROIQ in the last 72 hours.     No lab exists for component: CPKMB  Lab Results   Component Value Date/Time    Cholesterol, total 250 (H) 10/02/2013 01:55 PM    HDL Cholesterol 53 10/02/2013 01:55 PM    LDL, calculated 176 (H) 10/02/2013 01:55 PM    Triglyceride 244 (H) 07/23/2020 04:41 AM    CHOL/HDL Ratio 3.4 10/06/2010 10:25 AM     Lab Results   Component Value Date/Time    Glucose (POC) 131 (H) 07/27/2020 06:10 AM    Glucose (POC) 140 (H) 07/26/2020 11:35 PM    Glucose (POC) 128 (H) 07/26/2020 04:48 PM    Glucose (POC) 132 (H) 07/26/2020 11:06 AM    Glucose (POC) 174 (H) 07/26/2020 06:15 AM     Lab Results   Component Value Date/Time    Color YELLOW/STRAW 07/13/2020 04:27 PM    Appearance CLEAR 07/13/2020 04:27 PM    Specific gravity 1.012 07/13/2020 04:27 PM    pH (UA) 6.0 07/13/2020 04:27 PM    Protein Negative 07/13/2020 04:27 PM    Glucose Negative 07/13/2020 04:27 PM    Ketone TRACE (A) 07/13/2020 04:27 PM    Bilirubin Negative 07/13/2020 04:27 PM    Urobilinogen 1.0 07/13/2020 04:27 PM    Nitrites Negative 07/13/2020 04:27 PM    Leukocyte Esterase Negative 07/13/2020 04:27 PM    Epithelial cells FEW 07/13/2020 04:27 PM    Bacteria Negative 07/13/2020 04:27 PM    WBC 0-4 07/13/2020 04:27 PM    RBC 0-5 07/13/2020 04:27 PM         Medications Reviewed:     Current Facility-Administered Medications   Medication Dose Route Frequency    TPN ADULT - CENTRAL   IntraVENous CONTINUOUS    TPN ADULT - CENTRAL   IntraVENous CONTINUOUS    albumin human 25% (BUMINATE) solution 25 g  25 g IntraVENous Q6H    midodrine (PROAMATINE) tablet 20 mg  20 mg Oral Q8H    fat emulsion 20% (LIPOSYN, INTRAlipid) infusion 500 mL  500 mL IntraVENous Q MON, WED & FRI    glucose chewable tablet 16 g  4 Tab Oral PRN    glucagon (GLUCAGEN) injection 1 mg  1 mg IntraMUSCular PRN    dextrose 10% infusion 0-250 mL  0-250 mL IntraVENous PRN    insulin lispro (HUMALOG) injection   SubCUTAneous Q6H    melatonin tablet 3 mg  3 mg Oral QHS PRN    0.9% sodium chloride infusion 250 mL  250 mL IntraVENous PRN    lactated Ringers infusion  25 mL/hr IntraVENous CONTINUOUS    0.9% sodium chloride infusion 250 mL  250 mL IntraVENous PRN    sodium chloride (NS) flush 5-40 mL  5-40 mL IntraVENous Q8H    sodium chloride (NS) flush 5-40 mL  5-40 mL IntraVENous PRN    simethicone (MYLICON) 35KM/8.9IY oral drops 80 mg  1.2 mL Oral Multiple    pantoprazole (PROTONIX) 40 mg in 0.9% sodium chloride 10 mL injection  40 mg IntraVENous Q12H    heparin 25,000 units in D5W 250 ml infusion  18-36 Units/kg/hr IntraVENous TITRATE    benzonatate (TESSALON) capsule 100 mg  100 mg Oral TID PRN    guaiFENesin (ROBITUSSIN) 100 mg/5 mL oral liquid 100 mg  100 mg Oral Q4H PRN    ondansetron (ZOFRAN) injection 4 mg  4 mg IntraVENous Q6H    prochlorperazine (COMPAZINE) tablet 5 mg  5 mg Oral Q6H PRN    finasteride (PROSCAR) tablet 5 mg  5 mg Oral DAILY    tamsulosin (FLOMAX) capsule 0.4 mg  0.4 mg Oral DAILY    sodium chloride (NS) flush 5-40 mL  5-40 mL IntraVENous Q8H    sodium chloride (NS) flush 5-40 mL  5-40 mL IntraVENous PRN    acetaminophen (TYLENOL) tablet 650 mg  650 mg Oral Q6H PRN    balsam peru-castor oiL (VENELEX) ointment   Topical BID     ______________________________________________________________________  EXPECTED LENGTH OF STAY: 4d 4h  ACTUAL LENGTH OF STAY:          12                 Jacob Oseguera MD

## 2020-07-27 NOTE — PROGRESS NOTES
Problem: Mobility Impaired (Adult and Pediatric)  Goal: *Acute Goals and Plan of Care (Insert Text)  Description: FUNCTIONAL STATUS PRIOR TO ADMISSION: Patient was modified independent using a rolling walker and WB quad cane for functional mobility. HOME SUPPORT PRIOR TO ADMISSION: The patient lived with spouse, but did not require assist from spouse. Physical Therapy Goals  Reassessed 7/27/2020  1. Patient will move from supine to sit and sit to supine  in bed with modified independence within 7 day(s). 2.  Patient will transfer from bed to chair and chair to bed with minimal assistance/contact guard assist using the least restrictive device within 7 day(s). 3.  Patient will perform sit to stand with minimal assistance/contact guard assist within 7 day(s). 4.  Patient will ambulate with minimal assistance/contact guard assist for 20 feet with the least restrictive device within 7 day(s). Initiated 7/17/2020  1. Patient will move from supine to sit and sit to supine  in bed with modified independence within 7 day(s). 2.  Patient will transfer from bed to chair and chair to bed with minimal assistance/contact guard assist using the least restrictive device within 7 day(s). 3.  Patient will perform sit to stand with minimal assistance/contact guard assist within 7 day(s). 4.  Patient will ambulate with minimal assistance/contact guard assist for 50 feet with the least restrictive device within 7 day(s). 5.  Patient will ascend/descend 3 stairs with bilateral handrail(s) with minimal assistance/contact guard assist within 7 day(s).       Outcome: Progressing Towards Goal   PHYSICAL THERAPY TREATMENT: WEEKLY REASSESSMENT  Patient: Megan Huddleston (31 y.o. male)  Date: 7/27/2020  Primary Diagnosis: DVT (deep vein thrombosis) in pregnancy [O22.30]  DVT (deep venous thrombosis) ([facility]) [I82.409]  Procedure(s) (LRB):  ESOPHAGOGASTRODUODENOSCOPY (EGD) at bedside (N/A) 7 Days Post-Op   Precautions: Fall      ASSESSMENT  Patient continues with skilled PT services and is slowly progressing towards goals. Pt received supine in bed and agreeable to therapy. Pt continues to be limited by generalized weakness, decreased functional mobility, impaired balance and gait, increased edema and hypotension. Pt required min A and additional time and effort to assume sitting EOB. Pt tolerated LE therex prior to transfers with RW and min A x 2. Pt side stepped along the bedside and BP dropped in standing. Pt asymptomatic during transitional movements. Pt placed in modified chair position with all needs met. Pt will continue to benefit from SNF placement upon discharge to continue therapy efforts. Vitals:    07/27/20 1343 07/27/20 1345 07/27/20 1350 07/27/20 1353   BP: 100/58 103/54 (!) 83/48 106/62   BP 1 Location: Left arm Left arm Left arm Left arm   BP Patient Position: Supine;Pre-activity Sitting Standing Post activity  Comment: bed in chair position   Pulse: (!) 112 (!) 112 (!) 114 91   Resp:       Temp:       SpO2:       Weight:       Height:         .     Patient's progression toward goals since last assessment: progress was made towards goals, but remains limited by hypotension    Current Level of Function Impacting Discharge (mobility/balance): min A bed mobility, min A x 2 sit<>stand with RW, min A x 1 side lizy       PLAN :  Goals have been updated based on progression since last assessment. Patient continues to benefit from skilled intervention to address the above impairments. Recommendations and Planned Interventions: bed mobility training, transfer training, gait training, therapeutic exercises, neuromuscular re-education, orthotic/prosthetic training, edema management/control, patient and family training/education, and therapeutic activities      Frequency/Duration: Patient will be followed by physical therapy:  5 times a week to address goals.     Recommendation for discharge: (in order for the patient to meet his/her long term goals)  Therapy up to 5 days/week in SNF setting    This discharge recommendation:  Has been made in collaboration with the attending provider and/or case management           SUBJECTIVE:   Patient stated I just did some exercises. 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    BPH (benign prostatic hyperplasia)     DJD (degenerative joint disease)     Hypercholesterolemia     Other and unspecified hyperlipidemia      Past Surgical History:   Procedure Laterality Date    COLONOSCOPY N/A 8/18/2017    COLONOSCOPY performed by Bladimir Estrada MD at [facility] ENDOSCOPY    [address] Right 2010    rotator cuff    HX TONSILLECTOMY      IR IVC FILTER  7/20/2020         IR PLC IVC FILTER  7/20/2020       Personal factors and/or comorbidities impacting plan of care:     Home Situation  Home Environment: Patient room  # Steps to Enter: 3  Rails to Enter: Yes  Hand Rails : Bilateral  One/Two Story Residence: One story  Living Alone: Yes  Support Systems: Family member(s)  Patient Expects to be Discharged to[de-identified] Private residence  Current DME Used/Available at Home: Wheelchair, Ilene Sunflower, quad, Grab bars  Tub or Shower Type: Tub/Shower combination    EXAMINATION/PRESENTATION/DECISION MAKING:   Critical Behavior:  Neurologic State: Alert  Orientation Level: Oriented X4  Cognition: Follows commands, Appropriate for age attention/concentration  Safety/Judgement: Good awareness of safety precautions  Hearing: Auditory  Auditory Impairment: Hard of hearing, bilateral  Hearing Aids/Status:  At home  Skin:    Edema: increased edema in bilateral LEs  Range Of Motion:  AROM: Generally decreased, functional           PROM: Generally decreased, functional           Strength:    Strength: Generally decreased, functional                    Tone & Sensation:                                  Coordination:     Vision: Functional Mobility:  Bed Mobility:     Supine to Sit: Minimum assistance  Sit to Supine: Moderate assistance     Transfers:  Sit to Stand: Minimum assistance;Assist x2  Stand to Sit: Minimum assistance                       Balance:   Sitting: Intact  Standing: With support; Impaired  Standing - Static: Good  Standing - Dynamic : Fair  Ambulation/Gait Training:  Distance (ft): 3 Feet (ft)  Assistive Device: Gait belt;Walker, rolling  Ambulation - Level of Assistance: Minimal assistance;Assist x2        Gait Abnormalities: Decreased step clearance;Shuffling gait;Trunk sway increased        Base of Support: Widened     Speed/Taylor: Pace decreased (<100 feet/min); Shuffled  Step Length: Left shortened;Right shortened       Therapeutic Exercises: Ankle pumps, LAQ, seated marching    Pain Rating:  Pt denied pain    Activity Tolerance:   Fair--limited by hypotension during standing  Please refer to the flowsheet for vital signs taken during this treatment. After treatment patient left in no apparent distress:   Call bell within reach, Side rails x 3, and Bed in chair position    COMMUNICATION/EDUCATION:   The patients plan of care was discussed with: Registered nurse. Fall prevention education was provided and the patient/caregiver indicated understanding., Patient/family have participated as able in goal setting and plan of care. , and Patient/family agree to work toward stated goals and plan of care.     Thank you for this referral.  Devante Bolivar, PT, DPT   Time Calculation: 26 mins

## 2020-07-27 NOTE — CONSULTS
[facility] Consultation for: Consideration of J-tube placement    Requesting Physician: Dr. Eliana Sanchez    History of Present Illness:      Pastora Alfred is a 80 y.o. male who has been hospitalized for a prolonged period with multiple issues, most recently including severe pancreatitis. There has been concern that this may represent a pancreatic malignancy however ascites was negative for malignant cells and repeat imaging is suggestive more so of severe pancreatitis rather than a pancreatic malignancy. The patient was admitted initially with nausea and vomiting as well as a low large right lower extremity DVT and pulmonary emboli. The patient has been followed by GI and has been noted to have severe erosive esophagitis as well as a gastric outlet obstruction from extrinsic compression which is presumably from pancreatitis and pancreatic pseudocyst formation. 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 The patient states he has not been allowed to eat recently but denies the sensation of nausea or any recent vomiting. He was evaluated by IR today for consideration of placement of a G-J tube, but was not thought to be a candidate for this due to the gastric outlet obstruction. Surgery has been consulted to consider placement of a feeding jejunostomy tube distal to the ligament of Treitz.     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    BPH (benign prostatic hyperplasia)     DJD (degenerative joint disease)     Hypercholesterolemia     Other and unspecified hyperlipidemia        Past Surgical History:   Procedure Laterality Date    COLONOSCOPY N/A 8/18/2017    COLONOSCOPY performed by Jennifer De La Torre MD at [facility] ENDOSCOPY    HX ORTHOPAEDIC Right 2010    rotator cuff    HX TONSILLECTOMY      IR IVC FILTER  7/20/2020         IR PLC IVC FILTER  7/20/2020       Social History     Socioeconomic History    Marital status:      Spouse name: Not on file    Number of children: Not on file    Years of education: Not on file    Highest education level: Not on file   Occupational History    Not on file   Social Needs    Financial resource strain: Not on file    Food insecurity     Worry: Not on file     Inability: Not on file    Transportation needs     Medical: Not on file     Non-medical: Not on file   Tobacco Use    Smoking status: Never Smoker    Smokeless tobacco: Never Used   Substance and Sexual Activity    Alcohol use: Yes     Comment: social    Drug use: Not on file    Sexual activity: Not Currently   Lifestyle    Physical activity     Days per week: Not on file     Minutes per session: Not on file    Stress: Not on file   Relationships    Social connections     Talks on phone: Not on file     Gets together: Not on file     Attends Rastafari service: Not on file     Active member of club or organization: Not on file     Attends meetings of clubs or organizations: Not on file     Relationship status: Not on file    Intimate partner violence     Fear of current or ex partner: Not on file     Emotionally abused: Not on file     Physically abused: Not on file     Forced sexual activity: Not on file   Other Topics Concern    Not on file   Social History Narrative    Not on file       Family History   Problem Relation Age of Onset    Diabetes Sister     Diabetes Brother     Heart Disease Brother          Current Facility-Administered Medications:     TPN ADULT - CENTRAL, , IntraVENous, CONTINUOUS, Hector Zamorano MD, Last Rate: 63 mL/hr at 07/27/20 1825    TPN ADULT - CENTRAL, , IntraVENous, CONTINUOUS, Hector Zamorano MD, Last Rate: 63 mL/hr at 07/26/20 1810   midodrine (PROAMATINE) tablet 20 mg, 20 mg, Oral, Q8H, Radha Parnell, AMADOU, 20 mg at 07/27/20 1433    fat emulsion 20% (LIPOSYN, INTRAlipid) infusion 500 mL, 500 mL, IntraVENous, Q MON, WED & FRI, Ishmael Pradhan MD, 500 mL at 07/27/20 1825    glucose chewable tablet 16 g, 4 Tab, Oral, PRN, Ishmael Pradhan MD    glucagon (GLUCAGEN) injection 1 mg, 1 mg, IntraMUSCular, PRN, Ishmael Pradhan MD    dextrose 10% infusion 0-250 mL, 0-250 mL, IntraVENous, PRN, Ishmael Pradhan MD    insulin lispro (HUMALOG) injection, , SubCUTAneous, Q6H, Ishmael Pradhan MD, Stopped at 07/27/20 0000    melatonin tablet 3 mg, 3 mg, Oral, QHS PRN, Gali POE NP-C, 3 mg at 07/21/20 2337    0.9% sodium chloride infusion 250 mL, 250 mL, IntraVENous, PRN, Mandeep Mo NP    lactated Ringers infusion, 25 mL/hr, IntraVENous, CONTINUOUS, Jaye Valdes MD, Stopped at 07/24/20 1842    0.9% sodium chloride infusion 250 mL, 250 mL, IntraVENous, PRN, Mary Jo Colon, ACNP    sodium chloride (NS) flush 5-40 mL, 5-40 mL, IntraVENous, Q8H, Angelo Wright MD, 10 mL at 07/26/20 1126    sodium chloride (NS) flush 5-40 mL, 5-40 mL, IntraVENous, PRN, Rivera Che MD, 20 mL at 07/26/20 1807    simethicone (MYLICON) 70OD/8.6AR oral drops 80 mg, 1.2 mL, Oral, Multiple, Rivera Gannon MD    pantoprazole (PROTONIX) 40 mg in 0.9% sodium chloride 10 mL injection, 40 mg, IntraVENous, Q12H, Lori Melara DO, 40 mg at 07/27/20 0932    heparin 25,000 units in D5W 250 ml infusion, 18-36 Units/kg/hr, IntraVENous, TITRATE, Barney Benson DO, Last Rate: 4.5 mL/hr at 07/27/20 1823, 6 Units/kg/hr at 07/27/20 1823    benzonatate (TESSALON) capsule 100 mg, 100 mg, Oral, TID PRN, Ozzie RODRIGUEZ DO, 100 mg at 07/1934    guaiFENesin (ROBITUSSIN) 100 mg/5 mL oral liquid 100 mg, 100 mg, Oral, Q4H PRN, Lori Benson DO, 100 mg at 07/1934    ondansetron (ZOFRAN) injection 4 mg, 4 mg, IntraVENous, Q6H, Sable Aid D, NP, Stopped at 07/27/20 1500    prochlorperazine (COMPAZINE) tablet 5 mg, 5 mg, Oral, Q6H PRN, Sable Aid D, NP, 5 mg at 07/15/20 2224    finasteride (PROSCAR) tablet 5 mg, 5 mg, Oral, DAILY, Denis Tsang MD, Stopped at 07/27/20 0932    tamsulosin (FLOMAX) capsule 0.4 mg, 0.4 mg, Oral, DAILY, Denis Tsang MD, Stopped at 07/27/20 0932    sodium chloride (NS) flush 5-40 mL, 5-40 mL, IntraVENous, Q8H, Elizabeth Justice MD, 10 mL at 07/24/20 2118    sodium chloride (NS) flush 5-40 mL, 5-40 mL, IntraVENous, PRN, Denis Tsang MD, 10 mL at 07/25/20 1121    acetaminophen (TYLENOL) tablet 650 mg, 650 mg, Oral, Q6H PRN, Denis Tsang MD, 650 mg at 07/18/20 1448    balsam peru-castor oiL (VENELEX) ointment, , Topical, BID, Krystin Ying MD    Allergies   Allergen Reactions    Aspirin Unknown (comments)       ROS   As in HPI      Physical Exam:     Visit Vitals  /52 (BP 1 Location: Left arm, BP Patient Position: At rest)   Pulse 86   Temp 98.2 °F (36.8 °C)   Resp 18   Ht 5' 10\" (1.778 m)   Wt 208 lb 1.8 oz (94.4 kg)   SpO2 99%   BMI 29.86 kg/m²       General - alert and oriented, no apparent distress, well developed  HEENT - NC/AT, no scleral icterus  Abd -distended and moderately tense with ascites. Dull to percussion. Nontender. No palpable masses or organomegaly.   Ext - warm, well perfused, 1+ edema  Skin - supple and no rashes  Psychiatric - normal affect, good mood    Labs  Recent Results (from the past 24 hour(s))   GLUCOSE, POC    Collection Time: 07/26/20 11:35 PM   Result Value Ref Range    Glucose (POC) 140 (H) 65 - 100 mg/dL    Performed by Norma DONATO    PROTHROMBIN TIME + INR    Collection Time: 07/27/20  1:06 AM   Result Value Ref Range    INR 1.2 (H) 0.9 - 1.1      Prothrombin time 12.5 (H) 9.0 - 11.1 sec   PTT    Collection Time: 07/27/20  1:06 AM   Result Value Ref Range    aPTT 70.2 (H) 22.1 - 32.0 sec    aPTT, therapeutic range     58.0 - 77.0 SECS   FIBRINOGEN    Collection Time: 07/27/20  1:06 AM   Result Value Ref Range    Fibrinogen 159 (L) 200 - 475 mg/dL   MAGNESIUM    Collection Time: 07/27/20  1:06 AM   Result Value Ref Range    Magnesium 1.9 1.6 - 2.4 mg/dL   PHOSPHORUS    Collection Time: 07/27/20  1:06 AM   Result Value Ref Range    Phosphorus 3.3 2.6 - 4.7 MG/DL   CBC WITH AUTOMATED DIFF    Collection Time: 07/27/20  1:06 AM   Result Value Ref Range    WBC 7.8 4.1 - 11.1 K/uL    RBC 2.81 (L) 4.10 - 5.70 M/uL    HGB 9.1 (L) 12.1 - 17.0 g/dL    HCT 27.9 (L) 36.6 - 50.3 %    MCV 99.3 (H) 80.0 - 99.0 FL    MCH 32.4 26.0 - 34.0 PG    MCHC 32.6 30.0 - 36.5 g/dL    RDW 19.9 (H) 11.5 - 14.5 %    PLATELET 943 658 - 336 K/uL    MPV 9.8 8.9 - 12.9 FL    NRBC 0.3 (H) 0  WBC    ABSOLUTE NRBC 0.02 (H) 0.00 - 0.01 K/uL    NEUTROPHILS 63 32 - 75 %    BAND NEUTROPHILS 1 0 - 6 %    LYMPHOCYTES 23 12 - 49 %    MONOCYTES 8 5 - 13 %    EOSINOPHILS 4 0 - 7 %    BASOPHILS 0 0 - 1 %    METAMYELOCYTES 1 (H) 0 %    IMMATURE GRANULOCYTES 0 %    ABS. NEUTROPHILS 5.0 1.8 - 8.0 K/UL    ABS. LYMPHOCYTES 1.8 0.8 - 3.5 K/UL    ABS. MONOCYTES 0.6 0.0 - 1.0 K/UL    ABS. EOSINOPHILS 0.3 0.0 - 0.4 K/UL    ABS. BASOPHILS 0.0 0.0 - 0.1 K/UL    ABS. IMM.  GRANS. 0.0 K/UL    DF MANUAL      RBC COMMENTS ANISOCYTOSIS  1+        RBC COMMENTS MACROCYTOSIS  1+        RBC COMMENTS BLUE CELLS  PRESENT       METABOLIC PANEL, BASIC    Collection Time: 07/27/20  1:06 AM   Result Value Ref Range    Sodium 141 136 - 145 mmol/L    Potassium 3.3 (L) 3.5 - 5.1 mmol/L    Chloride 112 (H) 97 - 108 mmol/L    CO2 24 21 - 32 mmol/L    Anion gap 5 5 - 15 mmol/L    Glucose 130 (H) 65 - 100 mg/dL    BUN 19 6 - 20 MG/DL    Creatinine 0.69 (L) 0.70 - 1.30 MG/DL    BUN/Creatinine ratio 28 (H) 12 - 20      GFR est AA >60 >60 ml/min/1.73m2    GFR est non-AA >60 >60 ml/min/1.73m2    Calcium 7.6 (L) 8.5 - 10.1 MG/DL   GLUCOSE, POC    Collection Time: 07/27/20  6:10 AM   Result Value Ref Range Glucose (POC) 131 (H) 65 - 100 mg/dL    Performed by Rowan Walter    PTT    Collection Time: 07/27/20  7:09 AM   Result Value Ref Range    aPTT 83.3 (H) 22.1 - 32.0 sec    aPTT, therapeutic range     58.0 - 77.0 SECS   GLUCOSE, POC    Collection Time: 07/27/20 11:08 AM   Result Value Ref Range    Glucose (POC) 120 (H) 65 - 100 mg/dL    Performed by Bia Beckett    PTT    Collection Time: 07/27/20  3:31 PM   Result Value Ref Range    aPTT 62.9 (H) 22.1 - 32.0 sec    aPTT, therapeutic range     58.0 - 77.0 SECS   GLUCOSE, POC    Collection Time: 07/27/20  6:19 PM   Result Value Ref Range    Glucose (POC) 126 (H) 65 - 100 mg/dL    Performed by Belle Solis (CON)          Imaging  CT Results (most recent):  Results from [place] encounter on 07/13/20   CT ABD W WO CONT    Narrative INDICATION: Pancreatic mass (pancreatic protocol)    COMPARISON: 7/13/2020    TECHNIQUE:   Thin axial images were obtained through the abdomen with and without intravenous  iodinated contrast administration. Coronal and sagittal reconstructions were  generated. Oral contrast was not administered. CT dose reduction was achieved  through use of a standardized protocol tailored for this examination and  automatic exposure control for dose modulation. FINDINGS:     LIVER: No focal liver mass. Moderate intrahepatic biliary ductal dilatation with  dilatation of the common hepatic duct measuring 13 mm. There is abrupt cut off  of the distal common bile duct   GALLBLADDER: No calcified gallstone  SPLEEN: Unremarkable  PANCREAS: Edema/necrosis versus low density mass in the region of the pancreatic  head measuring roughly 2.5 cm (series 6, image 57) and in the pancreatic body  measuring 4 cm (image 64). No significant pancreatic ductal dilatation. There is  stranding and focal fluid surrounding the pancreatic body and tail with the  largest component in the region of the tail measuring roughly 3.5 cm.  Focal gas  anterior to the pancreatic head/body junction is of uncertain significance  (image 56). Chronic splenic vein thrombosis with extensive gastric and  gastroesophageal varices. There is infiltration which surrounds the confluence  of the main portal vein and splenic vein, the celiac artery and hepatic  arteries, and SMA. ADRENALS: Unremarkable. KIDNEYS/URETERS: Symmetric nephrograms without evidence for focal mass. Unchanged prominence of bilateral collecting systems, greater on the left. PERITONEUM: Moderate ascites is again noted with nodular infiltration of the  anterior peritoneal cavity and omentum. Infrarenal IVC filter. BOWEL: Nonspecific thickening of the gastric antrum and proximal duodenum which  may be reactive inflammation. No small bowel obstruction. BONES: No destructive bone lesion. Moderate degenerative changes in the spine  VISUALIZED THORAX: Bibasilar subsegmental atelectasis with probable minimal left  lower lobe consolidation as well. Small bilateral pleural effusions. ADDITIONAL COMMENTS: N/A      Impression IMPRESSION:      1. Unchanged significant inflammation involving the pancreas with areas of low  attenuation in the pancreatic head and pancreatic body measuring up to 4 cm. Given the amount of peripancreatic inflammation, this likely represents edema or  necrosis related to pancreatitis, less likely pancreatic neoplasm. No  significant pancreatic ductal dilatation. Focal fluid surrounding the pancreatic  body and tail likely developing pseudocysts. A couple small foci of air anterior  to the pancreatic body of uncertain significance. Extensive infiltration  surrounding the portal vein and hepatic arteries. 2. Distal common bile duct obstruction likely related to inflammation in the  pancreatic head. Moderate bowel wall thickening involving the gastric antrum and  proximal duodenum likely reactive. 3. 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     4. Moderate ascites is again noted with nonspecific nodular infiltration of the  anterior peritoneal cavity and omentum    5. Bibasilar airspace disease with small pleural effusions. I have personally reviewed all of the pertinent images     Assessment:     Megan Huddleston is a 80 y.o. male with severe pancreatitis. The underlying etiology of this is not certain but could represent a malignancy. He continues to have moderate volume ascites despite prior paracentesis. He appears to have a gastric outlet obstruction in addition to pancreatitis preventing oral intake. Recommendations:     1. In general, moderate volume ascites is a contraindication to a surgical feeding J-tube. This puts the patient at extremely high risk for bacterial peritonitis or leakage from the jejunostomy tube site. If his ascites improves as his pancreatitis inflammation decreases he may be a candidate for this in the future however I do not think it is safe at this juncture. I would recommend continued TPN for nutrition or consideration of attempted passage of a distal Dobbhoff tube for jejunal tube feeds. Plan discussed with Dr. Danielle Conner.     Jordan Crespo MD  7/27/2020  6:51 PM

## 2020-07-27 NOTE — PROGRESS NOTES
Problem: Falls - Risk of  Goal: *Absence of Falls  Description: Document Corrie Spain Fall Risk and appropriate interventions in the flowsheet.   Outcome: Progressing Towards Goal  Note: Fall Risk Interventions:  Mobility Interventions: Communicate number of staff needed for ambulation/transfer, Patient to call before getting OOB, PT Consult for mobility concerns, PT Consult for assist device competence, Utilize walker, cane, or other assistive device, Utilize gait belt for transfers/ambulation    Mentation Interventions: Adequate sleep, hydration, pain control    Medication Interventions: Teach patient to arise slowly, Utilize gait belt for transfers/ambulation, Patient to call before getting OOB    Elimination Interventions: Call light in reach, Patient to call for help with toileting needs, Urinal in reach    History of Falls Interventions: Door open when patient unattended         Problem: Patient Education: Go to Patient Education Activity  Goal: Patient/Family Education  Outcome: Progressing Towards Goal     Problem: General Medical Care Plan  Goal: *Absence of infection signs and symptoms  Outcome: Progressing Towards Goal     Problem: Deep Venous Thrombosis - Risk of  Goal: *Absence of deep venous thrombosis signs and symptoms(Stroke Metric)  Outcome: Progressing Towards Goal  Goal: *Absence of impaired coagulation signs and symptoms  Outcome: Progressing Towards Goal  Goal: *Knowledge of prescribed medications  Outcome: Progressing Towards Goal  Goal: *Absence of bleeding  Outcome: Progressing Towards Goal     Problem: Patient Education: Go to Patient Education Activity  Goal: Patient/Family Education  Outcome: Progressing Towards Goal     Problem: Patient Education: Go to Patient Education Activity  Goal: Patient/Family Education  Outcome: Progressing Towards Goal     Problem: Nutrition Deficit  Goal: *Optimize nutritional status  Outcome: Progressing Towards Goal     Problem: Pressure Injury - Risk of  Goal: *Prevention of pressure injury  Description: Document Joseph Scale and appropriate interventions in the flowsheet.   Outcome: Progressing Towards Goal  Note: Pressure Injury Interventions:  Sensory Interventions: Assess changes in LOC, Keep linens dry and wrinkle-free, Minimize linen layers, Pressure redistribution bed/mattress (bed type)    Moisture Interventions: Absorbent underpads    Activity Interventions: Increase time out of bed, Pressure redistribution bed/mattress(bed type), PT/OT evaluation    Mobility Interventions: HOB 30 degrees or less, Pressure redistribution bed/mattress (bed type), PT/OT evaluation    Nutrition Interventions: Document food/fluid/supplement intake    Friction and Shear Interventions: Lift sheet                Problem: Patient Education: Go to Patient Education Activity  Goal: Patient/Family Education  Outcome: Progressing Towards Goal     Problem: Pulmonary Embolism Care Plan (Adult)  Goal: *Improvement of existing pulmonary embolism  Outcome: Progressing Towards Goal  Goal: *Absence of bleeding  Outcome: Progressing Towards Goal  Goal: *Labs within defined limits  Outcome: Progressing Towards Goal     Problem: Patient Education: Go to Patient Education Activity  Goal: Patient/Family Education  Outcome: Progressing Towards Goal     Problem: Nutrition Deficit  Goal: *Optimize nutritional status  Outcome: Progressing Towards Goal

## 2020-07-27 NOTE — PROGRESS NOTES
[address]  [address] [facility], [address]       GI PROGRESS NOTE  Will Farhad Arnoldearl  [phone] office  [phone] NP/PA in-hospital cell phone M-F until 4:30PM  After 5PM or on weekends, please call  for physician on call      NAME: Alex Porter   :  1934   MRN:  [id number]       Subjective:   Patient denies nausea, vomiting, or abdominal pain. He is receiving TPN. Objective:     VITALS:   Last 24hrs VS reviewed since prior progress note. Most recent are:  Visit Vitals  BP (!) 88/42 (BP 1 Location: Left arm, BP Patient Position: At rest)   Pulse 64   Temp 97.9 °F (36.6 °C)   Resp 23   Ht 5' 10\" (1.778 m)   Wt 94.4 kg (208 lb 1.8 oz)   SpO2 99%   BMI 29.86 kg/m²       PHYSICAL EXAM:  General:          No acute distress    Neurologic:      Alert and oriented  HEENT:           EOMI, no scleral icterus   Lungs:             No respiratory distress  Heart:              S1 S2  Abdomen:        Soft, mildly distended, no apparent tenderness, no guarding, no rebound.  +Bowel sounds.   Extremities:     Warm  Psych:             Not anxious or agitated    Lab Data Reviewed:     Recent Results (from the past 24 hour(s))   GLUCOSE, POC    Collection Time: 20 11:06 AM   Result Value Ref Range    Glucose (POC) 132 (H) 65 - 100 mg/dL    Performed by [facility]    PTT    Collection Time: 20  2:35 PM   Result Value Ref Range    aPTT 93.9 (HH) 22.1 - 32.0 sec    aPTT, therapeutic range     58.0 - 77.0 SECS   GLUCOSE, POC    Collection Time: 20  4:48 PM   Result Value Ref Range    Glucose (POC) 128 (H) 65 - 100 mg/dL    Performed by [facility]    GLUCOSE, POC    Collection Time: 20 11:35 PM   Result Value Ref Range    Glucose (POC) 140 (H) 65 - 100 mg/dL    Performed by Raysa DONATO    PROTHROMBIN TIME + INR    Collection Time: 20  1:06 AM   Result Value Ref Range    INR 1.2 (H) 0.9 - 1.1      Prothrombin time 12.5 (H) 9.0 - 11.1 sec PTT    Collection Time: 07/27/20  1:06 AM   Result Value Ref Range    aPTT 70.2 (H) 22.1 - 32.0 sec    aPTT, therapeutic range     58.0 - 77.0 SECS   FIBRINOGEN    Collection Time: 07/27/20  1:06 AM   Result Value Ref Range    Fibrinogen 159 (L) 200 - 475 mg/dL   MAGNESIUM    Collection Time: 07/27/20  1:06 AM   Result Value Ref Range    Magnesium 1.9 1.6 - 2.4 mg/dL   PHOSPHORUS    Collection Time: 07/27/20  1:06 AM   Result Value Ref Range    Phosphorus 3.3 2.6 - 4.7 MG/DL   CBC WITH AUTOMATED DIFF    Collection Time: 07/27/20  1:06 AM   Result Value Ref Range    WBC 7.8 4.1 - 11.1 K/uL    RBC 2.81 (L) 4.10 - 5.70 M/uL    HGB 9.1 (L) 12.1 - 17.0 g/dL    HCT 27.9 (L) 36.6 - 50.3 %    MCV 99.3 (H) 80.0 - 99.0 FL    MCH 32.4 26.0 - 34.0 PG    MCHC 32.6 30.0 - 36.5 g/dL    RDW 19.9 (H) 11.5 - 14.5 %    PLATELET 256 290 - 453 K/uL    MPV 9.8 8.9 - 12.9 FL    NRBC 0.3 (H) 0  WBC    ABSOLUTE NRBC 0.02 (H) 0.00 - 0.01 K/uL    NEUTROPHILS 63 32 - 75 %    BAND NEUTROPHILS 1 0 - 6 %    LYMPHOCYTES 23 12 - 49 %    MONOCYTES 8 5 - 13 %    EOSINOPHILS 4 0 - 7 %    BASOPHILS 0 0 - 1 %    METAMYELOCYTES 1 (H) 0 %    IMMATURE GRANULOCYTES 0 %    ABS. NEUTROPHILS 5.0 1.8 - 8.0 K/UL    ABS. LYMPHOCYTES 1.8 0.8 - 3.5 K/UL    ABS. MONOCYTES 0.6 0.0 - 1.0 K/UL    ABS. EOSINOPHILS 0.3 0.0 - 0.4 K/UL    ABS. BASOPHILS 0.0 0.0 - 0.1 K/UL    ABS. IMM.  GRANS. 0.0 K/UL    DF MANUAL      RBC COMMENTS ANISOCYTOSIS  1+        RBC COMMENTS MACROCYTOSIS  1+        RBC COMMENTS BLUE CELLS  PRESENT       METABOLIC PANEL, BASIC    Collection Time: 07/27/20  1:06 AM   Result Value Ref Range    Sodium 141 136 - 145 mmol/L    Potassium 3.3 (L) 3.5 - 5.1 mmol/L    Chloride 112 (H) 97 - 108 mmol/L    CO2 24 21 - 32 mmol/L    Anion gap 5 5 - 15 mmol/L    Glucose 130 (H) 65 - 100 mg/dL    BUN 19 6 - 20 MG/DL    Creatinine 0.69 (L) 0.70 - 1.30 MG/DL    BUN/Creatinine ratio 28 (H) 12 - 20      GFR est AA >60 >60 ml/min/1.73m2    GFR est non-AA >60 >60 ml/min/1.73m2    Calcium 7.6 (L) 8.5 - 10.1 MG/DL   GLUCOSE, POC    Collection Time: 07/27/20  6:10 AM   Result Value Ref Range    Glucose (POC) 131 (H) 65 - 100 mg/dL    Performed by Harshad Lora    PTT    Collection Time: 07/27/20  7:09 AM   Result Value Ref Range    aPTT 83.3 (H) 22.1 - 32.0 sec    aPTT, therapeutic range     58.0 - 77.0 SECS       Assessment:   · Hematemesis: EGD (7/20/20): LA Grade D erosive esophagitis; hiatal hernia; retention of large volume of gastric fluid likely secondary to gastric outlet obstruction from extrinsic compression. Hgb 9.1. · Pancreatic mass: CT abdomen/pelvis with IV contrast (7/13/20): mass-like fullness in the pancreatic head, resulting in intrahepatic and pancreatic duct dilatation, peripancreatic inflammatory changes with 3.7 x 2.3 cm collection near the pancreatic tail possibly pseudocyst, large amount of ascites, and moderate amount of slightly complex fluid/soft tissue in the left abdomen - findings may represent pancreatitis or pancreatic neoplasm; edematous thickening of the gastric antrum and duodenum likely secondary to the pancreatic process.  LFTs and lipase unremarkable on 7/13.  Cytology of peritoneal fluid was negative for malignant cells. CT abdomen with and without IV contrast (7/22/20): unchanged significant inflammation involving the pancreas with areas of low attenuation in the pancreatic head and pancreatic body measuring up to 4 cm; given the amount of peripancreatic inflammation, likely represents edema or necrosis related to pancreatitis, less likely pancreatic neoplasm; no significant pancreatic ductal dilatation; focal fluid surrounding the pancreatic body and tail likely developing pseudocysts; distal common bile duct obstruction likely related to inflammation in the pancreatic head; moderate bowel wall thickening involving the gastric antrum and proximal duodenum likely reactive.   · Right lower extremity DVT with bilateral PEs: status post IVC filter 7/20. · Ascites: hepatology, Dr. Abhinav Arnold following. Status post paracentesis: cytology was negative for malignant cells. · COVID-19 negative     Patient Active Problem List   Diagnosis Code    DVT (deep vein thrombosis) in pregnancy O22.30    DVT (deep venous thrombosis) ([facility] [address] I82.409     Plan:   · NPO. Receiving TPN. · PPI BID  · LR  · Supportive measures  · Continue medical management. Consider EUS in 3-4 weeks following recovery from acute pancreatitis with interval imaging. · If within goals of care, consider G-J versus surgical J-tube for nutrition. Patient would not be a good candidate for endoscopic PEG tube placement given ascites and gastric outlet obstruction. Signed By: VANESSA Patel     7/27/2020  10:19 AM       This patient was seen and examined by me in a face-to-face visit today. I reviewed the medical record including lab work, imaging and other provider notes. I confirmed the interval history as described above. I spoke to the patient, however the patient is unable to give a meaningful history. I discussed this case in detail with Melissa MENDEZ. I formulated an updated  assessment of this patient and guided our treatment plan. I agree with the above progress note. I agree with the history, exam and assessment and plan as outlined in the note. I would like to add the following:     Abd: normoactive BS, nt, nd, no rebound/guarding. Will sign off. Please call with any questions. Outpatient GI follow-up with Dr. Kailee Black.      Dr. Escobar Rodriguez

## 2020-07-27 NOTE — ROUTINE PROCESS
Faculty or Preceptor Review of Student Work    7/27/2020  - Shift times - 0730 to 1300    The student documentation of patient care for Ivin Huger has been reviewed and approved. All medications have been administered under the direct supervision of the faculty or preceptor.     Semaj Reyes RN

## 2020-07-28 LAB
ANION GAP SERPL CALC-SCNC: 8 MMOL/L (ref 5–15)
APTT PPP: 37.5 SEC (ref 22.1–32)
APTT PPP: 40.3 SEC (ref 22.1–32)
APTT PPP: 69 SEC (ref 22.1–32)
APTT PPP: >130 SEC (ref 22.1–32)
BASOPHILS # BLD: 0 K/UL (ref 0–0.1)
BASOPHILS NFR BLD: 0 % (ref 0–1)
BUN SERPL-MCNC: 18 MG/DL (ref 6–20)
BUN/CREAT SERPL: 28 (ref 12–20)
CALCIUM SERPL-MCNC: 7.9 MG/DL (ref 8.5–10.1)
CHLORIDE SERPL-SCNC: 111 MMOL/L (ref 97–108)
CO2 SERPL-SCNC: 22 MMOL/L (ref 21–32)
CREAT SERPL-MCNC: 0.65 MG/DL (ref 0.7–1.3)
DIFFERENTIAL METHOD BLD: ABNORMAL
EOSINOPHIL # BLD: 0.3 K/UL (ref 0–0.4)
EOSINOPHIL NFR BLD: 4 % (ref 0–7)
ERYTHROCYTE [DISTWIDTH] IN BLOOD BY AUTOMATED COUNT: 20.4 % (ref 11.5–14.5)
FIBRINOGEN PPP-MCNC: 206 MG/DL (ref 200–475)
GLUCOSE BLD STRIP.AUTO-MCNC: 103 MG/DL (ref 65–100)
GLUCOSE BLD STRIP.AUTO-MCNC: 107 MG/DL (ref 65–100)
GLUCOSE BLD STRIP.AUTO-MCNC: 114 MG/DL (ref 65–100)
GLUCOSE SERPL-MCNC: 97 MG/DL (ref 65–100)
HCT VFR BLD AUTO: 28.9 % (ref 36.6–50.3)
HCT VFR BLD AUTO: 29.2 % (ref 36.6–50.3)
HGB BLD-MCNC: 9.3 G/DL (ref 12.1–17)
HGB BLD-MCNC: 9.4 G/DL (ref 12.1–17)
IMM GRANULOCYTES # BLD AUTO: 0 K/UL
IMM GRANULOCYTES NFR BLD AUTO: 0 %
INR PPP: 1.2 (ref 0.9–1.1)
LYMPHOCYTES # BLD: 1.6 K/UL (ref 0.8–3.5)
LYMPHOCYTES NFR BLD: 20 % (ref 12–49)
MAGNESIUM SERPL-MCNC: 2.1 MG/DL (ref 1.6–2.4)
MCH RBC QN AUTO: 32.5 PG (ref 26–34)
MCHC RBC AUTO-ENTMCNC: 32.2 G/DL (ref 30–36.5)
MCV RBC AUTO: 101 FL (ref 80–99)
METAMYELOCYTES NFR BLD MANUAL: 1 %
MONOCYTES # BLD: 0.4 K/UL (ref 0–1)
MONOCYTES NFR BLD: 5 % (ref 5–13)
MYELOCYTES NFR BLD MANUAL: 1 %
NEUTS BAND NFR BLD MANUAL: 2 % (ref 0–6)
NEUTS SEG # BLD: 5.7 K/UL (ref 1.8–8)
NEUTS SEG NFR BLD: 67 % (ref 32–75)
NRBC # BLD: 0 K/UL (ref 0–0.01)
NRBC BLD-RTO: 0 PER 100 WBC
PHOSPHATE SERPL-MCNC: 3.2 MG/DL (ref 2.6–4.7)
PLATELET # BLD AUTO: 164 K/UL (ref 150–400)
PLATELET COMMENTS,PCOM: ABNORMAL
PMV BLD AUTO: 10.7 FL (ref 8.9–12.9)
POTASSIUM SERPL-SCNC: 3.7 MMOL/L (ref 3.5–5.1)
PROTHROMBIN TIME: 12 SEC (ref 9–11.1)
RBC # BLD AUTO: 2.89 M/UL (ref 4.1–5.7)
RBC MORPH BLD: ABNORMAL
SERVICE CMNT-IMP: ABNORMAL
SODIUM SERPL-SCNC: 141 MMOL/L (ref 136–145)
THERAPEUTIC RANGE,PTTT: ABNORMAL SECS (ref 58–77)
WBC # BLD AUTO: 8.2 K/UL (ref 4.1–11.1)

## 2020-07-28 PROCEDURE — 85730 THROMBOPLASTIN TIME PARTIAL: CPT

## 2020-07-28 PROCEDURE — 74011250636 HC RX REV CODE- 250/636: Performed by: NURSE PRACTITIONER

## 2020-07-28 PROCEDURE — 65660000000 HC RM CCU STEPDOWN

## 2020-07-28 PROCEDURE — 76937 US GUIDE VASCULAR ACCESS: CPT

## 2020-07-28 PROCEDURE — 82962 GLUCOSE BLOOD TEST: CPT

## 2020-07-28 PROCEDURE — 74011000258 HC RX REV CODE- 258: Performed by: HOSPITALIST

## 2020-07-28 PROCEDURE — 85610 PROTHROMBIN TIME: CPT

## 2020-07-28 PROCEDURE — 74011250637 HC RX REV CODE- 250/637: Performed by: NURSE PRACTITIONER

## 2020-07-28 PROCEDURE — 85018 HEMOGLOBIN: CPT

## 2020-07-28 PROCEDURE — 74011250636 HC RX REV CODE- 250/636: Performed by: INTERNAL MEDICINE

## 2020-07-28 PROCEDURE — 74011250637 HC RX REV CODE- 250/637: Performed by: FAMILY MEDICINE

## 2020-07-28 PROCEDURE — 87635 SARS-COV-2 COVID-19 AMP PRB: CPT

## 2020-07-28 PROCEDURE — C1894 INTRO/SHEATH, NON-LASER: HCPCS

## 2020-07-28 PROCEDURE — 74011000250 HC RX REV CODE- 250: Performed by: HOSPITALIST

## 2020-07-28 PROCEDURE — 80048 BASIC METABOLIC PNL TOTAL CA: CPT

## 2020-07-28 PROCEDURE — 77030020365 HC SOL INJ SOD CL 0.9% 50ML

## 2020-07-28 PROCEDURE — 85384 FIBRINOGEN ACTIVITY: CPT

## 2020-07-28 PROCEDURE — C9113 INJ PANTOPRAZOLE SODIUM, VIA: HCPCS | Performed by: INTERNAL MEDICINE

## 2020-07-28 PROCEDURE — 36415 COLL VENOUS BLD VENIPUNCTURE: CPT

## 2020-07-28 PROCEDURE — 74011000250 HC RX REV CODE- 250: Performed by: INTERNAL MEDICINE

## 2020-07-28 PROCEDURE — 74011250636 HC RX REV CODE- 250/636: Performed by: HOSPITALIST

## 2020-07-28 PROCEDURE — 84100 ASSAY OF PHOSPHORUS: CPT

## 2020-07-28 PROCEDURE — 85025 COMPLETE CBC W/AUTO DIFF WBC: CPT

## 2020-07-28 PROCEDURE — 05HA33Z INSERTION OF INFUSION DEVICE INTO LEFT BRACHIAL VEIN, PERCUTANEOUS APPROACH: ICD-10-PCS | Performed by: INTERNAL MEDICINE

## 2020-07-28 PROCEDURE — 83735 ASSAY OF MAGNESIUM: CPT

## 2020-07-28 PROCEDURE — C1751 CATH, INF, PER/CENT/MIDLINE: HCPCS

## 2020-07-28 RX ORDER — HEPARIN SODIUM 5000 [USP'U]/ML
80 INJECTION, SOLUTION INTRAVENOUS; SUBCUTANEOUS ONCE
Status: COMPLETED | OUTPATIENT
Start: 2020-07-28 | End: 2020-07-28

## 2020-07-28 RX ORDER — BACITRACIN 500 UNIT/G
1 PACKET (EA) TOPICAL AS NEEDED
Status: DISCONTINUED | OUTPATIENT
Start: 2020-07-28 | End: 2020-07-30 | Stop reason: HOSPADM

## 2020-07-28 RX ADMIN — CASTOR OIL AND BALSAM, PERU: 788; 87 OINTMENT TOPICAL at 18:44

## 2020-07-28 RX ADMIN — TAMSULOSIN HYDROCHLORIDE 0.4 MG: 0.4 CAPSULE ORAL at 08:20

## 2020-07-28 RX ADMIN — Medication 10 ML: at 06:01

## 2020-07-28 RX ADMIN — CASTOR OIL AND BALSAM, PERU: 788; 87 OINTMENT TOPICAL at 08:20

## 2020-07-28 RX ADMIN — CALCIUM GLUCONATE: 94 INJECTION, SOLUTION INTRAVENOUS at 18:45

## 2020-07-28 RX ADMIN — HEPARIN SODIUM 7650 UNITS: 5000 INJECTION, SOLUTION INTRAVENOUS; SUBCUTANEOUS at 22:59

## 2020-07-28 RX ADMIN — MIDODRINE HYDROCHLORIDE 20 MG: 5 TABLET ORAL at 15:42

## 2020-07-28 RX ADMIN — Medication 10 ML: at 18:51

## 2020-07-28 RX ADMIN — FINASTERIDE 5 MG: 5 TABLET, FILM COATED ORAL at 08:20

## 2020-07-28 RX ADMIN — Medication 10 ML: at 21:38

## 2020-07-28 RX ADMIN — Medication 10 ML: at 22:00

## 2020-07-28 RX ADMIN — SODIUM CHLORIDE 40 MG: 9 INJECTION INTRAMUSCULAR; INTRAVENOUS; SUBCUTANEOUS at 08:20

## 2020-07-28 RX ADMIN — MIDODRINE HYDROCHLORIDE 20 MG: 5 TABLET ORAL at 21:37

## 2020-07-28 RX ADMIN — ONDANSETRON 4 MG: 2 INJECTION INTRAMUSCULAR; INTRAVENOUS at 08:20

## 2020-07-28 RX ADMIN — ONDANSETRON 4 MG: 2 INJECTION INTRAMUSCULAR; INTRAVENOUS at 21:25

## 2020-07-28 RX ADMIN — ONDANSETRON 4 MG: 2 INJECTION INTRAMUSCULAR; INTRAVENOUS at 15:42

## 2020-07-28 RX ADMIN — SODIUM CHLORIDE 40 MG: 9 INJECTION INTRAMUSCULAR; INTRAVENOUS; SUBCUTANEOUS at 21:30

## 2020-07-28 RX ADMIN — MIDODRINE HYDROCHLORIDE 20 MG: 5 TABLET ORAL at 06:01

## 2020-07-28 RX ADMIN — ONDANSETRON 4 MG: 2 INJECTION INTRAMUSCULAR; INTRAVENOUS at 02:30

## 2020-07-28 NOTE — PROCEDURES
Midline Insertion and Progress Note    PRE-PROCEDURE VERIFICATION  Correct Procedure: yes  Correct Site:  yes  Temperature: Temp: 98.3 °F (36.8 °C), Temperature Source: Temp Source: Oral  Recent Labs     07/28/20  0431   BUN 18   CREA 0.65*      INR 1.2*   WBC 8.2     Allergies: Aspirin    PROCEDURE DETAIL  A single lumen power injectable midline IV catheter was started for additional reliable vascular access. Pt has existing right PICC. The following documentation is in addition to the Midline properties in the lines/airways flowsheet :  Lot #: [id number]  Catheter Total Length: 10 (cm)  External Catheter Length: 0 (cm)  Circumference: 30.5 (cm)  Vein Selection for Midline: left brachial  Complication Related to Insertion: attempted left basilic but unable to advance catheter    Line is okay to use. Report to Mo. Sherren Ely, BSN, RN, [facility]-BC   Vascular Access Team

## 2020-07-28 NOTE — PROGRESS NOTES
Physical Therapy  7/28/2020    Chart reviewed. RN requested PT hold treatment at this time due to elevated aPTT. F/u later vs tomorrow as medically appropriate for PT. Thank you.     Gerda Smith, PT, DPT

## 2020-07-28 NOTE — PROGRESS NOTES
[address] Adult  Hospitalist Group                                                                                          Hospitalist Progress Note  Nicole Buckley MD  Answering service: [phone] from in house phone        Date of Service:  2020  NAME:  Nicole Reveles  :  1934  MRN:  [id number]      Admission Summary: This is a very pleasant elderly gentleman who was admitted to the hospital on  with progressive nausea vomiting and right lower limb swelling and pain.  He was found to have extensive DVT in that limb as well as pulmonary embolisms.  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  He had ascites which was drained and sent for cytology results still pending.  While he is on anticoagulation for his thromboembolic disease he developed significant amount of GI bleed and endoscopy showed erosive esophagitis.   IVC filter placed on the  without complication. Williams Puri is still on TPN but tolerating ice chips without nausea or vomiting.      Interval history / Subjective:   Pt seen and examined with RN, doing good on heparin drip and TPN   I d/w GI and surgery its complicated that he has as citis and cant get a G-J tube at this time, he need to be on TPN  For months before re imaging to see if pancreatitis and ascitis resolved or not at that point may be a candidate to get  The procedure     D/w GI he will need to get bowel rest for a while ( months on TPN and thereafter GJ tube) hgb remains stable > 9  Renewed TPN     Assessment & Plan:     Assessment and Plan:     -Hemorrhagic shock: Due to GI bleed POA improving BP still low but stable - resolved   - GI bleed with Acute blood loss anemia due to erosive esophagitis:  Hemoglobin been stable,  continue PPI twice daily, EGD showed erosive esophagitis with evidence of recent bleed but no active bleeding.   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 Now placed on Heparin gtt for treatment in DVT/PE, watch for bleeding.   - Ok to switch to oral? Need GI clearence     - Head of the pancreas mass: Negative cytology from ascitic fluid.  Possible plan for EUS biopsy when out of the ICU Per GI  - need TPN to give rest to pancreas      - Hypotension:  On midodrone, off norepi. BP low may require albumin     -  Severe protein calorie malnourishment continue TPN for now, we will start clear liquids and see how he tolerates, replace electrolytes as indicated,     - mild hypokalemia mag WNL     Code status: DNR  DVT prophylaxis: On heparin    Care Plan discussed with: Patient/Family and Nurse  Anticipated Disposition: Home w/Family  Anticipated Discharge: 24 hours to 48 hours to LTC with TPN      Hospital Problems  Date Reviewed: 7/14/2020          Codes Class Noted POA    * (Principal) D[facility] venous thrombosis) ([facility] ICD-10-CM: I82.409  ICD-9-CM: 453.40  7/14/2020 Yes                Review of Systems:   A comprehensive review of systems was negative. Vital Signs:    Last 24hrs VS reviewed since prior progress note. Most recent are:  Visit Vitals  /56   Pulse 83   Temp 98.5 °F (36.9 °C)   Resp 14   Ht 5' 10\" (1.778 m)   Wt 95.7 kg (210 lb 15.7 oz)   SpO2 97%   BMI 30.27 kg/m²         Intake/Output Summary (Last 24 hours) at 7/28/2020 0935  Last data filed at 7/28/2020 0316  Gross per 24 hour   Intake 91.63 ml   Output 1040 ml   Net -948. 37 ml        Physical Examination:             Constitutional:  No acute distress,   ENT:  Oral mucosa moist    Resp:  CTA bilaterally. No wheezing/rhonchi/rales.     CV:  Regular rhythm, normal rate, no murmurs, gallops, rubs    GI:  Soft, non distended, non tender bs +    Musculoskeletal:  No edema, warm, 2+ pulses throughout oedema 1 +    Neurologic:  Moves all extremities. AAOx3, CN II-XII reviewed     Psych:  Good insight, Not anxious nor agitated. Data Review:    I personally reviewed  Image and labs    Xr Chest Pa Lat    Result Date: 7/13/2020  IMPRESSION: Left lower lobe pneumonia. Patchy right lower lobe pneumonia    Ct Head Wo Cont    Result Date: 7/19/2020  IMPRESSION: No acute findings. Cta Chest W Or W Wo Cont    Result Date: 7/13/2020  IMPRESSION: 1. Bilateral pulmonary emboli. 2. 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 3. Abundant ascites. Omental caking versus edema. 4. Small-moderate left pleural effusion. Basilar left lower lobe consolidation versus atelectasis. The findings were called to Dr. Dinah Kang on 7/13/2020 at 8:05 PM by myself. [address]    Result Date: 7/13/2020  IMPRESSION: 1. No evidence of hepatic vein, portal vein, or inferior vena cava thrombosis as questioned on PE CT earlier today. There is however right lower lobe pulmonary emboli in right common femoral deep venous thrombosis reported elsewhere earlier today. 2. Masslike fullness in the pancreatic head, resulting in intrahepatic and pancreatic duct dilatation as described above. Peripancreatic inflammatory changes, with 3.7 x 2.3 cm collection near the pancreatic tail possibly pseudocyst. Large amount of ascites, and moderate amount of slightly complex fluid/soft tissue in the left abdomen. Findings may represent pancreatitis or pancreatic neoplasm. Correlation with any prior imaging would be helpful given the extensive abnormalities. 3. Small left pleural effusion and basilar atelectasis. 4. Edematous thickening of the gastric antrum and duodenum likely secondary to the pancreatic process. Ct Abd W Wo Cont    Result Date: 7/22/2020  IMPRESSION: 1.  Unchanged significant inflammation involving the pancreas with areas of low attenuation in the pancreatic head and pancreatic body measuring up to 4 cm. Given the amount of peripancreatic inflammation, this likely represents edema or necrosis related to pancreatitis, less likely pancreatic neoplasm. No significant pancreatic ductal dilatation. Focal fluid surrounding the pancreatic body and tail likely developing pseudocysts. A couple small foci of air anterior to the pancreatic body of uncertain significance. Extensive infiltration surrounding the portal vein and hepatic arteries. 2. Distal common bile duct obstruction likely related to inflammation in the pancreatic head. Moderate bowel wall thickening involving the gastric antrum and proximal duodenum likely reactive. 3. 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 4. Moderate ascites is again noted with nonspecific nodular infiltration of the anterior peritoneal cavity and omentum 5. Bibasilar airspace disease with small pleural effusions. Ir Plc Ivc Filter    Result Date: 7/20/2020  Impression: 1. Uneventful placement of retrievable IVC filter as described above. 2. Placement of the IVC filter just above the bifurcation of the inferior vena cava due to presence of a circumaortic left renal vein. Us Paracentesis Abd W Imaging    Result Date: 7/16/2020  IMPRESSION: Placement of a right lower quadrant 6 [place] paracentesis catheter. The catheter was left in place and attached to gravity drainage     Labs:     Recent Labs     07/28/20  0433 07/28/20  0431 07/27/20  0106   WBC  --  8.2 7.8   HGB 9.3* 9.4* 9.1*   HCT 28.9* 29.2* 27.9*   PLT  --  164 162     Recent Labs     07/28/20  0433 07/28/20  0431 07/27/20  0106 07/26/20  0353   NA  --  141 141 141   K  --  3.7 3.3* 3.8   CL  --  111* 112* 114*   CO2  --  22 24 21   BUN  --  18 19 21*   CREA  --  0.65* 0.69* 0.69*   GLU  --  97 130* 171*   CA  --  7.9* 7.6* 7.7*   MG 2.1  --  1.9 1.9   PHOS 3.2  --  3.3 3.5     No results for input(s): ALT, AP, TBIL, TBILI, TP, ALB, GLOB, GGT, AML, LPSE in the last 72 hours.     No lab exists for component: SGOT, GPT, AMYP, HLPSE  Recent Labs     07/28/20  0431 07/27/20  2119 07/27/20  1531  07/27/20  0106  07/26/20  0353   INR 1.2*  --   --   --  1.2*  --  1.3*   PTP 12.0*  --   --   --  12.5*  --  12.9*   APTT 69.0* 45.9* 62.9*   < > 70.2*   < > >130.0*    < > = values in this interval not displayed. No results for input(s): FE, TIBC, PSAT, FERR in the last 72 hours. No results found for: FOL, RBCF   No results for input(s): PH, PCO2, PO2 in the last 72 hours. No results for input(s): CPK, CKNDX, TROIQ in the last 72 hours.     No lab exists for component: CPKMB  Lab Results   Component Value Date/Time    Cholesterol, total 250 (H) 10/02/2013 01:55 PM    HDL Cholesterol 53 10/02/2013 01:55 PM    LDL, calculated 176 (H) 10/02/2013 01:55 PM    Triglyceride 244 (H) 07/23/2020 04:41 AM    CHOL/HDL Ratio 3.4 10/06/2010 10:25 AM     Lab Results   Component Value Date/Time    Glucose (POC) 103 (H) 07/28/2020 05:21 AM    Glucose (POC) 112 (H) 07/27/2020 11:55 PM    Glucose (POC) 126 (H) 07/27/2020 06:19 PM    Glucose (POC) 120 (H) 07/27/2020 11:08 AM    Glucose (POC) 131 (H) 07/27/2020 06:10 AM     Lab Results   Component Value Date/Time    Color YELLOW/STRAW 07/13/2020 04:27 PM    Appearance CLEAR 07/13/2020 04:27 PM    Specific gravity 1.012 07/13/2020 04:27 PM    pH (UA) 6.0 07/13/2020 04:27 PM    Protein Negative 07/13/2020 04:27 PM    Glucose Negative 07/13/2020 04:27 PM    Ketone TRACE (A) 07/13/2020 04:27 PM    Bilirubin Negative 07/13/2020 04:27 PM    Urobilinogen 1.0 07/13/2020 04:27 PM    Nitrites Negative 07/13/2020 04:27 PM    Leukocyte Esterase Negative 07/13/2020 04:27 PM    Epithelial cells FEW 07/13/2020 04:27 PM    Bacteria Negative 07/13/2020 04:27 PM    WBC 0-4 07/13/2020 04:27 PM    RBC 0-5 07/13/2020 04:27 PM         Medications Reviewed:     Current Facility-Administered Medications   Medication Dose Route Frequency    TPN ADULT - CENTRAL   IntraVENous CONTINUOUS    TPN ADULT - CENTRAL   IntraVENous CONTINUOUS    midodrine (PROAMATINE) tablet 20 mg  20 mg Oral Q8H    fat emulsion 20% (LIPOSYN, INTRAlipid) infusion 500 mL  500 mL IntraVENous Q MON, WED & FRI    glucose chewable tablet 16 g  4 Tab Oral PRN    glucagon (GLUCAGEN) injection 1 mg  1 mg IntraMUSCular PRN    dextrose 10% infusion 0-250 mL  0-250 mL IntraVENous PRN    insulin lispro (HUMALOG) injection   SubCUTAneous Q6H    melatonin tablet 3 mg  3 mg Oral QHS PRN    0.9% sodium chloride infusion 250 mL  250 mL IntraVENous PRN    lactated Ringers infusion  25 mL/hr IntraVENous CONTINUOUS    0.9% sodium chloride infusion 250 mL  250 mL IntraVENous PRN    sodium chloride (NS) flush 5-40 mL  5-40 mL IntraVENous Q8H    sodium chloride (NS) flush 5-40 mL  5-40 mL IntraVENous PRN    simethicone (MYLICON) 42GQ/6.3VQ oral drops 80 mg  1.2 mL Oral Multiple    pantoprazole (PROTONIX) 40 mg in 0.9% sodium chloride 10 mL injection  40 mg IntraVENous Q12H    heparin 25,000 units in D5W 250 ml infusion  18-36 Units/kg/hr IntraVENous TITRATE    benzonatate (TESSALON) capsule 100 mg  100 mg Oral TID PRN    guaiFENesin (ROBITUSSIN) 100 mg/5 mL oral liquid 100 mg  100 mg Oral Q4H PRN    ondansetron (ZOFRAN) injection 4 mg  4 mg IntraVENous Q6H    prochlorperazine (COMPAZINE) tablet 5 mg  5 mg Oral Q6H PRN    finasteride (PROSCAR) tablet 5 mg  5 mg Oral DAILY    tamsulosin (FLOMAX) capsule 0.4 mg  0.4 mg Oral DAILY    sodium chloride (NS) flush 5-40 mL  5-40 mL IntraVENous Q8H    sodium chloride (NS) flush 5-40 mL  5-40 mL IntraVENous PRN    acetaminophen (TYLENOL) tablet 650 mg  650 mg Oral Q6H PRN    balsam peru-castor oiL (VENELEX) ointment   Topical BID     ______________________________________________________________________  EXPECTED LENGTH OF STAY: 4d 4h  ACTUAL LENGTH OF STAY:          13                 Kenneth Meyer MD

## 2020-07-28 NOTE — ROUTINE PROCESS
TIMMY:     RUR 25%     Disposition: Patient accepted to [facility]- They will accept patient with TPN per Luma    Patient to be discharged on Thursday following a neg Covid-19 test result. COVID-19 test required.  notified Dr. Marvin Olivera of [facility] # [phone] received. Patient will need transportation arranged at discharge     Jayna Espinal with [facility] notified of plan to discharge patient on Thursday    UAI completed today by ANASTASIA Steinberg     Y. Paco Hickman, [address], RN, CRM.     Patient remains on TPN as surgery recommended continued TPN for nutrition or consideration of attempted passage of a distal Dobbhoff tube for jejunal tube feeds as a J-tube placement will put the patient at high risk for bacterial peritonitis due to his ascites.

## 2020-07-28 NOTE — PROGRESS NOTES
Problem: Falls - Risk of  Goal: *Absence of Falls  Description: Document Valeri Maki Fall Risk and appropriate interventions in the flowsheet. Outcome: Progressing Towards Goal  Note: Fall Risk Interventions:  Mobility Interventions: Communicate number of staff needed for ambulation/transfer    Mentation Interventions: Toileting rounds, Door open when patient unattended, Bed/chair exit alarm    Medication Interventions: Teach patient to arise slowly    Elimination Interventions: Call light in reach, Toileting schedule/hourly rounds    History of Falls Interventions: Door open when patient unattended         Problem: Patient Education: Go to Patient Education Activity  Goal: Patient/Family Education  Outcome: Progressing Towards Goal     Problem: General Medical Care Plan  Goal: *Absence of infection signs and symptoms  Outcome: Progressing Towards Goal     Problem: Deep Venous Thrombosis - Risk of  Goal: *Absence of deep venous thrombosis signs and symptoms(Stroke Metric)  Outcome: Progressing Towards Goal  Goal: *Absence of impaired coagulation signs and symptoms  Outcome: Progressing Towards Goal  Goal: *Knowledge of prescribed medications  Outcome: Progressing Towards Goal  Goal: *Absence of bleeding  Outcome: Progressing Towards Goal     Problem: Patient Education: Go to Patient Education Activity  Goal: Patient/Family Education  Outcome: Progressing Towards Goal     Problem: Patient Education: Go to Patient Education Activity  Goal: Patient/Family Education  Outcome: Progressing Towards Goal     Problem: Nutrition Deficit  Goal: *Optimize nutritional status  Outcome: Progressing Towards Goal     Problem: Pressure Injury - Risk of  Goal: *Prevention of pressure injury  Description: Document Joseph Scale and appropriate interventions in the flowsheet.   Outcome: Progressing Towards Goal  Note: Pressure Injury Interventions:  Sensory Interventions: Assess changes in LOC, Check visual cues for pain, Pressure redistribution bed/mattress (bed type)    Moisture Interventions: Absorbent underpads, Check for incontinence Q2 hours and as needed, Minimize layers    Activity Interventions: Increase time out of bed, Pressure redistribution bed/mattress(bed type)    Mobility Interventions: Float heels, Pressure redistribution bed/mattress (bed type)    Nutrition Interventions: Document food/fluid/supplement intake    Friction and Shear Interventions: Lift sheet                Problem: Patient Education: Go to Patient Education Activity  Goal: Patient/Family Education  Outcome: Progressing Towards Goal     Problem: Pulmonary Embolism Care Plan (Adult)  Goal: *Improvement of existing pulmonary embolism  Outcome: Progressing Towards Goal  Goal: *Absence of bleeding  Outcome: Progressing Towards Goal  Goal: *Labs within defined limits  Outcome: Progressing Towards Goal     Problem: Patient Education: Go to Patient Education Activity  Goal: Patient/Family Education  Outcome: Progressing Towards Goal

## 2020-07-28 NOTE — PROGRESS NOTES
Hematology-Oncology Progress Note    Jacky Tavera  1934  [id number]  7/28/2020    Follow-up for: pancreatic mass/DVT/PE     [x]        Chart notes since last visit reviewed   [x]        Medications reviewed for allergies and interactions       Case discussed with the following:         []                            []        Nursing Staff                                                                         []        Pathologist                                                                        []        FAMILY      Subjective:     Spoke with patient who complains of:  comfortable this am, stronger. this am, no c/o abd pain    Objective:     Patient Vitals for the past 24 hrs:   BP Temp Pulse Resp SpO2 Weight   07/28/20 0816 109/56 98.5 °F (36.9 °C) 83 14 97 % --   07/28/20 0316 100/52 97.5 °F (36.4 °C) 94 18 97 % --   07/28/20 0016 99/50 98.5 °F (36.9 °C) 82 18 98 % 95.7 kg (210 lb 15.7 oz)   07/27/20 1900 105/45 98.3 °F (36.8 °C) 81 18 98 % --   07/27/20 1458 103/52 98.2 °F (36.8 °C) 86 18 99 % --   07/27/20 1353 106/62 -- 91 -- -- --   07/27/20 1350 (!) 83/48 -- (!) 114 -- -- --   07/27/20 1345 103/54 -- (!) 112 -- -- --   07/27/20 1343 100/58 -- (!) 112 -- -- --   07/27/20 1220 119/61 98.1 °F (36.7 °C) 61 15 99 % --       REVIEW OF SYSTEMS:    Constitutional: negative fever, negative chills, negative weight loss  Eyes:   negative visual changes  ENT:   negative sore throat, tongue or lip swelling  Respiratory:  negative cough, negative dyspnea  Cards:  negative for chest pain, palpitations, lower extremity edema  GI:   negative for nausea, vomiting, diarrhea, and abdominal pain  Neuro:  negative for headaches, dizziness, vertigo  [x]                        Full ROS o/w normal/non contributor    Constitutional:  Patient looks  []        Sick  [x]        Frail  [x]        Better                                                 []        Depressed    HEENT:  [x]   NC []   AT               []    ALOPECIA           Eyes: [x]   Normal               []    Icteric  Oropharynx: [x]    Normal                  []  Thrush               []   Dry  Neck:   [x]   Supple                  []  Rigid               JVD:    [x]   ABSENT       []   PRESENT  Aerating well         Extr:    [x]  Lymphedema             []   Cyanosis      []  Clubbing  Edema:     []   NONE       [x]   PRESENT r>L le,,    Skin:  Intact []           Purpura []        Rash: [x]   ABSENT       []  PRESENT  Neuro:  []        Normal  [x]        Confused      Available labs reviewed:  Labs:    Recent Results (from the past 24 hour(s))   PTT    Collection Time: 07/27/20  3:31 PM   Result Value Ref Range    aPTT 62.9 (H) 22.1 - 32.0 sec    aPTT, therapeutic range     58.0 - 77.0 SECS   GLUCOSE, POC    Collection Time: 07/27/20  6:19 PM   Result Value Ref Range    Glucose (POC) 126 (H) 65 - 100 mg/dL    Performed by Mazin Enamorado (GERALD)    PTT    Collection Time: 07/27/20  9:19 PM   Result Value Ref Range    aPTT 45.9 (H) 22.1 - 32.0 sec    aPTT, therapeutic range     58.0 - 77.0 SECS   GLUCOSE, POC    Collection Time: 07/27/20 11:55 PM   Result Value Ref Range    Glucose (POC) 112 (H) 65 - 100 mg/dL    Performed by [facility]    PROTHROMBIN TIME + INR    Collection Time: 07/28/20  4:31 AM   Result Value Ref Range    INR 1.2 (H) 0.9 - 1.1      Prothrombin time 12.0 (H) 9.0 - 11.1 sec   PTT    Collection Time: 07/28/20  4:31 AM   Result Value Ref Range    aPTT 69.0 (H) 22.1 - 32.0 sec    aPTT, therapeutic range     58.0 - 77.0 SECS   FIBRINOGEN    Collection Time: 07/28/20  4:31 AM   Result Value Ref Range    Fibrinogen 206 200 - 348 mg/dL   METABOLIC PANEL, BASIC    Collection Time: 07/28/20  4:31 AM   Result Value Ref Range    Sodium 141 136 - 145 mmol/L    Potassium 3.7 3.5 - 5.1 mmol/L    Chloride 111 (H) 97 - 108 mmol/L    CO2 22 21 - 32 mmol/L    Anion gap 8 5 - 15 mmol/L    Glucose 97 65 - 100 mg/dL    BUN 18 6 - 20 MG/DL Creatinine 0.65 (L) 0.70 - 1.30 MG/DL    BUN/Creatinine ratio 28 (H) 12 - 20      GFR est AA >60 >60 ml/min/1.73m2    GFR est non-AA >60 >60 ml/min/1.73m2    Calcium 7.9 (L) 8.5 - 10.1 MG/DL   CBC WITH AUTOMATED DIFF    Collection Time: 07/28/20  4:31 AM   Result Value Ref Range    WBC 8.2 4.1 - 11.1 K/uL    RBC 2.89 (L) 4.10 - 5.70 M/uL    HGB 9.4 (L) 12.1 - 17.0 g/dL    HCT 29.2 (L) 36.6 - 50.3 %    .0 (H) 80.0 - 99.0 FL    MCH 32.5 26.0 - 34.0 PG    MCHC 32.2 30.0 - 36.5 g/dL    RDW 20.4 (H) 11.5 - 14.5 %    PLATELET 085 841 - 025 K/uL    MPV 10.7 8.9 - 12.9 FL    NRBC 0.0 0  WBC    ABSOLUTE NRBC 0.00 0.00 - 0.01 K/uL    NEUTROPHILS 67 32 - 75 %    BAND NEUTROPHILS 2 0 - 6 %    LYMPHOCYTES 20 12 - 49 %    MONOCYTES 5 5 - 13 %    EOSINOPHILS 4 0 - 7 %    BASOPHILS 0 0 - 1 %    METAMYELOCYTES 1 (H) 0 %    MYELOCYTES 1 (H) 0 %    IMMATURE GRANULOCYTES 0 %    ABS. NEUTROPHILS 5.7 1.8 - 8.0 K/UL    ABS. LYMPHOCYTES 1.6 0.8 - 3.5 K/UL    ABS. MONOCYTES 0.4 0.0 - 1.0 K/UL    ABS. EOSINOPHILS 0.3 0.0 - 0.4 K/UL    ABS. BASOPHILS 0.0 0.0 - 0.1 K/UL    ABS. IMM.  GRANS. 0.0 K/UL    DF MANUAL      PLATELET COMMENTS Large Platelets      RBC COMMENTS ATYPICAL LYMPHOCYTES PRESENT  ANISOCYTOSIS  2+        RBC COMMENTS MACROCYTOSIS  1+        RBC COMMENTS BLUE CELLS  1+        RBC COMMENTS OVALOCYTES  1+       MAGNESIUM    Collection Time: 07/28/20  4:33 AM   Result Value Ref Range    Magnesium 2.1 1.6 - 2.4 mg/dL   PHOSPHORUS    Collection Time: 07/28/20  4:33 AM   Result Value Ref Range    Phosphorus 3.2 2.6 - 4.7 MG/DL   HGB & HCT    Collection Time: 07/28/20  4:33 AM   Result Value Ref Range    HGB 9.3 (L) 12.1 - 17.0 g/dL    HCT 28.9 (L) 36.6 - 50.3 %   GLUCOSE, POC    Collection Time: 07/28/20  5:21 AM   Result Value Ref Range    Glucose (POC) 103 (H) 65 - 100 mg/dL    Performed by Sugey Jim  PCT    PTT    Collection Time: 07/28/20 10:01 AM   Result Value Ref Range    aPTT >130.0 (HH) 22.1 - 32.0 sec aPTT, therapeutic range     58.0 - 77.0 SECS   GLUCOSE, POC    Collection Time: 07/28/20 11:01 AM   Result Value Ref Range    Glucose (POC) 114 (H) 65 - 100 mg/dL    Performed by Helen Lieberman reviewed:    Chemotherapy monitored and toxicities assessed:    Assessment and Plan   1. Rle DVT/PE. .. Pt had gi bleed after starting eliquis,, currently off heparin and eliquis. ..  ivc filter placed emergently on 9/17 without complication     2. Pancreatic mass,,pt had ascites examined for cytology which was negative,,, GI feels the mass is inflammatory and related to pancreatitis    3. Anemia. hgb 9.3 today. . Secondary to gi bleed. . had 2cm ulcer seen on egd last week  Transfuse prn hgb<8 in this setting    Plans for PEG/J tube noted    Julio Sahni MD

## 2020-07-28 NOTE — ROUTINE PROCESS
Faculty or Preceptor Review of Student Work    7/28/2020  - Shift times - 0730 to 1300    The student documentation of patient care for Luis Fernando Hein has been reviewed and approved. All medications have been administered under the direct supervision of the faculty or preceptor.     Flor Vazquez RN

## 2020-07-29 LAB
ANION GAP SERPL CALC-SCNC: 7 MMOL/L (ref 5–15)
APTT PPP: 125.4 SEC (ref 22.1–32)
APTT PPP: 73.1 SEC (ref 22.1–32)
BASOPHILS # BLD: 0 K/UL (ref 0–0.1)
BASOPHILS NFR BLD: 0 % (ref 0–1)
BUN SERPL-MCNC: 19 MG/DL (ref 6–20)
BUN/CREAT SERPL: 31 (ref 12–20)
CALCIUM SERPL-MCNC: 8.2 MG/DL (ref 8.5–10.1)
CHLORIDE SERPL-SCNC: 112 MMOL/L (ref 97–108)
CO2 SERPL-SCNC: 22 MMOL/L (ref 21–32)
CREAT SERPL-MCNC: 0.61 MG/DL (ref 0.7–1.3)
DIFFERENTIAL METHOD BLD: ABNORMAL
EOSINOPHIL # BLD: 0.4 K/UL (ref 0–0.4)
EOSINOPHIL NFR BLD: 5 % (ref 0–7)
ERYTHROCYTE [DISTWIDTH] IN BLOOD BY AUTOMATED COUNT: 20.9 % (ref 11.5–14.5)
FIBRINOGEN PPP-MCNC: 255 MG/DL (ref 200–475)
GLUCOSE BLD STRIP.AUTO-MCNC: 103 MG/DL (ref 65–100)
GLUCOSE BLD STRIP.AUTO-MCNC: 112 MG/DL (ref 65–100)
GLUCOSE BLD STRIP.AUTO-MCNC: 113 MG/DL (ref 65–100)
GLUCOSE BLD STRIP.AUTO-MCNC: 120 MG/DL (ref 65–100)
GLUCOSE BLD STRIP.AUTO-MCNC: 87 MG/DL (ref 65–100)
GLUCOSE SERPL-MCNC: 125 MG/DL (ref 65–100)
HCT VFR BLD AUTO: 28.9 % (ref 36.6–50.3)
HGB BLD-MCNC: 9.2 G/DL (ref 12.1–17)
IMM GRANULOCYTES # BLD AUTO: 0 K/UL
IMM GRANULOCYTES NFR BLD AUTO: 0 %
INR PPP: 1.2 (ref 0.9–1.1)
LYMPHOCYTES # BLD: 0.9 K/UL (ref 0.8–3.5)
LYMPHOCYTES NFR BLD: 13 % (ref 12–49)
MAGNESIUM SERPL-MCNC: 2.1 MG/DL (ref 1.6–2.4)
MCH RBC QN AUTO: 32.5 PG (ref 26–34)
MCHC RBC AUTO-ENTMCNC: 31.8 G/DL (ref 30–36.5)
MCV RBC AUTO: 102.1 FL (ref 80–99)
METAMYELOCYTES NFR BLD MANUAL: 2 %
MONOCYTES # BLD: 0.7 K/UL (ref 0–1)
MONOCYTES NFR BLD: 10 % (ref 5–13)
NEUTS SEG # BLD: 4.9 K/UL (ref 1.8–8)
NEUTS SEG NFR BLD: 70 % (ref 32–75)
NRBC # BLD: 0 K/UL (ref 0–0.01)
NRBC BLD-RTO: 0 PER 100 WBC
PHOSPHATE SERPL-MCNC: 3.4 MG/DL (ref 2.6–4.7)
PLATELET # BLD AUTO: 155 K/UL (ref 150–400)
PMV BLD AUTO: 10.6 FL (ref 8.9–12.9)
POTASSIUM SERPL-SCNC: 4.7 MMOL/L (ref 3.5–5.1)
PROTHROMBIN TIME: 12 SEC (ref 9–11.1)
RBC # BLD AUTO: 2.83 M/UL (ref 4.1–5.7)
RBC MORPH BLD: ABNORMAL
SARS-COV-2, COV2: NOT DETECTED
SERVICE CMNT-IMP: ABNORMAL
SERVICE CMNT-IMP: NORMAL
SODIUM SERPL-SCNC: 141 MMOL/L (ref 136–145)
SOURCE, COVRS: NORMAL
SPECIMEN SOURCE, FCOV2M: NORMAL
THERAPEUTIC RANGE,PTTT: ABNORMAL SECS (ref 58–77)
THERAPEUTIC RANGE,PTTT: ABNORMAL SECS (ref 58–77)
WBC # BLD AUTO: 7 K/UL (ref 4.1–11.1)

## 2020-07-29 PROCEDURE — 74011000250 HC RX REV CODE- 250: Performed by: HOSPITALIST

## 2020-07-29 PROCEDURE — 85610 PROTHROMBIN TIME: CPT

## 2020-07-29 PROCEDURE — 74011250636 HC RX REV CODE- 250/636: Performed by: INTERNAL MEDICINE

## 2020-07-29 PROCEDURE — 83735 ASSAY OF MAGNESIUM: CPT

## 2020-07-29 PROCEDURE — 65660000000 HC RM CCU STEPDOWN

## 2020-07-29 PROCEDURE — 74011250636 HC RX REV CODE- 250/636: Performed by: NURSE PRACTITIONER

## 2020-07-29 PROCEDURE — 85384 FIBRINOGEN ACTIVITY: CPT

## 2020-07-29 PROCEDURE — 74011000258 HC RX REV CODE- 258: Performed by: HOSPITALIST

## 2020-07-29 PROCEDURE — 85730 THROMBOPLASTIN TIME PARTIAL: CPT

## 2020-07-29 PROCEDURE — 74011000250 HC RX REV CODE- 250: Performed by: INTERNAL MEDICINE

## 2020-07-29 PROCEDURE — C1751 CATH, INF, PER/CENT/MIDLINE: HCPCS

## 2020-07-29 PROCEDURE — 74011250637 HC RX REV CODE- 250/637: Performed by: NURSE PRACTITIONER

## 2020-07-29 PROCEDURE — 85025 COMPLETE CBC W/AUTO DIFF WBC: CPT

## 2020-07-29 PROCEDURE — 97110 THERAPEUTIC EXERCISES: CPT

## 2020-07-29 PROCEDURE — 82962 GLUCOSE BLOOD TEST: CPT

## 2020-07-29 PROCEDURE — 74011250637 HC RX REV CODE- 250/637: Performed by: FAMILY MEDICINE

## 2020-07-29 PROCEDURE — 80048 BASIC METABOLIC PNL TOTAL CA: CPT

## 2020-07-29 PROCEDURE — 74011250636 HC RX REV CODE- 250/636: Performed by: HOSPITALIST

## 2020-07-29 PROCEDURE — 84100 ASSAY OF PHOSPHORUS: CPT

## 2020-07-29 PROCEDURE — C9113 INJ PANTOPRAZOLE SODIUM, VIA: HCPCS | Performed by: INTERNAL MEDICINE

## 2020-07-29 PROCEDURE — 36415 COLL VENOUS BLD VENIPUNCTURE: CPT

## 2020-07-29 PROCEDURE — 97530 THERAPEUTIC ACTIVITIES: CPT

## 2020-07-29 PROCEDURE — 97116 GAIT TRAINING THERAPY: CPT

## 2020-07-29 RX ORDER — ENOXAPARIN SODIUM 100 MG/ML
1 INJECTION SUBCUTANEOUS EVERY 12 HOURS
Status: DISCONTINUED | OUTPATIENT
Start: 2020-07-29 | End: 2020-07-30 | Stop reason: HOSPADM

## 2020-07-29 RX ADMIN — CALCIUM GLUCONATE: 94 INJECTION, SOLUTION INTRAVENOUS at 18:19

## 2020-07-29 RX ADMIN — CASTOR OIL AND BALSAM, PERU: 788; 87 OINTMENT TOPICAL at 18:31

## 2020-07-29 RX ADMIN — ONDANSETRON 4 MG: 2 INJECTION INTRAMUSCULAR; INTRAVENOUS at 05:01

## 2020-07-29 RX ADMIN — MIDODRINE HYDROCHLORIDE 20 MG: 5 TABLET ORAL at 05:55

## 2020-07-29 RX ADMIN — ENOXAPARIN SODIUM 90 MG: 100 INJECTION SUBCUTANEOUS at 23:49

## 2020-07-29 RX ADMIN — SODIUM CHLORIDE 40 MG: 9 INJECTION INTRAMUSCULAR; INTRAVENOUS; SUBCUTANEOUS at 09:14

## 2020-07-29 RX ADMIN — MIDODRINE HYDROCHLORIDE 20 MG: 5 TABLET ORAL at 22:04

## 2020-07-29 RX ADMIN — ENOXAPARIN SODIUM 90 MG: 100 INJECTION SUBCUTANEOUS at 11:41

## 2020-07-29 RX ADMIN — Medication 10 ML: at 13:22

## 2020-07-29 RX ADMIN — I.V. FAT EMULSION 500 ML: 20 EMULSION INTRAVENOUS at 18:19

## 2020-07-29 RX ADMIN — ONDANSETRON 4 MG: 2 INJECTION INTRAMUSCULAR; INTRAVENOUS at 09:14

## 2020-07-29 RX ADMIN — Medication 10 ML: at 23:53

## 2020-07-29 RX ADMIN — FINASTERIDE 5 MG: 5 TABLET, FILM COATED ORAL at 09:14

## 2020-07-29 RX ADMIN — MIDODRINE HYDROCHLORIDE 20 MG: 5 TABLET ORAL at 13:21

## 2020-07-29 RX ADMIN — SODIUM CHLORIDE 40 MG: 9 INJECTION INTRAMUSCULAR; INTRAVENOUS; SUBCUTANEOUS at 22:04

## 2020-07-29 RX ADMIN — TAMSULOSIN HYDROCHLORIDE 0.4 MG: 0.4 CAPSULE ORAL at 09:14

## 2020-07-29 RX ADMIN — Medication 10 ML: at 13:21

## 2020-07-29 RX ADMIN — CASTOR OIL AND BALSAM, PERU: 788; 87 OINTMENT TOPICAL at 09:14

## 2020-07-29 NOTE — PROGRESS NOTES
[facility]  Hospitalist Group                                                                                          Hospitalist Progress Note  Claudia Hsu MD  Answering service: [phone] from in house phone        Date of Service:  2020  NAME:  Jenelle Junior  :  1934  MRN:  [id number]      Admission Summary: This is a very pleasant elderly gentleman who was admitted to the hospital on  with progressive nausea vomiting and right lower limb swelling and pain.  He was found to have extensive DVT in that limb as well as pulmonary embolisms.  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  He had ascites which was drained and sent for cytology results still pending.  While he is on anticoagulation for his thromboembolic disease he developed significant amount of GI bleed and endoscopy showed erosive esophagitis.   IVC filter placed on the 91CF without complication. Philipp Everett is still on TPN but tolerating ice chips without nausea or vomiting.      Interval history / Subjective:   Pt seen and examined he is accepted for rehab in [place] on  I d/w GI and surgery its complicated that he has ascitis and cant get a G-J tube at this time, he need to be on TPN  Also changed anticoagulation from heparin drip to Lovenox due to the fact that he is unable to take anything by mouth need to continue for months before re imaging to see if pancreatitis and ascitis resolved or not at that point may be a candidate to get  The procedure  D/w GI he will need to get bowel rest for a while ( months on TPN and thereafter GJ tube) hgb remains stable > 9    He is accepted at [place] and possible transfer to a support with TPN and Lovenox on      Assessment & Plan:     Assessment and Plan:     -Hemorrhagic shock: Due to GI bleed POA improving BP still low but stable - resolved   - GI bleed with Acute blood loss anemia due to erosive esophagitis:  Hemoglobin been stable,  continue PPI twice daily, EGD showed erosive esophagitis with evidence of recent bleed but no active bleeding.   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 Now placed on Heparin gtt for treatment in DVT/PE, watch for bleeding.   - will change to Lovenox BID 1mg/kg he has filter as well     - Head of the pancreas mass: Negative cytology from ascitic fluid.  Possible plan for EUS biopsy when out of the ICU Per GI as above  - need TPN to give rest to pancreas for at least 3 months     - Hypotension:  On midodrone, off norepi. BP low may require albumin     -  Severe protein calorie malnourishment continue TPN for now, we will start clear liquids and see how he tolerates, replace electrolytes as indicated,     - mild hypokalemia mag WNL     Code status: DNR  DVT prophylaxis: On heparin    Care Plan discussed with: Patient/Family and Nurse  Anticipated Disposition: Home w/Family  Anticipated Discharge: 24 hours to 48 hours to LTC with TPN  May d/c lovenox as have a filter      Hospital Problems  Date Reviewed: 7/14/2020          Codes Class Noted POA    * (Principal) [facility] ICD-10-CM: I82.409  ICD-9-CM: 453.40  7/14/2020 Yes                Review of Systems:   A comprehensive review of systems was negative. Vital Signs:    Last 24hrs VS reviewed since prior progress note.  Most recent are:  Visit Vitals  /51   Pulse 79   Temp 98.3 °F (36.8 °C)   Resp 18   Ht 5' 10\" (1.778 m)   Wt 90.3 kg (199 lb 1.2 oz)   SpO2 100%   BMI 28.56 kg/m²         Intake/Output Summary (Last 24 hours) at 7/29/2020 0756  Last data filed at 7/29/2020 0510  Gross per 24 hour   Intake --   Output 625 ml   Net -625 ml        Physical Examination:             Constitutional:  No acute distress,   ENT:  Oral mucosa moist    Resp:  CTA bilaterally. No wheezing/rhonchi/rales. CV:  Regular rhythm, normal rate, no murmurs, gallops, rubs    GI:  Soft, non distended, non tender bs +    Musculoskeletal:  No edema, warm, 2+ pulses throughout oedema 1 +    Neurologic:  Moves all extremities. AAOx3, CN II-XII reviewed     Psych:  Good insight, Not anxious nor agitated. Data Review:    I personally reviewed  Image and labs    Xr Chest Pa Lat    Result Date: 7/13/2020  IMPRESSION: Left lower lobe pneumonia. Patchy right lower lobe pneumonia    Ct Head Wo Cont    Result Date: 7/19/2020  IMPRESSION: No acute findings. Cta Chest W Or W Wo Cont    Result Date: 7/13/2020  IMPRESSION: 1. Bilateral pulmonary emboli. 2. 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 3. Abundant ascites. Omental caking versus edema. 4. Small-moderate left pleural effusion. Basilar left lower lobe consolidation versus atelectasis. The findings were called to Dr. Qasim Dennis on 7/13/2020 at 8:05 PM by myself. [facility] [address]    Result Date: 7/13/2020  IMPRESSION: 1. No evidence of hepatic vein, portal vein, or inferior vena cava thrombosis as questioned on PE CT earlier today. There is however right lower lobe pulmonary emboli in right common femoral deep venous thrombosis reported elsewhere earlier today. 2. Masslike fullness in the pancreatic head, resulting in intrahepatic and pancreatic duct dilatation as described above. Peripancreatic inflammatory changes, with 3.7 x 2.3 cm collection near the pancreatic tail possibly pseudocyst. Large amount of ascites, and moderate amount of slightly complex fluid/soft tissue in the left abdomen. Findings may represent pancreatitis or pancreatic neoplasm. Correlation with any prior imaging would be helpful given the extensive abnormalities.  3. Small left pleural effusion and basilar atelectasis. 4. Edematous thickening of the gastric antrum and duodenum likely secondary to the pancreatic process. Ct Abd W Wo Cont    Result Date: 7/22/2020  IMPRESSION: 1. Unchanged significant inflammation involving the pancreas with areas of low attenuation in the pancreatic head and pancreatic body measuring up to 4 cm. Given the amount of peripancreatic inflammation, this likely represents edema or necrosis related to pancreatitis, less likely pancreatic neoplasm. No significant pancreatic ductal dilatation. Focal fluid surrounding the pancreatic body and tail likely developing pseudocysts. A couple small foci of air anterior to the pancreatic body of uncertain significance. Extensive infiltration surrounding the portal vein and hepatic arteries. 2. Distal common bile duct obstruction likely related to inflammation in the pancreatic head. Moderate bowel wall thickening involving the gastric antrum and proximal duodenum likely reactive. 3. 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 4. Moderate ascites is again noted with nonspecific nodular infiltration of the anterior peritoneal cavity and omentum 5. Bibasilar airspace disease with small pleural effusions. Ir Plc Ivc Filter    Result Date: 7/20/2020  Impression: 1. Uneventful placement of retrievable IVC filter as described above. 2. Placement of the IVC filter just above the bifurcation of the inferior vena cava due to presence of a circumaortic left renal vein. Us Paracentesis Abd W Imaging    Result Date: 7/16/2020  IMPRESSION: Placement of a right lower quadrant 6 [place] paracentesis catheter.  The catheter was left in place and attached to gravity drainage     Labs:     Recent Labs     07/29/20  0508 07/28/20  0433 07/28/20  0431   WBC 7.0  --  8.2   HGB 9.2* 9.3* 9.4*   HCT 28.9* 28.9* 29.2*     --  164     Recent Labs     07/29/20  0508 07/28/20  0433 07/28/20  0431 07/27/20  0106     --  141 141   K 4.7  --  3.7 3.3* *  --  111* 112*   CO2 22  --  22 24   BUN 19  --  18 19   CREA 0.61*  --  0.65* 0.69*   *  --  97 130*   CA 8.2*  --  7.9* 7.6*   MG 2.1 2.1  --  1.9   PHOS 3.4 3.2  --  3.3     No results for input(s): ALT, AP, TBIL, TBILI, TP, ALB, GLOB, GGT, AML, LPSE in the last 72 hours. No lab exists for component: SGOT, GPT, AMYP, HLPSE  Recent Labs     07/29/20  0508 07/28/20  2159 07/28/20  1301  07/28/20  0431  07/27/20  0106   INR 1.2*  --   --   --  1.2*  --  1.2*   PTP 12.0*  --   --   --  12.0*  --  12.5*   APTT 125.4* 37.5* 40.3*   < > 69.0*   < > 70.2*    < > = values in this interval not displayed. No results for input(s): FE, TIBC, PSAT, FERR in the last 72 hours. No results found for: FOL, RBCF   No results for input(s): PH, PCO2, PO2 in the last 72 hours. No results for input(s): CPK, CKNDX, TROIQ in the last 72 hours.     No lab exists for component: CPKMB  Lab Results   Component Value Date/Time    Cholesterol, total 250 (H) 10/02/2013 01:55 PM    HDL Cholesterol 53 10/02/2013 01:55 PM    LDL, calculated 176 (H) 10/02/2013 01:55 PM    Triglyceride 244 (H) 07/23/2020 04:41 AM    CHOL/HDL Ratio 3.4 10/06/2010 10:25 AM     Lab Results   Component Value Date/Time    Glucose (POC) 112 (H) 07/29/2020 05:03 AM    Glucose (POC) 120 (H) 07/29/2020 12:03 AM    Glucose (POC) 107 (H) 07/28/2020 06:22 PM    Glucose (POC) 114 (H) 07/28/2020 11:01 AM    Glucose (POC) 103 (H) 07/28/2020 05:21 AM     Lab Results   Component Value Date/Time    Color YELLOW/STRAW 07/13/2020 04:27 PM    Appearance CLEAR 07/13/2020 04:27 PM    Specific gravity 1.012 07/13/2020 04:27 PM    pH (UA) 6.0 07/13/2020 04:27 PM    Protein Negative 07/13/2020 04:27 PM    Glucose Negative 07/13/2020 04:27 PM    Ketone TRACE (A) 07/13/2020 04:27 PM    Bilirubin Negative 07/13/2020 04:27 PM    Urobilinogen 1.0 07/13/2020 04:27 PM    Nitrites Negative 07/13/2020 04:27 PM    Leukocyte Esterase Negative 07/13/2020 04:27 PM    Epithelial cells FEW 07/13/2020 04:27 PM    Bacteria Negative 07/13/2020 04:27 PM    WBC 0-4 07/13/2020 04:27 PM    RBC 0-5 07/13/2020 04:27 PM         Medications Reviewed:     Current Facility-Administered Medications   Medication Dose Route Frequency    TPN ADULT - CENTRAL   IntraVENous CONTINUOUS    enoxaparin (LOVENOX) injection 90 mg  1 mg/kg SubCUTAneous Q12H    alteplase (CATHFLO) 1 mg in sterile water (preservative free) 1 mL injection  1 mg InterCATHeter PRN    bacitracin 500 unit/gram packet 1 Packet  1 Packet Topical PRN    TPN ADULT - CENTRAL   IntraVENous CONTINUOUS    midodrine (PROAMATINE) tablet 20 mg  20 mg Oral Q8H    fat emulsion 20% (LIPOSYN, INTRAlipid) infusion 500 mL  500 mL IntraVENous Q MON, WED & FRI    glucose chewable tablet 16 g  4 Tab Oral PRN    glucagon (GLUCAGEN) injection 1 mg  1 mg IntraMUSCular PRN    dextrose 10% infusion 0-250 mL  0-250 mL IntraVENous PRN    insulin lispro (HUMALOG) injection   SubCUTAneous Q6H    melatonin tablet 3 mg  3 mg Oral QHS PRN    0.9% sodium chloride infusion 250 mL  250 mL IntraVENous PRN    lactated Ringers infusion  25 mL/hr IntraVENous CONTINUOUS    0.9% sodium chloride infusion 250 mL  250 mL IntraVENous PRN    sodium chloride (NS) flush 5-40 mL  5-40 mL IntraVENous Q8H    sodium chloride (NS) flush 5-40 mL  5-40 mL IntraVENous PRN    simethicone (MYLICON) 49AJ/5.1CW oral drops 80 mg  1.2 mL Oral Multiple    pantoprazole (PROTONIX) 40 mg in 0.9% sodium chloride 10 mL injection  40 mg IntraVENous Q12H    benzonatate (TESSALON) capsule 100 mg  100 mg Oral TID PRN    guaiFENesin (ROBITUSSIN) 100 mg/5 mL oral liquid 100 mg  100 mg Oral Q4H PRN    ondansetron (ZOFRAN) injection 4 mg  4 mg IntraVENous Q6H    prochlorperazine (COMPAZINE) tablet 5 mg  5 mg Oral Q6H PRN    finasteride (PROSCAR) tablet 5 mg  5 mg Oral DAILY    tamsulosin (FLOMAX) capsule 0.4 mg  0.4 mg Oral DAILY    sodium chloride (NS) flush 5-40 mL  5-40 mL IntraVENous Q8H    sodium chloride (NS) flush 5-40 mL  5-40 mL IntraVENous PRN    acetaminophen (TYLENOL) tablet 650 mg  650 mg Oral Q6H PRN    balsam peru-castor oiL (VENELEX) ointment   Topical BID     ______________________________________________________________________  EXPECTED LENGTH OF STAY: 4d 4h  ACTUAL LENGTH OF STAY:          Yenni Christopher MD

## 2020-07-29 NOTE — PROGRESS NOTES
Occupational Therapy: hold    Chart reviewed, noted last aPTT of 125.4. Per protocol, will hold OT until aPTT is in therapeutic range for mobility (58-92). Noted patient is expected to d/c to [facility] tomorrow. Will f/u as able and appropriate.     Kwame Boateng, OTR/L

## 2020-07-29 NOTE — PROGRESS NOTES
Verbal shift change report given to Steffen (oncoming nurse) by Myranda Arzola (offgoing nurse). Report included the following information SBAR, Kardex, Intake/Output, MAR and Cardiac Rhythm NSR.

## 2020-07-29 NOTE — PROGRESS NOTES
Problem: Mobility Impaired (Adult and Pediatric)  Goal: *Acute Goals and Plan of Care (Insert Text)  Description: FUNCTIONAL STATUS PRIOR TO ADMISSION: Patient was modified independent using a rolling walker and WB quad cane for functional mobility. HOME SUPPORT PRIOR TO ADMISSION: The patient lived with spouse, but did not require assist from spouse. Physical Therapy Goals  Reassessed 7/27/2020  1. Patient will move from supine to sit and sit to supine  in bed with modified independence within 7 day(s). 2.  Patient will transfer from bed to chair and chair to bed with minimal assistance/contact guard assist using the least restrictive device within 7 day(s). 3.  Patient will perform sit to stand with minimal assistance/contact guard assist within 7 day(s). 4.  Patient will ambulate with minimal assistance/contact guard assist for 20 feet with the least restrictive device within 7 day(s). Initiated 7/17/2020  1. Patient will move from supine to sit and sit to supine  in bed with modified independence within 7 day(s). 2.  Patient will transfer from bed to chair and chair to bed with minimal assistance/contact guard assist using the least restrictive device within 7 day(s). 3.  Patient will perform sit to stand with minimal assistance/contact guard assist within 7 day(s). 4.  Patient will ambulate with minimal assistance/contact guard assist for 50 feet with the least restrictive device within 7 day(s). 5.  Patient will ascend/descend 3 stairs with bilateral handrail(s) with minimal assistance/contact guard assist within 7 day(s).       Outcome: Progressing Towards Goal    PHYSICAL THERAPY TREATMENT  Patient: Iram Sport (89 y.o. male)  Date: 7/29/2020  Diagnosis: DVT (deep vein thrombosis) in pregnancy [O22.30]  DVT (deep venous thrombosis) ([facility]) [I82.409]   DVT (deep venous thrombosis) ([facility])  Procedure(s) (LRB):  ESOPHAGOGASTRODUODENOSCOPY (EGD) at bedside (N/A) 9 Days Post-Op  Precautions: Fall  Chart, physical therapy assessment, plan of care and goals were reviewed. ASSESSMENT  Patient continues with skilled PT services and is progressing towards goals. Pt received in bed and agreeable to therapy. Pt requires min A for bed mobility and transfers. Pt completed EOB exercises with supervision. Pt able to increase gait tolerance with RW and Min A. Pt returned to chair with BLEs elevated. Current Level of Function Impacting Discharge (mobility/balance): Min A    Other factors to consider for discharge: activity tolerance         PLAN :  Patient continues to benefit from skilled intervention to address the above impairments. Continue treatment per established plan of care. to address goals. Recommendation for discharge: (in order for the patient to meet his/her long term goals)  Therapy up to 5 days/week in SNF setting    This discharge recommendation:  Has been made in collaboration with the attending provider and/or case management    IF patient discharges home will need the following DME: to be determined (TBD)       SUBJECTIVE:   Patient stated Silverio Diaz has made me do it. - when asked if he wanted to walk    OBJECTIVE DATA SUMMARY:   Critical Behavior:  Neurologic State: Alert  Orientation Level: Oriented X4  Cognition: Appropriate decision making, Appropriate for age attention/concentration, Appropriate safety awareness, Follows commands  Safety/Judgement: Good awareness of safety precautions  Functional Mobility Training:  Bed Mobility:     Supine to Sit: Minimum assistance     Scooting: Stand-by assistance        Transfers:  Sit to Stand: Minimum assistance;Assist x2  Stand to Sit: Minimum assistance                             Balance:  Sitting: Intact  Standing: Impaired; With support  Standing - Static: Good  Standing - Dynamic : Fair  Ambulation/Gait Training:  Distance (ft): 50 Feet (ft)  Assistive Device: Gait belt;Walker, rolling  Ambulation - Level of Assistance: Minimal assistance;Assist x1        Gait Abnormalities: Decreased step clearance        Base of Support: Widened     Speed/Taylor: Pace decreased (<100 feet/min)  Step Length: Left shortened;Right shortened                            Therapeutic Exercises: Ankle pumps, LAQ, seated marches x10ea  Pain Rating:  none    Activity Tolerance:   Fair  Please refer to the flowsheet for vital signs taken during this treatment. After treatment patient left in no apparent distress:   Sitting in chair and Call bell within reach    COMMUNICATION/COLLABORATION:   The patients plan of care was discussed with: Registered nurse.      JACKELYN Ryan   Time Calculation: 24 mins

## 2020-07-29 NOTE — PROGRESS NOTES
Hematology-Oncology Progress Note      Loli Jacob  1934  [id number]  7/29/2020      Subjective:     No complaints. Understands he may be going to rehab soon.      Allergies: Aspirin  Current Facility-Administered Medications   Medication Dose Route Frequency Provider Last Rate Last Dose    TPN ADULT - CENTRAL   IntraVENous CONTINUOUS Anette Eldridge MD        enoxaparin (LOVENOX) injection 90 mg  1 mg/kg SubCUTAneous Q12H Anette Eldridge MD        alteplase (CATHFLO) 1 mg in sterile water (preservative free) 1 mL injection  1 mg InterCATHeter PRN Anette Eldridge MD        bacitracin 500 unit/gram packet 1 Packet  1 Packet Topical PRN Anette Eldridge MD        TPN ADULT - CENTRAL   IntraVENous CONTINUOUS Anette Eldridge MD 63 mL/hr at 07/28/20 1845      midodrine (PROAMATINE) tablet 20 mg  20 mg Oral Q8H Starmadiha Cleaning, NP   20 mg at 07/29/20 0555    fat emulsion 20% (LIPOSYN, INTRAlipid) infusion 500 mL  500 mL IntraVENous Q MON, WED & FRI Ishmael Pradhan MD   500 mL at 07/27/20 1825    glucose chewable tablet 16 g  4 Tab Oral PRN Ishmael Pradhan MD        glucagon (GLUCAGEN) injection 1 mg  1 mg IntraMUSCular PRN Ishmael Pradhan MD        dextrose 10% infusion 0-250 mL  0-250 mL IntraVENous PRN Ishmael Pradhan MD        insulin lispro (HUMALOG) injection   SubCUTAneous Q6H Ishmael Pradhan MD   Stopped at 07/27/20 0000    melatonin tablet 3 mg  3 mg Oral QHS PRN Yogesh POE NP-C   3 mg at 07/21/20 2337    0.9% sodium chloride infusion 250 mL  250 mL IntraVENous PRN Benja Arzimendi NP        lactated Ringers infusion  25 mL/hr IntraVENous CONTINUOUS Anette Eldridge MD 25 mL/hr at 07/27/20 2331 25 mL/hr at 07/27/20 2331    0.9% sodium chloride infusion 250 mL  250 mL IntraVENous PRN KENRICK Thompson        sodium chloride (NS) flush 5-40 mL  5-40 mL IntraVENous Q8H Judith Mckinley MD   10 mL at 07/28/20 2138    sodium chloride (NS) flush 5-40 mL  5-40 mL IntraVENous PRN Giacomo Moise MD   20 mL at 07/26/20 1807    simethicone (MYLICON) 44RP/6.1IV oral drops 80 mg  1.2 mL Oral Multiple Giacomo Moise MD        pantoprazole (PROTONIX) 40 mg in 0.9% sodium chloride 10 mL injection  40 mg IntraVENous Q12H Melara, [employer] M, DO   40 mg at 07/28/20 2130    benzonatate (TESSALON) capsule 100 mg  100 mg Oral TID PRN Ashley Shipman M, DO   100 mg at 07/1934    guaiFENesin (ROBITUSSIN) 100 mg/5 mL oral liquid 100 mg  100 mg Oral Q4H PRN Ashley Shipman M, DO   100 mg at 07/1934    ondansetron (ZOFRAN) injection 4 mg  4 mg IntraVENous Q6H Dartha Flood D, NP   4 mg at 07/29/20 0501    prochlorperazine (COMPAZINE) tablet 5 mg  5 mg Oral Q6H PRN Dartha Flood D, NP   5 mg at 07/15/20 2224    finasteride (PROSCAR) tablet 5 mg  5 mg Oral DAILY Jessie Guerra MD   5 mg at 07/28/20 0820    tamsulosin (FLOMAX) capsule 0.4 mg  0.4 mg Oral DAILY Jessie Guerra MD   0.4 mg at 07/28/20 0820    sodium chloride (NS) flush 5-40 mL  5-40 mL IntraVENous Q8H Jessie Guerra MD   10 mL at 07/28/20 2200    sodium chloride (NS) flush 5-40 mL  5-40 mL IntraVENous PRN Jessie Guerra MD   10 mL at 07/25/20 1121    acetaminophen (TYLENOL) tablet 650 mg  650 mg Oral Q6H PRN Jessie Guerra MD   650 mg at 07/18/20 1448    balsam peru-castor oiL (VENELEX) ointment   Topical BID Elina Lipscomb MD         Objective:     Patient Vitals for the past 24 hrs:   BP Temp Pulse Resp SpO2 Weight   07/29/20 0818 103/51 98 °F (36.7 °C) 77 18 97 % --   07/29/20 0502 105/51 98.3 °F (36.8 °C) 79 18 100 % --   07/29/20 0421 -- -- -- -- -- 90.3 kg (199 lb 1.2 oz)   07/28/20 2302 115/58 98.9 °F (37.2 °C) 76 20 99 % --   07/28/20 2113 102/52 -- 85 20 99 % --   07/28/20 1511 107/49 98.1 °F (36.7 °C) 81 20 99 % --   07/28/20 1130 115/57 98.3 °F (36.8 °C) 63 16 99 % --       Gen: NAD  HEENT: PERRL, Sclerae anicteric  Cv: RRR without m/r/g  Pulm: CTA bilaterally  Abd: NABS, NTND, No HSM  Ext: No c/c/e    Available labs reviewed:  Labs:    Recent Results (from the past 24 hour(s))   PTT    Collection Time: 07/28/20 10:01 AM   Result Value Ref Range    aPTT >130.0 (HH) 22.1 - 32.0 sec    aPTT, therapeutic range     58.0 - 77.0 SECS   GLUCOSE, POC    Collection Time: 07/28/20 11:01 AM   Result Value Ref Range    Glucose (POC) 114 (H) 65 - 100 mg/dL    Performed by Oliver Vasquez    PTT    Collection Time: 07/28/20  1:01 PM   Result Value Ref Range    aPTT 40.3 (H) 22.1 - 32.0 sec    aPTT, therapeutic range     58.0 - 77.0 SECS   SARS-COV-2    Collection Time: 07/28/20  4:37 PM   Result Value Ref Range    Specimen source Nasopharyngeal      SARS-CoV-2 Not detected NOTD      Specimen source Nasopharyngeal     GLUCOSE, POC    Collection Time: 07/28/20  6:22 PM   Result Value Ref Range    Glucose (POC) 107 (H) 65 - 100 mg/dL    Performed by Tayla Cid    PTT    Collection Time: 07/28/20  9:59 PM   Result Value Ref Range    aPTT 37.5 (H) 22.1 - 32.0 sec    aPTT, therapeutic range     58.0 - 77.0 SECS   GLUCOSE, POC    Collection Time: 07/29/20 12:03 AM   Result Value Ref Range    Glucose (POC) 120 (H) 65 - 100 mg/dL    Performed by Emely Farooq, POC    Collection Time: 07/29/20  5:03 AM   Result Value Ref Range    Glucose (POC) 112 (H) 65 - 100 mg/dL    Performed by Sriram Barone    PROTHROMBIN TIME + INR    Collection Time: 07/29/20  5:08 AM   Result Value Ref Range    INR 1.2 (H) 0.9 - 1.1      Prothrombin time 12.0 (H) 9.0 - 11.1 sec   PTT    Collection Time: 07/29/20  5:08 AM   Result Value Ref Range    aPTT 125.4 (HH) 22.1 - 32.0 sec    aPTT, therapeutic range     58.0 - 77.0 SECS   FIBRINOGEN    Collection Time: 07/29/20  5:08 AM   Result Value Ref Range    Fibrinogen 255 200 - 475 mg/dL   MAGNESIUM    Collection Time: 07/29/20  5:08 AM   Result Value Ref Range    Magnesium 2.1 1.6 - 2.4 mg/dL   PHOSPHORUS    Collection Time: 07/29/20  5:08 AM   Result Value Ref Range Phosphorus 3.4 2.6 - 4.7 MG/DL   CBC WITH AUTOMATED DIFF    Collection Time: 07/29/20  5:08 AM   Result Value Ref Range    WBC 7.0 4.1 - 11.1 K/uL    RBC 2.83 (L) 4.10 - 5.70 M/uL    HGB 9.2 (L) 12.1 - 17.0 g/dL    HCT 28.9 (L) 36.6 - 50.3 %    .1 (H) 80.0 - 99.0 FL    MCH 32.5 26.0 - 34.0 PG    MCHC 31.8 30.0 - 36.5 g/dL    RDW 20.9 (H) 11.5 - 14.5 %    PLATELET 934 756 - 679 K/uL    MPV 10.6 8.9 - 12.9 FL    NRBC 0.0 0  WBC    ABSOLUTE NRBC 0.00 0.00 - 0.01 K/uL    NEUTROPHILS 70 32 - 75 %    LYMPHOCYTES 13 12 - 49 %    MONOCYTES 10 5 - 13 %    EOSINOPHILS 5 0 - 7 %    BASOPHILS 0 0 - 1 %    METAMYELOCYTES 2 (H) 0 %    IMMATURE GRANULOCYTES 0 %    ABS. NEUTROPHILS 4.9 1.8 - 8.0 K/UL    ABS. LYMPHOCYTES 0.9 0.8 - 3.5 K/UL    ABS. MONOCYTES 0.7 0.0 - 1.0 K/UL    ABS. EOSINOPHILS 0.4 0.0 - 0.4 K/UL    ABS. BASOPHILS 0.0 0.0 - 0.1 K/UL    ABS. IMM. GRANS. 0.0 K/UL    DF MANUAL      RBC COMMENTS ANISOCYTOSIS  2+        RBC COMMENTS MACROCYTOSIS  1+        RBC COMMENTS BLUE CELLS  PRESENT       METABOLIC PANEL, BASIC    Collection Time: 07/29/20  5:08 AM   Result Value Ref Range    Sodium 141 136 - 145 mmol/L    Potassium 4.7 3.5 - 5.1 mmol/L    Chloride 112 (H) 97 - 108 mmol/L    CO2 22 21 - 32 mmol/L    Anion gap 7 5 - 15 mmol/L    Glucose 125 (H) 65 - 100 mg/dL    BUN 19 6 - 20 MG/DL    Creatinine 0.61 (L) 0.70 - 1.30 MG/DL    BUN/Creatinine ratio 31 (H) 12 - 20      GFR est AA >60 >60 ml/min/1.73m2    GFR est non-AA >60 >60 ml/min/1.73m2    Calcium 8.2 (L) 8.5 - 10.1 MG/DL         Assessment and Plan   81 y/o man with poor performance status, presenting with pancreatic inflamation/omental caking and VTE. Developed GI bleeding/hypotension on anticoagulation which has been held, s/p IVC filter. Subsequent CT scan suggests abnormality more likely pancreatitis/inflammation rather than malignancy. 1. Pancreatic mass: felt to be too ill for biopsy at this time.  Ascites precludes safe placement of PEG tube.     2. Anemia: stable     3. VTE: bleeding on apixiban/eliquis, IVC filter placed. Thank you for allowing us to participate in the care of this very pleasant patient.     Charles Bejarano MD  Hematology/Oncology  Phone [phone]

## 2020-07-29 NOTE — PROGRESS NOTES
POC reviewed w/ pt. Aox4. VSS. Afebrile. Heparin gtt discontinued, started pt on lovenox this afternoon. TPN continues at 63 ml/hr. Order renewed this evening, w/ lipids. PICC in place to ARLIN, dressing remains C/D/I. PT/OT worked w/ pt, ambulated in hallway today. Pt tolerated sitting up in chair most of afternoon. Plan for discharge tomorrow. Will continue to monitor pt closely. Problem: Falls - Risk of  Goal: *Absence of Falls  Description: Document Sabina Soliman Fall Risk and appropriate interventions in the flowsheet. Outcome: Progressing Towards Goal  Note: Fall Risk Interventions:  Mobility Interventions: PT Consult for mobility concerns, PT Consult for assist device competence, OT consult for ADLs, Patient to call before getting OOB, Communicate number of staff needed for ambulation/transfer    Mentation Interventions: Toileting rounds    Medication Interventions: Teach patient to arise slowly, Patient to call before getting OOB    Elimination Interventions:  Toileting schedule/hourly rounds, Urinal in reach    History of Falls Interventions: Bed/chair exit alarm, Door open when patient unattended, Investigate reason for fall, Room close to nurse's station         Problem: Patient Education: Go to Patient Education Activity  Goal: Patient/Family Education  Outcome: Progressing Towards Goal     Problem: General Medical Care Plan  Goal: *Absence of infection signs and symptoms  Outcome: Progressing Towards Goal     Problem: Deep Venous Thrombosis - Risk of  Goal: *Absence of deep venous thrombosis signs and symptoms(Stroke Metric)  Outcome: Progressing Towards Goal  Goal: *Absence of impaired coagulation signs and symptoms  Outcome: Progressing Towards Goal  Goal: *Knowledge of prescribed medications  Outcome: Progressing Towards Goal  Goal: *Absence of bleeding  Outcome: Progressing Towards Goal     Problem: Nutrition Deficit  Goal: *Optimize nutritional status  Outcome: Progressing Towards Goal     Problem: Nutrition Deficit  Goal: *Optimize nutritional status  Outcome: Progressing Towards Goal     Problem: Pressure Injury - Risk of  Goal: *Prevention of pressure injury  Description: Document Joseph Scale and appropriate interventions in the flowsheet.   Outcome: Progressing Towards Goal  Note: Pressure Injury Interventions:  Sensory Interventions: Discuss PT/OT consult with provider, Float heels, Keep linens dry and wrinkle-free, Maintain/enhance activity level    Moisture Interventions: Maintain skin hydration (lotion/cream)    Activity Interventions: PT/OT evaluation, Pressure redistribution bed/mattress(bed type)    Mobility Interventions: HOB 30 degrees or less, Pressure redistribution bed/mattress (bed type)    Nutrition Interventions: Discuss nutritional consult with provider    Friction and Shear Interventions: Lift sheet                Problem: Pulmonary Embolism Care Plan (Adult)  Goal: *Improvement of existing pulmonary embolism  Outcome: Progressing Towards Goal  Goal: *Absence of bleeding  Outcome: Progressing Towards Goal  Goal: *Labs within defined limits  Outcome: Progressing Towards Goal

## 2020-07-29 NOTE — PROGRESS NOTES
Comprehensive Nutrition Assessment    Type and Reason for Visit: Reassess    Nutrition Recommendations/Plan:     Recommend TPN (D15/AA6) @ 75 mL/hr to meet needs  Continue lipids MWF. Nutrition Assessment:   Pt admitted for DVT. PMHx: arthritis, hypercholesterolemia. Hypotension on admit with DVT to femoral vein on admit-s/p IVC filter. Multiple hospitalizations and rehab visit over past few months with septic arthritis (Dec-Jan) followed by pancreatitis. Bedbound for past few months. N/V for 1 week PTA. Pancreatic mass. Ascites-s/p paracentesis-awaiting cytology results. GIB-noted results of EGD yesterday. Noted unable to place PEG/J via IR or surgical due to ascites. Continue TPN - current D15/AA6% and lipids provides an average of 1571 kcal and 91 g Pro. This meets 90% of minimum kcal needs and 99% of protein needs. Noted edema increasing from 2+ to 3+ on BLE. Recommend TPN @ 75 mL/hr to better meet kcal needs. Provides 1778 kcal, 108 g Pro. Better meets kcal and protein needs but it more volume. Unsure if pt able to get more concentrated version of TPN to reduce volume. Lytes WNL today. . , 97, 130, 171 mg/dL on am labs.      Malnutrition Assessment:  Malnutrition Status:  Severe malnutrition    Context:  Acute illness     Findings of the 6 clinical characteristics of malnutrition:   Energy Intake:  7 - 50% or less of est energy requirements for 5 or more days  Weight Loss:  7 - Greater than 2% over 1 week     Body Fat Loss:  Unable to assess,     Muscle Mass Loss:  Unable to assess,    Fluid Accumulation:  Unable to assess,     Strength:  Not performed     Estimated Daily Nutrient Needs:  Energy (kcal):  1738-1882kcal  Protein (g):  92 - 1.2g/kg       Fluid (ml/day):  1800 - 1ml/kcal    Nutrition Related Findings:  Last BM 7/27; edema 2+ BUE, 3+ BLE, anasarca      Wounds:    None       Current Nutrition Therapies:   Current Parenteral Nutrition Orders:  · Type and Formula: 6% AA/ D15 · Lipids: 500ml, Three times weekly  · Duration: Continuous  · Rate/Volume: 63 ml/hr/1500 mml    anasarca   Anthropometric Measures:  · Height:  5' 10\" (177.8 cm)  · Current Body Wt:  77.6 kg (171 lb 1.2 oz)   · Admission Body Wt:  168 lb 6.9 oz    · Ideal Body Wt:  166:  103.1 %   · BMI Categories:  Normal weight (BMI 18.5-24. 9)       Nutrition Diagnosis:   · Inadequate oral intake, Severe malnutrition, In context of acute illness or injury related to altered GI function as evidenced by vomiting, nausea, weight loss 1-2% in 1 week, poor intake prior to admission    · Inadequate pareneteral nutrition related to inadequate pareneteral nutrition infusion as evidenced by TPN meeting 79% estimated energy needs.     Nutrition Interventions:   Food and/or Nutrient Delivery: Continue NPO, Modify parenteral nutrition  Nutrition Education and Counseling: No recommendations at this time  Coordination of Nutrition Care: Continued inpatient monitoring, Interdisciplinary rounds    Goals:  TPN to meet >85% of needs within 5-7 days or until able to resume PO diet       Nutrition Monitoring and Evaluation:   Food/Nutrient Intake Outcomes: Parenteral nutrition intake/tolerance  Physical Signs/Symptoms Outcomes: Biochemical data, GI status    Discharge Planning:    Parenteral nutrition     Electronically signed by Zach Moran RD on 7/29/2020 at 1:35 PM    Contact: Marina Feliz or pager #[phone]

## 2020-07-29 NOTE — ROUTINE PROCESS
Bedside and Verbal shift change report given to Zoe (oncoming nurse) by Ebony Mary (offgoing nurse). Report included the following information SBAR, Kardex, Intake/Output, MAR, Recent Results and Cardiac Rhythm NSR.

## 2020-07-29 NOTE — PROGRESS NOTES
TIMMY- D/c to [facility]. CM noted that pt's negative COVID-19 test is back. CM faxed it to [facility] today. Pt for d/c to [facility] tomorrow.  Yeni Savage

## 2020-07-30 VITALS
OXYGEN SATURATION: 99 % | RESPIRATION RATE: 20 BRPM | BODY MASS INDEX: 28.41 KG/M2 | HEART RATE: 80 BPM | WEIGHT: 198.41 LBS | TEMPERATURE: 97.7 F | SYSTOLIC BLOOD PRESSURE: 107 MMHG | HEIGHT: 70 IN | DIASTOLIC BLOOD PRESSURE: 40 MMHG

## 2020-07-30 LAB
ANION GAP SERPL CALC-SCNC: 6 MMOL/L (ref 5–15)
APTT PPP: 45.8 SEC (ref 22.1–32)
BASOPHILS # BLD: 0.1 K/UL (ref 0–0.1)
BASOPHILS NFR BLD: 1 % (ref 0–1)
BUN SERPL-MCNC: 18 MG/DL (ref 6–20)
BUN/CREAT SERPL: 31 (ref 12–20)
CALCIUM SERPL-MCNC: 8.5 MG/DL (ref 8.5–10.1)
CHLORIDE SERPL-SCNC: 111 MMOL/L (ref 97–108)
CO2 SERPL-SCNC: 21 MMOL/L (ref 21–32)
CREAT SERPL-MCNC: 0.59 MG/DL (ref 0.7–1.3)
DIFFERENTIAL METHOD BLD: ABNORMAL
EOSINOPHIL # BLD: 0.2 K/UL (ref 0–0.4)
EOSINOPHIL NFR BLD: 3 % (ref 0–7)
ERYTHROCYTE [DISTWIDTH] IN BLOOD BY AUTOMATED COUNT: 21 % (ref 11.5–14.5)
FIBRINOGEN PPP-MCNC: 306 MG/DL (ref 200–475)
GLUCOSE BLD STRIP.AUTO-MCNC: 113 MG/DL (ref 65–100)
GLUCOSE BLD STRIP.AUTO-MCNC: 116 MG/DL (ref 65–100)
GLUCOSE SERPL-MCNC: 109 MG/DL (ref 65–100)
HCT VFR BLD AUTO: 29 % (ref 36.6–50.3)
HGB BLD-MCNC: 9.3 G/DL (ref 12.1–17)
IMM GRANULOCYTES # BLD AUTO: 0.1 K/UL (ref 0–0.04)
IMM GRANULOCYTES NFR BLD AUTO: 2 % (ref 0–0.5)
INR PPP: 1.1 (ref 0.9–1.1)
LYMPHOCYTES # BLD: 1.5 K/UL (ref 0.8–3.5)
LYMPHOCYTES NFR BLD: 21 % (ref 12–49)
MAGNESIUM SERPL-MCNC: 2.2 MG/DL (ref 1.6–2.4)
MCH RBC QN AUTO: 33 PG (ref 26–34)
MCHC RBC AUTO-ENTMCNC: 32.1 G/DL (ref 30–36.5)
MCV RBC AUTO: 102.8 FL (ref 80–99)
MONOCYTES # BLD: 0.5 K/UL (ref 0–1)
MONOCYTES NFR BLD: 7 % (ref 5–13)
NEUTS SEG # BLD: 4.6 K/UL (ref 1.8–8)
NEUTS SEG NFR BLD: 66 % (ref 32–75)
NRBC # BLD: 0 K/UL (ref 0–0.01)
NRBC BLD-RTO: 0 PER 100 WBC
PHOSPHATE SERPL-MCNC: 3.5 MG/DL (ref 2.6–4.7)
PLATELET # BLD AUTO: 156 K/UL (ref 150–400)
PMV BLD AUTO: 10.3 FL (ref 8.9–12.9)
POTASSIUM SERPL-SCNC: 4.5 MMOL/L (ref 3.5–5.1)
PROTHROMBIN TIME: 11.6 SEC (ref 9–11.1)
RBC # BLD AUTO: 2.82 M/UL (ref 4.1–5.7)
RBC MORPH BLD: ABNORMAL
RBC MORPH BLD: ABNORMAL
SERVICE CMNT-IMP: ABNORMAL
SERVICE CMNT-IMP: ABNORMAL
SODIUM SERPL-SCNC: 138 MMOL/L (ref 136–145)
THERAPEUTIC RANGE,PTTT: ABNORMAL SECS (ref 58–77)
WBC # BLD AUTO: 7 K/UL (ref 4.1–11.1)

## 2020-07-30 PROCEDURE — 74011250637 HC RX REV CODE- 250/637: Performed by: FAMILY MEDICINE

## 2020-07-30 PROCEDURE — 84100 ASSAY OF PHOSPHORUS: CPT

## 2020-07-30 PROCEDURE — C9113 INJ PANTOPRAZOLE SODIUM, VIA: HCPCS | Performed by: INTERNAL MEDICINE

## 2020-07-30 PROCEDURE — 85025 COMPLETE CBC W/AUTO DIFF WBC: CPT

## 2020-07-30 PROCEDURE — 74011000250 HC RX REV CODE- 250: Performed by: INTERNAL MEDICINE

## 2020-07-30 PROCEDURE — 74011250636 HC RX REV CODE- 250/636: Performed by: INTERNAL MEDICINE

## 2020-07-30 PROCEDURE — 74011250637 HC RX REV CODE- 250/637: Performed by: NURSE PRACTITIONER

## 2020-07-30 PROCEDURE — 82962 GLUCOSE BLOOD TEST: CPT

## 2020-07-30 PROCEDURE — 80048 BASIC METABOLIC PNL TOTAL CA: CPT

## 2020-07-30 PROCEDURE — 83735 ASSAY OF MAGNESIUM: CPT

## 2020-07-30 PROCEDURE — 85384 FIBRINOGEN ACTIVITY: CPT

## 2020-07-30 PROCEDURE — 36415 COLL VENOUS BLD VENIPUNCTURE: CPT

## 2020-07-30 PROCEDURE — 85610 PROTHROMBIN TIME: CPT

## 2020-07-30 PROCEDURE — 85730 THROMBOPLASTIN TIME PARTIAL: CPT

## 2020-07-30 RX ORDER — MIDODRINE HYDROCHLORIDE 10 MG/1
20 TABLET ORAL EVERY 8 HOURS
Qty: 180 TAB | Refills: 0 | Status: SHIPPED
Start: 2020-07-30 | End: 2020-08-29

## 2020-07-30 RX ORDER — INSULIN LISPRO 100 [IU]/ML
INJECTION, SOLUTION INTRAVENOUS; SUBCUTANEOUS
Qty: 1 VIAL | Refills: 0 | Status: SHIPPED
Start: 2020-07-30 | End: 2020-09-22

## 2020-07-30 RX ADMIN — TAMSULOSIN HYDROCHLORIDE 0.4 MG: 0.4 CAPSULE ORAL at 09:33

## 2020-07-30 RX ADMIN — MIDODRINE HYDROCHLORIDE 20 MG: 5 TABLET ORAL at 05:03

## 2020-07-30 RX ADMIN — CASTOR OIL AND BALSAM, PERU: 788; 87 OINTMENT TOPICAL at 09:28

## 2020-07-30 RX ADMIN — FINASTERIDE 5 MG: 5 TABLET, FILM COATED ORAL at 09:33

## 2020-07-30 RX ADMIN — SODIUM CHLORIDE 40 MG: 9 INJECTION INTRAMUSCULAR; INTRAVENOUS; SUBCUTANEOUS at 09:33

## 2020-07-30 NOTE — PROGRESS NOTES
Hematology-Oncology Progress Note    Nicole Reveles  1934  [id number]  7/30/2020    Follow-up for: pancreatic mass/DVT/PE     [x]        Chart notes since last visit reviewed   [x]        Medications reviewed for allergies and interactions       Case discussed with the following:         []                            []        Nursing Staff                                                                         []        Pathologist                                                                        []        FAMILY      Subjective:     Spoke with patient who complains of:  comfortable this am, c/o some belching, but o/w no new problems    Objective:     Patient Vitals for the past 24 hrs:   BP Temp Pulse Resp SpO2 Weight   07/30/20 0804 108/45 98.1 °F (36.7 °C) 79 18 98 % --   07/30/20 0503 109/50 -- 92 -- -- --   07/30/20 0449 109/50 98.1 °F (36.7 °C) 92 18 98 % --   07/30/20 0002 111/45 98 °F (36.7 °C) 82 18 96 % 90 kg (198 lb 6.6 oz)   07/29/20 2045 119/54 98 °F (36.7 °C) 80 16 100 % --   07/29/20 1509 132/59 98 °F (36.7 °C) 80 16 96 % --   07/29/20 1124 109/52 98 °F (36.7 °C) 75 14 100 % --       REVIEW OF SYSTEMS:    Constitutional: negative fever, negative chills, negative weight loss  Eyes:   negative visual changes  ENT:   negative sore throat, tongue or lip swelling  Respiratory:  negative cough, negative dyspnea  Cards:  negative for chest pain, palpitations, lower extremity edema  GI:   negative for nausea, vomiting, diarrhea, and abdominal pain  Neuro:  negative for headaches, dizziness, vertigo  [x]                        Full ROS o/w normal/non contributor    Constitutional:  Patient looks  []        Sick  [x]        Frail  [x]        Better                                                 []        Depressed    HEENT:  [x]   NC                         []   AT               []    ALOPECIA           Eyes: [x]   Normal               []    Icteric  Oropharynx: [x]    Normal []  Thrush               []   Dry  Neck:   [x]   Supple                  []  Rigid               JVD:    [x]   ABSENT       []   PRESENT  Aerating well         Extr:    [x]  Lymphedema             []   Cyanosis      []  Clubbing  Edema:     []   NONE       [x]   PRESENT r>L le,,    Skin:  Intact []           Purpura []        Rash: [x]   ABSENT       []  PRESENT  Neuro:  []        Normal  [x]        Confused      Available labs reviewed:  Labs:    Recent Results (from the past 24 hour(s))   GLUCOSE, POC    Collection Time: 07/29/20 11:27 AM   Result Value Ref Range    Glucose (POC) 87 65 - 100 mg/dL    Performed by Cheryl DAN (CON)    GLUCOSE, POC    Collection Time: 07/29/20  5:53 PM   Result Value Ref Range    Glucose (POC) 103 (H) 65 - 100 mg/dL    Performed by Raegna Travis    GLUCOSE, POC    Collection Time: 07/29/20 11:34 PM   Result Value Ref Range    Glucose (POC) 113 (H) 65 - 100 mg/dL    Performed by Angeli Nagy    PROTHROMBIN TIME + INR    Collection Time: 07/30/20  5:09 AM   Result Value Ref Range    INR 1.1 0.9 - 1.1      Prothrombin time 11.6 (H) 9.0 - 11.1 sec   PTT    Collection Time: 07/30/20  5:09 AM   Result Value Ref Range    aPTT 45.8 (H) 22.1 - 32.0 sec    aPTT, therapeutic range     58.0 - 77.0 SECS   FIBRINOGEN    Collection Time: 07/30/20  5:09 AM   Result Value Ref Range    Fibrinogen 306 200 - 475 mg/dL   MAGNESIUM    Collection Time: 07/30/20  5:09 AM   Result Value Ref Range    Magnesium 2.2 1.6 - 2.4 mg/dL   PHOSPHORUS    Collection Time: 07/30/20  5:09 AM   Result Value Ref Range    Phosphorus 3.5 2.6 - 4.7 MG/DL   METABOLIC PANEL, BASIC    Collection Time: 07/30/20  5:09 AM   Result Value Ref Range    Sodium 138 136 - 145 mmol/L    Potassium 4.5 3.5 - 5.1 mmol/L    Chloride 111 (H) 97 - 108 mmol/L    CO2 21 21 - 32 mmol/L    Anion gap 6 5 - 15 mmol/L    Glucose 109 (H) 65 - 100 mg/dL    BUN 18 6 - 20 MG/DL    Creatinine 0.59 (L) 0.70 - 1.30 MG/DL BUN/Creatinine ratio 31 (H) 12 - 20      GFR est AA >60 >60 ml/min/1.73m2    GFR est non-AA >60 >60 ml/min/1.73m2    Calcium 8.5 8.5 - 10.1 MG/DL   CBC WITH AUTOMATED DIFF    Collection Time: 07/30/20  5:09 AM   Result Value Ref Range    WBC 7.0 4.1 - 11.1 K/uL    RBC 2.82 (L) 4.10 - 5.70 M/uL    HGB 9.3 (L) 12.1 - 17.0 g/dL    HCT 29.0 (L) 36.6 - 50.3 %    .8 (H) 80.0 - 99.0 FL    MCH 33.0 26.0 - 34.0 PG    MCHC 32.1 30.0 - 36.5 g/dL    RDW 21.0 (H) 11.5 - 14.5 %    PLATELET 198 229 - 272 K/uL    MPV 10.3 8.9 - 12.9 FL    NRBC 0.0 0  WBC    ABSOLUTE NRBC 0.00 0.00 - 0.01 K/uL    NEUTROPHILS 66 32 - 75 %    LYMPHOCYTES 21 12 - 49 %    MONOCYTES 7 5 - 13 %    EOSINOPHILS 3 0 - 7 %    BASOPHILS 1 0 - 1 %    IMMATURE GRANULOCYTES 2 (H) 0.0 - 0.5 %    ABS. NEUTROPHILS 4.6 1.8 - 8.0 K/UL    ABS. LYMPHOCYTES 1.5 0.8 - 3.5 K/UL    ABS. MONOCYTES 0.5 0.0 - 1.0 K/UL    ABS. EOSINOPHILS 0.2 0.0 - 0.4 K/UL    ABS. BASOPHILS 0.1 0.0 - 0.1 K/UL    ABS. IMM. GRANS. 0.1 (H) 0.00 - 0.04 K/UL    DF AUTOMATED      RBC COMMENTS BLUE CELLS  PRESENT        RBC COMMENTS ANISOCYTOSIS  2+       GLUCOSE, POC    Collection Time: 07/30/20  6:26 AM   Result Value Ref Range    Glucose (POC) 116 (H) 65 - 100 mg/dL    Performed by Rainer Mace        Available Xrays reviewed:    Chemotherapy monitored and toxicities assessed:    Assessment and Plan   1. Rle DVT/PE. .. Pt had gi bleed after starting eliquis,, currently off heparin and eliquis. ..  ivc filter placed emergently on 3/50 without complication     2. Pancreatic mass,,pt had ascites examined for cytology which was negative,,, GI feels the mass is inflammatory and related to pancreatitis    3. Anemia. hgb 9.3 again today. . Secondary to gi bleed. . had 2cm ulcer seen on egd last week  Transfuse prn hgb<8 in this setting    Plans for PEG/J put on hold due to ascites    Lou Baldwin MD

## 2020-07-30 NOTE — DISCHARGE SUMMARY
Discharge Summary       PATIENT ID: Bob Vizcarra  MRN: [id number]   YOB: 1934    DATE OF ADMISSION: 7/13/2020  3:18 PM    DATE OF DISCHARGE: 07/30/2020  PRIMARY CARE PROVIDER: Dajuan Blankenship MD     ATTENDING PHYSICIAN: Lobito Levine MD  DISCHARGING PROVIDER: Orlando Whitten NP    To contact this individual call [phone] and ask the  to page. If unavailable ask to be transferred the Adult Hospitalist Department. CONSULTATIONS: IP CONSULT TO HEMATOLOGY  IP CONSULT TO GASTROENTEROLOGY  IP CONSULT TO HEPATOLOGY  IP CONSULT TO INTERVENTIONAL RADIOLOGY  IP CONSULT TO PALLIATIVE CARE - PROVIDER  IP CONSULT TO HEMATOLOGY  IP CONSULT TO INTERVENTIONAL RADIOLOGY  IP CONSULT TO INTERVENTIONAL RADIOLOGY  IP CONSULT TO HOSPITALIST  IP CONSULT TO GENERAL SURGERY    PROCEDURES/SURGERIES: Procedure(s):  ESOPHAGOGASTRODUODENOSCOPY (EGD) at bedside    [address]:   This is a very pleasant elderly gentleman who was admitted to the hospital on July 13 with progressive nausea vomiting and right lower limb swelling and pain.  He was found to have extensive DVT in that limb as well as pulmonary embolisms.  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  He had ascites which was drained and sent for cytology results still pending.  While he is on anticoagulation for his thromboembolic disease he developed significant amount of GI bleed and endoscopy showed erosive esophagitis.   IVC filter placed on the 21CN without complication. Ravi is still on TPN but tolerating ice chips without nausea or vomiting.          DISCHARGE DIAGNOSES / PLAN:      Hemorrhagic shock: Due to GI bleed POA improving BP still low but stable - resolved   - GI bleed with Acute blood loss anemia due to erosive esophagitis:  Hemoglobin been stable,  continue PPI twice daily, EGD showed erosive esophagitis with evidence of recent bleed but no active bleeding.   - he has some fluid retension due to immobility.  - Follow up with Dr. 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    - Head of the pancreas mass: Negative cytology from ascitic fluid.  Possible plan for EUS biopsy, per GI   - need TPN to give rest to pancreas for at least 3 months     - Hypotension:  On midodrone     -  Severe protein calorie malnourishment continue   - Continue TPN       - mild hypokalemia  - Resolved       ADDITIONAL CARE RECOMMENDATIONS:   Check CMP, Phos, Mag and triglycerides weekly and adjust TPN accordingly         PENDING TEST RESULTS:   At the time of discharge the following test results are still pending: None     FOLLOW UP APPOINTMENTS:    Follow-up Information     Follow up With Specialties Details Why Contact [address] [place] [address]    Ivanna Amin MD Internal Medicine Call For follow up appointment [address] [facility]      Choco Magana MD Gastroenterology Call To schedule follow up appointment [facility] [address]      Sandy Cobos MD Hematology and Oncology, Hematology, Oncology Call For follow up appointment [address] [phone]               DIET: NPO      ACTIVITY: Activity as tolerated    WOUND CARE: None    EQUIPMENT needed: None      DISCHARGE MEDICATIONS:  Current Discharge Medication List      START taking these medications    Details   fat emulsion 20% (LIPOSYN, INTRAlipid) 20 % infusion 500 mL by IntraVENous route every Monday, Wednesday, Friday.   Qty: 500 mL, Refills: 0      insulin lispro (HUMALOG) 100 unit/mL injection AC/HS  Qty: 1 Vial, Refills: 0      midodrine (PROAMATINE) 10 mg tablet Take 2 Tabs by mouth every eight (8) hours for 30 days. Qty: 180 Tab, Refills: 0      pantoprazole 4 mg/mL in 0.9% sodium chloride injection 10 mL by IntraVENous route every twelve (12) hours. Qty: 1 Ampule, Refills: 0         CONTINUE these medications which have NOT CHANGED    Details   multivitamin with iron tablet Take 1 Tab by mouth daily. TAMSULOSIN HCL (TAMSULOSIN PO) Take 0.4 mg by mouth daily. finasteride (PROSCAR) 5 mg tablet Take 5 mg by mouth daily. STOP taking these medications       predniSONE (STERAPRED) 5 mg dose pack Comments:   Reason for Stopping:                 NOTIFY YOUR PHYSICIAN FOR ANY OF THE FOLLOWING:   Fever over 101 degrees for 24 hours. Chest pain, shortness of breath, fever, chills, nausea, vomiting, diarrhea, change in mentation, falling, weakness, bleeding. Severe pain or pain not relieved by medications. Or, any other signs or symptoms that you may have questions about. DISPOSITION:    Home With:   OT  PT  HH  RN      X Long term SNF/Inpatient Rehab    Independent/assisted living    Hospice    Other:       PATIENT CONDITION AT DISCHARGE:     Functional status    Poor    X Deconditioned     Independent      Cognition    X Lucid     Forgetful     Dementia      Catheters/lines (plus indication)    Moya    X PICC     PEG     None      Code status     Full code    X DNR      PHYSICAL EXAMINATION AT DISCHARGE:  General:          Alert, cooperative, no distress, appears stated age. HEENT:           Atraumatic, anicteric sclerae, pink conjunctivae                          No oral ulcers, mucosa moist, throat clear, dentition fair  Neck:               Supple, symmetrical  Lungs:             Clear to auscultation bilaterally. No Wheezing or Rhonchi. No rales. Chest wall:      No tenderness  No Accessory muscle use.   Heart:              Regular  rhythm,  No  murmur  BLE edema- Right > left.   Abdomen:        Soft, non-tender. Not distended. Bowel sounds normal  Extremities:     No cyanosis. No clubbing,                            Skin turgor normal, Capillary refill normal  Skin:                Not pale. Not Jaundiced  No rashes   Psych:             Not anxious or agitated.   Neurologic:      Alert, moves all extremities, answers questions appropriately and responds to commands       CHRONIC MEDICAL DIAGNOSES:  Problem List as of 7/30/2020 Date Reviewed: 7/14/2020          Codes Class Noted - Resolved    * (Principal) D[facility] (deep venous thrombosis) ([facility] ICD-10-CM: I82.409  ICD-9-CM: 453.40  7/14/2020 - Present        DVT (deep vein thrombosis) in pregnancy ICD-10-CM: O22.30  ICD-9-CM: 671.30  7/13/2020 - Present              Greater than 45  minutes were spent with the patient on counseling and coordination of care    Signed:   Mague Marrero NP  7/30/2020  11:42 AM

## 2020-07-30 NOTE — PROGRESS NOTES
Problem: Falls - Risk of  Goal: *Absence of Falls  Description: Document Munir Agustin Fall Risk and appropriate interventions in the flowsheet. Outcome: Progressing Towards Goal  Note: Fall Risk Interventions:  Mobility Interventions: Assess mobility with egress test, Communicate number of staff needed for ambulation/transfer, Patient to call before getting OOB    Mentation Interventions:  Toileting rounds    Medication Interventions: Patient to call before getting OOB, Teach patient to arise slowly    Elimination Interventions: Call light in reach, Bed/chair exit alarm, Patient to call for help with toileting needs    History of Falls Interventions: Bed/chair exit alarm, Room close to nurse's station         Problem: Patient Education: Go to Patient Education Activity  Goal: Patient/Family Education  Outcome: Progressing Towards Goal     Problem: General Medical Care Plan  Goal: *Absence of infection signs and symptoms  Outcome: Progressing Towards Goal     Problem: Deep Venous Thrombosis - Risk of  Goal: *Absence of deep venous thrombosis signs and symptoms(Stroke Metric)  Outcome: Progressing Towards Goal  Goal: *Absence of impaired coagulation signs and symptoms  Outcome: Progressing Towards Goal  Goal: *Knowledge of prescribed medications  Outcome: Progressing Towards Goal  Goal: *Absence of bleeding  Outcome: Progressing Towards Goal     Problem: Patient Education: Go to Patient Education Activity  Goal: Patient/Family Education  Outcome: Progressing Towards Goal     Problem: Patient Education: Go to Patient Education Activity  Goal: Patient/Family Education  Outcome: Progressing Towards Goal     Problem: Nutrition Deficit  Goal: *Optimize nutritional status  Outcome: Progressing Towards Goal     Problem: Nutrition Deficit  Goal: *Optimize nutritional status  Outcome: Progressing Towards Goal     Problem: Pressure Injury - Risk of  Goal: *Prevention of pressure injury  Description: Document Joseph Scale and appropriate interventions in the flowsheet.   Outcome: Progressing Towards Goal  Note: Pressure Injury Interventions:  Sensory Interventions: Discuss PT/OT consult with provider, Float heels, Keep linens dry and wrinkle-free, Maintain/enhance activity level    Moisture Interventions: Absorbent underpads, Apply protective barrier, creams and emollients, Check for incontinence Q2 hours and as needed, Offer toileting Q_hr    Activity Interventions: Increase time out of bed, PT/OT evaluation    Mobility Interventions: Assess need for specialty bed, HOB 30 degrees or less, PT/OT evaluation    Nutrition Interventions: Document food/fluid/supplement intake, Discuss nutritional consult with provider    Friction and Shear Interventions: Minimize layers, HOB 30 degrees or less                Problem: Patient Education: Go to Patient Education Activity  Goal: Patient/Family Education  Outcome: Progressing Towards Goal     Problem: Pulmonary Embolism Care Plan (Adult)  Goal: *Improvement of existing pulmonary embolism  Outcome: Progressing Towards Goal  Goal: *Absence of bleeding  Outcome: Progressing Towards Goal  Goal: *Labs within defined limits  Outcome: Progressing Towards Goal     Problem: Patient Education: Go to Patient Education Activity  Goal: Patient/Family Education  Outcome: Progressing Towards Goal

## 2020-07-30 NOTE — PROGRESS NOTES
Discharge orders placed by treatment team. Pt will be discharged to [facility] rehab facility, report called to Lubna Rivero RN. PICC left in place to ARLIN. Midline discontinued to JAMAL. Discharge instructions and med rec given to pt's family and to transporters for the facility. All personal belongings gathered w/ pt. Pt safely escorted off unit in stretcher w/ transport.

## 2020-07-30 NOTE — PROGRESS NOTES
Problem: Falls - Risk of  Goal: *Absence of Falls  Description: Document Aneudy [facility] Fall Risk and appropriate interventions in the flowsheet. Outcome: Resolved/Met  Note: Fall Risk Interventions:  Mobility Interventions: PT Consult for assist device competence, PT Consult for mobility concerns, OT consult for ADLs, Patient to call before getting OOB    Mentation Interventions: Toileting rounds    Medication Interventions: Teach patient to arise slowly, Patient to call before getting OOB    Elimination Interventions: Call light in reach, Urinal in reach    History of Falls Interventions: Bed/chair exit alarm, Door open when patient unattended, Investigate reason for fall, Room close to nurse's station         Problem: Patient Education: Go to Patient Education Activity  Goal: Patient/Family Education  Outcome: Resolved/Met     Problem: General Medical Care Plan  Goal: *Absence of infection signs and symptoms  Outcome: Resolved/Met     Problem: Deep Venous Thrombosis - Risk of  Goal: *Absence of deep venous thrombosis signs and symptoms(Stroke Metric)  Outcome: Resolved/Met  Goal: *Absence of impaired coagulation signs and symptoms  Outcome: Resolved/Met  Goal: *Knowledge of prescribed medications  Outcome: Resolved/Met  Goal: *Absence of bleeding  Outcome: Resolved/Met     Problem: Patient Education: Go to Patient Education Activity  Goal: Patient/Family Education  Outcome: Resolved/Met     Problem: Patient Education: Go to Patient Education Activity  Goal: Patient/Family Education  Outcome: Resolved/Met     Problem: Nutrition Deficit  Goal: *Optimize nutritional status  Outcome: Resolved/Met     Problem: Nutrition Deficit  Goal: *Optimize nutritional status  Outcome: Resolved/Met     Problem: Pressure Injury - Risk of  Goal: *Prevention of pressure injury  Description: Document Joseph Scale and appropriate interventions in the flowsheet.   Outcome: Resolved/Met  Note: Pressure Injury Interventions:  Sensory Interventions: Discuss PT/OT consult with provider, Float heels, Keep linens dry and wrinkle-free, Maintain/enhance activity level    Moisture Interventions: Offer toileting Q_hr, Moisture barrier, Maintain skin hydration (lotion/cream), Check for incontinence Q2 hours and as needed    Activity Interventions: Increase time out of bed, Pressure redistribution bed/mattress(bed type), PT/OT evaluation    Mobility Interventions: Pressure redistribution bed/mattress (bed type), PT/OT evaluation    Nutrition Interventions: Discuss nutritional consult with provider    Friction and Shear Interventions: Feet elevated on foot rest, HOB 30 degrees or less, Minimize layers                Problem: Patient Education: Go to Patient Education Activity  Goal: Patient/Family Education  Outcome: Resolved/Met     Problem: Pulmonary Embolism Care Plan (Adult)  Goal: *Improvement of existing pulmonary embolism  Outcome: Resolved/Met  Goal: *Absence of bleeding  Outcome: Resolved/Met  Goal: *Labs within defined limits  Outcome: Resolved/Met     Problem: Patient Education: Go to Patient Education Activity  Goal: Patient/Family Education  Outcome: Resolved/Met

## 2020-07-30 NOTE — PROGRESS NOTES
Bedside shift change report given to [address] [place] [address] (oncoming nurse) by Stephanie Tavera (offgoing nurse).  Report included the following information SBAR, Kardex, MAR, Recent Results and Cardiac Rhythm SR.

## 2020-07-30 NOTE — PROGRESS NOTES
Bedside shift change report given to Zoe (oncoming nurse) by Steph Graf (offgoing nurse). Report included the following information SBAR, Kardex, Intake/Output, MAR, Recent Results and Cardiac Rhythm NSR.

## 2020-09-03 ENCOUNTER — HOSPITAL ENCOUNTER (EMERGENCY)
Age: 85
Discharge: HOME | End: 2020-09-03
Attending: EMERGENCY MEDICINE | Admitting: EMERGENCY MEDICINE
Payer: MEDICARE

## 2020-09-03 VITALS
OXYGEN SATURATION: 98 % | TEMPERATURE: 98 F | HEART RATE: 90 BPM | RESPIRATION RATE: 15 BRPM | DIASTOLIC BLOOD PRESSURE: 56 MMHG | SYSTOLIC BLOOD PRESSURE: 112 MMHG

## 2020-09-03 DIAGNOSIS — Z01.89 ENCOUNTER FOR BLOOD TEST: Primary | ICD-10-CM

## 2020-09-03 LAB
ALBUMIN SERPL-MCNC: 3.1 G/DL (ref 3.5–5)
ALBUMIN/GLOB SERPL: 0.6 {RATIO} (ref 1.1–2.2)
ALP SERPL-CCNC: 94 U/L (ref 45–117)
ALT SERPL-CCNC: 18 U/L (ref 12–78)
ANION GAP SERPL CALC-SCNC: 6 MMOL/L (ref 5–15)
AST SERPL-CCNC: 22 U/L (ref 15–37)
ATRIAL RATE: 102 BPM
BASOPHILS # BLD: 0 K/UL (ref 0–0.1)
BASOPHILS NFR BLD: 0 % (ref 0–1)
BILIRUB SERPL-MCNC: 0.5 MG/DL (ref 0.2–1)
BUN SERPL-MCNC: 28 MG/DL (ref 6–20)
BUN/CREAT SERPL: 36 (ref 12–20)
CALCIUM SERPL-MCNC: 9.5 MG/DL (ref 8.5–10.1)
CALCULATED P AXIS, ECG09: 56 DEGREES
CALCULATED R AXIS, ECG10: -11 DEGREES
CALCULATED T AXIS, ECG11: 21 DEGREES
CHLORIDE SERPL-SCNC: 106 MMOL/L (ref 97–108)
CO2 SERPL-SCNC: 25 MMOL/L (ref 21–32)
COMMENT, HOLDF: NORMAL
CREAT SERPL-MCNC: 0.78 MG/DL (ref 0.7–1.3)
DIAGNOSIS, 93000: NORMAL
DIFFERENTIAL METHOD BLD: ABNORMAL
EOSINOPHIL # BLD: 0.1 K/UL (ref 0–0.4)
EOSINOPHIL NFR BLD: 3 % (ref 0–7)
ERYTHROCYTE [DISTWIDTH] IN BLOOD BY AUTOMATED COUNT: 15.9 % (ref 11.5–14.5)
GLOBULIN SER CALC-MCNC: 4.9 G/DL (ref 2–4)
GLUCOSE SERPL-MCNC: 126 MG/DL (ref 65–100)
HCT VFR BLD AUTO: 35.2 % (ref 36.6–50.3)
HGB BLD-MCNC: 11.7 G/DL (ref 12.1–17)
IMM GRANULOCYTES # BLD AUTO: 0 K/UL (ref 0–0.04)
IMM GRANULOCYTES NFR BLD AUTO: 1 % (ref 0–0.5)
LYMPHOCYTES # BLD: 1.5 K/UL (ref 0.8–3.5)
LYMPHOCYTES NFR BLD: 33 % (ref 12–49)
MCH RBC QN AUTO: 34.5 PG (ref 26–34)
MCHC RBC AUTO-ENTMCNC: 33.2 G/DL (ref 30–36.5)
MCV RBC AUTO: 103.8 FL (ref 80–99)
MONOCYTES # BLD: 0.4 K/UL (ref 0–1)
MONOCYTES NFR BLD: 9 % (ref 5–13)
NEUTS SEG # BLD: 2.4 K/UL (ref 1.8–8)
NEUTS SEG NFR BLD: 55 % (ref 32–75)
NRBC # BLD: 0 K/UL (ref 0–0.01)
NRBC BLD-RTO: 0 PER 100 WBC
P-R INTERVAL, ECG05: 136 MS
PLATELET # BLD AUTO: 143 K/UL (ref 150–400)
PMV BLD AUTO: 11.3 FL (ref 8.9–12.9)
POTASSIUM SERPL-SCNC: 4.6 MMOL/L (ref 3.5–5.1)
PROT SERPL-MCNC: 8 G/DL (ref 6.4–8.2)
Q-T INTERVAL, ECG07: 340 MS
QRS DURATION, ECG06: 70 MS
QTC CALCULATION (BEZET), ECG08: 443 MS
RBC # BLD AUTO: 3.39 M/UL (ref 4.1–5.7)
SAMPLES BEING HELD,HOLD: NORMAL
SODIUM SERPL-SCNC: 137 MMOL/L (ref 136–145)
VENTRICULAR RATE, ECG03: 102 BPM
WBC # BLD AUTO: 4.5 K/UL (ref 4.1–11.1)

## 2020-09-03 PROCEDURE — 36415 COLL VENOUS BLD VENIPUNCTURE: CPT

## 2020-09-03 PROCEDURE — 93005 ELECTROCARDIOGRAM TRACING: CPT

## 2020-09-03 PROCEDURE — 85025 COMPLETE CBC W/AUTO DIFF WBC: CPT

## 2020-09-03 PROCEDURE — 80053 COMPREHEN METABOLIC PANEL: CPT

## 2020-09-03 PROCEDURE — 99283 EMERGENCY DEPT VISIT LOW MDM: CPT

## 2020-09-03 NOTE — DISCHARGE INSTRUCTIONS
Your blood work this morning was normal. Your potassium is 4.6. Follow up with your doctors as scheduled. Continue all your medications as prescribed.

## 2020-09-03 NOTE — ED NOTES
I have reviewed discharge instructions with the patient. The patient verbalized understanding. Wheeled out of the department by family member in no acute distress.

## 2020-09-03 NOTE — ED TRIAGE NOTES
Patient arrives from home with CC of a abnormal lab result. Pt got lab work this week and was told his potassium is 9.5. Patient has a history of pancreatitis and is on a continuous TPN infusion. Family reports recent changes to his infusion. Patient has no complaints.

## 2020-09-03 NOTE — ED PROVIDER NOTES
HPI     40-year-old male with a history of pancreatitis and esophagitis currently on TPN not taking anything by mouth. He presents emergency department this morning because the lab called to tell him his potassium was 9.5. Patient states he feels totally fine. He has no chest pain palpitation shortness of breath fatigue nausea vomiting diarrhea. He is making urine normally and has no swelling. They changed his TPN about 2 weeks ago and he started getting his TPN and an 18-hour period instead of a 24-hour. Otherwise been no change in medication or anything. He has never had any kidney issues.     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    BPH (benign prostatic hyperplasia)     DJD (degenerative joint disease)     Hypercholesterolemia     Other and unspecified hyperlipidemia        Past Surgical History:   Procedure Laterality Date    COLONOSCOPY N/A 8/18/2017    COLONOSCOPY performed by Lidia Carrion MD at [address] [place] [address] ENDOSCOPY    HX ORTHOPAEDIC Right 2010    rotator cuff    HX TONSILLECTOMY      IR IVC FILTER  7/20/2020         IR PLC IVC FILTER  7/20/2020         Family History:   Problem Relation Age of Onset    Diabetes Sister     Diabetes Brother     Heart Disease Brother        Social History     Socioeconomic History    Marital status:      Spouse name: Not on file    Number of children: Not on file    Years of education: Not on file    Highest education level: Not on file   Occupational History    Not on file   Social Needs    Financial resource strain: Not on file    Food insecurity     Worry: Not on file     Inability: Not on file    Transportation needs     Medical: Not on file     Non-medical: Not on file   Tobacco Use    Smoking status: Never Smoker    Smokeless tobacco: Never Used   Substance and Sexual Activity    Alcohol use: Yes     Comment: social    Drug use: Not on file    Sexual activity: Not Currently Lifestyle    Physical activity     Days per week: Not on file     Minutes per session: Not on file    Stress: Not on file   Relationships    Social connections     Talks on phone: Not on file     Gets together: Not on file     Attends Restorationist service: Not on file     Active member of club or organization: Not on file     Attends meetings of clubs or organizations: Not on file     Relationship status: Not on file    Intimate partner violence     Fear of current or ex partner: Not on file     Emotionally abused: Not on file     Physically abused: Not on file     Forced sexual activity: Not on file   Other Topics Concern    Not on file   Social History Narrative    Not on file         ALLERGIES: Aspirin    Review of Systems   Constitutional: Negative for fever. HENT: Negative for congestion. Eyes: Negative for visual disturbance. Respiratory: Negative for cough and shortness of breath. Cardiovascular: Negative for chest pain. Gastrointestinal: Negative for abdominal pain, nausea and vomiting. Endocrine: Negative for polyuria. Genitourinary: Negative for decreased urine volume and dysuria. Musculoskeletal: Negative for gait problem. Skin: Negative for rash. Neurological: Negative for dizziness, light-headedness and headaches. Psychiatric/Behavioral: Negative for dysphoric mood. There were no vitals filed for this visit. Physical Exam  Constitutional:       General: He is not in acute distress. Appearance: He is well-developed. HENT:      Head: Normocephalic and atraumatic. Mouth/Throat:      Pharynx: No oropharyngeal exudate. Eyes:      General: No scleral icterus. Right eye: No discharge. Left eye: No discharge. Pupils: Pupils are equal, round, and reactive to light. Neck:      Musculoskeletal: Normal range of motion and neck supple. Vascular: No JVD. Cardiovascular:      Rate and Rhythm: Normal rate and regular rhythm.       Heart sounds: Normal heart sounds. No murmur. Pulmonary:      Effort: Pulmonary effort is normal. No respiratory distress. Breath sounds: Normal breath sounds. No stridor. No wheezing or rales. Chest:      Chest wall: No tenderness. Abdominal:      General: Bowel sounds are normal. There is no distension. Palpations: Abdomen is soft. There is no mass. Tenderness: There is no abdominal tenderness. There is no guarding or rebound. Musculoskeletal: Normal range of motion. Skin:     General: Skin is warm and dry. Capillary Refill: Capillary refill takes less than 2 seconds. Findings: No rash. Comments: PICC line in right upper arm without erythema warmth or slight tenderness   Neurological:      Mental Status: He is oriented to person, place, and time. Psychiatric:         Behavior: Behavior normal.         Thought Content: Thought content normal.         Judgment: Judgment normal.          MDM       Procedures      ED EKG interpretation:  Rhythm: sinus tachycardia; and regular . Rate (approx.): 102; Axis: left axis deviation; P wave: normal; QRS interval: normal ; ST/T wave: non-specific changes;. This EKG was interpreted by Tavo Sorto MD,ED Provider. Suspect this was hemolysis/lab error if his EKG is not consistent with a potassium of 9.5. Will repeat labs to confirm. Labs are normal with a potassium of 4.6. Patient and daughter were reassured they were given a copy of the blood work and discharged to follow-up with her primary care doctor as scheduled.

## 2020-09-06 PROCEDURE — 96374 THER/PROPH/DIAG INJ IV PUSH: CPT

## 2020-09-06 PROCEDURE — 96361 HYDRATE IV INFUSION ADD-ON: CPT

## 2020-09-06 PROCEDURE — 99285 EMERGENCY DEPT VISIT HI MDM: CPT

## 2020-09-07 ENCOUNTER — APPOINTMENT (OUTPATIENT)
Dept: CT IMAGING | Age: 85
DRG: 871 | End: 2020-09-07
Attending: EMERGENCY MEDICINE
Payer: MEDICARE

## 2020-09-07 ENCOUNTER — APPOINTMENT (OUTPATIENT)
Dept: GENERAL RADIOLOGY | Age: 85
DRG: 871 | End: 2020-09-07
Attending: EMERGENCY MEDICINE
Payer: MEDICARE

## 2020-09-07 ENCOUNTER — HOSPITAL ENCOUNTER (INPATIENT)
Age: 85
LOS: 15 days | Discharge: HOME HEALTH | DRG: 871 | End: 2020-09-22
Attending: EMERGENCY MEDICINE | Admitting: INTERNAL MEDICINE
Payer: MEDICARE

## 2020-09-07 DIAGNOSIS — A41.9 SEVERE SEPSIS (HCC): Primary | ICD-10-CM

## 2020-09-07 DIAGNOSIS — R11.2 NAUSEA AND VOMITING, INTRACTABILITY OF VOMITING NOT SPECIFIED, UNSPECIFIED VOMITING TYPE: ICD-10-CM

## 2020-09-07 DIAGNOSIS — Z78.9 ON TOTAL PARENTERAL NUTRITION (TPN): ICD-10-CM

## 2020-09-07 DIAGNOSIS — Z45.2 PICC (PERIPHERALLY INSERTED CENTRAL CATHETER) IN PLACE: ICD-10-CM

## 2020-09-07 DIAGNOSIS — R65.20 SEVERE SEPSIS (HCC): Primary | ICD-10-CM

## 2020-09-07 PROBLEM — R57.9 SHOCK (HCC): Status: ACTIVE | Noted: 2020-09-07

## 2020-09-07 PROBLEM — R50.9 FEVER: Status: ACTIVE | Noted: 2020-09-07

## 2020-09-07 PROBLEM — R00.0 TACHYCARDIA: Status: ACTIVE | Noted: 2020-09-07

## 2020-09-07 PROBLEM — I95.9 HYPOTENSION: Status: ACTIVE | Noted: 2020-09-07

## 2020-09-07 LAB
ABO + RH BLD: NORMAL
ALBUMIN SERPL-MCNC: 2.3 G/DL (ref 3.5–5)
ALBUMIN SERPL-MCNC: 3 G/DL (ref 3.5–5)
ALBUMIN/GLOB SERPL: 0.6 {RATIO} (ref 1.1–2.2)
ALBUMIN/GLOB SERPL: 0.7 {RATIO} (ref 1.1–2.2)
ALP SERPL-CCNC: 106 U/L (ref 45–117)
ALP SERPL-CCNC: 89 U/L (ref 45–117)
ALT SERPL-CCNC: 17 U/L (ref 12–78)
ALT SERPL-CCNC: 21 U/L (ref 12–78)
ANION GAP SERPL CALC-SCNC: 6 MMOL/L (ref 5–15)
ANION GAP SERPL CALC-SCNC: 6 MMOL/L (ref 5–15)
APPEARANCE UR: CLEAR
APTT PPP: 40.6 SEC (ref 22.1–32)
AST SERPL-CCNC: 19 U/L (ref 15–37)
AST SERPL-CCNC: 24 U/L (ref 15–37)
ATRIAL RATE: 128 BPM
BACTERIA URNS QL MICRO: NEGATIVE /HPF
BASOPHILS # BLD: 0 K/UL (ref 0–0.1)
BASOPHILS # BLD: 0 K/UL (ref 0–0.1)
BASOPHILS NFR BLD: 0 % (ref 0–1)
BASOPHILS NFR BLD: 0 % (ref 0–1)
BILIRUB SERPL-MCNC: 0.8 MG/DL (ref 0.2–1)
BILIRUB SERPL-MCNC: 0.9 MG/DL (ref 0.2–1)
BILIRUB UR QL: NEGATIVE
BLOOD GROUP ANTIBODIES SERPL: NORMAL
BUN SERPL-MCNC: 27 MG/DL (ref 6–20)
BUN SERPL-MCNC: 29 MG/DL (ref 6–20)
BUN/CREAT SERPL: 31 (ref 12–20)
BUN/CREAT SERPL: 33 (ref 12–20)
CALCIUM SERPL-MCNC: 8 MG/DL (ref 8.5–10.1)
CALCIUM SERPL-MCNC: 9 MG/DL (ref 8.5–10.1)
CALCULATED P AXIS, ECG09: 60 DEGREES
CALCULATED R AXIS, ECG10: 25 DEGREES
CALCULATED T AXIS, ECG11: 18 DEGREES
CHLORIDE SERPL-SCNC: 104 MMOL/L (ref 97–108)
CHLORIDE SERPL-SCNC: 111 MMOL/L (ref 97–108)
CO2 SERPL-SCNC: 22 MMOL/L (ref 21–32)
CO2 SERPL-SCNC: 27 MMOL/L (ref 21–32)
COLOR UR: NORMAL
COVID-19, XGCOVT: NOT DETECTED
CREAT SERPL-MCNC: 0.83 MG/DL (ref 0.7–1.3)
CREAT SERPL-MCNC: 0.95 MG/DL (ref 0.7–1.3)
D DIMER PPP FEU-MCNC: 2.9 MG/L FEU (ref 0–0.65)
D DIMER PPP FEU-MCNC: 8.2 MG/L FEU (ref 0–0.65)
DIAGNOSIS, 93000: NORMAL
DIFFERENTIAL METHOD BLD: ABNORMAL
DIFFERENTIAL METHOD BLD: ABNORMAL
EOSINOPHIL # BLD: 0 K/UL (ref 0–0.4)
EOSINOPHIL # BLD: 0 K/UL (ref 0–0.4)
EOSINOPHIL NFR BLD: 0 % (ref 0–7)
EOSINOPHIL NFR BLD: 0 % (ref 0–7)
EPITH CASTS URNS QL MICRO: NORMAL /LPF
ERYTHROCYTE [DISTWIDTH] IN BLOOD BY AUTOMATED COUNT: 15.9 % (ref 11.5–14.5)
ERYTHROCYTE [DISTWIDTH] IN BLOOD BY AUTOMATED COUNT: 16 % (ref 11.5–14.5)
FERRITIN SERPL-MCNC: 146 NG/ML (ref 26–388)
FIBRINOGEN PPP-MCNC: 330 MG/DL (ref 200–475)
FIBRINOGEN PPP-MCNC: 381 MG/DL (ref 200–475)
GLOBULIN SER CALC-MCNC: 4 G/DL (ref 2–4)
GLOBULIN SER CALC-MCNC: 4.6 G/DL (ref 2–4)
GLUCOSE SERPL-MCNC: 101 MG/DL (ref 65–100)
GLUCOSE SERPL-MCNC: 142 MG/DL (ref 65–100)
GLUCOSE UR STRIP.AUTO-MCNC: NEGATIVE MG/DL
HCT VFR BLD AUTO: 29.7 % (ref 36.6–50.3)
HCT VFR BLD AUTO: 35.9 % (ref 36.6–50.3)
HEALTH STATUS, XMCV2T: NORMAL
HGB BLD-MCNC: 11.7 G/DL (ref 12.1–17)
HGB BLD-MCNC: 9.8 G/DL (ref 12.1–17)
HGB UR QL STRIP: NEGATIVE
HYALINE CASTS URNS QL MICRO: NORMAL /LPF (ref 0–5)
IMM GRANULOCYTES # BLD AUTO: 0 K/UL (ref 0–0.04)
IMM GRANULOCYTES # BLD AUTO: 0.1 K/UL (ref 0–0.04)
IMM GRANULOCYTES NFR BLD AUTO: 0 % (ref 0–0.5)
IMM GRANULOCYTES NFR BLD AUTO: 1 % (ref 0–0.5)
INR PPP: 1.2 (ref 0.9–1.1)
INR PPP: 1.4 (ref 0.9–1.1)
KETONES UR QL STRIP.AUTO: NEGATIVE MG/DL
LACTATE SERPL-SCNC: 0.8 MMOL/L (ref 0.4–2)
LACTATE SERPL-SCNC: 1.3 MMOL/L (ref 0.4–2)
LEUKOCYTE ESTERASE UR QL STRIP.AUTO: NEGATIVE
LIPASE SERPL-CCNC: 107 U/L (ref 73–393)
LYMPHOCYTES # BLD: 0.3 K/UL (ref 0.8–3.5)
LYMPHOCYTES # BLD: 0.4 K/UL (ref 0.8–3.5)
LYMPHOCYTES NFR BLD: 4 % (ref 12–49)
LYMPHOCYTES NFR BLD: 5 % (ref 12–49)
MAGNESIUM SERPL-MCNC: 1.9 MG/DL (ref 1.6–2.4)
MCH RBC QN AUTO: 34 PG (ref 26–34)
MCH RBC QN AUTO: 34.6 PG (ref 26–34)
MCHC RBC AUTO-ENTMCNC: 32.6 G/DL (ref 30–36.5)
MCHC RBC AUTO-ENTMCNC: 33 G/DL (ref 30–36.5)
MCV RBC AUTO: 104.4 FL (ref 80–99)
MCV RBC AUTO: 104.9 FL (ref 80–99)
MONOCYTES # BLD: 0.2 K/UL (ref 0–1)
MONOCYTES # BLD: 0.4 K/UL (ref 0–1)
MONOCYTES NFR BLD: 3 % (ref 5–13)
MONOCYTES NFR BLD: 5 % (ref 5–13)
NEUTS SEG # BLD: 7.1 K/UL (ref 1.8–8)
NEUTS SEG # BLD: 7.3 K/UL (ref 1.8–8)
NEUTS SEG NFR BLD: 90 % (ref 32–75)
NEUTS SEG NFR BLD: 92 % (ref 32–75)
NITRITE UR QL STRIP.AUTO: NEGATIVE
NRBC # BLD: 0 K/UL (ref 0–0.01)
NRBC # BLD: 0 K/UL (ref 0–0.01)
NRBC BLD-RTO: 0 PER 100 WBC
NRBC BLD-RTO: 0 PER 100 WBC
P-R INTERVAL, ECG05: 140 MS
PH UR STRIP: 6.5 [PH] (ref 5–8)
PHOSPHATE SERPL-MCNC: 3.3 MG/DL (ref 2.6–4.7)
PLATELET # BLD AUTO: 114 K/UL (ref 150–400)
PLATELET # BLD AUTO: 95 K/UL (ref 150–400)
PMV BLD AUTO: 11.2 FL (ref 8.9–12.9)
PMV BLD AUTO: 11.4 FL (ref 8.9–12.9)
POTASSIUM SERPL-SCNC: 4 MMOL/L (ref 3.5–5.1)
POTASSIUM SERPL-SCNC: 4.2 MMOL/L (ref 3.5–5.1)
PROCALCITONIN SERPL-MCNC: 0.14 NG/ML
PROT SERPL-MCNC: 6.3 G/DL (ref 6.4–8.2)
PROT SERPL-MCNC: 7.6 G/DL (ref 6.4–8.2)
PROT UR STRIP-MCNC: NEGATIVE MG/DL
PROTHROMBIN TIME: 12.4 SEC (ref 9–11.1)
PROTHROMBIN TIME: 14.1 SEC (ref 9–11.1)
Q-T INTERVAL, ECG07: 302 MS
QRS DURATION, ECG06: 68 MS
QTC CALCULATION (BEZET), ECG08: 440 MS
RBC # BLD AUTO: 2.83 M/UL (ref 4.1–5.7)
RBC # BLD AUTO: 3.44 M/UL (ref 4.1–5.7)
RBC #/AREA URNS HPF: NORMAL /HPF (ref 0–5)
RBC MORPH BLD: ABNORMAL
SODIUM SERPL-SCNC: 137 MMOL/L (ref 136–145)
SODIUM SERPL-SCNC: 139 MMOL/L (ref 136–145)
SOURCE, COVRS: NORMAL
SP GR UR REFRACTOMETRY: 1.02 (ref 1–1.03)
SPECIMEN EXP DATE BLD: NORMAL
SPECIMEN SOURCE, FCOV2M: NORMAL
SPECIMEN TYPE, XMCV1T: NORMAL
THERAPEUTIC RANGE,PTTT: ABNORMAL SECS (ref 58–77)
TROPONIN I SERPL-MCNC: <0.05 NG/ML
UR CULT HOLD, URHOLD: NORMAL
UROBILINOGEN UR QL STRIP.AUTO: 1 EU/DL (ref 0.2–1)
VENTRICULAR RATE, ECG03: 128 BPM
WBC # BLD AUTO: 7.9 K/UL (ref 4.1–11.1)
WBC # BLD AUTO: 7.9 K/UL (ref 4.1–11.1)
WBC URNS QL MICRO: NORMAL /HPF (ref 0–4)

## 2020-09-07 PROCEDURE — 83605 ASSAY OF LACTIC ACID: CPT

## 2020-09-07 PROCEDURE — 83735 ASSAY OF MAGNESIUM: CPT

## 2020-09-07 PROCEDURE — 74011250637 HC RX REV CODE- 250/637: Performed by: EMERGENCY MEDICINE

## 2020-09-07 PROCEDURE — 96374 THER/PROPH/DIAG INJ IV PUSH: CPT

## 2020-09-07 PROCEDURE — 85025 COMPLETE CBC W/AUTO DIFF WBC: CPT

## 2020-09-07 PROCEDURE — 80053 COMPREHEN METABOLIC PANEL: CPT

## 2020-09-07 PROCEDURE — 85384 FIBRINOGEN ACTIVITY: CPT

## 2020-09-07 PROCEDURE — 87449 NOS EACH ORGANISM AG IA: CPT

## 2020-09-07 PROCEDURE — 74011250636 HC RX REV CODE- 250/636: Performed by: FAMILY MEDICINE

## 2020-09-07 PROCEDURE — 84145 PROCALCITONIN (PCT): CPT

## 2020-09-07 PROCEDURE — 85610 PROTHROMBIN TIME: CPT

## 2020-09-07 PROCEDURE — 74011000250 HC RX REV CODE- 250: Performed by: INTERNAL MEDICINE

## 2020-09-07 PROCEDURE — 74011000636 HC RX REV CODE- 636: Performed by: RADIOLOGY

## 2020-09-07 PROCEDURE — 85379 FIBRIN DEGRADATION QUANT: CPT

## 2020-09-07 PROCEDURE — 93005 ELECTROCARDIOGRAM TRACING: CPT

## 2020-09-07 PROCEDURE — 36415 COLL VENOUS BLD VENIPUNCTURE: CPT

## 2020-09-07 PROCEDURE — 85730 THROMBOPLASTIN TIME PARTIAL: CPT

## 2020-09-07 PROCEDURE — 81001 URINALYSIS AUTO W/SCOPE: CPT

## 2020-09-07 PROCEDURE — 82728 ASSAY OF FERRITIN: CPT

## 2020-09-07 PROCEDURE — 74011250636 HC RX REV CODE- 250/636: Performed by: EMERGENCY MEDICINE

## 2020-09-07 PROCEDURE — 84484 ASSAY OF TROPONIN QUANT: CPT

## 2020-09-07 PROCEDURE — 74011000258 HC RX REV CODE- 258: Performed by: RADIOLOGY

## 2020-09-07 PROCEDURE — 83690 ASSAY OF LIPASE: CPT

## 2020-09-07 PROCEDURE — 74011000258 HC RX REV CODE- 258: Performed by: FAMILY MEDICINE

## 2020-09-07 PROCEDURE — 74011000258 HC RX REV CODE- 258: Performed by: INTERNAL MEDICINE

## 2020-09-07 PROCEDURE — 65610000006 HC RM INTENSIVE CARE

## 2020-09-07 PROCEDURE — 87385 HISTOPLASMA CAPSUL AG IA: CPT

## 2020-09-07 PROCEDURE — 74011000258 HC RX REV CODE- 258: Performed by: EMERGENCY MEDICINE

## 2020-09-07 PROCEDURE — 84100 ASSAY OF PHOSPHORUS: CPT

## 2020-09-07 PROCEDURE — 96361 HYDRATE IV INFUSION ADD-ON: CPT

## 2020-09-07 PROCEDURE — 87635 SARS-COV-2 COVID-19 AMP PRB: CPT

## 2020-09-07 PROCEDURE — 74177 CT ABD & PELVIS W/CONTRAST: CPT

## 2020-09-07 PROCEDURE — 74011250637 HC RX REV CODE- 250/637: Performed by: INTERNAL MEDICINE

## 2020-09-07 PROCEDURE — 87899 AGENT NOS ASSAY W/OPTIC: CPT

## 2020-09-07 PROCEDURE — 87040 BLOOD CULTURE FOR BACTERIA: CPT

## 2020-09-07 PROCEDURE — 74011250636 HC RX REV CODE- 250/636: Performed by: INTERNAL MEDICINE

## 2020-09-07 PROCEDURE — 71045 X-RAY EXAM CHEST 1 VIEW: CPT

## 2020-09-07 PROCEDURE — 86900 BLOOD TYPING SEROLOGIC ABO: CPT

## 2020-09-07 RX ORDER — NOREPINEPHRINE BITARTRATE/D5W 8 MG/250ML
.5-3 PLASTIC BAG, INJECTION (ML) INTRAVENOUS
Status: DISCONTINUED | OUTPATIENT
Start: 2020-09-07 | End: 2020-09-08

## 2020-09-07 RX ORDER — ACETAMINOPHEN 325 MG/1
650 TABLET ORAL
Status: DISCONTINUED | OUTPATIENT
Start: 2020-09-07 | End: 2020-09-08

## 2020-09-07 RX ORDER — PROMETHAZINE HYDROCHLORIDE 25 MG/1
12.5 TABLET ORAL
Status: DISCONTINUED | OUTPATIENT
Start: 2020-09-07 | End: 2020-09-22 | Stop reason: HOSPADM

## 2020-09-07 RX ORDER — LEVOFLOXACIN 5 MG/ML
750 INJECTION, SOLUTION INTRAVENOUS ONCE
Status: COMPLETED | OUTPATIENT
Start: 2020-09-07 | End: 2020-09-07

## 2020-09-07 RX ORDER — SODIUM CHLORIDE 0.9 % (FLUSH) 0.9 %
10 SYRINGE (ML) INJECTION
Status: COMPLETED | OUTPATIENT
Start: 2020-09-07 | End: 2020-09-07

## 2020-09-07 RX ORDER — SODIUM CHLORIDE 0.9 % (FLUSH) 0.9 %
5-40 SYRINGE (ML) INJECTION AS NEEDED
Status: DISCONTINUED | OUTPATIENT
Start: 2020-09-07 | End: 2020-09-22 | Stop reason: HOSPADM

## 2020-09-07 RX ORDER — VANCOMYCIN HYDROCHLORIDE
1250
Status: DISCONTINUED | OUTPATIENT
Start: 2020-09-07 | End: 2020-09-11 | Stop reason: ALTCHOICE

## 2020-09-07 RX ORDER — HEPARIN SODIUM 5000 [USP'U]/ML
5000 INJECTION, SOLUTION INTRAVENOUS; SUBCUTANEOUS EVERY 8 HOURS
Status: DISCONTINUED | OUTPATIENT
Start: 2020-09-07 | End: 2020-09-08

## 2020-09-07 RX ORDER — MIDODRINE HYDROCHLORIDE 5 MG/1
10 TABLET ORAL 3 TIMES DAILY
Status: DISCONTINUED | OUTPATIENT
Start: 2020-09-07 | End: 2020-09-22 | Stop reason: HOSPADM

## 2020-09-07 RX ORDER — SODIUM CHLORIDE 0.9 % (FLUSH) 0.9 %
5-40 SYRINGE (ML) INJECTION EVERY 8 HOURS
Status: DISCONTINUED | OUTPATIENT
Start: 2020-09-07 | End: 2020-09-22 | Stop reason: HOSPADM

## 2020-09-07 RX ORDER — ACETAMINOPHEN 650 MG/1
650 SUPPOSITORY RECTAL
Status: DISCONTINUED | OUTPATIENT
Start: 2020-09-07 | End: 2020-09-07

## 2020-09-07 RX ORDER — POLYETHYLENE GLYCOL 3350 17 G/17G
17 POWDER, FOR SOLUTION ORAL DAILY PRN
Status: DISCONTINUED | OUTPATIENT
Start: 2020-09-07 | End: 2020-09-22 | Stop reason: HOSPADM

## 2020-09-07 RX ORDER — LEVOFLOXACIN 5 MG/ML
750 INJECTION, SOLUTION INTRAVENOUS EVERY 24 HOURS
Status: DISCONTINUED | OUTPATIENT
Start: 2020-09-08 | End: 2020-09-07

## 2020-09-07 RX ORDER — ACETAMINOPHEN 500 MG
1000 TABLET ORAL
Status: COMPLETED | OUTPATIENT
Start: 2020-09-07 | End: 2020-09-07

## 2020-09-07 RX ORDER — ONDANSETRON 2 MG/ML
4 INJECTION INTRAMUSCULAR; INTRAVENOUS
Status: DISCONTINUED | OUTPATIENT
Start: 2020-09-07 | End: 2020-09-22 | Stop reason: HOSPADM

## 2020-09-07 RX ORDER — VANCOMYCIN 2 GRAM/500 ML IN 0.9 % SODIUM CHLORIDE INTRAVENOUS
2000 ONCE
Status: COMPLETED | OUTPATIENT
Start: 2020-09-07 | End: 2020-09-07

## 2020-09-07 RX ORDER — SODIUM CHLORIDE 9 MG/ML
125 INJECTION, SOLUTION INTRAVENOUS CONTINUOUS
Status: DISCONTINUED | OUTPATIENT
Start: 2020-09-07 | End: 2020-09-07

## 2020-09-07 RX ORDER — MIDODRINE HYDROCHLORIDE 5 MG/1
10 TABLET ORAL
Status: COMPLETED | OUTPATIENT
Start: 2020-09-07 | End: 2020-09-07

## 2020-09-07 RX ORDER — ONDANSETRON 2 MG/ML
4 INJECTION INTRAMUSCULAR; INTRAVENOUS ONCE
Status: COMPLETED | OUTPATIENT
Start: 2020-09-07 | End: 2020-09-07

## 2020-09-07 RX ADMIN — SODIUM CHLORIDE 1000 ML: 9 INJECTION, SOLUTION INTRAVENOUS at 00:55

## 2020-09-07 RX ADMIN — LEVOFLOXACIN 750 MG: 5 INJECTION, SOLUTION INTRAVENOUS at 03:21

## 2020-09-07 RX ADMIN — IOPAMIDOL 100 ML: 755 INJECTION, SOLUTION INTRAVENOUS at 02:15

## 2020-09-07 RX ADMIN — MIDODRINE HYDROCHLORIDE 10 MG: 5 TABLET ORAL at 21:15

## 2020-09-07 RX ADMIN — SODIUM CHLORIDE 500 ML: 900 INJECTION, SOLUTION INTRAVENOUS at 03:36

## 2020-09-07 RX ADMIN — VANCOMYCIN HYDROCHLORIDE 1250 MG: 10 INJECTION, POWDER, LYOPHILIZED, FOR SOLUTION INTRAVENOUS at 21:21

## 2020-09-07 RX ADMIN — MIDODRINE HYDROCHLORIDE 10 MG: 5 TABLET ORAL at 09:41

## 2020-09-07 RX ADMIN — DEXTROSE MONOHYDRATE 0.5 MCG/MIN: 5 INJECTION, SOLUTION INTRAVENOUS at 07:19

## 2020-09-07 RX ADMIN — CEFEPIME 2 G: 2 INJECTION, POWDER, FOR SOLUTION INTRAVENOUS at 10:41

## 2020-09-07 RX ADMIN — ACETAMINOPHEN 1000 MG: 500 TABLET ORAL at 00:55

## 2020-09-07 RX ADMIN — MIDODRINE HYDROCHLORIDE 10 MG: 5 TABLET ORAL at 02:56

## 2020-09-07 RX ADMIN — CEFEPIME 2 G: 2 INJECTION, POWDER, FOR SOLUTION INTRAVENOUS at 18:26

## 2020-09-07 RX ADMIN — Medication 10 ML: at 02:15

## 2020-09-07 RX ADMIN — SODIUM CHLORIDE 500 ML: 900 INJECTION, SOLUTION INTRAVENOUS at 04:30

## 2020-09-07 RX ADMIN — Medication 10 ML: at 13:57

## 2020-09-07 RX ADMIN — Medication 10 ML: at 07:34

## 2020-09-07 RX ADMIN — HEPARIN SODIUM 5000 UNITS: 5000 INJECTION INTRAVENOUS; SUBCUTANEOUS at 13:55

## 2020-09-07 RX ADMIN — SODIUM CHLORIDE 100 ML: 900 INJECTION, SOLUTION INTRAVENOUS at 02:15

## 2020-09-07 RX ADMIN — MIDODRINE HYDROCHLORIDE 10 MG: 5 TABLET ORAL at 15:08

## 2020-09-07 RX ADMIN — HEPARIN SODIUM 5000 UNITS: 5000 INJECTION INTRAVENOUS; SUBCUTANEOUS at 21:17

## 2020-09-07 RX ADMIN — HEPARIN SODIUM 5000 UNITS: 5000 INJECTION INTRAVENOUS; SUBCUTANEOUS at 07:34

## 2020-09-07 RX ADMIN — ONDANSETRON 4 MG: 2 INJECTION INTRAMUSCULAR; INTRAVENOUS at 00:56

## 2020-09-07 RX ADMIN — SODIUM CHLORIDE 125 ML/HR: 9 INJECTION, SOLUTION INTRAVENOUS at 03:51

## 2020-09-07 RX ADMIN — CEFEPIME 2 G: 2 INJECTION, POWDER, FOR SOLUTION INTRAVENOUS at 02:44

## 2020-09-07 RX ADMIN — DEXTROSE MONOHYDRATE 5 MCG/MIN: 5 INJECTION, SOLUTION INTRAVENOUS at 07:37

## 2020-09-07 RX ADMIN — SODIUM CHLORIDE 1000 ML: 9 INJECTION, SOLUTION INTRAVENOUS at 01:53

## 2020-09-07 RX ADMIN — Medication 10 ML: at 21:16

## 2020-09-07 RX ADMIN — VANCOMYCIN HYDROCHLORIDE 2000 MG: 10 INJECTION, POWDER, LYOPHILIZED, FOR SOLUTION INTRAVENOUS at 05:03

## 2020-09-07 NOTE — PROGRESS NOTES
0900 TRANSFER - IN REPORT:    Verbal report received from ELIZABETH Aguirre(name) on Kade Kirby  being received from ED(unit) for routine progression of care      Report consisted of patients Situation, Background, Assessment and   Recommendations(SBAR). Information from the following report(s) SBAR, Kardex, Intake/Output, MAR, Accordion and Cardiac Rhythm NSR was reviewed with the receiving nurse. Opportunity for questions and clarification was provided. Assessment completed upon patients arrival to unit and care assumed. 0930 Patient arrived on unit. Primary Nurse Chasity Anderson RN and Lucila Dupont RN performed a dual skin assessment on this patient No impairment noted. Joseph score is 22. Patient on Hanna InTouch bed. Levophed verified. VSS. Patient resting in bed, no complaints at this time. 1500 Levo gtt stopped, BP stable. 1930 Bedside and Verbal shift change report given to ELIZABETH Ely (oncoming nurse) by Mark Ro (offgoing nurse).  Report included the following information SBAR, Kardex, Intake/Output, MAR and Cardiac Rhythm NSr.

## 2020-09-07 NOTE — ED NOTES
Report given to Kj Gastelum RN from [employer]. Patient resting on stretcher in stable condition without complaints of pain.  Awaiting be placement

## 2020-09-07 NOTE — ROUTINE PROCESS
TRANSFER - OUT REPORT: 
 
Verbal report given to Andres Rodriguez RN (name) on Ariane Camargo  being transferred to Room 7103 (unit) for routine progression of care Report consisted of patients Situation, Background, Assessment and  
Recommendations(SBAR). Information from the following report(s) SBAR, Kardex, ED Summary, MAR, Recent Results and Cardiac Rhythm NSR was reviewed with the receiving nurse. Lines: PICC Double Lumen 07/16/20 Right;Brachial (Active) Peripheral IV 09/07/20 Left Forearm (Active) Opportunity for questions and clarification was provided. Patient transported with: 
 Monitor Registered Nurse

## 2020-09-07 NOTE — PROGRESS NOTES
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 Patient has been on TPN since July due to gastric outlet obstruction, reports that he was at home, noted some gastric discomfort, and hypotension. In the ER patient received 3L of crystalloids, MAP continue to be in the 50's, ICU team was notified by Dr. Radhika Castelan due to hypotension. Lactic acid 1.3, procalcitonin . 13, febrile to 102 and has d di bobby at 8.2, patient does have hx of PE but due to hx of GI hemorrhage is not anticoagulated, he does have IVC filter placed. On evaluate of patient, he is awake and alert, reports some abdominal pain, no dizziness, no SOB, no CP. POD:* No surgery found *    S/P:     Active Problem List:     Problem List  Date Reviewed: 2020          Codes Class    Tachycardia ICD-10-CM: R00.0  ICD-9-CM: 785.0         Hypotension ICD-10-CM: I95.9  ICD-9-CM: 799. 9         Fever ICD-10-CM: R50.9  ICD-9-CM: 780.60         Sepsis ([facility] [address] ICD-10-CM: A41.9  ICD-9-CM: 038.9, 995.91         Shock ([facility] [address] ICD-10-CM: R57.9  ICD-9-CM: 785.50         DVT (deep venous thrombosis) (HCC) ICD-10-CM: I82.409  ICD-9-CM: 453.40         DVT (deep vein thrombosis) in pregnancy ICD-10-CM: O22.30  ICD-9-CM: 671.30               Past Medical History:      has a past medical history of Arthritis, BPH (benign prostatic hyperplasia), DJD (degenerative joint disease), Hypercholesterolemia, and Other and unspecified hyperlipidemia.  He also has no past medical history of Abuse, Adverse effect of anesthesia, Anemia NEC, Calculus of kidney, Congestive heart failure, unspecified, Contact dermatitis and other eczema, due to unspecified cause, COPD, Depression, Headache(784.0), Psychotic disorder ([facility] [address] Sleep apnea, or Trauma. Past Surgical History:      has a past surgical history that includes hx orthopaedic (Right, 2010); hx tonsillectomy; colonoscopy (N/A, 2017); ir ivc filter (2020); and ir plc ivc filter (2020). Home Medications:     Prior to Admission medications    Medication Sig Start Date End Date Taking? Authorizing Provider   fat emulsion 20% (LIPOSYN, INTRAlipid) 20 % infusion 500 mL by IntraVENous route every Monday, Wednesday, Friday. 20   Colonel Jaxon NP   insulin lispro (HUMALOG) 100 unit/mL injection AC/HS 20   Mirian Mosqueda NP   pantoprazole 4 mg/mL in 0.9% sodium chloride injection 10 mL by IntraVENous route every twelve (12) hours. 20   Colonel Jaxon NP   multivitamin with iron tablet Take 1 Tab by mouth daily. Provider, Historical   TAMSULOSIN HCL (TAMSULOSIN PO) Take 0.4 mg by mouth daily. Provider, Historical   finasteride (PROSCAR) 5 mg tablet Take 5 mg by mouth daily. Provider, Historical       Allergies/Social/Family History: Allergies   Allergen Reactions    Aspirin Unknown (comments)      Social History     Tobacco Use    Smoking status: Never Smoker    Smokeless tobacco: Never Used   Substance Use Topics    Alcohol use: Yes     Comment: social      Family History   Problem Relation Age of Onset    Diabetes Sister     Diabetes Brother     Heart Disease Brother        Review of Systems:     Pertinent items are noted in HPI.     Objective:   Vital Signs:  Visit Vitals  BP (!) 82/42   Pulse (!) 104   Temp 98.5 °F (36.9 °C)   Resp 30   Ht 5' 11\" (1.803 m)   Wt 75.9 kg (167 lb 5.3 oz)   SpO2 98%   BMI 23.34 kg/m²      O2 Device: Room air Temp (24hrs), Av.5 °F (38.1 °C), Min:98.5 °F (36.9 °C), Max:102.5 °F (39.2 °C)           Intake/Output:   No intake or output data in the 24 hours ending 09/07/20 0545    Physical Exam:    General:  Alert, cooperative, well noursished, well developed, appears stated age   Eyes:  Sclera anicteric. Pupils equally round and reactive to light. Mouth/Throat: Mucous membranes normal, oral pharynx clear   Neck: Supple   Lungs:   Clear to auscultation bilaterally, good effort   CV:  Regular rate and rhythm,no murmur, click, rub or gallop   Abdomen:   Soft, non-tender. bowel sounds normal. non-distended   Extremities: No cyanosis or edema   Skin: Skin color, texture, turgor normal. no acute rash or lesions   Musculoskeletal: No swelling or deformity   Lines/Devices:  Intact, no erythema, drainage or tenderness   Psych: Alert and oriented, normal mood affect given the setting       LABS AND  DATA: Personally reviewed  Recent Labs     09/07/20 0049   WBC 7.9   HGB 11.7*   HCT 35.9*   *     Recent Labs     09/07/20 0049      K 4.2      CO2 27   BUN 29*   CREA 0.95   *   CA 9.0   MG 1.9     Recent Labs     09/07/20 0049      TP 7.6   ALB 3.0*   GLOB 4.6*   LPSE 107     Recent Labs     09/07/20 0049   INR 1.2*   PTP 12.4*      No results for input(s): PHI, PCO2I, PO2I, FIO2I in the last 72 hours. Recent Labs     09/07/20 0049   TROIQ <0.05       Hemodynamics:   PAP:   CO:     Wedge:   CI:     CVP:    SVR:       PVR:       Ventilator Settings:  Mode Rate Tidal Volume Pressure FiO2 PEEP                    Peak airway pressure:      Minute ventilation:          MEDS: Reviewed    Chest X-Ray:  CXR Results  (Last 48 hours)               09/07/20 0122  XR CHEST PORT Final result    Impression:  IMPRESSION: No evidence of acute cardiopulmonary process. Narrative:  INDICATION: Fever       COMPARISON: July 13, 2020       FINDINGS: AP portable imaging of the chest performed at 1:09 AM demonstrates a   stable cardiomediastinal silhouette.  Right arm PICC line has its tip at the   level of the distal SVC. The lungs are clear bilaterally. No significant osseous   abnormalities are seen. Postoperative changes are again seen in the right   shoulder. CT ABD PELV W CONT   Final Result   IMPRESSION:   Improved peripancreatic inflammatory changes, compatible with pancreatitis. Unchanged biliary dilatation, with no visualized choledocholithiasis. Small   hiatal hernia. XR CHEST PORT   Final Result   IMPRESSION: No evidence of acute cardiopulmonary process. ECHO 07/20  · Normal cavity size and systolic function (ejection fraction normal). Estimated left ventricular ejection fraction is 60 - 65%. · Mild tricuspid valve regurgitation is present. Assessment:     ICU Problems:  - Hypotension/Septic Shock  - Gastric outlet obstruction  - Ascites  - pancreatic mass  - Fever  - hx PE  - IVC filter placement    ICU Comprehensive Plan of Care:   Plans for this Shift:   1. Continue abx until cultures result  2. Start norepinephrine to maintain MAP>65  3. Start midodrine  4. Patient being r/o for COVID-19 in the ER, low suspicion  5. Pancreatic head mass   6. Hematology consult in AM for pancreatic head mass, no work up for ascites, Dr. Meghann Levy, hematology saw patient on previous hospitalization. 7. Admit to ICU for 24 hours, start norepinephrine, midodrine, wean pressors as tolerated  8. Follow up BCx  9. Consult GI for possible Pancreatic head mass Bx  10. Trend Lactic acid  11. procalcitonin low, less likely to be bacterial infection, will continue bax until culture results. 12. Hold full anticoaguation due to high bleeding risk. 13. Continue TPN     Multidisciplinary Rounds Completed: Yes    ABCDEF Bundle/Checklist  Pain Medications: Acetaminophen  Target RASS: N/A  Sedation Medications: None  CAM-ICU:  Negative  Mobility: Fair  PT/OT: N/A   Restraints: None needed at this time  Discussed Plan of Care (goals of care):  Yes  Addressed Code Status: Do Not Resuscitate    CARDIOVASCULAR  Cardiac Gtts: Norepinephrine  SBP Goal of: > 90 mmHg  MAP Goal of: > 65 mmHg  Transfusion Trigger (Hgb): <7 g/dL    RESPIRATORY  Vent Goals:   N/A  DVT Prophylaxis (if no, list reason): Heparin   SPO2 Goal: > 92%  Pulmonary toilet: Incentive Spirometry     GI/  Moya Catheter Present: No  GI Prophylaxis: Not at this time   Nutrition: Yes TPN  IVFs: None  Bowel Movement: No  Bowel Regimen: None needed at this time  Insulin: Sliding scale PRN    ANTIBIOTICS  Antibiotics:  Cefepime  Vancomycin    T/L/D  Tubes: None  Lines: PICC Line  Drains: None    SPECIAL EQUIPMENT  None    DISPOSITION  Stay in ICU    CRITICAL CARE CONSULTANT NOTE  I had a face to face encounter with the patient, reviewed and interpreted patient data including clinical events, labs, images, vital signs, I/O's, and examined patient. I have discussed the case and the plan and management of the patient's care with the consulting services, the bedside nurses and the respiratory therapist.      NOTE OF PERSONAL INVOLVEMENT IN CARE   This patient has a high probability of imminent, clinically significant deterioration, which requires the highest level of preparedness to intervene urgently. I participated in the decision-making and personally managed or directed the management of the following life and organ supporting interventions that required my frequent assessment to treat or prevent imminent deterioration. I personally spent 50 minutes of critical care time. This is time spent at this critically ill patient's bedside actively involved in patient care as well as the coordination of care and discussions with the patient's family. This does not include any procedural time which has been billed separately.     Guanako Mustafa MD  Staff [facility] [address]  9/7/2020

## 2020-09-07 NOTE — ED PROVIDER NOTES
59-year-old male presents from home via EMS with a complaint of fever and vomiting. Patient has some baseline dementia and history is obtained from the medical record and his daughter. Patient started feeling sick earlier today described as shaking. He became sick to his stomach and had several episodes of vomiting which prompted a call to 911. He was febrile at home to 102. Patient denies any chest pain, cough, abdominal pain. Patient is currently on home TPN through a PICC line in his right upper extremity due to severe pancreatitis and esophagitis. Patient was discharged from the hospital at the end of July after a 2-week stay where he was being evaluated for the symptoms. No urinary complaints, no diarrhea. No other complaints at this time.            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    BPH (benign prostatic hyperplasia)     DJD (degenerative joint disease)     Hypercholesterolemia     Other and unspecified hyperlipidemia        Past Surgical History:   Procedure Laterality Date    COLONOSCOPY N/A 8/18/2017    COLONOSCOPY performed by Tien Mast MD at [address] [place] [address] ENDOSCOPY    HX ORTHOPAEDIC Right 2010    rotator cuff    HX TONSILLECTOMY      IR IVC FILTER  7/20/2020         IR PLC IVC FILTER  7/20/2020         Family History:   Problem Relation Age of Onset    Diabetes Sister     Diabetes Brother     Heart Disease Brother        Social History     Socioeconomic History    Marital status:      Spouse name: Not on file    Number of children: Not on file    Years of education: Not on file    Highest education level: Not on file   Occupational History    Not on file   Social Needs    Financial resource strain: Not on file    Food insecurity     Worry: Not on file     Inability: Not on file    Transportation needs     Medical: Not on file     Non-medical: Not on file   Tobacco Use    Smoking status: Never Smoker    Smokeless tobacco: Never Used   Substance and Sexual Activity    Alcohol use: Yes     Comment: social    Drug use: Not on file    Sexual activity: Not Currently   Lifestyle    Physical activity     Days per week: Not on file     Minutes per session: Not on file    Stress: Not on file   Relationships    Social connections     Talks on phone: Not on file     Gets together: Not on file     Attends Oriental orthodox service: Not on file     Active member of club or organization: Not on file     Attends meetings of clubs or organizations: Not on file     Relationship status: Not on file    Intimate partner violence     Fear of current or ex partner: Not on file     Emotionally abused: Not on file     Physically abused: Not on file     Forced sexual activity: Not on file   Other Topics Concern    Not on file   Social History Narrative    Not on file         ALLERGIES: Aspirin    Review of Systems   Constitutional: Negative for diaphoresis and fever. HENT: Negative for facial swelling. Eyes: Negative for visual disturbance. Respiratory: Negative for cough. Cardiovascular: Negative for chest pain. Gastrointestinal: Negative for abdominal pain. Genitourinary: Negative for dysuria. Musculoskeletal: Negative for joint swelling. Skin: Negative for rash. Neurological: Negative for headaches. Hematological: Negative for adenopathy. Psychiatric/Behavioral: Negative for suicidal ideas. Vitals:    09/07/20 0013   BP: 100/54   Pulse: (!) 131   Resp: 30   Temp: (!) 102.5 °F (39.2 °C)   SpO2: 95%   Weight: 75.9 kg (167 lb 5.3 oz)   Height: 5' 11\" (1.803 m)            Physical Exam  Vitals signs and nursing note reviewed. Constitutional:       General: He is not in acute distress. Appearance: He is well-developed. He is ill-appearing. HENT:      Head: Normocephalic and atraumatic. Mouth/Throat:      Mouth: Mucous membranes are dry.    Eyes:      Pupils: Pupils are equal, round, and reactive to light.   Neck:      Musculoskeletal: Normal range of motion and neck supple. Cardiovascular:      Rate and Rhythm: Tachycardia present. Pulmonary:      Effort: Pulmonary effort is normal. No respiratory distress. Abdominal:      General: There is no distension. Musculoskeletal: Normal range of motion. Comments: PICC line in the right upper extremity. No erythema or tenderness. Skin:     General: Skin is warm and dry. Neurological:      Mental Status: He is alert and oriented to person, place, and time. MDM  Number of Diagnoses or Management Options  Nausea and vomiting, intractability of vomiting not specified, unspecified vomiting type: On total parenteral nutrition (TPN):   PICC (peripherally inserted central catheter) in place:   Severe sepsis [facility]):      Amount and/or Complexity of Data Reviewed  Clinical lab tests: reviewed      ED Course as of Sep 07 0220   Mon Sep 07, 2020   0137 ED EKG interpretation:  Rhythm: sinus tach. Rate (approx.): 128. Axis: normal.  ST segment:  No concerning ST elevations or depressions. This EKG was interpreted by Alex Perkins MD,ED Provider. EKG 12 LEAD INITIAL [JM]      ED Course User Index  [JM] Ozzy Lowe MD     IMPRESSION:  1. Severe sepsis ([address]    2. On total parenteral nutrition (TPN)    3. PICC (peripherally inserted central catheter) in place    4. Nausea and vomiting, intractability of vomiting not specified, unspecified vomiting type        - Sepsis order set entered: YES  - Broad Spectrum Antibiotics given: Cefepime, Vancomycin and  Levofloxacin  - Repeat lactic acid ordered for time none  - Sepsis Re-assessment performed at time 0449 and clinical condition improving. If Septic Shock (SBP<90, MAP<65, Lactate >4):     - IVF:  30cc/kg actual Body Weight  - Vasopressors: Not indicated due to Septic Shock not present    Plan:  admit  Palliative Care Consult   Likely NOT septic shock.   Pt with documented history of hypotension during prior admission and on midodrine at home. Lactate normal with improving HR. Will hold on pressors for now. Yen Alvarez MD    Perfect Serve Consult for Admission  2:22 AM    ED Room Number: ER15/15  Patient Name and age:  Kevin Guer 80 y.o.  male  Working Diagnosis:   1. Severe sepsis ([address]    2. On total parenteral nutrition (TPN)    3. PICC (peripherally inserted central catheter) in place    4. Nausea and vomiting, intractability of vomiting not specified, unspecified vomiting type        COVID-19 Suspicion:  yes  Sepsis present:  yes  Reassessment needed: yes  Code Status:  Do Not Resuscitate  Readmission: no  Isolation Requirements:  yes  Recommended Level of Care:  step down  Department:[facility] Adult ED - 21   Other:  Tachycardic and febrile. No clear source. COVID ordered. Lactate normal.  BP chronically low and patient had dose of midodrine reduced after hospital admission. 2:23 AM  I have spent **36* minutes of critical care time involved in lab review, consultations with specialist, family decision-making, and documentation. During this entire length of time I was immediately available to the patient and/or family.     Procedures

## 2020-09-07 NOTE — ED NOTES
Spoke to Dr. Jes Fatima concerning continual low BP following NS boluses; Dr. Jes Fatima is consulting the Intensivist.

## 2020-09-07 NOTE — ED TRIAGE NOTES
Patient arrives via EMS for fever and vomiting. EMS reports green bile vomited in route. Patient is being taken care of by Home health and arrived with a double lumen PICC. Patient on TPN due to difficulty swallowing. Patient is a poor historian.  A&Ox2

## 2020-09-07 NOTE — PROGRESS NOTES
2000 - Report received from Tammy Vargas., RN. Pt resting comfortably, VSS.  0000 - MAPs 70s, Levophed weaned. 0400 - BP stable. AM labs drawn. 0600 - COVID negative, isolation d/c per MD. Urbano Posadas drawn. Bedside and Verbal shift change report given to Wil Sutherland (oncoming nurse) by Brittany (offgoing nurse). Report included the following information SBAR, Kardex, Intake/Output, MAR, Accordion, Recent Results, Cardiac Rhythm -NSR and Alarm Parameters .

## 2020-09-07 NOTE — H&P
History & Physical    Date of admission: 9/7/2020    Patient name: Ale Crawford  MRN: [id number]  YOB: 1934  Age: 80 y.o. Primary care provider: Khari Gibson MD     Source of Information: patient, ED and electronic medical records                               Chief complaint:  Nausea and vomiting    History of present illness  Ale Crawford is a 80 y.o. male with past medical history of recent multiple bilateral pulmonary emboli, right lower extremity DVT, GI bleed, erosive gastritis, anemia, blood transfusion, pancreatitis, arthritis, BPH, DJD, and hyperlipidemia presented to the ED from home with reported nausea and vomiting. Patient provided some history in addition to his daughter who was at the bedside. Remainder of history was obtained per my review of ED and electronic medical records. Per these collective reports, patient had onset of nausea and vomiting at home yesterday with bilious emesis, moderate - severe, without specific aggravating or alleviating factors. He also reportedly had a fever with temperature = 102 F and was looking \"lethargic\" with shaking chills. His daughter notes that patient had been doing well at home followed his last hospitalization. He was here at [facility] from 7/13/2020 - 7/30/2020 with initial symptoms or nausea, vomiting, right lower extremity edema and pain. He was diagnosed with extensive right common femoral and proximal femoral DVT, bilateral pulmonary emboli, initially treated with anticoagulation.   However patient developed a GI bleed, with erosive gastritis (noted on EGD on 7/20/2020), pancreatitis mass (thought possible necrosis from pancreatitis vs neoplasm), acute blood loss anemia requiring blood transfusion, hemorrhagic shock, with continued hypotension (requiring Midodrine on discharge), and severe protein calorie malnutrition (requiring insertion of PICC line for TPN). Patient was discharged to [facility] rehab facility for two weeks and since then, he has been home. He notedly had recently been able to tolerate some ice chips and medication by mouth. On arrival in the ED tonight, initial recorded vital signs were T= 102.5 F, BP = 100/54, HR= 131, RR= 30, O2sat = 95% on room air. Code sepsis was called. Patient became more hypotensive with SBP decreased to 70s. ED ordered 0.9% normal saline (NS) 1000 ml IV fluid boluses x 2 plus an additional 500 ml IV bolus but patient's BP remains low. Although chart records note prior history of hypotension, patient's daughter notes that at home since discharge, his systolic blood pressures have been ~ 100's - 110s. Unfortunately he was to be taking Midodrine 10 mg, his daughter notes that the prescription was written for 2.5 mg which is the dose he was taking at home. Tonight in the ED, nurse confirms that patient was given Midodrine 10 mg po x 1 dose. ED request patient admission to the hospitalist service. There were no reports of new onset slurred speech, facial droop, syncope, loss of consciousness, headache, neck pain, visual disturbance, numbness, paresthesias, focal weakness, chest pain, palpitations, abdominal pain, nausea, vomiting, diarrhea, calf pain, increased leg swelling/ edema, fever, chills.      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 Authorizing Provider   fat emulsion 20% (LIPOSYN, INTRAlipid) 20 % infusion 500 mL by IntraVENous route every Monday, Wednesday, Friday. 7/31/20   Mery Ruiz NP   insulin lispro (HUMALOG) 100 unit/mL injection AC/HS 7/30/20   Mirian Mosqueda NP   pantoprazole 4 mg/mL in 0.9% sodium chloride injection 10 mL by IntraVENous route every twelve (12) hours. 7/30/20   Mery Ruiz NP   multivitamin with iron tablet Take 1 Tab by mouth daily. Provider, Historical   TAMSULOSIN HCL (TAMSULOSIN PO) Take 0.4 mg by mouth daily. Provider, Historical   finasteride (PROSCAR) 5 mg tablet Take 5 mg by mouth daily. Provider, Historical     Allergies   Allergen Reactions    Aspirin Unknown (comments)      Family History   Problem Relation Age of Onset    Diabetes Sister     Diabetes Brother     Heart Disease Brother          Social history  Patient resides      x  With family care    x  lives at home       Ambulates          x  Assisted walker         Alcohol history   x  None reported           Smoking history  x  None             Social History     Tobacco Use   Smoking Status Never Smoker   Smokeless Tobacco Never Used       Code status      x  DNR/DNI     Patient has a durable do not resuscitate (DDNR) documented in [address] records dated 7/17/2020. Review of systems  Pertinent positives as noted in HPI. All other systems were reviewed and were negative    Physical Examination   Visit Vitals  BP (!) 82/42   Pulse (!) 104   Temp 98.5 °F (36.9 °C)   Resp 30   Ht 5' 11\" (1.803 m)   Wt 75.9 kg (167 lb 5.3 oz)   SpO2 98%   BMI 23.34 kg/m²          O2 Device: Room air    General:  Elderly male mildly tachypneic but able to speak in complete sentences. Head:  Normocephalic, without obvious abnormality, atraumatic   Eyes:  Conjunctivae/corneas clear. PERRL, EOMs intact   E/N/M/T: Nares normal. Septum midline.  No nasal drainage or sinus tenderness  Lips, mucosa, and tongue normal Teeth and gums normal  Clear oropharynx   Neck: Normal appearance and movements, symmetrical, trachea midline  No palpable adenopathy  No thyroid enlargement, tenderness or nodules  No carotid bruit   Normal JVP   Lungs:   Symmetrical chest expansion and respiratory effort  Clear to auscultation bilaterally   Chest wall:  No tenderness or deformity   Heart:  Tachycardic  Regular rhythm   Sounds normal; no murmur, click, rub or gallop   Abdomen:   Soft, no tenderness  No rebound, guarding, or rigidity  Non-distended  Bowel sounds normal  No masses or hepatosplenomegaly  No hernias present   Back: No CVA tenderness   Extremities: Extremities normal, atraumatic  No cyanosis or edema  No DVT signs   Pulses 2+ radial/ 1+ DP bilateral DP pulses   Skin: Rash on buttocks  Warm and dry   Musculo-      skeletal: Gait not tested     Neuro: GCS 15. Moves all extremities x 4 with generalized weakness. No slurred speech. No facial droop. Sensation grossly intact. Psych: Alert, oriented x 3           Data Review    24 Hour Results:  Recent Results (from the past 24 hour(s))   LACTIC ACID    Collection Time: 09/07/20 12:49 AM   Result Value Ref Range    Lactic acid 1.3 0.4 - 2.0 MMOL/L   CBC WITH AUTOMATED DIFF    Collection Time: 09/07/20 12:49 AM   Result Value Ref Range    WBC 7.9 4.1 - 11.1 K/uL    RBC 3.44 (L) 4.10 - 5.70 M/uL    HGB 11.7 (L) 12.1 - 17.0 g/dL    HCT 35.9 (L) 36.6 - 50.3 %    .4 (H) 80.0 - 99.0 FL    MCH 34.0 26.0 - 34.0 PG    MCHC 32.6 30.0 - 36.5 g/dL    RDW 16.0 (H) 11.5 - 14.5 %    PLATELET 143 (L) 107 - 400 K/uL    MPV 11.4 8.9 - 12.9 FL    NRBC 0.0 0  WBC    ABSOLUTE NRBC 0.00 0.00 - 0.01 K/uL    NEUTROPHILS 92 (H) 32 - 75 %    LYMPHOCYTES 4 (L) 12 - 49 %    MONOCYTES 3 (L) 5 - 13 %    EOSINOPHILS 0 0 - 7 %    BASOPHILS 0 0 - 1 %    IMMATURE GRANULOCYTES 1 (H) 0.0 - 0.5 %    ABS. NEUTROPHILS 7.3 1.8 - 8.0 K/UL    ABS. LYMPHOCYTES 0.3 (L) 0.8 - 3.5 K/UL    ABS.  MONOCYTES 0.2 0.0 - 1.0 K/UL    ABS. EOSINOPHILS 0.0 0.0 - 0.4 K/UL    ABS. BASOPHILS 0.0 0.0 - 0.1 K/UL    ABS. IMM. GRANS. 0.1 (H) 0.00 - 0.04 K/UL    DF SMEAR SCANNED      RBC COMMENTS ANISOCYTOSIS  1+        RBC COMMENTS MACROCYTOSIS  1+        RBC COMMENTS OVALOCYTES  PRESENT       PROTHROMBIN TIME + INR    Collection Time: 09/07/20 12:49 AM   Result Value Ref Range    INR 1.2 (H) 0.9 - 1.1      Prothrombin time 12.4 (H) 9.0 - 69.3 sec   METABOLIC PANEL, COMPREHENSIVE    Collection Time: 09/07/20 12:49 AM   Result Value Ref Range    Sodium 137 136 - 145 mmol/L    Potassium 4.2 3.5 - 5.1 mmol/L    Chloride 104 97 - 108 mmol/L    CO2 27 21 - 32 mmol/L    Anion gap 6 5 - 15 mmol/L    Glucose 142 (H) 65 - 100 mg/dL    BUN 29 (H) 6 - 20 MG/DL    Creatinine 0.95 0.70 - 1.30 MG/DL    BUN/Creatinine ratio 31 (H) 12 - 20      GFR est AA >60 >60 ml/min/1.73m2    GFR est non-AA >60 >60 ml/min/1.73m2    Calcium 9.0 8.5 - 10.1 MG/DL    Bilirubin, total 0.8 0.2 - 1.0 MG/DL    ALT (SGPT) 21 12 - 78 U/L    AST (SGOT) 24 15 - 37 U/L    Alk.  phosphatase 106 45 - 117 U/L    Protein, total 7.6 6.4 - 8.2 g/dL    Albumin 3.0 (L) 3.5 - 5.0 g/dL    Globulin 4.6 (H) 2.0 - 4.0 g/dL    A-G Ratio 0.7 (L) 1.1 - 2.2     TROPONIN I    Collection Time: 09/07/20 12:49 AM   Result Value Ref Range    Troponin-I, Qt. <0.05 <0.05 ng/mL   LIPASE    Collection Time: 09/07/20 12:49 AM   Result Value Ref Range    Lipase 107 73 - 393 U/L   MAGNESIUM    Collection Time: 09/07/20 12:49 AM   Result Value Ref Range    Magnesium 1.9 1.6 - 2.4 mg/dL   D DIMER    Collection Time: 09/07/20 12:52 AM   Result Value Ref Range    D-dimer 8.20 (H) 0.00 - 0.65 mg/L FEU   EKG, 12 LEAD, INITIAL    Collection Time: 09/07/20  1:31 AM   Result Value Ref Range    Ventricular Rate 128 BPM    Atrial Rate 128 BPM    P-R Interval 140 ms    QRS Duration 68 ms    Q-T Interval 302 ms    QTC Calculation (Bezet) 440 ms    Calculated P Axis 60 degrees    Calculated R Axis 25 degrees    Calculated T Axis 18 degrees    Diagnosis       Sinus tachycardia  When compared with ECG of 03-SEP-2020 05:24,  No significant change was found     URINALYSIS W/MICROSCOPIC    Collection Time: 09/07/20  1:59 AM   Result Value Ref Range    Color YELLOW/STRAW      Appearance CLEAR CLEAR      Specific gravity 1.020 1.003 - 1.030      pH (UA) 6.5 5.0 - 8.0      Protein Negative NEG mg/dL    Glucose Negative NEG mg/dL    Ketone Negative NEG mg/dL    Bilirubin Negative NEG      Blood Negative NEG      Urobilinogen 1.0 0.2 - 1.0 EU/dL    Nitrites Negative NEG      Leukocyte Esterase Negative NEG      WBC 0-4 0 - 4 /hpf    RBC 0-5 0 - 5 /hpf    Epithelial cells FEW FEW /lpf    Bacteria Negative NEG /hpf    Hyaline cast 0-2 0 - 5 /lpf   URINE CULTURE HOLD SAMPLE    Collection Time: 09/07/20  1:59 AM    Specimen: Serum; Urine   Result Value Ref Range    Urine culture hold        Urine on hold in Microbiology dept for 2 days. If unpreserved urine is submitted, it cannot be used for addtional testing after 24 hours, recollection will be required.    PROCALCITONIN    Collection Time: 09/07/20  1:59 AM   Result Value Ref Range    Procalcitonin 0.14 ng/mL   FERRITIN    Collection Time: 09/07/20  1:59 AM   Result Value Ref Range    Ferritin 146 26 - 388 NG/ML   SARS-COV-2    Collection Time: 09/07/20  1:59 AM   Result Value Ref Range    Specimen source Nasopharyngeal      SARS-CoV-2 PENDING     SARS-CoV-2 PENDING     Specimen source Nasopharyngeal      COVID-19 rapid test PENDING     Specimen type NP Swab      Health status PENDING     COVID-19 PENDING      Recent Labs     09/07/20  0049   WBC 7.9   HGB 11.7*   HCT 35.9*   *     Recent Labs     09/07/20  0049      K 4.2      CO2 27   *   BUN 29*   CREA 0.95   CA 9.0   MG 1.9   ALB 3.0*   TBILI 0.8   ALT 21   INR 1.2*       Imaging  CT ABD PELV W CONT     INDICATION: sepsis, vomiting      COMPARISON: July 22, 2020      CONTRAST: 100 mL of Isovue-370.     TECHNIQUE: Following the uneventful intravenous administration of contrast, thin axial  images were obtained through the abdomen and pelvis. Coronal and sagittal  reconstructions were generated. Oral contrast was not administered. CT dose  reduction was achieved through use of a standardized protocol tailored for this  examination and automatic exposure control for dose modulation.     FINDINGS:   LOWER THORAX: Mild bilateral dependent atelectasis. LIVER: No mass. BILIARY TREE: Gallbladder is within normal limits. Intrahepatic and extrahepatic  biliary dilatation, unchanged, with no visualized choledocholithiasis. SPLEEN: within normal limits. PANCREAS: Improved peripancreatic inflammatory changes. ADRENALS: Unremarkable. KIDNEYS: No mass, calculus, or hydronephrosis. STOMACH: Small hiatal hernia. SMALL BOWEL: No dilatation or wall thickening. COLON: No dilatation or wall thickening. APPENDIX: Unremarkable. PERITONEUM: No ascites or pneumoperitoneum. RETROPERITONEUM: No lymphadenopathy or aortic aneurysm. IVC filter in place. REPRODUCTIVE ORGANS: Enlarged prostate gland. URINARY BLADDER: Decompressed. BONES: No destructive bone lesion. Degenerative changes in the thoracolumbar  spine. ABDOMINAL WALL: No mass or hernia. ADDITIONAL COMMENTS: N/A     IMPRESSION:  Improved peripancreatic inflammatory changes, compatible with pancreatitis. Unchanged biliary dilatation, with no visualized choledocholithiasis. Small  hiatal hernia. Assessment and Plan   1. Sepsis of unknown organism       - initially placed orders to admit to [facility] as admission BP was 100/54, however patient is requiring repeat IV fluid boluses for resuscitation at this time. Will continue IV hydration but if BP remains < 90, then may have to consider ICU transfer. - continue IV antibiotic coverage with Cefepime and Vancomycin        - check blood cultures and adjust antibiotics accordingly          2.   Septic shock       - plan as above. Patient also was already given Midodrine 10 mg po x 1 dose       - patient is asymptomatic, with good mentation at this time but as he remains hypotensive will continue IV fluid bolus infusion          3. Tachycardia        - continue telemetry monitoring    4. Fever       - Tylenol prn       5. Elevated d-dimer       - suspect secondary to combination of prolonged immobilization and recent PE/ DVTs       - As patient already had a CT abdomen/ pelvis with IV contrast, no CTA chest with IV contrast was ordered in order to prevent any contrast nephropathy.        - No prophylactic or therapeutic anticoagulation was ordered as it is contraindicated due to significant recent history of massive GI bleed with acute blood loss anemia        - patient already has a IVC filter from last hospitalization    6. Thrombocytopenia        - repeat platelet count in a.m.    7.  Anemia - macrocytic normochromic       - repeat Hgb & Hct in a.m. No blood transfusion needed at this time. - check iron profile, A34 and folic acid levels    8. Pancreatitis       - noted improved on latest CT tonight compared to July 22nd exam       - consult with GI regarding these findings as well as to discuss TPN. Patient reportedly had a scheduled upcoming outpatient appointment. 9.  Nausea and vomiting       - order Zofran 4 mg IV q 6 hours prn    10. Rash          - located on buttocks         - order Nystatin powder BID    11. VTE prophylaxis         - SCDs to BLEs      Addendum: at 05:19  Despite the aforementioned IV fluid boluses and Midodrine, BP is still 82/46. I discussed case findings with intensivist Dr. Jes Rosas and will transfer to the ICU.                Signed by: Brittany Meneses MD    September 7, 2020 at 4:12 AM

## 2020-09-07 NOTE — PROGRESS NOTES
Pharmacist Note - Vancomycin Dosing    Consult provided for this 80 y.o. male for indication of bacteremia. Antibiotic regimen(s): Levaquin, Cefepime and Vancomycin  Patient on vancomycin PTA? NO     Recent Labs     20  0049   WBC 7.9   CREA 0.95   BUN 29*     Frequency of BMP: daily  Height: 180 cm  Weight: 75.9 kg  Est CrCl: 59 ml/min  Temp (24hrs), Av.5 °F (38.1 °C), Min:98.5 °F (36.9 °C), Max:102.5 °F (39.2 °C)    Cultures: Blood, urine on hold      Goal trough = 15 - 20 mcg/mL    Therapy will be initiated with a loading dose of 2000 mg IV x 1 to be followed by a maintenance dose of 1250 mg IV every 16 hours. Pharmacy to follow patient daily and order levels / make dose adjustments as appropriate.

## 2020-09-08 ENCOUNTER — APPOINTMENT (OUTPATIENT)
Dept: NON INVASIVE DIAGNOSTICS | Age: 85
DRG: 871 | End: 2020-09-08
Attending: NURSE PRACTITIONER
Payer: MEDICARE

## 2020-09-08 LAB
ANION GAP SERPL CALC-SCNC: 5 MMOL/L (ref 5–15)
APTT PPP: 43.3 SEC (ref 22.1–32)
BASOPHILS # BLD: 0 K/UL (ref 0–0.1)
BASOPHILS NFR BLD: 0 % (ref 0–1)
BUN SERPL-MCNC: 19 MG/DL (ref 6–20)
BUN/CREAT SERPL: 24 (ref 12–20)
CALCIUM SERPL-MCNC: 8.5 MG/DL (ref 8.5–10.1)
CHLORIDE SERPL-SCNC: 111 MMOL/L (ref 97–108)
CO2 SERPL-SCNC: 23 MMOL/L (ref 21–32)
CREAT SERPL-MCNC: 0.79 MG/DL (ref 0.7–1.3)
CRP SERPL-MCNC: 8.25 MG/DL (ref 0–0.6)
D DIMER PPP FEU-MCNC: 4.73 MG/L FEU (ref 0–0.65)
DIFFERENTIAL METHOD BLD: ABNORMAL
ECHO AV PEAK GRADIENT: 8.84 MMHG
ECHO AV PEAK VELOCITY: 148.7 CM/S
ECHO LA TO AORTIC ROOT RATIO: 1.21
ECHO LV INTERNAL DIMENSION DIASTOLIC: 2.98 CM (ref 4.2–5.9)
ECHO LV INTERNAL DIMENSION SYSTOLIC: 2.52 CM
ECHO LV IVSD: 0.72 CM (ref 0.6–1)
ECHO LV MASS 2D: 53.9 G (ref 88–224)
ECHO LV MASS INDEX 2D: 27.6 G/M2 (ref 49–115)
ECHO LV POSTERIOR WALL DIASTOLIC: 0.79 CM (ref 0.6–1)
ECHO LVOT PEAK GRADIENT: 3.57 MMHG
ECHO LVOT PEAK VELOCITY: 94.42 CM/S
ECHO MV A VELOCITY: 91.73 CM/S
ECHO MV E VELOCITY: 58.49 CM/S
ECHO MV E/A RATIO: 0.64
ECHO RV TAPSE: 2.2 CM (ref 1.5–2)
EOSINOPHIL # BLD: 0 K/UL (ref 0–0.4)
EOSINOPHIL NFR BLD: 1 % (ref 0–7)
ERYTHROCYTE [DISTWIDTH] IN BLOOD BY AUTOMATED COUNT: 15.8 % (ref 11.5–14.5)
FERRITIN SERPL-MCNC: 220 NG/ML (ref 26–388)
FIBRINOGEN PPP-MCNC: 338 MG/DL (ref 200–475)
FLUID CULTURE, SPNG2: NORMAL
GLUCOSE SERPL-MCNC: 79 MG/DL (ref 65–100)
HCT VFR BLD AUTO: 31.1 % (ref 36.6–50.3)
HGB BLD-MCNC: 10.3 G/DL (ref 12.1–17)
IMM GRANULOCYTES # BLD AUTO: 0 K/UL (ref 0–0.04)
IMM GRANULOCYTES NFR BLD AUTO: 1 % (ref 0–0.5)
INR PPP: 1.4 (ref 0.9–1.1)
L PNEUMO1 AG UR QL IA: NEGATIVE
LDH SERPL L TO P-CCNC: 145 U/L (ref 85–241)
LYMPHOCYTES # BLD: 0.6 K/UL (ref 0.8–3.5)
LYMPHOCYTES NFR BLD: 15 % (ref 12–49)
MCH RBC QN AUTO: 34.3 PG (ref 26–34)
MCHC RBC AUTO-ENTMCNC: 33.1 G/DL (ref 30–36.5)
MCV RBC AUTO: 103.7 FL (ref 80–99)
MONOCYTES # BLD: 0.3 K/UL (ref 0–1)
MONOCYTES NFR BLD: 8 % (ref 5–13)
NEUTS SEG # BLD: 3.3 K/UL (ref 1.8–8)
NEUTS SEG NFR BLD: 75 % (ref 32–75)
NRBC # BLD: 0 K/UL (ref 0–0.01)
NRBC BLD-RTO: 0 PER 100 WBC
ORGANISM ID, SPNG3: NORMAL
PHOSPHATE SERPL-MCNC: 2.9 MG/DL (ref 2.6–4.7)
PLATELET # BLD AUTO: 82 K/UL (ref 150–400)
PLEASE NOTE, SPNG4: NORMAL
PMV BLD AUTO: 11.7 FL (ref 8.9–12.9)
POTASSIUM SERPL-SCNC: 3.6 MMOL/L (ref 3.5–5.1)
PROTHROMBIN TIME: 14 SEC (ref 9–11.1)
RBC # BLD AUTO: 3 M/UL (ref 4.1–5.7)
RBC MORPH BLD: ABNORMAL
RBC MORPH BLD: ABNORMAL
S PNEUM AG SPEC QL LA: NEGATIVE
SODIUM SERPL-SCNC: 139 MMOL/L (ref 136–145)
SPECIMEN SOURCE: NORMAL
SPECIMEN SOURCE: NORMAL
SPECIMEN, SPNG1: NORMAL
THERAPEUTIC RANGE,PTTT: ABNORMAL SECS (ref 58–77)
WBC # BLD AUTO: 4.2 K/UL (ref 4.1–11.1)

## 2020-09-08 PROCEDURE — 83615 LACTATE (LD) (LDH) ENZYME: CPT

## 2020-09-08 PROCEDURE — 65660000000 HC RM CCU STEPDOWN

## 2020-09-08 PROCEDURE — 74011250637 HC RX REV CODE- 250/637: Performed by: NURSE PRACTITIONER

## 2020-09-08 PROCEDURE — 74011250636 HC RX REV CODE- 250/636: Performed by: FAMILY MEDICINE

## 2020-09-08 PROCEDURE — 84100 ASSAY OF PHOSPHORUS: CPT

## 2020-09-08 PROCEDURE — 74011250636 HC RX REV CODE- 250/636: Performed by: NURSE PRACTITIONER

## 2020-09-08 PROCEDURE — 74011250636 HC RX REV CODE- 250/636: Performed by: INTERNAL MEDICINE

## 2020-09-08 PROCEDURE — 80048 BASIC METABOLIC PNL TOTAL CA: CPT

## 2020-09-08 PROCEDURE — 74011250637 HC RX REV CODE- 250/637: Performed by: INTERNAL MEDICINE

## 2020-09-08 PROCEDURE — 82728 ASSAY OF FERRITIN: CPT

## 2020-09-08 PROCEDURE — 74011000258 HC RX REV CODE- 258: Performed by: NURSE PRACTITIONER

## 2020-09-08 PROCEDURE — 85730 THROMBOPLASTIN TIME PARTIAL: CPT

## 2020-09-08 PROCEDURE — 86140 C-REACTIVE PROTEIN: CPT

## 2020-09-08 PROCEDURE — 85379 FIBRIN DEGRADATION QUANT: CPT

## 2020-09-08 PROCEDURE — 87103 BLOOD FUNGUS CULTURE: CPT

## 2020-09-08 PROCEDURE — 36415 COLL VENOUS BLD VENIPUNCTURE: CPT

## 2020-09-08 PROCEDURE — 74011000258 HC RX REV CODE- 258: Performed by: FAMILY MEDICINE

## 2020-09-08 PROCEDURE — 74011000250 HC RX REV CODE- 250: Performed by: NURSE PRACTITIONER

## 2020-09-08 PROCEDURE — 86641 CRYPTOCOCCUS ANTIBODY: CPT

## 2020-09-08 PROCEDURE — 85025 COMPLETE CBC W/AUTO DIFF WBC: CPT

## 2020-09-08 PROCEDURE — 93306 TTE W/DOPPLER COMPLETE: CPT

## 2020-09-08 PROCEDURE — 85610 PROTHROMBIN TIME: CPT

## 2020-09-08 PROCEDURE — 93306 TTE W/DOPPLER COMPLETE: CPT | Performed by: SPECIALIST

## 2020-09-08 PROCEDURE — 85384 FIBRINOGEN ACTIVITY: CPT

## 2020-09-08 RX ORDER — HEPARIN SODIUM 5000 [USP'U]/ML
5000 INJECTION, SOLUTION INTRAVENOUS; SUBCUTANEOUS EVERY 12 HOURS
Status: DISCONTINUED | OUTPATIENT
Start: 2020-09-09 | End: 2020-09-22 | Stop reason: HOSPADM

## 2020-09-08 RX ORDER — ALPRAZOLAM 0.25 MG/1
0.25 TABLET ORAL
Status: COMPLETED | OUTPATIENT
Start: 2020-09-08 | End: 2020-09-09

## 2020-09-08 RX ORDER — POTASSIUM CHLORIDE 29.8 MG/ML
20 INJECTION INTRAVENOUS
Status: COMPLETED | OUTPATIENT
Start: 2020-09-08 | End: 2020-09-08

## 2020-09-08 RX ORDER — NYSTATIN 100000 [USP'U]/G
POWDER TOPICAL 2 TIMES DAILY
Status: DISCONTINUED | OUTPATIENT
Start: 2020-09-08 | End: 2020-09-22 | Stop reason: HOSPADM

## 2020-09-08 RX ADMIN — POTASSIUM CHLORIDE 20 MEQ: 29.8 INJECTION, SOLUTION INTRAVENOUS at 09:24

## 2020-09-08 RX ADMIN — MIDODRINE HYDROCHLORIDE 10 MG: 5 TABLET ORAL at 08:31

## 2020-09-08 RX ADMIN — VANCOMYCIN HYDROCHLORIDE 1250 MG: 10 INJECTION, POWDER, LYOPHILIZED, FOR SOLUTION INTRAVENOUS at 14:16

## 2020-09-08 RX ADMIN — MIDODRINE HYDROCHLORIDE 10 MG: 5 TABLET ORAL at 21:28

## 2020-09-08 RX ADMIN — Medication 10 ML: at 21:27

## 2020-09-08 RX ADMIN — POTASSIUM CHLORIDE 20 MEQ: 29.8 INJECTION, SOLUTION INTRAVENOUS at 08:32

## 2020-09-08 RX ADMIN — Medication 10 ML: at 06:03

## 2020-09-08 RX ADMIN — MIDODRINE HYDROCHLORIDE 10 MG: 5 TABLET ORAL at 17:15

## 2020-09-08 RX ADMIN — CEFEPIME 2 G: 2 INJECTION, POWDER, FOR SOLUTION INTRAVENOUS at 18:15

## 2020-09-08 RX ADMIN — CEFEPIME 2 G: 2 INJECTION, POWDER, FOR SOLUTION INTRAVENOUS at 10:28

## 2020-09-08 RX ADMIN — CEFEPIME 2 G: 2 INJECTION, POWDER, FOR SOLUTION INTRAVENOUS at 03:58

## 2020-09-08 RX ADMIN — CALCIUM GLUCONATE: 94 INJECTION, SOLUTION INTRAVENOUS at 18:15

## 2020-09-08 RX ADMIN — HEPARIN SODIUM 5000 UNITS: 5000 INJECTION INTRAVENOUS; SUBCUTANEOUS at 06:02

## 2020-09-08 RX ADMIN — Medication 10 ML: at 14:16

## 2020-09-08 RX ADMIN — NYSTATIN: 100000 POWDER TOPICAL at 21:27

## 2020-09-08 NOTE — PROGRESS NOTES
Comprehensive Nutrition Assessment    Type and Reason for Visit: Initial    Nutrition Recommendations/Plan:    Continue TPN    Advance diet per GI    Nutrition Assessment:    Pt admitted with Tachycardia. PMHx: B/L PE-s/p IVC filter , DVT, GI bleed, Erosive gastritis, Pancreatitis. Noted Bacteremia; pt on TPN PTA. GI consulted today-unclear cause of Pancreatitis-MRI/MRCP ordered. Patient familiar to clinical nutrition team from previous admission. Per review of nutrition notes from last admission-patient has maintained weight of ~167#. Pt sent home on TPN since unable to eat and PEG/J tube unable to be placed 2/2 ascites. TPN ordered to start this evening and will provide 3090-8267 ml, 109 gm protein and 2300 average daily caloris to meet/slighlty exceed estimated needs. Hopefully will be able to resume oral diet pending GI workup. Patient unable to tell RD when he last had a BM. Noted Miralax ordered prn. Malnutrition Assessment:  Malnutrition Status:  No malnutrition      Nutritionally Significant Medications:   KCL, Miralax prn    Estimated Daily Nutrient Needs:  Energy (kcal):  1674-8661 (MSJ x 1.4-1.5)  Protein (g):   (1.2-1.4g/kg)       Fluid (ml/day):  1 ml/kcal    Nutrition Related Findings:  Last BM PTA. No edema.     Wounds:  None       Current Nutrition Therapies:   Diet: NPO  TPN:  6.5% AA D25 @ 70 ml/hr with 500 ml 20% lipid 3 x/week    Anthropometric Measures:  · Height:  5' 11\" (180.3 cm)  · Current Body Wt:  75.9 kg (167 lb 5.3 oz)   · Ideal Body Wt: 172#  :  97.3 %    Wt Readings from Last 10 Encounters:   09/08/20 75.9 kg (167 lb 5.3 oz)   07/30/20 90 kg (198 lb 6.6 oz)   07/27/18 92.5 kg (204 lb)   03/17/18 90.7 kg (200 lb)   08/18/17 86.2 kg (190 lb)   07/15/14 86.2 kg (190 lb)   10/02/13 90 kg (198 lb 6.4 oz)   03/05/13 88.2 kg (194 lb 6.4 oz)   09/18/12 88 kg (194 lb)   08/30/12 83.9 kg (185 lb)       Nutrition Diagnosis:   · Inadequate oral intake related to altered GI function as evidenced by nutrition support-parenteral nutrition    Nutrition Interventions:   Food and/or Nutrient Delivery: Continue NPO, Continue parenteral nutrition  Nutrition Education and Counseling: No recommendations at this time  Coordination of Nutrition Care: Continued inpatient monitoring, Interdisciplinary rounds    Goals:  TPN to meet 90% estimated needs x 5-7 days. Nutrition Monitoring and Evaluation:   Food/Nutrient Intake Outcomes: Parenteral nutrition intake/tolerance  Physical Signs/Symptoms Outcomes: Biochemical data, Weight, GI status    Discharge Planning:     Too soon to determine     Felix Guzman RD CNSC  Contact: [employer]

## 2020-09-08 NOTE — PROGRESS NOTES
8:00- AM assessment completed. Pt is alert and oriented, no complaints of pain. 9:00- Pt up in chair. 10:15- Spoke with pt's daughter and wife. Given update and informed they took his clothes home when pt was transferred from ED.   10:30- Called oncology and GI consults. 11:36- Called and cancelled oncology consult per Wally. 11:45- Called ID consult. 12:30- Spoke with Daughter, updated her on status and transfer of patient to [facility]. Daughter would like a call from hospitalist today. 12:40- Pt transferred to [facility] in wheelchair.

## 2020-09-08 NOTE — CONSULTS
[address] [facility].   [address] [facility] [address]        GASTROENTEROLOGY CONSULTATION NOTE  Will Myesha Dunaway  [phone] office  [phone] NP/PA in-hospital cell phone M-F until 4:30PM  After 5PM or on weekends, please call  for physician on call        NAME:  Ruslan Nicholas   :   1934   MRN:   [id number]       Referring Physician: Paula Teran NP    Consult Date: 2020 1:18 PM    Chief Complaint: nausea and vomiting     History of Present Illness:  Patient is a 80 y.o. who is seen in consultation at the request of Paula Teran NP, for previous gastric outlet obstruction - per patient supposed to have imaging on Friday, pancreatitis. Patient has a past medical history significant for recent multiple bilateral pulmonary emboli, right lower extremity DVT, GI bleed, erosive gastritis, anemia, pancreatitis, arthritis, BPH, DJD, and hyperlipidemia. He presented to the ED with complaints of nausea/vomiting. Patient was admitted to the hospital on 20 for sepsis of unknown origin, tachycardia, fever, elevated D-dimer, thrombocytopenia, anemia, nausea/vomiting, and pancreatitis. He was transferred out of the ICU today. Patient was previously seen during hospital admission in 2020 and then in follow up by Dr. Delon Gu as an outpatient in 2020. For review, CT abdomen/pelvis with IV contrast (20): mass-like fullness in the pancreatic head, resulting in intrahepatic and pancreatic duct dilatation, peripancreatic inflammatory changes with 3.7 x 2.3 cm collection near the pancreatic tail possibly pseudocyst, large amount of ascites, and moderate amount of slightly complex fluid/soft tissue in the left abdomen - findings may represent pancreatitis or pancreatic neoplasm; edematous thickening of the gastric antrum and duodenum likely secondary to the pancreatic process. Cytology of peritoneal fluid was negative for malignant cells. CT abdomen with and without IV contrast (7/22/20): unchanged significant inflammation involving the pancreas with areas of low attenuation in the pancreatic head and pancreatic body measuring up to 4 cm; given the amount of peripancreatic inflammation, likely represents edema or necrosis related to pancreatitis, less likely pancreatic neoplasm; no significant pancreatic ductal dilatation; focal fluid surrounding the pancreatic body and tail likely developing pseudocysts; distal common bile duct obstruction likely related to inflammation in the pancreatic head; moderate bowel wall thickening involving the gastric antrum and proximal duodenum likely reactive.     Patient complains of nausea for the last approximately 3 days. He reports 1 episode of vomiting. No hematemesis. No reflux or abdominal pain. No change in bowel habits, melena, or hematochezia.   + Fever. No NSAID use. No anticoagulation prior to admission. On heparin SQ. No alcohol or tobacco use. No history of abdominal surgeries. EGD (7/20/20) by Dr. Az Morley for coffee-ground emesis showed LA Grade D erosive esophagitis; hiatal hernia; retention of large volume of gastric fluid likely secondary to gastric outlet obstruction from extrinsic compression. A repeat EGD was recommended in 2 months to evaluate for mucosal healing of severe erosive esophagitis. I have reviewed the emergency room note, hospital admission note, notes by all other clinicians who have seen the patient during this hospitalization to date. I have reviewed the problem list and the reason for this hospitalization. I have reviewed the allergies and the medications the patient was taking at home prior to this hospitalization.       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    BPH (benign prostatic hyperplasia)     DJD (degenerative joint disease)     Hypercholesterolemia     Other and unspecified hyperlipidemia        PSH:  Past Surgical History:   Procedure Laterality Date    COLONOSCOPY N/A 2017    COLONOSCOPY performed by Sulaiman Samaniego MD at [facility] END[facility]COPY    HX ORTHOPAEDIC Right 2010    rotator cuff    HX TONSILLECTOMY      IR IVC FILTER  2020         IR PLC IVC FILTER  2020       Allergies: Allergies   Allergen Reactions    Aspirin Unknown (comments)       Home Medications:  Prior to Admission Medications   Prescriptions Last Dose Informant Patient Reported? Taking? TAMSULOSIN HCL (TAMSULOSIN PO) 2020 at Unknown time  Yes Yes   Sig: Take 0.4 mg by mouth daily. fat emulsion 20% (LIPOSYN, INTRAlipid) 20 % infusion 2020 at Unknown time  No Yes   Si mL by IntraVENous route every Monday, Wednesday, Friday. finasteride (PROSCAR) 5 mg tablet 2020 at Unknown time  Yes Yes   Sig: Take 5 mg by mouth daily. insulin lispro (HUMALOG) 100 unit/mL injection Not Taking at Unknown time  No No   Sig: AC/HS   multivitamin with iron tablet 2020 at Unknown time  Yes Yes   Sig: Take 1 Tab by mouth daily. pantoprazole 4 mg/mL in 0.9% sodium chloride injection 2020 at Unknown time  No Yes   Sig: 10 mL by IntraVENous route every twelve (12) hours.       Facility-Administered Medications: None       Hospital Medications:  Current Facility-Administered Medications   Medication Dose Route Frequency    [START ON 2020] Vancomycin Trough Before 6am dose    Other ONCE    cefepime (MAXIPIME) 2 g in 0.9% sodium chloride (MBP/ADV) 100 mL  2 g IntraVENous Q8H    Vancomycin Pharmacy Dosing   Other Rx Dosing/Monitoring    vancomycin (VANCOCIN) 1250 mg in  ml infusion  1,250 mg IntraVENous Q16H    sodium chloride (NS) flush 5-40 mL  5-40 mL IntraVENous Q8H    sodium chloride (NS) flush 5-40 mL  5-40 mL IntraVENous PRN    polyethylene glycol (MIRALAX) packet 17 g  17 g Oral DAILY PRN    promethazine (PHENERGAN) tablet 12.5 mg  12.5 mg Oral Q6H PRN    Or    ondansetron (ZOFRAN) injection 4 mg  4 mg IntraVENous Q6H PRN    midodrine (PROAMATINE) tablet 10 mg  10 mg Oral TID    heparin (porcine) injection 5,000 Units  5,000 Units SubCUTAneous Q8H       Social History:  Social History     Tobacco Use    Smoking status: Never Smoker    Smokeless tobacco: Never Used   Substance Use Topics    Alcohol use: Yes     Comment: social       Family History:  Family History   Problem Relation Age of Onset    Diabetes Sister     Diabetes Brother     Heart Disease Brother        Review of Systems:  Constitutional: negative fever, negative chills, negative weight loss  Eyes:   negative visual changes  ENT:   negative sore throat, tongue or lip swelling  Respiratory:  negative cough, negative dyspnea  Cards:  negative for chest pain, palpitations, lower extremity edema  GI:   See HPI  :  negative for frequency, dysuria  Integument:  negative for rash and pruritus  Heme:  negative for easy bruising and gum/nose bleeding  Musculoskeletal:negative for myalgias, back pain and muscle weakness  Neuro:    negative for headaches, dizziness  Psych: negative for feelings of anxiety, depression     Objective:     Patient Vitals for the past 8 hrs:   BP Temp Pulse Resp SpO2   09/08/20 1230 93/63  97 (!) 32 100 %   09/08/20 1200 94/66 97.8 °F (36.6 °C) 98 28    09/08/20 1130 94/58  96 30    09/08/20 1100 95/62  87 26 99 %   09/08/20 1000 (!) 86/54  88 24 100 %   09/08/20 0930 (!) 80/57  94 24 97 %   09/08/20 0900     100 %   09/08/20 0800 92/52 98.2 °F (36.8 °C) 92 (!) 35 99 %   09/08/20 0730 102/63  90 21 97 %   09/08/20 0700 92/59  89 20 96 %   09/08/20 0600 101/64  96 19 99 %     No intake/output data recorded.   09/06 1901 - 09/08 0700  In: 466.3 [I.V.:466.3]  Out: 900 [Urine:900]    EXAM:     CONST:  Pleasant male lying in bed, no acute distress   NEURO:  Alert and oriented x 3   HEENT: EOMI, no scleral icterus   LUNGS: No respiratory distress   CARD:  S1 S2   ABD:  Soft, non distended, no tenderness, no rebound, no guarding. + Bowel sounds. EXT:  Warm   PSYCH: Full, not anxious or agitated     Data Review     Recent Labs     09/08/20  0425 09/07/20  0541   WBC 4.2 7.9   HGB 10.3* 9.8*   HCT 31.1* 29.7*   PLT 82* 95*     Recent Labs     09/08/20  0425 09/07/20  0619 09/07/20  0541     --  139   K 3.6  --  4.0   *  --  111*   CO2 23  --  22   BUN 19  --  27*   CREA 0.79  --  0.83   GLU 79  --  101*   PHOS 2.9 3.3  --    CA 8.5  --  8.0*     Recent Labs     09/07/20  0541 09/07/20  0049   AP 89 106   TP 6.3* 7.6   ALB 2.3* 3.0*   GLOB 4.0 4.6*   LPSE  --  107     Recent Labs     09/08/20  0425 09/07/20  0541   INR 1.4* 1.4*   PTP 14.0* 14.1*   APTT 43.3* 40.6*        Assessment:   · Nausea/vomiting  · Pancreatitis: WBC 4.2, Hgb 10.3, normal LFTs, normal lipase. CT abdomen/pelvis with IV contrast (9/7/20): improved peripancreatic inflammatory changes, compatible with pancreatitis; unchanged biliary dilatation with no visualized choledocholithiasis; small hiatal hernia. · Gram positive cocci bacteremia: on antibiotics. ID following. · Sepsis: COVID negative  · History of PE status post IVC filter placement     Patient Active Problem List   Diagnosis Code    DVT (deep vein thrombosis) in pregnancy O22.30    DVT (deep venous thrombosis) ([facility]) I82.409    Tachycardia R00.0    Hypotension I95.9    Fever R50.9    Sepsis ([address] A41.9    Shock ([address] R57.9     Plan:     · NPO  · Supportive measures  · Will obtain MRI/MRCP  · Oncology consulted. ID following - on antibiotics. · Patient was discussed with and will be seen by Dr. Joe Woods  · Thank you for allowing me to participate in care of Jorje Tolliver     Signed By: Chaitanya Garcia     9/8/2020  1:18 PM       Gastroenterology Attending Physician attestation statement and comments. This patient was seen and examined by me in a face-to-face visit today. I reviewed the medical record including lab work, imaging and other provider notes.  I confirmed the history as described above. I spoke to the patient, reviewed the medical record including lab work, imaging and other provider notes. I discussed this case in detail with Chaitanya Croft. I formulated an  assessment of this patient and developed a treatment plan. I agree with the above consultation note. I agree with the history, exam and assessment and plan as outlined in the note. I would like to add the following: Patient seen and examined. He is admitted for hypotension and suspected bacteremia in setting of fever and vomiting at home. He is being evaluated for GPC bacteremia vs contaminant. On previous admission, he was suspected to have a pancreas mass; however, on subsequent imaging, this was favored to be severe acute pancreatitis. CT 8/28/20 following hospital discharge was completed as an outpatient with Dr. Madison Resendiz. This showed decreased peripancreatic fluid. His CT from 9/7 shows the following:    Improved peripancreatic inflammatory changes, compatible with pancreatitis. Unchanged biliary dilatation, with no visualized choledocholithiasis. Small  hiatal hernia    Our plan is to proceed with MRI/MRCP with contrast next. His underlying cause for pancreatitis is still not clear. Next step to consider is EUS. Discussed with patient at the bedside. Rivera Jeffers MD

## 2020-09-08 NOTE — PROGRESS NOTES
7:30-Bedside and Verbal shift change report given to Gideon Davila (oncoming nurse) by Mariella Jensen (offgoing nurse). Report included the following information SBAR, Kardex, ED Summary, Intake/Output, MAR, Accordion, Recent Results, Med Rec Status, Cardiac Rhythm SR, Alarm Parameters , Pre Procedure Checklist and Procedure Verification. 12:40-TRANSFER - OUT REPORT:    Verbal report given to Tommy(name) on Valeria Slaughter  being transferred to [facility](unit) for routine progression of care       Report consisted of patients Situation, Background, Assessment and   Recommendations(SBAR). Information from the following report(s) SBAR, Kardex, ED Summary, Intake/Output, MAR, Accordion, Recent Results, Med Rec Status, Cardiac Rhythm SR, Alarm Parameters  and Quality Measures was reviewed with the receiving nurse. Lines:   PICC Double Lumen 07/16/20 Right;Brachial (Active)   Central Line Being Utilized Yes 09/08/20 1200   Criteria for Appropriate Use Limited/no vessel suitable for conventional peripheral access 09/08/20 1200   Site Assessment Clean, dry, & intact 09/08/20 1200   Phlebitis Assessment 0 09/08/20 1200   Infiltration Assessment 0 09/08/20 1200   Date of Last Dressing Change 09/06/20 09/08/20 1200   Dressing Status Clean, dry, & intact 09/08/20 1200   Action Taken Open ports on tubing capped 09/08/20 1200   Dressing Type Disk with Chlorhexadine gluconate (CHG); Transparent 09/08/20 1200   Hub Color/Line Status Purple;Capped 09/08/20 1200   Positive Blood Return (Site #1) Yes 09/08/20 1200   Hub Color/Line Status Red;Capped 09/08/20 1200   Positive Blood Return (Site #2) Yes 09/08/20 1200   Alcohol Cap Used Yes 09/08/20 1200       Peripheral IV 09/07/20 Left Forearm (Active)   Site Assessment Clean, dry, & intact 09/08/20 1200   Phlebitis Assessment 0 09/08/20 1200   Infiltration Assessment 0 09/08/20 1200   Dressing Status Clean, dry, & intact 09/08/20 1200   Dressing Type Transparent;Tape 09/08/20 1200   Hub Color/Line Status Green;Capped;Flushed 09/08/20 1200   Action Taken Open ports on tubing capped 09/08/20 1200   Alcohol Cap Used Yes 09/08/20 1200        Opportunity for questions and clarification was provided.       Patient transported with:   Patient's medications from home  Tech

## 2020-09-08 NOTE — PROGRESS NOTES
[address] [facility]  Hospitalist Group     ICU Transfer/Accept Summary     This patient is being transferred OUT OF ICU  DATE OF TRANSFER: 9/8/2020       PATIENT ID: Kevin Martínez  MRN: [id number]   YOB: 1934    PRIMARY CARE PROVIDER: Rosalia Quiroga MD   DATE OF ADMISSION: 9/7/2020 12:12 AM    ATTENDING PHYSICIAN: Edith Carranza NP  CONSULTATIONS:   IP CONSULT TO INFECTIOUS DISEASES  IP CONSULT TO GASTROENTEROLOGY    PROCEDURES/SURGERIES:   * No surgery found *    REASON FOR ADMISSION: Septic Shock    HOSPITAL PROBLEM LIST:  Patient Active Problem List   Diagnosis Code    DVT (deep vein thrombosis) in pregnancy O22.30    DVT (deep venous thrombosis) ([facility]) I82.409    Tachycardia R00.0    Hypotension I95.9    Fever R50.9    Sepsis ([address] A41.9    Shock ([address] R57.9         Brief HPI and Hospital Course:      From H&P 9/7/2020:   Gabrielle Bhatti is a 80 y.o. male with past medical history of recent multiple bilateral pulmonary emboli, right lower extremity DVT, GI bleed, erosive gastritis, anemia, blood transfusion, pancreatitis, arthritis, BPH, DJD, and hyperlipidemia presented to the ED from home with reported nausea and vomiting. Patient provided some history in addition to his daughter who was at the bedside. Remainder of history was obtained per my review of ED and electronic medical records. Per these collective reports, patient had onset of nausea and vomiting at home yesterday with bilious emesis, moderate - severe, without specific aggravating or alleviating factors. He also reportedly had a fever with temperature = 102 F and was looking \"lethargic\" with shaking chills. His daughter notes that patient had been doing well at home followed his last hospitalization. He was here at [facility] from 7/13/2020 - 7/30/2020 with initial symptoms or nausea, vomiting, right lower extremity edema and pain.   He was diagnosed with extensive right common femoral and proximal femoral DVT, bilateral pulmonary emboli, initially treated with anticoagulation. However patient developed a GI bleed, with erosive gastritis (noted on EGD on 7/20/2020), pancreatitis mass (thought possible necrosis from pancreatitis vs neoplasm), acute blood loss anemia requiring blood transfusion, hemorrhagic shock, with continued hypotension (requiring Midodrine on discharge), and severe protein calorie malnutrition (requiring insertion of PICC line for TPN). Patient was discharged to [facility] rehab facility for two weeks and since then, he has been home. He notedly had recently been able to tolerate some ice chips and medication by mouth. On arrival in the ED tonight, initial recorded vital signs were T= 102.5 F, BP = 100/54, HR= 131, RR= 30, O2sat = 95% on room air. Code sepsis was called. Patient became more hypotensive with SBP decreased to 70s. ED ordered 0.9% normal saline (NS) 1000 ml IV fluid boluses x 2 plus an additional 500 ml IV bolus but patient's BP remains low. Although chart records note prior history of hypotension, patient's daughter notes that at home since discharge, his systolic blood pressures have been ~ 100's - 110s. Unfortunately he was to be taking Midodrine 10 mg, his daughter notes that the prescription was written for 2.5 mg which is the dose he was taking at home. Tonight in the ED, nurse confirms that patient was given Midodrine 10 mg po x 1 dose. ED request patient admission to the hospitalist service. There were no reports of new onset slurred speech, facial droop, syncope, loss of consciousness, headache, neck pain, visual disturbance, numbness, paresthesias, focal weakness, chest pain, palpitations, abdominal pain, nausea, vomiting, diarrhea, calf pain, increased leg swelling/ edema, fever, chills. \"    ICU Note 9/8/2020:  \"ICU Problems:  - Septic Shock, improved (COVID negative)  - GPC bacteremia   - Pancreatitis- improved peripancreatic inflammatory changes on most recent CT  - Gastric outlet obstruction- previously diagnosed  - pancreatic mass- seen of previous CT scan- not seen on most recent CT  - hx PE s/p IVC filter placement  ICU Comprehensive Plan of Care:  Plans for this Shift:   1. Patient with GPCs in blood, ID consult, will collect repeat blood cultures in AM  2. ID consulted  3. TTE ordered to evaluate for endocarditis  4. Continue midodrine, now off vasopressors  5. Continue vancomycin and cefepime  6. GI consulted for pancreatitis and gastric outlet obstruction- per patient he was supposed to have a \"study\" done with GI on Friday. Unclear timeline when patient can start PO intake. 7. Patient with elevated D dimer- previous PE, unable to be anticoagulated- IVC filter in place  8. Restarting TPN\"    GI Consult 9/8/2020:  \"Plan:  · NPO  · Supportive measures  · Will obtain MRI/MRCP  · Oncology consulted. ID following - on antibiotics. · Patient was discussed with and will be seen by Dr. Rina Booker  · Thank you for allowing me to participate in care of [facility]"     ID Consult 9/8/2020:  \"Impression:  · Positive blood culturegram-positive cocci in clusters 1/2 bottles  · Sepsis syndrome  · TPN dependent  · PICC line at home  Plan:  · Continue IV antibiotic therapy  · Follow-up cultures  · If this is not staph aureus, then can try to preserve the line as staph epidermidis might be a contaminant, especially if only from 1 blood culture bottle\"     Patient seen in bed in [facility]. He is A&Ox4. Pt denies HA, dizziness, visual changes, cough, SOB, CP, abd pain, n/v/d, dysuria or weakness. Discussed with Dr. Jeet Pelayo.     Assessment and Plan:    Septic Shock: GPC bacteremia  -resolving, now off pressers-continue midodrine  -BC gram-pos cocci in clusters 1/2 bottles  -repeat BC  -Cont vanc, cefepime for now  -TTE ordered to eval for endocarditis  -PICC line from home - TPN dependent  -Seen by ID today, follow cultures    Pancreatitis  -improved peripancreatic inflammatory changes on most recent CT  -Continue TPN  -Consult GI    Gastric outlet obstruction  -on TPN, continue  -consult GI    Pancreatic Mass: seen on prev CT but not most recent  -consult oncology - has seen Mao Najera in past    Hx PE/DVT: s/p IVC filter placement  -elevated d-dimer, improving  -IVC filter in place  -Echo ordered    DVTppx: SCDs, IVC filter, heparin  Gippx: NA  Code Status: DNR  Diet: TPN  Activity: OOB to chair TID and PRN  Discharge: TBD           PHYSICAL EXAMINATION:  Visit Vitals  /63   Pulse 96   Temp 97.5 °F (36.4 °C)   Resp 20   Ht 5' 11\" (1.803 m)   Wt 75.9 kg (167 lb 5.3 oz)   SpO2 97%   BMI 23.34 kg/m²       General:  Alert, cooperative, well noursished, well developed, appears stated age   Eyes:  Sclera anicteric. Pupils equally round and reactive to light. Mouth/Throat: Mucous membranes normal, oral pharynx clear   Neck: Supple   Lungs:   Clear to auscultation bilaterally, good effort   CV:  Regular rate and rhythm,no murmur, click, rub or gallop   Abdomen:   Soft, non-tender. bowel sounds normal. non-distended.     Extremities: No cyanosis or edema   Skin: Skin color, texture, turgor normal. no acute rash or lesions   Musculoskeletal: No swelling or deformity   Lines/Devices:  Intact, no erythema, drainage or tenderness   Neuro: A&Ox4, CNII-XII grossly intact, GRAHAM        CODE STATUS:   Full Code   X DNR    Partial    Comfort Care     Signed:   Galina Singleton NP  Date of Service:  9/8/2020  3:40 PM

## 2020-09-08 NOTE — ROUTINE PROCESS
Bedside and Verbal shift change report given to Omayra Randle (oncoming nurse) by Ozzie Contreras RN (offgoing nurse). Report included the following information SBAR, Kardex, Intake/Output, MAR, Recent Results and Cardiac Rhythm NSR.

## 2020-09-08 NOTE — ROUTINE PROCESS
TRANSFER - IN REPORT: 
 
Verbal report received from [facility]) on Jackqueline Inch  being received from (unit) for routine progression of care Report consisted of patients Situation, Background, Assessment and  
Recommendations(SBAR). Information from the following report(s) SBAR, Kardex, Intake/Output, MAR and Recent Results was reviewed with the receiving nurse. Opportunity for questions and clarification was provided. Assessment completed upon patients arrival to unit and care assumed.

## 2020-09-08 NOTE — PROGRESS NOTES
[facility]    ICU TEAM Progress Note    Name: Lesvia Pryor   : 1934   MRN: [id number]   Date: 2020      Subjective:   Progress Note: 2020      Reason for ICU Admission: Shock     HPI: 80M w/ hx of PE, DVT, on anticoagulation then developed severe GIB, pancreatitis, gastric outlet obstruction, also concern for malignancy, patient has ascites, unclear origin, patient does have notable mass at the head of pancreas, cytology negative from ascites fluid, initially plan for EUS Bx which is pending. Patient has been on TPN since July due to gastric outlet obstruction, reports that he was at home, noted some gastric discomfort, and hypotension. In the ER patient received 3L of crystalloids, MAP continue to be in the 50's, ICU team was notified by Dr. Nick Gardner due to hypotension. Lactic acid 1.3, procalcitonin . 13, febrile to 102 and has d di bobby at 8.2, patient does have hx of PE but due to hx of GI hemorrhage is not anticoagulated, he does have IVC filter placed. On evaluate of patient, he is awake and alert, reports some abdominal pain, no dizziness, no SOB, no CP. Overnight events: Patient off pressors since midnight. Otherwise, no acute events overnight    POD:* No surgery found *    S/P:     Active Problem List:     Problem List  Date Reviewed: 2020          Codes Class    Tachycardia ICD-10-CM: R00.0  ICD-9-CM: 785.0         Hypotension ICD-10-CM: I95.9  ICD-9-CM: 365. 9         Fever ICD-10-CM: R50.9  ICD-9-CM: 780.60         Sepsis ([facility] ICD-10-CM: A41.9  ICD-9-CM: 038.9, 995.91         Shock ([facility] ICD-10-CM: R57.9  ICD-9-CM: 785.50         DVT (deep venous thrombosis) (HCC) ICD-10-CM: I82.409  ICD-9-CM: 453.40         DVT (deep vein thrombosis) in pregnancy ICD-10-CM: O22.30  ICD-9-CM: 671.30               Past Medical History:      has a past medical history of Arthritis, BPH (benign prostatic hyperplasia), DJD (degenerative joint disease), Hypercholesterolemia, and Other and unspecified hyperlipidemia. He also has no past medical history of Abuse, Adverse effect of anesthesia, Anemia NEC, Calculus of kidney, Congestive heart failure, unspecified, Contact dermatitis and other eczema, due to unspecified cause, COPD, Depression, Headache(784.0), Psychotic disorder ([address] Sleep apnea, or Trauma. Past Surgical History:      has a past surgical history that includes hx orthopaedic (Right, 2010); hx tonsillectomy; colonoscopy (N/A, 8/18/2017); ir ivc filter (7/20/2020); and ir plc ivc filter (7/20/2020). Home Medications:     Prior to Admission medications    Medication Sig Start Date End Date Taking? Authorizing Provider   fat emulsion 20% (LIPOSYN, INTRAlipid) 20 % infusion 500 mL by IntraVENous route every Monday, Wednesday, Friday. 7/31/20  Yes Bonifacio Mosqueda NP   pantoprazole 4 mg/mL in 0.9% sodium chloride injection 10 mL by IntraVENous route every twelve (12) hours. 7/30/20  Yes Bonifacio Mosqueda NP   multivitamin with iron tablet Take 1 Tab by mouth daily. Yes Provider, Historical   TAMSULOSIN HCL (TAMSULOSIN PO) Take 0.4 mg by mouth daily. Yes Provider, Historical   finasteride (PROSCAR) 5 mg tablet Take 5 mg by mouth daily. Yes Provider, Historical   insulin lispro (HUMALOG) 100 unit/mL injection AC/HS 7/30/20   Sanchez Nguyen NP       Allergies/Social/Family History: Allergies   Allergen Reactions    Aspirin Unknown (comments)      Social History     Tobacco Use    Smoking status: Never Smoker    Smokeless tobacco: Never Used   Substance Use Topics    Alcohol use: Yes     Comment: social      Family History   Problem Relation Age of Onset    Diabetes Sister     Diabetes Brother     Heart Disease Brother        Review of Systems:     A comprehensive review of systems was negative.  Complaining of mild, intermittent abdominal pain    Objective:   Vital Signs:  Visit Vitals  BP 92/59   Pulse 89   Temp 98.7 °F (37.1 °C)   Resp 20   Ht 5' 11\" (1.803 m)   Wt 75.9 kg (167 lb 5.3 oz)   SpO2 96%   BMI 23.34 kg/m²      O2 Device: Room air Temp (24hrs), Av.5 °F (36.9 °C), Min:97.9 °F (36.6 °C), Max:99.3 °F (37.4 °C)           Intake/Output:     Intake/Output Summary (Last 24 hours) at 2020 0714  Last data filed at 2020 0600  Gross per 24 hour   Intake 466.26 ml   Output 900 ml   Net -433.74 ml       Physical Exam:    General:  Alert, cooperative, well noursished, well developed, appears stated age   Eyes:  Sclera anicteric. Pupils equally round and reactive to light. Mouth/Throat: Mucous membranes normal, oral pharynx clear   Neck: Supple   Lungs:   Clear to auscultation bilaterally, good effort   CV:  Regular rate and rhythm,no murmur, click, rub or gallop   Abdomen:   Soft, non-tender. bowel sounds normal. non-distended. Extremities: No cyanosis or edema   Skin: Skin color, texture, turgor normal. no acute rash or lesions   Musculoskeletal: No swelling or deformity   Lines/Devices:  Intact, no erythema, drainage or tenderness   Psych: Alert and oriented, normal mood affect given the setting       LABS AND  DATA: Personally reviewed  Recent Labs     20   WBC 4.2 7.9   HGB 10.3* 9.8*   HCT 31.1* 29.7*   PLT 82* 95*     Recent Labs     20  0619 2041 20     --  139 137   K 3.6  --  4.0 4.2   *  --  111* 104   CO2 23  --  22 27   BUN 19  --  27* 29*   CREA 0.79  --  0.83 0.95   GLU 79  --  101* 142*   CA 8.5  --  8.0* 9.0   MG  --   --   --  1.9   PHOS 2.9 3.3  --   --      Recent Labs     20  0541 20  004   AP 89 106   TP 6.3* 7.6   ALB 2.3* 3.0*   GLOB 4.0 4.6*   LPSE  --  107     Recent Labs     20  0425 20  0541   INR 1.4* 1.4*   PTP 14.0* 14.1*   APTT 43.3* 40.6*      No results for input(s): PHI, PCO2I, PO2I, FIO2I in the last 72 hours.   Recent Labs     20  0049   TROIQ <0.05           MEDS: Reviewed    Chest X-Ray:  CXR Results  (Last 50 hours)               09/07/20 0122  XR CHEST PORT Final result    Impression:  IMPRESSION: No evidence of acute cardiopulmonary process. Narrative:  INDICATION: Fever       COMPARISON: July 13, 2020       FINDINGS: AP portable imaging of the chest performed at 1:09 AM demonstrates a   stable cardiomediastinal silhouette. Right arm PICC line has its tip at the   level of the distal SVC. The lungs are clear bilaterally. No significant osseous   abnormalities are seen. Postoperative changes are again seen in the right   shoulder. CT ABD PELV W CONT   Final Result   IMPRESSION:   Improved peripancreatic inflammatory changes, compatible with pancreatitis. Unchanged biliary dilatation, with no visualized choledocholithiasis. Small   hiatal hernia. XR CHEST PORT   Final Result   IMPRESSION: No evidence of acute cardiopulmonary process. ECHO 07/20  · Normal cavity size and systolic function (ejection fraction normal). Estimated left ventricular ejection fraction is 60 - 65%. · Mild tricuspid valve regurgitation is present. Assessment:     ICU Problems:  - Septic Shock, improved (COVID negative)  - GPC bacteremia   - Pancreatitis- improved peripancreatic inflammatory changes on most recent CT  - Gastric outlet obstruction- previously diagnosed  - pancreatic mass- seen of previous CT scan- not seen on most recent CT  - hx PE s/p IVC filter placement    ICU Comprehensive Plan of Care:   Plans for this Shift:   1. Patient with GPCs in blood, ID consult, will collect repeat blood cultures in AM  2. ID consulted  3. TTE ordered to evaluate for endocarditis  4. Continue midodrine, now off vasopressors  5. Continue vancomycin and cefepime  6. GI consulted for pancreatitis and gastric outlet obstruction- per patient he was supposed to have a \"study\" done with GI on Friday. Unclear timeline when patient can start PO intake.   7. Patient with elevated D dimer- previous PE, unable to be anticoagulated- IVC filter in place  8. Restarting TPN      Multidisciplinary Rounds Completed: Yes    ABCDEF Bundle/Checklist  Pain Medications: Acetaminophen  Target RASS: N/A  Sedation Medications: None  CAM-ICU:  Negative  Mobility: Fair  PT/OT: N/A   Restraints: None needed at this time  Discussed Plan of Care (goals of care): Yes  Addressed Code Status: Do Not Resuscitate    CARDIOVASCULAR  Cardiac Gtts: None  SBP Goal of: > 90 mmHg  MAP Goal of: > 65 mmHg  Transfusion Trigger (Hgb): <7 g/dL    RESPIRATORY  Vent Goals:   N/A  DVT Prophylaxis (if no, list reason): Heparin   SPO2 Goal: > 92%  Pulmonary toilet: Incentive Spirometry     GI/  Moya Catheter Present: No  GI Prophylaxis: Not at this time   Nutrition: Yes TPN  IVFs: None  Bowel Movement: No  Bowel Regimen: None needed at this time  Insulin: Sliding scale PRN    ANTIBIOTICS  Antibiotics:  Cefepime  Vancomycin    T/L/D  Tubes: None  Lines: PICC Line  Drains: None    SPECIAL EQUIPMENT  None    DISPOSITION  Transfer to non-ICU bed    CRITICAL CARE CONSULTANT NOTE  I had a face to face encounter with the patient, reviewed and interpreted patient data including clinical events, labs, images, vital signs, I/O's, and examined patient. I have discussed the case and the plan and management of the patient's care with the consulting services, the bedside nurses and the respiratory therapist.      NOTE OF PERSONAL INVOLVEMENT IN CARE   This patient has a high probability of imminent, clinically significant deterioration, which requires the highest level of preparedness to intervene urgently. I participated in the decision-making and personally managed or directed the management of the following life and organ supporting interventions that required my frequent assessment to treat or prevent imminent deterioration.     KENRICK Vickers  [facility]  9/8/2020

## 2020-09-08 NOTE — CONSULTS
Infectious Disease Consult    Today's Date: 9/8/2020   Admit Date: 9/7/2020    Impression:   · Positive blood culturegram-positive cocci in clusters 1/2 bottles  · Sepsis syndrome  · TPN dependent  · PICC line at home    Plan:   · Continue IV antibiotic therapy  · Follow-up cultures  · If this is not staph aureus, then can try to preserve the line as staph epidermidis might be a contaminant, especially if only from 1 blood culture bottle    Anti-infectives:   · Vancomycin  · Cefepime    Subjective:   Date of Consultation:  September 8, 2020  Referring Clinician: Gely Culver NP    Patient is a 80 y.o. male admitted through the emergency room with a picture of sepsis. He is on chronic TPN at home via a right arm PICC line. He states that he has not been having trouble with the line. Was having some fevers and chills at home. He is growing gram-positive cocci in 1/2 blood culture bottles, is on IV antibiotic therapy and we are asked to see him in consultation.     Patient Active Problem List   Diagnosis Code    DVT (deep vein thrombosis) in pregnancy O22.30    DVT (deep venous thrombosis) ([facility]) I82.409    Tachycardia R00.0    Hypotension I95.9    Fever R50.9    Sepsis ([address] A41.9    Shock ([address] R57.9     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    BPH (benign prostatic hyperplasia)     DJD (degenerative joint disease)     Hypercholesterolemia     Other and unspecified hyperlipidemia       Family History   Problem Relation Age of Onset    Diabetes Sister     Diabetes Brother     Heart Disease Brother       Social History     Tobacco Use    Smoking status: Never Smoker    Smokeless tobacco: Never Used   Substance Use Topics    Alcohol use: Yes     Comment: social     Past Surgical History:   Procedure Laterality Date    COLONOSCOPY N/A 8/18/2017    COLONOSCOPY performed by Stepan Swann MD at [address] HX ORTHOPAEDIC Right 2010 rotator cuff    HX TONSILLECTOMY      IR IVC FILTER  2020         IR PLC IVC FILTER  2020      Prior to Admission medications    Medication Sig Start Date End Date Taking? Authorizing Provider   fat emulsion 20% (LIPOSYN, INTRAlipid) 20 % infusion 500 mL by IntraVENous route every Monday, Wednesday, Friday. 20  Yes Panfilo Mosqueda NP   pantoprazole 4 mg/mL in 0.9% sodium chloride injection 10 mL by IntraVENous route every twelve (12) hours. 20  Yes Panfilo Mosqueda NP   multivitamin with iron tablet Take 1 Tab by mouth daily. Yes Provider, Historical   TAMSULOSIN HCL (TAMSULOSIN PO) Take 0.4 mg by mouth daily. Yes Provider, Historical   finasteride (PROSCAR) 5 mg tablet Take 5 mg by mouth daily. Yes Provider, Historical   insulin lispro (HUMALOG) 100 unit/mL injection AC/HS 20   Yael Lino NP       Allergies   Allergen Reactions    Aspirin Unknown (comments)        Review of Systems:  A comprehensive review of systems was negative except for that written in the History of Present Illness. Objective:     Visit Vitals  BP 93/63   Pulse 97   Temp 97.8 °F (36.6 °C)   Resp (!) 32   Ht 5' 11\" (1.803 m)   Wt 75.9 kg (167 lb 5.3 oz)   SpO2 100%   BMI 23.34 kg/m²     Temp (24hrs), Av.5 °F (36.9 °C), Min:97.8 °F (36.6 °C), Max:99.3 °F (37.4 °C)       Lines:  PICC:    and Peripheral IV:       Physical Exam:  Lungs:  clear to auscultation bilaterally  Heart:  regular rate and rhythm  Abdomen:  soft, non-tender.  Bowel sounds normal. No masses,  no organomegaly  Skin:  no rash or abnormalities  PICC line site is benign    Data Review:     CBC:  Recent Labs     20  0425 20  0541 20  0049   WBC 4.2 7.9 7.9   GRANS 75 90* 92*   MONOS 8 5 3*   EOS 1 0 0   ANEU 3.3 7.1 7.3   ABL 0.6* 0.4* 0.3*   HGB 10.3* 9.8* 11.7*   HCT 31.1* 29.7* 35.9*   PLT 82* 95* 114*       BMP:  Recent Labs     20  0425 20  0541 20  0049   CREA 0.79 0.83 0.95   BUN 19 27* 29*    139 137   K 3.6 4.0 4.2   * 111* 104   CO2 23 22 27   AGAP 5 6 6   GLU 79 101* 142*       LFTS:  Recent Labs     09/07/20  0541 09/07/20 0049   TBILI 0.9 0.8   ALT 17 21   AP 89 106   TP 6.3* 7.6   ALB 2.3* 3.0*       Microbiology:     All Micro Results     Procedure Component Value Units Date/Time    CULTURE, BLOOD FOR FUNGUS [[id number]] Collected:  09/08/20 0615    Order Status:  Completed Specimen:  Blood Updated:  09/08/20 0720    CULTURE, BLOOD, PAIRED [[id number]]  (Abnormal) Collected:  09/07/20 0049    Order Status:  Completed Specimen:  Blood Updated:  09/08/20 0054     Special Requests: NO SPECIAL REQUESTS        Culture result:       GRAM POSITIVE COCCI IN CLUSTERS GROWING IN 1 OF 2 BOTTLES DRAWN (L HAND SITE)                  PRELIMINARY REPORT OF GRAM POSITIVE COCCI IN CLUSTERS GROWING IN 1 OF 2 BOTTLES DRAWN CALLED TO AND READ BACK BY MS [phone]  Hiram Arevalo RN ON 9/8/20 AT [address] [place] [address] ICU). MS                  REMAINING BOTTLE(S) HAS/HAVE NO GROWTH SO FAR          S. Vernell Soria, UR/CSF [[id number]] Collected:  09/07/20 9886    Order Status:  Completed Updated:  09/07/20 0742    LEGIONELLA PNEUMOPHILA AG, URINE [[id number]] Collected:  09/07/20 0541    Order Status:  Completed Specimen:  Urine Updated:  09/07/20 0741    RESPIRATORY PANEL,PCR,NASOPHARYNGEAL [[id number]]     Order Status:  Canceled Specimen:  NASOPHARYNGEAL SWAB     URINE CULTURE HOLD SAMPLE [[id number]] Collected:  09/07/20 0159    Order Status:  Completed Specimen:  Urine from Serum Updated:  09/07/20 0205     Urine culture hold       Urine on hold in Microbiology dept for 2 days. If unpreserved urine is submitted, it cannot be used for addtional testing after 24 hours, recollection will be required.                 Imaging:   Reviewed    Signed By: Damir Knutson MD     September 8, 2020

## 2020-09-09 ENCOUNTER — APPOINTMENT (OUTPATIENT)
Dept: MRI IMAGING | Age: 85
DRG: 871 | End: 2020-09-09
Attending: PHYSICIAN ASSISTANT
Payer: MEDICARE

## 2020-09-09 LAB
ALBUMIN SERPL-MCNC: 2.3 G/DL (ref 3.5–5)
ALBUMIN/GLOB SERPL: 0.5 {RATIO} (ref 1.1–2.2)
ALP SERPL-CCNC: 97 U/L (ref 45–117)
ALT SERPL-CCNC: 25 U/L (ref 12–78)
ANION GAP SERPL CALC-SCNC: 7 MMOL/L (ref 5–15)
AST SERPL-CCNC: 26 U/L (ref 15–37)
BASOPHILS # BLD: 0 K/UL (ref 0–0.1)
BASOPHILS NFR BLD: 1 % (ref 0–1)
BILIRUB SERPL-MCNC: 0.5 MG/DL (ref 0.2–1)
BUN SERPL-MCNC: 22 MG/DL (ref 6–20)
BUN/CREAT SERPL: 30 (ref 12–20)
CALCIUM SERPL-MCNC: 9 MG/DL (ref 8.5–10.1)
CHLORIDE SERPL-SCNC: 111 MMOL/L (ref 97–108)
CO2 SERPL-SCNC: 21 MMOL/L (ref 21–32)
CREAT SERPL-MCNC: 0.74 MG/DL (ref 0.7–1.3)
DATE LAST DOSE: ABNORMAL
DIFFERENTIAL METHOD BLD: ABNORMAL
DISCLAIMER:, 130261: NORMAL
EOSINOPHIL # BLD: 0.1 K/UL (ref 0–0.4)
EOSINOPHIL NFR BLD: 4 % (ref 0–7)
ERYTHROCYTE [DISTWIDTH] IN BLOOD BY AUTOMATED COUNT: 15.7 % (ref 11.5–14.5)
FIBRINOGEN PPP-MCNC: 350 MG/DL (ref 200–475)
GLOBULIN SER CALC-MCNC: 4.5 G/DL (ref 2–4)
GLUCOSE SERPL-MCNC: 148 MG/DL (ref 65–100)
HCT VFR BLD AUTO: 34.6 % (ref 36.6–50.3)
HGB BLD-MCNC: 11.3 G/DL (ref 12.1–17)
HISTOPLASMA AG, UR,HAGU: <0.5
IMM GRANULOCYTES # BLD AUTO: 0 K/UL (ref 0–0.04)
IMM GRANULOCYTES NFR BLD AUTO: 1 % (ref 0–0.5)
LYMPHOCYTES # BLD: 0.8 K/UL (ref 0.8–3.5)
LYMPHOCYTES NFR BLD: 22 % (ref 12–49)
MCH RBC QN AUTO: 34.1 PG (ref 26–34)
MCHC RBC AUTO-ENTMCNC: 32.7 G/DL (ref 30–36.5)
MCV RBC AUTO: 104.5 FL (ref 80–99)
MONOCYTES # BLD: 0.4 K/UL (ref 0–1)
MONOCYTES NFR BLD: 12 % (ref 5–13)
NEUTS SEG # BLD: 2.4 K/UL (ref 1.8–8)
NEUTS SEG NFR BLD: 60 % (ref 32–75)
NRBC # BLD: 0 K/UL (ref 0–0.01)
NRBC BLD-RTO: 0 PER 100 WBC
PLATELET # BLD AUTO: 97 K/UL (ref 150–400)
PMV BLD AUTO: 11.9 FL (ref 8.9–12.9)
POTASSIUM SERPL-SCNC: 3.7 MMOL/L (ref 3.5–5.1)
PROT SERPL-MCNC: 6.8 G/DL (ref 6.4–8.2)
RBC # BLD AUTO: 3.31 M/UL (ref 4.1–5.7)
RBC MORPH BLD: ABNORMAL
REPORTED DOSE,DOSE: ABNORMAL UNITS
REPORTED DOSE/TIME,TMG: ABNORMAL
SODIUM SERPL-SCNC: 139 MMOL/L (ref 136–145)
VANCOMYCIN TROUGH SERPL-MCNC: 14.3 UG/ML (ref 5–10)
WBC # BLD AUTO: 3.7 K/UL (ref 4.1–11.1)

## 2020-09-09 PROCEDURE — 74011250637 HC RX REV CODE- 250/637: Performed by: PHYSICIAN ASSISTANT

## 2020-09-09 PROCEDURE — 74011000250 HC RX REV CODE- 250: Performed by: NURSE PRACTITIONER

## 2020-09-09 PROCEDURE — 74011250637 HC RX REV CODE- 250/637: Performed by: NURSE PRACTITIONER

## 2020-09-09 PROCEDURE — 74011250637 HC RX REV CODE- 250/637: Performed by: INTERNAL MEDICINE

## 2020-09-09 PROCEDURE — 87040 BLOOD CULTURE FOR BACTERIA: CPT

## 2020-09-09 PROCEDURE — 80053 COMPREHEN METABOLIC PANEL: CPT

## 2020-09-09 PROCEDURE — 80202 ASSAY OF VANCOMYCIN: CPT

## 2020-09-09 PROCEDURE — 74011250636 HC RX REV CODE- 250/636: Performed by: FAMILY MEDICINE

## 2020-09-09 PROCEDURE — 74011000250 HC RX REV CODE- 250: Performed by: HOSPITALIST

## 2020-09-09 PROCEDURE — 85025 COMPLETE CBC W/AUTO DIFF WBC: CPT

## 2020-09-09 PROCEDURE — 36415 COLL VENOUS BLD VENIPUNCTURE: CPT

## 2020-09-09 PROCEDURE — 74011250636 HC RX REV CODE- 250/636: Performed by: HOSPITALIST

## 2020-09-09 PROCEDURE — 74011000258 HC RX REV CODE- 258: Performed by: HOSPITALIST

## 2020-09-09 PROCEDURE — 85384 FIBRINOGEN ACTIVITY: CPT

## 2020-09-09 PROCEDURE — 74011000258 HC RX REV CODE- 258: Performed by: FAMILY MEDICINE

## 2020-09-09 PROCEDURE — 65660000000 HC RM CCU STEPDOWN

## 2020-09-09 RX ORDER — LORAZEPAM 2 MG/ML
1 INJECTION INTRAMUSCULAR ONCE
Status: ACTIVE | OUTPATIENT
Start: 2020-09-09 | End: 2020-09-09

## 2020-09-09 RX ADMIN — CEFEPIME 2 G: 2 INJECTION, POWDER, FOR SOLUTION INTRAVENOUS at 02:09

## 2020-09-09 RX ADMIN — Medication 10 ML: at 06:17

## 2020-09-09 RX ADMIN — MIDODRINE HYDROCHLORIDE 10 MG: 5 TABLET ORAL at 16:18

## 2020-09-09 RX ADMIN — NYSTATIN: 100000 POWDER TOPICAL at 18:08

## 2020-09-09 RX ADMIN — VANCOMYCIN HYDROCHLORIDE 1250 MG: 10 INJECTION, POWDER, LYOPHILIZED, FOR SOLUTION INTRAVENOUS at 21:58

## 2020-09-09 RX ADMIN — CALCIUM GLUCONATE: 94 INJECTION, SOLUTION INTRAVENOUS at 18:03

## 2020-09-09 RX ADMIN — MIDODRINE HYDROCHLORIDE 10 MG: 5 TABLET ORAL at 21:58

## 2020-09-09 RX ADMIN — NYSTATIN: 100000 POWDER TOPICAL at 09:30

## 2020-09-09 RX ADMIN — ALPRAZOLAM 0.25 MG: 0.25 TABLET ORAL at 02:54

## 2020-09-09 RX ADMIN — I.V. FAT EMULSION 500 ML: 20 EMULSION INTRAVENOUS at 18:05

## 2020-09-09 RX ADMIN — Medication 10 ML: at 21:59

## 2020-09-09 RX ADMIN — CEFEPIME 2 G: 2 INJECTION, POWDER, FOR SOLUTION INTRAVENOUS at 18:59

## 2020-09-09 RX ADMIN — CEFEPIME 2 G: 2 INJECTION, POWDER, FOR SOLUTION INTRAVENOUS at 11:37

## 2020-09-09 RX ADMIN — MIDODRINE HYDROCHLORIDE 10 MG: 5 TABLET ORAL at 09:30

## 2020-09-09 RX ADMIN — VANCOMYCIN HYDROCHLORIDE 1250 MG: 10 INJECTION, POWDER, LYOPHILIZED, FOR SOLUTION INTRAVENOUS at 06:00

## 2020-09-09 NOTE — CONSULTS
[address]    Name:  Delores Kimball  MR#:  [id number]  :  1934  ACCOUNT #:  [de-identified]  DATE OF SERVICE:  2020      REASON FOR ADMISSION:  Bacteremia. REASON FOR CONSULTATION:  Pancreatic abnormality. HISTORY OF PRESENT ILLNESS:  The patient is an 60-year-old man I met several months ago when he was admitted to this hospital for hypotension, DVT and PE. He was ultimately found to have a cystic pancreatic mass on the CT scan, and our service was asked to see him with regard to that abnormality. During the course of his admission, he had testing including paracentesis, which was submitted for cytology and was negative on 2020. He was seen by GI and given his improved CT scans, the decision was made not to pursue additional diagnostic testing. Given concurrent bleeding and clotting, an IVC filter was placed. He was sent to rehab with TPN. Unfortunately, he re-presented to the hospital with bacteremia. A repeat CT scan was done on Monday, which showed improved peripancreatic inflammatory changes compatible with \"pancreatitis. \"  Note was also made of unchanged biliary dilation with no visualized choledocholithiasis and small hiatal hernia. He was seen by Dr. Robert Rudolph, who spoke with him about an MRCP. The patient declined the MRCP, explaining he did not want to go through that procedure. There is still a discussion ongoing about the risks and benefits of an EUS in this situation. The patient asked when he might be able to eat. PAST MEDICAL HISTORY:  1. BPH. 2.  Hyperlipidemia. 3.  Osteoarthritis. 4.  Prior rotator cuff surgery. 5.  Prior tonsillectomy. CURRENT MEDICATIONS IN THE HOSPITAL:  1. Cefepime 2 g IV q.8.  2.  Subcu heparin 5000 units subcu q.12.  3.  Midodrine 10 mg three times daily. 4.  Mycostatin powder twice daily. 5.  Vancomycin 1.25 g q.16 h.  6.  TPN. ALLERGIES:  ASPIRIN.     SOCIAL HISTORY:  He has a history of prior social alcohol use. He is a retired  from [employer]. He is a nonsmoker. REVIEW OF SYSTEMS:  GENERAL:  No fevers or chills. HEENT:  No auditory or visual change. LYMPHATIC:  No lumps or bumps in neck, underarm, or groin. CARDIOVASCULAR:  No chest pain or palpitations. PULMONARY:  No cough or shortness of breath. GASTROINTESTINAL:  He admits to sometimes urgency to defecate, but no abdominal pain today. GENITOURINARY:  No dysuria or incontinence. SKIN:  No rash or changing mole. MUSCULOSKELETAL:  No red or swollen joints. NEUROLOGIC:  No irritability or syncope. PHYSICAL EXAMINATION:  VITAL SIGNS:  /63, pulse 88, saturating 98% on room air. GENERAL:  Pleasant, in no acute distress. HEENT:  Sclerae anicteric. Oropharynx clear. NECK:  Supple without lymphadenopathy or thyromegaly. HEART:  Regular rate and rhythm without murmur, rub, or gallop. Lungs:  Clear to auscultation bilaterally. ABDOMEN:  Nontender and nondistended. Normoactive bowel sounds. No hepatosplenomegaly. EXTREMITIES:  No clubbing, cyanosis, or edema. PSYCH:  Normal mood and affect. Alert and oriented x3. ASSESSMENT AND PLAN:  An 59-year-old man with pancreatic fullness and gastric outlet obstruction, seen by our service two months ago, had fluid cytology that was negative and a followup CT scan was more suggestive of pancreatitis and pancreatic cancer, although EUS biopsy was not done, now admitted with bacteremia and repeat imaging on this admission shows that his abnormality is smaller. 1.  Pancreatic abnormality: It is not clear to me if this was pancreatitis with a phlegmon or pancreatic malignancy. At his age, he is probably not a candidate for curative intent Whipple or palliative chemotherapy even if his biopsy came back positive for malignancy. I defer to Gastrointestinal regarding the need for additional testing.   Given less ascites, percutaneous endoscopic gastrostomy tube may be an option, but I ultimately defer that to the primary service. We will follow up from a distance and await the decision regarding biopsy and after biopsy, we will await the final path too. 2.  Previous deep venous thrombosis and pulmonary embolism with contraindication to anticoagulation. He is status post inferior vena cava filter. Thank you for the consult.       Mary Jane Pozo MD      [id number]  D:  09/09/2020 9:15  T:  09/09/2020 11:32  JOB #:  [id number]

## 2020-09-09 NOTE — PROGRESS NOTES
Problem: Falls - Risk of  Goal: *Absence of Falls  Description: Document Alex Matthew Fall Risk and appropriate interventions in the flowsheet. Outcome: Progressing Towards Goal  Note: Fall Risk Interventions:  Mobility Interventions: Utilize walker, cane, or other assistive device, Communicate number of staff needed for ambulation/transfer         Medication Interventions: Teach patient to arise slowly    Elimination Interventions: Patient to call for help with toileting needs, Urinal in reach              Problem: Pressure Injury - Risk of  Goal: *Prevention of pressure injury  Description: Document Joseph Scale and appropriate interventions in the flowsheet. Outcome: Progressing Towards Goal  Note: Pressure Injury Interventions:             Activity Interventions: Increase time out of bed    Mobility Interventions: PT/OT evaluation, HOB 30 degrees or less    Nutrition Interventions: Document food/fluid/supplement intake    Friction and Shear Interventions: Apply protective barrier, creams and emollients                Problem: Sepsis: Day 2  Goal: *Hemodynamically stable  Outcome: Progressing Towards Goal  Goal: Activity/Safety  Outcome: Progressing Towards Goal  Goal: Diagnostic Test/Procedures  Outcome: Progressing Towards Goal  Goal: Nutrition/Diet  Outcome: Progressing Towards Goal

## 2020-09-09 NOTE — PROGRESS NOTES
TRANSITIONS OF CARE PLAN:   1. DESTINATION: TBD - potentially own home  2. TRANSPORT: Family  3. ADDITIONAL SUPPORT: Spouse, 2 adult daughters  4. DME: cane, walker, 2x hearing aids  5. HOME HEALTH: Currently open to [facility]; will need JOSE LUIS Orders  6. CODE STATUS/AMD STATUS: DDNR confirmed; ACP completed  7. FOLLOW UP APPOINTMENTS: PCP, GI,   8. STILL NEEDS: TPN, Cultures pending, potential PEG Tube, ABX, May benefit from PT and OT Consults    Reason for Admission:   Sepsis, Fever, Tachycardia, Hyptoension, Shock                  RUR Score:    20%              PCP: First and Last name:  Dr. Mahnaz Manzano   Name of Practice:    Are you a current patient: Yes/No: yes   Approximate date of last visit: 1 month   Can you participate in a virtual visit if needed: no    Do you (patient/family) have any concerns for transition/discharge? TPN dependent; currently open to [place] [address] for utilizing home health:   JOSE LUIS with [facility]    Current Advanced Directive/Advance Care Plan:  DDNR confirmed; ACP completed            Transition of Care Plan:      CM met with patient with patient alert and oriented x4. Patient lives with spouse in a single story home, and 2 daughters are currently staying with the patient and spouse to assist as needed. Patient is independent in ADLs, to include driving. Patient has hx of rehab at an unknown facility in [facility] and is currently open to [facility]. Patient has established PICC Line for ongoing TPN needs since July due to hx of gastric outlet obstruction. [facility]acy preference [facility] at [address] [place] [address],[address]. Potential disposition is for discharge to own home with continuation of HH with [facility] and transport via family. Patient will need 2nd IM Letter prior to discharge. Care Management Interventions  PCP Verified by CM: Yes(last seen by  Mary Carbajal [facility] 1 month ago)  Palliative Care Criteria Met (RRAT>21 & CHF Dx)?: No  Mode of Transport at Discharge:  Other (see comment)(wife or daughters to transport)  Transition of Care Consult (CM Consult): Discharge Planning, [address]: No  Reason Outside [place]: Patient already serviced by other home care/hospice agency(open to [place])  [address] [place] [place]: No  Discharge Durable Medical Equipment: No(has: cane, walker, 2x hearing aids)  Health Maintenance Reviewed: Yes(cm met with patient, with patient alert and oriented x4)  Physical Therapy Consult: No  Occupational Therapy Consult: No  Speech Therapy Consult: No  Current Support Network: Own Home, Family Lives [place], Lives with Spouse(2 daughters are staying with patient currently)  Confirm Follow Up Transport: Self(independent in [place], to include driving)  [address] Provided?: No  Discharge Location  Discharge Placement: Home with home health(lives with spouse and 2 daughters in a single story home, 3 exterior steps)  CRM: Marilynn Finney, MPH, [address] [place] [address]; Z: [phone]

## 2020-09-09 NOTE — PROGRESS NOTES
[address]  [address] [facility], [address]       GI PROGRESS NOTE  Will Zan Staton  [phone] office  [phone] NP/PA in-hospital cell phone M-F until 4:30PM  After 5PM or on weekends, please call  for physician on call      NAME: Hermilo Chavez   :  1934   MRN:  [id number]       Subjective:   Patient is sitting in the chair. Denies nausea, vomiting, or abdominal pain. He reports having a watery bowel movement today. Patient was unable to complete MRI yesterday and does not want to proceed. RN is at the bedside. Objective:     VITALS:   Last 24hrs VS reviewed since prior progress note. Most recent are:  Visit Vitals  /63 (BP 1 Location: Left arm, BP Patient Position: At rest)   Pulse 88   Temp 98.3 °F (36.8 °C)   Resp 18   Ht 5' 11\" (1.803 m)   Wt 74 kg (163 lb 2.3 oz)   SpO2 98%   BMI 22.75 kg/m²       PHYSICAL EXAM:  General: Cooperative, no acute distress    Neurologic:  Alert and oriented  HEENT: EOMI, no scleral icterus   Lungs:  No respiratory distress  Heart:  S1 S2  Abdomen: Soft, non-distended, no tenderness, no guarding, no rebound. +Bowel sounds.    Extremities: Warm  Psych:   Not anxious or agitated    Lab Data Reviewed:     Recent Results (from the past 24 hour(s))   ECHO ADULT COMPLETE    Collection Time: 20  4:23 PM   Result Value Ref Range    IVSd 0.72 0.6 - 1.0 cm    LVIDd 2.98 (A) 4.2 - 5.9 cm    LVIDs 2.52 cm    LVPWd 0.79 0.6 - 1.0 cm    LVOT Peak Gradient 3.57 mmHg    LVOT Peak Velocity 94.42 cm/s    Left Atrium to Aortic Root Ratio 1.21     AoV PG 8.84 mmHg    Aortic Valve Systolic Peak Velocity 079.93 cm/s    MV A Brijesh 91.73 cm/s    MV E Brijesh 58.49 cm/s    Tapse 2.20 (A) 1.5 - 2.0 cm    MV E/A 0.64     LV Mass AL 53.9 88 - 224 g    LV Mass AL Index 27.6 49 - 115 g/m2   FIBRINOGEN    Collection Time: 20  5:40 AM   Result Value Ref Range    Fibrinogen 350 200 - 066 mg/dL   METABOLIC PANEL, COMPREHENSIVE    Collection Time: 09/09/20  5:40 AM   Result Value Ref Range    Sodium 139 136 - 145 mmol/L    Potassium 3.7 3.5 - 5.1 mmol/L    Chloride 111 (H) 97 - 108 mmol/L    CO2 21 21 - 32 mmol/L    Anion gap 7 5 - 15 mmol/L    Glucose 148 (H) 65 - 100 mg/dL    BUN 22 (H) 6 - 20 MG/DL    Creatinine 0.74 0.70 - 1.30 MG/DL    BUN/Creatinine ratio 30 (H) 12 - 20      GFR est AA >60 >60 ml/min/1.73m2    GFR est non-AA >60 >60 ml/min/1.73m2    Calcium 9.0 8.5 - 10.1 MG/DL    Bilirubin, total 0.5 0.2 - 1.0 MG/DL    ALT (SGPT) 25 12 - 78 U/L    AST (SGOT) 26 15 - 37 U/L    Alk. phosphatase 97 45 - 117 U/L    Protein, total 6.8 6.4 - 8.2 g/dL    Albumin 2.3 (L) 3.5 - 5.0 g/dL    Globulin 4.5 (H) 2.0 - 4.0 g/dL    A-G Ratio 0.5 (L) 1.1 - 2.2     VANCOMYCIN, TROUGH    Collection Time: 09/09/20  5:40 AM   Result Value Ref Range    Vancomycin,trough 14.3 (H) 5.0 - 10.0 ug/mL    Reported dose date: NOT PROVIDED      Reported dose time: NOT PROVIDED      Reported dose: NOT PROVIDED UNITS   CBC WITH AUTOMATED DIFF    Collection Time: 09/09/20  5:41 AM   Result Value Ref Range    WBC 3.7 (L) 4.1 - 11.1 K/uL    RBC 3.31 (L) 4.10 - 5.70 M/uL    HGB 11.3 (L) 12.1 - 17.0 g/dL    HCT 34.6 (L) 36.6 - 50.3 %    .5 (H) 80.0 - 99.0 FL    MCH 34.1 (H) 26.0 - 34.0 PG    MCHC 32.7 30.0 - 36.5 g/dL    RDW 15.7 (H) 11.5 - 14.5 %    PLATELET 97 (L) 874 - 400 K/uL    MPV 11.9 8.9 - 12.9 FL    NRBC 0.0 0  WBC    ABSOLUTE NRBC 0.00 0.00 - 0.01 K/uL    NEUTROPHILS 60 32 - 75 %    LYMPHOCYTES 22 12 - 49 %    MONOCYTES 12 5 - 13 %    EOSINOPHILS 4 0 - 7 %    BASOPHILS 1 0 - 1 %    IMMATURE GRANULOCYTES 1 (H) 0.0 - 0.5 %    ABS. NEUTROPHILS 2.4 1.8 - 8.0 K/UL    ABS. LYMPHOCYTES 0.8 0.8 - 3.5 K/UL    ABS. MONOCYTES 0.4 0.0 - 1.0 K/UL    ABS. EOSINOPHILS 0.1 0.0 - 0.4 K/UL    ABS. BASOPHILS 0.0 0.0 - 0.1 K/UL    ABS. IMM.  GRANS. 0.0 0.00 - 0.04 K/UL    DF SMEAR SCANNED      RBC COMMENTS ANISOCYTOSIS  1+            Assessment: · Nausea/vomiting  · Pancreatitis: WBC 3.7, Hgb 11.3, normal LFTs, normal lipase. CT abdomen/pelvis with IV contrast (9/7/20): improved peripancreatic inflammatory changes, compatible with pancreatitis; unchanged biliary dilatation with no visualized choledocholithiasis; small hiatal hernia. · Pancreatic mass: not seen on most recent CT scans  · Gram positive cocci bacteremia versus contaminant: on antibiotics. ID following. · Sepsis: COVID negative  · History of PE status post IVC filter placement     Patient Active Problem List   Diagnosis Code    DVT (deep vein thrombosis) in pregnancy O22.30    DVT (deep venous thrombosis) ([facility]) I82.409    Tachycardia R00.0    Hypotension I95.9    Fever R50.9    Sepsis ([address] A41.9    Shock ([address] R57.9     Plan:   · Receiving TPN  · Supportive measures  · Patient does not want to proceed with MRI/MRCP at this time. Continue medical management for now. · Oncology and ID following     Signed By: VANESSA Padilla     9/9/2020  11:31 AM       GI Attending: I discussed current findings and plan with patient's daughter and wife over the phone. He has declined MRI due to claustrophobia and Xanax did not help. I have suggested that I review his CT's with radiology to see if MRI/MRCP will make a difference in their assessment before having him undergo MRI with anesthesia. Additionally, we discussed re-reviewing his medication list from earlier this year as he was started on multiple new medications earlier this year prior to the onset of pancreatitis. We also discussed that among other possibilities, occult malignancy is still a possibility, which is why were are continuing to work up the underlying cause, particularly with imaging. EUS may be helpful as well. Of note, Oncology has indicated that based on his functional status, he would likely not be a candidate for Whipple or even palliative chemotherapy.  I explained that this would also impact our decision to pursue EUS as it really should only be done if it is going to change our management. Finally, we discussed that he has been on TPN and that we should consider slowly advancing his diet vs placement of a feeding tube for enteral nutrition. Given his history of ascites, a PEG tube would not be advised at this time. If additional imaging is not advised by radiology, we can consider diet advancement to clear liquids tomorrow. Will follow along with you. Rivera Posadas MD

## 2020-09-09 NOTE — PROGRESS NOTES
Bedside and Verbal shift change report given to 17[address] Zane Forman (oncoming nurse) by Isabel Good RN (offgoing nurse). Report included the following information SBAR, Kardex, Intake/Output, MAR, Recent Results and Cardiac Rhythm NSR.

## 2020-09-09 NOTE — PROGRESS NOTES
[address] [facility]  Hospitalist Group                                                                                          Hospitalist Progress Note  Elian Burgos MD  Answering service: [phone] OR [phone] from in house phone        Date of Service:  2020  NAME:  Ariane Camargo  :  1934  MRN:  [id number]      Admission Summary:   Linda Bernstein is a 80 y.o. male with past medical history of recent multiple bilateral pulmonary emboli, right lower extremity DVT, GI bleed, erosive gastritis, anemia, blood transfusion, pancreatitis, arthritis, BPH, DJD, and hyperlipidemia presented to the ED from home with reported nausea and vomiting. Patient provided some history in addition to his daughter who was at the bedside.   Remainder of history was obtained per my review of ED and electronic medical records.     Interval history / Subjective:   Patient was seen and examined at bedside he was downgraded from the ICU yesterday for bacteremia and sepsis transferred to ICU for sepsis     Assessment & Plan:     Septic Shock: GPC bacteremia  -resolving, now off pressers-continue midodrine  -BC gram-pos cocci in clusters 1/2 bottles  -repeat BC ID following  -Cont vanc, cefepime for now  -TTE ordered to eval for endocarditis  -PICC line from home - TPN dependent  -Seen by ID today, follow cultures     Pancreatitis  -improved peripancreatic inflammatory changes on most recent CT  -Continue TPN  -Consult GI     Gastric outlet obstruction  -on TPN, continue  -consult GI plan for MRI could not tolerate it yesterday we will repeat MRI today     Pancreatic Mass: seen on prev CT but not most recent  -consult oncology - has seen Danilo Miguel in past  -Seen patient here today appreciate consult will follow up after MRI and discussed with GI for further plan     Hx PE/DVT: s/p IVC filter placement  -elevated d-dimer, improving  -IVC filter in place  -Echo ordered     DVTppx: SCDs, IVC filter,  Low plt   Gippx: NA  Code Status: DNR  Diet: TPN  Activity: OOB to chair TID and PRN  Discharge: TBD       Care Plan discussed with: Patient/Family and Nurse  Anticipated Disposition: Home w/Family  Anticipated Discharge: 24 hours to 48 hours     Hospital Problems  Date Reviewed: 7/14/2020          Codes Class Noted POA    Tachycardia ICD-10-CM: R00.0  ICD-9-CM: 785.0  9/7/2020 Unknown        Hypotension ICD-10-CM: I95.9  ICD-9-CM: 458.9  9/7/2020 Unknown        Fever ICD-10-CM: R50.9  ICD-9-CM: 780.60  9/7/2020 Unknown        Sepsis ([facility] [address] ICD-10-CM: A41.9  ICD-9-CM: 038.9, 995.91  9/7/2020 Unknown        Shock ([facility] [address] ICD-10-CM: R57.9  ICD-9-CM: 785.50  9/7/2020 Unknown                Review of Systems:   A comprehensive review of systems was negative. Vital Signs:    Last 24hrs VS reviewed since prior progress note. Most recent are:  Visit Vitals  /63 (BP 1 Location: Left arm, BP Patient Position: At rest)   Pulse 88   Temp 98.3 °F (36.8 °C)   Resp 18   Ht 5' 11\" (1.803 m)   Wt 74 kg (163 lb 2.3 oz)   SpO2 98%   BMI 22.75 kg/m²         Intake/Output Summary (Last 24 hours) at 9/9/2020 1045  Last data filed at 9/9/2020 0932  Gross per 24 hour   Intake 300 ml   Output 525 ml   Net -225 ml        Physical Examination:             Constitutional:  No acute distress, cooperative, pleasant    ENT:  Oral mucosa moist, oropharynx benign. Resp:  CTA bilaterally. No wheezing/rhonchi/rales. No accessory muscle use   CV:  Regular rhythm, normal rate, no murmurs, gallops, rubs    GI:  Soft, non distended, non tender. normoactive bowel sounds, no hepatosplenomegaly     Musculoskeletal:  No edema, warm, 2+ pulses throughout    Neurologic:  Moves all extremities. AAOx3, CN II-XII reviewed     Psych:  Good insight, Not anxious nor agitated. Data Review:    I personally reviewed  Image and labs    Ct Abd Pelv W Cont    Result Date: 9/7/2020  IMPRESSION: Improved peripancreatic inflammatory changes, compatible with pancreatitis. Unchanged biliary dilatation, with no visualized choledocholithiasis. Small hiatal hernia. Xr Chest Port    Result Date: 9/7/2020  IMPRESSION: No evidence of acute cardiopulmonary process. Labs:     Recent Labs     09/09/20 0541 09/08/20 0425   WBC 3.7* 4.2   HGB 11.3* 10.3*   HCT 34.6* 31.1*   PLT 97* 82*     Recent Labs     09/09/20 0540 09/08/20 0425 09/07/20  0619 09/07/20  0541 09/07/20 0049    139  --  139 137   K 3.7 3.6  --  4.0 4.2   * 111*  --  111* 104   CO2 21 23  --  22 27   BUN 22* 19  --  27* 29*   CREA 0.74 0.79  --  0.83 0.95   * 79  --  101* 142*   CA 9.0 8.5  --  8.0* 9.0   MG  --   --   --   --  1.9   PHOS  --  2.9 3.3  --   --      Recent Labs     09/09/20 0540 09/07/20  0541 09/07/20 0049   ALT 25 17 21   AP 97 89 106   TBILI 0.5 0.9 0.8   TP 6.8 6.3* 7.6   ALB 2.3* 2.3* 3.0*   GLOB 4.5* 4.0 4.6*   LPSE  --   --  107     Recent Labs     09/08/20 0425 09/07/20 0541 09/07/20 0049   INR 1.4* 1.4* 1.2*   PTP 14.0* 14.1* 12.4*   APTT 43.3* 40.6*  --       Recent Labs     09/08/20  0425 09/07/20  0159   FERR 220 146      No results found for: FOL, RBCF   No results for input(s): PH, PCO2, PO2 in the last 72 hours.   Recent Labs     09/07/20 0049   TROIQ <0.05     Lab Results   Component Value Date/Time    Cholesterol, total 250 (H) 10/02/2013 01:55 PM    HDL Cholesterol 53 10/02/2013 01:55 PM    LDL, calculated 176 (H) 10/02/2013 01:55 PM    Triglyceride 244 (H) 07/23/2020 04:41 AM    CHOL/HDL Ratio 3.4 10/06/2010 10:25 AM     Lab Results   Component Value Date/Time    Glucose (POC) 113 (H) 07/30/2020 10:56 AM    Glucose (POC) 116 (H) 07/30/2020 06:26 AM    Glucose (POC) 113 (H) 07/29/2020 11:34 PM    Glucose (POC) 103 (H) 07/29/2020 05:53 PM    Glucose (POC) 87 07/29/2020 11:27 AM     Lab Results   Component Value Date/Time    Color YELLOW/STRAW 09/07/2020 01:59 AM    Appearance CLEAR 09/07/2020 01:59 AM    Specific gravity 1.020 09/07/2020 01:59 AM    pH (UA) 6.5 09/07/2020 01:59 AM    Protein Negative 09/07/2020 01:59 AM    Glucose Negative 09/07/2020 01:59 AM    Ketone Negative 09/07/2020 01:59 AM    Bilirubin Negative 09/07/2020 01:59 AM    Urobilinogen 1.0 09/07/2020 01:59 AM    Nitrites Negative 09/07/2020 01:59 AM    Leukocyte Esterase Negative 09/07/2020 01:59 AM    Epithelial cells FEW 09/07/2020 01:59 AM    Bacteria Negative 09/07/2020 01:59 AM    WBC 0-4 09/07/2020 01:59 AM    RBC 0-5 09/07/2020 01:59 AM         Medications Reviewed:     Current Facility-Administered Medications   Medication Dose Route Frequency    TPN ADULT - CENTRAL   IntraVENous CONTINUOUS    LORazepam (ATIVAN) injection 1 mg  1 mg IntraVENous ONCE    TPN ADULT - CENTRAL   IntraVENous CONTINUOUS    fat emulsion 20% (LIPOSYN, INTRAlipid) infusion 500 mL  500 mL IntraVENous Q MON, WED & FRI    heparin (porcine) injection 5,000 Units  5,000 Units SubCUTAneous Q12H    nystatin (MYCOSTATIN) 100,000 unit/gram powder   Topical BID    cefepime (MAXIPIME) 2 g in 0.9% sodium chloride (MBP/ADV) 100 mL  2 g IntraVENous Q8H    Vancomycin Pharmacy Dosing   Other Rx Dosing/Monitoring    vancomycin (VANCOCIN) 1250 mg in  ml infusion  1,250 mg IntraVENous Q16H    sodium chloride (NS) flush 5-40 mL  5-40 mL IntraVENous Q8H    sodium chloride (NS) flush 5-40 mL  5-40 mL IntraVENous PRN    polyethylene glycol (MIRALAX) packet 17 g  17 g Oral DAILY PRN    promethazine (PHENERGAN) tablet 12.5 mg  12.5 mg Oral Q6H PRN    Or    ondansetron (ZOFRAN) injection 4 mg  4 mg IntraVENous Q6H PRN    midodrine (PROAMATINE) tablet 10 mg  10 mg Oral TID     ______________________________________________________________________  EXPECTED LENGTH OF STAY: - - -  ACTUAL LENGTH OF STAY:          2                 Spencer Grace MD

## 2020-09-09 NOTE — PROGRESS NOTES
0200: Completed MRI screening with patient and signed. Gave the patient Xanax 30 minutes before sending down for claustrophobia. Was informed about the benefit of the MRI. Patient refused MRI when he got down. After he came back up he said he would be willing to have MRI if he could be \"sedated. \"

## 2020-09-09 NOTE — PROGRESS NOTES
ID Progress Note  2020    Subjective:     He is feeling much better. No specific complaints at the time of my visit. Objective:     Antibiotics:  1. Vancomycin   2. Cefepime       Vitals:   Visit Vitals  BP 97/55 (BP 1 Location: Left arm, BP Patient Position: At rest)   Pulse 88   Temp 98.2 °F (36.8 °C)   Resp 18   Ht 5' 11\" (1.803 m)   Wt 74 kg (163 lb 2.3 oz)   SpO2 97%   BMI 22.75 kg/m²        Tmax:  Temp (24hrs), Av.2 °F (36.8 °C), Min:97.9 °F (36.6 °C), Max:98.4 °F (36.9 °C)      Exam:  Lungs:  clear to auscultation bilaterally  Heart:  regular rate and rhythm  Abdomen:  soft, non-tender. Bowel sounds normal. No masses,  no organomegaly    Labs:      Recent Labs     20  0541 20  0540 20  0425 20  0541 20  0049   WBC 3.7*  --  4.2 7.9 7.9   HGB 11.3*  --  10.3* 9.8* 11.7*   PLT 97*  --  82* 95* 114*   BUN  --  22* 19 27* 29*   CREA  --  0.74 0.79 0.83 0.95   AP  --  97  --  89 106   TBILI  --  0.5  --  0.9 0.8       Cultures:     No results found for: [facility]  Lab Results   Component Value Date/Time    Culture result: NO GROWTH AFTER 23 HOURS 2020 06:15 AM    Culture result: (A) 2020 12:49 AM     STAPHYLOCOCCUS SPECIES, COAGULASE NEGATIVE GROWING IN 1 OF 2 BOTTLES DRAWN (L HAND SITE)    Culture result: (A) 2020 12:49 AM     PRELIMINARY REPORT OF GRAM POSITIVE COCCI IN CLUSTERS GROWING IN 1 OF 2 BOTTLES DRAWN CALLED TO AND READ BACK BY MS MIKE RN ON 20 AT [address] [place] [address] ICU). MS    Culture result: REMAINING BOTTLE(S) HAS/HAVE NO GROWTH SO FAR 2020 12:49 AM       Radiology:     Line/Insert Date:           Assessment:     1. Sepsis syndrome--resolved  2. Positive blood culture--likely contaminant if other bottles remain negative    Objective:     1. Continue current therapy for now  2.  Follow cultures    Reyes Settles, MD

## 2020-09-09 NOTE — CONSULTS
Patient seen, chart reviewed, note dictated. [id number]    79 y/o man with pancreatic fullness and GOO, seen by our service 2 months ago. Had fluid cytology that was negative and f/u CT scan more concerning for pancreatitis than pancreatic CA, although EUS/Bx not done. Now admitted with bacteremia and repeat imaging this admission show abnormality is smaller. 1. Pancreatic abnormality: it is not clear to me if this is pancreatitis with phlegmon or pancreatic malignancy. At his age, he is probably not a candidate for curative intent Whipple or palliative chemotherapy even if biopsy came back positive for malignancy. Defer to GI regarding need for additional testing. Given less ascites, PEG tube option might be reconsidered, but I ultimately defer to the primary service. Will follow from a distance and await decision re: biopsy and if biopsied, final path. Thank you for consult.      Mimi Garcia MD  Hem/Onc

## 2020-09-09 NOTE — ACP (ADVANCE CARE PLANNING)
Advance Care Planning     Advance Care Planning Activator (Inpatient)  Conversation Note      Date of ACP Conversation: 09/09/20     Conversation Conducted with:   Patient with Decision Making Capacity    ACP Activator: Mane Aguirre makes decisions on behalf of the incapacitated patient: Decision Maker is asked to consider and make decisions based on patient values, known preferences, or best interests. Health Care Decision Maker:    Current Designated Health Care Decision Maker:   Primary Decision Maker: Alber Power - [phone]    Secondary Decision Maker: Alvin Villaseñor - Daughter - [phone]    Care Preferences    Ventilation: \"If you were in your present state of health and suddenly became very ill and were unable to breathe on your own, what would your preference be about the use of a ventilator (breathing machine) if it were available to you? \"  NO    \"If your health worsens and it becomes clear that your chance of recovery is unlikely, what would your preference be about the use of a ventilator (breathing machine) if it were available to you? \" NO    Resuscitation  \"CPR works best to restart the heart when there is a sudden event, like a heart attack, in someone who is otherwise healthy. Unfortunately, CPR does not typically restart the heart for people who have serious health conditions or who are very sick. \"    \"In the event your heart stopped as a result of an underlying serious health condition, would you want attempts to be made to restart your heart ? NO      [x] Yes  [] No   Educated Patient / [place] Ar[place] regarding differences between Advance Directives and portable DNR orders.     Length of ACP Conversation in minutes:      Conversation Outcomes:  [x] ACP discussion completed  [] Existing advance directive reviewed with patient; no changes to patient's previously recorded wishes     [] New Advance Directive completed   [] Portable Do Not Resuscitate prepared for Provider review and signature  [] POLST/POST/MOLST/MOST prepared for Provider review and signature    Follow-up plan:    [] Schedule follow-up conversation to continue planning  [] Referred individual to Provider for additional questions/concerns   [] Advised patient/agent/surrogate to review completed ACP document and update if needed with changes in condition, patient preferences or care setting     [] This note routed to one or more involved healthcare providers     Patient confirmed DDNR status.

## 2020-09-10 LAB
ANION GAP SERPL CALC-SCNC: 8 MMOL/L (ref 5–15)
BASOPHILS # BLD: 0 K/UL (ref 0–0.1)
BASOPHILS NFR BLD: 1 % (ref 0–1)
BUN SERPL-MCNC: 18 MG/DL (ref 6–20)
BUN/CREAT SERPL: 29 (ref 12–20)
CALCIUM SERPL-MCNC: 8.3 MG/DL (ref 8.5–10.1)
CHLORIDE SERPL-SCNC: 111 MMOL/L (ref 97–108)
CO2 SERPL-SCNC: 20 MMOL/L (ref 21–32)
CREAT SERPL-MCNC: 0.62 MG/DL (ref 0.7–1.3)
DIFFERENTIAL METHOD BLD: ABNORMAL
EOSINOPHIL # BLD: 0.2 K/UL (ref 0–0.4)
EOSINOPHIL NFR BLD: 5 % (ref 0–7)
ERYTHROCYTE [DISTWIDTH] IN BLOOD BY AUTOMATED COUNT: 15.5 % (ref 11.5–14.5)
FIBRINOGEN PPP-MCNC: 318 MG/DL (ref 200–475)
GLUCOSE BLD STRIP.AUTO-MCNC: 111 MG/DL (ref 65–100)
GLUCOSE SERPL-MCNC: 159 MG/DL (ref 65–100)
HCT VFR BLD AUTO: 29.7 % (ref 36.6–50.3)
HGB BLD-MCNC: 10 G/DL (ref 12.1–17)
IMM GRANULOCYTES # BLD AUTO: 0 K/UL (ref 0–0.04)
IMM GRANULOCYTES NFR BLD AUTO: 1 % (ref 0–0.5)
LYMPHOCYTES # BLD: 1 K/UL (ref 0.8–3.5)
LYMPHOCYTES NFR BLD: 27 % (ref 12–49)
MCH RBC QN AUTO: 34.7 PG (ref 26–34)
MCHC RBC AUTO-ENTMCNC: 33.7 G/DL (ref 30–36.5)
MCV RBC AUTO: 103.1 FL (ref 80–99)
MONOCYTES # BLD: 0.4 K/UL (ref 0–1)
MONOCYTES NFR BLD: 11 % (ref 5–13)
NEUTS SEG # BLD: 2.1 K/UL (ref 1.8–8)
NEUTS SEG NFR BLD: 55 % (ref 32–75)
NRBC # BLD: 0 K/UL (ref 0–0.01)
NRBC BLD-RTO: 0 PER 100 WBC
PHOSPHATE SERPL-MCNC: 1.9 MG/DL (ref 2.6–4.7)
PLATELET # BLD AUTO: 96 K/UL (ref 150–400)
PMV BLD AUTO: 11.1 FL (ref 8.9–12.9)
POTASSIUM SERPL-SCNC: 3.3 MMOL/L (ref 3.5–5.1)
RBC # BLD AUTO: 2.88 M/UL (ref 4.1–5.7)
RBC MORPH BLD: ABNORMAL
SERVICE CMNT-IMP: ABNORMAL
SODIUM SERPL-SCNC: 139 MMOL/L (ref 136–145)
WBC # BLD AUTO: 3.7 K/UL (ref 4.1–11.1)

## 2020-09-10 PROCEDURE — 74011250636 HC RX REV CODE- 250/636: Performed by: HOSPITALIST

## 2020-09-10 PROCEDURE — 36415 COLL VENOUS BLD VENIPUNCTURE: CPT

## 2020-09-10 PROCEDURE — 74011250637 HC RX REV CODE- 250/637: Performed by: NURSE PRACTITIONER

## 2020-09-10 PROCEDURE — 85025 COMPLETE CBC W/AUTO DIFF WBC: CPT

## 2020-09-10 PROCEDURE — 84100 ASSAY OF PHOSPHORUS: CPT

## 2020-09-10 PROCEDURE — 74011250636 HC RX REV CODE- 250/636: Performed by: FAMILY MEDICINE

## 2020-09-10 PROCEDURE — 65270000029 HC RM PRIVATE

## 2020-09-10 PROCEDURE — 80048 BASIC METABOLIC PNL TOTAL CA: CPT

## 2020-09-10 PROCEDURE — 82962 GLUCOSE BLOOD TEST: CPT

## 2020-09-10 PROCEDURE — 74011000258 HC RX REV CODE- 258: Performed by: FAMILY MEDICINE

## 2020-09-10 PROCEDURE — 74011000250 HC RX REV CODE- 250: Performed by: HOSPITALIST

## 2020-09-10 PROCEDURE — 74011000258 HC RX REV CODE- 258: Performed by: HOSPITALIST

## 2020-09-10 PROCEDURE — 74011250637 HC RX REV CODE- 250/637: Performed by: INTERNAL MEDICINE

## 2020-09-10 PROCEDURE — 85384 FIBRINOGEN ACTIVITY: CPT

## 2020-09-10 RX ADMIN — MIDODRINE HYDROCHLORIDE 10 MG: 5 TABLET ORAL at 15:38

## 2020-09-10 RX ADMIN — CEFEPIME 2 G: 2 INJECTION, POWDER, FOR SOLUTION INTRAVENOUS at 10:44

## 2020-09-10 RX ADMIN — SODIUM CHLORIDE: 900 INJECTION, SOLUTION INTRAVENOUS at 10:44

## 2020-09-10 RX ADMIN — MIDODRINE HYDROCHLORIDE 10 MG: 5 TABLET ORAL at 22:17

## 2020-09-10 RX ADMIN — CALCIUM GLUCONATE: 94 INJECTION, SOLUTION INTRAVENOUS at 19:25

## 2020-09-10 RX ADMIN — Medication 10 ML: at 07:10

## 2020-09-10 RX ADMIN — CEFEPIME 2 G: 2 INJECTION, POWDER, FOR SOLUTION INTRAVENOUS at 03:08

## 2020-09-10 RX ADMIN — Medication 10 ML: at 14:27

## 2020-09-10 RX ADMIN — VANCOMYCIN HYDROCHLORIDE 1250 MG: 10 INJECTION, POWDER, LYOPHILIZED, FOR SOLUTION INTRAVENOUS at 14:27

## 2020-09-10 RX ADMIN — Medication 10 ML: at 22:17

## 2020-09-10 RX ADMIN — CEFEPIME 2 G: 2 INJECTION, POWDER, FOR SOLUTION INTRAVENOUS at 19:06

## 2020-09-10 RX ADMIN — MIDODRINE HYDROCHLORIDE 10 MG: 5 TABLET ORAL at 09:20

## 2020-09-10 RX ADMIN — NYSTATIN: 100000 POWDER TOPICAL at 09:20

## 2020-09-10 NOTE — PROGRESS NOTES
Bedside and Verbal shift change report given to Татьяна Guevara (oncoming nurse) by Matthew Encinas RN (offgoing nurse). Report included the following information SBAR, Kardex, ED Summary, Intake/Output, MAR, Recent Results and Cardiac Rhythm NSR.

## 2020-09-10 NOTE — PROGRESS NOTES
Problem: Falls - Risk of  Goal: *Absence of Falls  Description: Document Bobo Metzger Fall Risk and appropriate interventions in the flowsheet.   Outcome: Progressing Towards Goal  Note: Fall Risk Interventions:  Mobility Interventions: Communicate number of staff needed for ambulation/transfer, Utilize walker, cane, or other assistive device         Medication Interventions: Evaluate medications/consider consulting pharmacy    Elimination Interventions: Call light in reach              Problem: Sepsis: Day 3  Goal: *Oxygen saturation within defined limits  Outcome: Progressing Towards Goal  Goal: *Tolerating diet  Outcome: Progressing Towards Goal  Goal: *Demonstrates progressive activity  Outcome: Progressing Towards Goal  Goal: Activity/Safety  Outcome: Progressing Towards Goal  Goal: Diagnostic Test/Procedures  Outcome: Progressing Towards Goal  Goal: Nutrition/Diet  Outcome: Progressing Towards Goal  Goal: Medications  Outcome: Progressing Towards Goal

## 2020-09-10 NOTE — PROGRESS NOTES
[address] [facility]  Hospitalist Group                                                                                          Hospitalist Progress Note  Terry Councilman, MD  Answering service: [phone] or [phone] from in house phone        Date of Service:  9/10/2020  NAME:  Laura Marshall  :  1934  MRN:  [id number]      Admission Summary:   Santana Carrero is a 80 y.o. male with past medical history of recent multiple bilateral pulmonary emboli, right lower extremity DVT, GI bleed, erosive gastritis, anemia, blood transfusion, pancreatitis, arthritis, BPH, DJD, and hyperlipidemia presented to the ED from home with reported nausea and vomiting. Patient provided some history in addition to his daughter who was at the bedside. Remainder of history was obtained per my review of ED and electronic medical records.     Interval history / Subjective:   Patient was seen and examined at bedside   Following by GI diet advanced patient was unable to do MRI due to claustrophobia CT scan was reviewed by GI with the radiologist and deemed may not be helpful if we get an MRI     Assessment & Plan:     Septic Shock: GPC bacteremia  -resolving, now off pressers-continue midodrine  -BC gram-pos cocci in clusters 1/2 bottles  -repeat BC ID following  -Cont vanc, cefepime for now  -TTE-LV: Estimated LVEF is 55 - 60%. Normal cavity size, wall thickness and systolic function (ejection fraction normal). Age-appropriate left ventricular diastolic function.   -PICC line from home - TPN dependent  -Seen by ID today, follow cultures     Pancreatitis  -improved peripancreatic inflammatory changes on most recent CT  -Continue TPN diet advance to clears as per GI  -Consult GI     Gastric outlet obstruction  -on TPN, continue  -Plan for MRI and further work-up as per GI holding off for now     Pancreatic Mass: seen on prev CT but not most recent  -consult oncology - has seen Sedrick Davis in past     Hx PE/DVT: s/p IVC filter placement  -elevated d-dimer, improving  -IVC filter in place     DVTppx: SCDs, IVC filter,  Low plt   Gippx: NA  Code Status: DNR  Diet: TPN  Activity: OOB to chair TID and PRN  Discharge: TBD       Care Plan discussed with: Patient/Family and Nurse  Anticipated Disposition: Home w/Family  Anticipated Discharge: 24 hours to 48 hours     Hospital Problems  Date Reviewed: 7/14/2020          Codes Class Noted POA    Tachycardia ICD-10-CM: R00.0  ICD-9-CM: 785.0  9/7/2020 Unknown        Hypotension ICD-10-CM: I95.9  ICD-9-CM: 458.9  9/7/2020 Unknown        Fever ICD-10-CM: R50.9  ICD-9-CM: 780.60  9/7/2020 Unknown        Sepsis ([facility] [address] ICD-10-CM: A41.9  ICD-9-CM: 038.9, 995.91  9/7/2020 Unknown        Shock ([facility] [address] ICD-10-CM: R57.9  ICD-9-CM: 785.50  9/7/2020 Unknown                Review of Systems:   A comprehensive review of systems was negative. Vital Signs:    Last 24hrs VS reviewed since prior progress note. Most recent are:  Visit Vitals  /63 (BP 1 Location: Left arm, BP Patient Position: At rest)   Pulse 87   Temp 98 °F (36.7 °C)   Resp 18   Ht 5' 11\" (1.803 m)   Wt 76 kg (167 lb 8.8 oz)   SpO2 99%   BMI 23.37 kg/m²         Intake/Output Summary (Last 24 hours) at 9/10/2020 1249  Last data filed at 9/10/2020 1049  Gross per 24 hour   Intake    Output 1025 ml   Net -1025 ml        Physical Examination:             Constitutional:  No acute distress, cooperative, pleasant    ENT:  Oral mucosa moist, oropharynx benign. Resp:  CTA bilaterally. No wheezing/rhonchi/rales. No accessory muscle use   CV:  Regular rhythm, normal rate, no murmurs, gallops, rubs    GI:  Soft, non distended, non tender. normoactive bowel sounds, no hepatosplenomegaly     Musculoskeletal:  No edema, warm, 2+ pulses throughout    Neurologic:  Moves all extremities. AAOx3, CN II-XII reviewed     Psych:  Good insight, Not anxious nor agitated.        Data Review:    I personally reviewed  Image and labs    Ct Abd Pelv W Cont    Result Date: 9/7/2020  IMPRESSION: Improved peripancreatic inflammatory changes, compatible with pancreatitis. Unchanged biliary dilatation, with no visualized choledocholithiasis. Small hiatal hernia. Xr Chest Port    Result Date: 9/7/2020  IMPRESSION: No evidence of acute cardiopulmonary process. Labs:     Recent Labs     09/10/20  0327 09/09/20  0541   WBC 3.7* 3.7*   HGB 10.0* 11.3*   HCT 29.7* 34.6*   PLT 96* 97*     Recent Labs     09/10/20  0327 09/09/20  0540 09/08/20  0425    139 139   K 3.3* 3.7 3.6   * 111* 111*   CO2 20* 21 23   BUN 18 22* 19   CREA 0.62* 0.74 0.79   * 148* 79   CA 8.3* 9.0 8.5   PHOS 1.9*  --  2.9     Recent Labs     09/09/20  0540   ALT 25   AP 97   TBILI 0.5   TP 6.8   ALB 2.3*   GLOB 4.5*     Recent Labs     09/08/20  0425   INR 1.4*   PTP 14.0*   APTT 43.3*      Recent Labs     09/08/20  0425   FERR 220      No results found for: FOL, RBCF   No results for input(s): PH, PCO2, PO2 in the last 72 hours. No results for input(s): CPK, CKNDX, TROIQ in the last 72 hours.     No lab exists for component: CPKMB  Lab Results   Component Value Date/Time    Cholesterol, total 250 (H) 10/02/2013 01:55 PM    HDL Cholesterol 53 10/02/2013 01:55 PM    LDL, calculated 176 (H) 10/02/2013 01:55 PM    Triglyceride 244 (H) 07/23/2020 04:41 AM    CHOL/HDL Ratio 3.4 10/06/2010 10:25 AM     Lab Results   Component Value Date/Time    Glucose (POC) 113 (H) 07/30/2020 10:56 AM    Glucose (POC) 116 (H) 07/30/2020 06:26 AM    Glucose (POC) 113 (H) 07/29/2020 11:34 PM    Glucose (POC) 103 (H) 07/29/2020 05:53 PM    Glucose (POC) 87 07/29/2020 11:27 AM     Lab Results   Component Value Date/Time    Color YELLOW/STRAW 09/07/2020 01:59 AM    Appearance CLEAR 09/07/2020 01:59 AM    Specific gravity 1.020 09/07/2020 01:59 AM    pH (UA) 6.5 09/07/2020 01:59 AM    Protein Negative 09/07/2020 01:59 AM    Glucose Negative 09/07/2020 01:59 AM    Ketone Negative 09/07/2020 01:59 AM Bilirubin Negative 09/07/2020 01:59 AM    Urobilinogen 1.0 09/07/2020 01:59 AM    Nitrites Negative 09/07/2020 01:59 AM    Leukocyte Esterase Negative 09/07/2020 01:59 AM    Epithelial cells FEW 09/07/2020 01:59 AM    Bacteria Negative 09/07/2020 01:59 AM    WBC 0-4 09/07/2020 01:59 AM    RBC 0-5 09/07/2020 01:59 AM         Medications Reviewed:     Current Facility-Administered Medications   Medication Dose Route Frequency    TPN ADULT - CENTRAL   IntraVENous CONTINUOUS    potassium phosphate 20 mmol in 0.9% sodium chloride 250 mL infusion   IntraVENous ONCE    TPN ADULT - CENTRAL   IntraVENous CONTINUOUS    fat emulsion 20% (LIPOSYN, INTRAlipid) infusion 500 mL  500 mL IntraVENous Q MON, WED & FRI    [Held by provider] heparin (porcine) injection 5,000 Units  5,000 Units SubCUTAneous Q12H    nystatin (MYCOSTATIN) 100,000 unit/gram powder   Topical BID    cefepime (MAXIPIME) 2 g in 0.9% sodium chloride (MBP/ADV) 100 mL  2 g IntraVENous Q8H    Vancomycin Pharmacy Dosing   Other Rx Dosing/Monitoring    vancomycin (VANCOCIN) 1250 mg in  ml infusion  1,250 mg IntraVENous Q16H    sodium chloride (NS) flush 5-40 mL  5-40 mL IntraVENous Q8H    sodium chloride (NS) flush 5-40 mL  5-40 mL IntraVENous PRN    polyethylene glycol (MIRALAX) packet 17 g  17 g Oral DAILY PRN    promethazine (PHENERGAN) tablet 12.5 mg  12.5 mg Oral Q6H PRN    Or    ondansetron (ZOFRAN) injection 4 mg  4 mg IntraVENous Q6H PRN    midodrine (PROAMATINE) tablet 10 mg  10 mg Oral TID     ______________________________________________________________________  EXPECTED LENGTH OF STAY: 4d 19h  ACTUAL LENGTH OF STAY:          3                 Jacob Oseguera MD

## 2020-09-10 NOTE — ROUTINE PROCESS
TRANSFER - OUT REPORT: 
 
Verbal report given to Partha JOHNSON(name) on Ariane Camargo  being transferred to (unit) for routine progression of care Report consisted of patients Situation, Background, Assessment and  
Recommendations(SBAR). Information from the following report(s) SBAR, Kardex, Intake/Output, MAR, Recent Results and Cardiac Rhythm NSR was reviewed with the receiving nurse. Lines: PICC Double Lumen 07/16/20 Right;Brachial (Active) [employer] Being Utilized Yes 09/10/20 7541 Criteria for Appropriate Use Total parenteral nutrition 09/10/20 0845 Site Assessment Clean, dry, & intact 09/10/20 0845 Phlebitis Assessment 0 09/10/20 0845 Infiltration Assessment 0 09/10/20 0845 Date of Last Dressing Change 09/06/20 09/08/20 1200 Dressing Status Clean, dry, & intact 09/10/20 0845 Action Taken Open ports on tubing capped 09/10/20 0845 Dressing Type Disk with Chlorhexadine gluconate (CHG); Transparent 09/10/20 0845 Hub Color/Line Status Purple; Infusing 09/10/20 0845 Positive Blood Return (Site #1) Yes 09/10/20 0845 Hub Color/Line Status Red;Flushed;Capped 09/10/20 0845 Positive Blood Return (Site #2) Yes 09/10/20 0845 Alcohol Cap Used Yes 09/10/20 0845 Peripheral IV 09/09/20 Left Hand (Active) Site Assessment Clean, dry, & intact 09/10/20 0845 Phlebitis Assessment 0 09/10/20 0845 Infiltration Assessment 0 09/10/20 0845 Dressing Status Clean, dry, & intact 09/10/20 0845 Dressing Type Transparent;Tape 09/10/20 0845 Hub Color/Line Status Pink;Capped 09/10/20 0845 Action Taken Open ports on tubing capped 09/10/20 0845 Alcohol Cap Used Yes 09/10/20 0845 Opportunity for questions and clarification was provided. Patient transported with: 
 Patient-specific medications from Pharmacy

## 2020-09-10 NOTE — PROGRESS NOTES
[address]  [address] [facility], [address]       GI PROGRESS NOTE  Will Meño Persons  [phone] office  [phone] NP/PA in-hospital cell phone M-F until 4:30PM  After 5PM or on weekends, please call  for physician on call      NAME: Miki Fatima   :  1934   MRN:  [id number]       Subjective:   Patient denies nausea, vomiting, or abdominal pain. No bowel movement today. He is NPO and receiving TPN. Discussed with RN at the bedside. Objective:     VITALS:   Last 24hrs VS reviewed since prior progress note. Most recent are:  Visit Vitals  BP 91/56   Pulse 93   Temp 98 °F (36.7 °C)   Resp 18   Ht 5' 11\" (1.803 m)   Wt 76 kg (167 lb 8.8 oz)   SpO2 98%   BMI 23.37 kg/m²       PHYSICAL EXAM:  General:          Cooperative, no acute distress    Neurologic:      Alert and oriented  HEENT:           EOMI, no scleral icterus   Lungs:             No respiratory distress  Heart:              S1 S2  Abdomen:        Soft, non-distended, no tenderness, no guarding, no rebound. +Bowel sounds.    Extremities:     Warm  Psych:             Not anxious or agitated      Lab Data Reviewed:     Recent Results (from the past 24 hour(s))   FIBRINOGEN    Collection Time: 09/10/20  3:27 AM   Result Value Ref Range    Fibrinogen 318 200 - 475 mg/dL   PHOSPHORUS    Collection Time: 09/10/20  3:27 AM   Result Value Ref Range    Phosphorus 1.9 (L) 2.6 - 4.7 MG/DL   CBC WITH AUTOMATED DIFF    Collection Time: 09/10/20  3:27 AM   Result Value Ref Range    WBC 3.7 (L) 4.1 - 11.1 K/uL    RBC 2.88 (L) 4.10 - 5.70 M/uL    HGB 10.0 (L) 12.1 - 17.0 g/dL    HCT 29.7 (L) 36.6 - 50.3 %    .1 (H) 80.0 - 99.0 FL    MCH 34.7 (H) 26.0 - 34.0 PG    MCHC 33.7 30.0 - 36.5 g/dL    RDW 15.5 (H) 11.5 - 14.5 %    PLATELET 96 (L) 321 - 400 K/uL    MPV 11.1 8.9 - 12.9 FL    NRBC 0.0 0  WBC    ABSOLUTE NRBC 0.00 0.00 - 0.01 K/uL    NEUTROPHILS 55 32 - 75 %    LYMPHOCYTES 27 12 - 49 %    MONOCYTES 11 5 - 13 %    EOSINOPHILS 5 0 - 7 %    BASOPHILS 1 0 - 1 %    IMMATURE GRANULOCYTES 1 (H) 0.0 - 0.5 %    ABS. NEUTROPHILS 2.1 1.8 - 8.0 K/UL    ABS. LYMPHOCYTES 1.0 0.8 - 3.5 K/UL    ABS. MONOCYTES 0.4 0.0 - 1.0 K/UL    ABS. EOSINOPHILS 0.2 0.0 - 0.4 K/UL    ABS. BASOPHILS 0.0 0.0 - 0.1 K/UL    ABS. IMM. GRANS. 0.0 0.00 - 0.04 K/UL    DF SMEAR SCANNED      RBC COMMENTS BLUE CELLS  PRESENT        RBC COMMENTS ANISOCYTOSIS  1+        RBC COMMENTS MACROCYTOSIS  1+       METABOLIC PANEL, BASIC    Collection Time: 09/10/20  3:27 AM   Result Value Ref Range    Sodium 139 136 - 145 mmol/L    Potassium 3.3 (L) 3.5 - 5.1 mmol/L    Chloride 111 (H) 97 - 108 mmol/L    CO2 20 (L) 21 - 32 mmol/L    Anion gap 8 5 - 15 mmol/L    Glucose 159 (H) 65 - 100 mg/dL    BUN 18 6 - 20 MG/DL    Creatinine 0.62 (L) 0.70 - 1.30 MG/DL    BUN/Creatinine ratio 29 (H) 12 - 20      GFR est AA >60 >60 ml/min/1.73m2    GFR est non-AA >60 >60 ml/min/1.73m2    Calcium 8.3 (L) 8.5 - 10.1 MG/DL        Assessment:   · Pancreatitis: WBC 3.7, Hgb 10.0, normal LFTs, normal lipase. CT abdomen/pelvis with IV contrast (9/7/20): improved peripancreatic inflammatory changes, compatible with pancreatitis; unchanged biliary dilatation with no visualized choledocholithiasis; small hiatal hernia. · Pancreatic mass: not seen on most recent CT scans  · Gram positive cocci bacteremia versus contaminant: on antibiotics. ID following. Repeat blood culture (9/9/20): no growth 2h.   · Sepsis: COVID negative  · History of PE status post IVC filter placement     Patient Active Problem List   Diagnosis Code    DVT (deep vein thrombosis) in pregnancy O22.30    DVT (deep venous thrombosis) ([facility]) I82.409    Tachycardia R00.0    Hypotension I95.9    Fever R50.9    Sepsis ([address] A41.9    Shock ([address] R57.9     Plan:   · Advance to clear liquids  · Supportive measures  · Discussed CT findings with radiology, Dr. Nash Bernstein.  No clear benefit of MRI/MRCP based on inflammatory findings on CT. Patient was unable to have MRI due to claustrophobia. Will hold off on MRI with anesthesia at this time. Signed By: Chaitanya Leigh     9/10/2020  10:32 AM          This patient was seen and examined by me in a face-to-face visit today. I reviewed the medical record including lab work, imaging and other provider notes. I confirmed the interval history as described above. I spoke to the patient, discussing our findings and plans. I discussed this case in detail with Chaitanya Garcia. I formulated an updated  assessment of this patient and guided our treatment plan. I agree with the above progress note. I agree with the history, exam and assessment and plan as outlined in the note. I would like to add the following: Patient seen and examined. I have discussed plan with patient at the bedside. He seems to have tolerated the first trial of clear liquids so far. I would advise continuing this for now and not advancing further as yet. We will hold off on MRI and EUS at this time. Will follow along with you. Rivera Blackman MD

## 2020-09-11 LAB
ALBUMIN SERPL-MCNC: 2.2 G/DL (ref 3.5–5)
ALBUMIN/GLOB SERPL: 0.6 {RATIO} (ref 1.1–2.2)
ALP SERPL-CCNC: 85 U/L (ref 45–117)
ALT SERPL-CCNC: 24 U/L (ref 12–78)
ANION GAP SERPL CALC-SCNC: 4 MMOL/L (ref 5–15)
AST SERPL-CCNC: 24 U/L (ref 15–37)
BASOPHILS # BLD: 0 K/UL (ref 0–0.1)
BASOPHILS NFR BLD: 1 % (ref 0–1)
BILIRUB SERPL-MCNC: 0.6 MG/DL (ref 0.2–1)
BUN SERPL-MCNC: 16 MG/DL (ref 6–20)
BUN/CREAT SERPL: 30 (ref 12–20)
C NEOFORM AB TITR SER AGGL: NEGATIVE {TITER}
CALCIUM SERPL-MCNC: 8.3 MG/DL (ref 8.5–10.1)
CHLORIDE SERPL-SCNC: 113 MMOL/L (ref 97–108)
CO2 SERPL-SCNC: 24 MMOL/L (ref 21–32)
CREAT SERPL-MCNC: 0.53 MG/DL (ref 0.7–1.3)
DIFFERENTIAL METHOD BLD: ABNORMAL
EOSINOPHIL # BLD: 0.2 K/UL (ref 0–0.4)
EOSINOPHIL NFR BLD: 6 % (ref 0–7)
ERYTHROCYTE [DISTWIDTH] IN BLOOD BY AUTOMATED COUNT: 15.3 % (ref 11.5–14.5)
FIBRINOGEN PPP-MCNC: 301 MG/DL (ref 200–475)
GLOBULIN SER CALC-MCNC: 3.8 G/DL (ref 2–4)
GLUCOSE BLD STRIP.AUTO-MCNC: 107 MG/DL (ref 65–100)
GLUCOSE BLD STRIP.AUTO-MCNC: 117 MG/DL (ref 65–100)
GLUCOSE SERPL-MCNC: 110 MG/DL (ref 65–100)
HCT VFR BLD AUTO: 28.9 % (ref 36.6–50.3)
HGB BLD-MCNC: 9.7 G/DL (ref 12.1–17)
IMM GRANULOCYTES # BLD AUTO: 0 K/UL (ref 0–0.04)
IMM GRANULOCYTES NFR BLD AUTO: 1 % (ref 0–0.5)
LYMPHOCYTES # BLD: 1 K/UL (ref 0.8–3.5)
LYMPHOCYTES NFR BLD: 33 % (ref 12–49)
MCH RBC QN AUTO: 34.4 PG (ref 26–34)
MCHC RBC AUTO-ENTMCNC: 33.6 G/DL (ref 30–36.5)
MCV RBC AUTO: 102.5 FL (ref 80–99)
MONOCYTES # BLD: 0.4 K/UL (ref 0–1)
MONOCYTES NFR BLD: 14 % (ref 5–13)
NEUTS SEG # BLD: 1.4 K/UL (ref 1.8–8)
NEUTS SEG NFR BLD: 45 % (ref 32–75)
NRBC # BLD: 0 K/UL (ref 0–0.01)
NRBC BLD-RTO: 0 PER 100 WBC
PHOSPHATE SERPL-MCNC: 2.6 MG/DL (ref 2.6–4.7)
PLATELET # BLD AUTO: 106 K/UL (ref 150–400)
PMV BLD AUTO: 11.6 FL (ref 8.9–12.9)
POTASSIUM SERPL-SCNC: 3.4 MMOL/L (ref 3.5–5.1)
PROT SERPL-MCNC: 6 G/DL (ref 6.4–8.2)
RBC # BLD AUTO: 2.82 M/UL (ref 4.1–5.7)
SERVICE CMNT-IMP: ABNORMAL
SERVICE CMNT-IMP: ABNORMAL
SODIUM SERPL-SCNC: 141 MMOL/L (ref 136–145)
WBC # BLD AUTO: 3.1 K/UL (ref 4.1–11.1)

## 2020-09-11 PROCEDURE — 74011000258 HC RX REV CODE- 258: Performed by: HOSPITALIST

## 2020-09-11 PROCEDURE — 80053 COMPREHEN METABOLIC PANEL: CPT

## 2020-09-11 PROCEDURE — 74011000258 HC RX REV CODE- 258: Performed by: FAMILY MEDICINE

## 2020-09-11 PROCEDURE — 85384 FIBRINOGEN ACTIVITY: CPT

## 2020-09-11 PROCEDURE — 82962 GLUCOSE BLOOD TEST: CPT

## 2020-09-11 PROCEDURE — 84100 ASSAY OF PHOSPHORUS: CPT

## 2020-09-11 PROCEDURE — 74011000250 HC RX REV CODE- 250: Performed by: HOSPITALIST

## 2020-09-11 PROCEDURE — 36415 COLL VENOUS BLD VENIPUNCTURE: CPT

## 2020-09-11 PROCEDURE — 65270000029 HC RM PRIVATE

## 2020-09-11 PROCEDURE — 74011250636 HC RX REV CODE- 250/636: Performed by: FAMILY MEDICINE

## 2020-09-11 PROCEDURE — 74011250636 HC RX REV CODE- 250/636: Performed by: HOSPITALIST

## 2020-09-11 PROCEDURE — 74011250637 HC RX REV CODE- 250/637: Performed by: INTERNAL MEDICINE

## 2020-09-11 PROCEDURE — 74011000250 HC RX REV CODE- 250: Performed by: NURSE PRACTITIONER

## 2020-09-11 PROCEDURE — 85025 COMPLETE CBC W/AUTO DIFF WBC: CPT

## 2020-09-11 RX ADMIN — CALCIUM GLUCONATE: 94 INJECTION, SOLUTION INTRAVENOUS at 18:30

## 2020-09-11 RX ADMIN — NYSTATIN: 100000 POWDER TOPICAL at 09:24

## 2020-09-11 RX ADMIN — NYSTATIN: 100000 POWDER TOPICAL at 18:18

## 2020-09-11 RX ADMIN — POLYETHYLENE GLYCOL 3350 17 G: 17 POWDER, FOR SOLUTION ORAL at 19:36

## 2020-09-11 RX ADMIN — VANCOMYCIN HYDROCHLORIDE 1250 MG: 10 INJECTION, POWDER, LYOPHILIZED, FOR SOLUTION INTRAVENOUS at 06:29

## 2020-09-11 RX ADMIN — MIDODRINE HYDROCHLORIDE 10 MG: 5 TABLET ORAL at 16:19

## 2020-09-11 RX ADMIN — CEFEPIME 2 G: 2 INJECTION, POWDER, FOR SOLUTION INTRAVENOUS at 18:19

## 2020-09-11 RX ADMIN — CEFEPIME 2 G: 2 INJECTION, POWDER, FOR SOLUTION INTRAVENOUS at 11:59

## 2020-09-11 RX ADMIN — MIDODRINE HYDROCHLORIDE 10 MG: 5 TABLET ORAL at 09:24

## 2020-09-11 RX ADMIN — I.V. FAT EMULSION 500 ML: 20 EMULSION INTRAVENOUS at 18:33

## 2020-09-11 RX ADMIN — MIDODRINE HYDROCHLORIDE 10 MG: 5 TABLET ORAL at 21:01

## 2020-09-11 RX ADMIN — Medication 10 ML: at 21:01

## 2020-09-11 RX ADMIN — Medication 10 ML: at 06:30

## 2020-09-11 RX ADMIN — CEFEPIME 2 G: 2 INJECTION, POWDER, FOR SOLUTION INTRAVENOUS at 03:56

## 2020-09-11 NOTE — PROGRESS NOTES
[facility]  Hospitalist Group                                                                                          Hospitalist Progress Note  Daphney Bateman MD  Answering service: [phone] OR [phone] from in house phone        Date of Service:  2020  NAME:  Lesvia Pryor  :  1934  MRN:  [id number]      Admission Summary:   Jessi Che is a 80 y.o. male with past medical history of recent multiple bilateral pulmonary emboli, right lower extremity DVT, GI bleed, erosive gastritis, anemia, blood transfusion, pancreatitis, arthritis, BPH, DJD, and hyperlipidemia presented to the ED from home with reported nausea and vomiting. Patient provided some history in addition to his daughter who was at the bedside. Remainder of history was obtained per my review of ED and electronic medical records.     Interval history / Subjective:   Patient was seen and examined at bedside   Following by GI diet advanced to GI light holding MRI and endoscopic ultrasound further management as per GI patient is also on TPN discussed with him may not be able to go home with TPN and need a permanent solution like a feeding tube    Addendum  -Discussed with GI the endpoint here is to advance clear liquid to full liquids by tomorrow and see how the patient does if he cannot tolerate the diet then the next step would be to get him a feeding tube and that tube has to be a GJ tube because he has gastric outlet obstruction Dr. murguia I will instruct the weekend physician about that otherwise option is patient need to go home with TPN again  -Antibiotics has been stopped as per culture probably contaminant growth ID was following     Assessment & Plan:     Septic Shock:   -GPC bacteremia  -resolving, now off pressers-continue midodrine  -BC gram-pos cocci in clusters 1/2 bottles  -repeat BC NEGATIVE ID following  -d/c Vanco and cont cefepime   -TTE-LV: Estimated LVEF is 55 - 60%.  Normal cavity size, wall thickness and systolic function (ejection fraction normal). Age-appropriate left ventricular diastolic function. -PICC line from home - TPN dependent renew TPN     Pancreatitis  -improved peripancreatic inflammatory changes on most recent CT  -Continue TPN diet advance to clears as per GI  -Consult GI     Gastric outlet obstruction  -on TPN, continue  -Plan for MRI and further work-up as per GI holding off for now     Pancreatic Mass: seen on prev CT but not most recent  -consult oncology - has seen Kay Cordova in past     Hx PE/DVT: s/p IVC filter placement  -elevated d-dimer, improving  -IVC filter in place     DVTppx: SCDs, IVC filter,  Low plt   Gippx: NA  Code Status: DNR  Diet: TPN  Activity: OOB to chair TID and PRN  Discharge: TBD       Care Plan discussed with: Patient/Family and Nurse  Anticipated Disposition: Home w/Family  Anticipated Discharge: 24 hours to 48 hours     Hospital Problems  Date Reviewed: 7/14/2020          Codes Class Noted POA    Tachycardia ICD-10-CM: R00.0  ICD-9-CM: 785.0  9/7/2020 Unknown        Hypotension ICD-10-CM: I95.9  ICD-9-CM: 458.9  9/7/2020 Unknown        Fever ICD-10-CM: R50.9  ICD-9-CM: 780.60  9/7/2020 Unknown        Sepsis ([address] ICD-10-CM: A41.9  ICD-9-CM: 038.9, 995.91  9/7/2020 Unknown        Shock ([address] ICD-10-CM: R57.9  ICD-9-CM: 785.50  9/7/2020 Unknown                Review of Systems:   A comprehensive review of systems was negative. Vital Signs:    Last 24hrs VS reviewed since prior progress note.  Most recent are:  Visit Vitals  /64 (BP 1 Location: Left arm, BP Patient Position: At rest)   Pulse 89   Temp 98.2 °F (36.8 °C)   Resp 16   Ht 5' 11\" (1.803 m)   Wt 78.3 kg (172 lb 9.6 oz)   SpO2 98%   BMI 24.07 kg/m²         Intake/Output Summary (Last 24 hours) at 9/11/2020 1158  Last data filed at 9/11/2020 0730  Gross per 24 hour   Intake    Output 890 ml   Net -890 ml        Physical Examination:             Constitutional:  No acute distress, cooperative, pleasant    ENT:  Oral mucosa moist, oropharynx benign. Resp:  CTA bilaterally. No wheezing/rhonchi/rales. No accessory muscle use   CV:  Regular rhythm, normal rate, no murmurs, gallops, rubs    GI:  Soft, non distended, non tender. normoactive bowel sounds, no hepatosplenomegaly     Musculoskeletal:  No edema, warm, 2+ pulses throughout    Neurologic:  Moves all extremities. AAOx3, CN II-XII reviewed     Psych:  Good insight, Not anxious nor agitated. Data Review:    I personally reviewed  Image and labs    Ct Abd Pelv W Cont    Result Date: 9/7/2020  IMPRESSION: Improved peripancreatic inflammatory changes, compatible with pancreatitis. Unchanged biliary dilatation, with no visualized choledocholithiasis. Small hiatal hernia. Xr Chest Port    Result Date: 9/7/2020  IMPRESSION: No evidence of acute cardiopulmonary process. Labs:     Recent Labs     09/11/20  0355 09/10/20  0327   WBC 3.1* 3.7*   HGB 9.7* 10.0*   HCT 28.9* 29.7*   * 96*     Recent Labs     09/11/20  0355 09/10/20  0327 09/09/20  0540    139 139   K 3.4* 3.3* 3.7   * 111* 111*   CO2 24 20* 21   BUN 16 18 22*   CREA 0.53* 0.62* 0.74   * 159* 148*   CA 8.3* 8.3* 9.0   PHOS 2.6 1.9*  --      Recent Labs     09/11/20 0355 09/09/20  0540   ALT 24 25   AP 85 97   TBILI 0.6 0.5   TP 6.0* 6.8   ALB 2.2* 2.3*   GLOB 3.8 4.5*     No results for input(s): INR, PTP, APTT, INREXT, INREXT in the last 72 hours. No results for input(s): FE, TIBC, PSAT, FERR in the last 72 hours. No results found for: FOL, RBCF   No results for input(s): PH, PCO2, PO2 in the last 72 hours. No results for input(s): CPK, CKNDX, TROIQ in the last 72 hours.     No lab exists for component: CPKMB  Lab Results   Component Value Date/Time    Cholesterol, total 250 (H) 10/02/2013 01:55 PM    HDL Cholesterol 53 10/02/2013 01:55 PM    LDL, calculated 176 (H) 10/02/2013 01:55 PM    Triglyceride 244 (H) 07/23/2020 04:41 AM    CHOL/HDL Ratio 3.4 10/06/2010 10:25 AM     Lab Results   Component Value Date/Time    Glucose (POC) 107 (H) 09/11/2020 11:28 AM    Glucose (POC) 117 (H) 09/11/2020 05:10 AM    Glucose (POC) 111 (H) 09/10/2020 11:41 PM    Glucose (POC) 113 (H) 07/30/2020 10:56 AM    Glucose (POC) 116 (H) 07/30/2020 06:26 AM     Lab Results   Component Value Date/Time    Color YELLOW/STRAW 09/07/2020 01:59 AM    Appearance CLEAR 09/07/2020 01:59 AM    Specific gravity 1.020 09/07/2020 01:59 AM    pH (UA) 6.5 09/07/2020 01:59 AM    Protein Negative 09/07/2020 01:59 AM    Glucose Negative 09/07/2020 01:59 AM    Ketone Negative 09/07/2020 01:59 AM    Bilirubin Negative 09/07/2020 01:59 AM    Urobilinogen 1.0 09/07/2020 01:59 AM    Nitrites Negative 09/07/2020 01:59 AM    Leukocyte Esterase Negative 09/07/2020 01:59 AM    Epithelial cells FEW 09/07/2020 01:59 AM    Bacteria Negative 09/07/2020 01:59 AM    WBC 0-4 09/07/2020 01:59 AM    RBC 0-5 09/07/2020 01:59 AM         Medications Reviewed:     Current Facility-Administered Medications   Medication Dose Route Frequency    TPN ADULT - CENTRAL   IntraVENous CONTINUOUS    TPN ADULT - CENTRAL   IntraVENous CONTINUOUS    fat emulsion 20% (LIPOSYN, INTRAlipid) infusion 500 mL  500 mL IntraVENous Q MON, WED & FRI    [Held by provider] heparin (porcine) injection 5,000 Units  5,000 Units SubCUTAneous Q12H    nystatin (MYCOSTATIN) 100,000 unit/gram powder   Topical BID    cefepime (MAXIPIME) 2 g in 0.9% sodium chloride (MBP/ADV) 100 mL  2 g IntraVENous Q8H    Vancomycin Pharmacy Dosing   Other Rx Dosing/Monitoring    vancomycin (VANCOCIN) 1250 mg in  ml infusion  1,250 mg IntraVENous Q16H    sodium chloride (NS) flush 5-40 mL  5-40 mL IntraVENous Q8H    sodium chloride (NS) flush 5-40 mL  5-40 mL IntraVENous PRN    polyethylene glycol (MIRALAX) packet 17 g  17 g Oral DAILY PRN    promethazine (PHENERGAN) tablet 12.5 mg  12.5 mg Oral Q6H PRN    Or    ondansetron (ZOFRAN) injection 4 mg 4 mg IntraVENous Q6H PRN    midodrine (PROAMATINE) tablet 10 mg  10 mg Oral TID     ______________________________________________________________________  EXPECTED LENGTH OF STAY: 4d 19h  ACTUAL LENGTH OF STAY:          4                 Kenneth Meyer MD

## 2020-09-11 NOTE — PROGRESS NOTES
TIMMY: D/C 24-48 hours, Resume Home Care with [facility] and TPN with [facility]  RUR: 19%    CM spoke with pt daughter, Xiao Grace ([phone]). Pt is a [facility] pt for his TPN; referral placed for [facility] for IV ABX (will run for verification of insurance coverage - [employer]), anticipating need for D/C. Daughter updated on timeframe for possible D/C. Daughter will transport pt home upon D/C in private vehicle. Daughter request update from MD and RD if possible; wanted to know how his eating is progressing and if TPN will likely be reduced or eliminated. Medicare pt has received and reviewed 2nd IM letter informing them of their right to appeal the discharge.      Care Manager: Hiral Lee MSN RN

## 2020-09-11 NOTE — PROGRESS NOTES
ID Progress Note  2020    Subjective:     He is feeling much better. No specific complaints at the time of my visit. Objective:     Antibiotics:  1. Vancomycin   2. Cefepime       Vitals:   Visit Vitals  /65 (BP 1 Location: Left arm, BP Patient Position: At rest)   Pulse 85   Temp 98.1 °F (36.7 °C)   Resp 16   Ht 5' 11\" (1.803 m)   Wt 78.3 kg (172 lb 9.6 oz)   SpO2 96%   BMI 24.07 kg/m²        Tmax:  Temp (24hrs), Av.9 °F (36.6 °C), Min:97.4 °F (36.3 °C), Max:98.2 °F (36.8 °C)      Exam:  Lungs:  clear to auscultation bilaterally  Heart:  regular rate and rhythm  Abdomen:  soft, non-tender. Bowel sounds normal. No masses,  no organomegaly    Labs:      Recent Labs     20  0355 09/10/20  0327 20  0541 20  0540   WBC 3.1* 3.7* 3.7*  --    HGB 9.7* 10.0* 11.3*  --    * 96* 97*  --    BUN 16 18  --  22*   CREA 0.53* 0.62*  --  0.74   AP 85  --   --  97   TBILI 0.6  --   --  0.5       Cultures:     No results found for: SDES  Lab Results   Component Value Date/Time    Culture result: NO GROWTH 2 DAYS 2020 05:40 AM    Culture result: NO GROWTH 3 DAYS 2020 06:15 AM    Culture result: (A) 2020 12:49 AM     STAPHYLOCOCCUS SPECIES, COAGULASE NEGATIVE GROWING IN 1 OF 2 BOTTLES DRAWN (L HAND SITE)    Culture result: (A) 2020 12:49 AM     PRELIMINARY REPORT OF GRAM POSITIVE COCCI IN CLUSTERS GROWING IN 1 OF 2 BOTTLES DRAWN CALLED TO AND READ BACK BY MS Ghislaine Arevalo RN ON 20 AT 6434 [facility]). MS    Culture result: REMAINING BOTTLE(S) HAS/HAVE NO GROWTH SO FAR 2020 12:49 AM       Radiology:     Line/Insert Date:           Assessment:     1. Sepsis syndrome--resolved--not sure of the cause  2. Positive blood culture--likely contaminant since other bottles remain negative    Objective:     1. Stop antibiotic therapy and monitor  2.  Follow cultures    Marisol Guillaume MD

## 2020-09-11 NOTE — PROGRESS NOTES
Latony[address]  174 [facility], [address]       GI PROGRESS NOTE  Will Meño Persons  [phone] office  [phone] NP/PA in-hospital cell phone M-F until 4:30PM  After 5PM or on weekends, please call  for physician on call      NAME: Miki Fatima   :  1934   MRN:  [id number]       Subjective:   Patient is sitting upright in bed with no complaints. No nausea, vomiting, or abdominal pain. He is tolerating clear liquids. Objective:     VITALS:   Last 24hrs VS reviewed since prior progress note. Most recent are:  Visit Vitals  /57   Pulse 82   Temp 98 °F (36.7 °C)   Resp 18   Ht 5' 11\" (1.803 m)   Wt 78.3 kg (172 lb 9.6 oz)   SpO2 98%   BMI 24.07 kg/m²       PHYSICAL EXAM:  General:          Cooperative, no acute distress    Neurologic:      Alert and oriented  HEENT:           EOMI, no scleral icterus   Lungs:             No respiratory distress  Heart:              S1 S2  Abdomen:        Soft, non-distended, no tenderness, no guarding, no rebound.  +Bowel sounds.   Extremities:     Warm  Psych:             Not anxious or agitated      Lab Data Reviewed:     Recent Results (from the past 24 hour(s))   GLUCOSE, POC    Collection Time: 09/10/20 11:41 PM   Result Value Ref Range    Glucose (POC) 111 (H) 65 - 100 mg/dL    Performed by FRANCIA WHITAKER [facility]  PCT    FIBRINOGEN    Collection Time: 20  3:55 AM   Result Value Ref Range    Fibrinogen 301 200 - 475 mg/dL   PHOSPHORUS    Collection Time: 20  3:55 AM   Result Value Ref Range    Phosphorus 2.6 2.6 - 4.7 MG/DL   METABOLIC PANEL, COMPREHENSIVE    Collection Time: 20  3:55 AM   Result Value Ref Range    Sodium 141 136 - 145 mmol/L    Potassium 3.4 (L) 3.5 - 5.1 mmol/L    Chloride 113 (H) 97 - 108 mmol/L    CO2 24 21 - 32 mmol/L    Anion gap 4 (L) 5 - 15 mmol/L    Glucose 110 (H) 65 - 100 mg/dL    BUN 16 6 - 20 MG/DL    Creatinine 0.53 (L) 0.70 - 1.30 MG/DL    BUN/Creatinine ratio 30 (H) 12 - 20      GFR est AA >60 >60 ml/min/1.73m2    GFR est non-AA >60 >60 ml/min/1.73m2    Calcium 8.3 (L) 8.5 - 10.1 MG/DL    Bilirubin, total 0.6 0.2 - 1.0 MG/DL    ALT (SGPT) 24 12 - 78 U/L    AST (SGOT) 24 15 - 37 U/L    Alk. phosphatase 85 45 - 117 U/L    Protein, total 6.0 (L) 6.4 - 8.2 g/dL    Albumin 2.2 (L) 3.5 - 5.0 g/dL    Globulin 3.8 2.0 - 4.0 g/dL    A-G Ratio 0.6 (L) 1.1 - 2.2     CBC WITH AUTOMATED DIFF    Collection Time: 09/11/20  3:55 AM   Result Value Ref Range    WBC 3.1 (L) 4.1 - 11.1 K/uL    RBC 2.82 (L) 4.10 - 5.70 M/uL    HGB 9.7 (L) 12.1 - 17.0 g/dL    HCT 28.9 (L) 36.6 - 50.3 %    .5 (H) 80.0 - 99.0 FL    MCH 34.4 (H) 26.0 - 34.0 PG    MCHC 33.6 30.0 - 36.5 g/dL    RDW 15.3 (H) 11.5 - 14.5 %    PLATELET 363 (L) 786 - 400 K/uL    MPV 11.6 8.9 - 12.9 FL    NRBC 0.0 0  WBC    ABSOLUTE NRBC 0.00 0.00 - 0.01 K/uL    NEUTROPHILS 45 32 - 75 %    LYMPHOCYTES 33 12 - 49 %    MONOCYTES 14 (H) 5 - 13 %    EOSINOPHILS 6 0 - 7 %    BASOPHILS 1 0 - 1 %    IMMATURE GRANULOCYTES 1 (H) 0.0 - 0.5 %    ABS. NEUTROPHILS 1.4 (L) 1.8 - 8.0 K/UL    ABS. LYMPHOCYTES 1.0 0.8 - 3.5 K/UL    ABS. MONOCYTES 0.4 0.0 - 1.0 K/UL    ABS. EOSINOPHILS 0.2 0.0 - 0.4 K/UL    ABS. BASOPHILS 0.0 0.0 - 0.1 K/UL    ABS. IMM. GRANS. 0.0 0.00 - 0.04 K/UL    DF AUTOMATED     GLUCOSE, POC    Collection Time: 09/11/20  5:10 AM   Result Value Ref Range    Glucose (POC) 117 (H) 65 - 100 mg/dL    Performed by FRANCIA WHITAKER [facility]  PCT        Assessment:   · Pancreatitis: WBC 3.1, Hgb 9.7, normal LFTs, normal lipase. CT abdomen/pelvis with IV contrast (9/7/20): improved peripancreatic inflammatory changes, compatible with pancreatitis; unchanged biliary dilatation with no visualized choledocholithiasis; small hiatal hernia.   · Pancreatic mass: not seen on most recent CT scans  · Gram positive cocci bacteremia versus contaminant: on antibiotics. ID following. Repeat blood culture (9/9/20): no growth 2d.    · Sepsis: COVID negative  · History of PE status post IVC filter placement     Patient Active Problem List   Diagnosis Code    DVT (deep vein thrombosis) in pregnancy O22.30    DVT (deep venous thrombosis) ([facility]) I82.409    Tachycardia R00.0    Hypotension I95.9    Fever R50.9    Sepsis ([address] A41.9    Shock ([facility] R57.9     Plan:   · Continue clear liquids  · Supportive measures  · Will hold off on MRI and EUS at this time  · Discussed with hospitalist, Dr. Juliana Caban     Signed By: VANESSA Shaffer     9/11/2020  8:59 AM         GI Attending: If clear liquids are tolerated today, we can advance diet to full liquids tomorrow. Weekend team to follow. Rivera Posadas MD

## 2020-09-11 NOTE — ROUTINE PROCESS
Bedside shift change report given to [facility] (oncoming nurse) by Sherrie Valle RN (offgoing nurse). Report included the following information SBAR and Kardex.

## 2020-09-11 NOTE — ROUTINE PROCESS
Bedside shift change report given to [address] (oncoming nurse) by Meredith Canela RN (offgoing nurse). Report included the following information SBAR, Kardex, Intake/Output and MAR.

## 2020-09-12 LAB
ALBUMIN SERPL-MCNC: 2.3 G/DL (ref 3.5–5)
ALBUMIN/GLOB SERPL: 0.5 {RATIO} (ref 1.1–2.2)
ALP SERPL-CCNC: 93 U/L (ref 45–117)
ALT SERPL-CCNC: 42 U/L (ref 12–78)
ANION GAP SERPL CALC-SCNC: 4 MMOL/L (ref 5–15)
AST SERPL-CCNC: 39 U/L (ref 15–37)
BACTERIA SPEC CULT: ABNORMAL
BASOPHILS # BLD: 0 K/UL (ref 0–0.1)
BASOPHILS # BLD: NORMAL K/UL (ref 0–0.1)
BASOPHILS NFR BLD: 0 % (ref 0–1)
BASOPHILS NFR BLD: NORMAL % (ref 0–1)
BILIRUB SERPL-MCNC: 0.3 MG/DL (ref 0.2–1)
BUN SERPL-MCNC: 15 MG/DL (ref 6–20)
BUN/CREAT SERPL: 25 (ref 12–20)
CALCIUM SERPL-MCNC: 8.5 MG/DL (ref 8.5–10.1)
CHLORIDE SERPL-SCNC: 111 MMOL/L (ref 97–108)
CO2 SERPL-SCNC: 25 MMOL/L (ref 21–32)
CREAT SERPL-MCNC: 0.6 MG/DL (ref 0.7–1.3)
DIFFERENTIAL METHOD BLD: ABNORMAL
DIFFERENTIAL METHOD BLD: NORMAL
EOSINOPHIL # BLD: 0.3 K/UL (ref 0–0.4)
EOSINOPHIL # BLD: NORMAL K/UL (ref 0–0.4)
EOSINOPHIL NFR BLD: 5 % (ref 0–7)
EOSINOPHIL NFR BLD: NORMAL % (ref 0–7)
ERYTHROCYTE [DISTWIDTH] IN BLOOD BY AUTOMATED COUNT: 15.3 % (ref 11.5–14.5)
ERYTHROCYTE [DISTWIDTH] IN BLOOD BY AUTOMATED COUNT: NORMAL % (ref 11.5–14.5)
FIBRINOGEN PPP-MCNC: 306 MG/DL (ref 200–475)
GLOBULIN SER CALC-MCNC: 4.2 G/DL (ref 2–4)
GLUCOSE BLD STRIP.AUTO-MCNC: 115 MG/DL (ref 65–100)
GLUCOSE BLD STRIP.AUTO-MCNC: 124 MG/DL (ref 65–100)
GLUCOSE SERPL-MCNC: 126 MG/DL (ref 65–100)
HCT VFR BLD AUTO: 31 % (ref 36.6–50.3)
HCT VFR BLD AUTO: NORMAL % (ref 36.6–50.3)
HGB BLD-MCNC: 10.2 G/DL (ref 12.1–17)
HGB BLD-MCNC: NORMAL G/DL (ref 12.1–17)
IMM GRANULOCYTES # BLD AUTO: 0.1 K/UL (ref 0–0.04)
IMM GRANULOCYTES # BLD AUTO: NORMAL K/UL (ref 0–0.04)
IMM GRANULOCYTES NFR BLD AUTO: 1 % (ref 0–0.5)
IMM GRANULOCYTES NFR BLD AUTO: NORMAL % (ref 0–0.5)
LYMPHOCYTES # BLD: 1.3 K/UL (ref 0.8–3.5)
LYMPHOCYTES # BLD: NORMAL K/UL (ref 0.8–3.5)
LYMPHOCYTES NFR BLD: 26 % (ref 12–49)
LYMPHOCYTES NFR BLD: NORMAL % (ref 12–49)
MCH RBC QN AUTO: 34.3 PG (ref 26–34)
MCH RBC QN AUTO: NORMAL PG (ref 26–34)
MCHC RBC AUTO-ENTMCNC: 32.9 G/DL (ref 30–36.5)
MCHC RBC AUTO-ENTMCNC: NORMAL G/DL (ref 30–36.5)
MCV RBC AUTO: 104.4 FL (ref 80–99)
MCV RBC AUTO: NORMAL FL (ref 80–99)
MONOCYTES # BLD: 0.4 K/UL (ref 0–1)
MONOCYTES # BLD: NORMAL K/UL (ref 0–1)
MONOCYTES NFR BLD: 8 % (ref 5–13)
MONOCYTES NFR BLD: NORMAL % (ref 5–13)
NEUTS SEG # BLD: 3 K/UL (ref 1.8–8)
NEUTS SEG # BLD: NORMAL K/UL (ref 1.8–8)
NEUTS SEG NFR BLD: 59 % (ref 32–75)
NEUTS SEG NFR BLD: NORMAL % (ref 32–75)
NRBC # BLD: 0 K/UL (ref 0–0.01)
NRBC # BLD: NORMAL K/UL (ref 0–0.01)
NRBC BLD-RTO: 0 PER 100 WBC
NRBC BLD-RTO: NORMAL PER 100 WBC
PHOSPHATE SERPL-MCNC: 2.4 MG/DL (ref 2.6–4.7)
PHOSPHATE SERPL-MCNC: NORMAL MG/DL (ref 2.6–4.7)
PLATELET # BLD AUTO: 112 K/UL (ref 150–400)
PLATELET # BLD AUTO: NORMAL K/UL (ref 150–400)
PLATELET COMMENTS,PCOM: NORMAL
PMV BLD AUTO: 11.3 FL (ref 8.9–12.9)
PMV BLD AUTO: NORMAL FL (ref 8.9–12.9)
POTASSIUM SERPL-SCNC: 3.4 MMOL/L (ref 3.5–5.1)
PROT SERPL-MCNC: 6.5 G/DL (ref 6.4–8.2)
RBC # BLD AUTO: 2.97 M/UL (ref 4.1–5.7)
RBC # BLD AUTO: NORMAL M/UL (ref 4.1–5.7)
RBC MORPH BLD: NORMAL
RBC MORPH BLD: NORMAL
SERVICE CMNT-IMP: ABNORMAL
SODIUM SERPL-SCNC: 140 MMOL/L (ref 136–145)
WBC # BLD AUTO: 5.1 K/UL (ref 4.1–11.1)
WBC # BLD AUTO: NORMAL K/UL (ref 4.1–11.1)

## 2020-09-12 PROCEDURE — 74011000258 HC RX REV CODE- 258: Performed by: HOSPITALIST

## 2020-09-12 PROCEDURE — 74011000258 HC RX REV CODE- 258: Performed by: FAMILY MEDICINE

## 2020-09-12 PROCEDURE — 74011000250 HC RX REV CODE- 250: Performed by: HOSPITALIST

## 2020-09-12 PROCEDURE — 65270000029 HC RM PRIVATE

## 2020-09-12 PROCEDURE — 85384 FIBRINOGEN ACTIVITY: CPT

## 2020-09-12 PROCEDURE — 84100 ASSAY OF PHOSPHORUS: CPT

## 2020-09-12 PROCEDURE — 82962 GLUCOSE BLOOD TEST: CPT

## 2020-09-12 PROCEDURE — 74011250636 HC RX REV CODE- 250/636: Performed by: HOSPITALIST

## 2020-09-12 PROCEDURE — 74011250637 HC RX REV CODE- 250/637: Performed by: INTERNAL MEDICINE

## 2020-09-12 PROCEDURE — 74011250636 HC RX REV CODE- 250/636: Performed by: FAMILY MEDICINE

## 2020-09-12 PROCEDURE — 85025 COMPLETE CBC W/AUTO DIFF WBC: CPT

## 2020-09-12 PROCEDURE — 80053 COMPREHEN METABOLIC PANEL: CPT

## 2020-09-12 PROCEDURE — 36415 COLL VENOUS BLD VENIPUNCTURE: CPT

## 2020-09-12 RX ADMIN — CEFEPIME 2 G: 2 INJECTION, POWDER, FOR SOLUTION INTRAVENOUS at 11:12

## 2020-09-12 RX ADMIN — MIDODRINE HYDROCHLORIDE 10 MG: 5 TABLET ORAL at 09:00

## 2020-09-12 RX ADMIN — CEFEPIME 2 G: 2 INJECTION, POWDER, FOR SOLUTION INTRAVENOUS at 02:34

## 2020-09-12 RX ADMIN — POLYETHYLENE GLYCOL 3350 17 G: 17 POWDER, FOR SOLUTION ORAL at 22:12

## 2020-09-12 RX ADMIN — MIDODRINE HYDROCHLORIDE 10 MG: 5 TABLET ORAL at 16:37

## 2020-09-12 RX ADMIN — CALCIUM GLUCONATE: 94 INJECTION, SOLUTION INTRAVENOUS at 18:09

## 2020-09-12 RX ADMIN — Medication 10 ML: at 22:12

## 2020-09-12 RX ADMIN — ALTEPLASE 2 MG: 2.2 INJECTION, POWDER, LYOPHILIZED, FOR SOLUTION INTRAVENOUS at 03:47

## 2020-09-12 RX ADMIN — MIDODRINE HYDROCHLORIDE 10 MG: 5 TABLET ORAL at 22:11

## 2020-09-12 RX ADMIN — NYSTATIN: 100000 POWDER TOPICAL at 09:02

## 2020-09-12 RX ADMIN — Medication 10 ML: at 05:29

## 2020-09-12 RX ADMIN — NYSTATIN: 100000 POWDER TOPICAL at 18:09

## 2020-09-12 NOTE — PROGRESS NOTES
[facility]                                                                                          Hospitalist Progress Note  Crow Benitez MD  Answering service: [phone] from in house phone        Date of Service:  2020  NAME:  Luis Fernando Hein  :  1934  MRN:  [id number]      Admission Summary:     Saleem Yusuf is a 80 y.o. male with past medical history of recent multiple bilateral pulmonary emboli, right lower extremity DVT, GI bleed, erosive gastritis, anemia, blood transfusion, pancreatitis, arthritis, BPH, DJD, and hyperlipidemia presented to the ED from home with reported nausea and vomiting.  Patient provided some history in addition to his daughter who was at the bedside. Adeola Marshall of history was obtained per my review of ED and electronic medical records.      Interval history / Subjective:          Assessment & Plan:     Septic Shock  -shock resolved, off pressor, BP normal, continue midodrine   -on Cefepime  -vancomycin discontinued   -repeated blood cx on  no growth so far, blood cx on  staph coag negative likely contaminant  -TTE-LV EF 55-60% normal cavity, wall thickness ans systolic function. -PICC line from home - TPN dependent renew TPN  -afebrile, no leukocytosis,   -ID on board, recommend to stop abx and monitor off abx     Pancreatitis  -improved peripancreatic inflammatory changes on most recent CT  -lipase was normal 107 on   -Continue TPN diet advance to clears as per GI  -GI on board     Gastric outlet obstruction  -on TPN, continue  -GI on board     Pancreatic abnormality on prev CT   -CT abdomen pelvis on  improved peripancreatic inflammatory changes, compatible with pancreatitis. Unchanged biliary dilatation, with no visualized choledocholithiasis.  Small hiatal hernia.  -Hem/Onc on board     Hx PE/DVT, s/p IVC filter placement  -elevated d-dimer, improving  -IVC filter in place    Anemia of chronic disease  -H/H stable  -no evidence of active bleeding  -monitor H/H        Code status: DNR  DVT prophylaxis:SCD    Care Plan discussed with: Patient/Family and Nurse  Anticipated Disposition: TBD  Anticipated Discharge: 24 hours to 48 hours   I called his daughter, Syeda Tong at [phone] updated her clinical condition, lab finding and treatment plan     Hospital Problems  Date Reviewed: 7/14/2020          Codes Class Noted POA    Tachycardia ICD-10-CM: R00.0  ICD-9-CM: 785.0  9/7/2020 Unknown        Hypotension ICD-10-CM: I95.9  ICD-9-CM: 458.9  9/7/2020 Unknown        Fever ICD-10-CM: R50.9  ICD-9-CM: 780.60  9/7/2020 Unknown        Sepsis ([address] ICD-10-CM: A41.9  ICD-9-CM: 038.9, 995.91  9/7/2020 Unknown        Shock ([facility] [address] ICD-10-CM: R57.9  ICD-9-CM: 785.50  9/7/2020 Unknown              Vital Signs:    Last 24hrs VS reviewed since prior progress note. Most recent are:  Visit Vitals  /60   Pulse 89   Temp 98.7 °F (37.1 °C)   Resp 17   Ht 5' 11\" (1.803 m)   Wt 77.9 kg (171 lb 12.8 oz)   SpO2 98%   BMI 23.96 kg/m²         Intake/Output Summary (Last 24 hours) at 9/12/2020 1332  Last data filed at 9/12/2020 1132  Gross per 24 hour   Intake    Output 2100 ml   Net -2100 ml        Physical Examination:             Constitutional:  No acute distress, cooperative, pleasant    ENT:  Oral mucosa moist, oropharynx benign. Resp:  CTA bilaterally. No wheezing/rhonchi/rales. No accessory muscle use   CV:  Regular rhythm, normal rate, no murmurs, gallops, rubs    GI:  Soft, non distended, non tender. normoactive bowel sounds, no hepatosplenomegaly     Musculoskeletal:  No edema,     Neurologic:  Moves all extremities.   AAOx3, CN II-XII reviewed     Skin:  Good turgor, no rashes or ulcers       Data Review:    Review and/or order of clinical lab test  Review and/or order of tests in the radiology section of CPT  Review and/or order of tests in the medicine section of CPT      Labs:     Recent Labs     09/12/20  0813 09/12/20  0525   WBC 5.1 PLEASE DISREGARD RESULTS   HGB 10.2* PLEASE DISREGARD RESULTS   HCT 31.0* PLEASE DISREGARD RESULTS   * PLEASE DISREGARD RESULTS     Recent Labs     09/12/20  0826 09/12/20  0525 09/11/20  0355 09/10/20  0327     --  141 139   K 3.4*  --  3.4* 3.3*   *  --  113* 111*   CO2 25  --  24 20*   BUN 15  --  16 18   CREA 0.60*  --  0.53* 0.62*   *  --  110* 159*   CA 8.5  --  8.3* 8.3*   PHOS 2.4* PLEASE DISREGARD RESULTS 2.6 1.9*     Recent Labs     09/12/20 0826 09/11/20 0355   ALT 42 24   AP 93 85   TBILI 0.3 0.6   TP 6.5 6.0*   ALB 2.3* 2.2*   GLOB 4.2* 3.8     No results for input(s): INR, PTP, APTT, INREXT in the last 72 hours. No results for input(s): FE, TIBC, PSAT, FERR in the last 72 hours. No results found for: FOL, RBCF   No results for input(s): PH, PCO2, PO2 in the last 72 hours. No results for input(s): CPK, CKNDX, TROIQ in the last 72 hours.     No lab exists for component: CPKMB  Lab Results   Component Value Date/Time    Cholesterol, total 250 (H) 10/02/2013 01:55 PM    HDL Cholesterol 53 10/02/2013 01:55 PM    LDL, calculated 176 (H) 10/02/2013 01:55 PM    Triglyceride 244 (H) 07/23/2020 04:41 AM    CHOL/HDL Ratio 3.4 10/06/2010 10:25 AM     Lab Results   Component Value Date/Time    Glucose (POC) 115 (H) 09/12/2020 06:42 AM    Glucose (POC) 124 (H) 09/12/2020 12:13 AM    Glucose (POC) 107 (H) 09/11/2020 11:28 AM    Glucose (POC) 117 (H) 09/11/2020 05:10 AM    Glucose (POC) 111 (H) 09/10/2020 11:41 PM     Lab Results   Component Value Date/Time    Color YELLOW/STRAW 09/07/2020 01:59 AM    Appearance CLEAR 09/07/2020 01:59 AM    Specific gravity 1.020 09/07/2020 01:59 AM    pH (UA) 6.5 09/07/2020 01:59 AM    Protein Negative 09/07/2020 01:59 AM    Glucose Negative 09/07/2020 01:59 AM    Ketone Negative 09/07/2020 01:59 AM    Bilirubin Negative 09/07/2020 01:59 AM    Urobilinogen 1.0 09/07/2020 01:59 AM    Nitrites Negative 09/07/2020 01:59 AM Leukocyte Esterase Negative 09/07/2020 01:59 AM    Epithelial cells FEW 09/07/2020 01:59 AM    Bacteria Negative 09/07/2020 01:59 AM    WBC 0-4 09/07/2020 01:59 AM    RBC 0-5 09/07/2020 01:59 AM         Medications Reviewed:     Current Facility-Administered Medications   Medication Dose Route Frequency    alteplase (CATHFLO) 2 mg in sterile water (preservative free) 2 mL injection  2 mg InterCATHeter PRN    TPN ADULT - CENTRAL   IntraVENous CONTINUOUS    fat emulsion 20% (LIPOSYN, INTRAlipid) infusion 500 mL  500 mL IntraVENous Q MON, WED & FRI    [Held by provider] heparin (porcine) injection 5,000 Units  5,000 Units SubCUTAneous Q12H    nystatin (MYCOSTATIN) 100,000 unit/gram powder   Topical BID    cefepime (MAXIPIME) 2 g in 0.9% sodium chloride (MBP/ADV) 100 mL  2 g IntraVENous Q8H    sodium chloride (NS) flush 5-40 mL  5-40 mL IntraVENous Q8H    sodium chloride (NS) flush 5-40 mL  5-40 mL IntraVENous PRN    polyethylene glycol (MIRALAX) packet 17 g  17 g Oral DAILY PRN    promethazine (PHENERGAN) tablet 12.5 mg  12.5 mg Oral Q6H PRN    Or    ondansetron (ZOFRAN) injection 4 mg  4 mg IntraVENous Q6H PRN    midodrine (PROAMATINE) tablet 10 mg  10 mg Oral TID     ______________________________________________________________________  EXPECTED LENGTH OF STAY: 4d 19h  ACTUAL LENGTH OF STAY:          5                 Bob Smith MD

## 2020-09-12 NOTE — PROGRESS NOTES
Bedside shift change report given to Reinier Velez RN (oncoming nurse) by Lev Lazcano RN (offgoing nurse). Report included the following information SBAR, Kardex, Intake/Output and MAR.

## 2020-09-12 NOTE — PROGRESS NOTES
Bedside and Verbal shift change report given to Familia Cyr RN (oncoming nurse) by Shelby Asencio RN (offgoing nurse). Report included the following information SBAR, Kardex, Procedure Summary, Intake/Output, MAR and Recent Results.

## 2020-09-12 NOTE — PROGRESS NOTES
[address]  174 [facility], [address]       GI PROGRESS NOTE    NAME: Loli Jacob   :  1934   MRN:  [id number]       Subjective:   Patient is sitting upright in bed with no complaints. No nausea, vomiting, or abdominal pain. He is tolerating clear liquids. Objective:     VITALS:   Last 24hrs VS reviewed since prior progress note. Most recent are:  Visit Vitals  /62   Pulse 89   Temp 98.1 °F (36.7 °C)   Resp 18   Ht 5' 11\" (1.803 m)   Wt 77.9 kg (171 lb 12.8 oz)   SpO2 97%   BMI 23.96 kg/m²       PHYSICAL EXAM:  General:          Cooperative, no acute distress    Neurologic:      Alert and oriented  HEENT:           EOMI, no scleral icterus   Lungs:             No respiratory distress  Heart:              S1 S2  Abdomen:        Soft, non-distended, no tenderness, no guarding, no rebound.  +Bowel sounds.   Extremities:     Warm  Psych:             Not anxious or agitated      Lab Data Reviewed:     Recent Results (from the past 24 hour(s))   GLUCOSE, POC    Collection Time: 20 12:13 AM   Result Value Ref Range    Glucose (POC) 124 (H) 65 - 100 mg/dL    Performed by Brandie Ortega    PHOSPHORUS    Collection Time: 20  5:25 AM   Result Value Ref Range    Phosphorus PLEASE DISREGARD RESULTS 2.6 - 4.7 MG/DL   CBC WITH AUTOMATED DIFF    Collection Time: 20  5:25 AM   Result Value Ref Range    WBC PLEASE DISREGARD RESULTS 4.1 - 11.1 K/uL    RBC PLEASE DISREGARD RESULTS 4.10 - 5.70 M/uL    HGB PLEASE DISREGARD RESULTS 12.1 - 17.0 g/dL    HCT PLEASE DISREGARD RESULTS 36.6 - 50.3 %    MCV Cannot be calculated 80.0 - 99.0 FL    MCH Cannot be calculated 26.0 - 34.0 PG    MCHC Cannot be calculated 30.0 - 36.5 g/dL    RDW PLEASE DISREGARD RESULTS 11.5 - 14.5 %    PLATELET PLEASE DISREGARD RESULTS 150 - 400 K/uL    MPV PLEASE DISREGARD RESULTS 8.9 - 12.9 FL    NRBC PLEASE DISREGARD RESULTS 0  WBC    ABSOLUTE NRBC PLEASE DISREGARD RESULTS 0.00 - 0.01 K/uL NEUTROPHILS PLEASE DISREGARD RESULTS 32 - 75 %    LYMPHOCYTES PLEASE DISREGARD RESULTS 12 - 49 %    MONOCYTES PLEASE DISREGARD RESULTS 5 - 13 %    EOSINOPHILS PLEASE DISREGARD RESULTS 0 - 7 %    BASOPHILS PLEASE DISREGARD RESULTS 0 - 1 %    IMMATURE GRANULOCYTES PLEASE DISREGARD RESULTS 0.0 - 0.5 %    ABS. NEUTROPHILS PLEASE DISREGARD RESULTS 1.8 - 8.0 K/UL    ABS. LYMPHOCYTES PLEASE DISREGARD RESULTS 0.8 - 3.5 K/UL    ABS. MONOCYTES PLEASE DISREGARD RESULTS 0.0 - 1.0 K/UL    ABS. EOSINOPHILS PLEASE DISREGARD RESULTS 0.0 - 0.4 K/UL    ABS. BASOPHILS PLEASE DISREGARD RESULTS 0.0 - 0.1 K/UL    ABS. IMM. GRANS. PLEASE DISREGARD RESULTS 0.00 - 0.04 K/UL    DF PLEASE DISREGARD RESULTS      PLATELET COMMENTS PLEASE DISREGARD RESULTS      RBC COMMENTS        PLEASE DISREGARD RESULTS  SPECIMEN INTEGRITY QUESTIONABLE. [address] [facility] NOTIFIED 09/12/20 -[phone]. TJS      RBC COMMENTS       CORRECTED ON 09/12 AT 0743: PREVIOUSLY REPORTED AS ANISOCYTOSIS 1+ MACROCYTOSIS 2+ TEARDROP CELLS PRESENT   GLUCOSE, POC    Collection Time: 09/12/20  6:42 AM   Result Value Ref Range    Glucose (POC) 115 (H) 65 - 100 mg/dL    Performed by Denisse Luna    PHOSPHORUS    Collection Time: 09/12/20  8:26 AM   Result Value Ref Range    Phosphorus 2.4 (L) 2.6 - 4.7 MG/DL   CBC WITH AUTOMATED DIFF    Collection Time: 09/12/20  8:26 AM   Result Value Ref Range    WBC 5.1 4.1 - 11.1 K/uL    RBC 2.97 (L) 4.10 - 5.70 M/uL    HGB 10.2 (L) 12.1 - 17.0 g/dL    HCT 31.0 (L) 36.6 - 50.3 %    .4 (H) 80.0 - 99.0 FL    MCH 34.3 (H) 26.0 - 34.0 PG    MCHC 32.9 30.0 - 36.5 g/dL    RDW 15.3 (H) 11.5 - 14.5 %    PLATELET 905 (L) 472 - 400 K/uL    MPV 11.3 8.9 - 12.9 FL    NRBC 0.0 0  WBC    ABSOLUTE NRBC 0.00 0.00 - 0.01 K/uL    NEUTROPHILS 59 32 - 75 %    LYMPHOCYTES 26 12 - 49 %    MONOCYTES 8 5 - 13 %    EOSINOPHILS 5 0 - 7 %    BASOPHILS 0 0 - 1 %    IMMATURE GRANULOCYTES 1 (H) 0.0 - 0.5 %    ABS.  NEUTROPHILS 3.0 1.8 - 8.0 K/UL ABS. LYMPHOCYTES 1.3 0.8 - 3.5 K/UL    ABS. MONOCYTES 0.4 0.0 - 1.0 K/UL    ABS. EOSINOPHILS 0.3 0.0 - 0.4 K/UL    ABS. BASOPHILS 0.0 0.0 - 0.1 K/UL    ABS. IMM. GRANS. 0.1 (H) 0.00 - 0.04 K/UL    DF AUTOMATED     FIBRINOGEN    Collection Time: 09/12/20  8:26 AM   Result Value Ref Range    Fibrinogen 306 200 - 816 mg/dL   METABOLIC PANEL, COMPREHENSIVE    Collection Time: 09/12/20  8:26 AM   Result Value Ref Range    Sodium 140 136 - 145 mmol/L    Potassium 3.4 (L) 3.5 - 5.1 mmol/L    Chloride 111 (H) 97 - 108 mmol/L    CO2 25 21 - 32 mmol/L    Anion gap 4 (L) 5 - 15 mmol/L    Glucose 126 (H) 65 - 100 mg/dL    BUN 15 6 - 20 MG/DL    Creatinine 0.60 (L) 0.70 - 1.30 MG/DL    BUN/Creatinine ratio 25 (H) 12 - 20      GFR est AA >60 >60 ml/min/1.73m2    GFR est non-AA >60 >60 ml/min/1.73m2    Calcium 8.5 8.5 - 10.1 MG/DL    Bilirubin, total 0.3 0.2 - 1.0 MG/DL    ALT (SGPT) 42 12 - 78 U/L    AST (SGOT) 39 (H) 15 - 37 U/L    Alk. phosphatase 93 45 - 117 U/L    Protein, total 6.5 6.4 - 8.2 g/dL    Albumin 2.3 (L) 3.5 - 5.0 g/dL    Globulin 4.2 (H) 2.0 - 4.0 g/dL    A-G Ratio 0.5 (L) 1.1 - 2.2         Assessment:   · Pancreatitis: WBC 3.1, Hgb 9.7, normal LFTs, normal lipase. CT abdomen/pelvis with IV contrast (9/7/20): improved peripancreatic inflammatory changes, compatible with pancreatitis; unchanged biliary dilatation with no visualized choledocholithiasis; small hiatal hernia.   · Pancreatic mass: not seen on most recent CT scans  · Gram positive cocci bacteremia versus contaminant: on antibiotics. ID following. Repeat blood culture (9/9/20): no growth 2d.    · Sepsis: COVID negative  · History of PE status post IVC filter placement     Patient Active Problem List   Diagnosis Code    DVT (deep vein thrombosis) in pregnancy O22.30    DVT (deep venous thrombosis) ([facility]) I82.409    Tachycardia R00.0    Hypotension I95.9    Fever R50.9    Sepsis ([address] A41.9    Shock ([address] R57.9     Plan:   · Continue clear liquids  · Supportive measures  · Will hold off on MRI and EUS at this time  · Discussed with hospital treatment team     Signed By: Trinidad Andrade MD     9/12/2020  8:59 AM         GI Attending: If clear liquids are tolerated today, we can advance diet to full liquids tomorrow. Weekend team to follow.     Trinidad Andrade MD

## 2020-09-12 NOTE — PROGRESS NOTES
ID Progress Note  2020    Subjective:     He is feeling much better. No specific complaints at the time of my visit. Objective:     Antibiotics:  1. Vancomycin   2. Cefepime       Vitals:   Visit Vitals  /60   Pulse 89   Temp 98.7 °F (37.1 °C)   Resp 17   Ht 5' 11\" (1.803 m)   Wt 77.9 kg (171 lb 12.8 oz)   SpO2 98%   BMI 23.96 kg/m²        Tmax:  Temp (24hrs), Av.6 °F (37 °C), Min:98.1 °F (36.7 °C), Max:98.9 °F (37.2 °C)      Exam:  Lungs:  clear to auscultation bilaterally  Heart:  regular rate and rhythm  Abdomen:  soft, non-tender. Bowel sounds normal. No masses,  no organomegaly    Labs:      Recent Labs     20  0826 20  0525 20  0355 09/10/20  0327   WBC 5.1 PLEASE DISREGARD RESULTS 3.1* 3.7*   HGB 10.2* PLEASE DISREGARD RESULTS 9.7* 10.0*   * PLEASE DISREGARD RESULTS 106* 96*   BUN 15  --  16 18   CREA 0.60*  --  0.53* 0.62*   AP 93  --  85  --    TBILI 0.3  --  0.6  --        Cultures:     No results found for: SDES  Lab Results   Component Value Date/Time    Culture result: NO GROWTH 3 DAYS 2020 05:40 AM    Culture result: NO GROWTH 4 DAYS 2020 06:15 AM    Culture result: (A) 2020 12:49 AM     STAPHYLOCOCCUS SPECIES, COAGULASE NEGATIVE GROWING IN 1 OF 2 BOTTLES DRAWN (L HAND SITE)    Culture result: (A) 2020 12:49 AM     PRELIMINARY REPORT OF GRAM POSITIVE COCCI IN CLUSTERS GROWING IN 1 OF 2 BOTTLES DRAWN CALLED TO AND READ BACK BY MS MIKE RN ON 20 AT [address] [place] [address] ICU). MS    Culture result: REMAINING BOTTLE(S) HAS/HAVE NO GROWTH IN 5 DAYS 2020 12:49 AM       Radiology:     Line/Insert Date:           Assessment:     1. Sepsis syndrome--resolved--not sure of the cause  2. Positive blood culture--likely contaminant since other bottles remain negative    Objective:     1. Stop antibiotic therapy and monitor  2.  Follow cultures    Imtiaz Solo MD

## 2020-09-12 NOTE — ROUTINE PROCESS
Bedside and Verbal shift change report given to Wil Santos (oncoming nurse) by Irina Rodrigues (offgoing nurse). Report included the following information SBAR, Kardex and Recent Results.

## 2020-09-13 LAB
ALBUMIN SERPL-MCNC: 2.2 G/DL (ref 3.5–5)
ALBUMIN/GLOB SERPL: 0.6 {RATIO} (ref 1.1–2.2)
ALP SERPL-CCNC: 93 U/L (ref 45–117)
ALT SERPL-CCNC: 38 U/L (ref 12–78)
ANION GAP SERPL CALC-SCNC: 5 MMOL/L (ref 5–15)
AST SERPL-CCNC: 33 U/L (ref 15–37)
BASOPHILS # BLD: 0 K/UL (ref 0–0.1)
BASOPHILS NFR BLD: 1 % (ref 0–1)
BILIRUB SERPL-MCNC: 0.5 MG/DL (ref 0.2–1)
BUN SERPL-MCNC: 16 MG/DL (ref 6–20)
BUN/CREAT SERPL: 29 (ref 12–20)
CALCIUM SERPL-MCNC: 8.5 MG/DL (ref 8.5–10.1)
CHLORIDE SERPL-SCNC: 109 MMOL/L (ref 97–108)
CO2 SERPL-SCNC: 27 MMOL/L (ref 21–32)
CREAT SERPL-MCNC: 0.55 MG/DL (ref 0.7–1.3)
DIFFERENTIAL METHOD BLD: ABNORMAL
EOSINOPHIL # BLD: 0.2 K/UL (ref 0–0.4)
EOSINOPHIL NFR BLD: 4 % (ref 0–7)
ERYTHROCYTE [DISTWIDTH] IN BLOOD BY AUTOMATED COUNT: 15.1 % (ref 11.5–14.5)
FIBRINOGEN PPP-MCNC: 286 MG/DL (ref 200–475)
GLOBULIN SER CALC-MCNC: 3.9 G/DL (ref 2–4)
GLUCOSE BLD STRIP.AUTO-MCNC: 110 MG/DL (ref 65–100)
GLUCOSE BLD STRIP.AUTO-MCNC: 120 MG/DL (ref 65–100)
GLUCOSE BLD STRIP.AUTO-MCNC: 127 MG/DL (ref 65–100)
GLUCOSE SERPL-MCNC: 128 MG/DL (ref 65–100)
HCT VFR BLD AUTO: 29.6 % (ref 36.6–50.3)
HGB BLD-MCNC: 9.7 G/DL (ref 12.1–17)
IMM GRANULOCYTES # BLD AUTO: 0.1 K/UL (ref 0–0.04)
IMM GRANULOCYTES NFR BLD AUTO: 2 % (ref 0–0.5)
LYMPHOCYTES # BLD: 1.2 K/UL (ref 0.8–3.5)
LYMPHOCYTES NFR BLD: 28 % (ref 12–49)
MCH RBC QN AUTO: 33.9 PG (ref 26–34)
MCHC RBC AUTO-ENTMCNC: 32.8 G/DL (ref 30–36.5)
MCV RBC AUTO: 103.5 FL (ref 80–99)
MONOCYTES # BLD: 0.4 K/UL (ref 0–1)
MONOCYTES NFR BLD: 9 % (ref 5–13)
NEUTS SEG # BLD: 2.5 K/UL (ref 1.8–8)
NEUTS SEG NFR BLD: 56 % (ref 32–75)
NRBC # BLD: 0 K/UL (ref 0–0.01)
NRBC BLD-RTO: 0 PER 100 WBC
PHOSPHATE SERPL-MCNC: 2.7 MG/DL (ref 2.6–4.7)
PLATELET # BLD AUTO: 124 K/UL (ref 150–400)
PMV BLD AUTO: 11.4 FL (ref 8.9–12.9)
POTASSIUM SERPL-SCNC: 3.3 MMOL/L (ref 3.5–5.1)
PROT SERPL-MCNC: 6.1 G/DL (ref 6.4–8.2)
RBC # BLD AUTO: 2.86 M/UL (ref 4.1–5.7)
SERVICE CMNT-IMP: ABNORMAL
SODIUM SERPL-SCNC: 141 MMOL/L (ref 136–145)
WBC # BLD AUTO: 4.3 K/UL (ref 4.1–11.1)

## 2020-09-13 PROCEDURE — 36415 COLL VENOUS BLD VENIPUNCTURE: CPT

## 2020-09-13 PROCEDURE — 74011000258 HC RX REV CODE- 258: Performed by: HOSPITALIST

## 2020-09-13 PROCEDURE — 74011250637 HC RX REV CODE- 250/637: Performed by: INTERNAL MEDICINE

## 2020-09-13 PROCEDURE — 65270000029 HC RM PRIVATE

## 2020-09-13 PROCEDURE — 74011000250 HC RX REV CODE- 250: Performed by: HOSPITALIST

## 2020-09-13 PROCEDURE — 85384 FIBRINOGEN ACTIVITY: CPT

## 2020-09-13 PROCEDURE — 80053 COMPREHEN METABOLIC PANEL: CPT

## 2020-09-13 PROCEDURE — 82962 GLUCOSE BLOOD TEST: CPT

## 2020-09-13 PROCEDURE — 74011250637 HC RX REV CODE- 250/637: Performed by: HOSPITALIST

## 2020-09-13 PROCEDURE — 84100 ASSAY OF PHOSPHORUS: CPT

## 2020-09-13 PROCEDURE — 74011250636 HC RX REV CODE- 250/636: Performed by: HOSPITALIST

## 2020-09-13 PROCEDURE — 85025 COMPLETE CBC W/AUTO DIFF WBC: CPT

## 2020-09-13 RX ORDER — POTASSIUM CHLORIDE 750 MG/1
40 TABLET, FILM COATED, EXTENDED RELEASE ORAL EVERY 4 HOURS
Status: COMPLETED | OUTPATIENT
Start: 2020-09-13 | End: 2020-09-13

## 2020-09-13 RX ADMIN — MIDODRINE HYDROCHLORIDE 10 MG: 5 TABLET ORAL at 08:27

## 2020-09-13 RX ADMIN — Medication 10 ML: at 06:19

## 2020-09-13 RX ADMIN — NYSTATIN: 100000 POWDER TOPICAL at 08:27

## 2020-09-13 RX ADMIN — POTASSIUM CHLORIDE 40 MEQ: 750 TABLET, FILM COATED, EXTENDED RELEASE ORAL at 11:54

## 2020-09-13 RX ADMIN — Medication 10 ML: at 21:36

## 2020-09-13 RX ADMIN — MIDODRINE HYDROCHLORIDE 10 MG: 5 TABLET ORAL at 21:36

## 2020-09-13 RX ADMIN — MIDODRINE HYDROCHLORIDE 10 MG: 5 TABLET ORAL at 15:51

## 2020-09-13 RX ADMIN — CALCIUM GLUCONATE: 94 INJECTION, SOLUTION INTRAVENOUS at 18:24

## 2020-09-13 RX ADMIN — POTASSIUM CHLORIDE 40 MEQ: 750 TABLET, FILM COATED, EXTENDED RELEASE ORAL at 08:27

## 2020-09-13 RX ADMIN — NYSTATIN: 100000 POWDER TOPICAL at 18:27

## 2020-09-13 NOTE — ROUTINE PROCESS
Bedside and Verbal shift change report given to Wil Santos (oncoming nurse) by Lorrene Ganser (offgoing nurse). Report included the following information SBAR, Kardex and Recent Results.

## 2020-09-13 NOTE — PROGRESS NOTES
[address]  [address] [facility], [address]       GI PROGRESS NOTE    NAME: Birgit Calabrese   :  1934   MRN:  [id number]       Subjective:   No complaints. No nausea, vomiting, or abdominal pain. He is tolerating clear liquids. Objective:     VITALS:   Last 24hrs VS reviewed since prior progress note. Most recent are:  Visit Vitals  /64   Pulse 86   Temp 98.9 °F (37.2 °C)   Resp 18   Ht 5' 11\" (1.803 m)   Wt 78 kg (172 lb)   SpO2 96%   BMI 23.99 kg/m²       PHYSICAL EXAM:  General:          Cooperative, no acute distress    Neurologic:      Alert and oriented  HEENT:           EOMI, no scleral icterus   Lungs:             No respiratory distress  Heart:              S1 S2  Abdomen:        Soft, non-distended, no tenderness, no guarding, no rebound.  +Bowel sounds.   Extremities:     Warm  Psych:             Not anxious or agitated      Lab Data Reviewed:     Recent Results (from the past 24 hour(s))   GLUCOSE, POC    Collection Time: 20 12:20 AM   Result Value Ref Range    Glucose (POC) 120 (H) 65 - 100 mg/dL    Performed by Edith Loza    FIBRINOGEN    Collection Time: 20  3:10 AM   Result Value Ref Range    Fibrinogen 286 200 - 475 mg/dL   PHOSPHORUS    Collection Time: 20  3:10 AM   Result Value Ref Range    Phosphorus 2.7 2.6 - 4.7 MG/DL   CBC WITH AUTOMATED DIFF    Collection Time: 20  3:10 AM   Result Value Ref Range    WBC 4.3 4.1 - 11.1 K/uL    RBC 2.86 (L) 4.10 - 5.70 M/uL    HGB 9.7 (L) 12.1 - 17.0 g/dL    HCT 29.6 (L) 36.6 - 50.3 %    .5 (H) 80.0 - 99.0 FL    MCH 33.9 26.0 - 34.0 PG    MCHC 32.8 30.0 - 36.5 g/dL    RDW 15.1 (H) 11.5 - 14.5 %    PLATELET 225 (L) 665 - 400 K/uL    MPV 11.4 8.9 - 12.9 FL    NRBC 0.0 0  WBC    ABSOLUTE NRBC 0.00 0.00 - 0.01 K/uL    NEUTROPHILS 56 32 - 75 %    LYMPHOCYTES 28 12 - 49 %    MONOCYTES 9 5 - 13 %    EOSINOPHILS 4 0 - 7 %    BASOPHILS 1 0 - 1 %    IMMATURE GRANULOCYTES 2 (H) 0.0 - 0.5 %    ABS. NEUTROPHILS 2.5 1.8 - 8.0 K/UL    ABS. LYMPHOCYTES 1.2 0.8 - 3.5 K/UL    ABS. MONOCYTES 0.4 0.0 - 1.0 K/UL    ABS. EOSINOPHILS 0.2 0.0 - 0.4 K/UL    ABS. BASOPHILS 0.0 0.0 - 0.1 K/UL    ABS. IMM. GRANS. 0.1 (H) 0.00 - 0.04 K/UL    DF AUTOMATED     METABOLIC PANEL, COMPREHENSIVE    Collection Time: 09/13/20  3:10 AM   Result Value Ref Range    Sodium 141 136 - 145 mmol/L    Potassium 3.3 (L) 3.5 - 5.1 mmol/L    Chloride 109 (H) 97 - 108 mmol/L    CO2 27 21 - 32 mmol/L    Anion gap 5 5 - 15 mmol/L    Glucose 128 (H) 65 - 100 mg/dL    BUN 16 6 - 20 MG/DL    Creatinine 0.55 (L) 0.70 - 1.30 MG/DL    BUN/Creatinine ratio 29 (H) 12 - 20      GFR est AA >60 >60 ml/min/1.73m2    GFR est non-AA >60 >60 ml/min/1.73m2    Calcium 8.5 8.5 - 10.1 MG/DL    Bilirubin, total 0.5 0.2 - 1.0 MG/DL    ALT (SGPT) 38 12 - 78 U/L    AST (SGOT) 33 15 - 37 U/L    Alk. phosphatase 93 45 - 117 U/L    Protein, total 6.1 (L) 6.4 - 8.2 g/dL    Albumin 2.2 (L) 3.5 - 5.0 g/dL    Globulin 3.9 2.0 - 4.0 g/dL    A-G Ratio 0.6 (L) 1.1 - 2.2     GLUCOSE, POC    Collection Time: 09/13/20  6:44 AM   Result Value Ref Range    Glucose (POC) 127 (H) 65 - 100 mg/dL    Performed by Yannick Blackwood        Assessment:   · He feels a lot better after big bowel movement  · Wants to try more to eat - I think this is a good idea  · Pancreatitis: WBC 4.3, Hgb 9.7, normal LFTs, normal lipase. CT abdomen/pelvis with IV contrast (9/7/20): improved peripancreatic inflammatory changes, compatible with pancreatitis; unchanged biliary dilatation with no visualized choledocholithiasis; small hiatal hernia.   · Pancreatic mass: not seen on most recent CT scans  · Gram positive cocci bacteremia versus contaminant: on antibiotics. ID following. Repeat blood culture (9/9/20): no growth 2d.    · Sepsis: COVID negative  · History of PE status post IVC filter placement     Patient Active Problem List   Diagnosis Code    DVT (deep vein thrombosis) in pregnancy O22.30    DVT (deep venous thrombosis) (HCC) I82.409    Tachycardia R00.0    Hypotension I95.9    Fever R50.9    Sepsis (HCC) A41.9    Shock ([address] R57.9     Plan:   · Advance diet as tolerated - will try GI lite today  · Supportive measures  · Will hold off on MRI and EUS at this time  · Discussed with hospital treatment team     Signed By: Bridget Reyes MD     9/13/2020  8:59 AM         GI Attending: If clear liquids are tolerated today, we can advance diet to full liquids tomorrow. Weekend team to follow.     Bridget Reyes MD

## 2020-09-13 NOTE — PROGRESS NOTES
[facility]  Hospitalist Group                                                                                          Hospitalist Progress Note  Julia Oliver MD  Answering service: [phone] from in house phone        Date of Service:  2020  NAME:  Jenelle Junior  :  1934  MRN:  [id number]      Admission Summary:     Jak Huang is a 80 y.o. male with past medical history of recent multiple bilateral pulmonary emboli, right lower extremity DVT, GI bleed, erosive gastritis, anemia, blood transfusion, pancreatitis, arthritis, BPH, DJD, and hyperlipidemia presented to the ED from home with reported nausea and vomiting.  Patient provided some history in addition to his daughter who was at the bedside. Polo Clay of history was obtained per my review of ED and electronic medical records.      Interval history / Subjective:     He said he feels better, eating soft diet, no abdominal pain or vomiting     Assessment & Plan:     Septic Shock  -shock resolved, off pressor, BP normal, continue midodrine   -off antibiotics  -received iv cefepime and vancomycin  -repeated blood cx on  no growth so far, blood cx on  staph coag negative likely contaminant  -TTE-LV EF 55-60% normal cavity, wall thickness ans systolic function. -PICC line from home - TPN dependent renew TPN  -afebrile, no leukocytosis,   -ID on board,       Pancreatitis  -improved peripancreatic inflammatory changes on most recent CT  -lipase was normal 107 on   -Continue TPN diet advance to GI Lite diet   -GI on board     Gastric outlet obstruction  -on TPN, continue  -GI on board     Pancreatic abnormality on prev CT   -CT abdomen pelvis on  improved peripancreatic inflammatory changes, compatible with pancreatitis. Unchanged biliary dilatation, with no visualized choledocholithiasis.  Small hiatal hernia.  -Hem/Onc on board     Hx PE/DVT, s/p IVC filter placement  -elevated d-dimer, improving  -IVC filter in place    Anemia of chronic disease  -H/H stable  -no evidence of active bleeding  -monitor H/H      Hypokalemia  -replace with kcl and repeat in am      Code status: DNR  DVT prophylaxis:SCD    Care Plan discussed with: Patient/Family and Nurse  Anticipated Disposition: TBD  Anticipated Discharge: 24 hours to 48 hours   I called his daughter, [facility] at [phone] updated her clinical condition, lab finding and treatment plan 9/12  He said no need to call hid daughter today     Hospital Problems  Date Reviewed: 7/14/2020          Codes Class Noted POA    Tachycardia ICD-10-CM: R00.0  ICD-9-CM: 785.0  9/7/2020 Unknown        Hypotension ICD-10-CM: I95.9  ICD-9-CM: 458.9  9/7/2020 Unknown        Fever ICD-10-CM: R50.9  ICD-9-CM: 780.60  9/7/2020 Unknown        Sepsis ([facility] [address] ICD-10-CM: A41.9  ICD-9-CM: 038.9, 995.91  9/7/2020 Unknown        Shock ([address] [facility] [address] ICD-10-CM: R57.9  ICD-9-CM: 785.50  9/7/2020 Unknown              Vital Signs:    Last 24hrs VS reviewed since prior progress note. Most recent are:  Visit Vitals  /64   Pulse 86   Temp 98.9 °F (37.2 °C)   Resp 18   Ht 5' 11\" (1.803 m)   Wt 78 kg (172 lb)   SpO2 96%   BMI 23.99 kg/m²         Intake/Output Summary (Last 24 hours) at 9/13/2020 1445  Last data filed at 9/13/2020 1424  Gross per 24 hour   Intake    Output 1475 ml   Net -1475 ml        Physical Examination:             Constitutional:  No acute distress, cooperative, pleasant    ENT:  Oral mucosa moist, oropharynx benign. Resp:  CTA bilaterally. No wheezing/rhonchi/rales. No accessory muscle use   CV:  Regular rhythm, normal rate, no murmurs, gallops, rubs    GI:  Soft, non distended, non tender. normoactive bowel sounds, no hepatosplenomegaly     Musculoskeletal:  No edema,     Neurologic:  Moves all extremities.   AAOx3, CN II-XII reviewed     Skin:  Good turgor, no rashes or ulcers       Data Review:    Review and/or order of clinical lab test  Review and/or order of tests in the radiology section of CPT  Review and/or order of tests in the medicine section of CPT      Labs:     Recent Labs     09/13/20 0310 09/12/20  0826   WBC 4.3 5.1   HGB 9.7* 10.2*   HCT 29.6* 31.0*   * 112*     Recent Labs     09/13/20 0310 09/12/20  0826 09/12/20  0525 09/11/20  0355    140  --  141   K 3.3* 3.4*  --  3.4*   * 111*  --  113*   CO2 27 25  --  24   BUN 16 15  --  16   CREA 0.55* 0.60*  --  0.53*   * 126*  --  110*   CA 8.5 8.5  --  8.3*   PHOS 2.7 2.4* PLEASE DISREGARD RESULTS 2.6     Recent Labs     09/13/20 0310 09/12/20  0826 09/11/20  0355   ALT 38 42 24   AP 93 93 85   TBILI 0.5 0.3 0.6   TP 6.1* 6.5 6.0*   ALB 2.2* 2.3* 2.2*   GLOB 3.9 4.2* 3.8     No results for input(s): INR, PTP, APTT, INREXT, INREXT in the last 72 hours. No results for input(s): FE, TIBC, PSAT, FERR in the last 72 hours. No results found for: FOL, RBCF   No results for input(s): PH, PCO2, PO2 in the last 72 hours. No results for input(s): CPK, CKNDX, TROIQ in the last 72 hours.     No lab exists for component: CPKMB  Lab Results   Component Value Date/Time    Cholesterol, total 250 (H) 10/02/2013 01:55 PM    HDL Cholesterol 53 10/02/2013 01:55 PM    LDL, calculated 176 (H) 10/02/2013 01:55 PM    Triglyceride 244 (H) 07/23/2020 04:41 AM    CHOL/HDL Ratio 3.4 10/06/2010 10:25 AM     Lab Results   Component Value Date/Time    Glucose (POC) 127 (H) 09/13/2020 06:44 AM    Glucose (POC) 120 (H) 09/13/2020 12:20 AM    Glucose (POC) 115 (H) 09/12/2020 06:42 AM    Glucose (POC) 124 (H) 09/12/2020 12:13 AM    Glucose (POC) 107 (H) 09/11/2020 11:28 AM     Lab Results   Component Value Date/Time    Color YELLOW/STRAW 09/07/2020 01:59 AM    Appearance CLEAR 09/07/2020 01:59 AM    Specific gravity 1.020 09/07/2020 01:59 AM    pH (UA) 6.5 09/07/2020 01:59 AM    Protein Negative 09/07/2020 01:59 AM    Glucose Negative 09/07/2020 01:59 AM    Ketone Negative 09/07/2020 01:59 AM    Bilirubin Negative 09/07/2020 01:59 AM    Urobilinogen 1.0 09/07/2020 01:59 AM    Nitrites Negative 09/07/2020 01:59 AM    Leukocyte Esterase Negative 09/07/2020 01:59 AM    Epithelial cells FEW 09/07/2020 01:59 AM    Bacteria Negative 09/07/2020 01:59 AM    WBC 0-4 09/07/2020 01:59 AM    RBC 0-5 09/07/2020 01:59 AM         Medications Reviewed:     Current Facility-Administered Medications   Medication Dose Route Frequency    alteplase (CATHFLO) 2 mg in sterile water (preservative free) 2 mL injection  2 mg InterCATHeter PRN    TPN ADULT - CENTRAL   IntraVENous CONTINUOUS    fat emulsion 20% (LIPOSYN, INTRAlipid) infusion 500 mL  500 mL IntraVENous Q MON, WED & FRI    [Held by provider] heparin (porcine) injection 5,000 Units  5,000 Units SubCUTAneous Q12H    nystatin (MYCOSTATIN) 100,000 unit/gram powder   Topical BID    sodium chloride (NS) flush 5-40 mL  5-40 mL IntraVENous Q8H    sodium chloride (NS) flush 5-40 mL  5-40 mL IntraVENous PRN    polyethylene glycol (MIRALAX) packet 17 g  17 g Oral DAILY PRN    promethazine (PHENERGAN) tablet 12.5 mg  12.5 mg Oral Q6H PRN    Or    ondansetron (ZOFRAN) injection 4 mg  4 mg IntraVENous Q6H PRN    midodrine (PROAMATINE) tablet 10 mg  10 mg Oral TID     ______________________________________________________________________  EXPECTED LENGTH OF STAY: 4d 19h  ACTUAL LENGTH OF STAY:          6                 Benedict Glover MD

## 2020-09-13 NOTE — PROGRESS NOTES
Bedside shift change report given to Ailin Bowen RN (oncoming nurse) by Tere Dominguez RN (offgoing nurse). Report included the following information SBAR, Kardex, Intake/Output, MAR and Recent Results.

## 2020-09-14 LAB
ALBUMIN SERPL-MCNC: 2.3 G/DL (ref 3.5–5)
ALBUMIN/GLOB SERPL: 0.6 {RATIO} (ref 1.1–2.2)
ALP SERPL-CCNC: 93 U/L (ref 45–117)
ALT SERPL-CCNC: 47 U/L (ref 12–78)
ANION GAP SERPL CALC-SCNC: 3 MMOL/L (ref 5–15)
AST SERPL-CCNC: 47 U/L (ref 15–37)
BACTERIA SPEC CULT: NORMAL
BILIRUB SERPL-MCNC: 0.5 MG/DL (ref 0.2–1)
BUN SERPL-MCNC: 24 MG/DL (ref 6–20)
BUN/CREAT SERPL: 38 (ref 12–20)
CALCIUM SERPL-MCNC: 8.5 MG/DL (ref 8.5–10.1)
CHLORIDE SERPL-SCNC: 108 MMOL/L (ref 97–108)
CO2 SERPL-SCNC: 27 MMOL/L (ref 21–32)
CREAT SERPL-MCNC: 0.63 MG/DL (ref 0.7–1.3)
FIBRINOGEN PPP-MCNC: 286 MG/DL (ref 200–475)
GLOBULIN SER CALC-MCNC: 4 G/DL (ref 2–4)
GLUCOSE BLD STRIP.AUTO-MCNC: 101 MG/DL (ref 65–100)
GLUCOSE BLD STRIP.AUTO-MCNC: 103 MG/DL (ref 65–100)
GLUCOSE BLD STRIP.AUTO-MCNC: 126 MG/DL (ref 65–100)
GLUCOSE SERPL-MCNC: 108 MG/DL (ref 65–100)
PHOSPHATE SERPL-MCNC: 2.5 MG/DL (ref 2.6–4.7)
POTASSIUM SERPL-SCNC: 4 MMOL/L (ref 3.5–5.1)
PROT SERPL-MCNC: 6.3 G/DL (ref 6.4–8.2)
SERVICE CMNT-IMP: ABNORMAL
SERVICE CMNT-IMP: NORMAL
SODIUM SERPL-SCNC: 138 MMOL/L (ref 136–145)

## 2020-09-14 PROCEDURE — 85384 FIBRINOGEN ACTIVITY: CPT

## 2020-09-14 PROCEDURE — 74011000258 HC RX REV CODE- 258: Performed by: HOSPITALIST

## 2020-09-14 PROCEDURE — 80053 COMPREHEN METABOLIC PANEL: CPT

## 2020-09-14 PROCEDURE — 74011250636 HC RX REV CODE- 250/636: Performed by: HOSPITALIST

## 2020-09-14 PROCEDURE — 74011000250 HC RX REV CODE- 250: Performed by: HOSPITALIST

## 2020-09-14 PROCEDURE — 82962 GLUCOSE BLOOD TEST: CPT

## 2020-09-14 PROCEDURE — 74011000250 HC RX REV CODE- 250: Performed by: NURSE PRACTITIONER

## 2020-09-14 PROCEDURE — 36415 COLL VENOUS BLD VENIPUNCTURE: CPT

## 2020-09-14 PROCEDURE — 74011250637 HC RX REV CODE- 250/637: Performed by: INTERNAL MEDICINE

## 2020-09-14 PROCEDURE — 65270000029 HC RM PRIVATE

## 2020-09-14 PROCEDURE — 84100 ASSAY OF PHOSPHORUS: CPT

## 2020-09-14 RX ADMIN — NYSTATIN: 100000 POWDER TOPICAL at 17:06

## 2020-09-14 RX ADMIN — MIDODRINE HYDROCHLORIDE 10 MG: 5 TABLET ORAL at 08:33

## 2020-09-14 RX ADMIN — MIDODRINE HYDROCHLORIDE 10 MG: 5 TABLET ORAL at 23:17

## 2020-09-14 RX ADMIN — Medication 10 ML: at 23:17

## 2020-09-14 RX ADMIN — CALCIUM GLUCONATE: 94 INJECTION, SOLUTION INTRAVENOUS at 19:48

## 2020-09-14 RX ADMIN — MIDODRINE HYDROCHLORIDE 10 MG: 5 TABLET ORAL at 17:05

## 2020-09-14 RX ADMIN — Medication 10 ML: at 05:08

## 2020-09-14 RX ADMIN — I.V. FAT EMULSION 500 ML: 20 EMULSION INTRAVENOUS at 19:49

## 2020-09-14 RX ADMIN — NYSTATIN: 100000 POWDER TOPICAL at 08:34

## 2020-09-14 NOTE — PROGRESS NOTES
Spiritual Care Assessment/Progress Note  [facility]      NAME: Ariane Camargo      MRN: [id number]  AGE: 80 y.o. SEX: male  [employer]ar[employer] Affiliation: Orthodoxy   Language: English     9/14/2020     Total Time (in minutes): 5     Spiritual Assessment begun in [place] [address] through conversation with:         []Patient        [] Family    [] Friend(s)        Reason for Consult: Initial/Spiritual assessment, patient floor     Spiritual beliefs: (Please include comment if needed)     [] Identifies with a dexter tradition:         [] Supported by a dexter community:            [] Claims no spiritual orientation:           [] Seeking spiritual identity:                [] Adheres to an individual form of spirituality:           [x] Not able to assess:                           Identified resources for coping:      [] Prayer                               [] Music                  [] Guided Imagery     [] Family/friends                 [] Pet visits     [] Devotional reading                         [x] Unknown     [] Other:                                              Interventions offered during this visit: (See comments for more details)                Plan of Care:     [] Support spiritual and/or cultural needs    [] Support AMD and/or advance care planning process      [] Support grieving process   [] Coordinate Rites and/or Rituals    [] Coordination with community clergy   [] No spiritual needs identified at this time   [] Detailed Plan of Care below (See Comments)  [] Make referral to Music Therapy  [] Make referral to Pet Therapy     [] Make referral to Addiction services  [] Make referral to [facility]  [] Make referral to Spiritual Care Partner  [] No future visits requested        [x] Follow up visits as needed     Comments:  visit for initial spiritual assessment. Staff with patient providing care. Door closed for privacy.   Will continue to follow up as needed and upon request as able.    Visited by Rev. Suman Nicholson MDiv, [facility], [facility] paging service: [phone])

## 2020-09-14 NOTE — PROGRESS NOTES
Comprehensive Nutrition Assessment    Type and Reason for Visit: Reassess    Nutrition Recommendations/Plan:    1. Encourage PO intake with all meals and supplements   - document % of meals in I/O flowsheet to monitor PO adequacy    2. If pt to discharge home on TPN   6.5%AA, D25 @ 75ml/hr x 1 hr   6.5%AA, D25 @ 155ml/hr x 10 hrs   6.5%AA, D25 @ 75ml/hr x 1 hr   Continue same lipids. 3. Replete Phos PRN     Nutrition Assessment:    Pt admitted with Tachycardia. PMHx: B/L PE-s/p IVC filter , DVT, GI bleed, Erosive gastritis, Pancreatitis. Noted Bacteremia; pt on TPN PTA. GI following with unclear cause of Pancreatitis. CT last week showing improvement in inflammation. Patient familiar to clinical nutrition team from previous admission. Per review of nutrition notes from last admission-patient has maintained weight of ~167#. Pt sent home on TPN since unable to eat and PEG/J tube unable to be placed 2/2 ascites. TPN continues to provide 3896-7706 ml, 109 gm protein and 2300 average daily calories to meet/slighlty exceed estimated needs. However diet advanced over the weekend and tolerating with just some abd discomfort but no vomiting. Pt visited today. Spoke with daughter during last admit but not available by phone today. Pt reports eating about 50% meals over past 48hrs. Agreeable to trial of Ensure (strawberry) - added BID for 700kcal, 40g protein. Hope that pt will be able to tolerate sufficient PO to avoid TPN at home. Recommend transition to cyclic regimen if to continue at home:    6.5%AA, D25 @ 75ml/hr x 1 hr   6.5%AA, D25 @ 155ml/hr x 10 hrs   6.5%AA, D25 @ 75ml/hr x 1 hr   Continue same lipids. Will provide same nutrition as above.      Malnutrition Assessment:  Malnutrition Status:  No malnutrition      Nutritionally Significant Medications: midodrine; PRN: phenergan, zofran    Estimated Daily Nutrient Needs:  Energy (kcal):  4787-1726 (MSJ x 1.4-1.5)  Protein (g):   (1.2-1.4g/kg) Fluid (ml/day):  1 ml/kcal    Nutrition Related Findings:  BM 9/13    Wounds:  None       Current Nutrition Therapies:   Diet: GI lite  Supplements: none  TPN:  6.5%AA D25 @ 70ml/hr + 500 ml 20% lipid 3x/week  Meal intake: No data found. Anthropometric Measures:  · Height:  5' 11\" (180.3 cm)  · Current Body Wt:  75.9 kg (167 lb 5.3 oz)   · Ideal Body Wt: 172#  :  97.3 %    Wt Readings from Last 10 Encounters:   09/14/20 78.6 kg (173 lb 3.2 oz)   09/09/20 75.9 kg (167 lb 5.3 oz)   07/30/20 90 kg (198 lb 6.6 oz)   07/27/18 92.5 kg (204 lb)   03/17/18 90.7 kg (200 lb)   08/18/17 86.2 kg (190 lb)   07/15/14 86.2 kg (190 lb)   10/02/13 90 kg (198 lb 6.4 oz)   03/05/13 88.2 kg (194 lb 6.4 oz)   09/18/12 88 kg (194 lb)     Nutrition Diagnosis:   · Inadequate oral intake(improving) related to altered GI function as evidenced by nutrition support-parenteral nutrition, intake 26-50%    Nutrition Interventions:   Food and/or Nutrient Delivery: Continue current diet, Continue parenteral nutrition  Nutrition Education and Counseling: Education initiated  Coordination of Nutrition Care: Continued inpatient monitoring    Goals:  PN to continue until oral intake meeting at least 60% needs consistently       Nutrition Monitoring and Evaluation:   Food/Nutrient Intake Outcomes: Food and nutrient intake, Supplement intake, Parenteral nutrition intake/tolerance  Physical Signs/Symptoms Outcomes: Biochemical data, GI status, Weight    Discharge Planning:     Too soon to determine     Mauricio Pruett, RD [address] , Pager #[phone] or via [employer] Liaison

## 2020-09-14 NOTE — PROGRESS NOTES
Bedside shift change report given to [facility] ELIZABETH MONTERROSO (oncoming nurse) by Alexis Mullen RN (offgoing nurse). Report included the following information SBAR, Kardex, Intake/Output, MAR and Recent Results.

## 2020-09-14 NOTE — PROGRESS NOTES
ID Progress Note  2020    Subjective:     He is feeling much better. No specific complaints at the time of my visit. Objective:     Antibiotics:  1. Vancomycin   2. Cefepime   3. None       Vitals:   Visit Vitals  /71   Pulse 96   Temp 98.6 °F (37 °C)   Resp 16   Ht 5' 11\" (1.803 m)   Wt 78.6 kg (173 lb 3.2 oz)   SpO2 98%   BMI 24.16 kg/m²        Tmax:  Temp (24hrs), Av.5 °F (36.9 °C), Min:98.4 °F (36.9 °C), Max:98.6 °F (37 °C)      Exam:  Lungs:  clear to auscultation bilaterally  Heart:  regular rate and rhythm  Abdomen:  soft, non-tender. Bowel sounds normal. No masses,  no organomegaly    Labs:      Recent Labs     20  0336 20  0310 20  0826 20  0525   WBC  --  4.3 5.1 PLEASE DISREGARD RESULTS   HGB  --  9.7* 10.2* PLEASE DISREGARD RESULTS   PLT  --  124* 112* PLEASE DISREGARD RESULTS   BUN 24* 16 15  --    CREA 0.63* 0.55* 0.60*  --    AP 93 93 93  --    TBILI 0.5 0.5 0.3  --        Cultures:     No results found for: SDES  Lab Results   Component Value Date/Time    Culture result: NO GROWTH 5 DAYS 2020 05:40 AM    Culture result: NO GROWTH 6 DAYS 2020 06:15 AM    Culture result: (A) 2020 12:49 AM     STAPHYLOCOCCUS SPECIES, COAGULASE NEGATIVE GROWING IN 1 OF 2 BOTTLES DRAWN (L HAND SITE)    Culture result: (A) 2020 12:49 AM     PRELIMINARY REPORT OF GRAM POSITIVE COCCI IN CLUSTERS GROWING IN 1 OF 2 BOTTLES DRAWN CALLED TO AND READ BACK BY MS MIKE RN ON 20 AT [address] [place] [address] ICU). MS    Culture result: REMAINING BOTTLE(S) HAS/HAVE NO GROWTH IN 5 DAYS 2020 12:49 AM       Radiology:     Line/Insert Date:           Assessment:     1. Sepsis syndrome--resolved--not sure of the cause  2. Positive blood culture--likely contaminant since other bottles remain negative    Objective:     1. Monitor off antibiotic therapy  2.  OK for discharge from ID standpoint    Lou Noyola MD

## 2020-09-14 NOTE — PROGRESS NOTES
TIMMY:     Resume Home Care with [facility] and TPN with [facility]  RUR: 19%    CM notified Jerry Oliveira with [employer] of possible d/c tomorrow.      CM to send TPN order in am per Jerry Oliveira    Patient's daughter will transport in am.    Ramon Bryant MSA, RN, CRM

## 2020-09-14 NOTE — PROGRESS NOTES
[address] [facility]  Hospitalist Group                                                                                          Hospitalist Progress Note  Benedict Glover MD  Answering service: [phone] from in house phone        Date of Service:  2020  NAME:  Dc Ley  :  1934  MRN:  [id number]      Admission Summary:     Cory High is a 80 y.o. male with past medical history of recent multiple bilateral pulmonary emboli, right lower extremity DVT, GI bleed, erosive gastritis, anemia, blood transfusion, pancreatitis, arthritis, BPH, DJD, and hyperlipidemia presented to the ED from home with reported nausea and vomiting.  Patient provided some history in addition to his daughter who was at the bedside. Jennifer Sera of history was obtained per my review of ED and electronic medical records.      Interval history / Subjective:     He said he ate today, has some abdominal discomfort but no vomiting     Assessment & Plan:     Septic Shock  -shock resolved, off pressor, BP normal, continue midodrine   -received iv cefepime and vancomycin, abx off  -repeated blood cx on  no growth so far, blood cx on  staph coag negative likely contaminant  -TTE-LV EF 55-60% normal cavity, wall thickness ans systolic function. -PICC line from home - TPN dependent renew TPN  -afebrile, no leukocytosis,   -ID on board,       Pancreatitis  -improved peripancreatic inflammatory changes on most recent CT  -lipase was normal 107 on   -on TPN, diet advance to GI Lite diet   -GI on board     Gastric outlet obstruction  -on TPN, continue  -GI on board     Pancreatic abnormality on prev CT   -CT abdomen pelvis on  improved peripancreatic inflammatory changes, compatible with pancreatitis. Unchanged biliary dilatation, with no visualized choledocholithiasis.  Small hiatal hernia.  -Hem/Onc on board     Hx PE/DVT, s/p IVC filter placement  -elevated d-dimer, improving  -IVC filter in place    Anemia of chronic disease  -H/H stable  -no evidence of active bleeding  -monitor H/H      Hypokalemia  -replaced and resolved      Code status: DNR  DVT prophylaxis:SCD    Care Plan discussed with: Patient/Family and Nurse  Anticipated Disposition: TBD  Anticipated Discharge: 24 hours to 48 hours   I called his daughterVerona at [phone] updated her clinical condition, lab finding and treatment plan 9/12  I called his daughter at 0 [phone] 18, and [phone] was a voice mail       Hospital Problems  Date Reviewed: 7/14/2020          Codes Class Noted POA    Tachycardia ICD-10-CM: R00.0  ICD-9-CM: 785.0  9/7/2020 Unknown        Hypotension ICD-10-CM: I95.9  ICD-9-CM: 458.9  9/7/2020 Unknown        Fever ICD-10-CM: R50.9  ICD-9-CM: 780.60  9/7/2020 Unknown        Sepsis ([address] ICD-10-CM: A41.9  ICD-9-CM: 038.9, 995.91  9/7/2020 Unknown        Shock ([address] ICD-10-CM: R57.9  ICD-9-CM: 785.50  9/7/2020 Unknown              Vital Signs:    Last 24hrs VS reviewed since prior progress note. Most recent are:  Visit Vitals  /71   Pulse 96   Temp 98.6 °F (37 °C)   Resp 16   Ht 5' 11\" (1.803 m)   Wt 78.6 kg (173 lb 3.2 oz)   SpO2 98%   BMI 24.16 kg/m²         Intake/Output Summary (Last 24 hours) at 9/14/2020 1437  Last data filed at 9/14/2020 6907  Gross per 24 hour   Intake    Output 1000 ml   Net -1000 ml        Physical Examination:             Constitutional:  No acute distress, cooperative, pleasant    ENT:  Oral mucosa moist, oropharynx benign. Resp:  CTA bilaterally. No wheezing/rhonchi/rales. No accessory muscle use   CV:  Regular rhythm, normal rate, no murmurs, gallops, rubs    GI:  Soft, non distended, non tender. normoactive bowel sounds, no hepatosplenomegaly     Musculoskeletal:  No edema,     Neurologic:  Moves all extremities.   AAOx3, CN II-XII reviewed     Skin:  Good turgor, no rashes or ulcers       Data Review:    Review and/or order of clinical lab test  Review and/or order of tests in the radiology section of CPT  Review and/or order of tests in the medicine section of CPT      Labs:     Recent Labs     09/13/20 0310 09/12/20  0826   WBC 4.3 5.1   HGB 9.7* 10.2*   HCT 29.6* 31.0*   * 112*     Recent Labs     09/14/20  0336 09/13/20 0310 09/12/20  0826    141 140   K 4.0 3.3* 3.4*    109* 111*   CO2 27 27 25   BUN 24* 16 15   CREA 0.63* 0.55* 0.60*   * 128* 126*   CA 8.5 8.5 8.5   PHOS 2.5* 2.7 2.4*     Recent Labs     09/14/20 0336 09/13/20 0310 09/12/20  0826   ALT 47 38 42   AP 93 93 93   TBILI 0.5 0.5 0.3   TP 6.3* 6.1* 6.5   ALB 2.3* 2.2* 2.3*   GLOB 4.0 3.9 4.2*     No results for input(s): INR, PTP, APTT, INREXT, INREXT in the last 72 hours. No results for input(s): FE, TIBC, PSAT, FERR in the last 72 hours. No results found for: FOL, RBCF   No results for input(s): PH, PCO2, PO2 in the last 72 hours. No results for input(s): CPK, CKNDX, TROIQ in the last 72 hours.     No lab exists for component: CPKMB  Lab Results   Component Value Date/Time    Cholesterol, total 250 (H) 10/02/2013 01:55 PM    HDL Cholesterol 53 10/02/2013 01:55 PM    LDL, calculated 176 (H) 10/02/2013 01:55 PM    Triglyceride 244 (H) 07/23/2020 04:41 AM    CHOL/HDL Ratio 3.4 10/06/2010 10:25 AM     Lab Results   Component Value Date/Time    Glucose (POC) 101 (H) 09/14/2020 12:03 PM    Glucose (POC) 103 (H) 09/14/2020 05:09 AM    Glucose (POC) 110 (H) 09/13/2020 11:12 PM    Glucose (POC) 127 (H) 09/13/2020 06:44 AM    Glucose (POC) 120 (H) 09/13/2020 12:20 AM     Lab Results   Component Value Date/Time    Color YELLOW/STRAW 09/07/2020 01:59 AM    Appearance CLEAR 09/07/2020 01:59 AM    Specific gravity 1.020 09/07/2020 01:59 AM    pH (UA) 6.5 09/07/2020 01:59 AM    Protein Negative 09/07/2020 01:59 AM    Glucose Negative 09/07/2020 01:59 AM    Ketone Negative 09/07/2020 01:59 AM    Bilirubin Negative 09/07/2020 01:59 AM    Urobilinogen 1.0 09/07/2020 01:59 AM    Nitrites Negative 09/07/2020 01:59 AM    Leukocyte Esterase Negative 09/07/2020 01:59 AM    Epithelial cells FEW 09/07/2020 01:59 AM    Bacteria Negative 09/07/2020 01:59 AM    WBC 0-4 09/07/2020 01:59 AM    RBC 0-5 09/07/2020 01:59 AM         Medications Reviewed:     Current Facility-Administered Medications   Medication Dose Route Frequency    TPN ADULT - CENTRAL   IntraVENous CONTINUOUS    TPN ADULT - CENTRAL   IntraVENous CONTINUOUS    alteplase (CATHFLO) 2 mg in sterile water (preservative free) 2 mL injection  2 mg InterCATHeter PRN    fat emulsion 20% (LIPOSYN, INTRAlipid) infusion 500 mL  500 mL IntraVENous Q MON, WED & FRI    [Held by provider] heparin (porcine) injection 5,000 Units  5,000 Units SubCUTAneous Q12H    nystatin (MYCOSTATIN) 100,000 unit/gram powder   Topical BID    sodium chloride (NS) flush 5-40 mL  5-40 mL IntraVENous Q8H    sodium chloride (NS) flush 5-40 mL  5-40 mL IntraVENous PRN    polyethylene glycol (MIRALAX) packet 17 g  17 g Oral DAILY PRN    promethazine (PHENERGAN) tablet 12.5 mg  12.5 mg Oral Q6H PRN    Or    ondansetron (ZOFRAN) injection 4 mg  4 mg IntraVENous Q6H PRN    midodrine (PROAMATINE) tablet 10 mg  10 mg Oral TID     ______________________________________________________________________  EXPECTED LENGTH OF STAY: 4d 19h  ACTUAL LENGTH OF STAY:          7                 Justin Johnson MD

## 2020-09-14 NOTE — PROGRESS NOTES
[address]  [address] [facility], [address]       GI PROGRESS NOTE  Will Antionette Marie  [phone] office  [phone] NP/PA in-hospital cell phone M-F until 4:30PM  After 5PM or on weekends, please call  for physician on call      NAME: My Ernandez   :  1934   MRN:  [id number]       Subjective:   Patient denies nausea, vomiting, or abdominal pain. He tolerated diet advancement over the weekend. Objective:     VITALS:   Last 24hrs VS reviewed since prior progress note. Most recent are:  Visit Vitals  BP (!) 96/57 (BP 1 Location: Left arm, BP Patient Position: At rest)   Pulse 92   Temp 98.4 °F (36.9 °C)   Resp 18   Ht 5' 11\" (1.803 m)   Wt 78.6 kg (173 lb 3.2 oz)   SpO2 96%   BMI 24.16 kg/m²       PHYSICAL EXAM:  General:          Cooperative, no acute distress    Neurologic:      Alert and oriented  HEENT:           EOMI, no scleral icterus   Lungs:             No respiratory distress  Heart:              S1 S2  Abdomen:        Soft, non-distended, no tenderness, no guarding, no rebound.  +Bowel sounds.   Extremities:     Warm  Psych:             Not anxious or agitated      Lab Data Reviewed:     Recent Results (from the past 24 hour(s))   GLUCOSE, POC    Collection Time: 20 11:12 PM   Result Value Ref Range    Glucose (POC) 110 (H) 65 - 100 mg/dL    Performed by FRANCIA WHITAKER [facility]  PCT    FIBRINOGEN    Collection Time: 20  3:36 AM   Result Value Ref Range    Fibrinogen 286 200 - 475 mg/dL   PHOSPHORUS    Collection Time: 20  3:36 AM   Result Value Ref Range    Phosphorus 2.5 (L) 2.6 - 4.7 MG/DL   METABOLIC PANEL, COMPREHENSIVE    Collection Time: 20  3:36 AM   Result Value Ref Range    Sodium 138 136 - 145 mmol/L    Potassium 4.0 3.5 - 5.1 mmol/L    Chloride 108 97 - 108 mmol/L    CO2 27 21 - 32 mmol/L    Anion gap 3 (L) 5 - 15 mmol/L    Glucose 108 (H) 65 - 100 mg/dL    BUN 24 (H) 6 - 20 MG/DL    Creatinine 0.63 (L) 0.70 - 1.30 MG/DL BUN/Creatinine ratio 38 (H) 12 - 20      GFR est AA >60 >60 ml/min/1.73m2    GFR est non-AA >60 >60 ml/min/1.73m2    Calcium 8.5 8.5 - 10.1 MG/DL    Bilirubin, total 0.5 0.2 - 1.0 MG/DL    ALT (SGPT) 47 12 - 78 U/L    AST (SGOT) 47 (H) 15 - 37 U/L    Alk. phosphatase 93 45 - 117 U/L    Protein, total 6.3 (L) 6.4 - 8.2 g/dL    Albumin 2.3 (L) 3.5 - 5.0 g/dL    Globulin 4.0 2.0 - 4.0 g/dL    A-G Ratio 0.6 (L) 1.1 - 2.2     GLUCOSE, POC    Collection Time: 09/14/20  5:09 AM   Result Value Ref Range    Glucose (POC) 103 (H) 65 - 100 mg/dL    Performed by FRANCIA WHITAKER [facility]  PCT        Assessment:   · Pancreatitis: WBC 4.3, Hgb 9.7, LFTs unremarkable, normal lipase.  CT abdomen/pelvis with IV contrast (9/7/20): improved peripancreatic inflammatory changes, compatible with pancreatitis; unchanged biliary dilatation with no visualized choledocholithiasis; small hiatal hernia.   · Pancreatic mass: not seen on most recent CT scans  · Gram positive cocci bacteremia versus contaminant: on antibiotics. ID following. Repeat blood culture (9/9/20): no growth 5d.   · Sepsis: COVID negative  · History of PE status post IVC filter placement     Patient Active Problem List   Diagnosis Code    DVT (deep vein thrombosis) in pregnancy O22.30    DVT (deep venous thrombosis) ([facility]) I82.409    Tachycardia R00.0    Hypotension I95.9    Fever R50.9    Sepsis ([address] A41.9    Shock ([address] R57.9     Plan:   · On GI lite diet  · Supportive measures  · Plan for outpatient follow up/EUS with Dr. Sarah Evans By: VANESSA Disla     9/14/2020  11:31 AM

## 2020-09-15 ENCOUNTER — APPOINTMENT (OUTPATIENT)
Dept: GENERAL RADIOLOGY | Age: 85
DRG: 871 | End: 2020-09-15
Attending: NURSE PRACTITIONER
Payer: MEDICARE

## 2020-09-15 LAB
ALBUMIN SERPL-MCNC: 2.3 G/DL (ref 3.5–5)
ALBUMIN SERPL-MCNC: 2.4 G/DL (ref 3.5–5)
ALBUMIN/GLOB SERPL: 0.6 {RATIO} (ref 1.1–2.2)
ALBUMIN/GLOB SERPL: 0.6 {RATIO} (ref 1.1–2.2)
ALP SERPL-CCNC: 81 U/L (ref 45–117)
ALP SERPL-CCNC: 85 U/L (ref 45–117)
ALT SERPL-CCNC: 75 U/L (ref 12–78)
ALT SERPL-CCNC: 78 U/L (ref 12–78)
ANION GAP SERPL CALC-SCNC: 4 MMOL/L (ref 5–15)
ANION GAP SERPL CALC-SCNC: 6 MMOL/L (ref 5–15)
AST SERPL-CCNC: 60 U/L (ref 15–37)
AST SERPL-CCNC: 72 U/L (ref 15–37)
BASOPHILS # BLD: 0 K/UL (ref 0–0.1)
BASOPHILS NFR BLD: 0 % (ref 0–1)
BILIRUB SERPL-MCNC: 0.3 MG/DL (ref 0.2–1)
BILIRUB SERPL-MCNC: 0.3 MG/DL (ref 0.2–1)
BUN SERPL-MCNC: 33 MG/DL (ref 6–20)
BUN SERPL-MCNC: 34 MG/DL (ref 6–20)
BUN/CREAT SERPL: 49 (ref 12–20)
BUN/CREAT SERPL: 53 (ref 12–20)
CALCIUM SERPL-MCNC: 8.5 MG/DL (ref 8.5–10.1)
CALCIUM SERPL-MCNC: 8.6 MG/DL (ref 8.5–10.1)
CHLORIDE SERPL-SCNC: 105 MMOL/L (ref 97–108)
CHLORIDE SERPL-SCNC: 106 MMOL/L (ref 97–108)
CO2 SERPL-SCNC: 25 MMOL/L (ref 21–32)
CO2 SERPL-SCNC: 26 MMOL/L (ref 21–32)
CREAT SERPL-MCNC: 0.62 MG/DL (ref 0.7–1.3)
CREAT SERPL-MCNC: 0.69 MG/DL (ref 0.7–1.3)
DIFFERENTIAL METHOD BLD: ABNORMAL
EOSINOPHIL # BLD: 0 K/UL (ref 0–0.4)
EOSINOPHIL NFR BLD: 0 % (ref 0–7)
ERYTHROCYTE [DISTWIDTH] IN BLOOD BY AUTOMATED COUNT: 15.2 % (ref 11.5–14.5)
ERYTHROCYTE [DISTWIDTH] IN BLOOD BY AUTOMATED COUNT: 15.4 % (ref 11.5–14.5)
GLOBULIN SER CALC-MCNC: 3.8 G/DL (ref 2–4)
GLOBULIN SER CALC-MCNC: 3.9 G/DL (ref 2–4)
GLUCOSE BLD STRIP.AUTO-MCNC: 109 MG/DL (ref 65–100)
GLUCOSE BLD STRIP.AUTO-MCNC: 113 MG/DL (ref 65–100)
GLUCOSE BLD STRIP.AUTO-MCNC: 132 MG/DL (ref 65–100)
GLUCOSE BLD STRIP.AUTO-MCNC: 135 MG/DL (ref 65–100)
GLUCOSE BLD STRIP.AUTO-MCNC: 149 MG/DL (ref 65–100)
GLUCOSE SERPL-MCNC: 133 MG/DL (ref 65–100)
GLUCOSE SERPL-MCNC: 144 MG/DL (ref 65–100)
HCT VFR BLD AUTO: 22.2 % (ref 36.6–50.3)
HCT VFR BLD AUTO: 25 % (ref 36.6–50.3)
HGB BLD-MCNC: 7.4 G/DL (ref 12.1–17)
HGB BLD-MCNC: 8.3 G/DL (ref 12.1–17)
IMM GRANULOCYTES # BLD AUTO: 0 K/UL
IMM GRANULOCYTES NFR BLD AUTO: 0 %
LYMPHOCYTES # BLD: 0.7 K/UL (ref 0.8–3.5)
LYMPHOCYTES NFR BLD: 7 % (ref 12–49)
MAGNESIUM SERPL-MCNC: 1.7 MG/DL (ref 1.6–2.4)
MAGNESIUM SERPL-MCNC: 1.8 MG/DL (ref 1.6–2.4)
MCH RBC QN AUTO: 34.7 PG (ref 26–34)
MCH RBC QN AUTO: 34.9 PG (ref 26–34)
MCHC RBC AUTO-ENTMCNC: 33.2 G/DL (ref 30–36.5)
MCHC RBC AUTO-ENTMCNC: 33.3 G/DL (ref 30–36.5)
MCV RBC AUTO: 104.2 FL (ref 80–99)
MCV RBC AUTO: 105 FL (ref 80–99)
MONOCYTES # BLD: 0.9 K/UL (ref 0–1)
MONOCYTES NFR BLD: 9 % (ref 5–13)
NEUTS SEG # BLD: 8.5 K/UL (ref 1.8–8)
NEUTS SEG NFR BLD: 84 % (ref 32–75)
NRBC # BLD: 0 K/UL (ref 0–0.01)
NRBC # BLD: 0 K/UL (ref 0–0.01)
NRBC BLD-RTO: 0 PER 100 WBC
NRBC BLD-RTO: 0 PER 100 WBC
PHOSPHATE SERPL-MCNC: 2.8 MG/DL (ref 2.6–4.7)
PHOSPHATE SERPL-MCNC: 2.8 MG/DL (ref 2.6–4.7)
PLATELET # BLD AUTO: 138 K/UL (ref 150–400)
PLATELET # BLD AUTO: 151 K/UL (ref 150–400)
PMV BLD AUTO: 10.7 FL (ref 8.9–12.9)
PMV BLD AUTO: 11.3 FL (ref 8.9–12.9)
POTASSIUM SERPL-SCNC: 4 MMOL/L (ref 3.5–5.1)
POTASSIUM SERPL-SCNC: 4.2 MMOL/L (ref 3.5–5.1)
PROT SERPL-MCNC: 6.1 G/DL (ref 6.4–8.2)
PROT SERPL-MCNC: 6.3 G/DL (ref 6.4–8.2)
RBC # BLD AUTO: 2.13 M/UL (ref 4.1–5.7)
RBC # BLD AUTO: 2.38 M/UL (ref 4.1–5.7)
RBC MORPH BLD: ABNORMAL
RBC MORPH BLD: ABNORMAL
SERVICE CMNT-IMP: ABNORMAL
SODIUM SERPL-SCNC: 136 MMOL/L (ref 136–145)
SODIUM SERPL-SCNC: 136 MMOL/L (ref 136–145)
WBC # BLD AUTO: 10.1 K/UL (ref 4.1–11.1)
WBC # BLD AUTO: 7.3 K/UL (ref 4.1–11.1)

## 2020-09-15 PROCEDURE — 65210000002 HC RM PRIVATE GYN

## 2020-09-15 PROCEDURE — 74011250637 HC RX REV CODE- 250/637: Performed by: NURSE PRACTITIONER

## 2020-09-15 PROCEDURE — 84100 ASSAY OF PHOSPHORUS: CPT

## 2020-09-15 PROCEDURE — 80053 COMPREHEN METABOLIC PANEL: CPT

## 2020-09-15 PROCEDURE — 74011250637 HC RX REV CODE- 250/637: Performed by: INTERNAL MEDICINE

## 2020-09-15 PROCEDURE — 97116 GAIT TRAINING THERAPY: CPT

## 2020-09-15 PROCEDURE — 83735 ASSAY OF MAGNESIUM: CPT

## 2020-09-15 PROCEDURE — 85025 COMPLETE CBC W/AUTO DIFF WBC: CPT

## 2020-09-15 PROCEDURE — 74011250636 HC RX REV CODE- 250/636: Performed by: HOSPITALIST

## 2020-09-15 PROCEDURE — 74011250636 HC RX REV CODE- 250/636: Performed by: NURSE PRACTITIONER

## 2020-09-15 PROCEDURE — 97165 OT EVAL LOW COMPLEX 30 MIN: CPT | Performed by: OCCUPATIONAL THERAPIST

## 2020-09-15 PROCEDURE — 97161 PT EVAL LOW COMPLEX 20 MIN: CPT

## 2020-09-15 PROCEDURE — 74011000258 HC RX REV CODE- 258: Performed by: HOSPITALIST

## 2020-09-15 PROCEDURE — 82962 GLUCOSE BLOOD TEST: CPT

## 2020-09-15 PROCEDURE — 74011000250 HC RX REV CODE- 250: Performed by: HOSPITALIST

## 2020-09-15 PROCEDURE — 85027 COMPLETE CBC AUTOMATED: CPT

## 2020-09-15 PROCEDURE — 36415 COLL VENOUS BLD VENIPUNCTURE: CPT

## 2020-09-15 PROCEDURE — 97535 SELF CARE MNGMENT TRAINING: CPT | Performed by: OCCUPATIONAL THERAPIST

## 2020-09-15 PROCEDURE — 71045 X-RAY EXAM CHEST 1 VIEW: CPT

## 2020-09-15 RX ORDER — IBUPROFEN 400 MG/1
400 TABLET ORAL ONCE
Status: COMPLETED | OUTPATIENT
Start: 2020-09-15 | End: 2020-09-15

## 2020-09-15 RX ORDER — ALBUMIN HUMAN 50 G/1000ML
25 SOLUTION INTRAVENOUS ONCE
Status: COMPLETED | OUTPATIENT
Start: 2020-09-16 | End: 2020-09-16

## 2020-09-15 RX ADMIN — NYSTATIN: 100000 POWDER TOPICAL at 18:40

## 2020-09-15 RX ADMIN — NYSTATIN: 100000 POWDER TOPICAL at 09:07

## 2020-09-15 RX ADMIN — CALCIUM GLUCONATE: 94 INJECTION, SOLUTION INTRAVENOUS at 18:41

## 2020-09-15 RX ADMIN — SODIUM CHLORIDE 500 ML: 900 INJECTION, SOLUTION INTRAVENOUS at 20:25

## 2020-09-15 RX ADMIN — Medication 10 ML: at 16:18

## 2020-09-15 RX ADMIN — Medication 10 ML: at 22:29

## 2020-09-15 RX ADMIN — MIDODRINE HYDROCHLORIDE 10 MG: 5 TABLET ORAL at 16:16

## 2020-09-15 RX ADMIN — MIDODRINE HYDROCHLORIDE 10 MG: 5 TABLET ORAL at 09:06

## 2020-09-15 RX ADMIN — SODIUM CHLORIDE 500 ML: 900 INJECTION, SOLUTION INTRAVENOUS at 22:30

## 2020-09-15 RX ADMIN — MIDODRINE HYDROCHLORIDE 10 MG: 5 TABLET ORAL at 22:27

## 2020-09-15 RX ADMIN — IBUPROFEN 400 MG: 400 TABLET, FILM COATED ORAL at 20:31

## 2020-09-15 NOTE — PROGRESS NOTES
TIMMY PLAN:    Resume Home Care with [facility] and TPN with [facility]    RUR: 19%     CM to send TPN order morning of discharge to [phone] and notify Sanjiv Saxena for [place] at [phone]     Patient's daughter will transport home. Patient noted to be vomiting and having loose stools. Discharge cancelled for today.      Asa Most, DWIGHT, RN,CRM

## 2020-09-15 NOTE — PROGRESS NOTES
Gastroenterology Attending Physician attestation statement and comments. This patient was seen and examined by me in a face-to-face visit today. I reviewed the medical record including lab work, imaging and other provider notes. I confirmed the history as described above. I spoke to the patient, reviewed the medical record including lab work, imaging and other provider notes. I discussed this case in detail with Roverto Wyman NP. I formulated an  assessment of this patient and developed a treatment plan. I agree with the above consultation note. I agree with the history, exam and assessment and plan as outlined in the note. I would like to add the following: At time of visit patient appeared tired, still on TPN and reporting no appetite. I worry about sending him home with current situation. Would prefer getting him off TPN, advancing diet to bland diet and making sure tolerating prior to sending home. If continues to have vomiting post meals would consider endoscopy +/- EUS while inpatient to make sure no gastric outlet obstruction and also to assess for etiology for pancreatitis. Will continue to follow patient. Maria G Pizarro MD    [address] [place] [address].  [address] [place] [address], [address] [place] [address]       GI PROGRESS NOTE  Roverto Wyman, [facility] office  [phone] NP/PA in-hospital cell phone M-F until 4:30PM  After 5PM or on weekends, please call  for physician on call      NAME: Kade Kirby   :  1934   MRN:  [id number]       Subjective:   He had vomiting overnight, poor appetite this morning, feeling better now. Objective:     VITALS:   Last 24hrs VS reviewed since prior progress note.  Most recent are:  Visit Vitals  BP (!) 101/58   Pulse (!) 106   Temp 98.3 °F (36.8 °C)   Resp 18   Ht 5' 11\" (1.803 m)   Wt 77.1 kg (169 lb 15.6 oz)   SpO2 98%   BMI 23.71 kg/m²       PHYSICAL EXAM:  General:          Cooperative, no acute distress    Neurologic:      Alert and oriented  HEENT:           EOMI, no scleral icterus   Lungs:             No respiratory distress  Heart:              regular rate  Abdomen:        Soft, non-distended, no tenderness   Extremities:     Warm  Psych:             Not anxious or agitated      Lab Data Reviewed:     Recent Results (from the past 24 hour(s))   GLUCOSE, POC    Collection Time: 09/14/20  5:54 PM   Result Value Ref Range    Glucose (POC) 126 (H) 65 - 100 mg/dL    Performed by Andrew Macario    GLUCOSE, POC    Collection Time: 09/15/20 12:31 AM   Result Value Ref Range    Glucose (POC) 109 (H) 65 - 100 mg/dL    Performed by Theodore Thornton    METABOLIC PANEL, COMPREHENSIVE    Collection Time: 09/15/20  4:45 AM   Result Value Ref Range    Sodium 136 136 - 145 mmol/L    Potassium 4.2 3.5 - 5.1 mmol/L    Chloride 106 97 - 108 mmol/L    CO2 26 21 - 32 mmol/L    Anion gap 4 (L) 5 - 15 mmol/L    Glucose 133 (H) 65 - 100 mg/dL    BUN 33 (H) 6 - 20 MG/DL    Creatinine 0.62 (L) 0.70 - 1.30 MG/DL    BUN/Creatinine ratio 53 (H) 12 - 20      GFR est AA >60 >60 ml/min/1.73m2    GFR est non-AA >60 >60 ml/min/1.73m2    Calcium 8.6 8.5 - 10.1 MG/DL    Bilirubin, total 0.3 0.2 - 1.0 MG/DL    ALT (SGPT) 78 12 - 78 U/L    AST (SGOT) 72 (H) 15 - 37 U/L    Alk.  phosphatase 85 45 - 117 U/L    Protein, total 6.3 (L) 6.4 - 8.2 g/dL    Albumin 2.4 (L) 3.5 - 5.0 g/dL    Globulin 3.9 2.0 - 4.0 g/dL    A-G Ratio 0.6 (L) 1.1 - 2.2     CBC W/O DIFF    Collection Time: 09/15/20  4:45 AM   Result Value Ref Range    WBC 7.3 4.1 - 11.1 K/uL    RBC 2.38 (L) 4.10 - 5.70 M/uL    HGB 8.3 (L) 12.1 - 17.0 g/dL    HCT 25.0 (L) 36.6 - 50.3 %    .0 (H) 80.0 - 99.0 FL    MCH 34.9 (H) 26.0 - 34.0 PG    MCHC 33.2 30.0 - 36.5 g/dL    RDW 15.2 (H) 11.5 - 14.5 %    PLATELET 252 012 - 284 K/uL    MPV 10.7 8.9 - 12.9 FL    NRBC 0.0 0  WBC    ABSOLUTE NRBC 0.00 0.00 - 0.01 K/uL   MAGNESIUM    Collection Time: 09/15/20  4:45 AM   Result Value Ref Range    Magnesium 1.8 1.6 - 2.4 mg/dL   PHOSPHORUS    Collection Time: 09/15/20  4:45 AM   Result Value Ref Range    Phosphorus 2.8 2.6 - 4.7 MG/DL   GLUCOSE, POC    Collection Time: 09/15/20  5:49 AM   Result Value Ref Range    Glucose (POC) 132 (H) 65 - 100 mg/dL    Performed by Emil Damico    GLUCOSE, POC    Collection Time: 09/15/20 10:59 AM   Result Value Ref Range    Glucose (POC) 135 (H) 65 - 100 mg/dL    Performed by EMILIA BRAND        Assessment:   · Pancreatitis: LFTs unremarkable, normal lipase.  CT abdomen/pelvis with IV contrast (9/7/20): improved peripancreatic inflammatory changes, compatible with pancreatitis; unchanged biliary dilatation with no visualized choledocholithiasis; small hiatal hernia.   · Pancreatic mass: not seen on most recent CT scans  · Gram positive cocci bacteremia versus contaminant: on antibiotics. ID following. Repeat blood culture (9/9/20): no growth 5d.   · Sepsis: COVID negative  · History of PE status post IVC filter placement     Patient Active Problem List   Diagnosis Code    DVT (deep vein thrombosis) in pregnancy O22.30    DVT (deep venous thrombosis) ([facility]) I82.409    Tachycardia R00.0    Hypotension I95.9    Fever R50.9    Sepsis ([address] A41.9    Shock ([address] R57.9     Plan:   · Monitor diet tolerance, wean/discontinue TPN per RD  · Supportive measures  · Plan for outpatient follow up/EUS with Dr. Tucker Piedra By: Dameon Guevara NP     9/15/2020  11:31 AM

## 2020-09-15 NOTE — PROGRESS NOTES
Pt called out to nurses station for help. RN found that he vomited a large amount of brown liquid on himself, the bed and in trash can. Pt got up to chair to get changed and vomited another time in trash can. Pt denied any nausea. RN will inform MD of this occurrence.

## 2020-09-15 NOTE — PROGRESS NOTES
OCCUPATIONAL THERAPY EVALUATION/DISCHARGE  Patient: Maira Coello (89 y.o. male)  Date: 9/15/2020  Primary Diagnosis: Sepsis ([facility] [address] [A41.9]  Fever [R50.9]  Tachycardia [R00.0]  Hypotension [I95.9]  Shock ([facility] [address] [R57.9]       Precautions:        ASSESSMENT  Based on the objective data described below, the patient presents with good activity tolerance, encouragement for out of bed. Supportive wife present, recent home health therapy, using adaptive equipment for mobility and dressing. At this time feel not far from baseline and recommend increased time out of bed and transfer to bathroom for toileting. Current Level of Function (ADLs/self-care): supervision to SBA for functional transfers, LE dressing with reacher and sock-aide at baseline    Functional Outcome Measure: The patient scored 70/100 on the Barthel Index outcome measure   Other factors to consider for discharge: supportive wife     PLAN :  Recommend with staff: Recommend with nursing patient to complete as able in order to maintain strength, endurance and independence: ADLs with supervision/setup, OOB to chair 3x/day and mobilizing to the bathroom for toileting with SBA. Thank you for your assistance. Recommendation for discharge: (in order for the patient to meet his/her long term goals)  No skilled occupational therapy/ follow up rehabilitation needs identified at this time. This discharge recommendation:  Has not yet been discussed the attending provider and/or case management    IF patient discharges home will need the following DME: none       SUBJECTIVE:   Patient stated the girl that was in earlier raised it up. 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 Was mod I for ADL's and mobility at home, received home health PT/OT  Expanded or extensive additional review of patient history:   Home Situation  Home Environment: Private residence  # Steps to Enter: 3  Rails to Enter: Yes  One/Two Story Residence: One story  Living Alone: No  Support Systems: Spouse/Significant Other/Partner  Patient Expects to be Discharged to[de-identified] Private residence  Current DME Used/Available at Home: Darrall Osier, straight, Walker, rolling, Grab bars  Tub or Shower Type: Tub/Shower combination    Hand dominance: Right    EXAMINATION OF PERFORMANCE DEFICITS:  Cognitive/Behavioral Status:  Neurologic State: Alert  Orientation Level: Oriented X4  Cognition: Follows commands; Appropriate for age attention/concentration; Appropriate safety awareness  Perception: Appears intact  Perseveration: No perseveration noted  Safety/Judgement: Awareness of environment; Fall prevention;Good awareness of safety precautions; Home safety    Skin: intact    Edema: none noted    Hearing: Auditory  Auditory Impairment: hard of hearing bilateral, per wife better to speak in left ear    Vision/Perceptual:            Not assessed                         Range of Motion:  AROM: Within functional limits      Strength:    Strength: Within functional limits      Coordination:  Coordination: Within functional limits          Tone & Sensation:  Tone: Normal                 Balance:  Sitting: Intact  Standing: Intact; With support    Functional Mobility and Transfers for ADLs:  Bed Mobility:  Rolling: Supervision  Supine to Sit: Supervision  Sit to Supine: Supervision    Transfers:  Sit to Stand: Supervision  Stand to Sit: Supervision  Bed to Chair: Supervision  Bathroom Mobility: Stand-by assistance  Toilet Transfer : Stand-by assistance; Adaptive equipment    ADL Assessment:  Feeding: Supervision    Oral Facial Hygiene/Grooming: Setup         Upper Body Dressing: Setup    Lower Body Dressing: Supervision; Other (comment)(has sock aid and reacher at home)    Toileting: Stand by assistance                ADL Intervention and task modifications:     Patient instructed and indicated understanding the benefits of maintaining activity tolerance, functional mobility, and independence with self care tasks during acute stay  to ensure safe return home and to baseline. Encouraged patient to increase frequency and duration OOB, be out of bed for all meals, perform daily ADLs (as approved by RN/MD regarding bathing etc), and performing functional mobility to/from bathroom. Cognitive Retraining  Safety/Judgement: Awareness of environment; Fall prevention;Good awareness of safety precautions; Home safety       Functional Measure:  Barthel Index:    Bathin  Bladder: 10  Bowels: 10  Groomin  Dressin  Feeding: 10  Mobility: 10  Stairs: 5  Toilet Use: 5  Transfer (Bed to Chair and Back): 10  Total: 70/100        The Barthel ADL Index: Guidelines  1. The index should be used as a record of what a patient does, not as a record of what a patient could do. 2. The main aim is to establish degree of independence from any help, physical or verbal, however minor and for whatever reason. 3. The need for supervision renders the patient not independent. 4. A patient's performance should be established using the best available evidence. Asking the patient, friends/relatives and nurses are the usual sources, but direct observation and common sense are also important. However direct testing is not needed.   5. Usually the patient's performance over the preceding 24-48 hours is important, but occasionally longer periods will be relevant. 6. Middle categories imply that the patient supplies over 50 per cent of the effort. 7. Use of aids to be independent is allowed. Lucien Hamlin., Barthel, D.W. (0001). Functional evaluation: the Barthel Index. [facility] (14)2. SANDY Gibbs, [facility]., [facility], [address] [place] [address] (1999). Measuring the change indisability after inpatient rehabilitation; comparison of the responsiveness of the Barthel Index and Functional [place] Measure. Journal of Neurology, Neurosurgery, and Psychiatry, 66(4), 009-301. Bushra Gilbert, N.J.A, SARITHA Narayanan, & Nik Simms MINES. (2004.) Assessment of post-stroke quality of life in cost-effectiveness studies: The usefulness of the Barthel Index and the EuroQoL-5D. Quality of Life Research, 15, 469-92         Occupational Therapy Evaluation Charge Determination   History Examination Decision-Making   LOW Complexity : Brief history review  LOW Complexity : 1-3 performance deficits relating to physical, cognitive , or psychosocial skils that result in activity limitations and / or participation restrictions  MEDIUM Complexity : Patient may present with comorbidities that affect occupational performnce. Miniml to moderate modification of tasks or assistance (eg, physical or verbal ) with assesment(s) is necessary to enable patient to complete evaluation       Based on the above components, the patient evaluation is determined to be of the following complexity level: LOW   Pain Rating:  No complaint of    Activity Tolerance:   Good  Please refer to the flowsheet for vital signs taken during this treatment. After treatment patient left in no apparent distress:    Sitting in chair, Call bell within reach and Caregiver / family present    COMMUNICATION/EDUCATION:   The patients plan of care was discussed with: Physical therapist and Registered nurse.      Thank you for this referral.  Cheko Schmitt CHECO SanchezR/L  Time Calculation: 23 mins

## 2020-09-15 NOTE — PROGRESS NOTES
Bedside and Verbal shift change report given to Maria Elena Villafuerte RN (oncoming nurse) by Esta Sicard, RN (offgoing nurse). Report included the following information SBAR, Kardex, Procedure Summary, Intake/Output, MAR and Recent Results.

## 2020-09-15 NOTE — PROGRESS NOTES
I have reviewed and agree with the charting of student nurse, [address] [place] [address],[address]. Bedside shift change report given to Yury Erwin RN (oncoming nurse) by Shaka Perez RN (offgoing nurse). Report included the following information SBAR, Kardex, Intake/Output, MAR and Recent Results.

## 2020-09-15 NOTE — PROGRESS NOTES
[facility]                                                                                          Hospitalist Progress Note  Priyanka Valdes MD  Answering service: [phone] from in house phone        Date of Service:  9/15/2020  NAME:  Valeria Slaughter  :  1934  MRN:  [id number]      Admission Summary:     Marcial Valdovinos is a 80 y.o. male with past medical history of recent multiple bilateral pulmonary emboli, right lower extremity DVT, GI bleed, erosive gastritis, anemia, blood transfusion, pancreatitis, arthritis, BPH, DJD, and hyperlipidemia presented to the ED from home with reported nausea and vomiting.  Patient provided some history in addition to his daughter who was at the bedside. Donice Dec[place] of history was obtained per my review of ED and electronic medical records.      Interval history / Subjective:     He said he throw up what he ate at 2 am this morning, had also loss stool bowel movement at 3 am and 4 am this morning     Assessment & Plan:     Septic Shock  -shock resolved, off pressor, BP normal, continue midodrine   -received iv cefepime and vancomycin, abx off  -repeated blood cx on  no growth so far, blood cx on  staph coag negative likely contaminant  -TTE-LV EF 55-60% normal cavity, wall thickness ans systolic function.    -PICC line from home   -afebrile, no leukocytosis,   -ID on board,       Pancreatitis  -improved peripancreatic inflammatory changes on most recent CT  -lipase was normal 107 on   -on TPN, on GI Lite diet  but had vomiting and loss stool bowel movement x 2 early this morning, will put him on full liquid diet, will try to advance slowly  -Nutritionist on board  -GI on board     Gastric outlet obstruction  -on GI Lite diet and TPN   -GI on board  -Nutritionist on board, plan to avoid home TPN,      Pancreatic abnormality on prev CT   -CT abdomen pelvis on  improved peripancreatic inflammatory changes, compatible with pancreatitis. Unchanged biliary dilatation, with no visualized choledocholithiasis. Small hiatal hernia.  -Hem/Onc on board     Hx PE/DVT, s/p IVC filter placement  -elevated d-dimer, improving  -IVC filter in place    Anemia of chronic disease  -H/H stable  -no evidence of active bleeding  -monitor H/H      Hypokalemia  -replaced and resolved       Code status: DNR  DVT prophylaxis:SCD    Care Plan discussed with: Patient/Family and Nurse  Anticipated Disposition: TBD  Anticipated Discharge: 24 hours to 48 hours     I called his daughter, Myranda Inman at  updated her clinical condition, lab finding and treatment plan 9/15       Hospital Problems  Date Reviewed: 7/14/2020          Codes Class Noted POA    Tachycardia ICD-10-CM: R00.0  ICD-9-CM: 785.0  9/7/2020 Unknown        Hypotension ICD-10-CM: I95.9  ICD-9-CM: 458.9  9/7/2020 Unknown        Fever ICD-10-CM: R50.9  ICD-9-CM: 780.60  9/7/2020 Unknown        Sepsis ([address] ICD-10-CM: A41.9  ICD-9-CM: 038.9, 995.91  9/7/2020 Unknown        Shock ([address] ICD-10-CM: R57.9  ICD-9-CM: 785.50  9/7/2020 Unknown              Vital Signs:    Last 24hrs VS reviewed since prior progress note. Most recent are:  Visit Vitals  BP (!) 101/58   Pulse (!) 106   Temp 98.3 °F (36.8 °C)   Resp 18   Ht 5' 11\" (1.803 m)   Wt 77.1 kg (169 lb 15.6 oz)   SpO2 98%   BMI 23.71 kg/m²         Intake/Output Summary (Last 24 hours) at 9/15/2020 1526  Last data filed at 9/15/2020 0300  Gross per 24 hour   Intake    Output 750 ml   Net -750 ml        Physical Examination:             Constitutional:  No acute distress, cooperative, pleasant    ENT:  Oral mucosa moist, oropharynx benign. Resp:  CTA bilaterally. No wheezing/rhonchi/rales. No accessory muscle use   CV:  Regular rhythm, normal rate, no murmurs, gallops, rubs    GI:  Soft, non distended, non tender.  normoactive bowel sounds, no hepatosplenomegaly     Musculoskeletal:  No edema,     Neurologic:  Moves all extremities. AAOx3, CN II-XII reviewed     Skin:  Good turgor, no rashes or ulcers       Data Review:    Review and/or order of clinical lab test  Review and/or order of tests in the radiology section of CPT  Review and/or order of tests in the medicine section of CPT      Labs:     Recent Labs     09/15/20  0445 09/13/20  0310   WBC 7.3 4.3   HGB 8.3* 9.7*   HCT 25.0* 29.6*    124*     Recent Labs     09/15/20  0445 09/14/20  0336 09/13/20  0310    138 141   K 4.2 4.0 3.3*    108 109*   CO2 26 27 27   BUN 33* 24* 16   CREA 0.62* 0.63* 0.55*   * 108* 128*   CA 8.6 8.5 8.5   MG 1.8  --   --    PHOS 2.8 2.5* 2.7     Recent Labs     09/15/20  0445 09/14/20  0336 09/13/20  0310   ALT 78 47 38   AP 85 93 93   TBILI 0.3 0.5 0.5   TP 6.3* 6.3* 6.1*   ALB 2.4* 2.3* 2.2*   GLOB 3.9 4.0 3.9     No results for input(s): INR, PTP, APTT, INREXT, INREXT in the last 72 hours. No results for input(s): FE, TIBC, PSAT, FERR in the last 72 hours. No results found for: FOL, RBCF   No results for input(s): PH, PCO2, PO2 in the last 72 hours. No results for input(s): CPK, CKNDX, TROIQ in the last 72 hours.     No lab exists for component: CPKMB  Lab Results   Component Value Date/Time    Cholesterol, total 250 (H) 10/02/2013 01:55 PM    HDL Cholesterol 53 10/02/2013 01:55 PM    LDL, calculated 176 (H) 10/02/2013 01:55 PM    Triglyceride 244 (H) 07/23/2020 04:41 AM    CHOL/HDL Ratio 3.4 10/06/2010 10:25 AM     Lab Results   Component Value Date/Time    Glucose (POC) 135 (H) 09/15/2020 10:59 AM    Glucose (POC) 132 (H) 09/15/2020 05:49 AM    Glucose (POC) 109 (H) 09/15/2020 12:31 AM    Glucose (POC) 126 (H) 09/14/2020 05:54 PM    Glucose (POC) 101 (H) 09/14/2020 12:03 PM     Lab Results   Component Value Date/Time    Color YELLOW/STRAW 09/07/2020 01:59 AM    Appearance CLEAR 09/07/2020 01:59 AM    Specific gravity 1.020 09/07/2020 01:59 AM    pH (UA) 6.5 09/07/2020 01:59 AM    Protein Negative 09/07/2020 01:59 AM    Glucose Negative 09/07/2020 01:59 AM    Ketone Negative 09/07/2020 01:59 AM    Bilirubin Negative 09/07/2020 01:59 AM    Urobilinogen 1.0 09/07/2020 01:59 AM    Nitrites Negative 09/07/2020 01:59 AM    Leukocyte Esterase Negative 09/07/2020 01:59 AM    Epithelial cells FEW 09/07/2020 01:59 AM    Bacteria Negative 09/07/2020 01:59 AM    WBC 0-4 09/07/2020 01:59 AM    RBC 0-5 09/07/2020 01:59 AM         Medications Reviewed:     Current Facility-Administered Medications   Medication Dose Route Frequency    TPN ADULT - CENTRAL   IntraVENous CONTINUOUS    TPN ADULT - CENTRAL   IntraVENous CONTINUOUS    alteplase (CATHFLO) 2 mg in sterile water (preservative free) 2 mL injection  2 mg InterCATHeter PRN    fat emulsion 20% (LIPOSYN, INTRAlipid) infusion 500 mL  500 mL IntraVENous Q MON, WED & FRI    [Held by provider] heparin (porcine) injection 5,000 Units  5,000 Units SubCUTAneous Q12H    nystatin (MYCOSTATIN) 100,000 unit/gram powder   Topical BID    sodium chloride (NS) flush 5-40 mL  5-40 mL IntraVENous Q8H    sodium chloride (NS) flush 5-40 mL  5-40 mL IntraVENous PRN    polyethylene glycol (MIRALAX) packet 17 g  17 g Oral DAILY PRN    promethazine (PHENERGAN) tablet 12.5 mg  12.5 mg Oral Q6H PRN    Or    ondansetron (ZOFRAN) injection 4 mg  4 mg IntraVENous Q6H PRN    midodrine (PROAMATINE) tablet 10 mg  10 mg Oral TID     ______________________________________________________________________  EXPECTED LENGTH OF STAY: 4d 19h  ACTUAL LENGTH OF STAY:          8                 Winifred Szymanski MD

## 2020-09-15 NOTE — PROGRESS NOTES
PHYSICAL THERAPY EVALUATION/DISCHARGE  Patient: Alexander Rodas (34 y.o. male)  Date: 9/15/2020  Primary Diagnosis: Sepsis ([facility] [address] [A41.9]  Fever [R50.9]  Tachycardia [R00.0]  Hypotension [I95.9]  Shock ([facility] [address] [R57.9]       Precautions:          ASSESSMENT  Based on the objective data described below, the patient presents with near baseline mobility following admission for sepsis from home. Pt on 8th day of admission and reports minimal time OOB since admission. Overall patient is at a SUP-SBA level today for transfers, ambulation and bed mobility. Pt tolerated gait distance of 275ft with slowed gait speed but intact balance and good safety awareness/actvitiy pacing. Pt utilizes a RW vs SPC at home and displays appropriate use of RW today. Returned to bed at end of evaluation. Pt does not require physical assistance with any aspect of mobility. Anticipate steady progress back to baseline once home and able to mobilize freely. No additional acute therapy needs. Will sign off at this time. Functional Outcome Measure: The patient scored 21/28 on the Tinetti outcome measure which is indicative of moderate fall risk. Other factors to consider for discharge: none-supportive wife     Further skilled acute physical therapy is not indicated at this time. PLAN :  Recommendation for discharge: (in order for the patient to meet his/her long term goals)  No skilled physical therapy/ follow up rehabilitation needs identified at this time. This discharge recommendation:  Has been made in collaboration with the attending provider and/or case management    IF patient discharges home will need the following DME: none       SUBJECTIVE:   Patient stated I like to go gambling. 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    BPH (benign prostatic hyperplasia)     DJD (degenerative joint disease)     Hypercholesterolemia     Other and unspecified hyperlipidemia      Past Surgical History:   Procedure Laterality Date    COLONOSCOPY N/A 8/18/2017    [facility] Cortes [facility]COPY    HX ORTHOPAEDIC Right 2010    rotator cuff    HX TONSILLECTOMY      IR IVC FILTER  7/20/2020         IR PLC IVC FILTER  7/20/2020       Prior level of function: Mod Indep with RW or SPC, lives with wife and pt continues to drive  Personal factors and/or comorbidities impacting plan of care: arthritis    Home Situation  Home Environment: Private residence  # Steps to Enter: 3  Rails to Enter: Yes  One/Two Story Residence: One story  Living Alone: No  Support Systems: Spouse/Significant Other/Partner  Patient Expects to be Discharged to[de-identified] Private residence  Current DME Used/Available at Home: Jose Ponce, amy, Walker, rolling, Grab bars  Tub or Shower Type: Tub/Shower combination    EXAMINATION/PRESENTATION/DECISION MAKING:   Critical Behavior:  Neurologic State: Alert           Hearing: Auditory  Auditory Impairment: Hard of hearing, bilateral  Skin:    Edema:   Range Of Motion:  AROM: Within functional limits                       Strength:    Strength: Within functional limits                    Tone & Sensation:   Tone: Normal                              Coordination:  Coordination: Within functional limits  Vision:      Functional Mobility:  Bed Mobility:  Rolling: Supervision  Supine to Sit: Supervision  Sit to Supine: Supervision     Transfers:  Sit to Stand: Supervision  Stand to Sit: Supervision        Bed to Chair: Supervision              Balance:   Sitting: Intact  Standing: Intact; With support  Ambulation/Gait Training:  Distance (ft): 275 Feet (ft)  Assistive Device: Gait belt;Walker, rolling  Ambulation - Level of Assistance: Stand-by assistance        Gait Abnormalities: Decreased step clearance        Base of Support: Widened     Speed/Taylor: Pace decreased (<100 feet/min)  Step Length: Right shortened;Left shortened                     Stairs: Therapeutic Exercises:       Functional Measure:  Tinetti test:    Sitting Balance: 1  Arises: 1  Attempts to Rise: 2  Immediate Standing Balance: 1  Standing Balance: 1  Nudged: 1  Eyes Closed: 1  Turn 360 Degrees - Continuous/Discontinuous: 1  Turn 360 Degrees - Steady/Unsteady: 1  Sitting Down: 2  Balance Score: 12 Balance total score  Indication of Gait: 1  R Step Length/Height: 1  L Step Length/Height: 1  R Foot Clearance: 1  L Foot Clearance: 1  Step Symmetry: 1  Step Continuity: 1  Path: 1  Trunk: 0  Walking Time: 1  Gait Score: 9 Gait total score  Total Score: 21/28 Overall total score         Tinetti Tool Score Risk of Falls  <19 = High Fall Risk  19-24 = Moderate Fall Risk  25-28 = Low Fall Risk  Tinetti ME. Performance-Oriented Assessment of Mobility Problems in Elderly Patients. [facility]; [id number].  (Scoring Description: PT Bulletin Feb. 10, 1993)    Older adults: Pb Abbott et al, 2009; n = 1000 [facility] elderly evaluated with ABC, CARIDAD, ADL, and IADL)  · Mean CARIDAD score for males aged 69-68 years = 26.21(3.40)  · Mean CARIDAD score for females age 69-68 years = 25.16(4.30)  · Mean CARIDAD score for males over 80 years = 23.29(6.02)  · Mean CARIDAD score for females over 80 years = 17.20(8.32)            Physical Therapy Evaluation Charge Determination   History Examination Presentation Decision-Making   MEDIUM  Complexity : 1-2 comorbidities / personal factors will impact the outcome/ POC  MEDIUM Complexity : 3 Standardized tests and measures addressing body structure, function, activity limitation and / or participation in recreation  LOW Complexity : Stable, uncomplicated  Other outcome measures Tinetti  LOW       Based on the above components, the patient evaluation is determined to be of the following complexity level: LOW     Pain Rating:  none    Activity Tolerance:   Good  Please refer to the flowsheet for vital signs taken during this treatment. After treatment patient left in no apparent distress:   Supine in bed, Call bell within reach and Side rails x 3    COMMUNICATION/EDUCATION:   The patients plan of care was discussed with: Registered nurse. Fall prevention education was provided and the patient/caregiver indicated understanding., Patient/family have participated as able in goal setting and plan of care. and Patient/family agree to work toward stated goals and plan of care.     Thank you for this referral.  Sanjay Brooke, PT   Time Calculation: 20 mins

## 2020-09-16 ENCOUNTER — APPOINTMENT (OUTPATIENT)
Dept: CT IMAGING | Age: 85
DRG: 871 | End: 2020-09-16
Attending: PHYSICIAN ASSISTANT
Payer: MEDICARE

## 2020-09-16 LAB
ALBUMIN SERPL-MCNC: 2.3 G/DL (ref 3.5–5)
ALBUMIN/GLOB SERPL: 0.7 {RATIO} (ref 1.1–2.2)
ALP SERPL-CCNC: 74 U/L (ref 45–117)
ALT SERPL-CCNC: 76 U/L (ref 12–78)
ANION GAP SERPL CALC-SCNC: 7 MMOL/L (ref 5–15)
AST SERPL-CCNC: 65 U/L (ref 15–37)
BILIRUB SERPL-MCNC: 0.4 MG/DL (ref 0.2–1)
BUN SERPL-MCNC: 35 MG/DL (ref 6–20)
BUN/CREAT SERPL: 52 (ref 12–20)
CALCIUM SERPL-MCNC: 8.3 MG/DL (ref 8.5–10.1)
CHLORIDE SERPL-SCNC: 108 MMOL/L (ref 97–108)
CO2 SERPL-SCNC: 25 MMOL/L (ref 21–32)
CREAT SERPL-MCNC: 0.67 MG/DL (ref 0.7–1.3)
ERYTHROCYTE [DISTWIDTH] IN BLOOD BY AUTOMATED COUNT: 15.6 % (ref 11.5–14.5)
FIBRINOGEN PPP-MCNC: 247 MG/DL (ref 200–475)
GLOBULIN SER CALC-MCNC: 3.3 G/DL (ref 2–4)
GLUCOSE SERPL-MCNC: 119 MG/DL (ref 65–100)
HCT VFR BLD AUTO: 18.3 % (ref 36.6–50.3)
HGB BLD-MCNC: 6.1 G/DL (ref 12.1–17)
HISTORY CHECKED?,CKHIST: NORMAL
LACTATE SERPL-SCNC: 0.5 MMOL/L (ref 0.4–2)
LIPASE SERPL-CCNC: 89 U/L (ref 73–393)
MAGNESIUM SERPL-MCNC: 1.8 MG/DL (ref 1.6–2.4)
MCH RBC QN AUTO: 35.1 PG (ref 26–34)
MCHC RBC AUTO-ENTMCNC: 33.3 G/DL (ref 30–36.5)
MCV RBC AUTO: 105.2 FL (ref 80–99)
NRBC # BLD: 0 K/UL (ref 0–0.01)
NRBC BLD-RTO: 0 PER 100 WBC
PHOSPHATE SERPL-MCNC: 3.6 MG/DL (ref 2.6–4.7)
PLATELET # BLD AUTO: 110 K/UL (ref 150–400)
POTASSIUM SERPL-SCNC: 3.9 MMOL/L (ref 3.5–5.1)
PROT SERPL-MCNC: 5.6 G/DL (ref 6.4–8.2)
RBC # BLD AUTO: 1.74 M/UL (ref 4.1–5.7)
SODIUM SERPL-SCNC: 140 MMOL/L (ref 136–145)
WBC # BLD AUTO: 8 K/UL (ref 4.1–11.1)

## 2020-09-16 PROCEDURE — 84100 ASSAY OF PHOSPHORUS: CPT

## 2020-09-16 PROCEDURE — 74011000250 HC RX REV CODE- 250: Performed by: NURSE PRACTITIONER

## 2020-09-16 PROCEDURE — 74178 CT ABD&PLV WO CNTR FLWD CNTR: CPT

## 2020-09-16 PROCEDURE — 83605 ASSAY OF LACTIC ACID: CPT

## 2020-09-16 PROCEDURE — 74011000258 HC RX REV CODE- 258: Performed by: NURSE PRACTITIONER

## 2020-09-16 PROCEDURE — 74011000250 HC RX REV CODE- 250: Performed by: PHYSICIAN ASSISTANT

## 2020-09-16 PROCEDURE — 83735 ASSAY OF MAGNESIUM: CPT

## 2020-09-16 PROCEDURE — 87040 BLOOD CULTURE FOR BACTERIA: CPT

## 2020-09-16 PROCEDURE — 30233N1 TRANSFUSION OF NONAUTOLOGOUS RED BLOOD CELLS INTO PERIPHERAL VEIN, PERCUTANEOUS APPROACH: ICD-10-PCS | Performed by: HOSPITALIST

## 2020-09-16 PROCEDURE — 74011250637 HC RX REV CODE- 250/637: Performed by: INTERNAL MEDICINE

## 2020-09-16 PROCEDURE — 74011000258 HC RX REV CODE- 258: Performed by: RADIOLOGY

## 2020-09-16 PROCEDURE — 36430 TRANSFUSION BLD/BLD COMPNT: CPT

## 2020-09-16 PROCEDURE — 83690 ASSAY OF LIPASE: CPT

## 2020-09-16 PROCEDURE — 36415 COLL VENOUS BLD VENIPUNCTURE: CPT

## 2020-09-16 PROCEDURE — 86923 COMPATIBILITY TEST ELECTRIC: CPT

## 2020-09-16 PROCEDURE — 74011250636 HC RX REV CODE- 250/636: Performed by: HOSPITALIST

## 2020-09-16 PROCEDURE — 65210000002 HC RM PRIVATE GYN

## 2020-09-16 PROCEDURE — 74011000636 HC RX REV CODE- 636: Performed by: RADIOLOGY

## 2020-09-16 PROCEDURE — 87077 CULTURE AEROBIC IDENTIFY: CPT

## 2020-09-16 PROCEDURE — 74011000250 HC RX REV CODE- 250: Performed by: HOSPITALIST

## 2020-09-16 PROCEDURE — P9045 ALBUMIN (HUMAN), 5%, 250 ML: HCPCS | Performed by: NURSE PRACTITIONER

## 2020-09-16 PROCEDURE — 74011250637 HC RX REV CODE- 250/637: Performed by: NURSE PRACTITIONER

## 2020-09-16 PROCEDURE — 80053 COMPREHEN METABOLIC PANEL: CPT

## 2020-09-16 PROCEDURE — 85027 COMPLETE CBC AUTOMATED: CPT

## 2020-09-16 PROCEDURE — 85384 FIBRINOGEN ACTIVITY: CPT

## 2020-09-16 PROCEDURE — 74011250636 HC RX REV CODE- 250/636: Performed by: NURSE PRACTITIONER

## 2020-09-16 PROCEDURE — 87186 SC STD MICRODIL/AGAR DIL: CPT

## 2020-09-16 PROCEDURE — C9113 INJ PANTOPRAZOLE SODIUM, VIA: HCPCS | Performed by: PHYSICIAN ASSISTANT

## 2020-09-16 PROCEDURE — 86900 BLOOD TYPING SEROLOGIC ABO: CPT

## 2020-09-16 PROCEDURE — 74011000258 HC RX REV CODE- 258: Performed by: HOSPITALIST

## 2020-09-16 PROCEDURE — P9016 RBC LEUKOCYTES REDUCED: HCPCS

## 2020-09-16 PROCEDURE — 74011250636 HC RX REV CODE- 250/636: Performed by: PHYSICIAN ASSISTANT

## 2020-09-16 RX ORDER — SODIUM CHLORIDE 9 MG/ML
125 INJECTION, SOLUTION INTRAVENOUS CONTINUOUS
Status: DISCONTINUED | OUTPATIENT
Start: 2020-09-16 | End: 2020-09-20

## 2020-09-16 RX ORDER — SODIUM CHLORIDE 0.9 % (FLUSH) 0.9 %
10 SYRINGE (ML) INJECTION
Status: COMPLETED | OUTPATIENT
Start: 2020-09-16 | End: 2020-09-16

## 2020-09-16 RX ORDER — SODIUM CHLORIDE 9 MG/ML
250 INJECTION, SOLUTION INTRAVENOUS AS NEEDED
Status: DISCONTINUED | OUTPATIENT
Start: 2020-09-16 | End: 2020-09-22 | Stop reason: HOSPADM

## 2020-09-16 RX ORDER — VANCOMYCIN 2 GRAM/500 ML IN 0.9 % SODIUM CHLORIDE INTRAVENOUS
2000 ONCE
Status: COMPLETED | OUTPATIENT
Start: 2020-09-16 | End: 2020-09-16

## 2020-09-16 RX ORDER — VANCOMYCIN HYDROCHLORIDE
1250 EVERY 12 HOURS
Status: DISCONTINUED | OUTPATIENT
Start: 2020-09-16 | End: 2020-09-21 | Stop reason: DRUGHIGH

## 2020-09-16 RX ADMIN — NYSTATIN: 100000 POWDER TOPICAL at 09:45

## 2020-09-16 RX ADMIN — CEFEPIME 2 G: 2 INJECTION, POWDER, FOR SOLUTION INTRAVENOUS at 23:48

## 2020-09-16 RX ADMIN — ALBUMIN (HUMAN) 25 G: 12.5 INJECTION, SOLUTION INTRAVENOUS at 00:35

## 2020-09-16 RX ADMIN — IOPAMIDOL 100 ML: 755 INJECTION, SOLUTION INTRAVENOUS at 20:53

## 2020-09-16 RX ADMIN — NYSTATIN: 100000 POWDER TOPICAL at 17:26

## 2020-09-16 RX ADMIN — CEFEPIME 2 G: 2 INJECTION, POWDER, FOR SOLUTION INTRAVENOUS at 16:37

## 2020-09-16 RX ADMIN — Medication 10 ML: at 21:24

## 2020-09-16 RX ADMIN — SODIUM CHLORIDE 100 ML: 900 INJECTION, SOLUTION INTRAVENOUS at 20:54

## 2020-09-16 RX ADMIN — MIDODRINE HYDROCHLORIDE 10 MG: 5 TABLET ORAL at 16:38

## 2020-09-16 RX ADMIN — SODIUM CHLORIDE, SODIUM LACTATE, POTASSIUM CHLORIDE, AND CALCIUM CHLORIDE 500 ML: 600; 310; 30; 20 INJECTION, SOLUTION INTRAVENOUS at 06:09

## 2020-09-16 RX ADMIN — CALCIUM GLUCONATE: 94 INJECTION, SOLUTION INTRAVENOUS at 18:31

## 2020-09-16 RX ADMIN — SODIUM CHLORIDE 125 ML/HR: 900 INJECTION, SOLUTION INTRAVENOUS at 12:14

## 2020-09-16 RX ADMIN — Medication 10 ML: at 16:39

## 2020-09-16 RX ADMIN — MIDODRINE HYDROCHLORIDE 10 MG: 5 TABLET ORAL at 09:45

## 2020-09-16 RX ADMIN — SODIUM CHLORIDE 40 MG: 9 INJECTION INTRAMUSCULAR; INTRAVENOUS; SUBCUTANEOUS at 12:14

## 2020-09-16 RX ADMIN — VANCOMYCIN HYDROCHLORIDE 2000 MG: 10 INJECTION, POWDER, LYOPHILIZED, FOR SOLUTION INTRAVENOUS at 10:13

## 2020-09-16 RX ADMIN — Medication 10 ML: at 20:54

## 2020-09-16 RX ADMIN — Medication 10 ML: at 10:14

## 2020-09-16 RX ADMIN — MIDODRINE HYDROCHLORIDE 10 MG: 5 TABLET ORAL at 22:14

## 2020-09-16 RX ADMIN — I.V. FAT EMULSION 500 ML: 20 EMULSION INTRAVENOUS at 18:34

## 2020-09-16 RX ADMIN — SODIUM CHLORIDE 125 ML/HR: 900 INJECTION, SOLUTION INTRAVENOUS at 07:24

## 2020-09-16 RX ADMIN — SODIUM CHLORIDE 40 MG: 9 INJECTION INTRAMUSCULAR; INTRAVENOUS; SUBCUTANEOUS at 21:23

## 2020-09-16 RX ADMIN — VANCOMYCIN HYDROCHLORIDE 1250 MG: 10 INJECTION, POWDER, LYOPHILIZED, FOR SOLUTION INTRAVENOUS at 21:23

## 2020-09-16 RX ADMIN — SODIUM CHLORIDE, SODIUM LACTATE, POTASSIUM CHLORIDE, AND CALCIUM CHLORIDE 500 ML: 600; 310; 30; 20 INJECTION, SOLUTION INTRAVENOUS at 00:35

## 2020-09-16 RX ADMIN — CEFEPIME 2 G: 2 INJECTION, POWDER, FOR SOLUTION INTRAVENOUS at 07:24

## 2020-09-16 RX ADMIN — Medication 10 ML: at 06:10

## 2020-09-16 NOTE — PROGRESS NOTES
Problem: Falls - Risk of  Goal: *Absence of Falls  Description: Document Valeri Patches Fall Risk and appropriate interventions in the flowsheet. Outcome: Progressing Towards Goal  Note: Fall Risk Interventions:  Mobility Interventions: Communicate number of staff needed for ambulation/transfer, PT Consult for assist device competence, Utilize walker, cane, or other assistive device         Medication Interventions: Patient to call before getting OOB, Teach patient to arise slowly    Elimination Interventions: Patient to call for help with toileting needs, Call light in reach              Problem: Patient Education: Go to Patient Education Activity  Goal: Patient/Family Education  Outcome: Progressing Towards Goal     Problem: Pressure Injury - Risk of  Goal: *Prevention of pressure injury  Description: Document Joseph Scale and appropriate interventions in the flowsheet.   Outcome: Progressing Towards Goal  Note: Pressure Injury Interventions:  Sensory Interventions: Minimize linen layers, Maintain/enhance activity level, Keep linens dry and wrinkle-free         Activity Interventions: Increase time out of bed    Mobility Interventions: Pressure redistribution bed/mattress (bed type)    Nutrition Interventions: Document food/fluid/supplement intake    Friction and Shear Interventions: Apply protective barrier, creams and emollients                Problem: Patient Education: Go to Patient Education Activity  Goal: Patient/Family Education  Outcome: Progressing Towards Goal     Problem: Sepsis: Day of Diagnosis  Goal: *Paired blood cultures prior to first dose of antibiotic  Outcome: Progressing Towards Goal  Goal: Activity/Safety  Outcome: Progressing Towards Goal     Problem: Sepsis: Day 5  Goal: Nutrition/Diet  Outcome: Progressing Towards Goal

## 2020-09-16 NOTE — PROGRESS NOTES
ID Progress Note  2020    Subjective:     He is feeling much better. No specific complaints at the time of my visit. 24 hour events noted    Objective:     Antibiotics:  1. Vancomycin   2. Cefepime      Vitals:   Visit Vitals  BP (!) 100/57 (BP 1 Location: Left arm, BP Patient Position: At rest)   Pulse 100   Temp 99.5 °F (37.5 °C)   Resp 30   Ht 5' 11\" (1.803 m)   Wt 79 kg (174 lb 2.6 oz)   SpO2 99%   BMI 24.29 kg/m²        Tmax:  Temp (24hrs), Av.1 °F (37.8 °C), Min:98.7 °F (37.1 °C), Max:102.4 °F (39.1 °C)      Exam:  Lungs:  clear to auscultation bilaterally  Heart:  regular rate and rhythm  Abdomen:  soft, non-tender. Bowel sounds normal. No masses,  no organomegaly    Labs:      Recent Labs     20  0444 09/15/20  2115 09/15/20  0445 20  0336   WBC 8.0 10.1 7.3  --    HGB 6.1* 7.4* 8.3*  --    * 138* 151  --    BUN 35* 34* 33* 24*   CREA 0.67* 0.69* 0.62* 0.63*   AP 74 81 85 93   TBILI 0.4 0.3 0.3 0.5       Cultures:     No results found for: SDES  Lab Results   Component Value Date/Time    Culture result: NO GROWTH 5 DAYS 2020 05:40 AM    Culture result: NO GROWTH 8 DAYS 2020 06:15 AM    Culture result: (A) 2020 12:49 AM     STAPHYLOCOCCUS SPECIES, COAGULASE NEGATIVE GROWING IN 1 OF 2 BOTTLES DRAWN (L HAND SITE)    Culture result: (A) 2020 12:49 AM     PRELIMINARY REPORT OF GRAM POSITIVE COCCI IN CLUSTERS GROWING IN 1 OF 2 BOTTLES DRAWN CALLED TO AND READ BACK BY MS MIKE RN ON 20 AT 0053 [facility]). MS    Culture result: REMAINING BOTTLE(S) HAS/HAVE NO GROWTH IN 5 DAYS 2020 12:49 AM       Radiology:     Line/Insert Date:           Assessment:     1. Sepsis syndrome--resolved--not sure of the cause--last 24 hours sounds like a similar event  2. Positive blood culture--likely contaminant since other bottles remain negative--will see what the new cultures show    Objective:     1. Continue current therapy  2.  Follow up cultures and studies    Fritz Grullon MD

## 2020-09-16 NOTE — PROGRESS NOTES
[address] [facility], [address] [facility]       GI PROGRESS NOTE  Will Harry Nguyenor  [phone] office  [phone] NP/PA in-hospital cell phone M-F until 4:30PM  After 5PM or on weekends, please call  for physician on call      NAME: Camden Osborne   :  1934   MRN:  [id number]       Subjective:   Patient became febrile and hypotensive overnight. Denies nausea, vomiting, or abdominal pain. No bowel movement. No shortness of breath. Objective:     VITALS:   Last 24hrs VS reviewed since prior progress note. Most recent are:  Visit Vitals  BP (!) 92/45 (BP 1 Location: Left arm, BP Patient Position: At rest)   Pulse 96   Temp 98.8 °F (37.1 °C)   Resp 24   Ht 5' 11\" (1.803 m)   Wt 79 kg (174 lb 2.6 oz)   SpO2 99%   BMI 24.29 kg/m²       PHYSICAL EXAM:  General: Cooperative, no acute distress    Neurologic:  Alert and oriented  HEENT: EOMI, no scleral icterus   Lungs:  No respiratory distress  Heart:  S1 S2  Abdomen: Soft, non-distended, no tenderness, no guarding, no rebound. Extremities: Warm  Psych:   Good insight. Not anxious or agitated.     Lab Data Reviewed:     Recent Results (from the past 24 hour(s))   GLUCOSE, POC    Collection Time: 09/15/20 10:59 AM   Result Value Ref Range    Glucose (POC) 135 (H) 65 - 100 mg/dL    Performed by Swathi Chacon, POC    Collection Time: 09/15/20  4:51 PM   Result Value Ref Range    Glucose (POC) 113 (H) 65 - 100 mg/dL    Performed by Nacho Vance    GLUCOSE, POC    Collection Time: 09/15/20  9:05 PM   Result Value Ref Range    Glucose (POC) 149 (H) 65 - 100 mg/dL    Performed by Yasmin Reeves    CBC WITH AUTOMATED DIFF    Collection Time: 09/15/20  9:15 PM   Result Value Ref Range    WBC 10.1 4.1 - 11.1 K/uL    RBC 2.13 (L) 4.10 - 5.70 M/uL    HGB 7.4 (L) 12.1 - 17.0 g/dL    HCT 22.2 (L) 36.6 - 50.3 %    .2 (H) 80.0 - 99.0 FL    MCH 34.7 (H) 26.0 - 34.0 PG    MCHC 33.3 30.0 - 36.5 g/dL    RDW 15.4 (H) 11.5 - 14.5 %    PLATELET 747 (L) 254 - 400 K/uL    MPV 11.3 8.9 - 12.9 FL    NRBC 0.0 0  WBC    ABSOLUTE NRBC 0.00 0.00 - 0.01 K/uL    NEUTROPHILS 84 (H) 32 - 75 %    LYMPHOCYTES 7 (L) 12 - 49 %    MONOCYTES 9 5 - 13 %    EOSINOPHILS 0 0 - 7 %    BASOPHILS 0 0 - 1 %    IMMATURE GRANULOCYTES 0 %    ABS. NEUTROPHILS 8.5 (H) 1.8 - 8.0 K/UL    ABS. LYMPHOCYTES 0.7 (L) 0.8 - 3.5 K/UL    ABS. MONOCYTES 0.9 0.0 - 1.0 K/UL    ABS. EOSINOPHILS 0.0 0.0 - 0.4 K/UL    ABS. BASOPHILS 0.0 0.0 - 0.1 K/UL    ABS. IMM. GRANS. 0.0 K/UL    DF MANUAL      RBC COMMENTS ANISOCYTOSIS  1+        RBC COMMENTS MACROCYTOSIS  1+       MAGNESIUM    Collection Time: 09/15/20  9:15 PM   Result Value Ref Range    Magnesium 1.7 1.6 - 2.4 mg/dL   PHOSPHORUS    Collection Time: 09/15/20  9:15 PM   Result Value Ref Range    Phosphorus 2.8 2.6 - 4.7 MG/DL   METABOLIC PANEL, COMPREHENSIVE    Collection Time: 09/15/20  9:15 PM   Result Value Ref Range    Sodium 136 136 - 145 mmol/L    Potassium 4.0 3.5 - 5.1 mmol/L    Chloride 105 97 - 108 mmol/L    CO2 25 21 - 32 mmol/L    Anion gap 6 5 - 15 mmol/L    Glucose 144 (H) 65 - 100 mg/dL    BUN 34 (H) 6 - 20 MG/DL    Creatinine 0.69 (L) 0.70 - 1.30 MG/DL    BUN/Creatinine ratio 49 (H) 12 - 20      GFR est AA >60 >60 ml/min/1.73m2    GFR est non-AA >60 >60 ml/min/1.73m2    Calcium 8.5 8.5 - 10.1 MG/DL    Bilirubin, total 0.3 0.2 - 1.0 MG/DL    ALT (SGPT) 75 12 - 78 U/L    AST (SGOT) 60 (H) 15 - 37 U/L    Alk.  phosphatase 81 45 - 117 U/L    Protein, total 6.1 (L) 6.4 - 8.2 g/dL    Albumin 2.3 (L) 3.5 - 5.0 g/dL    Globulin 3.8 2.0 - 4.0 g/dL    A-G Ratio 0.6 (L) 1.1 - 2.2     MAGNESIUM    Collection Time: 09/16/20  4:00 AM   Result Value Ref Range    Magnesium 1.8 1.6 - 2.4 mg/dL   FIBRINOGEN    Collection Time: 09/16/20  4:44 AM   Result Value Ref Range    Fibrinogen 247 200 - 475 mg/dL   PHOSPHORUS    Collection Time: 09/16/20  4:44 AM   Result Value Ref Range    Phosphorus 3.6 2.6 - 4.7 MG/DL METABOLIC PANEL, COMPREHENSIVE    Collection Time: 09/16/20  4:44 AM   Result Value Ref Range    Sodium 140 136 - 145 mmol/L    Potassium 3.9 3.5 - 5.1 mmol/L    Chloride 108 97 - 108 mmol/L    CO2 25 21 - 32 mmol/L    Anion gap 7 5 - 15 mmol/L    Glucose 119 (H) 65 - 100 mg/dL    BUN 35 (H) 6 - 20 MG/DL    Creatinine 0.67 (L) 0.70 - 1.30 MG/DL    BUN/Creatinine ratio 52 (H) 12 - 20      GFR est AA >60 >60 ml/min/1.73m2    GFR est non-AA >60 >60 ml/min/1.73m2    Calcium 8.3 (L) 8.5 - 10.1 MG/DL    Bilirubin, total 0.4 0.2 - 1.0 MG/DL    ALT (SGPT) 76 12 - 78 U/L    AST (SGOT) 65 (H) 15 - 37 U/L    Alk. phosphatase 74 45 - 117 U/L    Protein, total 5.6 (L) 6.4 - 8.2 g/dL    Albumin 2.3 (L) 3.5 - 5.0 g/dL    Globulin 3.3 2.0 - 4.0 g/dL    A-G Ratio 0.7 (L) 1.1 - 2.2     CBC W/O DIFF    Collection Time: 09/16/20  4:44 AM   Result Value Ref Range    WBC 8.0 4.1 - 11.1 K/uL    RBC 1.74 (L) 4.10 - 5.70 M/uL    HGB 6.1 (L) 12.1 - 17.0 g/dL    HCT 18.3 (L) 36.6 - 50.3 %    .2 (H) 80.0 - 99.0 FL    MCH 35.1 (H) 26.0 - 34.0 PG    MCHC 33.3 30.0 - 36.5 g/dL    RDW 15.6 (H) 11.5 - 14.5 %    PLATELET 893 (L) 002 - 400 K/uL    NRBC 0.0 0  WBC    ABSOLUTE NRBC 0.00 0.00 - 0.01 K/uL   LIPASE    Collection Time: 09/16/20  4:44 AM   Result Value Ref Range    Lipase 89 73 - 393 U/L   LACTIC ACID    Collection Time: 09/16/20  4:55 AM   Result Value Ref Range    Lactic acid 0.5 0.4 - 2.0 MMOL/L   RBC, ALLOCATE    Collection Time: 09/16/20  7:30 AM   Result Value Ref Range    HISTORY CHECKED? Historical check performed        Assessment:   · Pancreatitis: LFTs unremarkable, normal lipase. CT abdomen/pelvis with IV contrast (9/7/20): improved peripancreatic inflammatory changes, compatible with pancreatitis; unchanged biliary dilatation with no visualized choledocholithiasis; small hiatal hernia.   · Anemia: Hgb 6.1<--7.4<--8.3<--9.7<--10.2. BUN 35. No signs of active GI bleeding.  EGD (7/20/20) by Dr. Aron Stewart for coffee-ground emesis showed LA Grade D erosive esophagitis; hiatal hernia; retention of large volume of gastric fluid likely secondary to gastric outlet obstruction from extrinsic compression. · Sepsis: WBC 8.0, lactic acid normal. Repeat blood cultures (9/15): Tmax 102.4 and hypotensive. On antibiotics. CXR (9/15/20): no acute process. · Gram positive cocci bacteremia versus contaminant: on antibiotics. ID following. Repeat blood culture (9/9/20): no growth 5d.   · Sepsis: COVID negative  · History of PE status post IVC filter placement     Patient Active Problem List   Diagnosis Code    DVT (deep vein thrombosis) in pregnancy O22.30    DVT (deep venous thrombosis) ([facility]) I82.409    Tachycardia R00.0    Hypotension I95.9    Fever R50.9    Sepsis ([address] A41.9    Shock ([address] R57.9     Plan:   · On full liquids. Dietician following. · PPI BID   · Continue supportive measures  · Trend CBC and transfuse as necessary. Monitor for signs of active GI bleeding. · Discussed with hospitalist  · Discussed with Dr. Bryce King and Dr. Jeremie Burkett. Will obtain CTA GI bleed protocol (evaluate for pseudoaneurysm). If positive, please consult IR immediately for intervention.       Signed By: Gemma Opitz, PA     9/16/2020  10:15 AM

## 2020-09-16 NOTE — PROGRESS NOTES
Problem: Sepsis: Day of Diagnosis  Goal: *Paired blood cultures prior to first dose of antibiotic  Outcome: Progressing Towards Goal     Problem: Sepsis: Day of Diagnosis  Goal: *First dose of  appropriate antibiotic within 3 hours of arrival to ED, within 1 hour of arrival to ICU  Outcome: Progressing Towards Goal

## 2020-09-16 NOTE — PROGRESS NOTES
2008-PCT obtained VS and called RN to bedside. Pt RR 40/min, BP 95/51, HR in the  130-140s, 02 saturation 95% on room air. Pt febrile, oral temperature of 102.4F. Pt did not complain of any malaise, nausea, or pain. Lungs clear to auscultation. RN immediately called a RRT due to unstable VS. Pt placed on 2 L NC to decrease RR effort. 2018-RN started documenting RRT, pt appeared feverish, ice packs were applied. NP arrived at the bedside, ordered a one time 500cc bolus of NS, secondary RN obtained an EKG to assess pt heart rhythm, verbally stated it was sinus tach, HR in the 140s. NP noted that tachycardia may be due to fever, and RR may also be tied into that. This RN noted that pt has had recent history of sepsis and septic shock, verbalized concern that pt may need septic workup due to vital sign changes. NP stated we would continue to monitor and placed an order for one time dose of PO ibuprofen. NP stated there did not appear to be a need to transfer at this time. 2025-First NS bolus started at the end of the rapid response. Pt oxygen saturation 100% on 2L NC, RR still elevated in the 40s. Pt stated he felt fine, did not appear diaphoretic. PO Ibuprofen given at 2031.    2102-Repeat VS showed no major changes, MEWS still a 9, NP paged to inform him that temperature was still 102.2 axillary, , BP dropping to 91/44. NP placed order for second 500cc NS bolus, pt remained on 2L NC. NP then placed STAT orders post rapid for a CBC and BMP. RN obtained lab work, stayed with pt to obtain more VS. NP ordered transfer to high level of care. Pt appeared more feverish, weak in upper extremities. 2200-RN called report to receiving RN, obtained another set of VS, BP coming up to 102/55, , temperature down to 100F axillary, RR 28. Pt informed of transfer and belongs transferred with pt. Transferred on 2L NC with RN, tech and student nurse.

## 2020-09-16 NOTE — ROUTINE PROCESS
Bedside shift change report given to Katarina Quinones (oncoming nurse) by Yarely Penn RN (offgoing nurse). Report included the following information SBAR, Kardex, Intake/Output and MAR.

## 2020-09-16 NOTE — PROGRESS NOTES
Bedside and Verbal shift change report given to Rebecca Lomeli (oncoming nurse) by Mark Antoine RN (offgoing nurse). Report included the following information SBAR, Kardex, ED Summary, Procedure Summary, Intake/Output, MAR, Recent Results and Cardiac Rhythm NSR.

## 2020-09-16 NOTE — PROGRESS NOTES
Day #1 of Cefepime  Indication:  Sepsis  Current regimen:  1 GM IV Q8H  Abx regimen: monotherapy  Recent Labs     20  0444 09/15/20  2115 09/15/20  0445 20  0336   WBC 8.0 10.1 7.3  --    CREA  --  0.69* 0.62* 0.63*   BUN  --  34* 33* 24*     Est CrCl: ~80 ml/min; Temp (24hrs), Av.4 °F (38 °C), Min:98.3 °F (36.8 °C), Max:102.4 °F (39.1 °C)    Cultures:    Blood Cx Staph CoN 1/2 bottles, final   Blood Cx NG, holding for fungal   Blood Cx NG, final      Plan: Change to Cefepime 2 GM IV Q8H for CrCl > 60 ml/min.

## 2020-09-16 NOTE — PROGRESS NOTES
9/16/2020 -   TIMMY:  - RUR: 23%  - Disposition is TBD dependent on progression: potential discharge to home with continuation of HH with [facility] is to wean patient off of TPN while in house vs continuation of home TPN via [facility]    - Patient became febrile overnight  - Hemoglobin dropped; patient to receive 1 unit PRBC today, 9/16  - Cultures pending  - ABX continue  - TPN continues with full liquid diet  - Patient to have CTA today, 9/16  - Patient may need EUS prior to discharge  CRM: Michael Newsome, MPH, [address] [place] [address]; Z: [phone]

## 2020-09-16 NOTE — PROGRESS NOTES
Pharmacist Note - Vancomycin Dosing    Consult provided for this 80 y.o. male for indication of sepsis. Antibiotic regimen(s): cefepime, Vanc  Patient recently completed a course of Vancomycin -    Recent Labs     20  0444 09/15/20  2115 09/15/20  0445   WBC 8.0 10.1 7.3   CREA 0.67* 0.69* 0.62*   BUN 35* 34* 33*     Frequency of BMP: qdx3  Height: 180.3 cm  Weight: 79 kg  Est CrCl: 80 ml/min; UO: n/a ml/kg/hr  Temp (24hrs), Av.3 °F (37.9 °C), Min:98.3 °F (36.8 °C), Max:102.4 °F (39.1 °C)    Cultures:  No new    Goal trough = 15 - 20 mcg/mL    Therapy will be initiated with a loading dose of 2000 mg IV x 1 to be followed by a maintenance dose of 1250 mg IV every 12 hours. Pharmacy to follow patient daily and order levels / make dose adjustments as appropriate.

## 2020-09-16 NOTE — PROGRESS NOTES
TRANSFER - OUT REPORT:    Verbal report given to Lawrence Bernardo RN(name) on Dixon Saleem  being transferred to (unit) for change in patient condition(tachycardia, fever, rapid RR causing RRT)       Report consisted of patients Situation, Background, Assessment and   Recommendations(SBAR). Information from the following report(s) SBAR, Kardex, Intake/Output, MAR and Recent Results was reviewed with the receiving nurse. Lines:   PICC Double Lumen 07/16/20 Right;Brachial (Active)   Central Line Being Utilized Yes 09/15/20 2008   Criteria for Appropriate Use Total parenteral nutrition 09/15/20 2008   Site Assessment Clean, dry, & intact 09/15/20 2008   Phlebitis Assessment 0 09/15/20 2008   Infiltration Assessment 0 09/15/20 2008   Date of Last Dressing Change 09/13/20 09/15/20 2008   Dressing Status Clean, dry, & intact 09/15/20 2008   Action Taken Open ports on tubing capped 09/15/20 2008   Dressing Type Disk with Chlorhexadine gluconate (CHG) 09/15/20 2008   Hub Color/Line Status Purple; Infusing 09/15/20 2144   Positive Blood Return (Site #1) No 09/15/20 2144   Hub Color/Line Status Red; Infusing 09/15/20 2144   Positive Blood Return (Site #2) No 09/15/20 2144   Alcohol Cap Used Yes 09/15/20 2008       Peripheral IV 09/09/20 Left Hand (Active)   Site Assessment Clean, dry, & intact 09/15/20 2008   Phlebitis Assessment 0 09/15/20 2008   Infiltration Assessment 0 09/15/20 2008   Dressing Status Clean, dry, & intact 09/15/20 2008   Dressing Type Transparent 09/15/20 2008   Hub Color/Line Status Pink;Capped 09/15/20 2008   Action Taken Open ports on tubing capped 09/15/20 2008   Alcohol Cap Used Yes 09/15/20 2008        Opportunity for questions and clarification was provided.       Patient transported with:   O2 @ 2 liters  Registered Nurse  [facility]

## 2020-09-16 NOTE — PROGRESS NOTES
[facility] [facility]                                                                                          Hospitalist Progress Note  Timoteo Ambriz MD  Answering service: [phone] from in house phone        Date of Service:  2020  NAME:  Kellie Palomino  :  1934  MRN:  [id number]      Admission Summary:     Alok Guido is a 80 y.o. male with past medical history of recent multiple bilateral pulmonary emboli, right lower extremity DVT, GI bleed, erosive gastritis, anemia, blood transfusion, pancreatitis, arthritis, BPH, DJD, and hyperlipidemia presented to the ED from home with reported nausea and vomiting.  Patient provided some history in addition to his daughter who was at the bedside. Kinjal Sunshine of history was obtained per my review of ED and electronic medical records.      Interval history / Subjective:     He said no abdominal pain, nausea, vomiting, chest pain, cough or shortness of breath     Assessment & Plan:     Septic Shock  -off pressor,    -received iv cefepime and vancomycin, abx off  -repeated blood cx on  no growth so far, blood cx on  staph coag negative likely contaminant  -TTE-LV EF 55-60% normal cavity, wall thickness ans systolic function.    -PICC line replaced on   -COVID 19 test on  negative  -patient start to spike fever on 9/15, t [id number] on 9/15 at 2102, lactic acid normal,  repeated blood cx on 9/15, hypotensive, received a litter bolus, received iv albumin, midodrine, re started him on cefepime, vancomycin  -ID on board,       Pancreatitis  -improved peripancreatic inflammatory changes on most recent CT  -lipase was normal 107 on , check lipase  -on TPN, on GI Lite diet  but had vomiting and loss stool bowel movement x 2 early in the morning, placed on full liquid diet, will try to advance slowly  -Nutritionist on board  -GI on board     Gastric outlet obstruction  -on GI Lite diet and TPN   -GI on board  -Nutritionist on board, plan to avoid home TPN,      Pancreatic abnormality on prev CT   -CT abdomen pelvis on 9/7 improved peripancreatic inflammatory changes, compatible with pancreatitis. Unchanged biliary dilatation, with no visualized choledocholithiasis. Small hiatal hernia.  -Hem/Onc on board     Hx PE/DVT, s/p IVC filter placement  -elevated d-dimer, improving  -IVC filter in place    Anemia of chronic disease  -Hgb 6.1, transfuse one unit pRBC, and repeat H/H  -no evidence of active bleeding  -monitor H/H      Hypokalemia  -replaced and resolved       Code status: DNR  DVT prophylaxis:SCD    Care Plan discussed with: Patient/Family and Nurse  Anticipated Disposition: TBD  Anticipated Discharge: 24 hours to 48 hours       I called his daughter, Harjinder Nuñez , his wife also on the phone, updated the clinical condition, lab finding including low Hgb and transfusion, questions answered and agreed on plan of care    I called his daughter, Urbano Gallego at  updated her clinical condition, lab finding and treatment plan 9/15         Hospital Problems  Date Reviewed: 7/14/2020          Codes Class Noted POA    Tachycardia ICD-10-CM: R00.0  ICD-9-CM: 785.0  9/7/2020 Unknown        Hypotension ICD-10-CM: I95.9  ICD-9-CM: 458.9  9/7/2020 Unknown        Fever ICD-10-CM: R50.9  ICD-9-CM: 780.60  9/7/2020 Unknown        Sepsis ([address] ICD-10-CM: A41.9  ICD-9-CM: 038.9, 995.91  9/7/2020 Unknown        Shock ([address] ICD-10-CM: R57.9  ICD-9-CM: 785.50  9/7/2020 Unknown              Vital Signs:    Last 24hrs VS reviewed since prior progress note.  Most recent are:  Visit Vitals  BP (!) 92/45 (BP 1 Location: Left arm, BP Patient Position: At rest)   Pulse 96   Temp 98.8 °F (37.1 °C)   Resp 24   Ht 5' 11\" (1.803 m)   Wt 79 kg (174 lb 2.6 oz)   SpO2 99%   BMI 24.29 kg/m²         Intake/Output Summary (Last 24 hours) at 9/16/2020 0840  Last data filed at 9/15/2020 2149  Gross per 24 hour   Intake 0 ml   Output 500 ml Net -500 ml        Physical Examination:             Constitutional:  No acute distress, cooperative, pleasant    ENT:  Oral mucosa moist, oropharynx benign. Resp:  CTA bilaterally. No wheezing/rhonchi/rales. No accessory muscle use   CV:  Regular rhythm, normal rate, no murmurs, gallops, rubs    GI:  Soft, non distended, non tender. normoactive bowel sounds, no hepatosplenomegaly     Musculoskeletal:  No edema,     Neurologic:  Moves all extremities. AAOx3, CN II-XII reviewed     Skin:  Good turgor, no rashes or ulcers       Data Review:    Review and/or order of clinical lab test  Review and/or order of tests in the radiology section of CPT  Review and/or order of tests in the medicine section of CPT      Labs:     Recent Labs     09/16/20  0444 09/15/20  2115   WBC 8.0 10.1   HGB 6.1* 7.4*   HCT 18.3* 22.2*   * 138*     Recent Labs     09/16/20  0444 09/15/20  2115 09/15/20  0445    136 136   K 3.9 4.0 4.2    105 106   CO2 25 25 26   BUN 35* 34* 33*   CREA 0.67* 0.69* 0.62*   * 144* 133*   CA 8.3* 8.5 8.6   MG  --  1.7 1.8   PHOS 3.6 2.8 2.8     Recent Labs     09/16/20  0444 09/15/20  2115 09/15/20  0445   ALT 76 75 78   AP 74 81 85   TBILI 0.4 0.3 0.3   TP 5.6* 6.1* 6.3*   ALB 2.3* 2.3* 2.4*   GLOB 3.3 3.8 3.9     No results for input(s): INR, PTP, APTT, INREXT, INREXT in the last 72 hours. No results for input(s): FE, TIBC, PSAT, FERR in the last 72 hours. No results found for: FOL, RBCF   No results for input(s): PH, PCO2, PO2 in the last 72 hours. No results for input(s): CPK, CKNDX, TROIQ in the last 72 hours.     No lab exists for component: CPKMB  Lab Results   Component Value Date/Time    Cholesterol, total 250 (H) 10/02/2013 01:55 PM    HDL Cholesterol 53 10/02/2013 01:55 PM    LDL, calculated 176 (H) 10/02/2013 01:55 PM    Triglyceride 244 (H) 07/23/2020 04:41 AM    CHOL/HDL Ratio 3.4 10/06/2010 10:25 AM     Lab Results   Component Value Date/Time    Glucose (POC) 149 (H) 09/15/2020 09:05 PM    Glucose (POC) 113 (H) 09/15/2020 04:51 PM    Glucose (POC) 135 (H) 09/15/2020 10:59 AM    Glucose (POC) 132 (H) 09/15/2020 05:49 AM    Glucose (POC) 109 (H) 09/15/2020 12:31 AM     Lab Results   Component Value Date/Time    Color YELLOW/STRAW 09/07/2020 01:59 AM    Appearance CLEAR 09/07/2020 01:59 AM    Specific gravity 1.020 09/07/2020 01:59 AM    pH (UA) 6.5 09/07/2020 01:59 AM    Protein Negative 09/07/2020 01:59 AM    Glucose Negative 09/07/2020 01:59 AM    Ketone Negative 09/07/2020 01:59 AM    Bilirubin Negative 09/07/2020 01:59 AM    Urobilinogen 1.0 09/07/2020 01:59 AM    Nitrites Negative 09/07/2020 01:59 AM    Leukocyte Esterase Negative 09/07/2020 01:59 AM    Epithelial cells FEW 09/07/2020 01:59 AM    Bacteria Negative 09/07/2020 01:59 AM    WBC 0-4 09/07/2020 01:59 AM    RBC 0-5 09/07/2020 01:59 AM         Medications Reviewed:     Current Facility-Administered Medications   Medication Dose Route Frequency    cefepime (MAXIPIME) 2 g in 0.9% sodium chloride (MBP/ADV) 100 mL  2 g IntraVENous Q8H    0.9% sodium chloride infusion  125 mL/hr IntraVENous CONTINUOUS    0.9% sodium chloride infusion 250 mL  250 mL IntraVENous PRN    Vancomycin - Pharmacy dosing   Other Rx Dosing/Monitoring    vancomycin (VANCOCIN) 2000 mg in  ml infusion  2,000 mg IntraVENous ONCE    vancomycin (VANCOCIN) 1250 mg in  ml infusion  1,250 mg IntraVENous Q12H    TPN ADULT - CENTRAL   IntraVENous CONTINUOUS    alteplase (CATHFLO) 2 mg in sterile water (preservative free) 2 mL injection  2 mg InterCATHeter PRN    fat emulsion 20% (LIPOSYN, INTRAlipid) infusion 500 mL  500 mL IntraVENous Q MON, WED & FRI    [Held by provider] heparin (porcine) injection 5,000 Units  5,000 Units SubCUTAneous Q12H    nystatin (MYCOSTATIN) 100,000 unit/gram powder   Topical BID    sodium chloride (NS) flush 5-40 mL  5-40 mL IntraVENous Q8H    sodium chloride (NS) flush 5-40 mL  5-40 mL IntraVENous PRN    polyethylene glycol (MIRALAX) packet 17 g  17 g Oral DAILY PRN    promethazine (PHENERGAN) tablet 12.5 mg  12.5 mg Oral Q6H PRN    Or    ondansetron (ZOFRAN) injection 4 mg  4 mg IntraVENous Q6H PRN    midodrine (PROAMATINE) tablet 10 mg  10 mg Oral TID     ______________________________________________________________________  EXPECTED LENGTH OF STAY: 4d 19h  ACTUAL LENGTH OF STAY:          9                 Vish Abbasi MD

## 2020-09-16 NOTE — PROGRESS NOTES
Critical Care Documentation    Name: Ayala Calixto  YOB: 1934  MRN: [id number]  Admission Date: 9/7/2020 12:12 AM    Date of service: 9/16/2020    Active Diagnoses:    Hospital Problems  Date Reviewed: 7/14/2020          Codes Class Noted POA    Tachycardia ICD-10-CM: R00.0  ICD-9-CM: 785.0  9/7/2020 Unknown        Hypotension ICD-10-CM: I95.9  ICD-9-CM: 458.9  9/7/2020 Unknown        Fever ICD-10-CM: R50.9  ICD-9-CM: 780.60  9/7/2020 Unknown        Sepsis ([facility] [address] ICD-10-CM: A41.9  ICD-9-CM: 038.9, 995.91  9/7/2020 Unknown        Shock ([facility] [address] ICD-10-CM: R57.9  ICD-9-CM: 785.50  9/7/2020 Unknown              Critical Illness Complaint:  Tachycardia, hypotension    Clinical Presentation:  Rapid response called due to hypotension, fever, tachycardia. Found to be febrile temp 102F, tachycardic in the 130s. Recently completed course of vancomycin and cefepime, s/p sepsis with shock. Complains for feverish feeling. Data Reviewed: All diagnostic labs and studies have been reviewed. Medications/Therapies Administered:   Anxiolytics: [  ] yes [ x ] no   Antiarrhythmics: [  ] yes [ x ] no   Antihypertensives: [  ] yes [ x ] no   IVFs: [ x ] yes [  ] no   Blood Transfusion: [  ] yes [x  ] no    Imaging Ordered and Reviewed:   [ x ] CXR    [  ] CT Scan    [  ] MRI    [  ] Ultrasound    Prognosis / Plan of Care Discussed With:  [x  ] Patient  [  ] Family Members / Surrogate Decision-makers (patient unable / incompetent to give history and/or make treatment decisions, and such discussion is medically necessary)  [ x ] Nursing Staff  [  ] Specialist Physicians        Assessment and Plan:  Fever/Hypotension  - Off antibiotics, now with recurrent fever.  Will give antipyretics, if fever recurs will restart antibiotics  - Fever improving, HR and RR down  - Fluid bolus, blood pressure responded 102/55  - Infectious work up - CXR, CBC, CMP, Mag, Phos, lactic acid  - Transfer to higher level of care due to tachycardia    Addendum: Episode of transient hypotension, second fluid bolus given. Concern over HGB trending down 10.2 9/12/2020, now 7.4. Continue to trend, obtain type and screen. If blood pressure continues to trend down, ICU consultation for hypotension refractory to IVF bolus, possible need for pressors. Critical Care Attestation: This patient is unstable and critically ill. Due to a high probability of clinically significant, life threatening deterioration, the patient required my highest level of preparedness to intervene emergently and I personally spent this critical care time directly and personally managing the patient, and was immediately available to the patient. This critical care time included obtaining a history; examining the patient; pulse oximetry; ordering and review of labs/studies; arranging urgent treatment with development of a management plan; evaluation of patient's response to treatment; frequent reassessment; and, discussions with other providers and/or family. This critical care time was performed to assess and manage the high probability of imminent, life-threatening deterioration that could result in multi-organ failure and death. Time Spent:     I personally spent 25 minutes in providing critical care time. It was exclusive of separately billable procedures, treating other patients, and teaching time.     Guerda Chu NP  9/15/2020

## 2020-09-17 ENCOUNTER — APPOINTMENT (OUTPATIENT)
Dept: ULTRASOUND IMAGING | Age: 85
DRG: 871 | End: 2020-09-17
Attending: PHYSICIAN ASSISTANT
Payer: MEDICARE

## 2020-09-17 LAB
ABO + RH BLD: NORMAL
ALBUMIN SERPL-MCNC: 2.3 G/DL (ref 3.5–5)
ALBUMIN/GLOB SERPL: 0.6 {RATIO} (ref 1.1–2.2)
ALP SERPL-CCNC: 86 U/L (ref 45–117)
ALT SERPL-CCNC: 97 U/L (ref 12–78)
ANION GAP SERPL CALC-SCNC: 4 MMOL/L (ref 5–15)
AST SERPL-CCNC: 90 U/L (ref 15–37)
BILIRUB DIRECT SERPL-MCNC: 0.1 MG/DL (ref 0–0.2)
BILIRUB SERPL-MCNC: 0.5 MG/DL (ref 0.2–1)
BLD PROD TYP BPU: NORMAL
BLOOD GROUP ANTIBODIES SERPL: NORMAL
BPU ID: NORMAL
BUN SERPL-MCNC: 29 MG/DL (ref 6–20)
BUN/CREAT SERPL: 41 (ref 12–20)
CALCIUM SERPL-MCNC: 8.1 MG/DL (ref 8.5–10.1)
CHLORIDE SERPL-SCNC: 110 MMOL/L (ref 97–108)
CO2 SERPL-SCNC: 24 MMOL/L (ref 21–32)
CREAT SERPL-MCNC: 0.71 MG/DL (ref 0.7–1.3)
CROSSMATCH RESULT,%XM: NORMAL
FIBRINOGEN PPP-MCNC: 291 MG/DL (ref 200–475)
GLOBULIN SER CALC-MCNC: 3.6 G/DL (ref 2–4)
GLUCOSE SERPL-MCNC: 128 MG/DL (ref 65–100)
HCT VFR BLD AUTO: 22.9 % (ref 36.6–50.3)
HCT VFR BLD AUTO: 24.4 % (ref 36.6–50.3)
HGB BLD-MCNC: 7.5 G/DL (ref 12.1–17)
HGB BLD-MCNC: 8 G/DL (ref 12.1–17)
PHOSPHATE SERPL-MCNC: 3 MG/DL (ref 2.6–4.7)
POTASSIUM SERPL-SCNC: 3.9 MMOL/L (ref 3.5–5.1)
PROT SERPL-MCNC: 5.9 G/DL (ref 6.4–8.2)
SODIUM SERPL-SCNC: 138 MMOL/L (ref 136–145)
SPECIMEN EXP DATE BLD: NORMAL
STATUS OF UNIT,%ST: NORMAL
UNIT DIVISION, %UDIV: 0

## 2020-09-17 PROCEDURE — 74011000250 HC RX REV CODE- 250: Performed by: INTERNAL MEDICINE

## 2020-09-17 PROCEDURE — 74011000250 HC RX REV CODE- 250: Performed by: PHYSICIAN ASSISTANT

## 2020-09-17 PROCEDURE — 74011250636 HC RX REV CODE- 250/636: Performed by: PHYSICIAN ASSISTANT

## 2020-09-17 PROCEDURE — 74011000258 HC RX REV CODE- 258: Performed by: INTERNAL MEDICINE

## 2020-09-17 PROCEDURE — 74011250636 HC RX REV CODE- 250/636: Performed by: HOSPITALIST

## 2020-09-17 PROCEDURE — 80048 BASIC METABOLIC PNL TOTAL CA: CPT

## 2020-09-17 PROCEDURE — 74011250636 HC RX REV CODE- 250/636: Performed by: NURSE PRACTITIONER

## 2020-09-17 PROCEDURE — C9113 INJ PANTOPRAZOLE SODIUM, VIA: HCPCS | Performed by: PHYSICIAN ASSISTANT

## 2020-09-17 PROCEDURE — 65210000002 HC RM PRIVATE GYN

## 2020-09-17 PROCEDURE — 76705 ECHO EXAM OF ABDOMEN: CPT

## 2020-09-17 PROCEDURE — 74011250637 HC RX REV CODE- 250/637: Performed by: INTERNAL MEDICINE

## 2020-09-17 PROCEDURE — 74011000258 HC RX REV CODE- 258: Performed by: NURSE PRACTITIONER

## 2020-09-17 PROCEDURE — 80076 HEPATIC FUNCTION PANEL: CPT

## 2020-09-17 PROCEDURE — 84100 ASSAY OF PHOSPHORUS: CPT

## 2020-09-17 PROCEDURE — 74011250637 HC RX REV CODE- 250/637: Performed by: NURSE PRACTITIONER

## 2020-09-17 PROCEDURE — 36415 COLL VENOUS BLD VENIPUNCTURE: CPT

## 2020-09-17 PROCEDURE — 85384 FIBRINOGEN ACTIVITY: CPT

## 2020-09-17 PROCEDURE — 74011250636 HC RX REV CODE- 250/636: Performed by: INTERNAL MEDICINE

## 2020-09-17 PROCEDURE — 85018 HEMOGLOBIN: CPT

## 2020-09-17 RX ADMIN — VANCOMYCIN HYDROCHLORIDE 1250 MG: 10 INJECTION, POWDER, LYOPHILIZED, FOR SOLUTION INTRAVENOUS at 09:35

## 2020-09-17 RX ADMIN — MIDODRINE HYDROCHLORIDE 10 MG: 5 TABLET ORAL at 15:52

## 2020-09-17 RX ADMIN — CEFEPIME 2 G: 2 INJECTION, POWDER, FOR SOLUTION INTRAVENOUS at 22:04

## 2020-09-17 RX ADMIN — SODIUM CHLORIDE 125 ML/HR: 900 INJECTION, SOLUTION INTRAVENOUS at 19:04

## 2020-09-17 RX ADMIN — Medication 10 ML: at 06:48

## 2020-09-17 RX ADMIN — Medication 10 ML: at 22:01

## 2020-09-17 RX ADMIN — MIDODRINE HYDROCHLORIDE 10 MG: 5 TABLET ORAL at 09:03

## 2020-09-17 RX ADMIN — Medication 10 ML: at 12:52

## 2020-09-17 RX ADMIN — VANCOMYCIN HYDROCHLORIDE 1250 MG: 10 INJECTION, POWDER, LYOPHILIZED, FOR SOLUTION INTRAVENOUS at 20:28

## 2020-09-17 RX ADMIN — CEFEPIME 2 G: 2 INJECTION, POWDER, FOR SOLUTION INTRAVENOUS at 06:48

## 2020-09-17 RX ADMIN — MIDODRINE HYDROCHLORIDE 10 MG: 5 TABLET ORAL at 22:01

## 2020-09-17 RX ADMIN — NYSTATIN: 100000 POWDER TOPICAL at 16:06

## 2020-09-17 RX ADMIN — SODIUM CHLORIDE 40 MG: 9 INJECTION INTRAMUSCULAR; INTRAVENOUS; SUBCUTANEOUS at 09:07

## 2020-09-17 RX ADMIN — SODIUM CHLORIDE 40 MG: 9 INJECTION INTRAMUSCULAR; INTRAVENOUS; SUBCUTANEOUS at 20:28

## 2020-09-17 RX ADMIN — CEFEPIME 2 G: 2 INJECTION, POWDER, FOR SOLUTION INTRAVENOUS at 15:53

## 2020-09-17 RX ADMIN — Medication 10 ML: at 20:28

## 2020-09-17 RX ADMIN — SODIUM CHLORIDE 125 ML/HR: 900 INJECTION, SOLUTION INTRAVENOUS at 00:41

## 2020-09-17 RX ADMIN — CALCIUM GLUCONATE: 94 INJECTION, SOLUTION INTRAVENOUS at 19:07

## 2020-09-17 NOTE — PROGRESS NOTES
[address], [facility] Pati Promise  [phone]        Updated wife and daughter Kemi Mora on phone with plans for ERCP tomorrow.      Luma Wolf NP

## 2020-09-17 NOTE — PROGRESS NOTES
Comprehensive Nutrition Assessment    Type and Reason for Visit: Reassess    Nutrition Recommendations/Plan:    1. Encourage PO intake with all meals and supplements   - document % of meals in I/O flowsheet to monitor PO adequacy   - use supplements to give medications    2. If pt to remain on TPN switch to cyclic for liver rest   6.5%AA, D25 @ 75ml/hr x 1 hr   6.5%AA, D25 @ 155ml/hr x 10 hrs   6.5%AA, D25 @ 75ml/hr x 1 hr   Continue same lipids. Nutrition Assessment:    Pt admitted with Tachycardia. PMHx: B/L PE-s/p IVC filter , DVT, GI bleed, Erosive gastritis, Pancreatitis. Noted Bacteremia concerns for possible relation to PICC. PICC may need to eb removed pending repeat cultures. 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 Per review of nutrition notes from last admission-patient has maintained weight of ~167#. Pt sent home on TPN since unable to eat and PEG/J tube unable to be placed 2/2 ascites. TPN continues to provide 2241-7832 ml, 109 gm protein, 2300kcal. Diet advanced to solids but with recurrent abd pain and vomiting x 1 on 9/15 was downgraded back to full liquids. Pt visited today. Ate only a few bites of yogurt and 4 ox OJ at breakfast. Discussed trial of Ensure (strawberry) which he not yet tried. Drinking during conversation; BID will provide 700kcal, 40g protein. Since doing well with juice will also add Ensure Clear TID (720kcal, 24g protein) since low fat and pt may enjoy more. Discussed importance of improving PO intake especially if PICC/TPN discontinued. Pt with no questions. Recommend transition to cyclic regimen if to continue here I hospital or at home:    6.5%AA, D25 @ 75ml/hr x 1 hr   6.5%AA, D25 @ 155ml/hr x 10 hrs   6.5%AA, D25 @ 75ml/hr x 1 hr   Continue same lipids. Will provide same nutrition as above.    Plan to follow for results of ERCP, TPN plans, PO intake, and wt trends. Malnutrition Assessment:  Malnutrition Status:  No malnutrition      Nutritionally Significant Medications: cefepime, midodrine, protonix, vanco; NS @ 125ml/hr PRN: phenergan, zofran    Estimated Daily Nutrient Needs:  Energy (kcal):  4072-5923 (MSJ x 1.4-1.5)  Protein (g):   (1.2-1.4g/kg)       Fluid (ml/day):  1 ml/kcal    Nutrition Related Findings:  BM 9/16  Wounds:  None       Current Nutrition Therapies:   Diet: full liquids  Supplements: Ensure BID  TPN:  6.5%AA D25 @ 70ml/hr + 500 ml 20% lipid 3x/week  Meal intake:   Patient Vitals for the past 168 hrs:   % Diet Eaten   09/16/20 0800 40 %     Anthropometric Measures:  · Height:  5' 11\" (180.3 cm)  · Current Body Wt:  75.9 kg (167 lb 5.3 oz)   · Ideal Body Wt: 172#  :  97.3 %    Wt Readings from Last 10 Encounters:   09/17/20 80.2 kg (176 lb 12.9 oz)   09/09/20 75.9 kg (167 lb 5.3 oz)   07/30/20 90 kg (198 lb 6.6 oz)   07/27/18 92.5 kg (204 lb)   03/17/18 90.7 kg (200 lb)   08/18/17 86.2 kg (190 lb)   07/15/14 86.2 kg (190 lb)   10/02/13 90 kg (198 lb 6.4 oz)   03/05/13 88.2 kg (194 lb 6.4 oz)   09/18/12 88 kg (194 lb)     Nutrition Diagnosis:   · Inadequate oral intake(improving) related to altered GI function as evidenced by nutrition support-parenteral nutrition, intake 26-50%    Nutrition Interventions:   Food and/or Nutrient Delivery: Modify current diet, Continue oral nutrition supplement, Modify oral nutrition supplement, Continue parenteral nutrition  Nutrition Education and Counseling: Education initiated  Coordination of Nutrition Care: Continued inpatient monitoring    Goals:  PN to continue until oral intake meeting at least 60% needs consistently       Nutrition Monitoring and Evaluation:   Food/Nutrient Intake Outcomes: Food and nutrient intake, Supplement intake, Parenteral nutrition intake/tolerance  Physical Signs/Symptoms Outcomes: Biochemical data, GI status, Weight    Discharge Planning:     Too soon to determine     Sirisha Raymond, RD [address] [place] , Pager #[phone] or via [employer]

## 2020-09-17 NOTE — PROGRESS NOTES
Bedside and Verbal shift change report given to [facility] [address] (oncoming nurse) by Sonal Rhodes (offgoing nurse). Report included the following information SBAR, Kardex, ED Summary, Procedure Summary, Intake/Output, MAR, Recent Results and Cardiac Rhythm Sinus Tachy.

## 2020-09-17 NOTE — PROGRESS NOTES
Bedside shift change report given to [facility] (oncoming nurse) by Cruz Nobles (offgoing nurse). Report included the following information SBAR, Kardex, Intake/Output and Cardiac Rhythm NSR.

## 2020-09-17 NOTE — PROGRESS NOTES
Problem: Falls - Risk of  Goal: *Absence of Falls  Description: Document Raul Martinez Fall Risk and appropriate interventions in the flowsheet. Outcome: Progressing Towards Goal  Note: Fall Risk Interventions:  Mobility Interventions: Communicate number of staff needed for ambulation/transfer, Bed/chair exit alarm, Patient to call before getting OOB, PT Consult for mobility concerns, Utilize walker, cane, or other assistive device, PT Consult for assist device competence         Medication Interventions: Evaluate medications/consider consulting pharmacy, Patient to call before getting OOB, Teach patient to arise slowly    Elimination Interventions: Call light in reach, Toileting schedule/hourly rounds, Patient to call for help with toileting needs              Problem: Patient Education: Go to Patient Education Activity  Goal: Patient/Family Education  Outcome: Progressing Towards Goal     Problem: Pressure Injury - Risk of  Goal: *Prevention of pressure injury  Description: Document Joseph Scale and appropriate interventions in the flowsheet. Outcome: Progressing Towards Goal  Note: Pressure Injury Interventions:  Sensory Interventions: Avoid rigorous massage over bony prominences, Discuss PT/OT consult with provider, Maintain/enhance activity level, Keep linens dry and wrinkle-free, Turn and reposition approx. every two hours (pillows and wedges if needed), Pressure redistribution bed/mattress (bed type)    Moisture Interventions: Limit adult briefs, Check for incontinence Q2 hours and as needed    Activity Interventions: Increase time out of bed, Pressure redistribution bed/mattress(bed type), PT/OT evaluation    Mobility Interventions: Pressure redistribution bed/mattress (bed type), HOB 30 degrees or less, PT/OT evaluation, Turn and reposition approx.  every two hours(pillow and wedges)    Nutrition Interventions: Discuss nutritional consult with provider, Document food/fluid/supplement intake    Friction and Shear Interventions: Lift sheet, HOB 30 degrees or less                Problem: Patient Education: Go to Patient Education Activity  Goal: Patient/Family Education  Outcome: Progressing Towards Goal     Problem: Sepsis: Day 6  Goal: *Oxygen saturation within defined limits  Outcome: Progressing Towards Goal  Goal: *Vital signs within defined limits  Outcome: Progressing Towards Goal  Goal: *Demonstrates progressive activity  Outcome: Progressing Towards Goal  Goal: Activity/Safety  Outcome: Progressing Towards Goal  Goal: Medications  Outcome: Progressing Towards Goal  Goal: Treatments/Interventions/Procedures  Outcome: Progressing Towards Goal

## 2020-09-17 NOTE — PROGRESS NOTES
[address] [facility]                                                                                          Hospitalist Progress Note  Caron Ruiz MD  Answering service: [phone] from in house phone        Date of Service:  2020  NAME:  Damir Foote  :  1934  MRN:  [id number]      Admission Summary:     Johnathon Corrales is a 80 y.o. male with past medical history of recent multiple bilateral pulmonary emboli, right lower extremity DVT, GI bleed, erosive gastritis, anemia, blood transfusion, pancreatitis, arthritis, BPH, DJD, and hyperlipidemia presented to the ED from home with reported nausea and vomiting.  Patient provided some history in addition to his daughter who was at the bedside. Gaby García of history was obtained per my review of ED and electronic medical records.      Interval history / Subjective:     Patient seen and examined this morning. He is c/o cough and sputum production otherwise no abdominal pain, nausea or vomiting. Had one BM yesterday. No fever  \"Asking when he can go home\".       Assessment & Plan:     # Septic Shock-- resolved   # Bacteremia due to GPC  - off pressor  - episode of hypotension on 9/15 needing IVF bolus, albumin and midodrine    - received iv cefepime and vancomycin, which was restarted again 9/15  - last blood culture : GPC / bottle   - TTE-LV EF 55-60% normal cavity, wall thickness ans systolic function.   - PICC line replaced on  ( given the new +Ve blood culture, may need to be changed/removed)  - repeat blood culture once PICC removed   - COVID 19 test on  negative  - ID on board,    - may require repeat TTE and LAVON     # Pancreatitis  - improved peripancreatic inflammatory changes on most recent CT  - lipase was normal 107 on , check lipase  - on TPN, back off to full liquid diet due to intolerance of GI lite diet    - Nutritionist on board  - GI on board     # Gastric outlet obstruction  - on TPN and full liquid diet   - GI on board  - Nutritionist on board, plan to avoid home TPN,      # Pancreatic abnormality on prev CT   - CT abdomen pelvis on 9/7 improved peripancreatic inflammatory changes, compatible with pancreatitis. Unchanged biliary dilatation, with no visualized choledocholithiasis. Small hiatal hernia.  - Hem/Onc on board     # Hx PE/DVT, s/p IVC filter placement  - elevated d-dimer, improving  - IVC filter in place    # Acute on chronic Anemia disease  - s/p 1u PRBC transfusion, and monitor Hgb, transfuse <7  - no evidence of active bleeding  - monitor H/H      # Hypokalemia  - replaced and resolved       Code status: DNR  DVT prophylaxis:SCD    Care Plan discussed with: Patient/Family and Nurse  Anticipated Disposition: TBD  Anticipated Discharge: >48hrs     I called his daughter, Ephraim Garcia , his wife also on the phone, another daughter on the phone. Updated them on the current diagnosis, and treatment plan     Hospital Problems  Date Reviewed: 7/14/2020          Codes Class Noted POA    Tachycardia ICD-10-CM: R00.0  ICD-9-CM: 785.0  9/7/2020 Unknown        Hypotension ICD-10-CM: I95.9  ICD-9-CM: 458.9  9/7/2020 Unknown        Fever ICD-10-CM: R50.9  ICD-9-CM: 780.60  9/7/2020 Unknown        Sepsis ([address] ICD-10-CM: A41.9  ICD-9-CM: 038.9, 995.91  9/7/2020 Unknown        Shock ([address] ICD-10-CM: R57.9  ICD-9-CM: 785.50  9/7/2020 Unknown              Vital Signs:    Last 24hrs VS reviewed since prior progress note.  Most recent are:  Visit Vitals  BP (!) 113/56 (BP 1 Location: Left arm, BP Patient Position: At rest)   Pulse 97   Temp 99.8 °F (37.7 °C)   Resp 22   Ht 5' 11\" (1.803 m)   Wt 80.2 kg (176 lb 12.9 oz)   SpO2 96%   BMI 24.66 kg/m²         Intake/Output Summary (Last 24 hours) at 9/17/2020 0847  Last data filed at 9/17/2020 0650  Gross per 24 hour   Intake 8127.07 ml   Output 1455 ml   Net 6672.07 ml        Physical Examination:             Constitutional:  No acute distress, cooperative, pleasant    ENT:  Oral mucosa moist, oropharynx benign. Resp:  decreased breath sound at the base bilaterally    CV:  Regular rhythm, normal rate, no murmurs, gallops, rubs    GI:  Soft, non distended, non tender. normoactive bowel sounds, no hepatosplenomegaly     Musculoskeletal:  No edema,     Neurologic:  Moves all extremities. AAOx3, CN II-XII reviewed            Data Review:   I personally reviewed labs and imaging     Labs:     Recent Labs     09/17/20 0024 09/16/20  0444 09/15/20  2115   WBC  --  8.0 10.1   HGB 8.0* 6.1* 7.4*   HCT 24.4* 18.3* 22.2*   PLT  --  110* 138*     Recent Labs     09/17/20  0400 09/16/20  0444 09/16/20  0400 09/15/20  2115 09/15/20  0445    140  --  136 136   K 3.9 3.9  --  4.0 4.2   * 108  --  105 106   CO2 24 25  --  25 26   BUN 29* 35*  --  34* 33*   CREA 0.71 0.67*  --  0.69* 0.62*   * 119*  --  144* 133*   CA 8.1* 8.3*  --  8.5 8.6   MG  --   --  1.8 1.7 1.8   PHOS 3.0 3.6  --  2.8 2.8     Recent Labs     09/16/20  0444 09/15/20  2115 09/15/20  0445   ALT 76 75 78   AP 74 81 85   TBILI 0.4 0.3 0.3   TP 5.6* 6.1* 6.3*   ALB 2.3* 2.3* 2.4*   GLOB 3.3 3.8 3.9   LPSE 89  --   --      No results for input(s): INR, PTP, APTT, INREXT, INREXT in the last 72 hours. No results for input(s): FE, TIBC, PSAT, FERR in the last 72 hours. No results found for: FOL, RBCF   No results for input(s): PH, PCO2, PO2 in the last 72 hours. No results for input(s): CPK, CKNDX, TROIQ in the last 72 hours.     No lab exists for component: CPKMB  Lab Results   Component Value Date/Time    Cholesterol, total 250 (H) 10/02/2013 01:55 PM    HDL Cholesterol 53 10/02/2013 01:55 PM    LDL, calculated 176 (H) 10/02/2013 01:55 PM    Triglyceride 244 (H) 07/23/2020 04:41 AM    CHOL/HDL Ratio 3.4 10/06/2010 10:25 AM     Lab Results   Component Value Date/Time    Glucose (POC) 149 (H) 09/15/2020 09:05 PM    Glucose (POC) 113 (H) 09/15/2020 04:51 PM    Glucose (POC) 135 (H) 09/15/2020 10:59 AM    Glucose (POC) 132 (H) 09/15/2020 05:49 AM    Glucose (POC) 109 (H) 09/15/2020 12:31 AM     Lab Results   Component Value Date/Time    Color YELLOW/STRAW 09/07/2020 01:59 AM    Appearance CLEAR 09/07/2020 01:59 AM    Specific gravity 1.020 09/07/2020 01:59 AM    pH (UA) 6.5 09/07/2020 01:59 AM    Protein Negative 09/07/2020 01:59 AM    Glucose Negative 09/07/2020 01:59 AM    Ketone Negative 09/07/2020 01:59 AM    Bilirubin Negative 09/07/2020 01:59 AM    Urobilinogen 1.0 09/07/2020 01:59 AM    Nitrites Negative 09/07/2020 01:59 AM    Leukocyte Esterase Negative 09/07/2020 01:59 AM    Epithelial cells FEW 09/07/2020 01:59 AM    Bacteria Negative 09/07/2020 01:59 AM    WBC 0-4 09/07/2020 01:59 AM    RBC 0-5 09/07/2020 01:59 AM         Medications Reviewed:     Current Facility-Administered Medications   Medication Dose Route Frequency    cefepime (MAXIPIME) 2 g in 0.9% sodium chloride (MBP/ADV) 100 mL  2 g IntraVENous Q8H    0.9% sodium chloride infusion  125 mL/hr IntraVENous CONTINUOUS    0.9% sodium chloride infusion 250 mL  250 mL IntraVENous PRN    Vancomycin - Pharmacy dosing   Other Rx Dosing/Monitoring    vancomycin (VANCOCIN) 1250 mg in  ml infusion  1,250 mg IntraVENous Q12H    pantoprazole (PROTONIX) 40 mg in 0.9% sodium chloride 10 mL injection  40 mg IntraVENous Q12H    TPN ADULT - CENTRAL   IntraVENous CONTINUOUS    alteplase (CATHFLO) 2 mg in sterile water (preservative free) 2 mL injection  2 mg InterCATHeter PRN    fat emulsion 20% (LIPOSYN, INTRAlipid) infusion 500 mL  500 mL IntraVENous Q MON, WED & FRI    [Held by provider] heparin (porcine) injection 5,000 Units  5,000 Units SubCUTAneous Q12H    nystatin (MYCOSTATIN) 100,000 unit/gram powder   Topical BID    sodium chloride (NS) flush 5-40 mL  5-40 mL IntraVENous Q8H    sodium chloride (NS) flush 5-40 mL  5-40 mL IntraVENous PRN    polyethylene glycol (MIRALAX) packet 17 g  17 g Oral DAILY PRN    promethazine (PHENERGAN) tablet 12.5 mg  12.5 mg Oral Q6H PRN    Or    ondansetron (ZOFRAN) injection 4 mg  4 mg IntraVENous Q6H PRN    midodrine (PROAMATINE) tablet 10 mg  10 mg Oral TID     ______________________________________________________________________  EXPECTED LENGTH OF STAY: 4d 19h  ACTUAL LENGTH OF STAY:          10                 Norma Ferrari MD

## 2020-09-17 NOTE — PROGRESS NOTES
[address]  [address] [facility], [address]       GI PROGRESS NOTE  Will Sherin Orantes  [phone] office  [phone] NP/PA in-hospital cell phone M-F until 4:30PM  After 5PM or on weekends, please call  for physician on call      NAME: Kindra Bravo   :  1934   MRN:  [id number]       Subjective:   Patient is sitting in the chair with no complaints. No nausea, vomiting, or abdominal pain. He is tolerating full liquids. CTA was done yesterday. Objective:     VITALS:   Last 24hrs VS reviewed since prior progress note. Most recent are:  Visit Vitals  BP (!) 94/52 (BP 1 Location: Left arm, BP Patient Position: At rest)   Pulse 98   Temp 99.9 °F (37.7 °C)   Resp 20   Ht 5' 11\" (1.803 m)   Wt 80.2 kg (176 lb 12.9 oz)   SpO2 96%   BMI 24.66 kg/m²       PHYSICAL EXAM:  General:          Cooperative, no acute distress, sitting in the chair  Neurologic:      Alert and oriented  HEENT:           EOMI, no scleral icterus   Lungs:             No respiratory distress  Heart:              S1 S2  Abdomen:        Soft, non-distended, no tenderness, no guarding, no rebound. Extremities:     Warm  Psych:             Good insight. Not anxious or agitated.       Lab Data Reviewed:     Recent Results (from the past 24 hour(s))   HGB & HCT    Collection Time: 20 12:24 AM   Result Value Ref Range    HGB 8.0 (L) 12.1 - 17.0 g/dL    HCT 24.4 (L) 36.6 - 50.3 %   FIBRINOGEN    Collection Time: 20  4:00 AM   Result Value Ref Range    Fibrinogen 291 200 - 475 mg/dL   PHOSPHORUS    Collection Time: 20  4:00 AM   Result Value Ref Range    Phosphorus 3.0 2.6 - 4.7 MG/DL   METABOLIC PANEL, BASIC    Collection Time: 20  4:00 AM   Result Value Ref Range    Sodium 138 136 - 145 mmol/L    Potassium 3.9 3.5 - 5.1 mmol/L    Chloride 110 (H) 97 - 108 mmol/L    CO2 24 21 - 32 mmol/L    Anion gap 4 (L) 5 - 15 mmol/L    Glucose 128 (H) 65 - 100 mg/dL    BUN 29 (H) 6 - 20 MG/DL Creatinine 0.71 0.70 - 1.30 MG/DL    BUN/Creatinine ratio 41 (H) 12 - 20      GFR est AA >60 >60 ml/min/1.73m2    GFR est non-AA >60 >60 ml/min/1.73m2    Calcium 8.1 (L) 8.5 - 10.1 MG/DL        Assessment:   · Pancreatitis: LFTs (9/17) with AST 90, ALT 97, normal alkaline phosphatase, normal total bilirubin, normal lipase. CT abdomen/pelvis with IV contrast (9/7/20): improved peripancreatic inflammatory changes, compatible with pancreatitis; unchanged biliary dilatation with no visualized choledocholithiasis; small hiatal hernia. 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 · Anemia: Hgb 8.0 status post 1 unit PRBCs<--6.1<--7.4<--8.3<--9.7<--10.2. BUN 29. No signs of active GI bleeding. EGD (7/20/20) by Dr. Noreen Del Cid for coffee-ground emesis showed LA Grade D erosive esophagitis; hiatal hernia; retention of large volume of gastric fluid likely secondary to gastric outlet obstruction from extrinsic compression. · Sepsis: WBC 8.0 yesterday, lactic acid normal. Repeat blood culture (9/16): gram positive cocci in all 4 bottles. On antibiotics. CXR (9/15/20): no acute process. · Gram positive cocci bacteremia versus contaminant: on antibiotics. ID following. Repeat blood culture (9/9/20): no growth 5d. Repeat blood culture (9/16): gram positive cocci in all 4 bottles. · Sepsis: COVID negative  · History of PE status post IVC filter placement     Patient Active Problem List   Diagnosis Code    DVT (deep vein thrombosis) in pregnancy O22.30    DVT (deep venous thrombosis) ([facility]) I82.409    Tachycardia R00.0    Hypotension I95.9    Fever R50.9    Sepsis ([address] A41.9    Shock ([address] R57.9     Plan:   · On full liquids. Dietician following. NPO after midnight. · PPI  · On antibiotics. ID following.    · Continue supportive measures  · Trend CBC and transfuse as necessary. Monitor for signs of active GI bleeding. · Discussed with Dr. Bryan Escobedo. Will obtain RUQ ultrasound. Tentatively plan on ERCP tomorrow with Dr. Bryan Escobedo. Details and risks of the procedure to include (but not limited to) general anesthesia, bleeding, infection, perforation, and pancreatitis were discussed. Patient understands and is in agreement. Will discuss with the patient's daughter.       Signed By: VANESSA Serrano     9/17/2020  10:37 AM

## 2020-09-17 NOTE — PROGRESS NOTES
9/17/2020 -   TIMMY:  - RUR: 24%  - Disposition is TBD dependent on progression: potential discharge to home with continuation of HH with [address] is to wean patient off of TPN while in house vs continuation of home TPN via [facility]    - ABX continue  - PICC Line likely to be changed or removed  - Repeat cultures pending PICC Line plan  - Labs being monitored for transfusion as needed  - Patient couldn't tolerate GI Lite diet, so back on full liquids; TPN continues  CRM: Luis Vences, MPH, [address] [facility]; Z: [phone]

## 2020-09-17 NOTE — PROGRESS NOTES
Problem: Falls - Risk of  Goal: *Absence of Falls  Description: Document Drew Cortez Fall Risk and appropriate interventions in the flowsheet. Outcome: Progressing Towards Goal  Note: Fall Risk Interventions:  Mobility Interventions: Communicate number of staff needed for ambulation/transfer, Patient to call before getting OOB, Utilize walker, cane, or other assistive device         Medication Interventions: Evaluate medications/consider consulting pharmacy, Patient to call before getting OOB, Teach patient to arise slowly    Elimination Interventions: Call light in reach, Patient to call for help with toileting needs, Urinal in reach              Problem: Pressure Injury - Risk of  Goal: *Prevention of pressure injury  Description: Document Joseph Scale and appropriate interventions in the flowsheet.   Outcome: Progressing Towards Goal  Note: Pressure Injury Interventions:  Sensory Interventions: Keep linens dry and wrinkle-free, Minimize linen layers         Activity Interventions: Increase time out of bed, Pressure redistribution bed/mattress(bed type)    Mobility Interventions: Pressure redistribution bed/mattress (bed type), HOB 30 degrees or less    Nutrition Interventions: Document food/fluid/supplement intake    Friction and Shear Interventions: HOB 30 degrees or less, Minimize layers                Problem: Sepsis: Discharge Outcomes  Goal: *Vital signs within defined limits  Outcome: Progressing Towards Goal  Goal: *Tolerating diet  Outcome: Progressing Towards Goal  Goal: *Verbalizes understanding and describes prescribed diet  Outcome: Progressing Towards Goal  Goal: *Demonstrates progressive activity  Outcome: Progressing Towards Goal  Goal: *Lungs clear or at baseline  Outcome: Progressing Towards Goal  Goal: *Oxygen saturation returns to baseline or 90% or better on room air  Outcome: Progressing Towards Goal  Goal: *Absence of deep venous thrombosis signs and symptoms(Stroke Metric)  Outcome: Progressing Towards Goal  Goal: *Optimal pain control at patient's stated goal  Outcome: Progressing Towards Goal

## 2020-09-18 ENCOUNTER — ANESTHESIA EVENT (OUTPATIENT)
Dept: ENDOSCOPY | Age: 85
DRG: 871 | End: 2020-09-18
Payer: MEDICARE

## 2020-09-18 ENCOUNTER — APPOINTMENT (OUTPATIENT)
Dept: GENERAL RADIOLOGY | Age: 85
DRG: 871 | End: 2020-09-18
Attending: INTERNAL MEDICINE
Payer: MEDICARE

## 2020-09-18 ENCOUNTER — ANESTHESIA (OUTPATIENT)
Dept: ENDOSCOPY | Age: 85
DRG: 871 | End: 2020-09-18
Payer: MEDICARE

## 2020-09-18 LAB
ANION GAP SERPL CALC-SCNC: 6 MMOL/L (ref 5–15)
BUN SERPL-MCNC: 23 MG/DL (ref 6–20)
BUN/CREAT SERPL: 37 (ref 12–20)
CALCIUM SERPL-MCNC: 8.3 MG/DL (ref 8.5–10.1)
CHLORIDE SERPL-SCNC: 110 MMOL/L (ref 97–108)
CO2 SERPL-SCNC: 23 MMOL/L (ref 21–32)
CREAT SERPL-MCNC: 0.62 MG/DL (ref 0.7–1.3)
DATE LAST DOSE: ABNORMAL
ERYTHROCYTE [DISTWIDTH] IN BLOOD BY AUTOMATED COUNT: 17.1 % (ref 11.5–14.5)
FIBRINOGEN PPP-MCNC: 343 MG/DL (ref 200–475)
GLUCOSE SERPL-MCNC: 126 MG/DL (ref 65–100)
HCT VFR BLD AUTO: 24.3 % (ref 36.6–50.3)
HCT VFR BLD AUTO: 26.4 % (ref 36.6–50.3)
HGB BLD-MCNC: 7.5 G/DL (ref 12.1–17)
HGB BLD-MCNC: 7.9 G/DL (ref 12.1–17)
MCH RBC QN AUTO: 33.8 PG (ref 26–34)
MCHC RBC AUTO-ENTMCNC: 32.5 G/DL (ref 30–36.5)
MCV RBC AUTO: 103.8 FL (ref 80–99)
NRBC # BLD: 0 K/UL (ref 0–0.01)
NRBC BLD-RTO: 0 PER 100 WBC
PHOSPHATE SERPL-MCNC: 3.1 MG/DL (ref 2.6–4.7)
PLATELET # BLD AUTO: 115 K/UL (ref 150–400)
PMV BLD AUTO: 11.4 FL (ref 8.9–12.9)
POTASSIUM SERPL-SCNC: 3.9 MMOL/L (ref 3.5–5.1)
RBC # BLD AUTO: 2.34 M/UL (ref 4.1–5.7)
REPORTED DOSE,DOSE: ABNORMAL UNITS
REPORTED DOSE/TIME,TMG: ABNORMAL
SODIUM SERPL-SCNC: 139 MMOL/L (ref 136–145)
VANCOMYCIN TROUGH SERPL-MCNC: 17.4 UG/ML (ref 5–10)
WBC # BLD AUTO: 6.2 K/UL (ref 4.1–11.1)

## 2020-09-18 PROCEDURE — 74011000250 HC RX REV CODE- 250: Performed by: NURSE ANESTHETIST, CERTIFIED REGISTERED

## 2020-09-18 PROCEDURE — 74011250637 HC RX REV CODE- 250/637: Performed by: INTERNAL MEDICINE

## 2020-09-18 PROCEDURE — 74011000258 HC RX REV CODE- 258: Performed by: HOSPITALIST

## 2020-09-18 PROCEDURE — 77030009038 HC CATH BILI STN RTVR BSC -C: Performed by: INTERNAL MEDICINE

## 2020-09-18 PROCEDURE — 65660000000 HC RM CCU STEPDOWN

## 2020-09-18 PROCEDURE — 77030041276 HC SPHNTOM BILI BSC -F: Performed by: INTERNAL MEDICINE

## 2020-09-18 PROCEDURE — 76060000035 HC ANESTHESIA 2 TO 2.5 HR: Performed by: INTERNAL MEDICINE

## 2020-09-18 PROCEDURE — 74011000636 HC RX REV CODE- 636: Performed by: INTERNAL MEDICINE

## 2020-09-18 PROCEDURE — 74011000250 HC RX REV CODE- 250: Performed by: PHYSICIAN ASSISTANT

## 2020-09-18 PROCEDURE — 80048 BASIC METABOLIC PNL TOTAL CA: CPT

## 2020-09-18 PROCEDURE — 74011250636 HC RX REV CODE- 250/636: Performed by: PHYSICIAN ASSISTANT

## 2020-09-18 PROCEDURE — 74011250636 HC RX REV CODE- 250/636: Performed by: HOSPITALIST

## 2020-09-18 PROCEDURE — 77030036933 HC BRSH RX CYTO WREGD BSC -C: Performed by: INTERNAL MEDICINE

## 2020-09-18 PROCEDURE — 36415 COLL VENOUS BLD VENIPUNCTURE: CPT

## 2020-09-18 PROCEDURE — 74011000258 HC RX REV CODE- 258: Performed by: NURSE PRACTITIONER

## 2020-09-18 PROCEDURE — 77030008684 HC TU ET CUF COVD -B: Performed by: ANESTHESIOLOGY

## 2020-09-18 PROCEDURE — 74011000250 HC RX REV CODE- 250: Performed by: NURSE PRACTITIONER

## 2020-09-18 PROCEDURE — 80202 ASSAY OF VANCOMYCIN: CPT

## 2020-09-18 PROCEDURE — 88112 CYTOPATH CELL ENHANCE TECH: CPT

## 2020-09-18 PROCEDURE — 77030026438 HC STYL ET INTUB CARD -A: Performed by: ANESTHESIOLOGY

## 2020-09-18 PROCEDURE — 74011000250 HC RX REV CODE- 250: Performed by: HOSPITALIST

## 2020-09-18 PROCEDURE — C9113 INJ PANTOPRAZOLE SODIUM, VIA: HCPCS | Performed by: PHYSICIAN ASSISTANT

## 2020-09-18 PROCEDURE — 76040000003: Performed by: INTERNAL MEDICINE

## 2020-09-18 PROCEDURE — 0FD98ZX EXTRACTION OF COMMON BILE DUCT, VIA NATURAL OR ARTIFICIAL OPENING ENDOSCOPIC, DIAGNOSTIC: ICD-10-PCS | Performed by: HOSPITALIST

## 2020-09-18 PROCEDURE — 0F798DZ DILATION OF COMMON BILE DUCT WITH INTRALUMINAL DEVICE, VIA NATURAL OR ARTIFICIAL OPENING ENDOSCOPIC: ICD-10-PCS | Performed by: HOSPITALIST

## 2020-09-18 PROCEDURE — 74011250636 HC RX REV CODE- 250/636: Performed by: NURSE ANESTHETIST, CERTIFIED REGISTERED

## 2020-09-18 PROCEDURE — 85018 HEMOGLOBIN: CPT

## 2020-09-18 PROCEDURE — 84100 ASSAY OF PHOSPHORUS: CPT

## 2020-09-18 PROCEDURE — 85027 COMPLETE CBC AUTOMATED: CPT

## 2020-09-18 PROCEDURE — 74011250636 HC RX REV CODE- 250/636: Performed by: NURSE PRACTITIONER

## 2020-09-18 PROCEDURE — C1874 STENT, COATED/COV W/DEL SYS: HCPCS | Performed by: INTERNAL MEDICINE

## 2020-09-18 PROCEDURE — 74011250637 HC RX REV CODE- 250/637: Performed by: NURSE PRACTITIONER

## 2020-09-18 PROCEDURE — 77030040361 HC SLV COMPR DVT MDII -B: Performed by: INTERNAL MEDICINE

## 2020-09-18 PROCEDURE — 2709999900 HC NON-CHARGEABLE SUPPLY: Performed by: INTERNAL MEDICINE

## 2020-09-18 PROCEDURE — 77030007288 HC DEV LOK BILI BSC -A: Performed by: INTERNAL MEDICINE

## 2020-09-18 PROCEDURE — 85384 FIBRINOGEN ACTIVITY: CPT

## 2020-09-18 DEVICE — STENT SYSTEM RMV
Type: IMPLANTABLE DEVICE | Site: BILE DUCT | Status: FUNCTIONAL
Brand: WALLFLEX BILIARY

## 2020-09-18 RX ORDER — DEXAMETHASONE SODIUM PHOSPHATE 4 MG/ML
INJECTION, SOLUTION INTRA-ARTICULAR; INTRALESIONAL; INTRAMUSCULAR; INTRAVENOUS; SOFT TISSUE AS NEEDED
Status: DISCONTINUED | OUTPATIENT
Start: 2020-09-18 | End: 2020-09-18 | Stop reason: HOSPADM

## 2020-09-18 RX ORDER — FENTANYL CITRATE 50 UG/ML
INJECTION, SOLUTION INTRAMUSCULAR; INTRAVENOUS AS NEEDED
Status: DISCONTINUED | OUTPATIENT
Start: 2020-09-18 | End: 2020-09-18 | Stop reason: HOSPADM

## 2020-09-18 RX ORDER — EPINEPHRINE 0.1 MG/ML
1 INJECTION INTRACARDIAC; INTRAVENOUS ONCE
Status: DISCONTINUED | OUTPATIENT
Start: 2020-09-18 | End: 2020-09-18 | Stop reason: HOSPADM

## 2020-09-18 RX ORDER — ROCURONIUM BROMIDE 10 MG/ML
INJECTION, SOLUTION INTRAVENOUS AS NEEDED
Status: DISCONTINUED | OUTPATIENT
Start: 2020-09-18 | End: 2020-09-18 | Stop reason: HOSPADM

## 2020-09-18 RX ORDER — SUCCINYLCHOLINE CHLORIDE 20 MG/ML
INJECTION INTRAMUSCULAR; INTRAVENOUS AS NEEDED
Status: DISCONTINUED | OUTPATIENT
Start: 2020-09-18 | End: 2020-09-18 | Stop reason: HOSPADM

## 2020-09-18 RX ORDER — DIPHENHYDRAMINE HYDROCHLORIDE 50 MG/ML
25 INJECTION, SOLUTION INTRAMUSCULAR; INTRAVENOUS ONCE
Status: COMPLETED | OUTPATIENT
Start: 2020-09-18 | End: 2020-09-18

## 2020-09-18 RX ORDER — SODIUM CHLORIDE 0.9 % (FLUSH) 0.9 %
5-40 SYRINGE (ML) INJECTION AS NEEDED
Status: DISCONTINUED | OUTPATIENT
Start: 2020-09-18 | End: 2020-09-22 | Stop reason: HOSPADM

## 2020-09-18 RX ORDER — SODIUM CHLORIDE, SODIUM LACTATE, POTASSIUM CHLORIDE, CALCIUM CHLORIDE 600; 310; 30; 20 MG/100ML; MG/100ML; MG/100ML; MG/100ML
25 INJECTION, SOLUTION INTRAVENOUS CONTINUOUS
Status: DISCONTINUED | OUTPATIENT
Start: 2020-09-18 | End: 2020-09-22 | Stop reason: HOSPADM

## 2020-09-18 RX ORDER — LIDOCAINE HYDROCHLORIDE 20 MG/ML
INJECTION, SOLUTION EPIDURAL; INFILTRATION; INTRACAUDAL; PERINEURAL AS NEEDED
Status: DISCONTINUED | OUTPATIENT
Start: 2020-09-18 | End: 2020-09-18 | Stop reason: HOSPADM

## 2020-09-18 RX ORDER — EPINEPHRINE 0.1 MG/ML
1 INJECTION INTRACARDIAC; INTRAVENOUS
Status: DISCONTINUED | OUTPATIENT
Start: 2020-09-18 | End: 2020-09-18 | Stop reason: HOSPADM

## 2020-09-18 RX ORDER — FLUMAZENIL 0.1 MG/ML
0.2 INJECTION INTRAVENOUS
Status: ACTIVE | OUTPATIENT
Start: 2020-09-18 | End: 2020-09-18

## 2020-09-18 RX ORDER — PHENYLEPHRINE HCL IN 0.9% NACL 0.4MG/10ML
SYRINGE (ML) INTRAVENOUS AS NEEDED
Status: DISCONTINUED | OUTPATIENT
Start: 2020-09-18 | End: 2020-09-18 | Stop reason: HOSPADM

## 2020-09-18 RX ORDER — FENTANYL CITRATE 50 UG/ML
25-200 INJECTION, SOLUTION INTRAMUSCULAR; INTRAVENOUS
Status: DISPENSED | OUTPATIENT
Start: 2020-09-18 | End: 2020-09-18

## 2020-09-18 RX ORDER — MIDAZOLAM HYDROCHLORIDE 1 MG/ML
.25-5 INJECTION, SOLUTION INTRAMUSCULAR; INTRAVENOUS
Status: ACTIVE | OUTPATIENT
Start: 2020-09-18 | End: 2020-09-18

## 2020-09-18 RX ORDER — PROPOFOL 10 MG/ML
INJECTION, EMULSION INTRAVENOUS AS NEEDED
Status: DISCONTINUED | OUTPATIENT
Start: 2020-09-18 | End: 2020-09-18 | Stop reason: HOSPADM

## 2020-09-18 RX ORDER — SODIUM CHLORIDE 0.9 % (FLUSH) 0.9 %
5-40 SYRINGE (ML) INJECTION EVERY 8 HOURS
Status: DISCONTINUED | OUTPATIENT
Start: 2020-09-18 | End: 2020-09-22 | Stop reason: HOSPADM

## 2020-09-18 RX ORDER — ATROPINE SULFATE 0.1 MG/ML
0.5 INJECTION INTRAVENOUS
Status: DISCONTINUED | OUTPATIENT
Start: 2020-09-18 | End: 2020-09-18 | Stop reason: HOSPADM

## 2020-09-18 RX ORDER — DEXTROMETHORPHAN/PSEUDOEPHED 2.5-7.5/.8
1.2 DROPS ORAL
Status: DISCONTINUED | OUTPATIENT
Start: 2020-09-18 | End: 2020-09-18 | Stop reason: HOSPADM

## 2020-09-18 RX ORDER — ATROPINE SULFATE 0.1 MG/ML
1 INJECTION INTRAVENOUS ONCE
Status: DISCONTINUED | OUTPATIENT
Start: 2020-09-18 | End: 2020-09-18 | Stop reason: HOSPADM

## 2020-09-18 RX ORDER — ONDANSETRON 2 MG/ML
INJECTION INTRAMUSCULAR; INTRAVENOUS AS NEEDED
Status: DISCONTINUED | OUTPATIENT
Start: 2020-09-18 | End: 2020-09-18 | Stop reason: HOSPADM

## 2020-09-18 RX ORDER — SODIUM CHLORIDE, SODIUM LACTATE, POTASSIUM CHLORIDE, CALCIUM CHLORIDE 600; 310; 30; 20 MG/100ML; MG/100ML; MG/100ML; MG/100ML
INJECTION, SOLUTION INTRAVENOUS
Status: DISCONTINUED | OUTPATIENT
Start: 2020-09-18 | End: 2020-09-18 | Stop reason: HOSPADM

## 2020-09-18 RX ORDER — NALOXONE HYDROCHLORIDE 0.4 MG/ML
0.4 INJECTION, SOLUTION INTRAMUSCULAR; INTRAVENOUS; SUBCUTANEOUS
Status: ACTIVE | OUTPATIENT
Start: 2020-09-18 | End: 2020-09-18

## 2020-09-18 RX ADMIN — ROCURONIUM BROMIDE 5 MG: 10 SOLUTION INTRAVENOUS at 12:22

## 2020-09-18 RX ADMIN — MIDODRINE HYDROCHLORIDE 10 MG: 5 TABLET ORAL at 09:38

## 2020-09-18 RX ADMIN — ONDANSETRON HYDROCHLORIDE 4 MG: 2 INJECTION, SOLUTION INTRAMUSCULAR; INTRAVENOUS at 14:23

## 2020-09-18 RX ADMIN — Medication 10 ML: at 16:51

## 2020-09-18 RX ADMIN — SODIUM CHLORIDE 125 ML/HR: 900 INJECTION, SOLUTION INTRAVENOUS at 05:17

## 2020-09-18 RX ADMIN — PHENYLEPHRINE HYDROCHLORIDE 25 MCG/MIN: 10 INJECTION INTRAVENOUS at 13:19

## 2020-09-18 RX ADMIN — CALCIUM GLUCONATE: 94 INJECTION, SOLUTION INTRAVENOUS at 18:17

## 2020-09-18 RX ADMIN — CEFEPIME 2 G: 2 INJECTION, POWDER, FOR SOLUTION INTRAVENOUS at 16:49

## 2020-09-18 RX ADMIN — SODIUM CHLORIDE 40 MG: 9 INJECTION INTRAMUSCULAR; INTRAVENOUS; SUBCUTANEOUS at 09:38

## 2020-09-18 RX ADMIN — IOPAMIDOL 20 ML: 612 INJECTION, SOLUTION INTRAVENOUS at 14:24

## 2020-09-18 RX ADMIN — SUCCINYLCHOLINE CHLORIDE 160 MG: 20 INJECTION, SOLUTION INTRAMUSCULAR; INTRAVENOUS at 12:23

## 2020-09-18 RX ADMIN — CEFEPIME 2 G: 2 INJECTION, POWDER, FOR SOLUTION INTRAVENOUS at 06:10

## 2020-09-18 RX ADMIN — VANCOMYCIN HYDROCHLORIDE 1250 MG: 10 INJECTION, POWDER, LYOPHILIZED, FOR SOLUTION INTRAVENOUS at 09:52

## 2020-09-18 RX ADMIN — PROPOFOL 50 MG: 10 INJECTION, EMULSION INTRAVENOUS at 12:45

## 2020-09-18 RX ADMIN — DEXAMETHASONE SODIUM PHOSPHATE 8 MG: 4 INJECTION, SOLUTION INTRAMUSCULAR; INTRAVENOUS at 12:34

## 2020-09-18 RX ADMIN — Medication 80 MCG: at 12:52

## 2020-09-18 RX ADMIN — SODIUM CHLORIDE 125 ML/HR: 900 INJECTION, SOLUTION INTRAVENOUS at 16:52

## 2020-09-18 RX ADMIN — MIDODRINE HYDROCHLORIDE 10 MG: 5 TABLET ORAL at 18:13

## 2020-09-18 RX ADMIN — PROPOFOL 100 MG: 10 INJECTION, EMULSION INTRAVENOUS at 12:22

## 2020-09-18 RX ADMIN — DIPHENHYDRAMINE HYDROCHLORIDE 25 MG: 50 INJECTION, SOLUTION INTRAMUSCULAR; INTRAVENOUS at 21:26

## 2020-09-18 RX ADMIN — LIDOCAINE HYDROCHLORIDE 50 MG: 20 INJECTION, SOLUTION EPIDURAL; INFILTRATION; INTRACAUDAL; PERINEURAL at 12:22

## 2020-09-18 RX ADMIN — MIDODRINE HYDROCHLORIDE 10 MG: 5 TABLET ORAL at 21:24

## 2020-09-18 RX ADMIN — NYSTATIN: 100000 POWDER TOPICAL at 09:39

## 2020-09-18 RX ADMIN — FENTANYL CITRATE 50 MCG: 50 INJECTION, SOLUTION INTRAMUSCULAR; INTRAVENOUS at 14:16

## 2020-09-18 RX ADMIN — FENTANYL CITRATE 50 MCG: 50 INJECTION, SOLUTION INTRAMUSCULAR; INTRAVENOUS at 14:20

## 2020-09-18 RX ADMIN — Medication 80 MCG: at 12:22

## 2020-09-18 RX ADMIN — I.V. FAT EMULSION 500 ML: 20 EMULSION INTRAVENOUS at 18:46

## 2020-09-18 RX ADMIN — NYSTATIN: 100000 POWDER TOPICAL at 18:14

## 2020-09-18 RX ADMIN — Medication 80 MCG: at 13:06

## 2020-09-18 RX ADMIN — VANCOMYCIN HYDROCHLORIDE 1250 MG: 10 INJECTION, POWDER, LYOPHILIZED, FOR SOLUTION INTRAVENOUS at 21:29

## 2020-09-18 RX ADMIN — Medication 10 ML: at 21:48

## 2020-09-18 RX ADMIN — Medication 80 MCG: at 12:36

## 2020-09-18 RX ADMIN — SODIUM CHLORIDE 40 MG: 9 INJECTION INTRAMUSCULAR; INTRAVENOUS; SUBCUTANEOUS at 21:24

## 2020-09-18 RX ADMIN — PROPOFOL 50 MG: 10 INJECTION, EMULSION INTRAVENOUS at 12:43

## 2020-09-18 RX ADMIN — Medication 40 ML: at 05:17

## 2020-09-18 RX ADMIN — Medication 80 MCG: at 13:01

## 2020-09-18 RX ADMIN — SODIUM CHLORIDE, POTASSIUM CHLORIDE, SODIUM LACTATE AND CALCIUM CHLORIDE: 600; 310; 30; 20 INJECTION, SOLUTION INTRAVENOUS at 12:11

## 2020-09-18 NOTE — PROGRESS NOTES
TRANSFER - OUT REPORT:    Verbal report given to ELIZABETH Beckford (name) on Dixon Saleem  being transferred to [facility] (unit) for routine post - op       Report consisted of patients Situation, Background, Assessment and   Recommendations(SBAR). Information from the following report(s) SBAR, Procedure Summary and Cardiac Rhythm SR was reviewed with the receiving nurse. Lines:   PICC Double Lumen 07/16/20 Right;Brachial (Active)   Central Line Being Utilized Yes 09/18/20 0800   Criteria for Appropriate Use Long term IV/antibiotic administration 09/18/20 0800   Site Assessment Clean, dry, & intact 09/18/20 0800   Phlebitis Assessment 0 09/18/20 0800   Infiltration Assessment 0 09/18/20 0800   Date of Last Dressing Change 09/13/20 09/18/20 0435   Dressing Status Clean, dry, & intact 09/18/20 0800   Action Taken Open ports on tubing capped 09/18/20 0800   Dressing Type Transparent;Tape;Disk with Chlorhexadine gluconate (CHG) 09/18/20 0800   Hub Color/Line Status Purple;Flushed; Infusing 09/18/20 0800   Positive Blood Return (Site #1) Yes 09/18/20 0800   Hub Color/Line Status Red; Infusing 09/18/20 0800   Positive Blood Return (Site #2) Yes 09/18/20 0435   Alcohol Cap Used Yes 09/18/20 0800       Peripheral IV 09/18/20 Left Hand (Active)        Opportunity for questions and clarification was provided.

## 2020-09-18 NOTE — ANESTHESIA POSTPROCEDURE EVALUATION
Procedure(s):  ENDOSCOPIC RETROGRADE CHOLANGIOPANCREATOGRAPHY (ERCP)  ESOPHAGOGASTRODUODENOSCOPY (EGD)  ENDOSCOPIC SPHINCTEROTOMY  ENDOSCOPIC STONE EXTRACTION/BALLOON SWEEP  ENDOSCOPIC BRUSHING  BILIARY STENT PLACEMENT. No value filed. <BSHSIANPOST>    INITIAL Post-op Vital signs:   Vitals Value Taken Time   /71 9/18/2020  2:44 PM   Temp 36.1 °C (97 °F) 9/18/2020  2:35 PM   Pulse 85 9/18/2020  2:44 PM   Resp 11 9/18/2020  2:44 PM   SpO2 97 % 9/18/2020  2:44 PM   Vitals shown include unvalidated device data.

## 2020-09-18 NOTE — PROGRESS NOTES
9/18/2020 -   TIMMY:  - RUR: 24%  - Disposition is TBD dependent on progression: potential discharge to home with continuation of HH with [address] is to wean patient off of TPN while in house vs continuation of home TPN via [facility]    - Cultures are pending  - ABX continue  - Patient to have ERCP today, 9/18  - Patient may need TTE and LAVON  - PICC Line plan is pending  CRM: Amado Guevara, MPH, [address] [place] [address]; Z: [phone]

## 2020-09-18 NOTE — PROGRESS NOTES
Problem: Falls - Risk of  Goal: *Absence of Falls  Description: Document Harinder Ingram Fall Risk and appropriate interventions in the flowsheet. Outcome: Progressing Towards Goal  Note: Fall Risk Interventions:  Mobility Interventions: Communicate number of staff needed for ambulation/transfer         Medication Interventions: Teach patient to arise slowly, Evaluate medications/consider consulting pharmacy    Elimination Interventions: Urinal in reach, Call light in reach       0816 Bedside and Verbal shift change report given to [address] (oncoming nurse) by Grace Mercado (offgoing nurse). Report included the following information SBAR, Intake/Output, MAR, Recent Results and Cardiac Rhythm normal sinus.

## 2020-09-18 NOTE — PROGRESS NOTES

## 2020-09-18 NOTE — ANESTHESIA PREPROCEDURE EVALUATION
Anesthetic History   No history of anesthetic complications            Review of Systems / Medical History  Patient summary reviewed, nursing notes reviewed and pertinent labs reviewed    Pulmonary  Within defined limits                 Neuro/Psych   Within defined limits           Cardiovascular              Hyperlipidemia    Exercise tolerance: <4 METS     GI/Hepatic/Renal               Comments: Pancreatitis  Endo/Other        Blood dyscrasia, arthritis and anemia    Comments:  Thrombocytopenia  Other Findings   Comments: Tachycardia   Hypotension   Fever   Sepsis (HCC)   Shock ([address]              Physical Exam    Airway  Mallampati: III  TM Distance: > 6 cm  Neck ROM: normal range of motion   Mouth opening: Normal     Cardiovascular    Rhythm: regular  Rate: abnormal        Comments: Tachycardia  Dental    Dentition: Caps/crowns and Poor dentition     Pulmonary  Breath sounds clear to auscultation               Abdominal  GI exam deferred       Other Findings            Anesthetic Plan    ASA: 3  Anesthesia type: general          Induction: Intravenous  Anesthetic plan and risks discussed with: Patient

## 2020-09-18 NOTE — PROGRESS NOTES
Bedside and Verbal shift change report given to ELIZABETH Fowler (oncoming nurse) by Ellie Douglass (offgoing nurse). Report included the following information SBAR, Kardex, ED Summary, OR Summary, Procedure Summary, Intake/Output, MAR, Recent Results and Cardiac Rhythm Sinus.

## 2020-09-18 NOTE — ROUTINE PROCESS
Ayala Calixto 1934 
[id number] Situation: 
Verbal report received from: Kimberly Samuel Procedure: Procedure(s): ENDOSCOPIC RETROGRADE CHOLANGIOPANCREATOGRAPHY (ERCP) ESOPHAGOGASTRODUODENOSCOPY (EGD) ENDOSCOPIC SPHINCTEROTOMY 
ENDOSCOPIC STONE EXTRACTION/BALLOON SWEEP 
ENDOSCOPIC BRUSHING 
BILIARY STENT PLACEMENT Background: 
 
Preoperative diagnosis: Pancreatitis Postoperative diagnosis: Bile Duct Stricture :  Dr. Chaz Jenkins Assistant(s): Endoscopy Technician-1: Haroon Pickens Endoscopy RN-1: Katherine Stevens RN Endoscopy RN-Relief: Lex Layne RN Specimens: yes H. Pylori no Assessment: 
Intra-procedure medications Anesthesia gave intra-procedure sedation and medications, see anesthesia flow sheet Intravenous fluids: NS@ Lopez Sipsey Vital signs stable Abdominal assessment: round and soft Recommendation: 
Return to floor- room 362

## 2020-09-18 NOTE — PROGRESS NOTES
ID Progress Note  2020    Subjective:     He is feeling much better. No specific complaints at the time of my visit. 24 hour events noted    Objective:     Antibiotics:  1. Vancomycin   2. Cefepime      Vitals:   Visit Vitals  /61 (BP 1 Location: Left arm, BP Patient Position: At rest)   Pulse 95   Temp 98.6 °F (37 °C)   Resp 20   Ht 5' 11\" (1.803 m)   Wt 80.2 kg (176 lb 12.9 oz)   SpO2 98%   BMI 24.66 kg/m²        Tmax:  Temp (24hrs), Av.3 °F (37.4 °C), Min:98.3 °F (36.8 °C), Max:99.9 °F (37.7 °C)      Exam:  Lungs:  clear to auscultation bilaterally  Heart:  regular rate and rhythm  Abdomen:  soft, non-tender. Bowel sounds normal. No masses,  no organomegaly    Labs:      Recent Labs     20  1243 20  0400 20  0024 20  0444 09/15/20  2115 09/15/20  0445   WBC  --   --   --  8.0 10.1 7.3   HGB 7.5*  --  8.0* 6.1* 7.4* 8.3*   PLT  --   --   --  110* 138* 151   BUN  --  29*  --  35* 34* 33*   CREA  --  0.71  --  0.67* 0.69* 0.62*   AP  --  86  --  74 81 85   TBILI  --  0.5  --  0.4 0.3 0.3       Cultures:     No results found for: [facility]  Lab Results   Component Value Date/Time    Culture result: (A) 2020 06:51 AM     GRAM POSITIVE COCCI IN CLUSTERS GROWING IN ALL 4 BOTTLES DRAWN (SITE = L ARM AND R ARM)    Culture result: NO GROWTH 5 DAYS 2020 05:40 AM    Culture result: NO GROWTH 9 DAYS 2020 06:15 AM       Radiology:     Line/Insert Date:           Assessment:     1. Sepsis syndrome--resolved--not sure of the cause--last 24 hours sounds like a similar event  2. Positive blood culture--likely contaminant since other bottles remain negative--will see what the new cultures show--staph of some sort    Objective:     1. Continue current therapy  2.  Follow up cultures and studies    Mulu Bee MD

## 2020-09-18 NOTE — PROGRESS NOTES
GI     Discussed ERCP procedure details and risks with patient, which include, but are not limited to, risks of GA, bleeding, infection, perforation, and pancreatitis. I explained that his imaging shows persistent biliary intrahepatic and extrahepatic dilation of unclear cause and his development of sepsis with bacteremia is quite possibly of biliary source given his relative increase in liver enzymes now. He expressed understanding and agreed to proceed. His repeat imaging has not shown a pancreatic mass. Based on ERCP results and clinical course, we can determine the role for EUS. Rivera Garcia MD

## 2020-09-18 NOTE — PROCEDURES
[address]  [address] [facility] [address]       NAME:  Jorje Tolliver   :   1934   MRN:   [id number]       Procedure Type:   ERCP with biliary sphincterotomy, biliary stent placement, biliary tissue sampling     Indications: biliary obstruction  Pre-operative Diagnosis: see indication above  Post-operative Diagnosis:  See findings below  : Luis Murillo. Joe Woods MD    Referring Provider:    Leni Us MD, Armaan Linares MD      Sedation:  General anesthesia    Procedure Details:  After informed consent was obtained with all risks and benefits of procedure explained, the patient was taken to the fluoroscopy suite and placed in the prone position. Upon sequential sedation as per above, the Olympus duodenoscope [id number]   was inserted via the mouthiece and carefully advanced to the second portion of the duodenum. The quality of visualization was good. The duodenoscope was withdrawn into a short position. Findings:   Esophagus: LA Grade C erosive esophagitis  Stomach: the stomach lumen contained a sharp hairpin turn and angulation. The patient had to be changed to the left lateral decubitus position to overcome looping such that the scope could be advanced to the duodenum  Duodenum: the ampulla was small and stenotic appearing. There was no spontaneous biliary drainage. ERCP: A  film was taken and was normal.  The ampulla was small and stenotic appearing. Using a [employer] BX57 Jagtome preloaded with a 0.025 inch Jagwire, the bile duct was cannulated with the guidewire. The Dreamtome was then advanced over the guidewire. Bile was aspirated to confirm proper positioning. The aspirated bile was submitted for cytology analysis. Limited contrast was injected under fluoroscopic visualization. The bile duct was dilated upstream to approximately 20 mm. There was a mid-CBD stricture with sharp angulation. The stricture was at least 2 cm long.  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 A 9-10 mm biliary sphincterotomy was effected. There was no post-sphincterotomy bleeding. A 9 mm to 12 mm biliary extraction balloon was used to sweep the bile duct and further characterize the stricture. A 9 mm sized balloon could not be pulled through the strictured segment. Downstream the balloon was re-inflated and a 9 mm balloon could be pulled through the post-sphincterotomy ampulla. There were no filling defects appreciated on cholangiogram. Next, biliary brushings were obtained from the strictured segment. Next, a 10 mm by 6 cm fully covered metal stent was placed across the strictured segment and left in a transpapillary position. There was adequate drainage of contrast and bile. The pancreatic duct was neither entered or injected during this procedure. I performed all immediate radiologic interpretation during this procedure. Specimen Removed: 1. Bile aspirate for cytology, 2. Biliary brushings    Complications: None. EBL:  None. Interventions:    Pancreatic: see above  Biliary: see above    Impression:   1. 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 Watch for complications, including cholangitis, pancreatitis, bleeding, and perforation. 2. IV LR  3. PRN pain medication and anti-emetics  4. NPO for now. Clear liquid diet if patient is pain free. 5. If patient has progressive abdominal pain and/or change in abdominal exam, please check amylase, lipase, CBC, CMP, and upright KUB. 6. Follow up cytology  7. Continue antibiotics per ID  8. Will continue to follow          Discharge Disposition:  Hospital floor following recovery in Endoscopy    Signed By: Jai Parisi.  Yasmeen Washington MD     9/18/2020  2:10 PM

## 2020-09-18 NOTE — PROGRESS NOTES
TRANSFER - OUT REPORT:    Verbal report given to ELIZABETH Chaidez(name) on Kindra Bravo  being transferred to OR(unit) for ordered procedure       Report consisted of patients Situation, Background, Assessment and   Recommendations(SBAR). Information from the following report(s) SBAR, Kardex, ED Summary, OR Summary, Procedure Summary, Intake/Output, MAR, Recent Results and Cardiac Rhythm NSR was reviewed with the receiving nurse. Lines:   PICC Double Lumen 07/16/20 Right;Brachial (Active)   Central Line Being Utilized Yes 09/18/20 0435   Criteria for Appropriate Use Total parenteral nutrition 09/18/20 0435   Site Assessment Clean, dry, & intact 09/18/20 0435   Phlebitis Assessment 0 09/18/20 0435   Infiltration Assessment 0 09/18/20 0435   Date of Last Dressing Change 09/13/20 09/18/20 0435   Dressing Status Clean, dry, & intact 09/18/20 0435   Action Taken Open ports on tubing capped 09/17/20 0905   Dressing Type Disk with Chlorhexadine gluconate (CHG); Transparent 09/18/20 0435   Hub Color/Line Status Purple;Flushed; Infusing 09/18/20 0435   Positive Blood Return (Site #1) Yes 09/18/20 0435   Hub Color/Line Status Red;Flushed; Infusing 09/18/20 0435   Positive Blood Return (Site #2) Yes 09/18/20 0435   Alcohol Cap Used Yes 09/17/20 1153        Opportunity for questions and clarification was provided.       Patient transported with:   Registered Nurse

## 2020-09-18 NOTE — PROGRESS NOTES
[address] [facility] Adult  Hospitalist Group                                                                                          Hospitalist Progress Note  Eliana Sanchez MD  Answering service: [phone] from in house phone        Date of Service:  2020  NAME:  Pastora Alfred  :  1934  MRN:  [id number]      Admission Summary:     Alvaro Mobley is a 80 y.o. male with past medical history of recent multiple bilateral pulmonary emboli, right lower extremity DVT, GI bleed, erosive gastritis, anemia, blood transfusion, pancreatitis, arthritis, BPH, DJD, and hyperlipidemia presented to the ED from home with reported nausea and vomiting.  Patient provided some history in addition to his daughter who was at the bedside. Araceli Randhawa of history was obtained per my review of ED and electronic medical records.      Interval history / Subjective:     Patient seen and examined this morning.   He is a scheduled for ERCP procedure today     Assessment & Plan:     # Septic Shock-- resolved   # Bacteremia due to  [place]  - off pressor  - episode of hypotension on 9/15 needing IVF bolus, albumin and midodrine    - received iv cefepime and vancomycin, which was restarted again 9/15  - last blood culture : GPC  bottle   - TTE-LV EF 55-60% normal cavity, wall thickness ans systolic function.   - PICC line replaced on  ( given the new +Ve blood culture, may need to be changed/removed)  - repeat blood culture once PICC removed   - COVID 19 test on  negative  - ID on board,    - may require repeat TTE and LAVON     # Pancreatitis  - improved peripancreatic inflammatory changes on most recent CT  - lipase was normal 107 on , check lipase  - on TPN, back off to full liquid diet due to intolerance of GI lite diet    - Nutritionist on board  - GI on board  -He had ERCP- now status post biliary sphincterotomy, biliary brushings, and placement of a 10 mm by 6 cm fully covered metal stent     # Gastric outlet obstruction  - on TPN and full liquid diet   - GI on board  - Nutritionist on board, plan to avoid home TPN,      # Pancreatic abnormality on prev CT   - CT abdomen pelvis on 9/7 improved peripancreatic inflammatory changes, compatible with pancreatitis. Unchanged biliary dilatation, with no visualized choledocholithiasis. Small hiatal hernia.  - Hem/Onc on board  -- now status post biliary sphincterotomy, biliary brushings, and placement of a 10 mm by 6 cm fully covered metal stent follow-up on pathology     # Hx PE/DVT, s/p IVC filter placement  - elevated d-dimer, improving  - IVC filter in place    # Acute on chronic Anemia disease  - s/p 1u PRBC transfusion, and monitor Hgb, transfuse <7  - no evidence of active bleeding  - monitor H/H      # Hypokalemia  - replaced and resolved     Code status: DNR  DVT prophylaxis:SCD    Care Plan discussed with: Patient/Family and Nurse  Anticipated Disposition: TBD  Anticipated Discharge: >48hrs        Hospital Problems  Date Reviewed: 7/14/2020          Codes Class Noted POA    Tachycardia ICD-10-CM: R00.0  ICD-9-CM: 785.0  9/7/2020 Unknown        Hypotension ICD-10-CM: I95.9  ICD-9-CM: 458.9  9/7/2020 Unknown        Fever ICD-10-CM: R50.9  ICD-9-CM: 780.60  9/7/2020 Unknown        Sepsis ([address] ICD-10-CM: A41.9  ICD-9-CM: 038.9, 995.91  9/7/2020 Unknown        Shock ([address] ICD-10-CM: R57.9  ICD-9-CM: 785.50  9/7/2020 Unknown              Vital Signs:    Last 24hrs VS reviewed since prior progress note.  Most recent are:  Visit Vitals  /68   Pulse 82   Temp 97.5 °F (36.4 °C)   Resp 16   Ht 5' 11\" (1.803 m)   Wt 86.2 kg (190 lb 0.6 oz)   SpO2 94%   BMI 26.50 kg/m²         Intake/Output Summary (Last 24 hours) at 9/18/2020 1508  Last data filed at 9/18/2020 8436  Gross per 24 hour   Intake 5096.67 ml   Output 1650 ml   Net 3446.67 ml        Physical Examination:             Constitutional:  No acute distress, cooperative, pleasant    ENT:  Oral mucosa moist, oropharynx benign. Resp:  decreased breath sound at the base bilaterally    CV:  Regular rhythm, normal rate, no murmurs, gallops, rubs    GI:  Soft, non distended, non tender. normoactive bowel sounds, no hepatosplenomegaly     Musculoskeletal:  No edema,     Neurologic:  Moves all extremities. AAOx3, CN II-XII reviewed            Data Review:   I personally reviewed labs and imaging     Labs:     Recent Labs     09/18/20  0109 09/17/20  1243  09/16/20 0444   WBC 6.2  --   --  8.0   HGB 7.9* 7.5*   < > 6.1*   HCT 24.3* 22.9*   < > 18.3*   *  --   --  110*    < > = values in this interval not displayed. Recent Labs     09/18/20  0109 09/17/20  0400 09/16/20  0444 09/16/20  0400 09/15/20  2115    138 140  --  136   K 3.9 3.9 3.9  --  4.0   * 110* 108  --  105   CO2 23 24 25  --  25   BUN 23* 29* 35*  --  34*   CREA 0.62* 0.71 0.67*  --  0.69*   * 128* 119*  --  144*   CA 8.3* 8.1* 8.3*  --  8.5   MG  --   --   --  1.8 1.7   PHOS 3.1 3.0 3.6  --  2.8     Recent Labs     09/17/20  0400 09/16/20  0444 09/15/20  2115   ALT 97* 76 75   AP 86 74 81   TBILI 0.5 0.4 0.3   TP 5.9* 5.6* 6.1*   ALB 2.3* 2.3* 2.3*   GLOB 3.6 3.3 3.8   LPSE  --  89  --      No results for input(s): INR, PTP, APTT, INREXT, INREXT in the last 72 hours. No results for input(s): FE, TIBC, PSAT, FERR in the last 72 hours. No results found for: FOL, RBCF   No results for input(s): PH, PCO2, PO2 in the last 72 hours. No results for input(s): CPK, CKNDX, TROIQ in the last 72 hours.     No lab exists for component: CPKMB  Lab Results   Component Value Date/Time    Cholesterol, total 250 (H) 10/02/2013 01:55 PM    HDL Cholesterol 53 10/02/2013 01:55 PM    LDL, calculated 176 (H) 10/02/2013 01:55 PM    Triglyceride 244 (H) 07/23/2020 04:41 AM    CHOL/HDL Ratio 3.4 10/06/2010 10:25 AM     Lab Results   Component Value Date/Time    Glucose (POC) 149 (H) 09/15/2020 09:05 PM    Glucose (POC) 113 (H) 09/15/2020 04:51 PM Glucose (POC) 135 (H) 09/15/2020 10:59 AM    Glucose (POC) 132 (H) 09/15/2020 05:49 AM    Glucose (POC) 109 (H) 09/15/2020 12:31 AM     Lab Results   Component Value Date/Time    Color YELLOW/STRAW 09/07/2020 01:59 AM    Appearance CLEAR 09/07/2020 01:59 AM    Specific gravity 1.020 09/07/2020 01:59 AM    pH (UA) 6.5 09/07/2020 01:59 AM    Protein Negative 09/07/2020 01:59 AM    Glucose Negative 09/07/2020 01:59 AM    Ketone Negative 09/07/2020 01:59 AM    Bilirubin Negative 09/07/2020 01:59 AM    Urobilinogen 1.0 09/07/2020 01:59 AM    Nitrites Negative 09/07/2020 01:59 AM    Leukocyte Esterase Negative 09/07/2020 01:59 AM    Epithelial cells FEW 09/07/2020 01:59 AM    Bacteria Negative 09/07/2020 01:59 AM    WBC 0-4 09/07/2020 01:59 AM    RBC 0-5 09/07/2020 01:59 AM         Medications Reviewed:     Current Facility-Administered Medications   Medication Dose Route Frequency    TPN ADULT - CENTRAL   IntraVENous CONTINUOUS    sodium chloride (NS) flush 5-40 mL  5-40 mL IntraVENous Q8H    sodium chloride (NS) flush 5-40 mL  5-40 mL IntraVENous PRN    midazolam (VERSED) injection 0.25-5 mg  0.25-5 mg IntraVENous Multiple    fentaNYL citrate (PF) injection  mcg   mcg IntraVENous Multiple    naloxone (NARCAN) injection 0.4 mg  0.4 mg IntraVENous Multiple    flumazeniL (ROMAZICON) 0.1 mg/mL injection 0.2 mg  0.2 mg IntraVENous Multiple    simethicone (MYLICON) 72TP/6.2SX oral drops 80 mg  1.2 mL Oral Multiple    atropine injection 0.5 mg  0.5 mg IntraVENous ONCE PRN    EPINEPHrine (ADRENALIN) 0.1 mg/mL syringe 1 mg  1 mg Endoscopically ONCE PRN    glucagon (GLUCAGEN) injection 1 mg  1 mg IntraVENous ONCE PRN    iopamidoL (ISOVUE 300) 61 % contrast injection 50 mL  50 mL Other ONCE    EPINEPHrine (ADRENALIN) 0.1 mg/mL syringe 1 mg  1 mg Other ONCE    atropine injection 1 mg  1 mg IntraVENous ONCE    lactated Ringers infusion  25 mL/hr IntraVENous CONTINUOUS    TPN ADULT - CENTRAL   IntraVENous CONTINUOUS    cefepime (MAXIPIME) 2 g in 0.9% sodium chloride (MBP/ADV) 100 mL  2 g IntraVENous Q8H    0.9% sodium chloride infusion  125 mL/hr IntraVENous CONTINUOUS    0.9% sodium chloride infusion 250 mL  250 mL IntraVENous PRN    Vancomycin - Pharmacy dosing   Other Rx Dosing/Monitoring    vancomycin (VANCOCIN) 1250 mg in  ml infusion  1,250 mg IntraVENous Q12H    pantoprazole (PROTONIX) 40 mg in 0.9% sodium chloride 10 mL injection  40 mg IntraVENous Q12H    alteplase (CATHFLO) 2 mg in sterile water (preservative free) 2 mL injection  2 mg InterCATHeter PRN    fat emulsion 20% (LIPOSYN, INTRAlipid) infusion 500 mL  500 mL IntraVENous Q MON, WED & FRI    [Held by provider] heparin (porcine) injection 5,000 Units  5,000 Units SubCUTAneous Q12H    nystatin (MYCOSTATIN) 100,000 unit/gram powder   Topical BID    sodium chloride (NS) flush 5-40 mL  5-40 mL IntraVENous Q8H    sodium chloride (NS) flush 5-40 mL  5-40 mL IntraVENous PRN    polyethylene glycol (MIRALAX) packet 17 g  17 g Oral DAILY PRN    promethazine (PHENERGAN) tablet 12.5 mg  12.5 mg Oral Q6H PRN    Or    ondansetron (ZOFRAN) injection 4 mg  4 mg IntraVENous Q6H PRN    midodrine (PROAMATINE) tablet 10 mg  10 mg Oral TID     ______________________________________________________________________  EXPECTED LENGTH OF STAY: 4d 19h  ACTUAL LENGTH OF STAY:          Maisha Swann MD

## 2020-09-18 NOTE — PROGRESS NOTES
ID Progress Note  2020    Subjective:     He is feeling much better. No specific complaints at the time of my visit. 24 hour events noted    Objective:     Antibiotics:  1. Vancomycin   2. Cefepime      Vitals:   Visit Vitals  /77   Pulse 90   Temp 97.3 °F (36.3 °C)   Resp 18   Ht 5' 11\" (1.803 m)   Wt 86.2 kg (190 lb 0.6 oz)   SpO2 95%   BMI 26.50 kg/m²        Tmax:  Temp (24hrs), Av.9 °F (36.6 °C), Min:97 °F (36.1 °C), Max:98.6 °F (37 °C)      Exam:  Lungs:  clear to auscultation bilaterally  Heart:  regular rate and rhythm  Abdomen:  soft, non-tender. Bowel sounds normal. No masses,  no organomegaly    Labs:      Recent Labs     20  0109 20  1243 20  0400 20  0024 20  0444 09/15/20  2115   WBC 6.2  --   --   --  8.0 10.1   HGB 7.9* 7.5*  --  8.0* 6.1* 7.4*   *  --   --   --  110* 138*   BUN 23*  --  29*  --  35* 34*   CREA 0.62*  --  0.71  --  0.67* 0.69*   AP  --   --  86  --  74 81   TBILI  --   --  0.5  --  0.4 0.3       Cultures:     No results found for: [facility]  Lab Results   Component Value Date/Time    Culture result: (A) 2020 06:51 AM     STAPHYLOCOCCUS EPIDERMIDIS GROWING IN ALL 4 BOTTLES DRAWN (SITES = L ARM AND RT ARM)    Culture result: SENSITIVITY TO FOLLOW 2020 06:51 AM    Culture result: NO GROWTH 5 DAYS 2020 05:40 AM       Radiology:     Line/Insert Date:           Assessment:     1. Sepsis syndrome--resolved--not sure of the cause--last 24 hours sounds like a similar event  2. Positive blood culture--likely contaminant since other bottles remain negative--will see what the new cultures show--coag negative staph in 4/4 blood culture bottles    Objective:     1. Continue current therapy  2. Follow up cultures and studies  3. Remove PICC and replace in opposite arm if line is indeed 61 days old  4.  PRN this weekend    Li Parra MD

## 2020-09-18 NOTE — PROGRESS NOTES
GI    Please note that endoscopy fluoroscopy machine malfunctioned during the patient's procedure necessitating changing to a different fluoroscopy machine mid-case. This required additional anesthesia time that would otherwise have not been needed (approximately 30 minutes). Please see anesthesia team's notes for details. Rivera Bermudez MD

## 2020-09-18 NOTE — PROGRESS NOTES
Pharmacist Note - Vancomycin Dosing  Therapy day 3  Indication: staph epi bacteremia  Current regimen: 1250 mg IV Q 12 hours    A Trough Level resulted at 17.4 mcg/mL which was obtained 13.5 hrs post-dose. The extrapolated \"true\" trough is approximately 19.2 mcg/mL based on the patient's known kinetics. Goal trough: 15 - 20 mcg/mL     Plan: Continue current regimen. Pharmacy will continue to monitor this patient daily for changes in clinical status and renal function.

## 2020-09-19 LAB
ANION GAP SERPL CALC-SCNC: 4 MMOL/L (ref 5–15)
BACTERIA SPEC CULT: ABNORMAL
BUN SERPL-MCNC: 23 MG/DL (ref 6–20)
BUN/CREAT SERPL: 35 (ref 12–20)
CALCIUM SERPL-MCNC: 8.4 MG/DL (ref 8.5–10.1)
CHLORIDE SERPL-SCNC: 113 MMOL/L (ref 97–108)
CO2 SERPL-SCNC: 22 MMOL/L (ref 21–32)
COMMENT, HOLDF: NORMAL
CREAT SERPL-MCNC: 0.65 MG/DL (ref 0.7–1.3)
FIBRINOGEN PPP-MCNC: 375 MG/DL (ref 200–475)
GLUCOSE SERPL-MCNC: 148 MG/DL (ref 65–100)
HCT VFR BLD AUTO: 22 % (ref 36.6–50.3)
HCT VFR BLD AUTO: 23 % (ref 36.6–50.3)
HGB BLD-MCNC: 7.1 G/DL (ref 12.1–17)
HGB BLD-MCNC: 7.5 G/DL (ref 12.1–17)
PHOSPHATE SERPL-MCNC: 2.8 MG/DL (ref 2.6–4.7)
POTASSIUM SERPL-SCNC: 4.2 MMOL/L (ref 3.5–5.1)
SAMPLES BEING HELD,HOLD: NORMAL
SERVICE CMNT-IMP: ABNORMAL
SODIUM SERPL-SCNC: 139 MMOL/L (ref 136–145)

## 2020-09-19 PROCEDURE — 74011000250 HC RX REV CODE- 250: Performed by: HOSPITALIST

## 2020-09-19 PROCEDURE — 74011000258 HC RX REV CODE- 258: Performed by: HOSPITALIST

## 2020-09-19 PROCEDURE — 84100 ASSAY OF PHOSPHORUS: CPT

## 2020-09-19 PROCEDURE — 74011250637 HC RX REV CODE- 250/637: Performed by: INTERNAL MEDICINE

## 2020-09-19 PROCEDURE — 74011250636 HC RX REV CODE- 250/636: Performed by: NURSE PRACTITIONER

## 2020-09-19 PROCEDURE — 74011000250 HC RX REV CODE- 250: Performed by: PHYSICIAN ASSISTANT

## 2020-09-19 PROCEDURE — 80048 BASIC METABOLIC PNL TOTAL CA: CPT

## 2020-09-19 PROCEDURE — 74011250636 HC RX REV CODE- 250/636: Performed by: HOSPITALIST

## 2020-09-19 PROCEDURE — 85384 FIBRINOGEN ACTIVITY: CPT

## 2020-09-19 PROCEDURE — 85018 HEMOGLOBIN: CPT

## 2020-09-19 PROCEDURE — 74011000258 HC RX REV CODE- 258: Performed by: NURSE PRACTITIONER

## 2020-09-19 PROCEDURE — 65660000000 HC RM CCU STEPDOWN

## 2020-09-19 PROCEDURE — 74011250636 HC RX REV CODE- 250/636: Performed by: PHYSICIAN ASSISTANT

## 2020-09-19 PROCEDURE — C9113 INJ PANTOPRAZOLE SODIUM, VIA: HCPCS | Performed by: PHYSICIAN ASSISTANT

## 2020-09-19 PROCEDURE — 36415 COLL VENOUS BLD VENIPUNCTURE: CPT

## 2020-09-19 RX ADMIN — MIDODRINE HYDROCHLORIDE 10 MG: 5 TABLET ORAL at 21:06

## 2020-09-19 RX ADMIN — CALCIUM GLUCONATE: 94 INJECTION, SOLUTION INTRAVENOUS at 18:36

## 2020-09-19 RX ADMIN — NYSTATIN: 100000 POWDER TOPICAL at 08:00

## 2020-09-19 RX ADMIN — SODIUM CHLORIDE 40 MG: 9 INJECTION INTRAMUSCULAR; INTRAVENOUS; SUBCUTANEOUS at 21:07

## 2020-09-19 RX ADMIN — Medication 10 ML: at 21:04

## 2020-09-19 RX ADMIN — CEFEPIME 2 G: 2 INJECTION, POWDER, FOR SOLUTION INTRAVENOUS at 00:01

## 2020-09-19 RX ADMIN — Medication 10 ML: at 21:03

## 2020-09-19 RX ADMIN — SODIUM CHLORIDE 40 MG: 9 INJECTION INTRAMUSCULAR; INTRAVENOUS; SUBCUTANEOUS at 08:08

## 2020-09-19 RX ADMIN — Medication 10 ML: at 05:35

## 2020-09-19 RX ADMIN — MIDODRINE HYDROCHLORIDE 10 MG: 5 TABLET ORAL at 08:00

## 2020-09-19 RX ADMIN — HEPARIN SODIUM 5000 UNITS: 5000 INJECTION INTRAVENOUS; SUBCUTANEOUS at 17:20

## 2020-09-19 RX ADMIN — Medication 10 ML: at 08:10

## 2020-09-19 RX ADMIN — SODIUM CHLORIDE 125 ML/HR: 900 INJECTION, SOLUTION INTRAVENOUS at 07:59

## 2020-09-19 RX ADMIN — CEFEPIME 2 G: 2 INJECTION, POWDER, FOR SOLUTION INTRAVENOUS at 16:37

## 2020-09-19 RX ADMIN — Medication 10 ML: at 13:25

## 2020-09-19 RX ADMIN — CEFEPIME 2 G: 2 INJECTION, POWDER, FOR SOLUTION INTRAVENOUS at 07:56

## 2020-09-19 RX ADMIN — VANCOMYCIN HYDROCHLORIDE 1250 MG: 10 INJECTION, POWDER, LYOPHILIZED, FOR SOLUTION INTRAVENOUS at 21:02

## 2020-09-19 RX ADMIN — Medication 10 ML: at 05:36

## 2020-09-19 RX ADMIN — MIDODRINE HYDROCHLORIDE 10 MG: 5 TABLET ORAL at 16:37

## 2020-09-19 RX ADMIN — NYSTATIN: 100000 POWDER TOPICAL at 17:20

## 2020-09-19 RX ADMIN — VANCOMYCIN HYDROCHLORIDE 1250 MG: 10 INJECTION, POWDER, LYOPHILIZED, FOR SOLUTION INTRAVENOUS at 09:27

## 2020-09-19 NOTE — PROGRESS NOTES
[address] [facility] Adult  Hospitalist Group                                                                                          Hospitalist Progress Note  Jozef Alva MD  Answering service: [phone] from in house phone        Date of Service:  2020  NAME:  Kellie Palomino  :  1934  MRN:  [id number]      Admission Summary:     Alok Guido is a 80 y.o. male with past medical history of recent multiple bilateral pulmonary emboli, right lower extremity DVT, GI bleed, erosive gastritis, anemia, blood transfusion, pancreatitis, arthritis, BPH, DJD, and hyperlipidemia presented to the ED from home with reported nausea and vomiting.  Patient provided some history in addition to his daughter who was at the bedside. Kinjal Sunshine of history was obtained per my review of ED and electronic medical records.      Interval history / Subjective:     Patient seen and examined this morning.   Had ERCP yesterday  S/p stent  No fever noted      Assessment & Plan:     # Septic Shock-- resolved   # Bacteremia due to  [place]ante [place]  - off pressor on abx MGMT per ID   - episode of hypotension on 9/15 needing IVF bolus, albumin and midodrine    - received iv cefepime and vancomycin, which was restarted again 9/15  - last blood culture : GPC / bottle   - TTE-LV EF 55-60% normal cavity, wall thickness ans systolic function.   - PICC line replaced on  ( given the new +Ve blood culture, may need to be changed/removed)  - repeat blood culture once PICC removed   - COVID 19 test on  negative  - ID on board,    - may require repeat TTE and LAVON     # Pancreatitis  - improved peripancreatic inflammatory changes on most recent CT  - lipase was normal 107 on , check lipase  - on TPN, back off to full liquid diet due to intolerance of GI lite diet    - Nutritionist on board  - GI on board  -He had ERCP- now status post biliary sphincterotomy, biliary brushings, and placement of a 10 mm by 6 cm fully covered metal stent     # Gastric outlet obstruction  - on TPN and full liquid diet   - GI on board  - Nutritionist on board, plan to avoid home TPN,      # Pancreatic abnormality on prev CT   - CT abdomen pelvis on 9/7 improved peripancreatic inflammatory changes, compatible with pancreatitis. Unchanged biliary dilatation, with no visualized choledocholithiasis. Small hiatal hernia.  - Hem/Onc on board  -- now status post biliary sphincterotomy, biliary brushings, and placement of a 10 mm by 6 cm fully covered metal stent follow-up on pathology     # Hx PE/DVT, s/p IVC filter placement  - elevated d-dimer, improving  - IVC filter in place    # Acute on chronic Anemia disease  - s/p 1u PRBC transfusion, and monitor Hgb, transfuse <7  - no evidence of active bleeding  - monitor H/H      # Hypokalemia  - replaced and resolved     # anemia due to chronic dz  Transfuse if hgb < 7     Code status: DNR  DVT prophylaxis:SCD    Care Plan discussed with: Patient/Family and Nurse  Anticipated Disposition: TBD  Anticipated Discharge: >48hrs        Hospital Problems  Date Reviewed: 7/14/2020          Codes Class Noted POA    Tachycardia ICD-10-CM: R00.0  ICD-9-CM: 785.0  9/7/2020 Unknown        Hypotension ICD-10-CM: I95.9  ICD-9-CM: 458.9  9/7/2020 Unknown        Fever ICD-10-CM: R50.9  ICD-9-CM: 780.60  9/7/2020 Unknown        Sepsis ([facility] [address] ICD-10-CM: A41.9  ICD-9-CM: 038.9, 995.91  9/7/2020 Unknown        Shock ([address] ICD-10-CM: R57.9  ICD-9-CM: 785.50  9/7/2020 Unknown              Vital Signs:    Last 24hrs VS reviewed since prior progress note.  Most recent are:  Visit Vitals  /67 (BP 1 Location: Left arm, BP Patient Position: At rest)   Pulse 98   Temp 98.4 °F (36.9 °C)   Resp 22   Ht 5' 11\" (1.803 m)   Wt 88.2 kg (194 lb 7.1 oz)   SpO2 99%   BMI 27.12 kg/m²         Intake/Output Summary (Last 24 hours) at 9/19/2020 0919  Last data filed at 9/19/2020 0759  Gross per 24 hour   Intake 4104.08 ml   Output 1550 ml   Net 2554.08 ml Physical Examination:             Constitutional:  No acute distress, cooperative, pleasant    ENT:  Oral mucosa moist, oropharynx benign. Resp:  decreased breath sound at the base bilaterally    CV:  Regular rhythm, normal rate, no murmurs, gallops, rubs    GI:  Soft, non distended, non tender. normoactive bowel sounds, no hepatosplenomegaly     Musculoskeletal:  No edema,     Neurologic:  Moves all extremities. AAOx3, CN II-XII reviewed            Data Review:   I personally reviewed labs and imaging     Labs:     Recent Labs     09/19/20  0526 09/18/20  1826 09/18/20  0109   WBC  --   --  6.2   HGB 7.1* 7.5* 7.9*   HCT 22.0* 26.4* 24.3*   PLT  --   --  115*     Recent Labs     09/19/20  0526 09/18/20  0109 09/17/20  0400    139 138   K 4.2 3.9 3.9   * 110* 110*   CO2 22 23 24   BUN 23* 23* 29*   CREA 0.65* 0.62* 0.71   * 126* 128*   CA 8.4* 8.3* 8.1*   PHOS 2.8 3.1 3.0     Recent Labs     09/17/20  0400   ALT 97*   AP 86   TBILI 0.5   TP 5.9*   ALB 2.3*   GLOB 3.6     No results for input(s): INR, PTP, APTT, INREXT, INREXT in the last 72 hours. No results for input(s): FE, TIBC, PSAT, FERR in the last 72 hours. No results found for: FOL, RBCF   No results for input(s): PH, PCO2, PO2 in the last 72 hours. No results for input(s): CPK, CKNDX, TROIQ in the last 72 hours.     No lab exists for component: CPKMB  Lab Results   Component Value Date/Time    Cholesterol, total 250 (H) 10/02/2013 01:55 PM    HDL Cholesterol 53 10/02/2013 01:55 PM    LDL, calculated 176 (H) 10/02/2013 01:55 PM    Triglyceride 244 (H) 07/23/2020 04:41 AM    CHOL/HDL Ratio 3.4 10/06/2010 10:25 AM     Lab Results   Component Value Date/Time    Glucose (POC) 149 (H) 09/15/2020 09:05 PM    Glucose (POC) 113 (H) 09/15/2020 04:51 PM    Glucose (POC) 135 (H) 09/15/2020 10:59 AM    Glucose (POC) 132 (H) 09/15/2020 05:49 AM    Glucose (POC) 109 (H) 09/15/2020 12:31 AM     Lab Results   Component Value Date/Time Color YELLOW/STRAW 09/07/2020 01:59 AM    Appearance CLEAR 09/07/2020 01:59 AM    Specific gravity 1.020 09/07/2020 01:59 AM    pH (UA) 6.5 09/07/2020 01:59 AM    Protein Negative 09/07/2020 01:59 AM    Glucose Negative 09/07/2020 01:59 AM    Ketone Negative 09/07/2020 01:59 AM    Bilirubin Negative 09/07/2020 01:59 AM    Urobilinogen 1.0 09/07/2020 01:59 AM    Nitrites Negative 09/07/2020 01:59 AM    Leukocyte Esterase Negative 09/07/2020 01:59 AM    Epithelial cells FEW 09/07/2020 01:59 AM    Bacteria Negative 09/07/2020 01:59 AM    WBC 0-4 09/07/2020 01:59 AM    RBC 0-5 09/07/2020 01:59 AM         Medications Reviewed:     Current Facility-Administered Medications   Medication Dose Route Frequency    TPN ADULT - CENTRAL   IntraVENous CONTINUOUS    TPN ADULT - CENTRAL   IntraVENous CONTINUOUS    sodium chloride (NS) flush 5-40 mL  5-40 mL IntraVENous Q8H    sodium chloride (NS) flush 5-40 mL  5-40 mL IntraVENous PRN    lactated Ringers infusion  25 mL/hr IntraVENous CONTINUOUS    cefepime (MAXIPIME) 2 g in 0.9% sodium chloride (MBP/ADV) 100 mL  2 g IntraVENous Q8H    0.9% sodium chloride infusion  125 mL/hr IntraVENous CONTINUOUS    0.9% sodium chloride infusion 250 mL  250 mL IntraVENous PRN    Vancomycin - Pharmacy dosing   Other Rx Dosing/Monitoring    vancomycin (VANCOCIN) 1250 mg in  ml infusion  1,250 mg IntraVENous Q12H    pantoprazole (PROTONIX) 40 mg in 0.9% sodium chloride 10 mL injection  40 mg IntraVENous Q12H    alteplase (CATHFLO) 2 mg in sterile water (preservative free) 2 mL injection  2 mg InterCATHeter PRN    fat emulsion 20% (LIPOSYN, INTRAlipid) infusion 500 mL  500 mL IntraVENous Q MON, WED & FRI    heparin (porcine) injection 5,000 Units  5,000 Units SubCUTAneous Q12H    nystatin (MYCOSTATIN) 100,000 unit/gram powder   Topical BID    sodium chloride (NS) flush 5-40 mL  5-40 mL IntraVENous Q8H    sodium chloride (NS) flush 5-40 mL  5-40 mL IntraVENous PRN    Detail Level: Zone polyethylene glycol (MIRALAX) packet 17 g  17 g Oral DAILY PRN    promethazine (PHENERGAN) tablet 12.5 mg  12.5 mg Oral Q6H PRN    Or    ondansetron (ZOFRAN) injection 4 mg  4 mg IntraVENous Q6H PRN    midodrine (PROAMATINE) tablet 10 mg  10 mg Oral TID     ______________________________________________________________________  EXPECTED LENGTH OF STAY: 4d 19h  ACTUAL LENGTH OF STAY:          12                 Jj Diop MD Detail Level: Detailed

## 2020-09-19 NOTE — PROGRESS NOTES
Bedside and Verbal shift change report given to ELIZABETH Stevenson (oncoming nurse) by Nakia Coffey (offgoing nurse). Report included the following information SBAR, Kardex, ED Summary, Procedure Summary, Intake/Output, MAR and Cardiac Rhythm NSR.

## 2020-09-19 NOTE — PROGRESS NOTES
2120: Notified hospitalist of pts change in peripheral vascular status. Pt complained of swelling in right arm, lips, and BLE. Completed a neuro check, there were no deficits. Received order for IV Benadryl. 0730: Bedside and Verbal shift change report given to Rebecca Lomeli (oncoming nurse) by [facility] and Familia Higginbotham RN (offgoing nurse). Report included the following information SBAR, Kardex, ED Summary, Intake/Output, MAR, Recent Results and Cardiac Rhythm NSR. Problem: Falls - Risk of  Goal: *Absence of Falls  Description: Document Devere [place] Fall Risk and appropriate interventions in the flowsheet.   Outcome: Progressing Towards Goal  Note: Fall Risk Interventions:  Mobility Interventions: Communicate number of staff needed for ambulation/transfer, Patient to call before getting OOB, Utilize walker, cane, or other assistive device    Medication Interventions: Patient to call before getting OOB, Teach patient to arise slowly    Elimination Interventions: Call light in reach, Patient to call for help with toileting needs, Toilet paper/wipes in reach, Toileting schedule/hourly rounds, Urinal in reach    History of Falls Interventions: Vital signs minimum Q4HRs X 24 hrs (comment for end date), Door open when patient unattended    Problem: Sepsis: Day 6  Goal: *Oxygen saturation within defined limits  Outcome: Progressing Towards Goal  Goal: Activity/Safety  Outcome: Progressing Towards Goal  Goal: Psychosocial  Outcome: Progressing Towards Goal     Problem: Nutrition Deficit  Goal: *Optimize nutritional status  Outcome: Progressing Towards Goal

## 2020-09-19 NOTE — PROGRESS NOTES
[address]  [address] [place]  [place], [address]  [phone]                GI PROGRESS NOTE      NAME:   Maira Coello   :    1934   MRN:    [id number]     Assessment/Plan   Sepsis-on antibiotics and status post ERCP. Continue current regimen  Bile duct stricture-sampled and stent placed. Await pathology       Patient Active Problem List   Diagnosis Code    DVT (deep vein thrombosis) in pregnancy O22.30    DVT (deep venous thrombosis) ([facility]) I82.409    Tachycardia R00.0    Hypotension I95.9    Fever R50.9    Sepsis ([address] A41.9    Shock ([address] R57.9       Subjective:     Maira Coello is a 80 y.o.  male who had no new events overnight. No fever. Liver labs not repeated    Review of Systems    Constitutional: negative fever, negative chills, negative weight loss  Eyes:   negative visual changes  ENT:   negative sore throat, tongue or lip swelling  Respiratory:  negative cough, negative dyspnea  Cards:  negative for chest pain, palpitations, lower extremity edema  GI:   See HPI  :  negative for frequency, dysuria  Integument:  negative for rash and pruritus  Heme:  negative for easy bruising and gum/nose bleeding  Musculoskel: negative for myalgias,  back pain and muscle weakness  Neuro: negative for headaches, dizziness, vertigo  Psych:  negative for feelings of anxiety, depression           Objective:     VITALS:   Last 24hrs VS reviewed since prior hospitalist progress note.  Most recent are:  Visit Vitals  BP (!) 99/54 (BP 1 Location: Left arm, BP Patient Position: Sitting)   Pulse 91   Temp 98.3 °F (36.8 °C)   Resp 22   Ht 5' 11\" (1.803 m)   Wt 88.2 kg (194 lb 7.1 oz)   SpO2 99%   BMI 27.12 kg/m²       Intake/Output Summary (Last 24 hours) at 2020 1443  Last data filed at 2020 0759  Gross per 24 hour   Intake 4104.08 ml   Output 1600 ml   Net 2504.08 ml        PHYSICAL EXAM:  General   well developed, well nourished, appears stated age, in no acute distress  EENT  Normocephalic, Atraumatic, PERRLA, EOMI, sclera clear, nares clear, pharynx normal  Neck   Supple without nodes or mass. No thyromegaly or bruit  Respiratory   Clear To Auscultation bilaterally - no wheezes, rales, rhonchi, or crackles  Cardiology  Regular Rate and Rythmn  - no murmurs, rubs or gallops  Abdominal  Soft, non-tender, non-distended, positive bowel sounds, no hepatosplenomegaly, no palpable mass  Extremities  No clubbing, cyanosis, or edema. Pulses intact. Back  No spinal or muscle pain. No CVAT. Skin  Normal skin turgor. No rashes or skin ulcers noted  Neurological  No focal neurological deficits noted  Psychological  Oriented x 3. Normal affect. Lab Data   Recent Results (from the past 12 hour(s))   FIBRINOGEN    Collection Time: 09/19/20  5:26 AM   Result Value Ref Range    Fibrinogen 375 200 - 161 mg/dL   METABOLIC PANEL, BASIC    Collection Time: 09/19/20  5:26 AM   Result Value Ref Range    Sodium 139 136 - 145 mmol/L    Potassium 4.2 3.5 - 5.1 mmol/L    Chloride 113 (H) 97 - 108 mmol/L    CO2 22 21 - 32 mmol/L    Anion gap 4 (L) 5 - 15 mmol/L    Glucose 148 (H) 65 - 100 mg/dL    BUN 23 (H) 6 - 20 MG/DL    Creatinine 0.65 (L) 0.70 - 1.30 MG/DL    BUN/Creatinine ratio 35 (H) 12 - 20      GFR est AA >60 >60 ml/min/1.73m2    GFR est non-AA >60 >60 ml/min/1.73m2    Calcium 8.4 (L) 8.5 - 10.1 MG/DL   SAMPLES BEING HELD    Collection Time: 09/19/20  5:26 AM   Result Value Ref Range    SAMPLES BEING HELD 1pst     COMMENT        Add-on orders for these samples will be processed based on acceptable specimen integrity and analyte stability, which may vary by analyte.    PHOSPHORUS    Collection Time: 09/19/20  5:26 AM   Result Value Ref Range    Phosphorus 2.8 2.6 - 4.7 MG/DL   HGB & HCT    Collection Time: 09/19/20  5:26 AM   Result Value Ref Range    HGB 7.1 (L) 12.1 - 17.0 g/dL    HCT 22.0 (L) 36.6 - 50.3 %         Medications: Reviewed    PMH/SH reviewed - no change compared to H&P  Attending Physician: Carmella Knapp MD   Date/Time:  9/19/2020

## 2020-09-20 LAB
FIBRINOGEN PPP-MCNC: 330 MG/DL (ref 200–475)
HCT VFR BLD AUTO: 23.6 % (ref 36.6–50.3)
HGB BLD-MCNC: 7.5 G/DL (ref 12.1–17)
LIPASE SERPL-CCNC: 77 U/L (ref 73–393)
PHOSPHATE SERPL-MCNC: 3.2 MG/DL (ref 2.6–4.7)

## 2020-09-20 PROCEDURE — 74011250636 HC RX REV CODE- 250/636: Performed by: HOSPITALIST

## 2020-09-20 PROCEDURE — 36415 COLL VENOUS BLD VENIPUNCTURE: CPT

## 2020-09-20 PROCEDURE — 74011250637 HC RX REV CODE- 250/637: Performed by: NURSE PRACTITIONER

## 2020-09-20 PROCEDURE — 74011000250 HC RX REV CODE- 250: Performed by: HOSPITALIST

## 2020-09-20 PROCEDURE — 83690 ASSAY OF LIPASE: CPT

## 2020-09-20 PROCEDURE — 74011250636 HC RX REV CODE- 250/636: Performed by: NURSE PRACTITIONER

## 2020-09-20 PROCEDURE — 74011250636 HC RX REV CODE- 250/636: Performed by: PHYSICIAN ASSISTANT

## 2020-09-20 PROCEDURE — 65270000032 HC RM SEMIPRIVATE

## 2020-09-20 PROCEDURE — 74011000258 HC RX REV CODE- 258: Performed by: HOSPITALIST

## 2020-09-20 PROCEDURE — 74011000258 HC RX REV CODE- 258: Performed by: NURSE PRACTITIONER

## 2020-09-20 PROCEDURE — C9113 INJ PANTOPRAZOLE SODIUM, VIA: HCPCS | Performed by: PHYSICIAN ASSISTANT

## 2020-09-20 PROCEDURE — 74011250636 HC RX REV CODE- 250/636: Performed by: INTERNAL MEDICINE

## 2020-09-20 PROCEDURE — 74011250637 HC RX REV CODE- 250/637: Performed by: INTERNAL MEDICINE

## 2020-09-20 PROCEDURE — 74011000250 HC RX REV CODE- 250: Performed by: PHYSICIAN ASSISTANT

## 2020-09-20 PROCEDURE — 85018 HEMOGLOBIN: CPT

## 2020-09-20 PROCEDURE — 84100 ASSAY OF PHOSPHORUS: CPT

## 2020-09-20 PROCEDURE — 85384 FIBRINOGEN ACTIVITY: CPT

## 2020-09-20 RX ORDER — FUROSEMIDE 10 MG/ML
20 INJECTION INTRAMUSCULAR; INTRAVENOUS ONCE
Status: COMPLETED | OUTPATIENT
Start: 2020-09-20 | End: 2020-09-20

## 2020-09-20 RX ADMIN — SODIUM CHLORIDE 40 MG: 9 INJECTION INTRAMUSCULAR; INTRAVENOUS; SUBCUTANEOUS at 08:51

## 2020-09-20 RX ADMIN — MIDODRINE HYDROCHLORIDE 10 MG: 5 TABLET ORAL at 22:04

## 2020-09-20 RX ADMIN — CEFEPIME 2 G: 2 INJECTION, POWDER, FOR SOLUTION INTRAVENOUS at 01:36

## 2020-09-20 RX ADMIN — VANCOMYCIN HYDROCHLORIDE 1250 MG: 10 INJECTION, POWDER, LYOPHILIZED, FOR SOLUTION INTRAVENOUS at 09:00

## 2020-09-20 RX ADMIN — Medication 10 ML: at 14:40

## 2020-09-20 RX ADMIN — Medication 10 ML: at 06:58

## 2020-09-20 RX ADMIN — HEPARIN SODIUM 5000 UNITS: 5000 INJECTION INTRAVENOUS; SUBCUTANEOUS at 06:02

## 2020-09-20 RX ADMIN — Medication 10 ML: at 22:04

## 2020-09-20 RX ADMIN — MIDODRINE HYDROCHLORIDE 10 MG: 5 TABLET ORAL at 08:51

## 2020-09-20 RX ADMIN — SODIUM CHLORIDE, SODIUM LACTATE, POTASSIUM CHLORIDE, AND CALCIUM CHLORIDE 25 ML/HR: 600; 310; 30; 20 INJECTION, SOLUTION INTRAVENOUS at 14:38

## 2020-09-20 RX ADMIN — CEFEPIME 2 G: 2 INJECTION, POWDER, FOR SOLUTION INTRAVENOUS at 08:51

## 2020-09-20 RX ADMIN — SODIUM CHLORIDE 40 MG: 9 INJECTION INTRAMUSCULAR; INTRAVENOUS; SUBCUTANEOUS at 22:04

## 2020-09-20 RX ADMIN — HEPARIN SODIUM 5000 UNITS: 5000 INJECTION INTRAVENOUS; SUBCUTANEOUS at 17:17

## 2020-09-20 RX ADMIN — MIDODRINE HYDROCHLORIDE 10 MG: 5 TABLET ORAL at 17:18

## 2020-09-20 RX ADMIN — FUROSEMIDE 20 MG: 10 INJECTION, SOLUTION INTRAMUSCULAR; INTRAVENOUS at 12:25

## 2020-09-20 RX ADMIN — CALCIUM GLUCONATE: 94 INJECTION, SOLUTION INTRAVENOUS at 18:42

## 2020-09-20 RX ADMIN — NYSTATIN: 100000 POWDER TOPICAL at 09:01

## 2020-09-20 RX ADMIN — Medication 10 ML: at 06:59

## 2020-09-20 RX ADMIN — Medication 10 ML: at 14:00

## 2020-09-20 RX ADMIN — Medication 10 ML: at 12:26

## 2020-09-20 RX ADMIN — SODIUM CHLORIDE 125 ML/HR: 900 INJECTION, SOLUTION INTRAVENOUS at 01:42

## 2020-09-20 RX ADMIN — CEFEPIME 2 G: 2 INJECTION, POWDER, FOR SOLUTION INTRAVENOUS at 17:18

## 2020-09-20 RX ADMIN — VANCOMYCIN HYDROCHLORIDE 1250 MG: 10 INJECTION, POWDER, LYOPHILIZED, FOR SOLUTION INTRAVENOUS at 22:11

## 2020-09-20 NOTE — PROGRESS NOTES
Problem: Falls - Risk of  Goal: *Absence of Falls  Description: Document Iliana Dailey Fall Risk and appropriate interventions in the flowsheet.   Outcome: Progressing Towards Goal  Note: Fall Risk Interventions:  Mobility Interventions: Communicate number of staff needed for ambulation/transfer, Patient to call before getting OOB         Medication Interventions: Evaluate medications/consider consulting pharmacy, Patient to call before getting OOB, Teach patient to arise slowly    Elimination Interventions: Call light in reach, Patient to call for help with toileting needs, Stay With Me (per policy)    History of Falls Interventions: Door open when patient unattended, Investigate reason for fall, Room close to nurse's station         Problem: Sepsis: Day   Goal: Medications  Outcome: Progressing Towards Goal  Goal: Treatments/Interventions/Procedures  Outcome: Progressing Towards Goal    Daily labs, WBC, IV fluid hydration, IV vancomycin, cefepime, Vital signs

## 2020-09-20 NOTE — PROGRESS NOTES
Bedside and Verbal shift change report given to Santa Horowitz RN (oncoming nurse) by Pedro Harrington RN (offgoing nurse). Report included the following information SBAR, Kardex, Intake/Output, MAR and Recent Results.

## 2020-09-20 NOTE — ROUTINE PROCESS
Patients TPN dripping from bag. Pharmacy notified. She stated keep it running and contain the dripping. Later Rolandhosea Harper phoned pharmacy and was advised to stop the TPN. TRANSFER - OUT REPORT: 
 
Verbal report given to Elvin(name) on Camden Osborne  being transferred to [facility](unit) for routine progression of care Report consisted of patients Situation, Background, Assessment and  
Recommendations(SBAR). Information from the following report(s) SBAR, Kardex, [facility] and Recent Results was reviewed with the receiving nurse. Lines: PICC Double Lumen 07/16/20 Right;Brachial (Active) Central Line Being Utilized Yes 09/20/20 0900 Criteria for Appropriate Use Long term IV/antibiotic administration 09/20/20 0900 Site Assessment Clean, dry, & intact 09/20/20 0900 Phlebitis Assessment 0 09/20/20 0900 Infiltration Assessment 0 09/20/20 0900 Date of Last Dressing Change 09/11/20 09/20/20 0900 Dressing Status Clean, dry, & intact 09/20/20 0900 Action Taken Open ports on tubing capped 09/20/20 0415 Dressing Type Transparent 09/20/20 0900 Hub Color/Line Status Purple; Infusing 09/20/20 0415 Positive Blood Return (Site #1) No 09/20/20 0415 Hub Color/Line Status Red; Infusing 09/20/20 0415 Positive Blood Return (Site #2) Yes 09/20/20 0415 Alcohol Cap Used Yes 09/20/20 0415 Opportunity for questions and clarification was provided. Patient transported with: 
 [employer] Patient informed of new room number. He stated he just spoke to family and will let them know later.

## 2020-09-20 NOTE — PROGRESS NOTES
[address] [facility] Adult  Hospitalist Group                                                                                          Hospitalist Progress Note  Carey Palmer MD  Answering service: [phone] from in house phone        Date of Service:  2020  NAME:  Ruslan Nicholas  :  1934  MRN:  [id number]      Admission Summary:     Leslee Childress is a 80 y.o. male with past medical history of recent multiple bilateral pulmonary emboli, right lower extremity DVT, GI bleed, erosive gastritis, anemia, blood transfusion, pancreatitis, arthritis, BPH, DJD, and hyperlipidemia presented to the ED from home with reported nausea and vomiting.  Patient provided some history in addition to his daughter who was at the bedside. Maryan Berkowitz of history was obtained per my review of ED and electronic medical records.      Interval history / Subjective:     Patient seen and examined this morning.   Patient had ERCP status post stenting biliary stricture  Doing better tolerated diet full liquid advance as per GI TPN renewed for today     Assessment & Plan:     # Septic Shock-- resolved   # Bacteremia due to  [place]  - off pressor on abx MGMT per ID   - episode of hypotension on 9/15 needing IVF bolus, albumin and midodrine    - received iv cefepime and vancomycin, which was restarted again 9/15  - last blood culture : GPC 4/ bottle   - TTE-LV EF 55-60% normal cavity, wall thickness ans systolic function.   - PICC line replaced on  ( given the new +Ve blood culture, may need to be changed/removed)  - repeat blood culture  staph epidermidis all 4 bottles  - COVID 19 test on  negative  - ID on board    - may require repeat TTE and LAVON     # Pancreatitis  - improved peripancreatic inflammatory changes on most recent CT  - lipase was normal 107 on , check lipase  - on TPN, diet advance as per GI tolerated full liquid  - Nutritionist on board  - GI on board  -He had ERCP- now status post biliary sphincterotomy, biliary brushings, and placement of a 10 mm by 6 cm fully covered metal stent     # Gastric outlet obstruction  - on TPN and full liquid diet   - GI on board  - Nutritionist on board, plan to avoid home TPN,      # Pancreatic abnormality on prev CT   - CT abdomen pelvis on 9/7 improved peripancreatic inflammatory changes, compatible with pancreatitis. Unchanged biliary dilatation, with no visualized choledocholithiasis. Small hiatal hernia.  - Hem/Onc on board  -- now status post biliary sphincterotomy, biliary brushings, and placement of a 10 mm by 6 cm fully covered metal stent follow-up on pathology     # Hx PE/DVT, s/p IVC filter placement  - elevated d-dimer, improving  - IVC filter in place    # Acute on chronic Anemia disease  - s/p 1u PRBC transfusion, and monitor Hgb, transfuse <7  - no evidence of active bleeding  - monitor H/H      # Hypokalemia  - replaced and resolved     # anemia due to chronic dz  Transfuse if hgb < 7     Code status: DNR  DVT prophylaxis:SCD    Care Plan discussed with: Patient/Family and Nurse  Anticipated Disposition: TBD  Anticipated Discharge: >48hrs        Hospital Problems  Date Reviewed: 7/14/2020          Codes Class Noted POA    Tachycardia ICD-10-CM: R00.0  ICD-9-CM: 785.0  9/7/2020 Unknown        Hypotension ICD-10-CM: I95.9  ICD-9-CM: 458.9  9/7/2020 Unknown        Fever ICD-10-CM: R50.9  ICD-9-CM: 780.60  9/7/2020 Unknown        Sepsis ([facility] [address] ICD-10-CM: A41.9  ICD-9-CM: 038.9, 995.91  9/7/2020 Unknown        Shock ([address] ICD-10-CM: R57.9  ICD-9-CM: 785.50  9/7/2020 Unknown              Vital Signs:    Last 24hrs VS reviewed since prior progress note.  Most recent are:  Visit Vitals  /64 (BP 1 Location: Right arm, BP Patient Position: Sitting)   Pulse 92   Temp 97.6 °F (36.4 °C)   Resp 18   Ht 5' 11\" (1.803 m)   Wt 90.7 kg (199 lb 15.3 oz)   SpO2 97%   BMI 27.89 kg/m²         Intake/Output Summary (Last 24 hours) at 9/20/2020 1239  Last data filed at 9/20/2020 1226  Gross per 24 hour   Intake 3176 ml   Output 1900 ml   Net 1276 ml        Physical Examination:             Constitutional:  No acute distress, cooperative, pleasant    ENT:  Oral mucosa moist, oropharynx benign. Resp:  decreased breath sound at the base bilaterally    CV:  Regular rhythm, normal rate, no murmurs, gallops, rubs    GI:  Soft, non distended, non tender. normoactive bowel sounds, no hepatosplenomegaly     Musculoskeletal:  No edema,     Neurologic:  Moves all extremities. AAOx3, CN II-XII reviewed            Data Review:   I personally reviewed labs and imaging     Labs:     Recent Labs     09/20/20 0310 09/19/20  1730  09/18/20  0109   WBC  --   --   --  6.2   HGB 7.5* 7.5*   < > 7.9*   HCT 23.6* 23.0*   < > 24.3*   PLT  --   --   --  115*    < > = values in this interval not displayed. Recent Labs     09/20/20 0310 09/19/20  0526 09/18/20  0109   NA  --  139 139   K  --  4.2 3.9   CL  --  113* 110*   CO2  --  22 23   BUN  --  23* 23*   CREA  --  0.65* 0.62*   GLU  --  148* 126*   CA  --  8.4* 8.3*   PHOS 3.2 2.8 3.1     Recent Labs     09/20/20  0310   LPSE 77     No results for input(s): INR, PTP, APTT, INREXT, INREXT in the last 72 hours. No results for input(s): FE, TIBC, PSAT, FERR in the last 72 hours. No results found for: FOL, RBCF   No results for input(s): PH, PCO2, PO2 in the last 72 hours. No results for input(s): CPK, CKNDX, TROIQ in the last 72 hours.     No lab exists for component: CPKMB  Lab Results   Component Value Date/Time    Cholesterol, total 250 (H) 10/02/2013 01:55 PM    HDL Cholesterol 53 10/02/2013 01:55 PM    LDL, calculated 176 (H) 10/02/2013 01:55 PM    Triglyceride 244 (H) 07/23/2020 04:41 AM    CHOL/HDL Ratio 3.4 10/06/2010 10:25 AM     Lab Results   Component Value Date/Time    Glucose (POC) 149 (H) 09/15/2020 09:05 PM    Glucose (POC) 113 (H) 09/15/2020 04:51 PM    Glucose (POC) 135 (H) 09/15/2020 10:59 AM    Glucose (POC) 132 (H) 09/15/2020 05:49 AM    Glucose (POC) 109 (H) 09/15/2020 12:31 AM     Lab Results   Component Value Date/Time    Color YELLOW/STRAW 09/07/2020 01:59 AM    Appearance CLEAR 09/07/2020 01:59 AM    Specific gravity 1.020 09/07/2020 01:59 AM    pH (UA) 6.5 09/07/2020 01:59 AM    Protein Negative 09/07/2020 01:59 AM    Glucose Negative 09/07/2020 01:59 AM    Ketone Negative 09/07/2020 01:59 AM    Bilirubin Negative 09/07/2020 01:59 AM    Urobilinogen 1.0 09/07/2020 01:59 AM    Nitrites Negative 09/07/2020 01:59 AM    Leukocyte Esterase Negative 09/07/2020 01:59 AM    Epithelial cells FEW 09/07/2020 01:59 AM    Bacteria Negative 09/07/2020 01:59 AM    WBC 0-4 09/07/2020 01:59 AM    RBC 0-5 09/07/2020 01:59 AM         Medications Reviewed:     Current Facility-Administered Medications   Medication Dose Route Frequency    TPN ADULT - CENTRAL   IntraVENous CONTINUOUS    TPN ADULT - CENTRAL   IntraVENous CONTINUOUS    sodium chloride (NS) flush 5-40 mL  5-40 mL IntraVENous Q8H    sodium chloride (NS) flush 5-40 mL  5-40 mL IntraVENous PRN    lactated Ringers infusion  25 mL/hr IntraVENous CONTINUOUS    cefepime (MAXIPIME) 2 g in 0.9% sodium chloride (MBP/ADV) 100 mL  2 g IntraVENous Q8H    0.9% sodium chloride infusion 250 mL  250 mL IntraVENous PRN    Vancomycin - Pharmacy dosing   Other Rx Dosing/Monitoring    vancomycin (VANCOCIN) 1250 mg in  ml infusion  1,250 mg IntraVENous Q12H    pantoprazole (PROTONIX) 40 mg in 0.9% sodium chloride 10 mL injection  40 mg IntraVENous Q12H    alteplase (CATHFLO) 2 mg in sterile water (preservative free) 2 mL injection  2 mg InterCATHeter PRN    fat emulsion 20% (LIPOSYN, INTRAlipid) infusion 500 mL  500 mL IntraVENous Q MON, WED & FRI    heparin (porcine) injection 5,000 Units  5,000 Units SubCUTAneous Q12H    nystatin (MYCOSTATIN) 100,000 unit/gram powder   Topical BID    sodium chloride (NS) flush 5-40 mL  5-40 mL IntraVENous Q8H    sodium chloride (NS) flush 5-40 mL  5-40 mL IntraVENous PRN    polyethylene glycol (MIRALAX) packet 17 g  17 g Oral DAILY PRN    promethazine (PHENERGAN) tablet 12.5 mg  12.5 mg Oral Q6H PRN    Or    ondansetron (ZOFRAN) injection 4 mg  4 mg IntraVENous Q6H PRN    midodrine (PROAMATINE) tablet 10 mg  10 mg Oral TID     ______________________________________________________________________  EXPECTED LENGTH OF STAY: 4d 19h  ACTUAL LENGTH OF STAY:          Susan Rosales MD

## 2020-09-20 NOTE — PROGRESS NOTES
Problem: Falls - Risk of  Goal: *Absence of Falls  Description: Document Iliana Dailey Fall Risk and appropriate interventions in the flowsheet. Outcome: Progressing Towards Goal  Note: Fall Risk Interventions:  Mobility Interventions: Communicate number of staff needed for ambulation/transfer, Patient to call before getting OOB           Medication Interventions: Evaluate medications/consider consulting pharmacy, Patient to call before getting OOB, Teach patient to arise slowly     Elimination Interventions: Call light in reach, Patient to call for help with toileting needs, Stay With Me (per policy)     History of Falls Interventions: Door open when patient unattended, Investigate reason for fall, Room close to nurse's station           Problem: Sepsis: Day   Goal: Medications  Outcome: Progressing Towards Goal  Goal: Treatments/Interventions/Procedures  Outcome: Progressing Towards Goal     Daily labs, WBC, IV fluid hydration, IV vancomycin, cefepime, Vital signs       Bedside shift change report given to Kwame (oncoming nurse) by Maddy Rubin (offgoing nurse).  Report included the following information SBAR, Kardex, Intake/Output, MAR, Recent Results, Med Rec Status and Cardiac Rhythm

## 2020-09-21 LAB
ALBUMIN SERPL-MCNC: 2.2 G/DL (ref 3.5–5)
ALBUMIN/GLOB SERPL: 0.5 {RATIO} (ref 1.1–2.2)
ALP SERPL-CCNC: 77 U/L (ref 45–117)
ALT SERPL-CCNC: 48 U/L (ref 12–78)
ANION GAP SERPL CALC-SCNC: 3 MMOL/L (ref 5–15)
AST SERPL-CCNC: 26 U/L (ref 15–37)
BILIRUB DIRECT SERPL-MCNC: <0.1 MG/DL (ref 0–0.2)
BILIRUB SERPL-MCNC: 0.3 MG/DL (ref 0.2–1)
BUN SERPL-MCNC: 20 MG/DL (ref 6–20)
BUN/CREAT SERPL: 29 (ref 12–20)
CALCIUM SERPL-MCNC: 8.6 MG/DL (ref 8.5–10.1)
CHLORIDE SERPL-SCNC: 110 MMOL/L (ref 97–108)
CO2 SERPL-SCNC: 26 MMOL/L (ref 21–32)
CREAT SERPL-MCNC: 0.69 MG/DL (ref 0.7–1.3)
FIBRINOGEN PPP-MCNC: 365 MG/DL (ref 200–475)
GLOBULIN SER CALC-MCNC: 4.1 G/DL (ref 2–4)
GLUCOSE BLD STRIP.AUTO-MCNC: 110 MG/DL (ref 65–100)
GLUCOSE BLD STRIP.AUTO-MCNC: 114 MG/DL (ref 65–100)
GLUCOSE SERPL-MCNC: 121 MG/DL (ref 65–100)
HCT VFR BLD AUTO: 24.4 % (ref 36.6–50.3)
HGB BLD-MCNC: 7.8 G/DL (ref 12.1–17)
PHOSPHATE SERPL-MCNC: 3.6 MG/DL (ref 2.6–4.7)
POTASSIUM SERPL-SCNC: 3.5 MMOL/L (ref 3.5–5.1)
PROT SERPL-MCNC: 6.3 G/DL (ref 6.4–8.2)
SERVICE CMNT-IMP: ABNORMAL
SERVICE CMNT-IMP: ABNORMAL
SODIUM SERPL-SCNC: 139 MMOL/L (ref 136–145)

## 2020-09-21 PROCEDURE — 74011250636 HC RX REV CODE- 250/636: Performed by: NURSE PRACTITIONER

## 2020-09-21 PROCEDURE — 36415 COLL VENOUS BLD VENIPUNCTURE: CPT

## 2020-09-21 PROCEDURE — C9113 INJ PANTOPRAZOLE SODIUM, VIA: HCPCS | Performed by: PHYSICIAN ASSISTANT

## 2020-09-21 PROCEDURE — 74011000250 HC RX REV CODE- 250: Performed by: PHYSICIAN ASSISTANT

## 2020-09-21 PROCEDURE — 74011000258 HC RX REV CODE- 258: Performed by: NURSE PRACTITIONER

## 2020-09-21 PROCEDURE — 74011250637 HC RX REV CODE- 250/637: Performed by: NURSE PRACTITIONER

## 2020-09-21 PROCEDURE — 74011250637 HC RX REV CODE- 250/637: Performed by: INTERNAL MEDICINE

## 2020-09-21 PROCEDURE — 80048 BASIC METABOLIC PNL TOTAL CA: CPT

## 2020-09-21 PROCEDURE — 74011250636 HC RX REV CODE- 250/636: Performed by: PHYSICIAN ASSISTANT

## 2020-09-21 PROCEDURE — 84100 ASSAY OF PHOSPHORUS: CPT

## 2020-09-21 PROCEDURE — 65270000032 HC RM SEMIPRIVATE

## 2020-09-21 PROCEDURE — 80076 HEPATIC FUNCTION PANEL: CPT

## 2020-09-21 PROCEDURE — 85384 FIBRINOGEN ACTIVITY: CPT

## 2020-09-21 PROCEDURE — 85018 HEMOGLOBIN: CPT

## 2020-09-21 PROCEDURE — 82962 GLUCOSE BLOOD TEST: CPT

## 2020-09-21 RX ORDER — VANCOMYCIN HYDROCHLORIDE
1250
Status: DISCONTINUED | OUTPATIENT
Start: 2020-09-21 | End: 2020-09-22 | Stop reason: DRUGHIGH

## 2020-09-21 RX ADMIN — SODIUM CHLORIDE 40 MG: 9 INJECTION INTRAMUSCULAR; INTRAVENOUS; SUBCUTANEOUS at 09:48

## 2020-09-21 RX ADMIN — NYSTATIN: 100000 POWDER TOPICAL at 17:57

## 2020-09-21 RX ADMIN — HEPARIN SODIUM 5000 UNITS: 5000 INJECTION INTRAVENOUS; SUBCUTANEOUS at 06:19

## 2020-09-21 RX ADMIN — Medication 10 ML: at 06:19

## 2020-09-21 RX ADMIN — SODIUM CHLORIDE 40 MG: 9 INJECTION INTRAMUSCULAR; INTRAVENOUS; SUBCUTANEOUS at 22:32

## 2020-09-21 RX ADMIN — MIDODRINE HYDROCHLORIDE 10 MG: 5 TABLET ORAL at 17:55

## 2020-09-21 RX ADMIN — Medication 10 ML: at 14:52

## 2020-09-21 RX ADMIN — MIDODRINE HYDROCHLORIDE 10 MG: 5 TABLET ORAL at 09:48

## 2020-09-21 RX ADMIN — Medication 10 ML: at 22:00

## 2020-09-21 RX ADMIN — MIDODRINE HYDROCHLORIDE 10 MG: 5 TABLET ORAL at 22:32

## 2020-09-21 RX ADMIN — HEPARIN SODIUM 5000 UNITS: 5000 INJECTION INTRAVENOUS; SUBCUTANEOUS at 18:10

## 2020-09-21 RX ADMIN — CEFEPIME 2 G: 2 INJECTION, POWDER, FOR SOLUTION INTRAVENOUS at 09:47

## 2020-09-21 RX ADMIN — CEFEPIME 2 G: 2 INJECTION, POWDER, FOR SOLUTION INTRAVENOUS at 01:16

## 2020-09-21 RX ADMIN — VANCOMYCIN HYDROCHLORIDE 1250 MG: 10 INJECTION, POWDER, LYOPHILIZED, FOR SOLUTION INTRAVENOUS at 14:52

## 2020-09-21 RX ADMIN — CEFEPIME 2 G: 2 INJECTION, POWDER, FOR SOLUTION INTRAVENOUS at 17:54

## 2020-09-21 RX ADMIN — NYSTATIN: 100000 POWDER TOPICAL at 09:49

## 2020-09-21 RX ADMIN — Medication 10 ML: at 14:53

## 2020-09-21 NOTE — PROGRESS NOTES
[address]  [address] [facility], [address]       GI PROGRESS NOTE  [facility]  [phone] office  [phone] NP/PA in-hospital cell phone M-F until 4:30PM  After 5PM or on weekends, please call  for physician on call      NAME: Odalys Grullon   :  1934   MRN:  [id number]       Subjective:   Patient is sitting upright in bed and appears comfortable. No nausea, vomiting, or abdominal pain. He tolerated full liquids over the weekend. Objective:     VITALS:   Last 24hrs VS reviewed since prior progress note. Most recent are:  Visit Vitals  /73 (BP 1 Location: Left arm, BP Patient Position: Sitting)   Pulse 98   Temp 98 °F (36.7 °C)   Resp 18   Ht 5' 11\" (1.803 m)   Wt 86.6 kg (190 lb 14.7 oz)   SpO2 98%   BMI 26.63 kg/m²       PHYSICAL EXAM:  General:          Cooperative, no acute distress  Neurologic:      Alert and oriented  HEENT:           EOMI, no scleral icterus   Lungs:             No respiratory distress  Heart:              S1 S2  Abdomen:        Soft, non-distended, no tenderness, no guarding, no rebound. + Bowel sounds. Extremities:     Warm  Psych:             Good insight. Not anxious or agitated.       Lab Data Reviewed:     Recent Results (from the past 24 hour(s))   HGB & HCT    Collection Time: 20  2:50 AM   Result Value Ref Range    HGB 7.8 (L) 12.1 - 17.0 g/dL    HCT 24.4 (L) 36.6 - 50.3 %   FIBRINOGEN    Collection Time: 20  2:50 AM   Result Value Ref Range    Fibrinogen 365 200 - 475 mg/dL   PHOSPHORUS    Collection Time: 20  2:50 AM   Result Value Ref Range    Phosphorus 3.6 2.6 - 4.7 MG/DL   METABOLIC PANEL, BASIC    Collection Time: 20  2:50 AM   Result Value Ref Range    Sodium 139 136 - 145 mmol/L    Potassium 3.5 3.5 - 5.1 mmol/L    Chloride 110 (H) 97 - 108 mmol/L    CO2 26 21 - 32 mmol/L    Anion gap 3 (L) 5 - 15 mmol/L    Glucose 121 (H) 65 - 100 mg/dL    BUN 20 6 - 20 MG/DL    Creatinine 0.69 (L) 0.70 - 1.30 MG/DL    BUN/Creatinine ratio 29 (H) 12 - 20      GFR est AA >60 >60 ml/min/1.73m2    GFR est non-AA >60 >60 ml/min/1.73m2    Calcium 8.6 8.5 - 10.1 MG/DL       Assessment:   · Pancreatitis: LFTs (9/17) with AST 90, ALT 97, normal alkaline phosphatase, normal total bilirubin, normal lipase. CT abdomen/pelvis with IV contrast (9/7/20): improved peripancreatic inflammatory changes, compatible with pancreatitis; unchanged biliary dilatation with no visualized choledocholithiasis; small hiatal hernia. 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 RUQ ultrasound (9/17/20): intrahepatic and extrahepatic biliary dilatation again noted; common bile duct 14 mm. · Biliary stricture: status post ERCP with biliary sphincterotomy, stent placement, and biliary tissue sampling (9/18/20). · Anemia: Hgb stable at 7.8 (received 1 unit PRBCs on 9/16/20). No signs of active GI bleeding. EGD (7/20/20) by Dr. Pablito Fernandez for coffee-ground emesis showed LA Grade D erosive esophagitis; hiatal hernia; retention of large volume of gastric fluid likely secondary to gastric outlet obstruction from extrinsic compression. · Gram positive cocci bacteremia versus contaminant: on antibiotics. ID following. Repeat blood culture (9/9/20): no growth 5d. Repeat blood culture (9/16): Staph epidermidis in all 4 bottles  · Sepsis: COVID negative  · History of PE status post IVC filter placement     Patient Active Problem List   Diagnosis Code    DVT (deep vein thrombosis) in pregnancy O22.30    DVT (deep venous thrombosis) ([facility]) I82.409    Tachycardia R00.0    Hypotension I95.9    Fever R50.9    Sepsis ([address] A41.9    Shock ([address] R57.9     Plan:   · GI lite diet  · Dietician following. If meeting nutritional requirements, consider discontinuing TPN.    · Monitor LFTs  · Follow cytology of brushings  · ID following - on antibiotics  · Discussed with hospitalist, Dr. Arleth Manzo By: VANESSA Truong     9/21/2020  11:58 AM        This patient was seen and examined by me in a face-to-face visit today. I reviewed the medical record including lab work, imaging and other provider notes. I confirmed the interval history as described above. I spoke to the patient, discussing our findings and plans. I discussed this case in detail with William MENDEZ. I formulated an updated  assessment of this patient and guided our treatment plan. I agree with the above progress note. I agree with the history, exam and assessment and plan as outlined in the note. I would like to add the following:     Abd: normoactive BS, nt, nd, no rebound/guarding. Awaiting cytology. Biliary stent in place.      Dr. Sammy Krishnamurthy

## 2020-09-21 NOTE — PROGRESS NOTES
Calorie Count Note    Type and Reason for Visit: Reassess    Nutrition Assessment:       Left Calorie count sheets on Computer in pt room. Informed pt of purpose. Wrote done breakfast intake of Full liquid diet. Noted advancement to GI lite. Pt just received lunch tray on visit. Had not started eating yet. Pt states appetite is good and feels he will eat fair amount of lunch. Not drinking all of Ensures. No Ensure clear this morning. Current Diet and Supplement Order:    DIET NUTRITIONAL SUPPLEMENTS Dinner, Breakfast; Ensure [employer]  DIET ONE TIME MESSAGE  DIET NUTRITIONAL SUPPLEMENTS All Meals; Ensure Clear  DIET ONE TIME MESSAGE  TPN ADULT - CENTRAL  DIET GI LITE (POST SURGICAL)    Comparative Standards (Estimated Nutrition Needs):   Estimated Daily Total Kcal: 3722-6300 (MSJ x 1.4-1.5)  Estimated Daily Protein (g):  (1.2-1.4g/kg)    Day 1  9/21/2020  Meal Calories Protein Comments   Breakfast 350 11    Lunch      Dinner      Snacks/Supplements      Total       % Kcal needs met % Protein needs met      Day 2  Meal Calories Protein Comments   Breakfast      Lunch      Dinner      Snacks/Supplements      Total       % Kcal needs met % Protein needs met      Day 3  Meal Calories Protein Comments   Breakfast      Lunch      Dinner      Snacks/Supplements      Total       % Kcal needs met % Protein needs met        Intervention & Recommendations:   1.  Start Calorie Count        Electronically signed by Johnathon Singh RD on 9/21/2020     Contact Number: [phone]

## 2020-09-21 NOTE — PROGRESS NOTES
Day #6 of Vancomycin  Indication:  Staph epi bacteremia  Current regimen:  1250 mg IV Q12h  Abx regimen:  cefepime, vancomycin  ID Following ?: YES  Concomitant nephrotoxic drugs (requires more frequent monitoring): None  Frequency of BMP?: Tomorrow AM    Recent Labs     20  0250 20  0526   CREA 0.69* 0.65*   BUN 20 23*     Est CrCl: ~80-85 ml/min; UO: 1.3 mL/kg/hr  Temp (24hrs), Av.4 °F (36.9 °C), Min:98 °F (36.7 °C), Max:98.8 °F (37.1 °C)    Cultures:    blood -CoNs , final   Blood for fungus- NGTD   Blood-NG, final   blood - staph epi (R- erythromycin, S- vanc JACKIE = 1)- final    Goal trough = 10-15 mcg/ml; reduced trough goal for non-SA bacteremia      Recent trough history (date/time/level/dose/action taken):   @ 0954= 17.4 mcg/mL (~13.5 hrs post dose, on 1250 q12h; extrap \"true\" trough = 19.23 mcg/mL)- continued    Plan: Reduce to vancomycin 1250 mg Q16H for anticipated trough of ~ 12.7 mcg/ml based on patient's known kinetics.

## 2020-09-21 NOTE — PROGRESS NOTES
Bedside and Verbal shift change report given to Barry Cramer RN (oncoming nurse) by Shaq Kinney RN (offgoing nurse). Report included the following information SBAR, Kardex, ED Summary, Intake/Output, MAR and Recent Results.

## 2020-09-21 NOTE — PROGRESS NOTES
[address] [facility] Adult  Hospitalist Group                                                                                          Hospitalist Progress Note  Randolph Casillas MD  Answering service: [phone] from in house phone        Date of Service:  2020  NAME:  Ale Crawford  :  1934  MRN:  [id number]      Admission Summary:     Yuridia Quinn is a 80 y.o. male with past medical history of recent multiple bilateral pulmonary emboli, right lower extremity DVT, GI bleed, erosive gastritis, anemia, blood transfusion, pancreatitis, arthritis, BPH, DJD, and hyperlipidemia presented to the ED from home with reported nausea and vomiting.  Patient provided some history in addition to his daughter who was at the bedside. Tex Kong of history was obtained per my review of ED and electronic medical records.      Interval history / Subjective:     Patient seen and examined this morning. Patient had ERCP status post stenting biliary stricture  Doing better tolerated diet full liquid advance to GI lite or mechanical soft   D/w GI pt may be weaned off TPN if cont to get adequate oral intake and may be d/c'd home soon     Assessment & Plan:     # Septic Shock-- resolved   # Bacteremia due to  Shante Bowie  - off pressor on abx MGMT per ID   - episode of hypotension on 9/15 needing IVF bolus, albumin and midodrine    - received iv cefepime and vancomycin, which was restarted again 9/15  - last blood culture : GPC 4/4 bottle c/s to Levaquin   - TTE-LV EF 55-60% normal cavity, wall thickness ans systolic function.   - PICC line replaced on  ( given the new +Ve blood culture, may need to be changed/removed)  - repeat blood culture  staph epidermidis all 4 bottles c/s to levaquin  - COVID 19 test on  negative  - ID on board    - may require repeat TTE and LAVON? ?     # Pancreatitis  - improved peripancreatic inflammatory changes on most recent CT  - lipase was normal 107 on , check lipase  - on TPN, diet advance as per GI tolerated full liquid--> as tolerates as above discussed   - Nutritionist on board  - GI on board  -He had ERCP- now status post biliary sphincterotomy, biliary brushings, and placement of a 10 mm by 6 cm fully covered metal stent     # Gastric outlet obstruction  - on TPN and full liquid diet -- > advance as tolerates   - GI on board  - Nutritionist on board, plan to avoid home TPN,      # Pancreatic abnormality on prev CT   - CT abdomen pelvis on 9/7 improved peripancreatic inflammatory changes, compatible with pancreatitis. Unchanged biliary dilatation, with no visualized choledocholithiasis. Small hiatal hernia.  - Hem/Onc on board  -- now status post biliary sphincterotomy, biliary brushings, and placement of a 10 mm by 6 cm fully covered metal stent follow-up on pathology per GI will get it soon      # Hx PE/DVT, s/p IVC filter placement  - elevated d-dimer, improving  - IVC filter in place    # Acute on chronic Anemia disease  - s/p 1u PRBC transfusion, and monitor Hgb, transfuse <7  - no evidence of active bleeding  - monitor H/H  7.8    # Hypokalemia  - replaced and resolved     # anemia due to chronic dz  Transfuse if hgb < 7     Code status: DNR  DVT prophylaxis:SCD    Care Plan discussed with: Patient/Family and Nurse  Anticipated Disposition: TBD  Anticipated Discharge: >48hrs        Hospital Problems  Date Reviewed: 7/14/2020          Codes Class Noted POA    Tachycardia ICD-10-CM: R00.0  ICD-9-CM: 785.0  9/7/2020 Unknown        Hypotension ICD-10-CM: I95.9  ICD-9-CM: 458.9  9/7/2020 Unknown        Fever ICD-10-CM: R50.9  ICD-9-CM: 780.60  9/7/2020 Unknown        Sepsis ([address] ICD-10-CM: A41.9  ICD-9-CM: 038.9, 995.91  9/7/2020 Unknown        Shock ([address] ICD-10-CM: R57.9  ICD-9-CM: 785.50  9/7/2020 Unknown              Vital Signs:    Last 24hrs VS reviewed since prior progress note.  Most recent are:  Visit Vitals  /73 (BP 1 Location: Left arm, BP Patient Position: Sitting)   Pulse 98   Temp 98 °F (36.7 °C)   Resp 18   Ht 5' 11\" (1.803 m)   Wt 86.6 kg (190 lb 14.7 oz)   SpO2 98%   BMI 26.63 kg/m²         Intake/Output Summary (Last 24 hours) at 9/21/2020 0949  Last data filed at 9/21/2020 0844  Gross per 24 hour   Intake 3858.33 ml   Output 3100 ml   Net 758.33 ml        Physical Examination:             Constitutional:  No acute distress, cooperative, pleasant    ENT:  Oral mucosa moist, oropharynx benign. Resp:  decreased breath sound at the base bilaterally    CV:  Regular rhythm, normal rate, no murmurs, gallops, rubs    GI:  Soft, non distended, non tender. normoactive bowel sounds, no hepatosplenomegaly     Musculoskeletal:  No edema,     Neurologic:  Moves all extremities. AAOx3, CN II-XII reviewed            Data Review:   I personally reviewed labs and imaging     Labs:     Recent Labs     09/21/20  0250 09/20/20  0310   HGB 7.8* 7.5*   HCT 24.4* 23.6*     Recent Labs     09/21/20  0250 09/20/20  0310 09/19/20  0526     --  139   K 3.5  --  4.2   *  --  113*   CO2 26  --  22   BUN 20  --  23*   CREA 0.69*  --  0.65*   *  --  148*   CA 8.6  --  8.4*   PHOS 3.6 3.2 2.8     Recent Labs     09/20/20  0310   LPSE 77     No results for input(s): INR, PTP, APTT, INREXT, INREXT in the last 72 hours. No results for input(s): FE, TIBC, PSAT, FERR in the last 72 hours. No results found for: FOL, RBCF   No results for input(s): PH, PCO2, PO2 in the last 72 hours. No results for input(s): CPK, CKNDX, TROIQ in the last 72 hours.     No lab exists for component: CPKMB  Lab Results   Component Value Date/Time    Cholesterol, total 250 (H) 10/02/2013 01:55 PM    HDL Cholesterol 53 10/02/2013 01:55 PM    LDL, calculated 176 (H) 10/02/2013 01:55 PM    Triglyceride 244 (H) 07/23/2020 04:41 AM    CHOL/HDL Ratio 3.4 10/06/2010 10:25 AM     Lab Results   Component Value Date/Time    Glucose (POC) 149 (H) 09/15/2020 09:05 PM    Glucose (POC) 113 (H) 09/15/2020 04:51 PM    Glucose (POC) 135 (H) 09/15/2020 10:59 AM    Glucose (POC) 132 (H) 09/15/2020 05:49 AM    Glucose (POC) 109 (H) 09/15/2020 12:31 AM     Lab Results   Component Value Date/Time    Color YELLOW/STRAW 09/07/2020 01:59 AM    Appearance CLEAR 09/07/2020 01:59 AM    Specific gravity 1.020 09/07/2020 01:59 AM    pH (UA) 6.5 09/07/2020 01:59 AM    Protein Negative 09/07/2020 01:59 AM    Glucose Negative 09/07/2020 01:59 AM    Ketone Negative 09/07/2020 01:59 AM    Bilirubin Negative 09/07/2020 01:59 AM    Urobilinogen 1.0 09/07/2020 01:59 AM    Nitrites Negative 09/07/2020 01:59 AM    Leukocyte Esterase Negative 09/07/2020 01:59 AM    Epithelial cells FEW 09/07/2020 01:59 AM    Bacteria Negative 09/07/2020 01:59 AM    WBC 0-4 09/07/2020 01:59 AM    RBC 0-5 09/07/2020 01:59 AM         Medications Reviewed:     Current Facility-Administered Medications   Medication Dose Route Frequency    vancomycin (VANCOCIN) 1250 mg in  ml infusion  1,250 mg IntraVENous Q16H    TPN ADULT - CENTRAL   IntraVENous CONTINUOUS    sodium chloride (NS) flush 5-40 mL  5-40 mL IntraVENous Q8H    sodium chloride (NS) flush 5-40 mL  5-40 mL IntraVENous PRN    lactated Ringers infusion  25 mL/hr IntraVENous CONTINUOUS    cefepime (MAXIPIME) 2 g in 0.9% sodium chloride (MBP/ADV) 100 mL  2 g IntraVENous Q8H    0.9% sodium chloride infusion 250 mL  250 mL IntraVENous PRN    Vancomycin - Pharmacy dosing   Other Rx Dosing/Monitoring    pantoprazole (PROTONIX) 40 mg in 0.9% sodium chloride 10 mL injection  40 mg IntraVENous Q12H    alteplase (CATHFLO) 2 mg in sterile water (preservative free) 2 mL injection  2 mg InterCATHeter PRN    fat emulsion 20% (LIPOSYN, INTRAlipid) infusion 500 mL  500 mL IntraVENous Q MON, WED & FRI    heparin (porcine) injection 5,000 Units  5,000 Units SubCUTAneous Q12H    nystatin (MYCOSTATIN) 100,000 unit/gram powder   Topical BID    sodium chloride (NS) flush 5-40 mL  5-40 mL IntraVENous Q8H    sodium chloride (NS) flush 5-40 mL  5-40 mL IntraVENous PRN    polyethylene glycol (MIRALAX) packet 17 g  17 g Oral DAILY PRN    promethazine (PHENERGAN) tablet 12.5 mg  12.5 mg Oral Q6H PRN    Or    ondansetron (ZOFRAN) injection 4 mg  4 mg IntraVENous Q6H PRN    midodrine (PROAMATINE) tablet 10 mg  10 mg Oral TID     ______________________________________________________________________  EXPECTED LENGTH OF STAY: 4d 19h  ACTUAL LENGTH OF STAY:          Anali Sprague MD

## 2020-09-21 NOTE — PROGRESS NOTES
ID Progress Note  2020    Subjective:     He is feeling much better. No specific complaints at the time of my visit. 24 hour events noted    Objective:     Antibiotics:  1. Vancomycin   2. Cefepime      Vitals:   Visit Vitals  /67 (BP 1 Location: Left arm, BP Patient Position: At rest)   Pulse 94   Temp 98.2 °F (36.8 °C)   Resp 18   Ht 5' 11\" (1.803 m)   Wt 86.6 kg (190 lb 14.7 oz)   SpO2 97%   BMI 26.63 kg/m²        Tmax:  Temp (24hrs), Av.4 °F (36.9 °C), Min:98 °F (36.7 °C), Max:98.8 °F (37.1 °C)      Exam:  Lungs:  clear to auscultation bilaterally  Heart:  regular rate and rhythm  Abdomen:  soft, non-tender. Bowel sounds normal. No masses,  no organomegaly    Labs:      Recent Labs     20  0250 20  0310 20  1730 20  0526 20  1826   HGB 7.8* 7.5* 7.5* 7.1* 7.5*   BUN 20  --   --  23*  --    CREA 0.69*  --   --  0.65*  --    AP 77  --   --   --   --    TBILI 0.3  --   --   --   --        Cultures:     No results found for: [facility]  Lab Results   Component Value Date/Time    Culture result: (A) 2020 06:51 AM     STAPHYLOCOCCUS EPIDERMIDIS GROWING IN ALL 4 BOTTLES DRAWN (SITES = L ARM AND RT ARM)    Culture result: NO GROWTH 5 DAYS 2020 05:40 AM    Culture result: NO GROWTH 13 DAYS 2020 06:15 AM       Radiology:     Line/Insert Date:           Assessment:     1. Sepsis syndrome--resolved--not sure of the cause--last 24 hours sounds like a similar event  2. Positive blood culture--likely contaminant since other bottles remain negative--will see what the new cultures show--coag negative staph in 4/4 blood culture bottles    Objective:     1. Continue current therapy  2. Follow up cultures and studies  3.  Remove PICC and replace in opposite arm if line is indeed 61 days old    Mindi Castro MD

## 2020-09-22 VITALS
HEIGHT: 71 IN | HEART RATE: 100 BPM | TEMPERATURE: 99 F | SYSTOLIC BLOOD PRESSURE: 121 MMHG | BODY MASS INDEX: 26.73 KG/M2 | RESPIRATION RATE: 16 BRPM | OXYGEN SATURATION: 97 % | WEIGHT: 190.92 LBS | DIASTOLIC BLOOD PRESSURE: 73 MMHG

## 2020-09-22 PROBLEM — I95.9 HYPOTENSION: Status: RESOLVED | Noted: 2020-09-07 | Resolved: 2020-09-22

## 2020-09-22 PROBLEM — R00.0 TACHYCARDIA: Status: RESOLVED | Noted: 2020-09-07 | Resolved: 2020-09-22

## 2020-09-22 PROBLEM — R50.9 FEVER: Status: RESOLVED | Noted: 2020-09-07 | Resolved: 2020-09-22

## 2020-09-22 PROBLEM — R57.9 SHOCK (HCC): Status: RESOLVED | Noted: 2020-09-07 | Resolved: 2020-09-22

## 2020-09-22 PROBLEM — A41.9 SEPSIS (HCC): Status: RESOLVED | Noted: 2020-09-07 | Resolved: 2020-09-22

## 2020-09-22 LAB
ANION GAP SERPL CALC-SCNC: 4 MMOL/L (ref 5–15)
BUN SERPL-MCNC: 18 MG/DL (ref 6–20)
BUN/CREAT SERPL: 27 (ref 12–20)
CALCIUM SERPL-MCNC: 8.6 MG/DL (ref 8.5–10.1)
CHLORIDE SERPL-SCNC: 110 MMOL/L (ref 97–108)
CO2 SERPL-SCNC: 26 MMOL/L (ref 21–32)
CREAT SERPL-MCNC: 0.66 MG/DL (ref 0.7–1.3)
FIBRINOGEN PPP-MCNC: 372 MG/DL (ref 200–475)
GLUCOSE SERPL-MCNC: 91 MG/DL (ref 65–100)
PHOSPHATE SERPL-MCNC: 3.1 MG/DL (ref 2.6–4.7)
POTASSIUM SERPL-SCNC: 3.6 MMOL/L (ref 3.5–5.1)
SODIUM SERPL-SCNC: 140 MMOL/L (ref 136–145)

## 2020-09-22 PROCEDURE — 85384 FIBRINOGEN ACTIVITY: CPT

## 2020-09-22 PROCEDURE — C1751 CATH, INF, PER/CENT/MIDLINE: HCPCS

## 2020-09-22 PROCEDURE — 77030020365 HC SOL INJ SOD CL 0.9% 50ML

## 2020-09-22 PROCEDURE — 74011000250 HC RX REV CODE- 250: Performed by: PHYSICIAN ASSISTANT

## 2020-09-22 PROCEDURE — 36415 COLL VENOUS BLD VENIPUNCTURE: CPT

## 2020-09-22 PROCEDURE — 02HV33Z INSERTION OF INFUSION DEVICE INTO SUPERIOR VENA CAVA, PERCUTANEOUS APPROACH: ICD-10-PCS | Performed by: HOSPITALIST

## 2020-09-22 PROCEDURE — 80048 BASIC METABOLIC PNL TOTAL CA: CPT

## 2020-09-22 PROCEDURE — 76937 US GUIDE VASCULAR ACCESS: CPT

## 2020-09-22 PROCEDURE — 74011250636 HC RX REV CODE- 250/636: Performed by: NURSE PRACTITIONER

## 2020-09-22 PROCEDURE — 74011250636 HC RX REV CODE- 250/636: Performed by: PHYSICIAN ASSISTANT

## 2020-09-22 PROCEDURE — 74011250637 HC RX REV CODE- 250/637: Performed by: INTERNAL MEDICINE

## 2020-09-22 PROCEDURE — 36573 INSJ PICC RS&I 5 YR+: CPT | Performed by: HOSPITALIST

## 2020-09-22 PROCEDURE — 74011000258 HC RX REV CODE- 258: Performed by: NURSE PRACTITIONER

## 2020-09-22 PROCEDURE — 84100 ASSAY OF PHOSPHORUS: CPT

## 2020-09-22 PROCEDURE — C9113 INJ PANTOPRAZOLE SODIUM, VIA: HCPCS | Performed by: PHYSICIAN ASSISTANT

## 2020-09-22 RX ORDER — MIDODRINE HYDROCHLORIDE 5 MG/1
5 TABLET ORAL 3 TIMES DAILY
Qty: 45 TAB | Refills: 0 | Status: SHIPPED | OUTPATIENT
Start: 2020-09-22 | End: 2020-10-07

## 2020-09-22 RX ORDER — PANTOPRAZOLE SODIUM 40 MG/1
40 TABLET, DELAYED RELEASE ORAL DAILY
Qty: 30 TAB | Refills: 0 | Status: SHIPPED | OUTPATIENT
Start: 2020-09-22 | End: 2020-10-22

## 2020-09-22 RX ADMIN — HEPARIN SODIUM 5000 UNITS: 5000 INJECTION INTRAVENOUS; SUBCUTANEOUS at 05:31

## 2020-09-22 RX ADMIN — CEFEPIME 2 G: 2 INJECTION, POWDER, FOR SOLUTION INTRAVENOUS at 08:48

## 2020-09-22 RX ADMIN — VANCOMYCIN HYDROCHLORIDE 1250 MG: 10 INJECTION, POWDER, LYOPHILIZED, FOR SOLUTION INTRAVENOUS at 05:35

## 2020-09-22 RX ADMIN — Medication 10 ML: at 05:32

## 2020-09-22 RX ADMIN — MIDODRINE HYDROCHLORIDE 10 MG: 5 TABLET ORAL at 17:03

## 2020-09-22 RX ADMIN — NYSTATIN: 100000 POWDER TOPICAL at 08:48

## 2020-09-22 RX ADMIN — MIDODRINE HYDROCHLORIDE 10 MG: 5 TABLET ORAL at 08:48

## 2020-09-22 RX ADMIN — CEFEPIME 2 G: 2 INJECTION, POWDER, FOR SOLUTION INTRAVENOUS at 01:00

## 2020-09-22 RX ADMIN — SODIUM CHLORIDE 40 MG: 9 INJECTION INTRAMUSCULAR; INTRAVENOUS; SUBCUTANEOUS at 08:48

## 2020-09-22 NOTE — ROUTINE PROCESS
Hospital follow-up PCP transitional care appointment has been scheduled with Dr. Jennifer Robles for Thurs, Sept 24 @ 2:45PM to address sepsis diagnosis. Pending patient discharge.   Tracy Rodriguez, Care Management Specialist.

## 2020-09-22 NOTE — PROGRESS NOTES
Bedside shift change report given to Ivy Patel (oncoming nurse) by [facility] FILI BOYER and Lyric Kwan RN (offgoing nurse). Report included the following information SBAR, Kardex, ED Summary, OR Summary, Procedure Summary, Intake/Output, MAR, Accordion, Recent Results and Med Rec Status.

## 2020-09-22 NOTE — PROGRESS NOTES
Doctor ordered for patient's PICC line to be removed from Right arm and replaced in Left arm for discharge today with IV Vancomycin. RN spoke with PICC team who said they can do this but to inform the doctor that normally they would not do this until new cultures have been drawn and they are negative which would be Thursday to get the PICC placed. Dr. Airam Bowden confirmed that he would still like the PICC line placed today for discharge today.

## 2020-09-22 NOTE — PROCEDURES
Order for PICC verified. Consent obtained after risk,benefits and procedure explained and questions answered. Vascular Access [facility] Placement Note    PRE-PROCEDURE VERIFICATION  Correct Procedure: yes  Correct Site:  yes  Temperature: Temp: 98.4 °F (36.9 °C), Temperature Source: Temp Source: Oral  Recent Labs     09/22/20  0343   BUN 18   CREA 0.66*     Allergies: Aspirin  Education materials, including PICC Booklet, for PICC Care given to patient: yes. See Patient Education activity for further details. PROCEDURE DETAIL  A single lumen PICC line was started for antibiotic therapy. The following documentation is in addition to the PICC properties in the lines/airways flowsheet :  Lot #: [id number]  Was xylocaine 1% used intradermally:  yes  Catheter Length: 44 (cm)  Vein Selection for PICC:left basilic  Central Line Bundle followed yes  Complication Related to Insertion: none    The placement was verified by ECG/Sapiens technology: The  tip location is in the superior vena cava. See ECG results for PICC tip placement. Report given to nurse Gypsy Gibbs is okay to use.     Carol Singh RN

## 2020-09-22 NOTE — PROGRESS NOTES
IV Antibiotic Orders    1. Diagnosis:  S epi bacteremia  2. Routine PICC care  3. Antibiotic:  Vancomycin 1 gram IV Q12  4. Lab each Monday:   CBC/diff/platelets   BMP   Vancomycin trough  5. Lab each Thursday:   CBC/diff/platelets   BMP   Vancomycin trough  6. Fax lab to Dr. Yaneth Silva @ [phone].  7.  Call Dr. Yaneth Silva @ [phone] for wbc under 4 or creatinine over 2  8. Duration of therapy: 10/6/20 and then remove PICC  9. Allergies: None to current regimen   10.  Call [phone] with name of Yandy Hastings MD

## 2020-09-22 NOTE — DISCHARGE INSTRUCTIONS
Discharge Instructions       PATIENT ID: Kindra Bravo  MRN: [id number]   YOB: 1934    DATE OF ADMISSION: 9/7/2020 12:12 AM    DATE OF DISCHARGE: 9/22/2020    PRIMARY CARE PROVIDER: Miranda Arteaga MD     ATTENDING PHYSICIAN: Pooja Antoine MD  DISCHARGING PROVIDER: Feliz Mcnamara MD    To contact this individual call [phone] and ask the  to page. If unavailable ask to be transferred the Adult Hospitalist Department. DISCHARGE DIAGNOSES  Bacteremia with pancreatitis    CONSULTATIONS: IP CONSULT TO INFECTIOUS DISEASES  IP CONSULT TO GASTROENTEROLOGY  IP CONSULT TO ONCOLOGY    PROCEDURES/SURGERIES: Procedure(s):  ENDOSCOPIC RETROGRADE CHOLANGIOPANCREATOGRAPHY (ERCP)  ESOPHAGOGASTRODUODENOSCOPY (EGD)  ENDOSCOPIC SPHINCTEROTOMY  ENDOSCOPIC STONE EXTRACTION/BALLOON SWEEP  ENDOSCOPIC BRUSHING  BILIARY STENT PLACEMENT    PENDING TEST RESULTS:   At the time of discharge the following test results are still pending:     FOLLOW UP APPOINTMENTS:   Follow-up Information     Follow up With Specialties Details Why Contact Info    Miranda Arteaga MD Internal Medicine In 2 weeks  [address] [place] [address]      Cecilia Solis MD Gastroenterology In 2 weeks  [place] [id number] [phone]             ADDITIONAL CARE RECOMMENDATIONS:   IV Antibiotic Orders     1. Diagnosis:  S epi bacteremia  2. Routine PICC care  3. Antibiotic:  Vancomycin 1 gram IV Q12  4. Lab each Monday:             CBC/diff/platelets             BMP             Vancomycin trough  5. Lab each Thursday:             CBC/diff/platelets             BMP             Vancomycin trough  6. Fax lab to Dr. Fawn Kraft @ [phone].  7.  Call Dr. Fawn Kraft @ [phone] for wbc under 4 or creatinine over 2  8. Duration of therapy: 10/6/20 and then remove PICC  9. Allergies: None to current regimen   10.  Call [phone] with name of Kelly Hernandez MD          DIET: Regular Diet    ACTIVITY: Activity as tolerated    WOUND CARE:     EQUIPMENT needed:       DISCHARGE MEDICATIONS:   See Medication Reconciliation Form    · It is important that you take the medication exactly as they are prescribed. · Keep your medication in the bottles provided by the pharmacist and keep a list of the medication names, dosages, and times to be taken in your wallet. · Do not take other medications without consulting your doctor. NOTIFY YOUR PHYSICIAN FOR ANY OF THE FOLLOWING:   Fever over 101 degrees for 24 hours. Chest pain, shortness of breath, fever, chills, nausea, vomiting, diarrhea, change in mentation, falling, weakness, bleeding. Severe pain or pain not relieved by medications. Or, any other signs or symptoms that you may have questions about.       DISPOSITION:  x  Home With:   OT  PT  HH  RN       SNF/Inpatient Rehab/LTAC    Independent/assisted living    Hospice    Other:     CDMP Checked:   Yes x     PROBLEM LIST Updated:  Yes x       Signed:   Claudia Hsu MD  9/22/2020  11:08 AM

## 2020-09-22 NOTE — PROGRESS NOTES
[address] [facility] Adult  Hospitalist Group                                                                                          Hospitalist Progress Note  Antonella Frausto MD  Answering service: [phone] from in house phone        Date of Service:  2020  NAME:  Alex Porter  :  1934  MRN:  [id number]      Admission Summary:     Emma Jordan is a 80 y.o. male with past medical history of recent multiple bilateral pulmonary emboli, right lower extremity DVT, GI bleed, erosive gastritis, anemia, blood transfusion, pancreatitis, arthritis, BPH, DJD, and hyperlipidemia presented to the ED from home with reported nausea and vomiting.  Patient provided some history in addition to his daughter who was at the bedside. Bisi Murillo of history was obtained per my review of ED and electronic medical records.      Interval history / Subjective:     Patient seen and examined this morning.   Patient had ERCP status post stenting biliary stricture  Doing better tolerated diet  Ate almost 100% all meals since yesterday   D/w ID will need home IV abx for bacteremia staph epi /   Order placed change PICC AND REPLACE OLD ONE     Assessment & Plan:     # Septic Shock-- resolved   # Bacteremia due to [facility]    - last blood culture : GPC 4/4 bottle will need home IV abx per ID   - TTE-LV EF 55-60% normal cavity, wall thickness ans systolic function.   - PICC line replaced on  ( given the new +Ve blood culture, may need to be changed/removed)  - repeat blood culture  staph epidermidis all 4 bottles   - COVID 19 test on  negative  - d/w ID CM and PICC line to be replaced      # Pancreatitis  - improved peripancreatic inflammatory changes on most recent CT  - lipase was normal 107 on   - on TPN, weaned off as pt eating 100%   - Nutritionist on board  -He had ERCP- now status post biliary sphincterotomy, biliary brushings, and placement of a 10 mm by 6 cm fully covered metal stent  - path-    Few atypical groups are present in a background of abundant benign ductal epithelium   Reactive atypia is favored      # Gastric outlet obstruction  Now tolet=rating diet TPN d/c'd      # Pancreatic abnormality on prev CT   - CT abdomen pelvis on 9/7 improved peripancreatic inflammatory changes, compatible with pancreatitis. Unchanged biliary dilatation, with no visualized choledocholithiasis. Small hiatal hernia.  - Hem/Onc on board  -- now status post biliary sphincterotomy, biliary brushings, and placement of a 10 mm by 6 cm fully covered metal stent   - payh-    Few atypical groups are present in a background of abundant benign ductal epithelium  Reactive atypia is favored      # Hx PE/DVT, s/p IVC filter placement  - elevated d-dimer, improving  - IVC filter in place    # Acute on chronic Anemia disease  - s/p 1u PRBC transfusion, and monitor Hgb, transfuse <7  - no evidence of active bleeding  - monitor H/H  7.8    # Hypokalemia  - replaced and resolved     # anemia due to chronic dz  Transfuse if hgb < 7     Code status: DNR  DVT prophylaxis:SCD    Care Plan discussed with: Patient/Family and Nurse  Anticipated Disposition: TBD  Anticipated Discharge: home today with IV abx        Hospital Problems  Date Reviewed: 7/14/2020          Codes Class Noted POA    Tachycardia ICD-10-CM: R00.0  ICD-9-CM: 785.0  9/7/2020 Unknown        Hypotension ICD-10-CM: I95.9  ICD-9-CM: 458.9  9/7/2020 Unknown        Fever ICD-10-CM: R50.9  ICD-9-CM: 780.60  9/7/2020 Unknown        Sepsis ([address] ICD-10-CM: A41.9  ICD-9-CM: 038.9, 995.91  9/7/2020 Unknown        Shock ([address] ICD-10-CM: R57.9  ICD-9-CM: 785.50  9/7/2020 Unknown              Vital Signs:    Last 24hrs VS reviewed since prior progress note.  Most recent are:  Visit Vitals  BP (!) 103/55   Pulse 87   Temp 98.4 °F (36.9 °C)   Resp 14   Ht 5' 11\" (1.803 m)   Wt 86.6 kg (190 lb 14.7 oz)   SpO2 95%   BMI 26.63 kg/m²         Intake/Output Summary (Last 24 hours) at 9/22/2020 1040  Last data filed at 9/22/2020 0845  Gross per 24 hour   Intake 480 ml   Output 1151 ml   Net -671 ml        Physical Examination:             Constitutional:  No acute distress, cooperative, pleasant    ENT:  Oral mucosa moist, oropharynx benign. Resp:  decreased breath sound at the base bilaterally    CV:  Regular rhythm, normal rate, no murmurs, gallops, rubs    GI:  Soft, non distended, non tender. normoactive bowel sounds, no hepatosplenomegaly     Musculoskeletal:  No edema,     Neurologic:  Moves all extremities. AAOx3, CN II-XII reviewed            Data Review:   I personally reviewed labs and imaging     Labs:     Recent Labs     09/21/20 0250 09/20/20 0310   HGB 7.8* 7.5*   HCT 24.4* 23.6*     Recent Labs     09/22/20  0343 09/21/20 0250 09/20/20  0310    139  --    K 3.6 3.5  --    * 110*  --    CO2 26 26  --    BUN 18 20  --    CREA 0.66* 0.69*  --    GLU 91 121*  --    CA 8.6 8.6  --    PHOS 3.1 3.6 3.2     Recent Labs     09/21/20 0250 09/20/20 0310   ALT 48  --    AP 77  --    TBILI 0.3  --    TP 6.3*  --    ALB 2.2*  --    GLOB 4.1*  --    LPSE  --  77     No results for input(s): INR, PTP, APTT, INREXT, INREXT in the last 72 hours. No results for input(s): FE, TIBC, PSAT, FERR in the last 72 hours. No results found for: FOL, RBCF   No results for input(s): PH, PCO2, PO2 in the last 72 hours. No results for input(s): CPK, CKNDX, TROIQ in the last 72 hours.     No lab exists for component: CPKMB  Lab Results   Component Value Date/Time    Cholesterol, total 250 (H) 10/02/2013 01:55 PM    HDL Cholesterol 53 10/02/2013 01:55 PM    LDL, calculated 176 (H) 10/02/2013 01:55 PM    Triglyceride 244 (H) 07/23/2020 04:41 AM    CHOL/HDL Ratio 3.4 10/06/2010 10:25 AM     Lab Results   Component Value Date/Time    Glucose (POC) 114 (H) 09/21/2020 05:32 PM    Glucose (POC) 110 (H) 09/21/2020 02:41 PM    Glucose (POC) 149 (H) 09/15/2020 09:05 PM    Glucose (POC) 113 (H) 09/15/2020 04:51 PM    Glucose (POC) 135 (H) 09/15/2020 10:59 AM     Lab Results   Component Value Date/Time    Color YELLOW/STRAW 09/07/2020 01:59 AM    Appearance CLEAR 09/07/2020 01:59 AM    Specific gravity 1.020 09/07/2020 01:59 AM    pH (UA) 6.5 09/07/2020 01:59 AM    Protein Negative 09/07/2020 01:59 AM    Glucose Negative 09/07/2020 01:59 AM    Ketone Negative 09/07/2020 01:59 AM    Bilirubin Negative 09/07/2020 01:59 AM    Urobilinogen 1.0 09/07/2020 01:59 AM    Nitrites Negative 09/07/2020 01:59 AM    Leukocyte Esterase Negative 09/07/2020 01:59 AM    Epithelial cells FEW 09/07/2020 01:59 AM    Bacteria Negative 09/07/2020 01:59 AM    WBC 0-4 09/07/2020 01:59 AM    RBC 0-5 09/07/2020 01:59 AM         Medications Reviewed:     Current Facility-Administered Medications   Medication Dose Route Frequency    vancomycin (VANCOCIN) 1250 mg in  ml infusion  1,250 mg IntraVENous Q16H    sodium chloride (NS) flush 5-40 mL  5-40 mL IntraVENous Q8H    sodium chloride (NS) flush 5-40 mL  5-40 mL IntraVENous PRN    lactated Ringers infusion  25 mL/hr IntraVENous CONTINUOUS    cefepime (MAXIPIME) 2 g in 0.9% sodium chloride (MBP/ADV) 100 mL  2 g IntraVENous Q8H    0.9% sodium chloride infusion 250 mL  250 mL IntraVENous PRN    Vancomycin - Pharmacy dosing   Other Rx Dosing/Monitoring    pantoprazole (PROTONIX) 40 mg in 0.9% sodium chloride 10 mL injection  40 mg IntraVENous Q12H    alteplase (CATHFLO) 2 mg in sterile water (preservative free) 2 mL injection  2 mg InterCATHeter PRN    fat emulsion 20% (LIPOSYN, INTRAlipid) infusion 500 mL  500 mL IntraVENous Q MON, WED & FRI    heparin (porcine) injection 5,000 Units  5,000 Units SubCUTAneous Q12H    nystatin (MYCOSTATIN) 100,000 unit/gram powder   Topical BID    sodium chloride (NS) flush 5-40 mL  5-40 mL IntraVENous Q8H    sodium chloride (NS) flush 5-40 mL  5-40 mL IntraVENous PRN    polyethylene glycol (MIRALAX) packet 17 g  17 g Oral DAILY PRN  promethazine (PHENERGAN) tablet 12.5 mg  12.5 mg Oral Q6H PRN    Or    ondansetron (ZOFRAN) injection 4 mg  4 mg IntraVENous Q6H PRN    midodrine (PROAMATINE) tablet 10 mg  10 mg Oral TID     ______________________________________________________________________  EXPECTED LENGTH OF STAY: 4d 19h  ACTUAL LENGTH OF STAY:          15                 Kati Swain MD

## 2020-09-22 NOTE — PROGRESS NOTES
118 [facility], [place]  174 [facility], [address]       GI PROGRESS NOTE  Will Gilles Bah  [phone] office  [phone] NP/PA in-hospital cell phone M-F until 4:30PM  After 5PM or on weekends, please call  for physician on call      NAME: Tory Lassiter   :  1934   MRN:  [id number]       Subjective:   Patient appears comfortable. No nausea, vomiting, or abdominal pain. He has tolerated a GI lite diet over the last 24h and has eaten most of his meals. TPN has been discontinued. Objective:     VITALS:   Last 24hrs VS reviewed since prior progress note. Most recent are:  Visit Vitals  BP (!) 103/55   Pulse 87   Temp 98.4 °F (36.9 °C)   Resp 14   Ht 5' 11\" (1.803 m)   Wt 86.6 kg (190 lb 14.7 oz)   SpO2 95%   BMI 26.63 kg/m²       PHYSICAL EXAM:  General: Cooperative, no acute distress    Neurologic:  Alert and oriented  HEENT: EOMI, no scleral icterus   Lungs:  No respiratory distress  Heart:  S1 S2  Abdomen: Soft, non-distended, no tenderness, no guarding, no rebound. +Bowel sounds. Extremities: Warm  Psych:   Good insight. Not anxious or agitated.     Lab Data Reviewed:     Recent Results (from the past 24 hour(s))   GLUCOSE, POC    Collection Time: 20  2:41 PM   Result Value Ref Range    Glucose (POC) 110 (H) 65 - 100 mg/dL    Performed by Emily Bio L (PCT)    GLUCOSE, POC    Collection Time: 20  5:32 PM   Result Value Ref Range    Glucose (POC) 114 (H) 65 - 100 mg/dL    Performed by [address]    Collection Time: 20  3:43 AM   Result Value Ref Range    Fibrinogen 372 200 - 151 mg/dL   METABOLIC PANEL, BASIC    Collection Time: 20  3:43 AM   Result Value Ref Range    Sodium 140 136 - 145 mmol/L    Potassium 3.6 3.5 - 5.1 mmol/L    Chloride 110 (H) 97 - 108 mmol/L    CO2 26 21 - 32 mmol/L    Anion gap 4 (L) 5 - 15 mmol/L    Glucose 91 65 - 100 mg/dL    BUN 18 6 - 20 MG/DL    Creatinine 0.66 (L) 0.70 - 1.30 MG/DL BUN/Creatinine ratio 27 (H) 12 - 20      GFR est AA >60 >60 ml/min/1.73m2    GFR est non-AA >60 >60 ml/min/1.73m2    Calcium 8.6 8.5 - 10.1 MG/DL   PHOSPHORUS    Collection Time: 09/22/20  3:43 AM   Result Value Ref Range    Phosphorus 3.1 2.6 - 4.7 MG/DL       Assessment:   · Pancreatitis: CT abdomen/pelvis with IV contrast (9/7/20): improved peripancreatic inflammatory changes, compatible with pancreatitis; unchanged biliary dilatation with no visualized choledocholithiasis; small hiatal hernia. 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 RUQ ultrasound (9/17/20): intrahepatic and extrahepatic biliary dilatation again noted; common bile duct 14 mm. · Biliary stricture: status post ERCP with biliary sphincterotomy, stent placement, and biliary tissue sampling (9/18/20). Cytology of common bile duct aspirate: no cells diagnostic for malignancy. Cytology of common bile duct brushing: atypical, favor benign reactive process. LFTs normal 9/21, lipase normal 9/20. · Anemia: Hgb stable at 7.8 yesterday (received 1 unit PRBCs on 9/16/20). No signs of active GI bleeding. EGD (7/20/20) by Dr. Cierra Schulz for coffee-ground emesis showed LA Grade D erosive esophagitis; hiatal hernia; retention of large volume of gastric fluid likely secondary to gastric outlet obstruction from extrinsic compression. · Gram positive cocci bacteremia versus contaminant: on antibiotics. ID following. Repeat blood culture (9/9/20): no growth 5d. Repeat blood culture (9/16): Staph epidermidis in all 4 bottles  · Sepsis: COVID negative  · History of PE status post IVC filter placement     Patient Active Problem List   Diagnosis Code    DVT (deep vein thrombosis) in pregnancy O22.30    DVT (deep venous thrombosis) ([facility] I82.409     Plan:   · Diet as tolerated.  TPN was discontinued. · Dietician following  · Cytology as above which was discussed with the patient  · ID following - on antibiotics  · Plan for outpatient follow up in 2 weeks     Signed By: VANESSA Rueda     9/22/2020  1:14 PM       GI attending note:    Chart reviewed. Agree with note of TOÑO.     Dr. Tammi Gaviria

## 2020-09-22 NOTE — ROUTINE PROCESS
Bedside and Verbal shift change report given to Janeth Stewart and Marjan Garcia RN (oncoming nurse) by [facility], RN (offgoing nurse). Report included the following information SBAR, Kardex, Intake/Output, MAR and Recent Results.

## 2020-09-22 NOTE — DISCHARGE SUMMARY
Discharge Summary       PATIENT ID: Luis Fernando Hein  MRN: [id number]   YOB: 1934    DATE OF ADMISSION: 9/7/2020 12:12 AM    DATE OF DISCHARGE: 9/22/20   PRIMARY CARE PROVIDER: Cleo Soliman MD     ATTENDING PHYSICIAN: Rui Berry  DISCHARGING PROVIDER: Norma Amin MD    To contact this individual call [phone] and ask the  to page. If unavailable ask to be transferred the Adult Hospitalist Department.     CONSULTATIONS: IP CONSULT TO INFECTIOUS DISEASES  IP CONSULT TO GASTROENTEROLOGY  IP CONSULT TO ONCOLOGY    PROCEDURES/SURGERIES: Procedure(s):  ENDOSCOPIC RETROGRADE CHOLANGIOPANCREATOGRAPHY (ERCP)  ESOPHAGOGASTRODUODENOSCOPY (EGD)  ENDOSCOPIC SPHINCTEROTOMY  ENDOSCOPIC STONE EXTRACTION/BALLOON SWEEP  ENDOSCOPIC BRUSHING  BILIARY STENT PLACEMENT    ADMITTING DIAGNOSES & HOSPITAL COURSE:   Saleem Yusuf is a 80 y.o. male with past medical history of recent multiple bilateral pulmonary emboli, right lower extremity DVT, GI bleed, erosive gastritis, anemia, blood transfusion, pancreatitis, arthritis, BPH, DJD, and hyperlipidemia presented to the ED from home with reported nausea and vomiting.  Patient provided some history in addition to his daughter who was at the bedside.  Remainder of history was obtained per my review of ED and electronic medical records.         Assessment & Plan:      # Septic Shock-- resolved   # Bacteremia due to [facility]     - last blood culture 9/16: GPC 4/4 bottle will need home IV abx per ID   - TTE-LV EF 55-60% normal cavity, wall thickness ans systolic function.   - PICC line replaced on 9/13 ( given the new +Ve blood culture, may need to be changed/removed)  - repeat blood culture 9/16 staph epidermidis all 4 bottles   - COVID 19 test on 9/7 negative  - d/w ID CM and PICC line to be replaced      # Pancreatitis  - improved peripancreatic inflammatory changes on most recent CT  - lipase was normal 107 on 9/7  - on TPN, weaned off as pt eating 100%   - Nutritionist on board  -He had ERCP- now status post biliary sphincterotomy, biliary brushings, and placement of a 10 mm by 6 cm fully covered metal stent  - path-    Few atypical groups are present in a background of abundant benign ductal epithelium   Reactive atypia is favored      # Gastric outlet obstruction  Now tolet=rating diet TPN d/c'd      # Pancreatic abnormality on prev CT   - CT abdomen pelvis on 9/7 improved peripancreatic inflammatory changes, compatible with pancreatitis. Unchanged biliary dilatation, with no visualized choledocholithiasis.  Small hiatal hernia.  - Hem/Onc on board  -- now status post biliary sphincterotomy, biliary brushings, and placement of a 10 mm by 6 cm fully covered metal stent   - payh-    Few atypical groups are present in a background of abundant benign ductal epithelium  Reactive atypia is favored      # Hx PE/DVT, s/p IVC filter placement  - elevated d-dimer, improving  - IVC filter in place     # Acute on chronic Anemia disease  - s/p 1u PRBC transfusion, and monitor Hgb, transfuse <7  - no evidence of active bleeding  - monitor H/H  7.8     # Hypokalemia  - replaced and resolved      # anemia due to chronic dz  Transfuse if hgb < 7      Code status: DNR             DISCHARGE DIAGNOSES / PLAN:      1. D/c home with IV abx per ID f/u with GI      ADDITIONAL CARE RECOMMENDATIONS:        PENDING TEST RESULTS:   At the time of discharge the following test results are still pending:     FOLLOW UP APPOINTMENTS:    Follow-up Information     Follow up With Specialties Details Why Contact Kelly Quiroga MD Internal Medicine In 2 weeks  [facility] Leanna Escamilla [address] [place] [address]      Mushtaq Escobedo MD Gastroenterology In 2 weeks  [address]  [phone]               DIET: Regular Diet    ACTIVITY: Activity as tolerated    WOUND CARE:     EQUIPMENT needed: DISCHARGE MEDICATIONS:  Current Discharge Medication List      START taking these medications    Details   pantoprazole (PROTONIX) 40 mg tablet Take 1 Tab by mouth daily for 30 days. Qty: 30 Tab, Refills: 0         CONTINUE these medications which have NOT CHANGED    Details   multivitamin with iron tablet Take 1 Tab by mouth daily. TAMSULOSIN HCL (TAMSULOSIN PO) Take 0.4 mg by mouth daily. finasteride (PROSCAR) 5 mg tablet Take 5 mg by mouth daily. STOP taking these medications       fat emulsion 20% (LIPOSYN, INTRAlipid) 20 % infusion Comments:   Reason for Stopping:         pantoprazole 4 mg/mL in 0.9% sodium chloride injection Comments:   Reason for Stopping:         insulin lispro (HUMALOG) 100 unit/mL injection Comments:   Reason for Stopping:                 NOTIFY YOUR PHYSICIAN FOR ANY OF THE FOLLOWING:   Fever over 101 degrees for 24 hours. Chest pain, shortness of breath, fever, chills, nausea, vomiting, diarrhea, change in mentation, falling, weakness, bleeding. Severe pain or pain not relieved by medications. Or, any other signs or symptoms that you may have questions about. DISPOSITION:  x  Home With:   OT  PT  HH  RN       Long term SNF/Inpatient Rehab    Independent/assisted living    Hospice    Other:       PATIENT CONDITION AT DISCHARGE:     Functional status    Poor    x Deconditioned     Independent      Cognition   x  Lucid     Forgetful     Dementia      Catheters/lines (plus indication)    Moya    x PICC     PEG     None      Code status     Full code    x DNR      PHYSICAL EXAMINATION AT DISCHARGE:  General:          Alert, cooperative, no distress, appears stated age. HEENT:           Atraumatic, anicteric sclerae, pink conjunctivae                          No oral ulcers, mucosa moist, throat clear, dentition fair  Neck:               Supple, symmetrical  Lungs:             Clear to auscultation bilaterally. No Wheezing or Rhonchi. No rales.   Chest wall: No tenderness  No Accessory muscle use. Heart:              Regular  rhythm,  No  murmur   No edema  Abdomen:        Soft, non-tender. Not distended. Bowel sounds normal  Extremities:     No cyanosis. No clubbing,                            Skin turgor normal, Capillary refill normal  Skin:                Not pale. Not Jaundiced  No rashes   Psych:             Not anxious or agitated.   Neurologic:      Alert, moves all extremities, answers questions appropriately and responds to commands       CHRONIC MEDICAL DIAGNOSES:  Problem List as of 9/22/2020 Date Reviewed: 9/22/2020          Codes Class Noted - Resolved    DVT (deep venous thrombosis) ([facility] ICD-10-CM: I82.409  ICD-9-CM: 453.40  7/14/2020 - Present        DVT (deep vein thrombosis) in pregnancy ICD-10-CM: O22.30  ICD-9-CM: 671.30  7/13/2020 - Present        RESOLVED: Tachycardia ICD-10-CM: R00.0  ICD-9-CM: 785.0  9/7/2020 - 9/22/2020        RESOLVED: Hypotension ICD-10-CM: I95.9  ICD-9-CM: 458.9  9/7/2020 - 9/22/2020        RESOLVED: Fever ICD-10-CM: R50.9  ICD-9-CM: 780.60  9/7/2020 - 9/22/2020        RESOLVED: Sepsis ([facility] ICD-10-CM: A41.9  ICD-9-CM: 038.9, 995.91  9/7/2020 - 9/22/2020        RESOLVED: Shock ([facility] ICD-10-CM: R57.9  ICD-9-CM: 785.50  9/7/2020 - 9/22/2020              Greater than 30  minutes were spent with the patient on counseling and coordination of care    Signed:   Antonella Frausto MD  9/22/2020  11:10 AM

## 2020-09-22 NOTE — PROGRESS NOTES
Transition of Care  RUR: 20%    Patient presented to [facility] from home on 9/7/20 with sepsis, fever, tachycardia    Discharge Plan: Home today (CM noted discharge order) with [facility] (home health, skilled nursing, PT/OT) and [facility] home IV antibiotics infusion ; info added to AVS    Transportation: grazyna Kelley by car    [phone]: CM sent referral to [place] [phone] via [employer]; awaiting response; [facility] has accepted patient    [phone]: CM sent referral to [facility] for IV antibiotics via [employer]; awaiting response; CM also called Kendrick Glynn (contact) at [facility] to inform; she verbalized understanding; [facility]; [place] Glynn stated she will be at hospital between 3-4pm today to do teaching    1215:CM met with patient at bedside; patient is alert and oriented x 3; CM informed patient of discharge plans, IV home infusions, home health; he verbalized understanding; CM also called patients wife, Moon Hernandez and daughter, Cesar Kelley at [phone] to inform of discharge plans; they both verbalized understanding and confirmed that daughter, Cesar Kelley will transport patient by car at discharge    [id number]: CM faxed updated PICC line notes to [facility] at [phone]    Medicare pt has received, reviewed, and signed 2nd IM letter informing them of their right to appeal the discharge. Signed copy has been placed on pt bedside chart.     CM following  Brielle De La Rosa RN, CRM

## 2020-09-23 ENCOUNTER — PATIENT OUTREACH (OUTPATIENT)
Dept: CASE MANAGEMENT | Age: 85
End: 2020-09-23

## 2020-09-23 NOTE — PROGRESS NOTES
Attempt to contact for follow up. Left message requesting call back. Pre-visit chart review completed.

## 2020-09-24 ENCOUNTER — PATIENT OUTREACH (OUTPATIENT)
Dept: CASE MANAGEMENT | Age: 85
End: 2020-09-24

## 2020-09-24 NOTE — PROGRESS NOTES
Patient contacted regarding COVID-19 risk. Discussed COVID-19 related testing which was not done at this time. Care Transition Nurse/ Ambulatory Care Manager/ LPN Care Coordinator contacted the pt by telephone to perform post discharge assessment. Verified name and  with pt as identifiers. Provided introduction to self, and explanation of the CTN/ACM/LPN role, and reason for call due to risk factors for infection and/or exposure to COVID-19. Symptoms reviewed with pt who verbalized the following symptoms: none. Due to n symptoms encounter was not routed to provider for escalation. Discussed follow-up appointments. If no appointment was previously scheduled, appointment scheduling offered: [facility] follow up appointment(s): No future appointments. Non-[facility] follow up appointment(s):   Advance Care Planning:   Does patient have an Advance Directive: no- declined education    Patient has following risk factors of: sepsis. CTN/ACM/LPN reviewed discharge instructions, medical action plan and red flags such as increased shortness of breath, increasing fever and signs of decompensation with pt who verbalized understanding. Discussed exposure protocols and quarantine with CDC Guidelines What to do if you are sick with coronavirus disease 2019.  pt was given an opportunity for questions and concerns. The pt agrees to contact the [facility] exposure line [phone], local health department  and PCP office for questions related to their healthcare. CTN/ACM provided contact information for future needs. Reviewed and educated pt on any new and changed medications related to discharge diagnosis. Patient/family/caregiver given information for [employer] and agrees to enroll no      14 day call based on severity of symptoms and risk factors.

## 2020-09-29 LAB
BACTERIA SPEC CULT: NORMAL
SERVICE CMNT-IMP: NORMAL

## 2020-10-02 ENCOUNTER — HOSPITAL ENCOUNTER (INPATIENT)
Age: 85
LOS: 3 days | Discharge: HOME | DRG: 864 | End: 2020-10-05
Attending: EMERGENCY MEDICINE | Admitting: HOSPITALIST
Payer: MEDICARE

## 2020-10-02 ENCOUNTER — APPOINTMENT (OUTPATIENT)
Dept: CT IMAGING | Age: 85
DRG: 864 | End: 2020-10-02
Attending: PHYSICIAN ASSISTANT
Payer: MEDICARE

## 2020-10-02 ENCOUNTER — APPOINTMENT (OUTPATIENT)
Dept: ULTRASOUND IMAGING | Age: 85
DRG: 864 | End: 2020-10-02
Attending: EMERGENCY MEDICINE
Payer: MEDICARE

## 2020-10-02 ENCOUNTER — APPOINTMENT (OUTPATIENT)
Dept: GENERAL RADIOLOGY | Age: 85
DRG: 864 | End: 2020-10-02
Attending: EMERGENCY MEDICINE
Payer: MEDICARE

## 2020-10-02 DIAGNOSIS — R50.9 FEVER, UNSPECIFIED FEVER CAUSE: Primary | ICD-10-CM

## 2020-10-02 PROBLEM — R18.8 ASCITES: Status: ACTIVE | Noted: 2020-10-02

## 2020-10-02 PROBLEM — A41.9 SEPSIS (HCC): Status: ACTIVE | Noted: 2020-10-02

## 2020-10-02 LAB
ALBUMIN SERPL-MCNC: 2.7 G/DL (ref 3.5–5)
ALBUMIN/GLOB SERPL: 0.7 {RATIO} (ref 1.1–2.2)
ALP SERPL-CCNC: 335 U/L (ref 45–117)
ALT SERPL-CCNC: 119 U/L (ref 12–78)
ANION GAP SERPL CALC-SCNC: 8 MMOL/L (ref 5–15)
APPEARANCE UR: CLEAR
AST SERPL-CCNC: 259 U/L (ref 15–37)
ATRIAL RATE: 127 BPM
BACTERIA URNS QL MICRO: NEGATIVE /HPF
BASOPHILS # BLD: 0 K/UL (ref 0–0.1)
BASOPHILS NFR BLD: 0 % (ref 0–1)
BILIRUB SERPL-MCNC: 2.3 MG/DL (ref 0.2–1)
BILIRUB UR QL CFM: POSITIVE
BUN SERPL-MCNC: 10 MG/DL (ref 6–20)
BUN/CREAT SERPL: 13 (ref 12–20)
CALCIUM SERPL-MCNC: 8.8 MG/DL (ref 8.5–10.1)
CALCULATED P AXIS, ECG09: 50 DEGREES
CALCULATED R AXIS, ECG10: 0 DEGREES
CALCULATED T AXIS, ECG11: 7 DEGREES
CHLORIDE SERPL-SCNC: 107 MMOL/L (ref 97–108)
CO2 SERPL-SCNC: 26 MMOL/L (ref 21–32)
COLOR UR: ABNORMAL
COMMENT, HOLDF: NORMAL
CREAT SERPL-MCNC: 0.77 MG/DL (ref 0.7–1.3)
DIAGNOSIS, 93000: NORMAL
DIFFERENTIAL METHOD BLD: ABNORMAL
EOSINOPHIL # BLD: 0 K/UL (ref 0–0.4)
EOSINOPHIL NFR BLD: 0 % (ref 0–7)
EPITH CASTS URNS QL MICRO: ABNORMAL /LPF
ERYTHROCYTE [DISTWIDTH] IN BLOOD BY AUTOMATED COUNT: 14 % (ref 11.5–14.5)
GLOBULIN SER CALC-MCNC: 3.9 G/DL (ref 2–4)
GLUCOSE SERPL-MCNC: 108 MG/DL (ref 65–100)
GLUCOSE UR STRIP.AUTO-MCNC: NEGATIVE MG/DL
HCT VFR BLD AUTO: 28.5 % (ref 36.6–50.3)
HGB BLD-MCNC: 9 G/DL (ref 12.1–17)
HGB UR QL STRIP: NEGATIVE
IMM GRANULOCYTES # BLD AUTO: 0 K/UL (ref 0–0.04)
IMM GRANULOCYTES NFR BLD AUTO: 0 % (ref 0–0.5)
KETONES UR QL STRIP.AUTO: ABNORMAL MG/DL
LACTATE SERPL-SCNC: 0.8 MMOL/L (ref 0.4–2)
LEUKOCYTE ESTERASE UR QL STRIP.AUTO: ABNORMAL
LIPASE SERPL-CCNC: 69 U/L (ref 73–393)
LYMPHOCYTES # BLD: 0.7 K/UL (ref 0.8–3.5)
LYMPHOCYTES NFR BLD: 10 % (ref 12–49)
MCH RBC QN AUTO: 33.1 PG (ref 26–34)
MCHC RBC AUTO-ENTMCNC: 31.6 G/DL (ref 30–36.5)
MCV RBC AUTO: 104.8 FL (ref 80–99)
MONOCYTES # BLD: 0.6 K/UL (ref 0–1)
MONOCYTES NFR BLD: 9 % (ref 5–13)
NEUTS SEG # BLD: 5.9 K/UL (ref 1.8–8)
NEUTS SEG NFR BLD: 81 % (ref 32–75)
NITRITE UR QL STRIP.AUTO: NEGATIVE
NRBC # BLD: 0 K/UL (ref 0–0.01)
NRBC BLD-RTO: 0 PER 100 WBC
P-R INTERVAL, ECG05: 140 MS
PH UR STRIP: 8 [PH] (ref 5–8)
PLATELET # BLD AUTO: 171 K/UL (ref 150–400)
PMV BLD AUTO: 9.6 FL (ref 8.9–12.9)
POTASSIUM SERPL-SCNC: 3.3 MMOL/L (ref 3.5–5.1)
PROT SERPL-MCNC: 6.6 G/DL (ref 6.4–8.2)
PROT UR STRIP-MCNC: ABNORMAL MG/DL
Q-T INTERVAL, ECG07: 304 MS
QRS DURATION, ECG06: 66 MS
QTC CALCULATION (BEZET), ECG08: 441 MS
RBC # BLD AUTO: 2.72 M/UL (ref 4.1–5.7)
RBC #/AREA URNS HPF: ABNORMAL /HPF (ref 0–5)
RBC MORPH BLD: ABNORMAL
RBC MORPH BLD: ABNORMAL
SAMPLES BEING HELD,HOLD: NORMAL
SODIUM SERPL-SCNC: 141 MMOL/L (ref 136–145)
SP GR UR REFRACTOMETRY: 1.02 (ref 1–1.03)
UR CULT HOLD, URHOLD: NORMAL
UROBILINOGEN UR QL STRIP.AUTO: 0.2 EU/DL (ref 0.2–1)
VANCOMYCIN SERPL-MCNC: 14 UG/ML
VENTRICULAR RATE, ECG03: 127 BPM
WBC # BLD AUTO: 7.2 K/UL (ref 4.1–11.1)
WBC URNS QL MICRO: ABNORMAL /HPF (ref 0–4)

## 2020-10-02 PROCEDURE — 74011250637 HC RX REV CODE- 250/637: Performed by: EMERGENCY MEDICINE

## 2020-10-02 PROCEDURE — 85025 COMPLETE CBC W/AUTO DIFF WBC: CPT

## 2020-10-02 PROCEDURE — 74011250636 HC RX REV CODE- 250/636: Performed by: EMERGENCY MEDICINE

## 2020-10-02 PROCEDURE — 96375 TX/PRO/DX INJ NEW DRUG ADDON: CPT

## 2020-10-02 PROCEDURE — 86301 IMMUNOASSAY TUMOR CA 19-9: CPT

## 2020-10-02 PROCEDURE — 74011250636 HC RX REV CODE- 250/636: Performed by: HOSPITALIST

## 2020-10-02 PROCEDURE — 83690 ASSAY OF LIPASE: CPT

## 2020-10-02 PROCEDURE — 36415 COLL VENOUS BLD VENIPUNCTURE: CPT

## 2020-10-02 PROCEDURE — 81001 URINALYSIS AUTO W/SCOPE: CPT

## 2020-10-02 PROCEDURE — 87040 BLOOD CULTURE FOR BACTERIA: CPT

## 2020-10-02 PROCEDURE — 71045 X-RAY EXAM CHEST 1 VIEW: CPT

## 2020-10-02 PROCEDURE — 83605 ASSAY OF LACTIC ACID: CPT

## 2020-10-02 PROCEDURE — 80053 COMPREHEN METABOLIC PANEL: CPT

## 2020-10-02 PROCEDURE — 74011250637 HC RX REV CODE- 250/637: Performed by: HOSPITALIST

## 2020-10-02 PROCEDURE — 96365 THER/PROPH/DIAG IV INF INIT: CPT

## 2020-10-02 PROCEDURE — 65660000000 HC RM CCU STEPDOWN

## 2020-10-02 PROCEDURE — 80202 ASSAY OF VANCOMYCIN: CPT

## 2020-10-02 PROCEDURE — 74011000258 HC RX REV CODE- 258: Performed by: EMERGENCY MEDICINE

## 2020-10-02 PROCEDURE — 93005 ELECTROCARDIOGRAM TRACING: CPT

## 2020-10-02 PROCEDURE — 76705 ECHO EXAM OF ABDOMEN: CPT

## 2020-10-02 PROCEDURE — 99285 EMERGENCY DEPT VISIT HI MDM: CPT

## 2020-10-02 RX ORDER — ONDANSETRON 2 MG/ML
4 INJECTION INTRAMUSCULAR; INTRAVENOUS
Status: DISCONTINUED | OUTPATIENT
Start: 2020-10-02 | End: 2020-10-05 | Stop reason: HOSPADM

## 2020-10-02 RX ORDER — MIDODRINE HYDROCHLORIDE 5 MG/1
5 TABLET ORAL
Status: COMPLETED | OUTPATIENT
Start: 2020-10-02 | End: 2020-10-02

## 2020-10-02 RX ORDER — ACETAMINOPHEN 325 MG/1
975 TABLET ORAL
Status: COMPLETED | OUTPATIENT
Start: 2020-10-02 | End: 2020-10-02

## 2020-10-02 RX ORDER — PANTOPRAZOLE SODIUM 40 MG/1
40 TABLET, DELAYED RELEASE ORAL DAILY
Status: DISCONTINUED | OUTPATIENT
Start: 2020-10-02 | End: 2020-10-05 | Stop reason: HOSPADM

## 2020-10-02 RX ORDER — POLYETHYLENE GLYCOL 3350 17 G/17G
17 POWDER, FOR SOLUTION ORAL DAILY PRN
Status: DISCONTINUED | OUTPATIENT
Start: 2020-10-02 | End: 2020-10-05 | Stop reason: HOSPADM

## 2020-10-02 RX ORDER — DOCUSATE SODIUM 100 MG/1
100 CAPSULE, LIQUID FILLED ORAL 2 TIMES DAILY
COMMUNITY
End: 2021-03-24

## 2020-10-02 RX ORDER — SODIUM CHLORIDE 0.9 % (FLUSH) 0.9 %
5-40 SYRINGE (ML) INJECTION AS NEEDED
Status: DISCONTINUED | OUTPATIENT
Start: 2020-10-02 | End: 2020-10-05 | Stop reason: HOSPADM

## 2020-10-02 RX ORDER — ENOXAPARIN SODIUM 100 MG/ML
40 INJECTION SUBCUTANEOUS DAILY
Status: DISCONTINUED | OUTPATIENT
Start: 2020-10-02 | End: 2020-10-05 | Stop reason: HOSPADM

## 2020-10-02 RX ORDER — SODIUM CHLORIDE 0.9 % (FLUSH) 0.9 %
5-40 SYRINGE (ML) INJECTION EVERY 8 HOURS
Status: DISCONTINUED | OUTPATIENT
Start: 2020-10-02 | End: 2020-10-05 | Stop reason: HOSPADM

## 2020-10-02 RX ORDER — PROMETHAZINE HYDROCHLORIDE 25 MG/1
12.5 TABLET ORAL
Status: DISCONTINUED | OUTPATIENT
Start: 2020-10-02 | End: 2020-10-05 | Stop reason: HOSPADM

## 2020-10-02 RX ORDER — ACETAMINOPHEN 325 MG/1
650 TABLET ORAL
Status: DISCONTINUED | OUTPATIENT
Start: 2020-10-02 | End: 2020-10-05 | Stop reason: HOSPADM

## 2020-10-02 RX ORDER — FINASTERIDE 5 MG/1
5 TABLET, FILM COATED ORAL DAILY
Status: DISCONTINUED | OUTPATIENT
Start: 2020-10-02 | End: 2020-10-05 | Stop reason: HOSPADM

## 2020-10-02 RX ORDER — TAMSULOSIN HYDROCHLORIDE 0.4 MG/1
0.4 CAPSULE ORAL DAILY
Status: DISCONTINUED | OUTPATIENT
Start: 2020-10-02 | End: 2020-10-05 | Stop reason: HOSPADM

## 2020-10-02 RX ORDER — MIDODRINE HYDROCHLORIDE 5 MG/1
5 TABLET ORAL 3 TIMES DAILY
Status: DISCONTINUED | OUTPATIENT
Start: 2020-10-02 | End: 2020-10-05 | Stop reason: HOSPADM

## 2020-10-02 RX ORDER — ACETAMINOPHEN 650 MG/1
650 SUPPOSITORY RECTAL
Status: DISCONTINUED | OUTPATIENT
Start: 2020-10-02 | End: 2020-10-05 | Stop reason: HOSPADM

## 2020-10-02 RX ORDER — ONDANSETRON 2 MG/ML
4 INJECTION INTRAMUSCULAR; INTRAVENOUS
Status: COMPLETED | OUTPATIENT
Start: 2020-10-02 | End: 2020-10-02

## 2020-10-02 RX ADMIN — VANCOMYCIN HYDROCHLORIDE 1000 MG: 1 INJECTION, POWDER, LYOPHILIZED, FOR SOLUTION INTRAVENOUS at 11:25

## 2020-10-02 RX ADMIN — CEFEPIME HYDROCHLORIDE 2 G: 2 INJECTION, POWDER, FOR SOLUTION INTRAVENOUS at 05:32

## 2020-10-02 RX ADMIN — ACETAMINOPHEN 975 MG: 325 TABLET ORAL at 04:40

## 2020-10-02 RX ADMIN — ONDANSETRON 4 MG: 2 INJECTION INTRAMUSCULAR; INTRAVENOUS at 04:41

## 2020-10-02 RX ADMIN — MIDODRINE HYDROCHLORIDE 5 MG: 5 TABLET ORAL at 15:55

## 2020-10-02 RX ADMIN — MIDODRINE HYDROCHLORIDE 5 MG: 5 TABLET ORAL at 23:48

## 2020-10-02 RX ADMIN — SODIUM CHLORIDE 1000 ML: 9 INJECTION, SOLUTION INTRAVENOUS at 06:18

## 2020-10-02 RX ADMIN — SODIUM CHLORIDE 500 ML: 900 INJECTION, SOLUTION INTRAVENOUS at 08:34

## 2020-10-02 RX ADMIN — MIDODRINE HYDROCHLORIDE 5 MG: 5 TABLET ORAL at 07:13

## 2020-10-02 RX ADMIN — VANCOMYCIN HYDROCHLORIDE 1000 MG: 1 INJECTION, POWDER, LYOPHILIZED, FOR SOLUTION INTRAVENOUS at 23:49

## 2020-10-02 RX ADMIN — SODIUM CHLORIDE 1000 ML: 9 INJECTION, SOLUTION INTRAVENOUS at 04:40

## 2020-10-02 RX ADMIN — Medication 10 ML: at 23:49

## 2020-10-02 NOTE — CONSULTS
Infectious Disease Consult    Today's Date: 10/2/2020   Admit Date: 10/2/2020    Impression:   · Febrile illness  · Transaminase elevation  · On IV vancomycin at home for recent coag negative staph sepsis/line infection    Plan:   · Continue current therapy  · Follow up cultures and adjust antibiotic therapy as needed    Anti-infectives:   · Vancomycin     Subjective:   Date of Consultation:  October 2, 2020  Referring Physician: Dr Yasir Childs    Patient is a 80 y.o. male known to me from recent hospital stay for bacteremia/line infection. He is admitted with acute onset of fever and vomiting over the last 24 hours. Cultures are pending and he is on antibiotic therapy. He states that he is feeling ok currently. 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    BPH (benign prostatic hyperplasia)     DJD (degenerative joint disease)     Hypercholesterolemia     Other and unspecified hyperlipidemia       Family History   Problem Relation Age of Onset    Diabetes Sister     Diabetes Brother     Heart Disease Brother       Social History     Tobacco Use    Smoking status: Never Smoker    Smokeless tobacco: Never Used   Substance Use Topics    Alcohol use: Yes     Comment: social     Past Surgical History:   Procedure Laterality Date    COLONOSCOPY N/A 8/18/2017    COLONOSCOPY performed by Haily Muñoz MD at [address] [place] [address] ENDOSCOPY    HX ORTHOPAEDIC Right 2010    rotator cuff    HX TONSILLECTOMY      IR IVC FILTER  7/20/2020         IR [facility] IVC FILTER  7/20/2020      Prior to Admission medications    Medication Sig Start Date End Date Taking? Authorizing Provider   docusate sodium (COLACE) 100 mg capsule Take 100 mg by mouth two (2) times a day.    Yes Provider, Historical   pantoprazole (PROTONIX) 40 mg tablet Take 1 Tab by mouth daily for 30 days. 9/22/20 10/22/20 Yes Katey Bojorquez MD   midodrine (PROAMATINE) 5 mg tablet Take 1 Tab by mouth three (3) times daily for 15 days. 9/22/20 10/7/20 Yes Katey Bojorquez MD   multivitamin with iron tablet Take 1 Tab by mouth daily. Yes Provider, Historical   TAMSULOSIN HCL (TAMSULOSIN PO) Take 0.4 mg by mouth daily. Yes Provider, Historical   finasteride (PROSCAR) 5 mg tablet Take 5 mg by mouth daily. Yes Provider, Historical       Allergies   Allergen Reactions    Aspirin Unknown (comments)        Review of Systems:  A comprehensive review of systems was negative except for that written in the History of Present Illness. Objective:     Visit Vitals  BP (!) 105/54 (BP 1 Location: Right arm, BP Patient Position: At rest)   Pulse 95   Temp 98.5 °F (36.9 °C)   Resp 18   Ht 5' 11\" (1.803 m)   Wt 81.8 kg (180 lb 5.4 oz)   SpO2 95%   BMI 25.15 kg/m²     Temp (24hrs), Av.8 °F (37.1 °C), Min:98 °F (36.7 °C), Max:100 °F (37.8 °C)       Lines:  PICC:       Physical Exam:  Lungs:  clear to auscultation bilaterally  Heart:  regular rate and rhythm  Abdomen:  soft, non-tender.  Bowel sounds normal. No masses,  no organomegaly  Skin:  no rash or abnormalities    Data Review:     CBC:  Recent Labs     10/02/20  0405   WBC 7.2   GRANS 81*   MONOS 9   EOS 0   ANEU 5.9   ABL 0.7*   HGB 9.0*   HCT 28.5*          BMP:  Recent Labs     10/02/20  0405   CREA 0.77   BUN 10      K 3.3*      CO2 26   AGAP 8   *       LFTS:  Recent Labs     10/02/20  0405   TBILI 2.3*   *   *   TP 6.6   ALB 2.7*       Microbiology:     All Micro Results     Procedure Component Value Units Date/Time    CULTURE, BLOOD [[id number]] Collected:  10/02/20 1322    Order Status:  Completed Specimen:  Blood Updated:  10/02/20 1333    CULTURE, BLOOD, PAIRED [[id number]] Collected:  10/02/20 0414    Order Status:  Completed Specimen:  Blood Updated:  10/02/20 0602    URINE CULTURE HOLD SAMPLE [[id number]] Collected:  10/02/20 0449    Order Status:  Completed Specimen:  Urine from Serum Updated:  10/02/20 0508     Urine culture hold       Urine on hold in Microbiology dept for 2 days. If unpreserved urine is submitted, it cannot be used for addtional testing after 24 hours, recollection will be required.                 Imaging:   Reviewed     Signed By: Tia Peña MD     October 2, 2020

## 2020-10-02 NOTE — ED PROVIDER NOTES
HPI     79-year-old male with a history of high cholesterol, recent admission for sepsis with pancreatitis, gastric outlet obstruction, presents emergency department tonight with fever. Patient states the fever started tonight. He states he had some nausea and vomiting but no diarrhea. He denies any abdominal pain. He denies a headache, sore throat, chest pain, cough, shortness of breath. He has not taken anything for his fever. He currently has a PICC line in his left arm receiving antibiotics but he is not sure why. He states he is urinating normally.     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    BPH (benign prostatic hyperplasia)     DJD (degenerative joint disease)     Hypercholesterolemia     Other and unspecified hyperlipidemia        Past Surgical History:   Procedure Laterality Date    COLONOSCOPY N/A 8/18/2017    COLONOSCOPY performed by [facility]ika [facility] at [facility] ENDOSCOPY    HX ORTHOPAEDIC Right 2010    rotator cuff    HX TONSILLECTOMY      IR IVC FILTER  7/20/2020         IR PLC IVC FILTER  7/20/2020         Family History:   Problem Relation Age of Onset    Diabetes Sister     Diabetes Brother     Heart Disease Brother        Social History     Socioeconomic History    Marital status:      Spouse name: Not on file    Number of children: Not on file    Years of education: Not on file    Highest education level: Not on file   Occupational History    Not on file   Social Needs    Financial resource strain: Not on file    Food insecurity     Worry: Not on file     Inability: Not on file    Transportation needs     Medical: Not on file     Non-medical: Not on file   Tobacco Use    Smoking status: Never Smoker    Smokeless tobacco: Never Used   Substance and Sexual Activity    Alcohol use: Yes     Comment: social    Drug use: Not on file    Sexual activity: Not Currently   Lifestyle    Physical activity     Days per week: Not on file     Minutes per session: Not on file    Stress: Not on file   Relationships    Social connections     Talks on phone: Not on file     Gets together: Not on file     Attends Mu-ism service: Not on file     Active member of club or organization: Not on file     Attends meetings of clubs or organizations: Not on file     Relationship status: Not on file    Intimate partner violence     Fear of current or ex partner: Not on file     Emotionally abused: Not on file     Physically abused: Not on file     Forced sexual activity: Not on file   Other Topics Concern    Not on file   Social History Narrative    Not on file         ALLERGIES: Aspirin    Review of Systems   Constitutional: Positive for fever. HENT: Negative for congestion. Eyes: Negative for visual disturbance. Respiratory: Negative for cough and shortness of breath. Cardiovascular: Negative for chest pain. Gastrointestinal: Negative for abdominal pain. Endocrine: Negative for polyuria. Genitourinary: Negative for dysuria. Musculoskeletal: Negative for gait problem. Skin: Negative for rash. Neurological: Negative for headaches. Psychiatric/Behavioral: Negative for dysphoric mood. There were no vitals filed for this visit. Physical Exam  Constitutional:       General: He is not in acute distress. Appearance: He is well-developed. HENT:      Head: Normocephalic and atraumatic. Mouth/Throat:      Pharynx: No oropharyngeal exudate. Eyes:      General: No scleral icterus. Right eye: No discharge. Left eye: No discharge. Pupils: Pupils are equal, round, and reactive to light. Neck:      Musculoskeletal: Normal range of motion and neck supple. Vascular: No JVD. Cardiovascular:      Rate and Rhythm: Regular rhythm. Tachycardia present. Heart sounds: Normal heart sounds. No murmur.    Pulmonary:      Effort: Pulmonary effort is normal. No respiratory distress. Breath sounds: Normal breath sounds. No stridor. No wheezing or rales. Chest:      Chest wall: No tenderness. Abdominal:      General: Bowel sounds are normal. There is no distension. Palpations: Abdomen is soft. There is no mass. Tenderness: There is no abdominal tenderness. There is no guarding or rebound. Musculoskeletal: Normal range of motion. Right lower leg: Edema (bilateral feet) present. Left lower leg: Edema present. Skin:     General: Skin is warm and dry. Capillary Refill: Capillary refill takes less than 2 seconds. Findings: No rash. Neurological:      Mental Status: He is oriented to person, place, and time. Psychiatric:         Behavior: Behavior normal.         Thought Content: Thought content normal.         Judgment: Judgment normal.          MDM       Procedures      ED EKG interpretation:  Rhythm: sinus tachycardia; and regular . Rate (approx.): 127; Axis: normal; P wave: normal; QRS interval: normal ; ST/T wave: non-specific changes; . This EKG was interpreted by Sherrilyn Hashimoto, MD,ED Provider. Labs are reassuring except for new LFT elevation. Ultrasound ordered. Fluid boluses going. Cefepime added. Will admit. Perfect Serve Consult for Admission  5:45 AM    ED Room Number: ER08/08  Patient Name and age:  Sneha Hayden 80 y.o.  male  Working Diagnosis: No diagnosis found. COVID-19 Suspicion:  no  Sepsis present:  no  Reassessment needed: no  Code Status:  Do Not Resuscitate  Readmission: yes  Isolation Requirements:  no  Recommended Level of Care:  telemetry  Department:[facility] Adult ED - 21   Other:  80year old male with pic line getting Vanc after an admission for sepsis/pancreatitis/gastritis. Presents with recurrent fever tonight. One episode of vomiting. No pain. Only new abnormality on labs is elevated LFT's. I've ordered ultrasound.  Lipase is normal. Wbc and lactate are normal. Hemoglobin is 9 but that is good for him. He looks well. BP dropped once but he is usually on the low side and take midodrine. I gave him Cefepime.

## 2020-10-02 NOTE — ROUTINE PROCESS
TRANSFER - OUT REPORT: 
 
Verbal report given to Jerri RN(name) on Karsten Palma  being transferred to (unit) for routine progression of care Report consisted of patients Situation, Background, Assessment and  
Recommendations(SBAR). Information from the following report(s) SBAR, Kardex, ED Summary, [facility] and Recent Results was reviewed with the receiving nurse. Lines: PICC Single Lumen 00/96/78 Left;Basilic (Active) Peripheral IV 10/02/20 Right Antecubital (Active) Site Assessment Clean, dry, & intact 10/02/20 0820 Phlebitis Assessment 0 10/02/20 0820 Infiltration Assessment 0 10/02/20 0820 Dressing Status Clean, dry, & intact 10/02/20 0820 Hub Color/Line Status Pink 10/02/20 0820 Opportunity for questions and clarification was provided. Patient transported with: 
 [employer]

## 2020-10-02 NOTE — ED TRIAGE NOTES
Pt arrives via EMS w/ CC of fever. Pt states he was recently discharged from the hospital with a GI infx. Pt denies pain, cough, SOB. Pt states he vomited once earlier. VSS per EMS. Temp 99.9 orally per EMS. Pt A & O x 4.

## 2020-10-02 NOTE — PROGRESS NOTES
Problem: General Infection Care Plan (Adult and Pediatric)  Goal: Improvement in signs and symptoms of infection  Outcome: Progressing Towards Goal  Goal: *Optimize nutritional status  Outcome: Progressing Towards Goal     Problem: Patient Education: Go to Patient Education Activity  Goal: Patient/Family Education  Outcome: Progressing Towards Goal     Problem: Pain  Goal: *Control of Pain  Outcome: Progressing Towards Goal  Goal: *PALLIATIVE CARE:  Alleviation of Pain  Outcome: Progressing Towards Goal

## 2020-10-02 NOTE — PROGRESS NOTES
TRANSFER - IN REPORT:    Verbal report received from [facility] (name) on Claudene Maywood  being received from [facility] (unit) for routine progression of care      Report consisted of patients Situation, Background, Assessment and   Recommendations(SBAR). Information from the following report(s) SBAR, Kardex and MAR was reviewed with the receiving nurse. Opportunity for questions and clarification was provided. Assessment completed upon patients arrival to unit and care assumed.

## 2020-10-02 NOTE — PROGRESS NOTES
Day #1 of Vancomycin (continued from outpatient)  Indication:  sepsis  -discharged on vanc 1g q12 through 10/6 from previous admission for Staph epi in blood  Current regimen:  vanc 1g q12h  Abx regimen:  vanc  ID Following ?: YES - followed on previous admission, consulted this admission - not yet seen  Concomitant nephrotoxic drugs (requires more frequent monitoring): None  Frequency of BMP?: daily    Recent Labs     10/02/20  0405   WBC 7.2   CREA 0.77   BUN 10     Est CrCl: 70-75 ml/min; UO: -- ml/kg/hr  Temp (24hrs), Av.4 °F (37.4 °C), Min:98.1 °F (36.7 °C), Max:100 °F (37.8 °C)    Cultures:    blood: Staph epi in 4/4 bottles; pan (S) with exception of erythromycin    Goal trough = 15 - 20 mcg/mL    Recent trough history (date/time/level/dose/action taken):  10/2 random vanc trough = 14 mcg/ml    Plan: Continue current regimen. Will assess regimen in a few days or sooner if clinically indicated.     Arnel Tucker

## 2020-10-02 NOTE — CONSULTS
[address] [place]  [address] [id number]        GASTROENTEROLOGY CONSULTATION NOTE  Will Deena Tumtum  [phone] office  [phone] NP/PA in-hospital cell phone M-F until 4:30PM  After 5PM or on weekends, please call  for physician on call        NAME:  Woody Delarosa   :   1934   MRN:   [id number]       Referring Physician: Dr. Bryan Turcios Date: 10/2/2020 1:10 PM    Chief Complaint: fever     History of Present Illness:  Patient is a 80 y.o. who is seen in consultation at the request of Dr. Brigida Amaral for transaminitis. Patient has a history of pancreatitis, biliary stricture, and bacteremia for which he was previously admitted in 2020. During that admission, CT abdomen/pelvis with IV contrast (20): improved peripancreatic inflammatory changes, compatible with pancreatitis; unchanged biliary dilatation with no visualized choledocholithiasis; small hiatal hernia. 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 RUQ ultrasound (20): intrahepatic and extrahepatic biliary dilatation again noted; common bile duct 14 mm. Patient underwent ERCP with biliary sphincterotomy, stent placement, and biliary tissue sampling (20) by Dr. Val Tate of common bile duct aspirate: no cells diagnostic for malignancy. Cytology of common bile duct brushing: atypical, favor benign reactive process. Patient presented to the ED for fever. He reports fever for the last 2 days (Tmax 100). Patient reports an isolated episode of vomiting the day prior to presentation. No nausea at this time. No abdominal pain. No change in bowel habits, melena, or hematochezia. No jaundice, dark-colored urine, or pale stools. No NSAID use.  No anticoagulation prior to admission.   No alcohol or tobacco use.  No history of abdominal surgeries. EGD (7/20/20) by Dr. Jenn Interiano for coffee-ground emesis showed LA Grade D erosive esophagitis; hiatal hernia; retention of large volume of gastric fluid likely secondary to gastric outlet obstruction from extrinsic compression. A repeat EGD was recommended in 2 months to evaluate for mucosal healing of severe erosive esophagitis. I have reviewed the emergency room note, hospital admission note, notes by all other clinicians who have seen the patient during this hospitalization to date. I have reviewed the problem list and the reason for this hospitalization. I have reviewed the allergies and the medications the patient was taking at home prior to this hospitalization. 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    BPH (benign prostatic hyperplasia)     DJD (degenerative joint disease)     Hypercholesterolemia     Other and unspecified hyperlipidemia        PSH:  Past Surgical History:   Procedure Laterality Date    COLONOSCOPY N/A 8/18/2017    COLONOSCOPY performed by Kaitlin Ventura MD at [facility] ENDOSCOPY    HX ORTHOPAEDIC Right 2010    rotator cuff    HX TONSILLECTOMY      IR IVC FILTER  7/20/2020         IR PLC IVC FILTER  7/20/2020       Allergies: Allergies   Allergen Reactions    Aspirin Unknown (comments)       Home Medications:  Prior to Admission Medications   Prescriptions Last Dose Informant Patient Reported? Taking? TAMSULOSIN HCL (TAMSULOSIN PO)   Yes No   Sig: Take 0.4 mg by mouth daily. finasteride (PROSCAR) 5 mg tablet   Yes No   Sig: Take 5 mg by mouth daily. midodrine (PROAMATINE) 5 mg tablet   No No   Sig: Take 1 Tab by mouth three (3) times daily for 15 days. multivitamin with iron tablet   Yes No   Sig: Take 1 Tab by mouth daily. pantoprazole (PROTONIX) 40 mg tablet   No No   Sig: Take 1 Tab by mouth daily for 30 days.       Facility-Administered Medications: None Hospital Medications:  Current Facility-Administered Medications   Medication Dose Route Frequency    finasteride (PROSCAR) tablet 5 mg  5 mg Oral DAILY    tamsulosin (FLOMAX) capsule 0.4 mg  0.4 mg Oral DAILY    multivitamin, tx-iron-ca-min (THERA-M w/ IRON) tablet 1 Tab  1 Tab Oral DAILY    pantoprazole (PROTONIX) tablet 40 mg  40 mg Oral DAILY    midodrine (PROAMATINE) tablet 5 mg  5 mg Oral TID    sodium chloride (NS) flush 5-40 mL  5-40 mL IntraVENous Q8H    sodium chloride (NS) flush 5-40 mL  5-40 mL IntraVENous PRN    acetaminophen (TYLENOL) tablet 650 mg  650 mg Oral Q6H PRN    Or    acetaminophen (TYLENOL) suppository 650 mg  650 mg Rectal Q6H PRN    polyethylene glycol (MIRALAX) packet 17 g  17 g Oral DAILY PRN    promethazine (PHENERGAN) tablet 12.5 mg  12.5 mg Oral Q6H PRN    Or    ondansetron (ZOFRAN) injection 4 mg  4 mg IntraVENous Q6H PRN    enoxaparin (LOVENOX) injection 40 mg  40 mg SubCUTAneous DAILY    vancomycin - pharmacy to dose   Other Rx Dosing/Monitoring    vancomycin (VANCOCIN) 1,000 mg in 0.9% sodium chloride (MBP/ADV) 250 mL  1,000 mg IntraVENous Q12H     Current Outpatient Medications   Medication Sig    pantoprazole (PROTONIX) 40 mg tablet Take 1 Tab by mouth daily for 30 days.  midodrine (PROAMATINE) 5 mg tablet Take 1 Tab by mouth three (3) times daily for 15 days.  multivitamin with iron tablet Take 1 Tab by mouth daily.  TAMSULOSIN HCL (TAMSULOSIN PO) Take 0.4 mg by mouth daily.  finasteride (PROSCAR) 5 mg tablet Take 5 mg by mouth daily.        Social History:  Social History     Tobacco Use    Smoking status: Never Smoker    Smokeless tobacco: Never Used   Substance Use Topics    Alcohol use: Yes     Comment: social       Family History:  Family History   Problem Relation Age of Onset    Diabetes Sister     Diabetes Brother     Heart Disease Brother        Review of Systems:  Constitutional: + fever, + chills, negative weight loss  Eyes: negative visual changes  ENT:   negative sore throat, tongue or lip swelling  Respiratory:  + cough, negative dyspnea  Cards:  negative for chest pain, palpitations, lower extremity edema  GI:   See HPI  :  negative for frequency, dysuria  Integument:  negative for rash and pruritus  Heme:  negative for easy bruising and gum/nose bleeding  Musculoskeletal:negative for myalgias, back pain and muscle weakness  Neuro:    negative for headaches, dizziness  Psych: negative for feelings of anxiety, depression     Objective:     Patient Vitals for the past 8 hrs:   BP Temp Pulse Resp SpO2   10/02/20 1230 (!) 110/54  88 22 100 %   10/02/20 1200 (!) 107/49  93 18 100 %   10/02/20 1100 (!) 100/52 98.1 °F (36.7 °C) 93 21 100 %   10/02/20 1030 (!) 113/56  (!) 102 27 96 %   10/02/20 1000 (!) 100/49  89 30 98 %   10/02/20 0930 100/61  85 13 100 %   10/02/20 0915 (!) 102/55  88 13 100 %   10/02/20 0900 (!) 99/58  88 13 100 %   10/02/20 0845 109/68  88 16 100 %   10/02/20 0830 104/65  93 14 100 %   10/02/20 0815 (!) 98/54  95 13 99 %   10/02/20 0800 (!) 100/53 98.1 °F (36.7 °C) 93 14 100 %   10/02/20 0730 (!) 100/55  (!) 101 19 (!) 88 %   10/02/20 0713 (!) 91/46  (!) 103     10/02/20 0700 (!) 85/48  (!) 106 15    10/02/20 0600 (!) 103/50  (!) 112 15 92 %   10/02/20 0530 (!) 86/44  (!) 116 19 93 %     10/02 0701 - 10/02 1900  In: -   Out: 500 [Urine:500]  09/30 1901 - 10/02 0700  In: 1100 [I.V.:1100]  Out: -     EXAM:     CONST:  Pleasant male lying in bed, no acute distress   NEURO:  Alert and oriented x 3   HEENT: EOMI, no scleral icterus   LUNGS: No respiratory distress   CARD:  S1 S2   ABD:  Soft, non distended, no tenderness, no rebound, no guarding. + Bowel sounds.     EXT:  Warm   PSYCH: Full, not anxious or agitated     Data Review     Recent Labs     10/02/20  0405   WBC 7.2   HGB 9.0*   HCT 28.5*        Recent Labs     10/02/20  0405      K 3.3*      CO2 26   BUN 10   CREA 0.77   GLU 108*   CA 8.8     Recent Labs     10/02/20  0405   *   TP 6.6   ALB 2.7*   GLOB 3.9   LPSE 69*     No results for input(s): INR, PTP, APTT, INREXT in the last 72 hours. CT abdomen/pelvis with IV contrast (9/7/20): improved peripancreatic inflammatory changes, compatible with pancreatitis; unchanged biliary dilatation with no visualized choledocholithiasis; small hiatal hernia. 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 RUQ ultrasound (9/17/20): intrahepatic and extrahepatic biliary dilatation again noted; common bile duct 14 mm.      Assessment:   · Elevated LFTs: WBC 7.2, Hgb 9.0, platelets 978, , , alkaline phosphatase 335, total bilirubin 2.3, lipase normal, lactic acid normal.   · Biliary stricture status post ERCP with biliary sphincterotomy, stent placement, and biliary tissue sampling (9/18/20).  Cytology of common bile duct aspirate: no cells diagnostic for malignancy. Cytology of common bile duct brushing: atypical, favor benign reactive process. RUQ ultrasound (10/2/20): distended gallbladder with no cholelithiasis or acute cholecystitis, wall thickening to 3 mm; common bile duct 5 mm; moderate ascites. · History of pancreatitis: CT abdomen/pelvis on 9/7 and 9/16 as above. CA 19-9 normal in 7/2020. · Sepsis: blood culture pending.       Patient Active Problem List   Diagnosis Code    DVT (deep vein thrombosis) in pregnancy O22.30    DVT (deep venous thrombosis) ([facility]) I82.409    Febrile R50.9    Ascites R18.8    Sepsis ([address] A41.9     Plan:     · On antibiotics  · Trend LFTs  · Repeat CA 19-9  · Will obtain CT abdomen/pelvis with contrast to evaluate CBD stent  · Consult surgery, Dr. Dayna Yap  · ID consulted  · Patient was discussed with and will be seen by Dr. Sahara Garcia  · Thank you for allowing me to participate in care of Sneha Hayden     Signed By: VANESSA Hutton     10/2/2020  1:10 PM       Agree with above  Fever and elevated LFT, s/p recent ERCP with CBD stenting by DR Omid Gonzalez, to get CT scan of abdomen to assess position of stent  Surgical consult for possible cholecystitis  Will follow

## 2020-10-03 ENCOUNTER — APPOINTMENT (OUTPATIENT)
Dept: CT IMAGING | Age: 85
DRG: 864 | End: 2020-10-03
Attending: PHYSICIAN ASSISTANT
Payer: MEDICARE

## 2020-10-03 LAB
ALBUMIN SERPL-MCNC: 2.2 G/DL (ref 3.5–5)
ALBUMIN/GLOB SERPL: 0.5 {RATIO} (ref 1.1–2.2)
ALP SERPL-CCNC: 270 U/L (ref 45–117)
ALT SERPL-CCNC: 87 U/L (ref 12–78)
ANION GAP SERPL CALC-SCNC: 5 MMOL/L (ref 5–15)
AST SERPL-CCNC: 182 U/L (ref 15–37)
BASOPHILS # BLD: 0 K/UL (ref 0–0.1)
BASOPHILS NFR BLD: 0 % (ref 0–1)
BILIRUB SERPL-MCNC: 1.4 MG/DL (ref 0.2–1)
BUN SERPL-MCNC: 12 MG/DL (ref 6–20)
BUN/CREAT SERPL: 15 (ref 12–20)
CALCIUM SERPL-MCNC: 8.3 MG/DL (ref 8.5–10.1)
CANCER AG19-9 SERPL-ACNC: <2 U/ML (ref 0–35)
CHLORIDE SERPL-SCNC: 109 MMOL/L (ref 97–108)
CO2 SERPL-SCNC: 25 MMOL/L (ref 21–32)
CREAT SERPL-MCNC: 0.82 MG/DL (ref 0.7–1.3)
DIFFERENTIAL METHOD BLD: ABNORMAL
EOSINOPHIL # BLD: 0.2 K/UL (ref 0–0.4)
EOSINOPHIL NFR BLD: 3 % (ref 0–7)
ERYTHROCYTE [DISTWIDTH] IN BLOOD BY AUTOMATED COUNT: 14.4 % (ref 11.5–14.5)
GLOBULIN SER CALC-MCNC: 4.1 G/DL (ref 2–4)
GLUCOSE SERPL-MCNC: 91 MG/DL (ref 65–100)
HCT VFR BLD AUTO: 23 % (ref 36.6–50.3)
HGB BLD-MCNC: 7.4 G/DL (ref 12.1–17)
IMM GRANULOCYTES # BLD AUTO: 0 K/UL (ref 0–0.04)
IMM GRANULOCYTES NFR BLD AUTO: 0 % (ref 0–0.5)
LYMPHOCYTES # BLD: 1 K/UL (ref 0.8–3.5)
LYMPHOCYTES NFR BLD: 20 % (ref 12–49)
MCH RBC QN AUTO: 33.5 PG (ref 26–34)
MCHC RBC AUTO-ENTMCNC: 32.2 G/DL (ref 30–36.5)
MCV RBC AUTO: 104.1 FL (ref 80–99)
MONOCYTES # BLD: 0.5 K/UL (ref 0–1)
MONOCYTES NFR BLD: 11 % (ref 5–13)
NEUTS SEG # BLD: 3.2 K/UL (ref 1.8–8)
NEUTS SEG NFR BLD: 66 % (ref 32–75)
NRBC # BLD: 0 K/UL (ref 0–0.01)
NRBC BLD-RTO: 0 PER 100 WBC
PLATELET # BLD AUTO: 123 K/UL (ref 150–400)
PMV BLD AUTO: 11.6 FL (ref 8.9–12.9)
POTASSIUM SERPL-SCNC: 5.4 MMOL/L (ref 3.5–5.1)
PROT SERPL-MCNC: 6.3 G/DL (ref 6.4–8.2)
RBC # BLD AUTO: 2.21 M/UL (ref 4.1–5.7)
SODIUM SERPL-SCNC: 139 MMOL/L (ref 136–145)
WBC # BLD AUTO: 4.9 K/UL (ref 4.1–11.1)

## 2020-10-03 PROCEDURE — 74011000636 HC RX REV CODE- 636: Performed by: INTERNAL MEDICINE

## 2020-10-03 PROCEDURE — 74011250636 HC RX REV CODE- 250/636: Performed by: HOSPITALIST

## 2020-10-03 PROCEDURE — 85025 COMPLETE CBC W/AUTO DIFF WBC: CPT

## 2020-10-03 PROCEDURE — 65660000000 HC RM CCU STEPDOWN

## 2020-10-03 PROCEDURE — 36415 COLL VENOUS BLD VENIPUNCTURE: CPT

## 2020-10-03 PROCEDURE — 74011250637 HC RX REV CODE- 250/637: Performed by: HOSPITALIST

## 2020-10-03 PROCEDURE — 80053 COMPREHEN METABOLIC PANEL: CPT

## 2020-10-03 PROCEDURE — 74177 CT ABD & PELVIS W/CONTRAST: CPT

## 2020-10-03 RX ORDER — SODIUM CHLORIDE 0.9 % (FLUSH) 0.9 %
10 SYRINGE (ML) INJECTION
Status: COMPLETED | OUTPATIENT
Start: 2020-10-03 | End: 2020-10-03

## 2020-10-03 RX ADMIN — MIDODRINE HYDROCHLORIDE 5 MG: 5 TABLET ORAL at 22:37

## 2020-10-03 RX ADMIN — VANCOMYCIN HYDROCHLORIDE 1000 MG: 1 INJECTION, POWDER, LYOPHILIZED, FOR SOLUTION INTRAVENOUS at 22:37

## 2020-10-03 RX ADMIN — Medication 10 ML: at 15:14

## 2020-10-03 RX ADMIN — Medication 10 ML: at 08:19

## 2020-10-03 RX ADMIN — IOPAMIDOL 100 ML: 755 INJECTION, SOLUTION INTRAVENOUS at 10:00

## 2020-10-03 RX ADMIN — MIDODRINE HYDROCHLORIDE 5 MG: 5 TABLET ORAL at 15:14

## 2020-10-03 RX ADMIN — Medication 10 ML: at 10:00

## 2020-10-03 RX ADMIN — VANCOMYCIN HYDROCHLORIDE 1000 MG: 1 INJECTION, POWDER, LYOPHILIZED, FOR SOLUTION INTRAVENOUS at 12:21

## 2020-10-03 RX ADMIN — Medication 10 ML: at 22:37

## 2020-10-03 RX ADMIN — IOHEXOL 50 ML: 240 INJECTION, SOLUTION INTRATHECAL; INTRAVASCULAR; INTRAVENOUS; ORAL at 10:00

## 2020-10-03 NOTE — PROGRESS NOTES
Bedside shift change report given to [address] [place] [address] (oncoming nurse) by Reinier Mcintyre (offgoing nurse). Report included the following information SBAR, Intake/Output, MAR, Recent Results, Med Rec Status and Cardiac Rhythm NSR.    Follow-up on decreased H&H

## 2020-10-03 NOTE — PROGRESS NOTES
Problem: General Infection Care Plan (Adult and Pediatric)  Goal: Improvement in signs and symptoms of infection  Outcome: Progressing Towards Goal     Problem: Pain  Goal: *Control of Pain  Outcome: Progressing Towards Goal     Problem: Falls - Risk of  Goal: *Absence of Falls  Description: Document Che Fall Risk and appropriate interventions in the flowsheet.   Outcome: Progressing Towards Goal  Note: Fall Risk Interventions:  Mobility Interventions: OT consult for ADLs, Patient to call before getting OOB, PT Consult for mobility concerns, Utilize walker, cane, or other assistive device         Medication Interventions: Patient to call before getting OOB, Teach patient to arise slowly    Elimination Interventions: Call light in reach, Stay With Me (per policy), Toileting schedule/hourly rounds, Urinal in reach

## 2020-10-03 NOTE — PROGRESS NOTES
Day #2 of Vancomycin (continued from outpatient)  Indication:  sepsis  -discharged on vanc 1g q12 through 10/6 from previous admission for Staph epi in blood  Current regimen:  vanc 1g q12h  Abx regimen:  vanc  ID Following ?: YES   Concomitant nephrotoxic drugs (requires more frequent monitoring): None  Frequency of BMP?: daily    Recent Labs     10/03/20  0406 10/02/20  0405   WBC 4.9 7.2   CREA 0.82 0.77   BUN 12 10     Est CrCl: 65-70 ml/min; UO: 0.8 ml/kg/hr  Temp (24hrs), Av.5 °F (36.9 °C), Min:98 °F (36.7 °C), Max:99.4 °F (37.4 °C)    Cultures:   10/2 blood: NG x1 day; prelim   blood: Staph epi in 4/4 bottles; pan (S) with exception of erythromycin    Goal trough = 15 - 20 mcg/mL    Recent trough history (date/time/level/dose/action taken):  10/2 random vanc trough = 14 mcg/ml    Plan: Continue current regimen. Will assess regimen with a trough level prior to AM dose tomorrow.     Dulce Amaral, [address] [facility]

## 2020-10-03 NOTE — PROGRESS NOTES
Problem: Falls - Risk of  Goal: *Absence of Falls  Description: Document Janet Katie Fall Risk and appropriate interventions in the flowsheet.   Note: Fall Risk Interventions:  Mobility Interventions: PT Consult for assist device competence         Medication Interventions: Patient to call before getting OOB    Elimination Interventions: Call light in reach

## 2020-10-03 NOTE — PROGRESS NOTES
ID Progress Note  10/3/2020    Subjective:     He feels fine    Objective:     Antibiotics:  1. Vancomycin      Vitals:   Visit Vitals  /71 (BP 1 Location: Right arm)   Pulse 92   Temp 98.8 °F (37.1 °C)   Resp 18   Ht 5' 11\" (1.803 m)   Wt 81.7 kg (180 lb 1.9 oz)   SpO2 97%   BMI 25.12 kg/m²        Tmax:  Temp (24hrs), Av.7 °F (37.1 °C), Min:98.1 °F (36.7 °C), Max:99.4 °F (37.4 °C)      Exam:  Lungs:  clear to auscultation bilaterally  Heart:  regular rate and rhythm  Abdomen:  soft, non-tender. Bowel sounds normal. No masses,  no organomegaly    Labs:      Recent Labs     10/03/20  0406 10/02/20  0405   WBC 4.9 7.2   HGB 7.4* 9.0*   * 171   BUN 12 10   CREA 0.82 0.77   * 335*   TBILI 1.4* 2.3*       Cultures:     No results found for: SDES  Lab Results   Component Value Date/Time    Culture result: NO GROWTH AFTER 19 HOURS 10/02/2020 01:22 PM    Culture result: NO GROWTH 1 DAY 10/02/2020 04:14 AM    Culture result: (A) 2020 06:51 AM     STAPHYLOCOCCUS EPIDERMIDIS GROWING IN ALL 4 BOTTLES DRAWN (SITES = L ARM AND RT ARM)       Radiology:     Line/Insert Date:           Assessment:     1. Febrile illness  2. History of line infection  3. Deconditioning    Objective:     1. Continue current therapy  2.  Follow-up cultures and studies    Familia Melton MD

## 2020-10-03 NOTE — PROGRESS NOTES
[address]  [address] [facility], [address] [place] [address]    GI PROGRESS NOTE    NAME: Chelsi Montalvo   :  1934   MRN:  [id number]       Subjective:   He reports no complaints. Tolerating diet. Drinking oral contrast presently for CT. Review of Systems    Constitutional: negative fever, negative chills, negative weight loss  Eyes:   negative visual changes  ENT:   negative sore throat, tongue or lip swelling  Respiratory:  negative cough, negative dyspnea  Cards:  negative for chest pain, palpitations, lower extremity edema  GI:   See HPI  :  negative for frequency, dysuria  Integument:  negative for rash and pruritus  Heme:  negative for easy bruising and gum/nose bleeding  Musculoskel: negative for myalgias,  back pain and muscle weakness  Neuro: negative for headaches, dizziness, vertigo  Psych:  negative for feelings of anxiety, depression         Objective:     VITALS:   Last 24hrs VS reviewed since prior progress note. Most recent are:  Visit Vitals  /74 (BP 1 Location: Right arm, BP Patient Position: Sitting)   Pulse 98   Temp 98.2 °F (36.8 °C)   Resp 17   Ht 5' 11\" (1.803 m)   Wt 81.7 kg (180 lb 1.9 oz)   SpO2 97%   BMI 25.12 kg/m²       Intake/Output Summary (Last 24 hours) at 10/3/2020 1203  Last data filed at 10/3/2020 0830  Gross per 24 hour   Intake 75 ml   Output 1450 ml   Net -1375 ml     PHYSICAL EXAM:  General: WD, WN. Alert, cooperative, no acute distress    HEENT: NC, Atraumatic. Anicteric sclerae. Abdomen: Soft, Non distended, Non tender.  +Bowel sounds, no HSM  Extremities: No c/c/e  Neurologic:  CN 2-12 gi, Alert and oriented X 3. No acute neurological distress   Psych:   Good insight. Not anxious nor agitated.     Lab Data Reviewed:   Recent Labs     10/03/20  0406 10/02/20  0405   WBC 4.9 7.2   HGB 7.4* 9.0*   HCT 23.0* 28.5*   * 171     Recent Labs     10/03/20  0406 10/02/20  0405    141   K 5.4* 3.3*   * 107   CO2 25 26   BUN 12 10 CREA 0.82 0.77   GLU 91 108*   CA 8.3* 8.8     Recent Labs     10/03/20  0406 10/02/20  0405   * 335*   TP 6.3* 6.6   ALB 2.2* 2.7*   GLOB 4.1* 3.9   LPSE  --  69*       ________________________________________________________________________       Assessment:   · Elevated LFT's - trending down. CT abdomen planned to evaluate stent position. 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 He has been on antibiotics. Repeat fever leading to current hospitalization. Afebrile here thus far  · Ascites on US - previous fluid analysis in 7/20 was negative     Plan:   · Follow up CT results  · Consider repeat diagnostic paracentesis  · Continue current diet  · Continue antibiotics  · Will follow. Discussed plan with hospitalist and nurse. Signed By: Cristal Patton.  Dax Irizarry MD     10/3/2020  12:03 PM

## 2020-10-03 NOTE — PROGRESS NOTES
Bedside shift change report given to Noris (oncoming nurse) by Irene Lyman (offgoing nurse). Report included the following information SBAR, Kardex and MAR.

## 2020-10-03 NOTE — H&P
[address]  HISTORY AND PHYSICAL    Name:  Martine Bazzi  MR#:  [id number]  :  1934  ACCOUNT #:  [de-identified]  ADMIT DATE:  10/02/2020      ADMITTING ATTENDING:  Sunny Torres MD    CHIEF COMPLAINT:  \"I had fever this morning\". HISTORY OF PRESENT ILLNESS:  This is an 14-year-old [facility] American male with past medical history of hypercholesterolemia, degenerative joint disease, BPH, who was recently discharged on 2020 when he was found to have sepsis with pancreatitis, gastric outlet obstruction. At that time, he was also found to have coagulase-negative bacteremia for which he was discharged on IV antibiotics. His antibiotics were supposed to continue until 10/06/2020. He states that he was doing fine except this morning, he had temperature of 100 degrees Fahrenheit. The patient also had some nausea and vomiting, but no diarrhea and that is why the patient came into ER for further evaluation and management. He claims that besides these, he did not have any symptoms including no cough, no chest pain, shortness of breath, headache, dizziness, no changes in bowel movements. REVIEW OF SYSTEMS:  Pertinent positive as above. Rest of the review of systems were done. They were all negative except for above. PAST MEDICAL HISTORY:  1.  .  2. BPH. 3.  Hypercholesterolemia. 4.  Recent admission with pancreatitis and bacteremia. PAST SURGICAL HISTORY:  1. Tonsillectomy. 2.  Status post IVC filter. FAMILY HISTORY:  Significant for diabetes and coronary artery disease. SOCIAL HISTORY:  Nonsmoker, nonalcoholic, non-IV drug abuser. ALLERGIES:  THE PATIENT IS ALLERGIC TO ASPIRIN, WHICH GIVES HIM RASHES. HOME MEDICATIONS:  The patient is on the following medications,  1. Colace 100 mg p.o. b.i.d.  2.  Protonix 40 mg p.o. daily. 3.  Midodrine 5 mg p.o. t.i.d.  4.  Multivitamin 1 p.o. daily. 5.  Tamsulosin 0.4 mg p.o. daily. 6.  Proscar 5 mg p.o. daily.   7. Vancomycin IV q.12 hourly. PHYSICAL EXAMINATION:  VITAL SIGNS:  At the time when the patient was seen, the patient's vitals were as follows:  Blood pressure 102/55, pulse 88, afebrile, respiratory rate 13, saturating 100% on 2 L of nasal cannula. GENERAL:  Not in acute distress. Comfortably lying in bed. HEENT;  Head:  Normocephalic, atraumatic. Eyes:  PERRLA. EOMI. Ears:  Bilateral hearing normal.  No growth. Nose:  No polyps. No bleeding. Mouth:  Dry mucous membranes. Decent oral hygiene. NECK:  Supple. No JVD. No thyromegaly. RESPIRATORY:  Clear to auscultation bilaterally. No adventitious breath sounds. CARDIOVASCULAR:  S1, S2 normal.  No murmurs, rubs, or gallops. GI:  Bowel sounds present. Soft, nontender, and nondistended. NEUROLOGICAL:  Cranial nerves II-XII intact. Motor 4/4 bilaterally in upper limbs and lower limbs. Sensory normal.  PSYCHIATRIC:  Mood and affect appropriate. Alert, awake, and oriented x3. LABORATORY AND RADIOLOGICAL RESULTS:  WBC 7.2, hemoglobin 9.0, hematocrit 28, and a platelet count of 067. Urinalysis does not suggest any signs of infection. Sodium 141, potassium 3.2, chloride 107, bicarbonate 23, BUN 10, creatinine 0.77, ALT is 119, , alkaline phosphatase 335. Blood cultures have already been taken. Chest x-ray, does not show any acute process. Ultrasound of the abdomen was done, which shows distended gallbladder with no cholelithiasis or acute cholecystitis  small ascites. ASSESSMENT AND PLAN:  1. This is an 51-year-old [facility] American male with a past medical history of hypercholesterolemia, benign prostatic hyperplasia, degenerative joint disease with a recent admission with pancreatitis and coagulase-negative bacteremia. He comes over here because of fever, tachycardia, hypotension/systemic inflammatory response syndrome, probably infectious in origin. We will admit the patient.   We will do the blood cultures and we will continue the vancomycin. In addition, I will add Zosyn for the patient and would consult the Infectious Disease regarding the same. In light of transaminitis, I would also consult Gastroenterology and we will seek further recommendations. As mentioned above, the ultrasound of the abdomen did not show cholecystitis, although the gallbladder was distended. Follow Gastroenterology and Infectious Disease. 2.  Recent admission with coagulase-negative bacteremia. We will continue vancomycin for now. 3.  Benign prostatic hyperplasia. We will continue the patient's home medications. 4.  Orthostatic hypotension. We will continue the patient on midodrine. 5.  The patient is do-not-resuscitate. I spent about 40 minutes in taking care of the patient.         Kaur Ferrari MD PG/BERNARD_MARIXA_RATNA/BC_PAWEL  D:  10/02/2020 21:16  T:  10/02/2020 23:07  JOB #:  [id number]

## 2020-10-03 NOTE — PROGRESS NOTES
[address] [facility]  Hospitalist Group                                                                                          Hospitalist Progress Note  Sharmila Sethi MD  Answering service: [phone] from in house phone        Date of Service:  10/3/2020  NAME:  Artem Tsai  :  1934  MRN:  [id number]      Admission Summary:   Per H and P \"80year-old  male with past medical history of hypercholesterolemia, degenerative joint disease, BPH, who was recently discharged on 2020 when he was found to have sepsis with pancreatitis, gastric outlet obstruction. At that time, he was also found to have coagulase-negative bacteremia for which he was discharged on IV antibiotics. His antibiotics were supposed to continue until 10/06/2020. He states that he was doing fine except this morning, he had temperature of 100 degrees Fahrenhe\"    Interval history / Subjective:   Patient seen and examined    States that he has fever at home which prompted him to come to hospital-100F  He denied any chest pain,nasuea/vomiting,abdominal pain. Assessment & Plan:     #Fever(PTA)- improved  #Recent h/o staph epidermidis bacteremia  -remained afebrile for 24 hrs now, HR wnl  -on vancomycin  -blood cultures negative so far  -CT abd -Appropriate positioning of metallic common bile duct stent.  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  -not on anticoagulation due to h/o bleeding  -has IVC filter    # chronic pancreatitis s/p stent placement-GI following  #Transaminitis: LFTs trending down    # Chronic anemia:  -Hb stable    #Orthostatic hypotension: on midodrine    Code status: DNR  DVT prophylaxis: [address] discussed with: patient,nurse  Anticipated Disposition: TBD  Anticipated Discharge:tbd Hospital Problems  Date Reviewed: 9/22/2020          Codes Class Noted POA    Febrile ICD-10-CM: R50.9  ICD-9-CM: 780.60  10/2/2020 Unknown        Ascites ICD-10-CM: R18.8  ICD-9-CM: 789.59  10/2/2020 Unknown        Sepsis ([facility] [address] ICD-10-CM: A41.9  ICD-9-CM: 038.9, 995.91  10/2/2020 Unknown                Review of Systems:   As per HPI      Vital Signs:    Last 24hrs VS reviewed since prior progress note. Most recent are:  Visit Vitals  /71 (BP 1 Location: Right arm)   Pulse 92   Temp 98.8 °F (37.1 °C)   Resp 18   Ht 5' 11\" (1.803 m)   Wt 81.7 kg (180 lb 1.9 oz)   SpO2 97%   BMI 25.12 kg/m²         Intake/Output Summary (Last 24 hours) at 10/3/2020 1817  Last data filed at 10/3/2020 1400  Gross per 24 hour   Intake 565 ml   Output 1450 ml   Net -885 ml        Physical Examination:             Constitutional:  No acute distress, cooperative, pleasant    ENT:  Oral mucosa moist, oropharynx benign. Resp:  CTA bilaterally. No wheezing/rhonchi/rales. No accessory muscle use   CV:  Regular rhythm, normal rate    GI:  Soft, non distended, non tender    Musculoskeletal:  No LE edema    Neurologic:  Moves all extremities. AAOx3     Psych:  Not anxious nor agitated. Data Review:    Review and/or order of clinical lab test  Review and/or order of tests in the radiology section of CPT  Review and/or order of tests in the medicine section of CPT      Labs:     Recent Labs     10/03/20  0406 10/02/20  0405   WBC 4.9 7.2   HGB 7.4* 9.0*   HCT 23.0* 28.5*   * 171     Recent Labs     10/03/20  0406 10/02/20  0405    141   K 5.4* 3.3*   * 107   CO2 25 26   BUN 12 10   CREA 0.82 0.77   GLU 91 108*   CA 8.3* 8.8     Recent Labs     10/03/20  0406 10/02/20  0405   ALT 87* 119*   * 335*   TBILI 1.4* 2.3*   TP 6.3* 6.6   ALB 2.2* 2.7*   GLOB 4.1* 3.9   LPSE  --  69*     No results for input(s): INR, PTP, APTT, INREXT in the last 72 hours.    No results for input(s): FE, TIBC, PSAT, FERR in the last 72 hours. No results found for: FOL, RBCF   No results for input(s): PH, PCO2, PO2 in the last 72 hours. No results for input(s): CPK, CKNDX, TROIQ in the last 72 hours.     No lab exists for component: CPKMB  Lab Results   Component Value Date/Time    Cholesterol, total 250 (H) 10/02/2013 01:55 PM    HDL Cholesterol 53 10/02/2013 01:55 PM    LDL, calculated 176 (H) 10/02/2013 01:55 PM    Triglyceride 244 (H) 07/23/2020 04:41 AM    CHOL/HDL Ratio 3.4 10/06/2010 10:25 AM     Lab Results   Component Value Date/Time    Glucose (POC) 114 (H) 09/21/2020 05:32 PM    Glucose (POC) 110 (H) 09/21/2020 02:41 PM    Glucose (POC) 149 (H) 09/15/2020 09:05 PM    Glucose (POC) 113 (H) 09/15/2020 04:51 PM    Glucose (POC) 135 (H) 09/15/2020 10:59 AM     Lab Results   Component Value Date/Time    Color DARK YELLOW 10/02/2020 04:49 AM    Appearance CLEAR 10/02/2020 04:49 AM    Specific gravity 1.017 10/02/2020 04:49 AM    pH (UA) 8.0 10/02/2020 04:49 AM    Protein TRACE (A) 10/02/2020 04:49 AM    Glucose Negative 10/02/2020 04:49 AM    Ketone TRACE (A) 10/02/2020 04:49 AM    Bilirubin Negative 09/07/2020 01:59 AM    Urobilinogen 0.2 10/02/2020 04:49 AM    Nitrites Negative 10/02/2020 04:49 AM    Leukocyte Esterase TRACE (A) 10/02/2020 04:49 AM    Epithelial cells FEW 10/02/2020 04:49 AM    Bacteria Negative 10/02/2020 04:49 AM    WBC 0-4 10/02/2020 04:49 AM    RBC 0-5 10/02/2020 04:49 AM         Medications Reviewed:     Current Facility-Administered Medications   Medication Dose Route Frequency    sodium chloride 0.9 % bolus infusion 100 mL  100 mL IntraVENous RAD ONCE    [START ON 10/4/2020] vancomycin trough level 10/4 @ 1100 prior to dose due at same time   Other ONCE    finasteride (PROSCAR) tablet 5 mg  5 mg Oral DAILY    tamsulosin (FLOMAX) capsule 0.4 mg  0.4 mg Oral DAILY    multivitamin, tx-iron-ca-min (THERA-M w/ IRON) tablet 1 Tab  1 Tab Oral DAILY    pantoprazole (PROTONIX) tablet 40 mg  40 mg Oral DAILY    midodrine (PROAMATINE) tablet 5 mg  5 mg Oral TID    sodium chloride (NS) flush 5-40 mL  5-40 mL IntraVENous Q8H    sodium chloride (NS) flush 5-40 mL  5-40 mL IntraVENous PRN    acetaminophen (TYLENOL) tablet 650 mg  650 mg Oral Q6H PRN    Or    acetaminophen (TYLENOL) suppository 650 mg  650 mg Rectal Q6H PRN    polyethylene glycol (MIRALAX) packet 17 g  17 g Oral DAILY PRN    promethazine (PHENERGAN) tablet 12.5 mg  12.5 mg Oral Q6H PRN    Or    ondansetron (ZOFRAN) injection 4 mg  4 mg IntraVENous Q6H PRN    enoxaparin (LOVENOX) injection 40 mg  40 mg SubCUTAneous DAILY    vancomycin - pharmacy to dose   Other Rx Dosing/Monitoring    vancomycin (VANCOCIN) 1,000 mg in 0.9% sodium chloride (MBP/ADV) 250 mL  1,000 mg IntraVENous Q12H     ______________________________________________________________________  EXPECTED LENGTH OF STAY: - - -  ACTUAL LENGTH OF STAY:          1                 Jax Hood MD

## 2020-10-04 LAB
ALBUMIN SERPL-MCNC: 2.5 G/DL (ref 3.5–5)
ALBUMIN/GLOB SERPL: 0.6 {RATIO} (ref 1.1–2.2)
ALP SERPL-CCNC: 386 U/L (ref 45–117)
ALT SERPL-CCNC: 95 U/L (ref 12–78)
ANION GAP SERPL CALC-SCNC: 6 MMOL/L (ref 5–15)
AST SERPL-CCNC: 160 U/L (ref 15–37)
BASOPHILS # BLD: 0 K/UL (ref 0–0.1)
BASOPHILS NFR BLD: 0 % (ref 0–1)
BILIRUB SERPL-MCNC: 1.1 MG/DL (ref 0.2–1)
BUN SERPL-MCNC: 10 MG/DL (ref 6–20)
BUN/CREAT SERPL: 13 (ref 12–20)
CALCIUM SERPL-MCNC: 8.7 MG/DL (ref 8.5–10.1)
CHLORIDE SERPL-SCNC: 107 MMOL/L (ref 97–108)
CO2 SERPL-SCNC: 29 MMOL/L (ref 21–32)
CREAT SERPL-MCNC: 0.77 MG/DL (ref 0.7–1.3)
DATE LAST DOSE: ABNORMAL
DIFFERENTIAL METHOD BLD: ABNORMAL
EOSINOPHIL # BLD: 0.1 K/UL (ref 0–0.4)
EOSINOPHIL NFR BLD: 2 % (ref 0–7)
ERYTHROCYTE [DISTWIDTH] IN BLOOD BY AUTOMATED COUNT: 13.8 % (ref 11.5–14.5)
GLOBULIN SER CALC-MCNC: 4.1 G/DL (ref 2–4)
GLUCOSE SERPL-MCNC: 128 MG/DL (ref 65–100)
HCT VFR BLD AUTO: 26.5 % (ref 36.6–50.3)
HGB BLD-MCNC: 8.5 G/DL (ref 12.1–17)
IMM GRANULOCYTES # BLD AUTO: 0 K/UL (ref 0–0.04)
IMM GRANULOCYTES NFR BLD AUTO: 1 % (ref 0–0.5)
LYMPHOCYTES # BLD: 0.8 K/UL (ref 0.8–3.5)
LYMPHOCYTES NFR BLD: 17 % (ref 12–49)
MCH RBC QN AUTO: 32.9 PG (ref 26–34)
MCHC RBC AUTO-ENTMCNC: 32.1 G/DL (ref 30–36.5)
MCV RBC AUTO: 102.7 FL (ref 80–99)
MONOCYTES # BLD: 0.5 K/UL (ref 0–1)
MONOCYTES NFR BLD: 10 % (ref 5–13)
NEUTS SEG # BLD: 3.1 K/UL (ref 1.8–8)
NEUTS SEG NFR BLD: 70 % (ref 32–75)
NRBC # BLD: 0 K/UL (ref 0–0.01)
NRBC BLD-RTO: 0 PER 100 WBC
PLATELET # BLD AUTO: 156 K/UL (ref 150–400)
PMV BLD AUTO: 10.2 FL (ref 8.9–12.9)
POTASSIUM SERPL-SCNC: 2.8 MMOL/L (ref 3.5–5.1)
PROT SERPL-MCNC: 6.6 G/DL (ref 6.4–8.2)
RBC # BLD AUTO: 2.58 M/UL (ref 4.1–5.7)
REPORTED DOSE,DOSE: ABNORMAL UNITS
REPORTED DOSE/TIME,TMG: ABNORMAL
SODIUM SERPL-SCNC: 142 MMOL/L (ref 136–145)
VANCOMYCIN TROUGH SERPL-MCNC: 16.8 UG/ML (ref 5–10)
WBC # BLD AUTO: 4.5 K/UL (ref 4.1–11.1)

## 2020-10-04 PROCEDURE — 74011250637 HC RX REV CODE- 250/637: Performed by: HOSPITALIST

## 2020-10-04 PROCEDURE — 80053 COMPREHEN METABOLIC PANEL: CPT

## 2020-10-04 PROCEDURE — 85025 COMPLETE CBC W/AUTO DIFF WBC: CPT

## 2020-10-04 PROCEDURE — 36415 COLL VENOUS BLD VENIPUNCTURE: CPT

## 2020-10-04 PROCEDURE — 74011250636 HC RX REV CODE- 250/636: Performed by: HOSPITALIST

## 2020-10-04 PROCEDURE — 80202 ASSAY OF VANCOMYCIN: CPT

## 2020-10-04 PROCEDURE — 65660000000 HC RM CCU STEPDOWN

## 2020-10-04 RX ORDER — POTASSIUM CHLORIDE 750 MG/1
40 TABLET, FILM COATED, EXTENDED RELEASE ORAL EVERY 6 HOURS
Status: COMPLETED | OUTPATIENT
Start: 2020-10-04 | End: 2020-10-04

## 2020-10-04 RX ADMIN — POTASSIUM CHLORIDE 40 MEQ: 750 TABLET, FILM COATED, EXTENDED RELEASE ORAL at 14:43

## 2020-10-04 RX ADMIN — PANTOPRAZOLE SODIUM 40 MG: 40 TABLET, DELAYED RELEASE ORAL at 08:09

## 2020-10-04 RX ADMIN — ENOXAPARIN SODIUM 40 MG: 40 INJECTION SUBCUTANEOUS at 08:08

## 2020-10-04 RX ADMIN — MIDODRINE HYDROCHLORIDE 5 MG: 5 TABLET ORAL at 08:09

## 2020-10-04 RX ADMIN — Medication 10 ML: at 06:54

## 2020-10-04 RX ADMIN — Medication 10 ML: at 14:12

## 2020-10-04 RX ADMIN — VANCOMYCIN HYDROCHLORIDE 1000 MG: 1 INJECTION, POWDER, LYOPHILIZED, FOR SOLUTION INTRAVENOUS at 12:32

## 2020-10-04 RX ADMIN — FINASTERIDE 5 MG: 5 TABLET, FILM COATED ORAL at 08:09

## 2020-10-04 RX ADMIN — VANCOMYCIN HYDROCHLORIDE 1000 MG: 1 INJECTION, POWDER, LYOPHILIZED, FOR SOLUTION INTRAVENOUS at 22:09

## 2020-10-04 RX ADMIN — MULTIPLE VITAMINS W/ MINERALS TAB 1 TABLET: TAB at 08:09

## 2020-10-04 RX ADMIN — MIDODRINE HYDROCHLORIDE 5 MG: 5 TABLET ORAL at 15:49

## 2020-10-04 RX ADMIN — MIDODRINE HYDROCHLORIDE 5 MG: 5 TABLET ORAL at 21:00

## 2020-10-04 RX ADMIN — POTASSIUM CHLORIDE 40 MEQ: 750 TABLET, FILM COATED, EXTENDED RELEASE ORAL at 20:59

## 2020-10-04 RX ADMIN — TAMSULOSIN HYDROCHLORIDE 0.4 MG: 0.4 CAPSULE ORAL at 08:09

## 2020-10-04 NOTE — PROGRESS NOTES
[address], [address]    GI PROGRESS NOTE    NAME: Simona Madden   :  1934   MRN:  [id number]       Subjective:   He reports no complaints. Tolerating diet. Had a bowel movement yesterday. CT abdomen shows stent is in proper position. Small volume ascites. Stable peripancreatic inflammation. Review of Systems    Constitutional: negative fever, negative chills, negative weight loss  Eyes:   negative visual changes  ENT:   negative sore throat, tongue or lip swelling  Respiratory:  negative cough, negative dyspnea  Cards:  negative for chest pain, palpitations, lower extremity edema  GI:   See HPI  :  negative for frequency, dysuria  Integument:  negative for rash and pruritus  Heme:  negative for easy bruising and gum/nose bleeding  Musculoskel: negative for myalgias,  back pain and muscle weakness  Neuro: negative for headaches, dizziness, vertigo  Psych:  negative for feelings of anxiety, depression         Objective:     VITALS:   Last 24hrs VS reviewed since prior progress note. Most recent are:  Visit Vitals  /68 (BP 1 Location: Right arm, BP Patient Position: At rest)   Pulse (!) 104   Temp 98.6 °F (37 °C)   Resp 18   Ht 5' 11\" (1.803 m)   Wt 83.9 kg (184 lb 15.5 oz)   SpO2 93%   BMI 25.80 kg/m²       Intake/Output Summary (Last 24 hours) at 10/4/2020 1010  Last data filed at 10/4/2020 0725  Gross per 24 hour   Intake 565 ml   Output 1100 ml   Net -535 ml     PHYSICAL EXAM:  General: WD, WN. Alert, cooperative, no acute distress    HEENT: NC, Atraumatic. Anicteric sclerae. Abdomen: Soft, Non distended, Non tender.  +Bowel sounds, no HSM  Extremities: No c/c/e  Neurologic:  CN 2-12 gi, Alert and oriented X 3. No acute neurological distress   Psych:   Good insight. Not anxious nor agitated.     Lab Data Reviewed:   Recent Labs     10/03/20  0406 10/02/20  0405   WBC 4.9 7.2   HGB 7.4* 9.0*   HCT 23.0* 28.5*   * 171     Recent Labs 10/03/20  0406 10/02/20  0405    141   K 5.4* 3.3*   * 107   CO2 25 26   BUN 12 10   CREA 0.82 0.77   GLU 91 108*   CA 8.3* 8.8     Recent Labs     10/03/20  0406 10/02/20  0405   * 335*   TP 6.3* 6.6   ALB 2.2* 2.7*   GLOB 4.1* 3.9   LPSE  --  69*       ________________________________________________________________________       Assessment:   · Elevated LFT's - awaiting results from today. Trending down as of yesterday. CT abdomen shows stent in appropriate position. GB with contrast. 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 He has been on antibiotics. Repeat fever leading to current hospitalization. Afebrile here thus far  · Ascites on US and CT- previous fluid analysis in 7/20 was negative     Plan:   · Follow up blood work  · Monitor for recurrent fever  · Continue current diet  · Continue antibiotics per ID  · Will follow. Discussed plan with hospitalist team     Signed By: Katina Anderson.  Susanna Ragsdale MD     10/4/2020  12:03 PM Normal

## 2020-10-04 NOTE — PROGRESS NOTES
Problem: General Infection Care Plan (Adult and Pediatric)  Goal: Improvement in signs and symptoms of infection  Outcome: Progressing Towards Goal     Problem: Patient Education: Go to Patient Education Activity  Goal: Patient/Family Education  Outcome: Progressing Towards Goal     Problem: Pain  Goal: *Control of Pain  Outcome: Resolved/Met     Problem: Patient Education: Go to Patient Education Activity  Goal: Patient/Family Education  Outcome: Progressing Towards Goal     Problem: Falls - Risk of  Goal: *Absence of Falls  Description: Document Che Fall Risk and appropriate interventions in the flowsheet.   Outcome: Progressing Towards Goal  Note: Fall Risk Interventions:  Mobility Interventions: Patient to call before getting OOB, PT Consult for mobility concerns, Assess mobility with egress test, Communicate number of staff needed for ambulation/transfer         Medication Interventions: Assess postural VS orthostatic hypotension, Patient to call before getting OOB, Teach patient to arise slowly    Elimination Interventions: Call light in reach, Patient to call for help with toileting needs, Stay With Me (per policy), Toileting schedule/hourly rounds

## 2020-10-04 NOTE — PROGRESS NOTES
Day #3 of Vancomycin (continued from outpatient)  Indication:  sepsis  -discharged on vanc 1g q12 through 10/6 from previous admission for Staph epi in blood  Current regimen:  vanc 1g q12h  Abx regimen:  vanc  ID Following ?: YES   Concomitant nephrotoxic drugs (requires more frequent monitoring): None  Frequency of BMP?: daily    Recent Labs     10/04/20  1041 10/03/20  0406 10/02/20  0405   WBC 4.5 4.9 7.2   CREA 0.77 0.82 0.77   BUN 10 12 10     Est CrCl: 70-75 ml/min; UO: 0.8 ml/kg/hr  Temp (24hrs), Av.9 °F (37.2 °C), Min:98.2 °F (36.8 °C), Max:99.8 °F (37.7 °C)    Cultures:   10/2 blood: NG x2 days; prelim   blood: Staph epi in 4/4 bottles; pan (S) with exception of erythromycin    Goal trough = 15 - 20 mcg/mL    Recent trough history (date/time/level/dose/action taken):  10/2 random vanc = 14 mcg/ml  10/4 vanc trough = 16.8 mcg/ml, continue vanc 1g q12h    Plan: Continue current regimen as trough level within goal trough range.      Yarely Escobar, [facility]

## 2020-10-04 NOTE — PROGRESS NOTES
Bedside shift change report given to Estrellita Galindo (oncoming nurse) by Pj Swann (offgoing nurse).  Report included the following information SBAR, Intake/Output, MAR, Med Rec Status and Cardiac Rhythm ST.

## 2020-10-04 NOTE — PROGRESS NOTES
ID Progress Note  10/4/2020    Subjective:     He feels fine    Objective:     Antibiotics:  1. Vancomycin      Vitals:   Visit Vitals  BP (!) 144/79 (BP 1 Location: Right arm, BP Patient Position: At rest)   Pulse 98   Temp 99 °F (37.2 °C)   Resp 18   Ht 5' 11\" (1.803 m)   Wt 83.9 kg (184 lb 15.5 oz)   SpO2 96%   BMI 25.80 kg/m²        Tmax:  Temp (24hrs), Av.9 °F (37.2 °C), Min:98.2 °F (36.8 °C), Max:99.8 °F (37.7 °C)      Exam:  Lungs:  clear to auscultation bilaterally  Heart:  regular rate and rhythm  Abdomen:  soft, non-tender. Bowel sounds normal. No masses,  no organomegaly    Labs:      Recent Labs     10/04/20  1041 10/03/20  0406 10/02/20  0405   WBC 4.5 4.9 7.2   HGB 8.5* 7.4* 9.0*    123* 171   BUN 10 12 10   CREA 0.77 0.82 0.77   * 270* 335*   TBILI 1.1* 1.4* 2.3*       Cultures:     No results found for: SDES  Lab Results   Component Value Date/Time    Culture result: NO GROWTH 2 DAYS 10/02/2020 01:22 PM    Culture result: NO GROWTH 2 DAYS 10/02/2020 04:14 AM    Culture result: (A) 2020 06:51 AM     STAPHYLOCOCCUS EPIDERMIDIS GROWING IN ALL 4 BOTTLES DRAWN (SITES = L ARM AND RT ARM)       Radiology:     Line/Insert Date:           Assessment:     1. Febrile illness--cultures negative to date and he is doing well  2. History of line infection  3. Deconditioning    Objective:     1. Continue current therapy--can stop vancomycin tomorrow if he is to be discharged  2.  Follow-up cultures and studies    Christophe Griffiths MD

## 2020-10-05 ENCOUNTER — PATIENT OUTREACH (OUTPATIENT)
Dept: CASE MANAGEMENT | Age: 85
End: 2020-10-05

## 2020-10-05 VITALS
BODY MASS INDEX: 25.8 KG/M2 | HEIGHT: 71 IN | TEMPERATURE: 98.3 F | OXYGEN SATURATION: 97 % | WEIGHT: 184.3 LBS | DIASTOLIC BLOOD PRESSURE: 80 MMHG | SYSTOLIC BLOOD PRESSURE: 149 MMHG | HEART RATE: 93 BPM | RESPIRATION RATE: 18 BRPM

## 2020-10-05 LAB
ALBUMIN SERPL-MCNC: 2.4 G/DL (ref 3.5–5)
ALBUMIN/GLOB SERPL: 0.6 {RATIO} (ref 1.1–2.2)
ALP SERPL-CCNC: 444 U/L (ref 45–117)
ALT SERPL-CCNC: 150 U/L (ref 12–78)
ANION GAP SERPL CALC-SCNC: 3 MMOL/L (ref 5–15)
AST SERPL-CCNC: 254 U/L (ref 15–37)
BASOPHILS # BLD: 0 K/UL (ref 0–0.1)
BASOPHILS NFR BLD: 1 % (ref 0–1)
BILIRUB SERPL-MCNC: 1 MG/DL (ref 0.2–1)
BUN SERPL-MCNC: 10 MG/DL (ref 6–20)
BUN/CREAT SERPL: 13 (ref 12–20)
CALCIUM SERPL-MCNC: 8.5 MG/DL (ref 8.5–10.1)
CHLORIDE SERPL-SCNC: 110 MMOL/L (ref 97–108)
CO2 SERPL-SCNC: 29 MMOL/L (ref 21–32)
CREAT SERPL-MCNC: 0.78 MG/DL (ref 0.7–1.3)
DIFFERENTIAL METHOD BLD: ABNORMAL
EOSINOPHIL # BLD: 0.1 K/UL (ref 0–0.4)
EOSINOPHIL NFR BLD: 3 % (ref 0–7)
ERYTHROCYTE [DISTWIDTH] IN BLOOD BY AUTOMATED COUNT: 13.6 % (ref 11.5–14.5)
GLOBULIN SER CALC-MCNC: 4.1 G/DL (ref 2–4)
GLUCOSE SERPL-MCNC: 94 MG/DL (ref 65–100)
HCT VFR BLD AUTO: 25.3 % (ref 36.6–50.3)
HGB BLD-MCNC: 8.1 G/DL (ref 12.1–17)
IMM GRANULOCYTES # BLD AUTO: 0 K/UL (ref 0–0.04)
IMM GRANULOCYTES NFR BLD AUTO: 0 % (ref 0–0.5)
LYMPHOCYTES # BLD: 1 K/UL (ref 0.8–3.5)
LYMPHOCYTES NFR BLD: 25 % (ref 12–49)
MCH RBC QN AUTO: 32.7 PG (ref 26–34)
MCHC RBC AUTO-ENTMCNC: 32 G/DL (ref 30–36.5)
MCV RBC AUTO: 102 FL (ref 80–99)
MONOCYTES # BLD: 0.4 K/UL (ref 0–1)
MONOCYTES NFR BLD: 11 % (ref 5–13)
NEUTS SEG # BLD: 2.3 K/UL (ref 1.8–8)
NEUTS SEG NFR BLD: 60 % (ref 32–75)
NRBC # BLD: 0 K/UL (ref 0–0.01)
NRBC BLD-RTO: 0 PER 100 WBC
PLATELET # BLD AUTO: 159 K/UL (ref 150–400)
PMV BLD AUTO: 10 FL (ref 8.9–12.9)
POTASSIUM SERPL-SCNC: 3.4 MMOL/L (ref 3.5–5.1)
PROT SERPL-MCNC: 6.5 G/DL (ref 6.4–8.2)
RBC # BLD AUTO: 2.48 M/UL (ref 4.1–5.7)
SODIUM SERPL-SCNC: 142 MMOL/L (ref 136–145)
WBC # BLD AUTO: 3.9 K/UL (ref 4.1–11.1)

## 2020-10-05 PROCEDURE — 80053 COMPREHEN METABOLIC PANEL: CPT

## 2020-10-05 PROCEDURE — 36415 COLL VENOUS BLD VENIPUNCTURE: CPT

## 2020-10-05 PROCEDURE — 85025 COMPLETE CBC W/AUTO DIFF WBC: CPT

## 2020-10-05 PROCEDURE — 74011250636 HC RX REV CODE- 250/636: Performed by: HOSPITALIST

## 2020-10-05 PROCEDURE — 74011250637 HC RX REV CODE- 250/637: Performed by: HOSPITALIST

## 2020-10-05 RX ADMIN — ENOXAPARIN SODIUM 40 MG: 40 INJECTION SUBCUTANEOUS at 08:39

## 2020-10-05 RX ADMIN — VANCOMYCIN HYDROCHLORIDE 1000 MG: 1 INJECTION, POWDER, LYOPHILIZED, FOR SOLUTION INTRAVENOUS at 11:03

## 2020-10-05 RX ADMIN — FINASTERIDE 5 MG: 5 TABLET, FILM COATED ORAL at 08:40

## 2020-10-05 RX ADMIN — MULTIPLE VITAMINS W/ MINERALS TAB 1 TABLET: TAB at 08:40

## 2020-10-05 RX ADMIN — TAMSULOSIN HYDROCHLORIDE 0.4 MG: 0.4 CAPSULE ORAL at 08:39

## 2020-10-05 RX ADMIN — PANTOPRAZOLE SODIUM 40 MG: 40 TABLET, DELAYED RELEASE ORAL at 08:40

## 2020-10-05 RX ADMIN — Medication 10 ML: at 13:01

## 2020-10-05 NOTE — ROUTINE PROCESS
Hospital follow-up PCP transitional care appointment has been scheduled with Dr. Geovanny Colón for Oct 6 @ 11:30AM.  Pending patient discharge.   Tomeka Evans, Care Management Specialist.

## 2020-10-05 NOTE — PROGRESS NOTES
[facility] Adult  Hospitalist Group                                                                                          Hospitalist Progress Note  Idania Campa MD  Answering service: [phone] from in house phone        Date of Service:  10/4/2020  NAME:  Yuval Cortes  :  1934  MRN:  [id number]      Admission Summary:   Per H and P \"80year-old  male with past medical history of hypercholesterolemia, degenerative joint disease, BPH, who was recently discharged on 2020 when he was found to have sepsis with pancreatitis, gastric outlet obstruction. At that time, he was also found to have coagulase-negative bacteremia for which he was discharged on IV antibiotics. His antibiotics were supposed to continue until 10/06/2020. He states that he was doing fine except this morning, he had temperature of 100 degrees Fahrenhe\"    Interval history / Subjective:   Patient seen and examined    Remains afebrile. Denied any abdominal pain,nasuea/vomiting. Assessment & Plan:     #Fever(PTA)- improved  #Recent h/o staph epidermidis bacteremia  -on vancomycin, per ID note -last dose tomorrow.  -blood cultures negative so far  -CT abd -Appropriate positioning of metallic common bile duct stent. Moderate  intrahepatic biliary ductal dilation with pneumobilia  -he remains afebrile so far.     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  -not on anticoagulation due to h/o bleeding  -has IVC filter    # chronic pancreatitis s/p stent placement-GI following  #Transaminitis: LFTs trending down, per GI - can monitor for now as LFTs trending down    # Chronic anemia:  -Hb stable    #Orthostatic hypotension: on midodrine    Code status: DNR  DVT prophylaxis: [address] discussed with: patient,nurse  Anticipated Disposition: TBD  Anticipated Discharge:tbd     Hospital Problems  Date Reviewed: 2020          Codes Class Noted POA    Febrile ICD-10-CM: R50.9  ICD-9-CM: 780.60  10/2/2020 Unknown        Ascites ICD-10-CM: R18.8  ICD-9-CM: 789.59  10/2/2020 Unknown        Sepsis ([facility] [address] ICD-10-CM: A41.9  ICD-9-CM: 038.9, 995.91  10/2/2020 Unknown                Review of Systems:   As per HPI      Vital Signs:    Last 24hrs VS reviewed since prior progress note. Most recent are:  Visit Vitals  /75 (BP 1 Location: Right arm, BP Patient Position: At rest)   Pulse 95   Temp 98.1 °F (36.7 °C)   Resp 18   Ht 5' 11\" (1.803 m)   Wt 83.9 kg (184 lb 15.5 oz)   SpO2 97%   BMI 25.80 kg/m²         Intake/Output Summary (Last 24 hours) at 10/4/2020 2000  Last data filed at 10/4/2020 0725  Gross per 24 hour   Intake 75 ml   Output 600 ml   Net -525 ml        Physical Examination:             Constitutional:  No acute distress, cooperative, pleasant    ENT:  Oral mucosa moist, oropharynx benign. Resp:  CTA bilaterally. No wheezing/rhonchi/rales. No accessory muscle use   CV:  Regular rhythm, normal rate    GI:  Soft, non distended, non tender    Musculoskeletal:  No LE edema    Neurologic:  Moves all extremities. AAOx3     Psych:  Not anxious nor agitated. Data Review:    Review and/or order of clinical lab test  Review and/or order of tests in the medicine section of [facility]      Labs:     Recent Labs     10/04/20  1041 10/03/20  0406   WBC 4.5 4.9   HGB 8.5* 7.4*   HCT 26.5* 23.0*    123*     Recent Labs     10/04/20  1041 10/03/20  0406 10/02/20  0405    139 141   K 2.8* 5.4* 3.3*    109* 107   CO2 29 25 26   BUN 10 12 10   CREA 0.77 0.82 0.77   * 91 108*   CA 8.7 8.3* 8.8     Recent Labs     10/04/20  1041 10/03/20  0406 10/02/20  0405   ALT 95* 87* 119*   * 270* 335*   TBILI 1.1* 1.4* 2.3*   TP 6.6 6.3* 6.6   ALB 2.5* 2.2* 2.7*   GLOB 4.1* 4.1* 3.9   LPSE  --   --  69*     No results for input(s): INR, PTP, APTT, INREXT, INREXT in the last 72 hours. No results for input(s): FE, TIBC, PSAT, FERR in the last 72 hours.    No results found for: FOL, RBCF   No results for input(s): PH, PCO2, PO2 in the last 72 hours. No results for input(s): CPK, CKNDX, TROIQ in the last 72 hours.     No lab exists for component: CPKMB  Lab Results   Component Value Date/Time    Cholesterol, total 250 (H) 10/02/2013 01:55 PM    HDL Cholesterol 53 10/02/2013 01:55 PM    LDL, calculated 176 (H) 10/02/2013 01:55 PM    Triglyceride 244 (H) 07/23/2020 04:41 AM    CHOL/HDL Ratio 3.4 10/06/2010 10:25 AM     Lab Results   Component Value Date/Time    Glucose (POC) 114 (H) 09/21/2020 05:32 PM    Glucose (POC) 110 (H) 09/21/2020 02:41 PM    Glucose (POC) 149 (H) 09/15/2020 09:05 PM    Glucose (POC) 113 (H) 09/15/2020 04:51 PM    Glucose (POC) 135 (H) 09/15/2020 10:59 AM     Lab Results   Component Value Date/Time    Color DARK YELLOW 10/02/2020 04:49 AM    Appearance CLEAR 10/02/2020 04:49 AM    Specific gravity 1.017 10/02/2020 04:49 AM    pH (UA) 8.0 10/02/2020 04:49 AM    Protein TRACE (A) 10/02/2020 04:49 AM    Glucose Negative 10/02/2020 04:49 AM    Ketone TRACE (A) 10/02/2020 04:49 AM    Bilirubin Negative 09/07/2020 01:59 AM    Urobilinogen 0.2 10/02/2020 04:49 AM    Nitrites Negative 10/02/2020 04:49 AM    Leukocyte Esterase TRACE (A) 10/02/2020 04:49 AM    Epithelial cells FEW 10/02/2020 04:49 AM    Bacteria Negative 10/02/2020 04:49 AM    WBC 0-4 10/02/2020 04:49 AM    RBC 0-5 10/02/2020 04:49 AM         Medications Reviewed:     Current Facility-Administered Medications   Medication Dose Route Frequency    potassium chloride SR (KLOR-CON 10) tablet 40 mEq  40 mEq Oral Q6H    finasteride (PROSCAR) tablet 5 mg  5 mg Oral DAILY    tamsulosin (FLOMAX) capsule 0.4 mg  0.4 mg Oral DAILY    multivitamin, tx-iron-ca-min (THERA-M w/ IRON) tablet 1 Tab  1 Tab Oral DAILY    pantoprazole (PROTONIX) tablet 40 mg  40 mg Oral DAILY    midodrine (PROAMATINE) tablet 5 mg  5 mg Oral TID    sodium chloride (NS) flush 5-40 mL  5-40 mL IntraVENous Q8H    sodium chloride (NS) flush 5-40 mL  5-40 mL IntraVENous PRN    acetaminophen (TYLENOL) tablet 650 mg  650 mg Oral Q6H PRN    Or    acetaminophen (TYLENOL) suppository 650 mg  650 mg Rectal Q6H PRN    polyethylene glycol (MIRALAX) packet 17 g  17 g Oral DAILY PRN    promethazine (PHENERGAN) tablet 12.5 mg  12.5 mg Oral Q6H PRN    Or    ondansetron (ZOFRAN) injection 4 mg  4 mg IntraVENous Q6H PRN    enoxaparin (LOVENOX) injection 40 mg  40 mg SubCUTAneous DAILY    vancomycin - pharmacy to dose   Other Rx Dosing/Monitoring    vancomycin (VANCOCIN) 1,000 mg in 0.9% sodium chloride (MBP/ADV) 250 mL  1,000 mg IntraVENous Q12H     ______________________________________________________________________  EXPECTED LENGTH OF STAY: - - -  ACTUAL LENGTH OF STAY:          2                 Idania Campa MD

## 2020-10-05 NOTE — PROGRESS NOTES
Problem: General Infection Care Plan (Adult and Pediatric)  Goal: Improvement in signs and symptoms of infection  Outcome: Progressing Towards Goal  Goal: *Optimize nutritional status  Outcome: Progressing Towards Goal     Problem: Pain  Goal: *PALLIATIVE CARE:  Alleviation of Pain  Outcome: Progressing Towards Goal     Problem: Patient Education: Go to Patient Education Activity  Goal: Patient/Family Education  Outcome: Progressing Towards Goal     Problem: Falls - Risk of  Goal: *Absence of Falls  Description: Document Che Fall Risk and appropriate interventions in the flowsheet.   Outcome: Progressing Towards Goal  Note: Fall Risk Interventions:  Mobility Interventions: Patient to call before getting OOB         Medication Interventions: Patient to call before getting OOB    Elimination Interventions: Call light in reach              Problem: Patient Education: Go to Patient Education Activity  Goal: Patient/Family Education  Outcome: Progressing Towards Goal

## 2020-10-05 NOTE — PROGRESS NOTES
Bedside shift change report given to [place] [id number] (oncoming nurse) by Deng Sanchez (offgoing nurse). Report included the following information SBAR, Kardex, Intake/Output, MAR and Cardiac Rhythm NSR.

## 2020-10-05 NOTE — PROGRESS NOTES
TRANSITIONS OF CARE PLAN:   1. DESTINATION: Own Home  2. TRANSPORT: Family  3. ADDITIONAL SUPPORT: Spouse and 2 adult daughters that are staying with patient  4. DME: cane, walker, wheelchair, 2x hearing aids  5. HOME HEALTH: [facility]  6. CODE STATUS/AMD STATUS: DDNR; on file  7. FOLLOW UP APPOINTMENTS: PCP, GI,   8. STILL NEEDS: Discharge    Reason for Admission:   Febrile, Ascites, Sepsis                   RUR Score:          17%           Plan for utilizing home health:      JOSE LU[facility] with [facility]    PCP: First and Last name:  Dr. Julia Marino   Name of Practice:    Are you a current patient: Yes/No: yes   Approximate date of last visit: 3 weeks   Can you participate in a virtual visit with your PCP: yes                    Current Advanced Directive/Advance Care Plan: DDNR; on file                         Transition of Care Plan:               CM met with patient, with patient alert and oriented x 4. Patient has a hx of HH currently with [facility] and rehab at [facility]. Patient is supported at home by his spouse and 2 daughters that are currently staying with patient and spouse: one lives in [place], and one lives in [place]. Disposition is for discharge to own home with transport via family and continuation of [facility] via [facility]. CM submitted referral via [employer]. Medicare pt has received, reviewed, and signed 2nd IM letter informing them of their right to appeal the discharge. Signed copy has been placed on pt bedside chart. Care Management Interventions  PCP Verified by CM: Yes(last seen by Dr. John Scherer 3 weeks ago)  Palliative Care Criteria Met (RRAT>21 & CHF Dx)?: No  Mode of Transport at Discharge:  Other (see comment)(spouse to transport)  Transition of Care Consult (CM Consult): Discharge Planning  MyChart Signup: No  Discharge Durable Medical Equipment: No(has: cane, walker, wheelchair, 2x hearing aids)  Health Maintenance Reviewed: Yes(cm met with patient, with patient alert and oriented x 4)  Physical Therapy Consult: No  Occupational Therapy Consult: No  Speech Therapy Consult: No  Current Support Network: Own Home, Lives with Spouse  Confirm Follow Up Transport: Self(independent in adls, to include driving)  [employer] Provided?: No  Discharge Location  Discharge Placement: Home with family assistance(lives with spouse in a single story home, 3 exterior steps)   CRM: Gayle Campos, MPH, [address] [place] [address]; Z: [phone]

## 2020-10-05 NOTE — PROGRESS NOTES
[address]  [address] [facility], [address]       GI PROGRESS NOTE  Will Lise Schmitt  [phone] office  [phone] NP/PA in-hospital cell phone M-F until 4:30PM  After 5PM or on weekends, please call  for physician on call      NAME: Gaby Conner   :  1934   MRN:  [id number]       Subjective:   Patient reports that he is ready to go home. No nausea, vomiting, or abdominal pain. He is tolerating a regular diet. Objective:     VITALS:   Last 24hrs VS reviewed since prior progress note. Most recent are:  Visit Vitals  /73 (BP 1 Location: Right arm, BP Patient Position: At rest)   Pulse 91   Temp 98.6 °F (37 °C)   Resp 18   Ht 5' 11\" (1.803 m)   Wt 83.6 kg (184 lb 4.9 oz)   SpO2 96%   BMI 25.71 kg/m²       PHYSICAL EXAM:  General: Cooperative, no acute distress    Neurologic:  Alert and oriented  HEENT: EOMI, no scleral icterus   Lungs:  No respiratory distress  Heart:  S1 S2  Abdomen: Soft, non-distended, no tenderness, no guarding, no rebound. +Bowel sounds. Extremities: Warm  Psych:   Not anxious or agitated    Lab Data Reviewed:     Recent Results (from the past 24 hour(s))   CBC WITH AUTOMATED DIFF    Collection Time: 10/04/20 10:41 AM   Result Value Ref Range    WBC 4.5 4.1 - 11.1 K/uL    RBC 2.58 (L) 4.10 - 5.70 M/uL    HGB 8.5 (L) 12.1 - 17.0 g/dL    HCT 26.5 (L) 36.6 - 50.3 %    .7 (H) 80.0 - 99.0 FL    MCH 32.9 26.0 - 34.0 PG    MCHC 32.1 30.0 - 36.5 g/dL    RDW 13.8 11.5 - 14.5 %    PLATELET 361 873 - 402 K/uL    MPV 10.2 8.9 - 12.9 FL    NRBC 0.0 0  WBC    ABSOLUTE NRBC 0.00 0.00 - 0.01 K/uL    NEUTROPHILS 70 32 - 75 %    LYMPHOCYTES 17 12 - 49 %    MONOCYTES 10 5 - 13 %    EOSINOPHILS 2 0 - 7 %    BASOPHILS 0 0 - 1 %    IMMATURE GRANULOCYTES 1 (H) 0.0 - 0.5 %    ABS. NEUTROPHILS 3.1 1.8 - 8.0 K/UL    ABS. LYMPHOCYTES 0.8 0.8 - 3.5 K/UL    ABS. MONOCYTES 0.5 0.0 - 1.0 K/UL    ABS. EOSINOPHILS 0.1 0.0 - 0.4 K/UL    ABS.  BASOPHILS 0.0 0.0 - 0.1 K/UL    ABS. IMM. GRANS. 0.0 0.00 - 0.04 K/UL    DF AUTOMATED     METABOLIC PANEL, COMPREHENSIVE    Collection Time: 10/04/20 10:41 AM   Result Value Ref Range    Sodium 142 136 - 145 mmol/L    Potassium 2.8 (L) 3.5 - 5.1 mmol/L    Chloride 107 97 - 108 mmol/L    CO2 29 21 - 32 mmol/L    Anion gap 6 5 - 15 mmol/L    Glucose 128 (H) 65 - 100 mg/dL    BUN 10 6 - 20 MG/DL    Creatinine 0.77 0.70 - 1.30 MG/DL    BUN/Creatinine ratio 13 12 - 20      GFR est AA >60 >60 ml/min/1.73m2    GFR est non-AA >60 >60 ml/min/1.73m2    Calcium 8.7 8.5 - 10.1 MG/DL    Bilirubin, total 1.1 (H) 0.2 - 1.0 MG/DL    ALT (SGPT) 95 (H) 12 - 78 U/L    AST (SGOT) 160 (H) 15 - 37 U/L    Alk. phosphatase 386 (H) 45 - 117 U/L    Protein, total 6.6 6.4 - 8.2 g/dL    Albumin 2.5 (L) 3.5 - 5.0 g/dL    Globulin 4.1 (H) 2.0 - 4.0 g/dL    A-G Ratio 0.6 (L) 1.1 - 2.2     VANCOMYCIN, TROUGH    Collection Time: 10/04/20 10:41 AM   Result Value Ref Range    Vancomycin,trough 16.8 (H) 5.0 - 10.0 ug/mL    Reported dose date NOT PROVIDED      Reported dose time: NOT PROVIDED      Reported dose: NOT PROVIDED UNITS   CBC WITH AUTOMATED DIFF    Collection Time: 10/05/20  3:08 AM   Result Value Ref Range    WBC 3.9 (L) 4.1 - 11.1 K/uL    RBC 2.48 (L) 4.10 - 5.70 M/uL    HGB 8.1 (L) 12.1 - 17.0 g/dL    HCT 25.3 (L) 36.6 - 50.3 %    .0 (H) 80.0 - 99.0 FL    MCH 32.7 26.0 - 34.0 PG    MCHC 32.0 30.0 - 36.5 g/dL    RDW 13.6 11.5 - 14.5 %    PLATELET 649 423 - 876 K/uL    MPV 10.0 8.9 - 12.9 FL    NRBC 0.0 0  WBC    ABSOLUTE NRBC 0.00 0.00 - 0.01 K/uL    NEUTROPHILS 60 32 - 75 %    LYMPHOCYTES 25 12 - 49 %    MONOCYTES 11 5 - 13 %    EOSINOPHILS 3 0 - 7 %    BASOPHILS 1 0 - 1 %    IMMATURE GRANULOCYTES 0 0.0 - 0.5 %    ABS. NEUTROPHILS 2.3 1.8 - 8.0 K/UL    ABS. LYMPHOCYTES 1.0 0.8 - 3.5 K/UL    ABS. MONOCYTES 0.4 0.0 - 1.0 K/UL    ABS. EOSINOPHILS 0.1 0.0 - 0.4 K/UL    ABS. BASOPHILS 0.0 0.0 - 0.1 K/UL    ABS. IMM.  GRANS. 0.0 0.00 - 0.04 K/UL    DF AUTOMATED     METABOLIC PANEL, COMPREHENSIVE    Collection Time: 10/05/20  3:08 AM   Result Value Ref Range    Sodium 142 136 - 145 mmol/L    Potassium 3.4 (L) 3.5 - 5.1 mmol/L    Chloride 110 (H) 97 - 108 mmol/L    CO2 29 21 - 32 mmol/L    Anion gap 3 (L) 5 - 15 mmol/L    Glucose 94 65 - 100 mg/dL    BUN 10 6 - 20 MG/DL    Creatinine 0.78 0.70 - 1.30 MG/DL    BUN/Creatinine ratio 13 12 - 20      GFR est AA >60 >60 ml/min/1.73m2    GFR est non-AA >60 >60 ml/min/1.73m2    Calcium 8.5 8.5 - 10.1 MG/DL    Bilirubin, total 1.0 0.2 - 1.0 MG/DL    ALT (SGPT) 150 (H) 12 - 78 U/L    AST (SGOT) 254 (H) 15 - 37 U/L    Alk. phosphatase 444 (H) 45 - 117 U/L    Protein, total 6.5 6.4 - 8.2 g/dL    Albumin 2.4 (L) 3.5 - 5.0 g/dL    Globulin 4.1 (H) 2.0 - 4.0 g/dL    A-G Ratio 0.6 (L) 1.1 - 2.2          Assessment:   · Elevated LFT's: RUQ ultrasound (10/2/20): distended gallbladder with no cholelithiasis or acute cholecystitis, wall thickening to 3 mm; common bile duct 5 mm; moderate ascites. CT abdomen/pelvis (10/3/20) shows stent in appropriate position. , , alkaline phosphatase 444, total bilirubin 1.0 (<--1.1<--1.4<--2.3). · Biliary stricture status post ERCP with biliary sphincterotomy, stent placement, and biliary tissue sampling (9/18/20).  Cytology of common bile duct aspirate: no cells diagnostic for malignancy. Cytology of common bile duct brushing: atypical, favor benign reactive process. · History of severe acute pancreatitis - clinically and radiologically improved and patient is tolerating diet  · Recent hospitalization for sepsis with bacteremia, which was attributed to line infection. Patient has been on antibiotics. Repeat fever leading to current hospitalization. Afebrile here thus far. Blood culture (10/2/20): no growth 3d.    · Ascites on US and CT- previous fluid analysis in 7/20 was negative     Patient Active Problem List   Diagnosis Code    DVT (deep vein thrombosis) in pregnancy O22.30    DVT (deep venous thrombosis) ([facility] [address] I82.409    Febrile R50.9    Ascites R18.8    Sepsis ([facility] [address] A41.9     Plan:   · Diet as tolerated  · Trend LFTs  · Monitor for recurrent fever  · Continue antibiotics per ID     Signed By: VANESSA Cid     10/5/2020  9:08 AM         GI Attending: Agree with above plan. We can continue to trend LFT's as an outpatient as he otherwise appears to be well enough to be discharged. We can arrange for outpatient follow up. Please call with any questions. Rivera Eldridge MD

## 2020-10-05 NOTE — PROGRESS NOTES
I have reviewed discharge instructions with the patient. The patient verbalized understanding.  Patient leaving via car to private residence with wife

## 2020-10-06 NOTE — DISCHARGE INSTRUCTIONS
Discharge Instructions       PATIENT ID: Johana Oliver  MRN: [id number]   YOB: 1934    DATE OF ADMISSION: 10/2/2020  3:55 AM    DATE OF DISCHARGE: 10/5/2020    PRIMARY CARE PROVIDER: Harvey Lino MD     ATTENDING PHYSICIAN: Magy Melara MD  DISCHARGING PROVIDER: Terril Cranker, MD    To contact this individual call [phone] and ask the  to page. If unavailable ask to be transferred the Adult Hospitalist Department. DISCHARGE DIAGNOSES -Fever,transaminitis    CONSULTATIONS: IP CONSULT TO GASTROENTEROLOGY  IP CONSULT TO INFECTIOUS DISEASES  IP CONSULT TO GENERAL SURGERY    PROCEDURES/SURGERIES: * No surgery found *    PENDING TEST RESULTS:   At the time of discharge the following test results are still pending: none    FOLLOW UP APPOINTMENTS:   Follow-up Information     Follow up With Specialties Details Why Contact Info    Harvey Lino MD Internal Medicine In 1 week  [address] [facility] [address] [facility]      Alexis Felix MD Gastroenterology In 1 week  [address]  [phone]             ADDITIONAL CARE RECOMMENDATIONS:   -follow up with PCP and gastroenterologist,you will need repeat CBC,CMP in 5-7 days      DIET: Regular Diet      ACTIVITY: Activity as tolerated            DISCHARGE MEDICATIONS:   See Medication Reconciliation Form    · It is important that you take the medication exactly as they are prescribed. · Keep your medication in the bottles provided by the pharmacist and keep a list of the medication names, dosages, and times to be taken in your wallet. · Do not take other medications without consulting your doctor. NOTIFY YOUR PHYSICIAN FOR ANY OF THE FOLLOWING:   Fever over 101 degrees for 24 hours. Chest pain, shortness of breath, fever, chills, nausea, vomiting, diarrhea, change in mentation, falling, weakness, bleeding.  Severe pain or pain not relieved by medications. Or, any other signs or symptoms that you may have questions about.       DISPOSITION:   X Home With:   OT  PT  HH  RN       SNF/Inpatient Rehab/LTAC    Independent/assisted living    Hospice    Other:     CDMP Checked:   Yes ***     PROBLEM LIST Updated:  Yes ***       Signed:   Samantha Comer MD  10/5/2020  12:54 PM

## 2020-10-06 NOTE — DISCHARGE SUMMARY
Discharge Summary       PATIENT ID: Guerda Alvarez  MRN: [id number]   YOB: 1934    DATE OF ADMISSION: 10/2/2020  3:55 AM    DATE OF DISCHARGE: 10/5/2020  PRIMARY CARE PROVIDER: Yobani Purcell MD     ATTENDING PHYSICIAN: Dulce Ventura MD    DISCHARGING PROVIDER: Bella Dorado MD    To contact this individual call [phone] and ask the  to page. If unavailable ask to be transferred the Adult Hospitalist Department. CONSULTATIONS: IP CONSULT TO GASTROENTEROLOGY  IP CONSULT TO INFECTIOUS DISEASES    PROCEDURES/SURGERIES: * No surgery found *    ADMITTING DIAGNOSES & HOSPITAL COURSE:     DISCHARGE DIAGNOSES / PLAN:      Per H and P \"80year-old  male with past medical history of hypercholesterolemia, degenerative joint disease, BPH, who was recently discharged on 09/20/2020 when he was found to have sepsis with pancreatitis, gastric outlet obstruction.  At that time, he was also found to have coagulase-negative bacteremia for which he was discharged on IV antibiotics.  His antibiotics were supposed to continue until 10/06/2020. [facility] states that he was doing fine except this morning, he had temperature of 100 degrees Fahrenhe\"       Fever(PTA)- resolved  #Recent h/o staph epidermidis bacteremia  #Transaminitis:  -completed course of IV vancomycin.  -blood cultures negative so far  -CT abd -Appropriate positioning of metallic common bile duct stent.  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     Heart: The heart is normal in size. No pericardial effusion.     Abdomen/Pelvis:  Liver:  No focal liver lesions.     Biliary system: Gallbladder is full of vicarious excretion of contrast material.  Appropriate positioning of metallic common bile duct stent. There is moderate  intrahepatic biliary ductal dilation with pneumobilia.     Spleen: Normal.     Pancreas: Unchanged peripancreatic fat stranding.     Kidneys/Ureters/Bladder: No renal masses. No renal or ureteral calculi. No  hydronephrosis or hydroureter. The bladder is normal.     Adrenals: Normal.     Stomach/bowel: No dilation or abnormal wall thickening is present. No free  intraperitoneal air noted.     Reproductive Organs: Unchanged enlarged prostate.     Vasculature: Normal caliber arteries. Moderate calcific atherosclerosis of the  abdominal aorta and iliac vasculature. IVC filter in place. The main portal vein  remains patent. There is unchanged chronic thrombosis of the proximal splenic  vein and SMV with numerous collaterals within the upper and midabdomen.     Nodes: No pathologically enlarged lymph nodes.     Fluid: Small volume ascites, which is increased since prior exam. Diffuse body  wall edema. Scattered mesenteric edema. No organized fluid collection.     Bones/Soft Tissue: No acute fractures or aggressive osseous lesions are seen. Multilevel degenerative disc disease most advanced at L1-L2, L2-L3, and L4-L5. Lower lumbar facet arthropathy.     IMPRESSION  IMPRESSION:     1. Appropriate positioning of metallic common bile duct stent. Moderate  intrahepatic biliary ductal dilation with pneumobilia. 2. Redemonstrated pancreatitis with stable peripancreatic inflammation. No  organized fluid collection. 3. Unchanged chronic thrombosis of the splenic vein and SMV with numerous  abdominal collaterals. 4.  Interval increase in size of small volume ascites, mesenteric edema, and  anasarca. 5. Trace bilateral pleural effusions with bilateral lower lobe subsegmental  atelectasis.               PENDING TEST RESULTS:   At the time of discharge the following test results are still pending: none    FOLLOW UP APPOINTMENTS:    Follow-up Information     Follow up With Specialties Details Why Contact Info    Mayco Ontiveros MD Internal Medicine Go on 10/6/2020 Hospital follow up appt has been scheduled for Oct 6 @ 11:30AM [address] [facility]      Nathaly Alejandra MD Gastroenterology In 1 week  [facility] [address] [facility] [address]      [address] that will see you at home [address] [facility] [place] [address] [facility] [phone]         ADDITIONAL CARE RECOMMENDATIONS:   -follow up with PCP and gastroenterologist,you will need repeat CBC,CMP in 5-7 days      DIET: Regular Diet      ACTIVITY: Activity as tolerated            DISCHARGE MEDICATIONS:  Discharge Medication List as of 10/5/2020  2:36 PM      CONTINUE these medications which have NOT CHANGED    Details   docusate sodium (COLACE) 100 mg capsule Take 100 mg by mouth two (2) times a day., Historical Med      pantoprazole (PROTONIX) 40 mg tablet Take 1 Tab by mouth daily for 30 days. , Print, Disp-30 Tab,R-0      midodrine (PROAMATINE) 5 mg tablet Take 1 Tab by mouth three (3) times daily for 15 days. , Print, Disp-45 Tab,R-0      multivitamin with iron tablet Take 1 Tab by mouth daily. , Historical Med      TAMSULOSIN HCL (TAMSULOSIN PO) Take 0.4 mg by mouth daily. , Historical Med      finasteride (PROSCAR) 5 mg tablet Take 5 mg by mouth daily. , Historical Med               NOTIFY YOUR PHYSICIAN FOR ANY OF THE FOLLOWING:   Fever over 101 degrees for 24 hours.    Chest pain, shortness of breath, fever, chills, nausea, vomiting, diarrhea, change in mentation, falling, weakness, bleeding. Severe pain or pain not relieved by medications. Or, any other signs or symptoms that you may have questions about. DISPOSITION:  x  Home With:   OT  PT  HH  RN       Long term SNF/Inpatient Rehab    Independent/assisted living    Hospice    Other:       PATIENT CONDITION AT DISCHARGE:     Functional status    Poor     Deconditioned    x Independent      Cognition    x Lucid     Forgetful     Dementia      Catheters/lines (plus indication)    Moya     PICC     PEG    x None      Code status    x Full code     DNR      PHYSICAL EXAMINATION AT DISCHARGE:                                            Constitutional:  No acute distress, cooperative, pleasant    ENT:  Oral mucosa moist, oropharynx benign. Resp:  CTA bilaterally. No wheezing/rhonchi/rales. No accessory muscle use   CV:  Regular rhythm, normal rate    GI:  Soft, non distended, non tender    Musculoskeletal:  No LE edema    Neurologic:  Moves all extremities.   AAOx3                         Psych:  Not anxious nor agitated.            CHRONIC MEDICAL DIAGNOSES:  Problem List as of 10/5/2020 Date Reviewed: 9/22/2020          Codes Class Noted - Resolved    Febrile ICD-10-CM: R50.9  ICD-9-CM: 780.60  10/2/2020 - Present        Ascites ICD-10-CM: R18.8  ICD-9-CM: 789.59  10/2/2020 - Present        Sepsis ([facility] [address] ICD-10-CM: A41.9  ICD-9-CM: 038.9, 995.91  10/2/2020 - Present        DVT (deep venous thrombosis) ([facility] [address] ICD-10-CM: I82.409  ICD-9-CM: 453.40  7/14/2020 - Present        DVT (deep vein thrombosis) in pregnancy ICD-10-CM: O22.30  ICD-9-CM: 671.30  7/13/2020 - Present        RESOLVED: Tachycardia ICD-10-CM: R00.0  ICD-9-CM: 785.0  9/7/2020 - 9/22/2020        RESOLVED: Hypotension ICD-10-CM: I95.9  ICD-9-CM: 458.9  9/7/2020 - 9/22/2020        RESOLVED: Fever ICD-10-CM: R50.9  ICD-9-CM: 780.60  9/7/2020 - 9/22/2020        RESOLVED: [facility]epsis ([facility] ICD-10-CM: A41.9  ICD-9-CM: 038.9, 995.91  9/7/2020 - 9/22/2020        RESOLVED: [facility]) ICD-10-CM: R57.9  ICD-9-CM: 785.50  9/7/2020 - 9/22/2020              20  minutes were spent with the patient on counseling and coordination of care    Signed:   Rosanna Blanco MD  10/6/2020  5:54 AM      CC; Victoria Gutierrez MD

## 2020-10-07 LAB
BACTERIA SPEC CULT: NORMAL
BACTERIA SPEC CULT: NORMAL
SERVICE CMNT-IMP: NORMAL
SERVICE CMNT-IMP: NORMAL

## 2020-10-09 NOTE — ADT AUTH CERT NOTES
Additional Vitals by Harjinder Quiroz RN  
 
   
Review Status  Review Entered In Primary  10/9/2020 08:54   
   
Criteria Review 100/55 100/52 105/60 105/54 125/68 126/72 131/74 127/74 136/80 125/69 147/78 135/80 127/68 146/75 144/79 131/75 150/83 130/73 139/73 149/80 BP Temp 100 . .. 98.1. [place] 98 (3... 98.5 . .. 99.3 . .. 98.1 . .. 99.4 . .. 98.2 . .. 98.2 . .. 98.5. [place] 99.8 . .. 99.6 . .. 98.6 . .. 98.2 . .. 99 (3... 98.1 . .. 98 (3... 99.3 . .. 98.6 . .. 98.3 . .. Temp Pulse (Heart Rate) 101 93 94 95 96 104 103 98 93 103 105 103 104 98 98 95 99 100 91 93 Pulse (Heart Rate) Resp Rate 19 21 19 18 20 20 16 17 17 16 16 18 18 18 18 18 17 19 18 18 Resp Rate O2 Sat (%) 88 100 98 95 96 96 95 97 97 95 96 96 93 100 96 97 97 94 96 97  
O2 Sat (%) O2 Device Room. .. Nasa. .. Nasa. .. Room . .. Room . .. Room . .. Room . .. Room . .. Room . .. Room. .. O2 Device O2 Flow Rate (L/min) 2 2   
   
10/2 ED vitals by Harjinder Quiroz RN  
 
   
Review Status  Review Entered In Primary  10/9/2020 08:35   
   
Criteria Review Date and Time  Temp  Temp Source  Pulse  Resp  Pulse via Oximetry  BP  MAP (Calculated)  MAP (Monitor)  SpO2  Height  Weight  O2 Device  O2 Flow Rate (L/min)  Pain Scale 1  Pain Intensity 1  N-PASS Pain Score 1  Faces (Stringer-Baker) Scale 1  FLACC Score 1  NIPS Pain Score 1  User   
10/02/20 1530  98 °F (36.7 °C)  Oral  94  19  --  105/60  --  69  98 %  --  --  Nasal cannula  2 l/min  --  --  --  --  --  --  VCG   
10/02/20 1500  --  --  93  19  --  101/59   --  69  100 %  --  --  Nasal cannula  2 l/min  --  --  --  --  --  --  VCG   
10/02/20 1430  --  --  88  24  --  100/55   --  65  98 %  --  --  --  --  --  --  --  --  --  --  VCG   
10/02/20 1400  --  --  86  23  --  102/55   --  68  97 %  --  --  --  --  --  --  --  --  --  --  VCG   
10/02/20 1330  --  --  90  21  --  99/53   --  65  100 %  --  --  --  --  --  --  --  --  --  --  VCG   
 10/02/20 1300  --  --  91  24  --  98/53   --  64   100 %  --  --  Nasal cannula  2 l/min  --  --  --  --  --  --  VCG   
10/02/20 1230  --  --  88  22  --  110/54   --  66  100 %  --  --  --  --  --  --  --  --  --  --  VCG   
10/02/20 1200  --  --  93  18  --  107/49   --  64   100 %  --  --  Nasal cannula  2 l/min  --  --  --  --  --  --  VCG   
10/02/20 1100  98.1 °F (36.7 °C)  Oral  93  21  --  100/52   --  61   100 %  --  --  Nasal cannula  2 l/min  --  --  --  --  --  --  VCG   
10/02/20 1030  --  --  102   27  --  113/56   --  68  96 %  --  --  --  --  --  --  --  --  --  --  VCG   
10/02/20 1000  --  --  89  30  --  100/49   --  61   98 %  --  --  Nasal cannula  2 l/min  --  --  --  --  --  --  VCG   
10/02/20 0930  --  --  85  13  --  100/61  --  68  100 %  --  --  --  --  --  --  --  --  --  --  VCG   
10/02/20 0915  --  --  88  13  --  102/55   --  66  100 %  --  --  --  --  --  --  --  --  --  --  VCG   
10/02/20 0900  --  --  88  13  --  99/58   --  68  100 %  --  --  --  --  --  --  --  --  --  --  VCG   
10/02/20 0845  --  --  88  16  --  109/68  --  76  100 %  --  --  --  --  --  --  --  --  --  --  VCG   
10/02/20 0830  --  --  93  14  --  104/65  --  74  100 %  --  --  --  --  --  --  --  --  --  --  VCG   
10/02/20 0815  --  --  95  13  --  98/54   --  65  99 %  --  --  --  --  --  --  --  --  --  --  VCG   
10/02/20 0800  98.1 °F (36.7 °C)  Oral  93  14  --  100/53   --  64   100 %  --  --  Nasal cannula  2 l/min  --  --  --  --  --  --  VCG   
10/02/20 0730  --  --  101   19  --  100/55   --  65  88 %   --  --  --  --  --  --  --  --  --  --  HGC   
10/02/20 0713  --  --  103   --  --  91/46   --  --  --  --  --  --  --  --  --  --  --  --  --  MND   
10/02/20 0700  --  --  106   15  --  85/48   --  56   --  --  --  --  --  --  --  --  --  --  --  HGC   
10/02/20 0600  --  --  112   15  --  103/50   --  62   92 %  --  --  --  --  --  --  --  --  --  --  HGC   
 10/02/20 0530  --  --  116   19  --  86/44   --  54   93 %  --  --  --  --  --  --  --  --  --  --  CM   
10/02/20 0431  --  --  --  --  --  --  --  --  93 %  --  --  Room air  --  --  --  --  --  --  --  HGC   
10/02/20 0430  --  --  125   29  --  112/53   --  67  94 %  --  --  --  --  --  --  --  --  --  --  HGC   
10/02/20 0403  100 °F (37.8 °C)  Oral  131   46   --  107/47   67  --  93 %  5' 11\" (1.803 m)  81.8 kg (180 lb 5.4 oz)  Room air  --  Numeric (0 - 10)  0  --  --  --  --  HGC   
10/02/20 0400  100 °F (37.8 °C)  Oral  131   43   --  107/47   67  --  93 %  --  --  Room air  --  --  --  --  --  --  --  HGC  
   
   
10/5 GI PN by Javier Gomez RN  
 
   
Review Status  Review Entered In Primary  10/9/2020 08:29   
   
Criteria Review Thurmond Merlin 
KATHARINADO:               [id number]  UZ  
  
  
Subjective:  
Patient reports that he is ready to go home.  No nausea, vomiting, or abdominal pain.  He is tolerating a regular diet.  
  
Objective:  
  
VITALS:  
Last 24hrs VS reviewed since prior progress note. Most recent are: 
Visit Vitals /73 (BP 1 Location: Right arm, BP Patient Position: At rest) Pulse 91 Temp 98.6 °F (37 °C) Resp 18 Ht 5' 11\" (1.803 m) Wt 83.6 kg (184 lb 4.9 oz) SpO2 96% BMI 25.71 kg/m²  
  
  
PHYSICAL EXAM: 
General:          Cooperative, no acute distress   
Neurologic:      Alert and oriented HEENT:           EOMI, no scleral icterus Lungs:             No respiratory distress Heart:              S1 S2 Abdomen:        Soft, non-distended, no tenderness, no guarding, no rebound. +Bowel sounds.  
Extremities:     Warm Psych:             Not anxious or agitated 
  
Lab Data Reviewed:  
  
Recent Results          
Recent Results (from the past 24 hour(s)) CBC WITH AUTOMATED DIFF  
  Collection Time: 10/04/20 10:41 AM  
Result Value Ref Range  
  WBC 4.5 4.1 - 11.1 K/uL  
  RBC 2.58 (L) 4.10 - 5.70 M/uL   HGB 8.5 (L) 12.1 - 17.0 g/dL  
  HCT 26.5 (L) 36.6 - 50.3 %  
  .7 (H) 80.0 - 99.0 FL  
  MCH 32.9 26.0 - 34.0 PG  
  MCHC 32.1 30.0 - 36.5 g/dL  
  RDW 13.8 11.5 - 14.5 %  
  PLATELET 462 819 - 305 K/uL  
  MPV 10.2 8.9 - 12.9 FL  
  NRBC 0.0 0  WBC  
  ABSOLUTE NRBC 0.00 0.00 - 0.01 K/uL  
  NEUTROPHILS 70 32 - 75 %  
  LYMPHOCYTES 17 12 - 49 %  
  MONOCYTES 10 5 - 13 %  
  EOSINOPHILS 2 0 - 7 %  
  BASOPHILS 0 0 - 1 %  
  IMMATURE GRANULOCYTES 1 (H) 0.0 - 0.5 %  
  ABS. NEUTROPHILS 3.1 1.8 - 8.0 K/UL  
  ABS. LYMPHOCYTES 0.8 0.8 - 3.5 K/UL  
  ABS. MONOCYTES 0.5 0.0 - 1.0 K/UL  
  ABS. EOSINOPHILS 0.1 0.0 - 0.4 K/UL  
  ABS. BASOPHILS 0.0 0.0 - 0.1 K/UL  
  ABS. IMM. GRANS. 0.0 0.00 - 0.04 K/UL  
  DF AUTOMATED    
METABOLIC PANEL, COMPREHENSIVE  
  Collection Time: 10/04/20 10:41 AM  
Result Value Ref Range  
  Sodium 142 136 - 145 mmol/L  
  Potassium 2.8 (L) 3.5 - 5.1 mmol/L  
  Chloride 107 97 - 108 mmol/L  
  CO2 29 21 - 32 mmol/L  
  Anion gap 6 5 - 15 mmol/L  
  Glucose 128 (H) 65 - 100 mg/dL  
  BUN 10 6 - 20 MG/DL  
  Creatinine 0.77 0.70 - 1.30 MG/DL  
  BUN/Creatinine ratio 13 12 - 20    
  GFR est AA >60 >60 ml/min/1.73m2  
  GFR est non-AA >60 >60 ml/min/1.73m2  
  Calcium 8.7 8.5 - 10.1 MG/DL  
  Bilirubin, total 1.1 (H) 0.2 - 1.0 MG/DL  
  ALT (SGPT) 95 (H) 12 - 78 U/L  
  AST (SGOT) 160 (H) 15 - 37 U/L  
  Alk. phosphatase 386 (H) 45 - 117 U/L  
  Protein, total 6.6 6.4 - 8.2 g/dL  
  Albumin 2.5 (L) 3.5 - 5.0 g/dL  
  Globulin 4.1 (H) 2.0 - 4.0 g/dL  
  A-G Ratio 0.6 (L) 1.1 - 2.2    
VANCOMYCIN, TROUGH  
  Collection Time: 10/04/20 10:41 AM  
Result Value Ref Range  
  Vancomycin,trough 16.8 (H) 5.0 - 10.0 ug/mL  
  Reported dose date NOT PROVIDED    
  Reported dose time: NOT PROVIDED    
  Reported dose: NOT PROVIDED UNITS  
CBC WITH AUTOMATED DIFF  
  Collection Time: 10/05/20  3:08 AM  
Result Value Ref Range  
  WBC 3.9 (L) 4.1 - 11.1 K/uL  
  RBC 2.48 (L) 4.10 - 5.70 M/uL   HGB 8.1 (L) 12.1 - 17.0 g/dL  
  HCT 25.3 (L) 36.6 - 50.3 %  
  .0 (H) 80.0 - 99.0 FL  
  MCH 32.7 26.0 - 34.0 PG  
  MCHC 32.0 30.0 - 36.5 g/dL  
  RDW 13.6 11.5 - 14.5 %  
  PLATELET 624 286 - 472 K/uL  
  MPV 10.0 8.9 - 12.9 FL  
  NRBC 0.0 0  WBC  
  ABSOLUTE NRBC 0.00 0.00 - 0.01 K/uL  
  NEUTROPHILS 60 32 - 75 %  
  LYMPHOCYTES 25 12 - 49 %  
  MONOCYTES 11 5 - 13 %  
  EOSINOPHILS 3 0 - 7 %  
  BASOPHILS 1 0 - 1 %  
  IMMATURE GRANULOCYTES 0 0.0 - 0.5 %  
  ABS. NEUTROPHILS 2.3 1.8 - 8.0 K/UL  
  ABS. LYMPHOCYTES 1.0 0.8 - 3.5 K/UL  
  ABS. MONOCYTES 0.4 0.0 - 1.0 K/UL  
  ABS. EOSINOPHILS 0.1 0.0 - 0.4 K/UL  
  ABS. BASOPHILS 0.0 0.0 - 0.1 K/UL  
  ABS. IMM. GRANS. 0.0 0.00 - 0.04 K/UL  
  DF AUTOMATED    
METABOLIC PANEL, COMPREHENSIVE  
  Collection Time: 10/05/20  3:08 AM  
Result Value Ref Range  
  Sodium 142 136 - 145 mmol/L  
  Potassium 3.4 (L) 3.5 - 5.1 mmol/L  
  Chloride 110 (H) 97 - 108 mmol/L  
  CO2 29 21 - 32 mmol/L  
  Anion gap 3 (L) 5 - 15 mmol/L  
  Glucose 94 65 - 100 mg/dL  
  BUN 10 6 - 20 MG/DL  
  Creatinine 0.78 0.70 - 1.30 MG/DL  
  BUN/Creatinine ratio 13 12 - 20    
  GFR est AA >60 >60 ml/min/1.73m2  
  GFR est non-AA >60 >60 ml/min/1.73m2  
  Calcium 8.5 8.5 - 10.1 MG/DL  
  Bilirubin, total 1.0 0.2 - 1.0 MG/DL  
  ALT (SGPT) 150 (H) 12 - 78 U/L  
  AST (SGOT) 254 (H) 15 - 37 U/L  
  Alk. phosphatase 444 (H) 45 - 117 U/L  
  Protein, total 6.5 6.4 - 8.2 g/dL  
  Albumin 2.4 (L) 3.5 - 5.0 g/dL  
  Globulin 4.1 (H) 2.0 - 4.0 g/dL  
  A-G Ratio 0.6 (L) 1.1 - 2.2    
  
  
             
Assessment: · Elevated LFT's: RUQ ultrasound (10/2/20): distended gallbladder with no cholelithiasis or acute cholecystitis, wall thickening to 3 mm; common bile duct 5 mm; moderate ascites. CT abdomen/pelvis (10/3/20) shows stent in appropriate position. , , alkaline phosphatase 444, total bilirubin 1.0 (<--1.1<--1.4<--2.3).   
 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  Cytology of common bile duct aspirate: no cells diagnostic for malignancy. Cytology of common bile duct brushing: atypical, favor benign reactive process. · History of severe acute pancreatitis - clinically and radiologically improved and patient is tolerating diet · Recent hospitalization for sepsis with bacteremia, which was attributed to line infection. Patient has been on antibiotics. Repeat fever leading to current hospitalization. Afebrile here thus far. Blood culture (10/2/20): no growth 3d.  
· Ascites on US and CT- previous fluid analysis in  was negative   
       
Patient Active Problem List  
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 9  
  
    
     
Plan:   · Diet as tolerated · Trend LFTs · Monitor for recurrent fever · Continue antibiotics per ID   
  
Signed By: VANESSA Man   
  10/5/2020  9:08 AM 
   
  
  
GI Attending: Agree with above plan. We can continue to trend LFT's as an outpatient as he otherwise appears to be well enough to be discharged. We can arrange for outpatient follow up. Please call with any questions. 
  
Jayant P. Corinne Ogles, MD 
  
   
   
10/4 ID PN by Joelle Wiggins RN  
 
   
Review Status  Review Entered In Primary  10/9/2020 08:27   
   
Criteria Review ID Progress Note 
10/4/2020 
  
Subjective:  
  
He feels fine 
  
Objective:  
  
Antibiotics: 1. Vancomycin                     
  
Vitals:  
Visit Vitals BP (!) 144/79 (BP 1 Location: Right arm, BP Patient Position: At rest) Pulse 98 Temp 99 °F (37.2 °C) Resp 18 Ht 5' 11\" (1.803 m) Wt 83.9 kg (184 lb 15.5 oz) SpO2 96% BMI 25.80 kg/m²  
  
  
Tmax:  Temp (24hrs), Av.9 °F (37.2 °C), Min:98.2 °F (36.8 °C), Max:99.8 °F (37.7 °C) 
  
  
Exam:  Lungs:  clear to auscultation bilaterally Heart:  regular rate and rhythm Abdomen:  soft, non-tender. Bowel sounds normal. No masses,  no organomegaly 
  
Labs:     
         
Recent Labs  
  10/04/20 
1041 10/03/20 
0406 10/02/20 
0405 WBC 4.5 4.9 7.2 HGB 8.5* 7.4* 9.0*  
 123* 171 BUN 10 12 10 CREA 0.77 0.82 0.77 * 270* 335* TBILI 1.1* 1.4* 2.3*  
  
  
Cultures:  
  
No results found for: SDES 
           
Lab Results Component Value Date/Time  
  Culture result: NO GROWTH 2 DAYS 10/02/2020 01:22 PM  
  Culture result: NO GROWTH 2 DAYS 10/02/2020 04:14 AM  
  Culture result: (A) 09/16/2020 06:51 AM  
    STAPHYLOCOCCUS EPIDERMIDIS GROWING IN ALL 4 BOTTLES DRAWN (SITES = L ARM AND RT ARM)   
  
Radiology:  
  
Line/Insert Date:        
  
Assessment:  
  
1. Febrile illness--cultures negative to date and he is doing well 2. History of line infection 3. Deconditioning 
  
Objective:  
  
1. Continue current therapy--can stop vancomycin tomorrow if he is to be discharged 2. Follow-up cultures and studies 
  
Rian Che MD 
   
   
10/4 GI PN by Joelle Wiggins RN  
 
   
Review Status  Review Entered In Primary  10/9/2020 08:26   
   
Criteria Review GI PROGRESS NOTE 
  
NAME:            Troy Solomon 
GYM:               [birth year]  BSX:               [id number]  
  
  
Subjective: He reports no complaints. Tolerating diet. Had a bowel movement yesterday. CT abdomen shows stent is in proper position. Small volume ascites. Stable peripancreatic inflammation. 
  
Review of Systems 
  
Constitutional: negative fever, negative chills, negative weight loss Eyes:               negative visual changes ENT:                LSYRMDMY sore throat, tongue or lip swelling Respiratory:   negative cough, negative dyspnea Cards:             negative for chest pain, palpitations, lower extremity edema GI:                   See HPI 
 BU:                  [id number] for frequency, dysuria Integument:   negative for rash and pruritus Heme:             negative for easy bruising and gum/nose bleeding Musculoskel: negative for myalgias,  back pain and muscle weakness Neuro: negative for headaches, dizziness, vertigo Psych:                        negative for feelings of anxiety, depression  
  
  
  
Objective:  
  
VITALS:  
Last 24hrs VS reviewed since prior progress note. Most recent are: 
Visit Vitals /68 (BP 1 Location: Right arm, BP Patient Position: At rest) Pulse (!) 104 Temp 98.6 °F (37 °C) Resp 18 Ht 5' 11\" (1.803 m) Wt 83.9 kg (184 lb 15.5 oz) SpO2 93% BMI 25.80 kg/m²  
  
  
Intake/Output Summary (Last 24 hours) at 10/4/2020 1010 Last data filed at 10/4/2020 0725 
     
Gross per 24 hour Intake 565 ml Output 1100 ml Net -535 ml  
  
PHYSICAL EXAM: 
General:          WD, WN. Alert, cooperative, no acute distress   
HEENT:           NC, Atraumatic. Anicteric sclerae. 
  
Abdomen:        Soft, Non distended, Non tender.  +Bowel sounds, no HSM Extremities:     No c/c/e Neurologic:      CN 2-12 gi, Alert and oriented X 3.  No acute neurological distress Psych:             Good insight. Not anxious nor agitated. 
  
Lab Data Reviewed:  
       
Recent Labs  
  10/03/20 
0406 10/02/20 
0405 WBC 4.9 7.2 HGB 7.4* 9.0*  
HCT 23.0* 28.5*  
* 171  
  
       
Recent Labs  
  10/03/20 
0406 10/02/20 
0405  141  
K 5.4* 3.3*  
* 107 CO2 25 26 BUN 12 10 CREA 0.82 0.77 GLU 91 108* CA 8.3* 8.8  
  
       
Recent Labs  
  10/03/20 
0406 10/02/20 
0405 * 335* TP 6.3* 6.6 ALB 2.2* 2.7*  
GLOB 4.1* 3.9 LPSE  --  69*  
  
  
________________________________________________________________________ 
  
             
Assessment: · Elevated LFT's - awaiting results from today. Trending down as of yesterday. CT abdomen shows stent in appropriate position.  GB with contrast. Of note, distended GB noted on US 
· Biliary stricture - stent placed 20 
· History of severe acute pancreatitis - clinically and radiologically improved and patient is tolerating diet · Recent hospitalization for sepsis with bacteremia, which was attributed to line infection. He has been on antibiotics. Repeat fever leading to current hospitalization. Afebrile here thus far · Ascites on US and CT- previous fluid analysis in  was negative   
    
     
Plan:   · Follow up blood work · Monitor for recurrent fever · Continue current diet · Continue antibiotics per ID · Will follow. Discussed plan with hospitalist team   
  
Signed By: Enoc Richardson. Elsie Olivo MD   
  10/4/2020  12:03 PM  
   
   
10/3 ID PN by Comfort Lagunas RN  
 
   
Review Status  Review Entered In Primary  10/9/2020 08:24   
   
Criteria Review ID Progress Note 
10/3/2020 
  
Subjective:  
  
He feels fine 
  
Objective:  
  
Antibiotics: 1. Vancomycin                     
  
Vitals:  
Visit Vitals /71 (BP 1 Location: Right arm) Pulse 92 Temp 98.8 °F (37.1 °C) Resp 18 Ht 5' 11\" (1.803 m) Wt 81.7 kg (180 lb 1.9 oz) SpO2 97% BMI 25.12 kg/m²  
  
  
Tmax:  Temp (24hrs), Av.7 °F (37.1 °C), Min:98.1 °F (36.7 °C), Max:99.4 °F (37.4 °C) 
  
  
Exam:  Lungs:  clear to auscultation bilaterally Heart:  regular rate and rhythm Abdomen:  soft, non-tender. Bowel sounds normal. No masses,  no organomegaly 
  
Labs:     
       
Recent Labs  
  10/03/20 
0406 10/02/20 
0405 WBC 4.9 7.2 HGB 7.4* 9.0*  
* 171 BUN 12 10 CREA 0.82 0.77 * 335* TBILI 1.4* 2.3*  
  
  
Cultures:  
  
No results found for: SDES 
           
Lab Results Component Value Date/Time  
  Culture result: NO GROWTH AFTER 19 HOURS 10/02/2020 01:22 PM  
  Culture result: NO GROWTH 1 DAY 10/02/2020 04:14 AM  
  Culture result: (A) 2020 06:51 AM  
     STAPHYLOCOCCUS EPIDERMIDIS GROWING IN ALL 4 BOTTLES DRAWN (SITES = L ARM AND RT ARM)   
  
Radiology:  
  
Line/Insert Date:        
  
Assessment:  
  
1. Febrile illness 2. History of line infection 3. Deconditioning 
  
Objective:  
  
1. Continue current therapy 2. Follow-up cultures and studies 
  
Christophe Griffiths MD 
  
   
   
10/3 GI PN by Morenita Grady RN  
 
   
Review Status  Review Entered In Primary  10/9/2020 08:22   
   
Criteria Review GI PROGRESS NOTE 
  
NAME:            Troy Matute 
SSU:               [birth year]  PPY:               [id number]  
  
  
Subjective: He reports no complaints. Tolerating diet. Drinking oral contrast presently for CT. 
  
Review of Systems 
  
Constitutional: negative fever, negative chills, negative weight loss Eyes:               negative visual changes ENT:                [id number] sore throat, tongue or lip swelling Respiratory:   negative cough, negative dyspnea Cards:             negative for chest pain, palpitations, lower extremity edema GI:                   See HPI 
:                  negative for frequency, dysuria Integument:   negative for rash and pruritus Heme:             negative for easy bruising and gum/nose bleeding Musculoskel: negative for myalgias,  back pain and muscle weakness Neuro: negative for headaches, dizziness, vertigo Psych:                        negative for feelings of anxiety, depression  
  
  
  
Objective:  
  
VITALS:  
Last 24hrs VS reviewed since prior progress note. Most recent are: 
Visit Vitals /74 (BP 1 Location: Right arm, BP Patient Position: Sitting) Pulse 98 Temp 98.2 °F (36.8 °C) Resp 17 Ht 5' 11\" (1.803 m) Wt 81.7 kg (180 lb 1.9 oz) SpO2 97% BMI 25.12 kg/m²  
  
  
Intake/Output Summary (Last 24 hours) at 10/3/2020 1203 Last data filed at 10/3/2020 0830 
     
Gross per 24 hour Intake 75 ml Output 1450 ml Net -1375 ml  
  
PHYSICAL EXAM: 
 General:          WD, WN. Alert, cooperative, no acute distress   
HEENT:           NC, Atraumatic. Anicteric sclerae. 
  
Abdomen:        Soft, Non distended, Non tender.  +Bowel sounds, no HSM Extremities:     No c/c/e Neurologic:      CN 2-12 gi, Alert and oriented X 3.  No acute neurological distress Psych:             Good insight. Not anxious nor agitated. 
  
Lab Data Reviewed:  
       
Recent Labs  
  10/03/20 
0406 10/02/20 
0405 WBC 4.9 7.2 HGB 7.4* 9.0*  
HCT 23.0* 28.5*  
* 171  
  
       
Recent Labs  
  10/03/20 
0406 10/02/20 
0405  141  
K 5.4* 3.3*  
* 107 CO2 25 26 BUN 12 10 CREA 0.82 0.77 GLU 91 108* CA 8.3* 8.8  
  
       
Recent Labs  
  10/03/20 
0406 10/02/20 
0405 * 335* TP 6.3* 6.6 ALB 2.2* 2.7*  
GLOB 4.1* 3.9 LPSE  --  69*  
  
  
________________________________________________________________________ 
  
             
Assessment: · Elevated LFT's - trending down. CT abdomen planned to evaluate stent position. Of note, distended GB noted on US 
· Biliary stricture - stent placed 9/18/20 
· History of severe acute pancreatitis - clinically and radiologically improved and patient is tolerating diet · Recent hospitalization for sepsis with bacteremia, which was attributed to line infection. He has been on antibiotics. Repeat fever leading to current hospitalization. Afebrile here thus far · Ascites on US - previous fluid analysis in 7/20 was negative   
    
     
Plan:   · Follow up CT results · Consider repeat diagnostic paracentesis · Continue current diet · Continue antibiotics · Will follow. Discussed plan with hospitalist and nurse.   
  
Signed By: Americo Jacobs. Remberto Bentley MD   
  10/3/2020  12:03 PM  
   
   
10/2 ID consult Note by Bridgett Kraus RN  
 
   
Review Status  Review Entered In Primary  10/9/2020 08:20   
   
Criteria Review Infectious Disease Consult 
  
Today's Date: 10/2/2020  
 Admit Date: 10/2/2020 
  
Impression: · Febrile illness · Transaminase elevation · On IV vancomycin at home for recent coag negative staph sepsis/line infection 
  
Plan: · Continue current therapy · Follow up cultures and adjust antibiotic therapy as needed 
  
Anti-infectives: · Vancomycin  
  
Subjective:  
Date of Consultation:  October 2, 2020 Referring Physician: Dr Hnery Hoang 
  
Patient is a 80 y. o. male known to me from recent hospital stay for bacteremia/line infection. He is admitted with acute onset of fever and vomiting over the last 24 hours. Cultures are pending and he is on antibiotic therapy. He states that he is feeling ok currently.   
  
       
Patient Active Problem List  
Diagnosis Code  DVT (deep vein thrombosis) in pregnancy O22.30  DVT (deep venous thrombosis) ([facility]) I82.409  Febrile R50.9  Ascites R18.8  Sepsis ([address] A41. 9  
  
       
Past Medical History:  
Diagnosis Date  Arthritis    
  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    
 DJD (degenerative joint disease)    
 Hypercholesterolemia    
 Other and unspecified hyperlipidemia    
  
         
Family History Problem Relation Age of Onset  Diabetes Sister    
 Diabetes Brother    
 Heart Disease Brother    
  
Social History  
  
         
Tobacco Use  Smoking status: Never Smoker  Smokeless tobacco: Never Used Substance Use Topics  Alcohol use: Yes  
    Comment: social  
  
         
Past Surgical History:  
Procedure Laterality Date  COLONOSCOPY N/A 8/18/2017  
  COLONOSCOPY performed by Lyle Booker MD at [facility] HX ORTHOPAEDIC Right 2010  
  rotator cuff  HX TONSILLECTOMY      
 IR IVC FILTER   7/20/2020  
     
 IR PLC IVC FILTER   7/20/2020  
  
             
Prior to Admission medications Medication Sig Start Date End Date Taking? Authorizing Provider docusate sodium (COLACE) 100 mg capsule Take 100 mg by mouth two (2) times a day.     Yes Provider, Historical  
pantoprazole (PROTONIX) 40 mg tablet Take 1 Tab by mouth daily for 30 days. 9/22/20 10/22/20 Yes Lety Bejarano MD  
midodrine (PROAMATINE) 5 mg tablet Take 1 Tab by mouth three (3) times daily for 15 days. 9/22/20 10/7/20 Yes Lety Bejarano MD  
multivitamin with iron tablet Take 1 Tab by mouth daily.     Yes Provider, Historical  
TAMSULOSIN HCL (TAMSULOSIN PO) Take 0.4 mg by mouth daily.     Yes Provider, Historical  
finasteride (PROSCAR) 5 mg tablet Take 5 mg by mouth daily.     Yes Provider, Historical  
  
  
       
Allergies Allergen Reactions  Aspirin Unknown (comments)  
  
  
Review of Systems:  A comprehensive review of systems was negative except for that written in the History of Present Illness. 
  
Objective:  
  
Visit Vitals BP (!) 105/54 (BP 1 Location: Right arm, BP Patient Position: At rest) Pulse 95 Temp 98.5 °F (36.9 °C) Resp 18 Ht 5' 11\" (1.803 m) Wt 81.8 kg (180 lb 5.4 oz) SpO2 95% BMI 25.15 kg/m²  
  
Temp (24hrs), Av.8 °F (37.1 °C), Min:98 °F (36.7 °C), Max:100 °F (37.8 °C) 
  
  
Lines:  PICC:    
  
Physical Exam:  Lungs:  clear to auscultation bilaterally Heart:  regular rate and rhythm Abdomen:  soft, non-tender. Bowel sounds normal. No masses,  no organomegaly Skin:  no rash or abnormalities 
  
Data Review:  
  
CBC: 
     
Recent Labs  
  10/02/20 
0405 WBC 7.2 GRANS 81* MONOS 9  
EOS 0 ANEU 5.9 ABL 0.7* HGB 9.0*  
HCT 28.5*  
  
  
  
BMP: 
     
Recent Labs  
  10/02/20 
0405 CREA 0.77 BUN 10   
K 3.3*  
 CO2 26 AGAP 8  
*  
  
  
LFTS: 
     
Recent Labs  
  10/02/20 
0405 TBILI 2.3* * * TP 6.6 ALB 2.7*  
  
  
Microbiology:  
  
          
               
All Micro Results   
  Procedure Component Value Units Date/Time   CULTURE, BLOOD [[id number]] Collected:  10/02/20 1322  
  Order Status:  Completed Specimen: [address] Updated:  10/02/20 1333  
  CULTURE, BLOOD, PAIRED [[id number]] Collected:  10/02/20 0414  
  Order Status:  Completed Specimen: [address] Updated:  10/02/20 0602  
  URINE CULTURE HOLD SAMPLE [[id number]] Collected:  10/02/20 0449  
  Order Status:  Completed Specimen:  Urine from Serum Updated:  10/02/20 0508  
    Urine culture hold       
    Urine on hold in Microbiology dept for 2 days.  If unpreserved urine is submitted, it cannot be used for addtional testing after 24 hours, recollection will be required.  
       
   
  
  
Imaging:  
Reviewed  
  
Signed By: Maeve Tian MD   
  2020   
   
   
10/2 GI consult Note by Love Landon RN  
 
   
Review Status  Review Entered In Primary  10/9/2020 08:19   
   
Criteria Review GASTROENTEROLOGY CONSULTATION NOTE Will Yesi Gleason, [facility] [phone] NP/PA in-hospital cell phone M-F until 4:30PM 
After 5PM or on weekends, please call  for physician on call 
     
  
NAME:            Troy Banks  
:               1934  EAV:               [id number]  
  
  
Referring Physician: Dr. Duglas Langford 
  
Consult Date: 10/2/2020 1:10 PM 
  
Chief Complaint: fever 
  
History of Present Illness:  Patient is a 80 y. o. who is seen in consultation at the request of Dr. Anson tubbs.  Patient has a history of pancreatitis, biliary stricture, and bacteremia for which he was previously admitted in 2020.  During that admission, CT abdomen/pelvis with IV contrast (20): improved peripancreatic inflammatory changes, compatible with pancreatitis; unchanged biliary dilatation with no visualized choledocholithiasis; small hiatal hernia. 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 RUQ ultrasound (9/17/20): intrahepatic and extrahepatic biliary dilatation again noted; common bile duct 14 mm.  Patient underwent ERCP with biliary sphincterotomy, stent placement, and biliary tissue sampling (9/18/20) by Dr. Maren Mullen of common bile duct aspirate: no cells diagnostic for malignancy. Cytology of common bile duct brushing: atypical, favor benign reactive process.  
  
Patient presented to the ED for fever.  He reports fever for the last 2 days (Tmax 100).  Patient reports an isolated episode of vomiting the day prior to presentation.  No nausea at this time.  No abdominal pain.  No change in bowel habits, melena, or hematochezia.  No jaundice, dark-colored urine, or pale stools. 
  
No NSAID use.  No anticoagulation prior to admission.  No alcohol or tobacco use.  No history of abdominal surgeries.  EGD (7/20/20) by Dr. Terry Soliman for coffee-ground emesis showed LA Grade D erosive esophagitis; hiatal hernia; retention of large volume of gastric fluid likely secondary to gastric outlet obstruction from extrinsic compression.  A repeat EGD was recommended in 2 months to evaluate for mucosal healing of severe erosive esophagitis. 
  
I have reviewed the emergency room note, hospital admission note, notes by all other clinicians who have seen the patient during this hospitalization to date. I have reviewed the problem list and the reason for this hospitalization. I have reviewed the allergies and the medications the patient was taking at home prior to this hospitalization. 
  
  
PMH: 
       
Past Medical History:  
Diagnosis Date  Arthritis    
  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    
 DJD (degenerative joint disease)    
 Hypercholesterolemia    
 Other and unspecified hyperlipidemia    
  
  
PSH: 
         
Past Surgical History:  
Procedure Laterality Date  COLONOSCOPY N/A 8/18/2017  
  COLONOSCOPY performed by Lyubov Murray MD at [address] HX ORTHOPAEDIC Right 2010  
  rotator cuff  HX TONSILLECTOMY      
 IR IVC FILTER   7/20/2020  
     
 IR PLC IVC FILTER   7/20/2020  
  
  
Allergies: 
       
Allergies Allergen Reactions  Aspirin Unknown (comments)  
  
  
Home Medications: 
Prior to Admission Medications Prescriptions Last Dose Informant Patient Reported? Taking? TAMSULOSIN HCL (TAMSULOSIN PO)     Yes No  
Sig: Take 0.4 mg by mouth daily. finasteride (PROSCAR) 5 mg tablet     Yes No  
Sig: Take 5 mg by mouth daily. midodrine (PROAMATINE) 5 mg tablet     No No  
Sig: Take 1 Tab by mouth three (3) times daily for 15 days. multivitamin with iron tablet     Yes No  
Sig: Take 1 Tab by mouth daily. pantoprazole (PROTONIX) 40 mg tablet     No No  
Sig: Take 1 Tab by mouth daily for 30 days. Facility-Administered Medications: None  
  
  
Hospital Medications: 
           
Current Facility-Administered Medications Medication Dose Route Frequency  finasteride (PROSCAR) tablet 5 mg  5 mg Oral DAILY  tamsulosin (FLOMAX) capsule 0.4 mg  0.4 mg Oral DAILY  multivitamin, tx-iron-ca-min (THERA-M w/ IRON) tablet 1 Tab  1 Tab Oral DAILY  pantoprazole (PROTONIX) tablet 40 mg  40 mg Oral DAILY  midodrine (PROAMATINE) tablet 5 mg  5 mg Oral TID  sodium chloride (NS) flush 5-40 mL  5-40 mL IntraVENous Q8H  
 sodium chloride (NS) flush 5-40 mL  5-40 mL IntraVENous PRN  
 acetaminophen (TYLENOL) tablet 650 mg  650 mg Oral Q6H PRN  
  Or  acetaminophen (TYLENOL) suppository 650 mg  650 mg Rectal Q6H PRN  polyethylene glycol (MIRALAX) packet 17 g  17 g Oral DAILY PRN  promethazine (PHENERGAN) tablet 12.5 mg  12.5 mg Oral Q6H PRN  
  Or  ondansetron (ZOFRAN) injection 4 mg  4 mg IntraVENous Q6H PRN  
 enoxaparin (LOVENOX) injection 40 mg  40 mg SubCUTAneous DAILY  vancomycin - pharmacy to dose   Other Rx Dosing/Monitoring  vancomycin (VANCOCIN) 1,000 mg in 0.9% sodium chloride (MBP/ADV) 250 mL  1,000 mg IntraVENous Q12H  
  
       
Current Outpatient Medications Medication Sig  pantoprazole (PROTONIX) 40 mg tablet Take 1 Tab by mouth daily for 30 days.  midodrine (PROAMATINE) 5 mg tablet Take 1 Tab by mouth three (3) times daily for 15 days.  multivitamin with iron tablet Take 1 Tab by mouth daily.  TAMSULOSIN HCL (TAMSULOSIN PO) Take 0.4 mg by mouth daily.  finasteride (PROSCAR) 5 mg tablet Take 5 mg by mouth daily.  
  
  
Social History: 
Social History  
  
         
Tobacco Use  Smoking status: Never Smoker  Smokeless tobacco: Never Used Substance Use Topics  Alcohol use: Yes  
    Comment: social  
  
  
Family History: 
         
Family History Problem Relation Age of Onset  Diabetes Sister    
 Diabetes Brother    
 Heart Disease Brother    
  
  
Review of Systems: 
Constitutional: + fever, + chills, negative weight loss Eyes:               negative visual changes ENT:                CUVWEHUQ sore throat, tongue or lip swelling Respiratory:   + cough, negative dyspnea Cards:             negative for chest pain, palpitations, lower extremity edema GI:                   See HPI 
:                  negative for frequency, dysuria Integument:   negative for rash and pruritus Heme:             negative for easy bruising and gum/nose bleeding Musculoskeletal:negative for myalgias, back pain and muscle weakness Neuro:             negative for headaches, dizziness Psych:            negative for feelings of anxiety, depression  
  
Objective:  
  
Patient Vitals for the past 8 hrs: 
  BP Temp Pulse Resp SpO2  
10/02/20 1230 (!) 110/54  88 22 100 % 10/02/20 1200 (!) 107/49  93 18 100 % 10/02/20 1100 (!) 100/52 98.1 °F (36.7 °C) 93 21 100 % 10/02/20 1030 (!) 113/56  (!) 102 27 96 % 10/02/20 1000 (!) 100/49  89 30 98 % 10/02/20 0930 100/61  85 13 100 % 10/02/20 0915 (!) 102/55  88 13 100 % 10/02/20 0900 (!) 99/58  88 13 100 % 10/02/20 0845 109/68  88 16 100 % 10/02/20 0830 104/65  93 14 100 % 10/02/20 0815 (!) 98/54  95 13 99 % 10/02/20 0800 (!) 100/53 98.1 °F (36.7 °C) 93 14 100 % 10/02/20 0730 (!) 100/55  (!) 101 19 (!) 88 % 10/02/20 0713 (!) 91/46  (!) 103    
10/02/20 0700 (!) 85/48  (!) 106 15   
10/02/20 0600 (!) 103/50  (!) 112 15 92 % 10/02/20 0530 (!) 86/44  (!) 116 19 93 %  
  
10/02 0701 - 10/02 1900 In: -  
Out: 500 [Urine:500] 09/30 1901 - 10/02 0700 In: 1100 [I.V.:1100] Out: -  
  
EXAM:   
            CONST:          Pleasant male lying in bed, no acute distress             YYFJB:          Alert and oriented x 3             HEENT:           EOMI, no scleral icterus             LUNGS:           No respiratory distress             CARD:             S1 S2 
            ABD:                Soft, non distended, no tenderness, no rebound, no guarding. + Bowel sounds.  
            EXT:                Warm 
            PSYCH:           Full, not anxious or agitated 
             
Data Review  
  
     
Recent Labs  
  10/02/20 
0405 WBC 7.2 HGB 9.0*  
HCT 28.5*  
  
  
     
Recent Labs  
  10/02/20 
0405   
K 3.3*  
 CO2 26 BUN 10  
CREA 0.77 * CA 8.8  
  
     
Recent Labs  
  10/02/20 
0405 * TP 6.6 ALB 2.7*  
GLOB 3.9 LPSE 69*  
  
No results for input(s): INR, PTP, APTT, INREXT in the last 72 hours. 
  
CT abdomen/pelvis with IV contrast (9/7/20): improved peripancreatic inflammatory changes, compatible with pancreatitis; unchanged biliary dilatation with no visualized choledocholithiasis; small hiatal hernia. 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 RUQ ultrasound (9/17/20): intrahepatic and extrahepatic biliary dilatation again noted; common bile duct 14 mm.  
             
Assessment: · Elevated LFTs: WBC 7.2, Hgb 9.0, platelets 210, , , alkaline phosphatase 335, total bilirubin 2.3, lipase normal, lactic acid normal.  
· Biliary stricture status post ERCP with biliary sphincterotomy, stent placement, and biliary tissue sampling (9/18/20).  Cytology of common bile duct aspirate: no cells diagnostic for malignancy. Cytology of common bile duct brushing: atypical, favor benign reactive process. RUQ ultrasound (10/2/20): distended gallbladder with no cholelithiasis or acute cholecystitis, wall thickening to 3 mm; common bile duct 5 mm; moderate ascites. · History of pancreatitis: CT abdomen/pelvis on 9/7 and 9/16 as above.  CA 19-9 normal in 7/2020. · Sepsis: blood culture pending.   
  
       
Patient Active Problem List  
Diagnosis Code  DVT (deep vein thrombosis) in pregnancy O22.30  DVT (deep venous thrombosis) (HCC) I82.409  Febrile R50.9  Ascites R18.8  Sepsis ([address] A41. 9  
  
    
     
Plan:    
  
· On antibiotics · Trend LFTs · Repeat CA 19-9 · Will obtain CT abdomen/pelvis with contrast to evaluate CBD stent · Consult surgery, Dr. Bruce Williamson · ID consulted · Patient was discussed with and will be seen by Dr. Ro Irby · Thank you for allowing me to participate in care of Troy Mena   
  
Signed By: VANESSA Galvan   
  10/2/2020  1:10 PM

## 2020-12-11 ENCOUNTER — TRANSCRIBE ORDER (OUTPATIENT)
Dept: SCHEDULING | Age: 85
End: 2020-12-11

## 2020-12-11 DIAGNOSIS — K22.10 EROSIVE ESOPHAGITIS: Primary | ICD-10-CM

## 2020-12-11 DIAGNOSIS — K85.90 PANCREATITIS: ICD-10-CM

## 2020-12-11 DIAGNOSIS — K83.1 BILIARY STRICTURE: ICD-10-CM

## 2021-03-24 RX ORDER — PANTOPRAZOLE SODIUM 40 MG/1
40 TABLET, DELAYED RELEASE ORAL DAILY
COMMUNITY

## 2021-03-27 ENCOUNTER — HOSPITAL ENCOUNTER (OUTPATIENT)
Dept: PREADMISSION TESTING | Age: 86
Discharge: HOME | End: 2021-03-27
Payer: MEDICARE

## 2021-03-27 ENCOUNTER — TRANSCRIBE ORDER (OUTPATIENT)
Dept: REGISTRATION | Age: 86
End: 2021-03-27

## 2021-03-27 DIAGNOSIS — Z01.812 PRE-PROCEDURE LAB EXAM: ICD-10-CM

## 2021-03-27 DIAGNOSIS — Z01.812 PRE-PROCEDURE LAB EXAM: Primary | ICD-10-CM

## 2021-03-27 PROCEDURE — U0003 INFECTIOUS AGENT DETECTION BY NUCLEIC ACID (DNA OR RNA); SEVERE ACUTE RESPIRATORY SYNDROME CORONAVIRUS 2 (SARS-COV-2) (CORONAVIRUS DISEASE [COVID-19]), AMPLIFIED PROBE TECHNIQUE, MAKING USE OF HIGH THROUGHPUT TECHNOLOGIES AS DESCRIBED BY CMS-2020-01-R: HCPCS

## 2021-03-28 LAB — SARS-COV-2, COV2NT: NOT DETECTED

## 2021-03-31 ENCOUNTER — HOSPITAL ENCOUNTER (OUTPATIENT)
Age: 86
Setting detail: OUTPATIENT SURGERY
Discharge: HOME | End: 2021-03-31
Attending: INTERNAL MEDICINE | Admitting: INTERNAL MEDICINE
Payer: MEDICARE

## 2021-03-31 ENCOUNTER — ANESTHESIA EVENT (OUTPATIENT)
Dept: ENDOSCOPY | Age: 86
End: 2021-03-31
Payer: MEDICARE

## 2021-03-31 ENCOUNTER — ANESTHESIA (OUTPATIENT)
Dept: ENDOSCOPY | Age: 86
End: 2021-03-31
Payer: MEDICARE

## 2021-03-31 ENCOUNTER — APPOINTMENT (OUTPATIENT)
Dept: GENERAL RADIOLOGY | Age: 86
End: 2021-03-31
Attending: INTERNAL MEDICINE
Payer: MEDICARE

## 2021-03-31 VITALS
WEIGHT: 182 LBS | RESPIRATION RATE: 25 BRPM | DIASTOLIC BLOOD PRESSURE: 84 MMHG | BODY MASS INDEX: 25.48 KG/M2 | OXYGEN SATURATION: 97 % | HEIGHT: 71 IN | HEART RATE: 83 BPM | SYSTOLIC BLOOD PRESSURE: 134 MMHG | TEMPERATURE: 98.2 F

## 2021-03-31 PROCEDURE — 76040000007: Performed by: INTERNAL MEDICINE

## 2021-03-31 PROCEDURE — 74011250636 HC RX REV CODE- 250/636: Performed by: INTERNAL MEDICINE

## 2021-03-31 PROCEDURE — 74011000250 HC RX REV CODE- 250: Performed by: NURSE ANESTHETIST, CERTIFIED REGISTERED

## 2021-03-31 PROCEDURE — 77030007288 HC DEV LOK BILI BSC -A: Performed by: INTERNAL MEDICINE

## 2021-03-31 PROCEDURE — 77030040361 HC SLV COMPR DVT MDII -B: Performed by: INTERNAL MEDICINE

## 2021-03-31 PROCEDURE — 76060000032 HC ANESTHESIA 0.5 TO 1 HR: Performed by: INTERNAL MEDICINE

## 2021-03-31 PROCEDURE — 74011250637 HC RX REV CODE- 250/637: Performed by: INTERNAL MEDICINE

## 2021-03-31 PROCEDURE — 74011000636 HC RX REV CODE- 636: Performed by: INTERNAL MEDICINE

## 2021-03-31 PROCEDURE — 77030041276 HC SPHNTOM BILI BSC -F: Performed by: INTERNAL MEDICINE

## 2021-03-31 PROCEDURE — 77030008684 HC TU ET CUF COVD -B: Performed by: ANESTHESIOLOGY

## 2021-03-31 PROCEDURE — 77030026438 HC STYL ET INTUB CARD -A: Performed by: ANESTHESIOLOGY

## 2021-03-31 PROCEDURE — 74011250636 HC RX REV CODE- 250/636: Performed by: NURSE ANESTHETIST, CERTIFIED REGISTERED

## 2021-03-31 PROCEDURE — 77030009038 HC CATH BILI STN RTVR BSC -C: Performed by: INTERNAL MEDICINE

## 2021-03-31 PROCEDURE — 77030041758 HC FCPS RSC GRSP BSC -B: Performed by: INTERNAL MEDICINE

## 2021-03-31 RX ORDER — NALOXONE HYDROCHLORIDE 0.4 MG/ML
0.4 INJECTION, SOLUTION INTRAMUSCULAR; INTRAVENOUS; SUBCUTANEOUS
Status: DISCONTINUED | OUTPATIENT
Start: 2021-03-31 | End: 2021-03-31 | Stop reason: HOSPADM

## 2021-03-31 RX ORDER — MIDAZOLAM HYDROCHLORIDE 1 MG/ML
.25-5 INJECTION, SOLUTION INTRAMUSCULAR; INTRAVENOUS
Status: DISCONTINUED | OUTPATIENT
Start: 2021-03-31 | End: 2021-03-31 | Stop reason: HOSPADM

## 2021-03-31 RX ORDER — EPINEPHRINE 0.1 MG/ML
1 INJECTION INTRACARDIAC; INTRAVENOUS ONCE
Status: DISCONTINUED | OUTPATIENT
Start: 2021-03-31 | End: 2021-03-31 | Stop reason: HOSPADM

## 2021-03-31 RX ORDER — FLUMAZENIL 0.1 MG/ML
0.2 INJECTION INTRAVENOUS
Status: DISCONTINUED | OUTPATIENT
Start: 2021-03-31 | End: 2021-03-31 | Stop reason: HOSPADM

## 2021-03-31 RX ORDER — PROPOFOL 10 MG/ML
INJECTION, EMULSION INTRAVENOUS AS NEEDED
Status: DISCONTINUED | OUTPATIENT
Start: 2021-03-31 | End: 2021-03-31 | Stop reason: HOSPADM

## 2021-03-31 RX ORDER — ONDANSETRON 2 MG/ML
INJECTION INTRAMUSCULAR; INTRAVENOUS AS NEEDED
Status: DISCONTINUED | OUTPATIENT
Start: 2021-03-31 | End: 2021-03-31 | Stop reason: HOSPADM

## 2021-03-31 RX ORDER — MIDODRINE HYDROCHLORIDE 2.5 MG/1
5 TABLET ORAL
COMMUNITY

## 2021-03-31 RX ORDER — FENTANYL CITRATE 50 UG/ML
25-200 INJECTION, SOLUTION INTRAMUSCULAR; INTRAVENOUS
Status: DISCONTINUED | OUTPATIENT
Start: 2021-03-31 | End: 2021-03-31 | Stop reason: HOSPADM

## 2021-03-31 RX ORDER — SUCCINYLCHOLINE CHLORIDE 20 MG/ML
INJECTION INTRAMUSCULAR; INTRAVENOUS AS NEEDED
Status: DISCONTINUED | OUTPATIENT
Start: 2021-03-31 | End: 2021-03-31 | Stop reason: HOSPADM

## 2021-03-31 RX ORDER — LIDOCAINE HYDROCHLORIDE 20 MG/ML
INJECTION, SOLUTION EPIDURAL; INFILTRATION; INTRACAUDAL; PERINEURAL AS NEEDED
Status: DISCONTINUED | OUTPATIENT
Start: 2021-03-31 | End: 2021-03-31 | Stop reason: HOSPADM

## 2021-03-31 RX ORDER — LEVOFLOXACIN 5 MG/ML
500 INJECTION, SOLUTION INTRAVENOUS ONCE
Status: COMPLETED | OUTPATIENT
Start: 2021-03-31 | End: 2021-03-31

## 2021-03-31 RX ORDER — DEXTROMETHORPHAN/PSEUDOEPHED 2.5-7.5/.8
1.2 DROPS ORAL
Status: DISCONTINUED | OUTPATIENT
Start: 2021-03-31 | End: 2021-03-31 | Stop reason: HOSPADM

## 2021-03-31 RX ORDER — SODIUM CHLORIDE 0.9 % (FLUSH) 0.9 %
5-40 SYRINGE (ML) INJECTION AS NEEDED
Status: DISCONTINUED | OUTPATIENT
Start: 2021-03-31 | End: 2021-03-31 | Stop reason: HOSPADM

## 2021-03-31 RX ORDER — SODIUM CHLORIDE, SODIUM LACTATE, POTASSIUM CHLORIDE, CALCIUM CHLORIDE 600; 310; 30; 20 MG/100ML; MG/100ML; MG/100ML; MG/100ML
INJECTION, SOLUTION INTRAVENOUS
Status: DISCONTINUED | OUTPATIENT
Start: 2021-03-31 | End: 2021-03-31 | Stop reason: HOSPADM

## 2021-03-31 RX ORDER — ROCURONIUM BROMIDE 10 MG/ML
INJECTION, SOLUTION INTRAVENOUS AS NEEDED
Status: DISCONTINUED | OUTPATIENT
Start: 2021-03-31 | End: 2021-03-31 | Stop reason: HOSPADM

## 2021-03-31 RX ORDER — ATROPINE SULFATE 0.1 MG/ML
1 INJECTION INTRAVENOUS ONCE
Status: DISCONTINUED | OUTPATIENT
Start: 2021-03-31 | End: 2021-03-31 | Stop reason: HOSPADM

## 2021-03-31 RX ORDER — ATROPINE SULFATE 0.1 MG/ML
0.5 INJECTION INTRAVENOUS
Status: DISCONTINUED | OUTPATIENT
Start: 2021-03-31 | End: 2021-03-31 | Stop reason: HOSPADM

## 2021-03-31 RX ORDER — SODIUM CHLORIDE, SODIUM LACTATE, POTASSIUM CHLORIDE, CALCIUM CHLORIDE 600; 310; 30; 20 MG/100ML; MG/100ML; MG/100ML; MG/100ML
25 INJECTION, SOLUTION INTRAVENOUS CONTINUOUS
Status: DISCONTINUED | OUTPATIENT
Start: 2021-03-31 | End: 2021-03-31 | Stop reason: HOSPADM

## 2021-03-31 RX ORDER — SODIUM CHLORIDE 0.9 % (FLUSH) 0.9 %
5-40 SYRINGE (ML) INJECTION EVERY 8 HOURS
Status: DISCONTINUED | OUTPATIENT
Start: 2021-03-31 | End: 2021-03-31 | Stop reason: HOSPADM

## 2021-03-31 RX ORDER — EPINEPHRINE 0.1 MG/ML
1 INJECTION INTRACARDIAC; INTRAVENOUS
Status: DISCONTINUED | OUTPATIENT
Start: 2021-03-31 | End: 2021-03-31 | Stop reason: HOSPADM

## 2021-03-31 RX ADMIN — PHENYLEPHRINE HYDROCHLORIDE 20 MCG/MIN: 10 INJECTION INTRAVENOUS at 14:37

## 2021-03-31 RX ADMIN — LIDOCAINE HYDROCHLORIDE 60 MG: 20 INJECTION, SOLUTION EPIDURAL; INFILTRATION; INTRACAUDAL; PERINEURAL at 14:12

## 2021-03-31 RX ADMIN — ONDANSETRON HYDROCHLORIDE 4 MG: 2 INJECTION, SOLUTION INTRAMUSCULAR; INTRAVENOUS at 14:48

## 2021-03-31 RX ADMIN — LEVOFLOXACIN 500 MG: 5 INJECTION, SOLUTION INTRAVENOUS at 14:51

## 2021-03-31 RX ADMIN — SUCCINYLCHOLINE CHLORIDE 140 MG: 20 INJECTION, SOLUTION INTRAMUSCULAR; INTRAVENOUS at 14:12

## 2021-03-31 RX ADMIN — PROPOFOL 120 MG: 10 INJECTION, EMULSION INTRAVENOUS at 14:12

## 2021-03-31 RX ADMIN — SODIUM CHLORIDE, POTASSIUM CHLORIDE, SODIUM LACTATE AND CALCIUM CHLORIDE: 600; 310; 30; 20 INJECTION, SOLUTION INTRAVENOUS at 14:20

## 2021-03-31 RX ADMIN — ROCURONIUM BROMIDE 5 MG: 10 SOLUTION INTRAVENOUS at 14:12

## 2021-03-31 NOTE — ANESTHESIA PREPROCEDURE EVALUATION
Relevant Problems   No relevant active problems       Anesthetic History   No history of anesthetic complications            Review of Systems / Medical History  Patient summary reviewed, nursing notes reviewed and pertinent labs reviewed    Pulmonary  Within defined limits                 Neuro/Psych   Within defined limits           Cardiovascular  Within defined limits                Exercise tolerance: >4 METS     GI/Hepatic/Renal  Within defined limits              Endo/Other        Arthritis     Other Findings              Physical Exam    Airway  Mallampati: II  TM Distance: > 6 cm  Neck ROM: normal range of motion   Mouth opening: Normal     Cardiovascular  Regular rate and rhythm,  S1 and S2 normal,  no murmur, click, rub, or gallop             Dental  No notable dental hx       Pulmonary  Breath sounds clear to auscultation               Abdominal  GI exam deferred       Other Findings            Anesthetic Plan    ASA: 2  Anesthesia type: general          Induction: Intravenous  Anesthetic plan and risks discussed with: Patient

## 2021-03-31 NOTE — PROCEDURES
[address] [place] [address]       NAME:  John Paul Anthony   :   1934   MRN:   [id number]       Procedure Type:   ERCP with biliary sludge removal     Indications: biliary stricture  Pre-operative Diagnosis: see indication above  Post-operative Diagnosis:  See findings below  : Natalie Weldon. Luis Copeland MD    Referring Provider: Reanna Menchaca MD      Sedation:  General anesthesia    Procedure Details:  After informed consent was obtained with all risks and benefits of procedure explained, the patient was taken to the fluoroscopy suite and placed in the prone position. Upon sequential sedation as per above, the Olympus duodenoscope [id number]   was inserted via the mouthpeice and carefully advanced to the second portion of the duodenum. The quality of visualization was good. The duodenoscope was withdrawn into a short position. Findings:   Esophagus:normal  Stomach: normal   Duodenum; normal to second portion. Previously placed fully covered metal stent was not seen transpapillary    ERCP: A  film was taken. IVC filter was seen in position. The previously placed fully covered metal stent was not seen. This had presumably passed in the patient's stools. The ampulla was consistent with prior biliary sphincterotomy. Using a [employer] LR16 Jagtome preloaded with a 0.025 inch Jagwire, the bile duct was cannulated with the guidewire. The Josiephine Plate was then advanced over the guidewire. Bile was aspirated to confirm proper positioning. Limited contrast was injected under fluoroscopic visualization. The bile duct was not dilated at the level of the common bile duct. The cystic duct filled with contrast. The proximal common hepatic duct was relatively dilated. Next, a 9 mm to 12 mm biliary extraction balloon was used to sweep the bile duct. Minimal sludge was expelled.  A 9 mm sized biliary extraction balloon was used to sweep the bile duct multiple times. An occlusion cholangiogram was performed and was negative for any retained filling defects. There was relative narrowing in the mid-common bile duct; however, there was adequate drainage of contrast and bile. A stent was not replaced. Limited contrast entered the gallbladder and for this reason levaquin 500 mg IV was administered. The pancreatic duct was neither entered or injected during this procedure. I performed all immediate radiologic interpretation during this procedure. Specimen Removed: none    Complications: None. EBL:  None. Interventions:    Pancreatic: see above  Biliary: see above    Impression:   1. Spontaneous biliary stent migration out of the bile duct  2. Improvement in appearance of biliary stricture - stent not replaced today as patient has clinically improved from his episode of severe acute pancreatitis and given his negative work up to date, his biliary stricture is favored to be benign  3. Extraction of minimal biliary sludge    Recommendations:      1. Watch for complications, including cholangitis, pancreatitis, bleeding, and perforation. 2. IV LR in endoscopy recovery  3. PRN pain medication and anti-emetics  4. Full liquid diet  5. If patient has progressive abdominal pain and/or change in abdominal exam, please check amylase, lipase, CBC, CMP, and upright KUB. 6. Follow up with referring physicians          Discharge Disposition:  home following recovery in Endoscopy    Signed By: Natalie Weldon.  Luis Copeland MD     3/31/2021  2:50 PM

## 2021-03-31 NOTE — ANESTHESIA POSTPROCEDURE EVALUATION
Post-Anesthesia Evaluation and Assessment    Patient: Jason Escamilla MRN: [id number]  SSN: [id number]    YOB: 1934  Age: 80 y.o. Sex: male      I have evaluated the patient and they are stable and ready for discharge from the PACU. Cardiovascular Function/Vital Signs  Visit Vitals  /85   Pulse 82   Temp 36.8 °C (98.2 °F)   Resp 21   Ht 5' 11\" (1.803 m)   Wt 82.6 kg (182 lb)   SpO2 100%   BMI 25.38 kg/m²       Patient is status post General anesthesia for Procedure(s):  ENDOSCOPIC RETROGRADE CHOLANGIOPANCREATOGRAPHY (ERCP)   :-  ENDOSCOPIC STONE EXTRACTION/BALLOON SWEEP. Nausea/Vomiting: None    Postoperative hydration reviewed and adequate. Pain:  Pain Scale 1: Numeric (0 - 10) (03/31/21 1458)  Pain Intensity 1: 0 (03/31/21 1458)   Managed    Neurological Status: At baseline    Mental Status, Level of Consciousness: Alert and  oriented to person, place, and time    Pulmonary Status:   O2 Device: Room air (03/31/21 1451)   Adequate oxygenation and airway patent    Complications related to anesthesia: None    Post-anesthesia assessment completed. No concerns    Signed By: Reji Johnson MD     March 31, 2021              Procedure(s):  ENDOSCOPIC RETROGRADE CHOLANGIOPANCREATOGRAPHY (ERCP)   :-  ENDOSCOPIC STONE EXTRACTION/BALLOON SWEEP. general    <BSHSIANPOST>    INITIAL Post-op Vital signs:   Vitals Value Taken Time   /85 03/31/21 1500   Temp 36.8 °C (98.2 °F) 03/31/21 1458   Pulse 86 03/31/21 1505   Resp 29 03/31/21 1505   SpO2 100 % 03/31/21 1505   Vitals shown include unvalidated device data.

## 2021-03-31 NOTE — H&P
[address]  [address] [facility], [address] [place] [address]      History and Physical       NAME:  Pastora Alfred   :   1934   MRN:   [id number]             History of Present Illness:  Patient is a 80 y.o. who is seen for biliary stricture and stent removal with possible replacement. 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 HX ORTHOPAEDIC Right 2010    rotator cuff    HX TONSILLECTOMY      IR IVC FILTER  2020         IR PLC IVC FILTER  2020       Allergies: Allergies   Allergen Reactions    Aspirin Unknown (comments)     Unsure of reaction.  Midodrine Other (comments)     Leg spasm/cramps/back itching/dry mouth/mucous production/slightly swollen and tingling breast/painful little fingers       Home Medications:  Prior to Admission Medications   Prescriptions Last Dose Informant Patient Reported? Taking? MULTIVITAMIN PO 3/30/2021 at Unknown time  Yes Yes   Sig: Take  by mouth. Takes one po once daily. TAMSULOSIN HCL (TAMSULOSIN PO) 3/30/2021 at Unknown time  Yes Yes   Sig: Take 0.4 mg by mouth daily. acetaminophen (TYLENOL EXTRA STRENGTH PO) 3/30/2021 at Unknown time  Yes Yes   Sig: Take  by mouth as needed. docusate sodium (COLACE) 50 mg capsule 3/30/2021 at Unknown time  Yes Yes   Sig: Take 50 mg by mouth as needed for Constipation. finasteride (PROSCAR) 5 mg tablet Not Taking at Unknown time  Yes No   Sig: Take 5 mg by mouth daily. midodrine (PROAMATINE) 2.5 mg tablet 3/24/2021 at Unknown time  Yes Yes   Sig: Take 5 mg by mouth three (3) times daily (with meals). Indications: a feeling of dizziness upon standing due to a drop in blood pressure   pantoprazole (PROTONIX) 40 mg tablet 3/30/2021 at Unknown time  Yes Yes   Sig: Take 40 mg by mouth daily.       Facility-Administered Medications: None       Hospital Medications:  Current Facility-Administered Medications   Medication Dose Route Frequency    sodium chloride (NS) flush 5-40 mL  5-40 mL IntraVENous Q8H    sodium chloride (NS) flush 5-40 mL  5-40 mL IntraVENous PRN    midazolam (VERSED) injection 0.25-5 mg  0.25-5 mg IntraVENous Multiple    fentaNYL citrate (PF) injection  mcg   mcg IntraVENous Multiple    naloxone (NARCAN) injection 0.4 mg  0.4 mg IntraVENous Multiple    flumazeniL (ROMAZICON) 0.1 mg/mL injection 0.2 mg  0.2 mg IntraVENous Multiple    simethicone (MYLICON) 91YP/4.8NJ oral drops 80 mg  1.2 mL Oral Multiple    atropine injection 0.5 mg  0.5 mg IntraVENous ONCE PRN    EPINEPHrine (ADRENALIN) 0.1 mg/mL syringe 1 mg  1 mg Endoscopically ONCE PRN    glucagon (GLUCAGEN) injection 1 mg  1 mg IntraVENous ONCE PRN    iopamidoL (ISOVUE 300) 61 % contrast injection 50 mL  50 mL Other ONCE    EPINEPHrine (ADRENALIN) 0.1 mg/mL syringe 1 mg  1 mg Other ONCE    atropine injection 1 mg  1 mg IntraVENous ONCE    lactated Ringers infusion  25 mL/hr IntraVENous CONTINUOUS    iopamidoL (ISOVUE 300) 61 % contrast injection 30 mL  30 mL IntraCATHeter RAD ONCE       Social History:  Social History     Tobacco Use    Smoking status: Never Smoker    Smokeless tobacco: Never Used   Substance Use Topics    Alcohol use: Not Currently     Comment: social       Family History:  Family History   Problem Relation Age of Onset    Diabetes Sister     Diabetes Brother     Heart Disease Brother              Review of Systems:      Constitutional: negative fever, negative chills, negative weight loss  Eyes:   negative visual changes  ENT:   negative sore throat, tongue or lip swelling  Respiratory: negative cough, negative dyspnea  Cards:  negative for chest pain, palpitations, lower extremity edema  GI:   See HPI  :  negative for frequency, dysuria  Integument:  negative for rash and pruritus  Heme:  negative for easy bruising and gum/nose bleeding  Musculoskel: negative for myalgias,  back pain and muscle weakness  Neuro: negative for headaches, dizziness, vertigo  Psych:  negative for feelings of anxiety, depression       Objective:     Patient Vitals for the past 8 hrs:   BP Temp Pulse Resp SpO2 Height Weight   03/31/21 1330 119/78 98.2 °F (36.8 °C) 82 22 98 %     03/31/21 1305      5' 11\" (1.803 m) 82.6 kg (182 lb)     No intake/output data recorded. No intake/output data recorded. EXAM:     NEURO-a&o   HEENT-wnl   LUNGS-clear    COR-regular rate and rhythym     ABD-soft , no tenderness, no rebound, good bs     EXT-no edema     Data Review     No results for input(s): WBC, HGB, HCT, PLT, HGBEXT, HCTEXT, PLTEXT in the last 72 hours. No results for input(s): NA, K, CL, CO2, BUN, CREA, GLU, PHOS, CA in the last 72 hours. No results for input(s): AP, TBIL, TP, ALB, GLOB, GGT, AML, LPSE in the last 72 hours. No lab exists for component: SGOT, GPT, AMYP, HLPSE  No results for input(s): INR, PTP, APTT, INREXT in the last 72 hours. Assessment:     · Biliary stricture     Patient Active Problem List   Diagnosis Code    DVT (deep vein thrombosis) in pregnancy O22.30    DVT (deep venous thrombosis) ([facility] [address] I82.409    Febrile R50.9    Ascites R18.8    [facility] A41.9     Plan:   · The patient was counseled at length about the risks of orion Covid-19 in the roe-operative and post-operative states including the recovery window of their procedure. The patient was made aware that orion Covid-19 after a surgical procedure may worsen their prognosis for recovering from the virus and lend to a higher morbidity and or mortality risk.   The patient was given the options of postponing their procedure. All of the risks, benefits, and alternatives were discussed. The patient does wish to proceed with the procedure. · Endoscopic procedure with GA     Signed By: Kev Armenta.  Dawson Lynch MD     3/31/2021  1:59 PM

## 2021-03-31 NOTE — ROUTINE PROCESS
Leslie [facility] 1934 
[id number] Situation: 
Verbal report received from: Jenelle Rojas Procedure: Procedure(s): ENDOSCOPIC RETROGRADE CHOLANGIOPANCREATOGRAPHY (ERCP)   :- 
ENDOSCOPIC STONE EXTRACTION/BALLOON SWEEP Background: 
 
Preoperative diagnosis: BILARY STRICTURE 
STENT REMOVAL Postoperative diagnosis: 1)Biliary Stricture :  Dr. Adrienne Perry Assistant(s): Endoscopy Technician-1: Matthieu Leggett Endoscopy RN-1: Kasandra Diaz RN Specimens: no 
H. Pylori  no Assessment: 
Intra-procedure medications Anesthesia gave intra-procedure sedation and medications, see anesthesia flow sheet Intravenous fluids: [email or web address] Vital signs stable Abdominal assessment: round and soft Recommendation: 
Discharge patient per MD order Family -yes Permission to share finding with family -yes

## 2021-03-31 NOTE — PERIOP NOTES

## 2021-03-31 NOTE — DISCHARGE INSTRUCTIONS
[address], [address] [place] [address]    ERCP DISCHARGE INSTRUCTIONS    Yahaira Velasquez  [id number]  1934    Discomfort:  Sore throat- throat lozenges or warm salt water gargle  redness at IV site- apply warm compress to area; if redness or soreness persist- contact your physician  Gaseous discomfort- walking, belching will help relieve any discomfort  You may not operate a vehicle for 12 hours  You may not engage in an occupation involving machinery or appliances for rest of today  You may not drink alcoholic beverages for at least 12 hours  Avoid making any critical decisions for at least 24 hour  DIET  You may resume a full liquid diet today. If tolerated, you may advance your diet tomorrow. ACTIVITY  You may resume your normal daily activities   Spend the remainder of the day resting -  avoid any strenuous activity. CALL M.D. ANY SIGN OF   Increasing pain, nausea, vomiting  Abdominal distension (swelling)  New increased bleeding (oral or rectal)  Fever (chills)  Pain in chest area  Bloody discharge from nose or mouth  Shortness of breath    Follow-up Instructions:   Call Dr. Gelacio Esqueda for any questions or problems. Telephone # [phone]    ENDOSCOPY FINDINGS:   Your previously placed biliary stent had migrated out of the bile duct and presumably passed in your stools. The bile duct stricture appeared improved and for this reason a stent was not replaced. Please call if you develop abdominal pain, fever, jaundice, or any concerning symptoms. Signed By: Turner Méndez. Mark Wynn MD     3/31/2021  3:54 PM         Learning About Coronavirus (COVID-19)  Coronavirus (COVID-19): Overview  What is coronavirus (SZHRT-16)? The coronavirus disease (COVID-19) is caused by a virus. It is an illness that was first found in [place], [place], in December 2019. It has since spread worldwide. The virus can cause fever, cough, and trouble breathing.  In severe cases, it can cause pneumonia and make it hard to breathe without help. It can cause death. Coronaviruses are a large group of viruses. They cause the common cold. They also cause more serious illnesses like Middle East respiratory syndrome (MERS) and severe acute respiratory syndrome (SARS). COVID-19 is caused by a novel coronavirus. That means it's a new type that has not been seen in people before. This virus spreads person-to-person through droplets from coughing and sneezing. It can also spread when you are close to someone who is infected. And it can spread when you touch something that has the virus on it, such as a doorknob or a tabletop. What can you do to protect yourself from coronavirus (COVID-19)? The best way to protect yourself from getting sick is to:  · Avoid areas where there is an outbreak. · Avoid contact with people who may be infected. · Wash your hands often with soap or alcohol-based hand sanitizers. · Avoid crowds and try to stay at least 6 feet away from other people. · Wash your hands often, especially after you cough or sneeze. Use soap and water, and scrub for at least 20 seconds. If soap and water aren't available, use an alcohol-based hand . · Avoid touching your mouth, nose, and eyes. What can you do to avoid spreading the virus to others? To help avoid spreading the virus to others:  · Cover your mouth with a tissue when you cough or sneeze. Then throw the tissue in the trash. · Use a disinfectant to clean things that you touch often. · Stay home if you are sick or have been exposed to the virus. Don't go to school, work, or public areas. And don't use public transportation. · If you are sick:  ? Leave your home only if you need to get medical care. But call the doctor's office first so they know you're coming. And wear a face mask, if you have one.  ? If you have a face mask, wear it whenever you're around other people.  It can help stop the spread of the virus when you cough or sneeze. ? Clean and disinfect your home every day. Use household  and disinfectant wipes or sprays. Take special care to clean things that you grab with your hands. These include doorknobs, remote controls, phones, and handles on your refrigerator and microwave. And don't forget countertops, tabletops, bathrooms, and computer keyboards. When to call for help  Call 911 anytime you think you may need emergency care. For example, call if:  · You have severe trouble breathing. (You can't talk at all.)  · You have constant chest pain or pressure. · You are severely dizzy or lightheaded. · You are confused or can't think clearly. · Your face and lips have a blue color. · You pass out (lose consciousness) or are very hard to wake up. Call your doctor now if you develop symptoms such as:  · Shortness of breath. · Fever. · Cough. If you need to get care, call ahead to the doctor's office for instructions before you go. Make sure you wear a face mask, if you have one, to prevent exposing other people to the virus. Where can you get the latest information? The following health organizations are tracking and studying this virus. Their websites contain the most up-to-date information. Chito Bryant also learn what to do if you think you may have been exposed to the virus. · U.S. Centers for Disease Control and Prevention (CDC): The CDC provides updated news about the disease and travel advice. The website also tells you how to prevent the spread of infection. www.cdc.gov  · World Health Organization [facility]): WHO offers information about the virus outbreaks. WHO also has travel advice. www.who.int  Current as of: April 1, 2020               Content Version: 12.4  © 3815-8901 Healthwise, Incorporated.    Care instructions adapted under license by your healthcare professional. If you have questions about a medical condition or this instruction, always ask your healthcare professional. [address] any warranty or liability for your use of this information.

## 2021-10-15 ENCOUNTER — TRANSCRIBE ORDER (OUTPATIENT)
Dept: SCHEDULING | Age: 86
End: 2021-10-15

## 2021-10-15 DIAGNOSIS — R74.8 ACID PHOSPHATASE ELEVATED: ICD-10-CM

## 2021-10-15 DIAGNOSIS — R14.0 BLOATING: Primary | ICD-10-CM

## 2021-10-15 DIAGNOSIS — K83.1 BILIARY STRICTURE: ICD-10-CM

## 2021-11-10 ENCOUNTER — HOSPITAL ENCOUNTER (OUTPATIENT)
Dept: CT IMAGING | Age: 86
Discharge: HOME | End: 2021-11-10
Attending: PHYSICIAN ASSISTANT
Payer: MEDICARE

## 2021-11-10 DIAGNOSIS — K83.1 BILIARY STRICTURE: ICD-10-CM

## 2021-11-10 DIAGNOSIS — R74.8 ACID PHOSPHATASE ELEVATED: ICD-10-CM

## 2021-11-10 DIAGNOSIS — R14.0 BLOATING: ICD-10-CM

## 2021-11-10 LAB — CREAT BLD-MCNC: 1.1 MG/DL (ref 0.6–1.3)

## 2021-11-10 PROCEDURE — 74011000636 HC RX REV CODE- 636: Performed by: RADIOLOGY

## 2021-11-10 PROCEDURE — 82565 ASSAY OF CREATININE: CPT

## 2021-11-10 PROCEDURE — 74177 CT ABD & PELVIS W/CONTRAST: CPT

## 2021-11-10 RX ADMIN — IOPAMIDOL 100 ML: 755 INJECTION, SOLUTION INTRAVENOUS at 15:14

## 2021-11-10 RX ADMIN — IOHEXOL 50 ML: 240 INJECTION, SOLUTION INTRATHECAL; INTRAVASCULAR; INTRAVENOUS; ORAL at 15:14

## 2021-11-27 NOTE — PROGRESS NOTES
Spiritual Care Assessment/Progress Note  [facility]      NAME: Hermilo Chavez      MRN: [id number]  AGE: 80 y.o. SEX: male  Anabaptist Affiliation: Spiritism   Language: English     7/14/2020     Total Time (in minutes): 5     Spiritual Assessment begun in [address] [place] [address] MED SURG through conversation with:         []Patient        [] Family    [] Friend(s)        Reason for Consult: Palliative Care, Initial/Spiritual Assessment     Spiritual beliefs: (Please include comment if needed)     [] Identifies with a dexter tradition:         [] Supported by a dexter community:            [] Claims no spiritual orientation:           [] Seeking spiritual identity:                [] Adheres to an individual form of spirituality:           [x] Not able to assess:                           Identified resources for coping:      [] Prayer                               [] Music                  [] Guided Imagery     [] Family/friends                 [] Pet visits     [] Devotional reading                         [x] Unknown     [] Other:                                               Interventions offered during this visit: (See comments for more details)                Plan of Care:     [] Support spiritual and/or cultural needs    [] Support AMD and/or advance care planning process      [] Support grieving process   [] Coordinate Rites and/or Rituals    [] Coordination with community clergy   [] No spiritual needs identified at this time   [] Detailed Plan of Care below (See Comments)  [] Make referral to Music Therapy  [] Make referral to Pet Therapy     [] Make referral to Addiction services  [] Make referral to [facility]  [] Make referral to Spiritual Care Partner  [] No future visits requested        [x] Follow up visits as needed     Comments:  visit for initial spiritual assessment, palliative care consult. Patient on Covid-19 isolation precautions.   Patient's nurse providing care and not available for consult at this time. Attempted to call the patient's room, but no answer. Will continue to follow up as needed and upon request as able. Visited by Rev. Jorgito Greene MDiv, [facility], [facility] paging service: [phone] English

## 2022-03-18 PROBLEM — R50.9 FEBRILE: Status: ACTIVE | Noted: 2020-10-02

## 2022-03-19 PROBLEM — A41.9 SEPSIS (HCC): Status: ACTIVE | Noted: 2020-10-02

## 2022-03-19 PROBLEM — I82.409 DVT (DEEP VENOUS THROMBOSIS) (HCC): Status: ACTIVE | Noted: 2020-07-14

## 2022-03-20 PROBLEM — R18.8 ASCITES: Status: ACTIVE | Noted: 2020-10-02

## 2022-09-22 ENCOUNTER — HOSPITAL ENCOUNTER (OUTPATIENT)
Dept: GENERAL RADIOLOGY | Age: 87
Discharge: HOME | End: 2022-09-22
Payer: MEDICARE

## 2022-09-22 ENCOUNTER — TRANSCRIBE ORDER (OUTPATIENT)
Dept: REGISTRATION | Age: 87
End: 2022-09-22

## 2022-09-22 DIAGNOSIS — K21.9 ACID REFLUX: ICD-10-CM

## 2022-09-22 DIAGNOSIS — R05.9 COUGH: ICD-10-CM

## 2022-09-22 DIAGNOSIS — R14.0 ABDOMINAL BLOATING: ICD-10-CM

## 2022-09-22 DIAGNOSIS — R79.89 ELEVATED LFTS: ICD-10-CM

## 2022-09-22 DIAGNOSIS — K83.1 BILIARY STRICTURE: ICD-10-CM

## 2022-09-22 DIAGNOSIS — R14.0 ABDOMINAL BLOATING: Primary | ICD-10-CM

## 2022-09-22 PROCEDURE — 74019 RADEX ABDOMEN 2 VIEWS: CPT

## 2023-04-27 ENCOUNTER — TRANSCRIBE ORDER (OUTPATIENT)
Dept: SCHEDULING | Age: 88
End: 2023-04-27

## 2023-04-27 DIAGNOSIS — R79.89 ELEVATED LFTS: ICD-10-CM

## 2023-04-27 DIAGNOSIS — K83.1 BILIARY STRICTURE: Primary | ICD-10-CM

## 2023-05-04 ENCOUNTER — HOSPITAL ENCOUNTER (OUTPATIENT)
Dept: MRI IMAGING | Age: 88
Discharge: HOME | End: 2023-05-04
Attending: INTERNAL MEDICINE
Payer: MEDICARE

## 2023-05-04 VITALS — BODY MASS INDEX: 28.31 KG/M2 | WEIGHT: 203 LBS

## 2023-05-04 DIAGNOSIS — K83.1 BILIARY STRICTURE: ICD-10-CM

## 2023-05-04 DIAGNOSIS — R79.89 ELEVATED LFTS: ICD-10-CM

## 2023-05-04 PROCEDURE — A9576 INJ PROHANCE MULTIPACK: HCPCS | Performed by: RADIOLOGY

## 2023-05-04 PROCEDURE — 74011250636 HC RX REV CODE- 250/636: Performed by: RADIOLOGY

## 2023-05-04 PROCEDURE — 74183 MRI ABD W/O CNTR FLWD CNTR: CPT

## 2023-05-04 RX ADMIN — GADOTERIDOL 18 ML: 279.3 INJECTION, SOLUTION INTRAVENOUS at 16:33

## 2023-06-09 NOTE — DISCHARGE INSTRUCTIONS
Discharge SNF/Rehab Instructions/LTAC       PATIENT ID: Rossy Estrada  MRN: [id number]   YOB: 1934    DATE OF ADMISSION: 7/13/2020  3:18 PM    DATE OF DISCHARGE: 7/30/2020    PRIMARY CARE PROVIDER: Amina Quiroga MD       ATTENDING PHYSICIAN: Timoteo Steiner*  DISCHARGING PROVIDER: Barry Swift NP     To contact this individual call [phone] and ask the  to page. If unavailable ask to be transferred the Adult Hospitalist Department. CONSULTATIONS: IP CONSULT TO HEMATOLOGY  IP CONSULT TO GASTROENTEROLOGY  IP CONSULT TO HEPATOLOGY  IP CONSULT TO INTERVENTIONAL RADIOLOGY  IP CONSULT TO PALLIATIVE CARE - PROVIDER  IP CONSULT TO HEMATOLOGY  IP CONSULT TO INTERVENTIONAL RADIOLOGY  IP CONSULT TO INTERVENTIONAL RADIOLOGY  IP CONSULT TO HOSPITALIST  IP CONSULT TO GENERAL SURGERY    PROCEDURES/SURGERIES: Procedure(s):  ESOPHAGOGASTRODUODENOSCOPY (EGD) at bedside    [address]:   This is a very pleasant elderly gentleman who was admitted to the hospital on July 13 with progressive nausea vomiting and right lower limb swelling and pain.  He was found to have extensive DVT in that limb as well as pulmonary embolisms.  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  He had ascites which was drained and sent for cytology results still pending.  While he is on anticoagulation for his thromboembolic disease he developed significant amount of GI bleed and endoscopy showed erosive esophagitis.   IVC filter placed on the 99NA without complication. Nacho Meyer is still on TPN but tolerating ice chips without nausea or vomiting.             DISCHARGE DIAGNOSES / PLAN:      Hemorrhagic shock: Due to GI bleed POA improving BP still low but stable - resolved   - GI bleed with Acute blood loss anemia due to erosive esophagitis:  Hemoglobin been stable,  continue PPI twice daily, EGD showed erosive esophagitis with evidence of recent bleed but no active bleeding.   - he has some fluid retension due to immobility.  - Follow up with Dr. 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    - Head of the pancreas mass: Negative cytology from ascitic fluid.  Possible plan for EUS biopsy, per GI   - need TPN to give rest to pancreas for at least 3 months     - Hypotension:  On midodrone     -  Severe protein calorie malnourishment continue   - Continue TPN       - mild hypokalemia  - Resolved         PENDING TEST RESULTS:   At the time of discharge the following test results are still pending: None   FOLLOW UP APPOINTMENTS:    Follow-up Information     Follow up With Specialties Details Why Contact Info    [address] [employer] [address] [place]    Josh Bacon MD Internal Medicine Call For follow up appointment [phone]  [facility]      Steven Goss MD Gastroenterology Call To schedule follow up appointment [facility] [address]      Leyda Washington MD Hematology and Oncology, Hematology, Oncology Call For follow up appointment [address] [employer] [phone]             ADDITIONAL CARE RECOMMENDATIONS:    Check CMP, Phos, Mag and triglycerides weekly and adjust TPN accordingly     DIET: NPO       TUBE FEEDING INSTRUCTIONS: Nne     OXYGEN / BiPAP SETTINGS: Nne     ACTIVITY: Activity as tolerated    WOUND CARE: None     EQUIPMENT needed: None       DISCHARGE MEDICATIONS:   See Medication Reconciliation Form      NOTIFY YOUR PHYSICIAN FOR ANY OF THE FOLLOWING:   Fever over 101 degrees for 24 hours.    Chest pain, shortness of breath, fever, chills, nausea, vomiting, diarrhea, change in mentation, falling, weakness, bleeding. Severe pain or pain not relieved by medications. Or, any other signs or symptoms that you may have questions about.     DISPOSITION:    Home With:   OT  PT  HH  RN      X SNF/Inpatient Rehab/LTAC    Independent/assisted living    Hospice    Other:       PATIENT CONDITION AT DISCHARGE:     Functional status    Poor    X Deconditioned     Independent      Cognition    X Lucid     Forgetful     Dementia      Catheters/lines (plus indication)    Moya     PICC     PEG     None      Code status     Full code    X DNR      PHYSICAL EXAMINATION AT DISCHARGE:   Refer to Progress Note***      CHRONIC MEDICAL DIAGNOSES:  Problem List as of 7/30/2020 Date Reviewed: 7/14/2020          Codes Class Noted - Resolved    * (Principal) DVT (deep venous thrombosis) ([facility] ICD-10-CM: I82.409  ICD-9-CM: 453.40  7/14/2020 - Present        DVT (deep vein thrombosis) in pregnancy ICD-10-CM: O22.30  ICD-9-CM: 671.30  7/13/2020 - Present                Signed:   Jah Adames NP  7/30/2020  11:34 AM Bill [id number] For Specimen Handling/Conveyance To Laboratory?: no Detail Level: None Venipuncture Paragraph: An alcohol pad was applied to the venipuncture site. Venipuncture was performed using a butterfly needle. Pressure and a bandaid was applied to the site. No complications were noted.

## 2023-09-23 NOTE — PROGRESS NOTES
TIMMY PLAN:    RUR-19%    Patient will be discharged today to [facility] arranged with [facility] for 12 noon    2nd IM Letter signed by wife     Nurse to call report to [phone], WING 1 for room 119B. CM faxed a copy of the UAI and COVID-19 result to [place] at [phone].  enclosed a H&P, facesheet, DDNR and a PCS form in an envelope for [facility] . No further discharge needs identified. Michael Carter MSA, RN,CRM    Transition of Care Plan to SNF/Rehab    SNF/Rehab Transition:  Patient has been accepted to [facility] and meets criteria for admission. Patient will transported by [facility] and expected to leave at  12 noon    Communication to Patient/Family:  Met with patient and wife and they are agreeable to the transition plan. Communication to SNF/Rehab:  Bedside RN, Rani Paige has been notified to update the transition plan to the facility and call report (phone number [phone]).   Discharge information has been updated on the AVS.     Discharge instructions will be downloaded from [employer] and confirmed with facility, Daniel can manage the patient care needs for the following:     SNF/Rehab Transition:  Tianna Purcell with (X) only those applicable:    Medication:  [x]  Medications will be available at the facility  []  IV Antibiotics  []  Controlled Substance - hard copy to be sent with patient   [x]  Weekly Labs   Documents:  [] Hard RX  [] MAR  [] Kardex  [x] AVS  [x]Transfer Summary  []Discharge   Equipment:  []  CPAP/BiPAP  []  Wound Vacuum  []  Moya or Urinary Device  [x]  PICC/Central Line  []  Nebulizer  []  Ventilator   Treatment:  []Isolation (for MRSA, VRE, etc.)  []Surgical Drain Management  []Tracheostomy Care  []Dressing Changes  []Dialysis with transportation and chair time   []PEG Care  []Oxygen  []Daily Weights for Heart Failure   Dietary:  []Any diet limitations  []Tube Feedings   [x]Total Parenteral Management (TPN)   Eligible for Medicaid Long Term Services and Supports  Yes:  [x] Eligible for medical assistance or will become eligible within 180 days and UAI completed. [] Provider/Patient and/or support system has requested screening. [] UAI copy provided to patient or responsible party,   [] UAI unavailable at discharge will send once processed to SNF provider. [] UAI unavailable at discharged mailed to patient  No:   [] Private pay and is not financially eligible for Medicaid within the next 180 days. [] Reside out-of-state.   [] A residents of a state owned/operated facility that is licensed  by [facility] or [facility]  [] Enrollment in [facility] hospice services  [] [address]  [] Patient /Family declines to have screening completed or provide financial information for screening     Financial Resources:  Medicaid    [] Initiated and application pending   [] Full coverage     Advanced Care Plan:  []Surrogate Decision Maker of Care  []POA  [x]Communicated Code Status  (DDNR\", )    Other PAST MEDICAL HISTORY:  No pertinent past medical history

## 2024-09-02 NOTE — PROGRESS NOTES
TRANSFER - OUT REPORT:    Verbal report given to [address] Guillermo Sheppard RN(name) on Jorje Tolliver  being transferred to (unit) for routine progression of care       Report consisted of patients Situation, Background, Assessment and   Recommendations(SBAR). Information from the following report(s) SBAR, Kardex, Intake/Output, MAR, Accordion and Recent Results was reviewed with the receiving nurse. Lines:   PICC Double Lumen 07/16/20 Right;Brachial (Active)   Criteria for Appropriate Use Limited/no vessel suitable for conventional peripheral access 07/16/20 1030   Site Assessment Clean;Dry 07/16/20 1030   Phlebitis Assessment 0 07/16/20 1030   Infiltration Assessment 0 07/16/20 1030   Arm Circumference (cm) 28 cm 07/16/20 1030   Date of Last Dressing Change 07/16/20 07/16/20 1030   Dressing Status New;Occlusive 07/16/20 1030   External Catheter Length (cm) 0 centimeters 07/16/20 1030   Dressing Type Disk with Chlorhexadine gluconate (CHG); Stabilization/securement device;Transparent 07/16/20 1030   Hub Color/Line Status Purple;Flushed;Capped 07/16/20 1030   Positive Blood Return (Site #1) Yes 07/16/20 1030   Hub Color/Line Status Red;Flushed;Capped 07/16/20 1030   Positive Blood Return (Site #2) Yes 07/16/20 1030   Alcohol Cap Used Yes 07/16/20 1030       Peripheral IV 07/13/20 Left Wrist (Active)   Site Assessment Clean, dry, & intact 07/15/20 2000   Phlebitis Assessment 0 07/15/20 2000   Infiltration Assessment 0 07/15/20 2000   Dressing Status Clean, dry, & intact 07/15/20 2000   Dressing Type Transparent 07/15/20 2000   Hub Color/Line Status Infusing 07/15/20 2000   Action Taken Open ports on tubing capped 07/15/20 2000   Alcohol Cap Used Yes 07/15/20 2000       Peripheral IV 07/16/20 Right Arm (Active)        Opportunity for questions and clarification was provided.       Patient transported with:   Bridge No

## 2025-04-16 NOTE — PROGRESS NOTES
Comprehensive Nutrition Assessment    Type and Reason for Visit: Positive nutrition screen    Nutrition Recommendations/Plan:   1. Encourage PO with all meals and supplements. Document % meals and any N/V.     2. Give zofran prior to meals as needed. 3. Add daily MVI if not drinking supplements. Nutrition Assessment:     Pt admitted for DVT. PMHx: arthritis, hypercholesterolemia. Hypotension on admit with DVT tof femoral vein on admit. Multiple hospitalizations and rehab visit over past few months with septic arthritis (Dec-Jan) followed by pancreatitis. Bedbound for past few months. N/V for 1 week PTA. Pancreatic mass with 2 previous admissions for pancreatitis, GI consult still pending. Ascites also noted with hepatology to see. Pt [place], spoke with daughter via phone. Poor poor over past week with intake consisting of just bone broth and Ensure shakes. 6lbs wt loss over that period of time. Ensure High Protein (420kcal, 42g protein), Ensure Clear (720kcal, 24g protein) added for lower fat content until po tolerance can be better evaluated and seen by GI. Malnutrition Assessment:  Malnutrition Status:  Severe malnutrition    Context:  Acute illness     Findings of the 6 clinical characteristics of malnutrition:   Energy Intake:  7 - 50% or less of est energy requirements for 5 or more days  Weight Loss:  7 - Greater than 2% over 1 week     Body Fat Loss:  Unable to assess,     Muscle Mass Loss:  Unable to assess,    Fluid Accumulation:  Unable to assess,     Strength:  Not performed     Estimated Daily Nutrient Needs:  Energy (kcal):  1738-1882kcal  Protein (g):  92 - 1.2g/kg       Fluid (ml/day):  1800 - 1ml/kcal    Nutrition Related Findings:  BM PTA    Wounds:  None       Current Nutrition Therapies:   DIET CARDIAC Regular  DIET NUTRITIONAL SUPPLEMENTS All Meals; Ensure High Protein  DIET NUTRITIONAL SUPPLEMENTS All Meals;  Ensure Clear  Meds: albumin, lasix, heparin    Anthropometric Patient access center called, stated no beds at this time, will call again about 1900.   Measures:  · Height:  5' 10\" (177.8 cm)  · Current Body Wt:  76.2 kg (168 lb)   · Admission Body Wt:  168 lb 6.9 oz    · Usual Body Wt:        · Ideal Body Wt:   :  101.2 %   ·   Nutrition Diagnosis:   · Inadequate oral intake, Severe malnutrition, In context of acute illness or injury related to altered GI function as evidenced by vomiting, nausea, weight loss 1-2% in 1 week, poor intake prior to admission    Nutrition Interventions:   Food and/or Nutrient Delivery: Modify current diet, Start oral nutrition supplement  Nutrition Education and Counseling: No recommendations at this time  Coordination of Nutrition Care: Continued inpatient monitoring    Goals:  Consumption of at least 50% meals and 2-3 supplements/day in 3-4 days       Nutrition Monitoring and Evaluation:   Behavioral-Environmental Outcomes:    Food/Nutrient Intake Outcomes: Supplement intake, Food and nutrient intake, Vitamin/mineral intake  Physical Signs/Symptoms Outcomes: Nausea/vomiting, GI status, Weight    Discharge Planning:     Too soon to determine     Anahi Santos, RD [address] [place] , Pager #[phone] or via [employer]

## (undated) DEVICE — CANNULATING SPHINCTEROTOME: Brand: JAGTOME™ REVOLUTION RX

## (undated) DEVICE — NEEDLE HYPO 18GA L1.5IN PNK S STL HUB POLYPR SHLD REG BVL

## (undated) DEVICE — GARMENT,MEDLINE,DVT,INT,CALF,LG, GEN2: Brand: MEDLINE

## (undated) DEVICE — BAG BELONG PT PERS CLEAR HANDL

## (undated) DEVICE — DEVICE LCK BILI RAP EXCHG OLPS --

## (undated) DEVICE — BAG SPEC BIOHZD LF 2MIL 6X10IN -- CONVERT TO ITEM 357326

## (undated) DEVICE — 1200 GUARD II KIT W/5MM TUBE W/O VAC TUBE: Brand: GUARDIAN

## (undated) DEVICE — RETRIEVAL BALLOON CATHETER: Brand: EXTRACTOR™ PRO RX

## (undated) DEVICE — BW-412T DISP COMBO CLEANING BRUSH: Brand: SINGLE USE COMBINATION CLEANING BRUSH

## (undated) DEVICE — CATH IV AUTOGRD BC BLU 22GA 25 -- INSYTE

## (undated) DEVICE — TUBING HYDR IRR --

## (undated) DEVICE — CUFF BLD PRSS CHILD SZ 9 FOR 15-21CM LIMB VYN SFT W/O TB

## (undated) DEVICE — SYRINGE MED 20ML STD CLR PLAS LUERLOCK TIP N CTRL DISP

## (undated) DEVICE — QUILTED PREMIUM COMFORT UNDERPAD,EXTRA HEAVY: Brand: WINGS

## (undated) DEVICE — KENDALL RADIOLUCENT FOAM MONITORING ELECTRODE -RECTANGULAR SHAPE: Brand: KENDALL

## (undated) DEVICE — SET ADMIN 16ML TBNG L100IN 2 Y INJ SITE IV PIGGY BK DISP

## (undated) DEVICE — FCPS BX HOT RJ4 2.2MMX240CM -- RADIAL JAW 4 BX/40

## (undated) DEVICE — CONTAINER SPEC 20 ML LID NEUT BUFF FORMALIN 10 % POLYPR STS

## (undated) DEVICE — SET EXTN TBNG L BOR 4 W STPCOCK ST 32IN PRIMING VOL 6ML

## (undated) DEVICE — Z DISCONTINUED USE 2751540 TUBING IRRIG L10IN DISP PMP ENDOGATOR

## (undated) DEVICE — AIRLIFE™ U/CONNECT-IT OXYGEN TUBING 7 FEET (2.1 M) CRUSH-RESISTANT OXYGEN TUBING, VINYL TIPPED: Brand: AIRLIFE™

## (undated) DEVICE — CONNECTOR TBNG AUX H2O JET DISP FOR OLY 160/180 SER

## (undated) DEVICE — WIREGUIDED CYTOLOGY BRUSH: Brand: RX CYTOLOGY BRUSH

## (undated) DEVICE — KIT IV STRT W CHLORAPREP PD 1ML

## (undated) DEVICE — REM POLYHESIVE ADULT PATIENT RETURN ELECTRODE: Brand: VALLEYLAB

## (undated) DEVICE — SOLIDIFIER FLUID 3000 CC ABSORB

## (undated) DEVICE — FORCEP GRASPING ALLIGATOR RAT 2.4 MMX230 CMX8 MM RESCUE

## (undated) DEVICE — ENDO CARRY-ON PROCEDURE KIT INCLUDES ENZYMATIC SPONGE, GAUZE, BIOHAZARD LABEL, TRAY, LUBRICANT, DIRTY SCOPE LABEL, WATER LABEL, TRAY, DRAWSTRING PAD, AND DEFENDO 4-PIECE KIT.: Brand: ENDO CARRY-ON PROCEDURE KIT